# Patient Record
Sex: MALE | Race: WHITE | Employment: OTHER | URBAN - NONMETROPOLITAN AREA
[De-identification: names, ages, dates, MRNs, and addresses within clinical notes are randomized per-mention and may not be internally consistent; named-entity substitution may affect disease eponyms.]

---

## 2017-02-13 LAB
ANION GAP SERPL CALCULATED.3IONS-SCNC: 12 MMOL/L (ref 4–12)
BUN BLDV-MCNC: 20 MG/DL (ref 7–20)
CALCIUM SERPL-MCNC: 8.2 MG/DL (ref 8.8–10.5)
CHLORIDE BLD-SCNC: 100 MEQ/L (ref 101–111)
CO2: 30 MEQ/L (ref 21–32)
CREAT SERPL-MCNC: 0.82 MG/DL (ref 0.7–1.3)
CREATININE CLEARANCE: >60
GLUCOSE, FASTING: 106 MG/DL (ref 70–110)
POTASSIUM SERPL-SCNC: 3 MEQ/L (ref 3.6–5)
SODIUM BLD-SCNC: 142 MEQ/L (ref 135–145)

## 2017-04-24 ENCOUNTER — OFFICE VISIT (OUTPATIENT)
Dept: CARDIOLOGY | Age: 62
End: 2017-04-24

## 2017-04-24 VITALS
WEIGHT: 218 LBS | HEART RATE: 92 BPM | BODY MASS INDEX: 33.04 KG/M2 | SYSTOLIC BLOOD PRESSURE: 166 MMHG | DIASTOLIC BLOOD PRESSURE: 82 MMHG | HEIGHT: 68 IN

## 2017-04-24 DIAGNOSIS — I25.10 CORONARY ARTERY DISEASE INVOLVING NATIVE CORONARY ARTERY OF NATIVE HEART WITHOUT ANGINA PECTORIS: Primary | ICD-10-CM

## 2017-04-24 DIAGNOSIS — K20.90 ESOPHAGITIS: ICD-10-CM

## 2017-04-24 PROCEDURE — G8598 ASA/ANTIPLAT THER USED: HCPCS | Performed by: INTERNAL MEDICINE

## 2017-04-24 PROCEDURE — G8427 DOCREV CUR MEDS BY ELIG CLIN: HCPCS | Performed by: INTERNAL MEDICINE

## 2017-04-24 PROCEDURE — 1036F TOBACCO NON-USER: CPT | Performed by: INTERNAL MEDICINE

## 2017-04-24 PROCEDURE — G8417 CALC BMI ABV UP PARAM F/U: HCPCS | Performed by: INTERNAL MEDICINE

## 2017-04-24 PROCEDURE — 99213 OFFICE O/P EST LOW 20 MIN: CPT | Performed by: INTERNAL MEDICINE

## 2017-04-24 PROCEDURE — 3017F COLORECTAL CA SCREEN DOC REV: CPT | Performed by: INTERNAL MEDICINE

## 2017-04-24 RX ORDER — LOSARTAN POTASSIUM 25 MG/1
25 TABLET ORAL DAILY
Qty: 30 TABLET | Refills: 11 | Status: SHIPPED | OUTPATIENT
Start: 2017-04-24 | End: 2019-03-25 | Stop reason: ALTCHOICE

## 2017-12-27 ENCOUNTER — HOSPITAL ENCOUNTER (OUTPATIENT)
Dept: PHYSICAL THERAPY | Age: 62
Setting detail: THERAPIES SERIES
Discharge: HOME | End: 2017-12-27
Payer: MEDICARE

## 2017-12-27 PROCEDURE — 97161 PT EVAL LOW COMPLEX 20 MIN: CPT

## 2017-12-27 PROCEDURE — G8978 MOBILITY CURRENT STATUS: HCPCS

## 2017-12-27 PROCEDURE — G8979 MOBILITY GOAL STATUS: HCPCS

## 2017-12-27 ASSESSMENT — PAIN DESCRIPTION - PAIN TYPE: TYPE: CHRONIC PAIN

## 2017-12-27 ASSESSMENT — PAIN SCALES - GENERAL: PAINLEVEL_OUTOF10: 5

## 2017-12-27 ASSESSMENT — PAIN DESCRIPTION - LOCATION: LOCATION: BACK

## 2017-12-27 ASSESSMENT — PAIN DESCRIPTION - FREQUENCY: FREQUENCY: INTERMITTENT

## 2017-12-27 NOTE — PROGRESS NOTES
I certify that I have examined the patient below and determined that Physical Medicine and Rehabilitation service is necessary; that the secondary diagnosis for the provision of rehabilitation services is consistent with identified needs; that service will be furnished on an outpatient basis while the patient is in my care; that I approve the above plan of care for up to 90 days or as specifically noted above and will review it within that time frame or more often if the patients condition requires. Attestation, signature or co-signature of physician indicates approval of certification requirements.    ________________________ ____________ __________  Physician Signature   Date   Time  IS IT OK TO DO LAND EXERCISES, IF NOT TOLERATED, THEN CHANGE TO POOL? ________      [facility]  OUTPATIENT PHYSICAL THERAPY  EVALUATION  [facility]     Time In: 6299  Time Out: 1510  Minutes: 35  Timed Code Treatment Minutes: 0 Minutes     Date: 2017  Patient Name: Joy Bustamante,  Gender:  male        CSN: [id number]   : 1955  (57 y.o.)  Referral Date : 17     Referring Practitioner: Dr Dylan Santos      Diagnosis: deconditioning, weakness, back pain,   Treatment Diagnosis: deconditioning, low back pain, weakness   Additional Pertinent Hx: right knee sprain due to fall several years ago  SS since Oct 2011, history of RA per patient        General:  PT Visit Information  Onset Date: 16  PT Insurance Information: Medicare,  copay of 20%    Total # of Visits to Date: 1  Plan of Care/Certification Expiration Date: 18          See Medical History Questionnaire for information related to allergies and medications.     Subjective:  Comments: incidious onset of low back pain 2016  Subsided on its own  Pain returned  in Oct 2017  No previous treatment    Other (Comment): pool therapy for aerobic exercise  3 time per week for 12 weeks,             Pain:  Patient Currently in Pain: Short term goal 3: decrease low back pain ot a 5/10 so he is able to tolerate a 6 min of walking    Long term goals  Time Frame for Long term goals : 12 weeks  Long term goal 1: increase core strength to 4/5, B hip strength to 4+/5   Long term goal 2: patient will report he is able to walk longer and at  more normal speed without increased low back pain   Long term goal 3: increase hip flex to 120, hmastring mobility to 40 to increase ease of dressing,   Long term goal 4: I home program     PT G-Codes  Functional Assessment Tool Used: kaylene saran low back pain and disabiltiy questionaire   Score: 5  Functional Limitation: Mobility: Walking and moving around  Mobility: Walking and Moving Around Current Status (): At least 20 percent but less than 40 percent impaired, limited or restricted  Mobility: Walking and Moving Around Goal Status ():  At least 1 percent but less than 20 percent impaired, limited or restricted    Elridge Yemi, PT

## 2017-12-28 ENCOUNTER — HOSPITAL ENCOUNTER (OUTPATIENT)
Dept: PHYSICAL THERAPY | Age: 62
Setting detail: THERAPIES SERIES
Discharge: HOME | End: 2017-12-28
Payer: MEDICARE

## 2018-01-15 ENCOUNTER — HOSPITAL ENCOUNTER (OUTPATIENT)
Dept: PHYSICAL THERAPY | Age: 63
Setting detail: THERAPIES SERIES
Discharge: HOME | End: 2018-01-15
Payer: MEDICARE

## 2018-01-15 PROCEDURE — 97110 THERAPEUTIC EXERCISES: CPT

## 2018-01-15 NOTE — PROGRESS NOTES
endurance  Assessment: Pt toelrated session well. Pt educated in abdominal bracing to assist with moblity and decrease lumbar pressure. Pt performed standing ex. but does fatigue quickly and has increase in SOB. Pt would benefit from continued skilled PT to increase endurance, strength, and ROM to return to functional moblity with no pain.        Patient Education:  Patient Education: abdominal bracing, HEP with visuals                      Plan:  Times per week: 2  Plan weeks: 12  Specific instructions for Next Treatment: would like to intitiate land therapy in order to better monitor lumbar spine during actiity,   Sent order to  to intitiate land versus pool program   Patient is not scheduled at this time -   Current Treatment Recommendations: Strengthening, ROM, Pain Management, Balance Training, Positioning  Plan Comment: continue per PT POC    Goals:  Patient goals : less pain so he can sleep at night,     Short term goals  Time Frame for Short term goals: 5 weeks  Short term goal 1: increase abd strength to 3/5 to allow for increased support of his lumbar spine  Short term goal 2: patient will perfrom 10 reps of PRE on land or in the poo while maintaining a neutral lumbar spine    Short term goal 3: decrease low back pain ot a 5/10 so he is able to tolerate a 6 min of walking    Long term goals  Time Frame for Long term goals : 12 weeks  Long term goal 1: increase core strength to 4/5, B hip strength to 4+/5   Long term goal 2: patient will report he is able to walk longer and at  more normal speed without increased low back pain   Long term goal 3: increase hip flex to 120, hmastring mobility to 40 to increase ease of dressing,   Long term goal 4: I home program          Konrad Watts

## 2018-01-17 ENCOUNTER — HOSPITAL ENCOUNTER (OUTPATIENT)
Dept: PHYSICAL THERAPY | Age: 63
Setting detail: THERAPIES SERIES
Discharge: HOME | End: 2018-01-17
Payer: MEDICARE

## 2018-01-17 PROCEDURE — 97110 THERAPEUTIC EXERCISES: CPT

## 2018-01-17 ASSESSMENT — PAIN DESCRIPTION - LOCATION: LOCATION: KNEE

## 2018-01-17 ASSESSMENT — PAIN DESCRIPTION - ORIENTATION: ORIENTATION: RIGHT

## 2018-01-17 ASSESSMENT — PAIN SCALES - GENERAL: PAINLEVEL_OUTOF10: 5

## 2018-01-17 ASSESSMENT — PAIN DESCRIPTION - PAIN TYPE: TYPE: CHRONIC PAIN

## 2018-01-17 NOTE — PROGRESS NOTES
X3  Exercise 11: all ex. to increase strength for functional mobility          Activity Tolerance:  Activity Tolerance: Patient Tolerated treatment well    Assessment: Body structures, Functions, Activity limitations: Decreased functional mobility , Decreased strength, Decreased ROM, Decreased ADL status, Decreased endurance  Assessment: Pt tolerated session well with no increase in pain. Pt demonstrates decreased ROM/strength of R knee during ambulation. Pt working on increasing core strength to asssit with stabilization of lumbar spine.         Patient Education:  Patient Education: abdominal bracing, HEP with visuals                      Plan:  Times per week: 2  Plan weeks: 12  Specific instructions for Next Treatment: would like to intitiate land therapy in order to better monitor lumbar spine during actiity,   Sent order to  to intitiate land versus pool program   Patient is not scheduled at this time -   Current Treatment Recommendations: Strengthening, ROM, Pain Management, Balance Training, Positioning  Plan Comment: continue per PT POC    Goals:  Patient goals : less pain so he can sleep at night,     Short term goals  Time Frame for Short term goals: 5 weeks  Short term goal 1: increase abd strength to 3/5 to allow for increased support of his lumbar spine  Short term goal 2: patient will perfrom 10 reps of PRE on land or in the poo while maintaining a neutral lumbar spine    Short term goal 3: decrease low back pain ot a 5/10 so he is able to tolerate a 6 min of walking    Long term goals  Time Frame for Long term goals : 12 weeks  Long term goal 1: increase core strength to 4/5, B hip strength to 4+/5   Long term goal 2: patient will report he is able to walk longer and at  more normal speed without increased low back pain   Long term goal 3: increase hip flex to 120, hmastring mobility to 40 to increase ease of dressing,   Long term goal 4: I home program          Greta Burnett

## 2018-01-22 ENCOUNTER — HOSPITAL ENCOUNTER (OUTPATIENT)
Dept: PHYSICAL THERAPY | Age: 63
Setting detail: THERAPIES SERIES
Discharge: HOME | End: 2018-01-22
Payer: MEDICARE

## 2018-01-22 PROCEDURE — 97110 THERAPEUTIC EXERCISES: CPT

## 2018-01-24 ENCOUNTER — HOSPITAL ENCOUNTER (OUTPATIENT)
Dept: PHYSICAL THERAPY | Age: 63
Setting detail: THERAPIES SERIES
Discharge: HOME | End: 2018-01-24
Payer: MEDICARE

## 2018-01-24 PROCEDURE — 97110 THERAPEUTIC EXERCISES: CPT

## 2018-01-24 ASSESSMENT — PAIN DESCRIPTION - ORIENTATION: ORIENTATION: POSTERIOR;LOWER;RIGHT

## 2018-01-24 ASSESSMENT — PAIN SCALES - GENERAL: PAINLEVEL_OUTOF10: 5

## 2018-01-24 ASSESSMENT — PAIN DESCRIPTION - LOCATION: LOCATION: LEG

## 2018-01-24 ASSESSMENT — PAIN DESCRIPTION - PAIN TYPE: TYPE: CHRONIC PAIN

## 2018-01-24 NOTE — PROGRESS NOTES
[facility]  OUTPATIENT PHYSICAL THERAPY  DAILY NOTE  [facility]     Time In: 1430  Time Out: 1500  Minutes: 30  Timed Code Treatment Minutes: 30 Minutes     Date: 2018  Patient Name: Jm Fink,  Gender:  male        CSN: [id number]   : 1955  (57 y.o.)  Referral Date : 17    Referring Practitioner: Dr Segura Headings      Diagnosis: deconditioning, weakness, back pain,   Treatment Diagnosis: deconditioning, low back pain, weakness    Additional Pertinent Hx: right knee sprain due to fall several years ago  SS since Oct 2011, history of RA per patient                   General:  PT Visit Information  Onset Date: 16  PT Insurance Information: Medicare,  copay of 20%    Total # of Visits to Date: 5  Plan of Care/Certification Expiration Date: 18               Subjective: Other (Comment): pool therapy for aerobic exercise  3 time per week for 12 weeks,      Subjective: Pt had no new concerns. Pain:     Pain Level: 5  Pain Type: Chronic pain  Pain Location: Leg  Pain Orientation: Posterior, Lower, Right      Objective  Follows Commands: Within Functional Limits                                                                                                                       Exercises  Exercise 1: Nustep seat 8 UE 9 lvl5 for 5 min. Exercise 2: stainding in // bars: heel toe raises, marching, mini squats, 3 way hip X15 reps  Exercise 3: supine quad sets, heel slides, glut sets, SLR, hip abd,/add. X10 reps  Exercise 4: abdominal bracing with 5 sec. holds X10, pelvic tilts, dynamic lumbar stabilization ex. with abdominal bracing UE, LE, reciprocal  Exercise 5: supine: diaganols with B hand clasps (chopping pattern) hitting targets to strengthening abdominals/obliques X10 reps  Exercise 6: SKTC, sitting piriformis stretch held 15 sec.  X3  Exercise 11: all ex. to increase strength for functional mobility          Activity Tolerance:  Activity Tolerance: Patient Tolerated treatment well    Assessment: Body structures, Functions, Activity limitations: Decreased functional mobility , Decreased strength, Decreased ROM, Decreased ADL status, Decreased endurance  Assessment: Pt continues to work on core strength to assist with stabilization of lumbar spine. Pt does have weakness in R knee with standing ex. and demonstrates decreased R knee flexion with gait pattern. Pt requires positive motivation to complete tasks. Cont with skilled PT to increase strength for functional moblity. Prognosis: Good       Patient Education:  Patient Education: abdominal bracing with all activity.                        Plan:  Times per week: 2  Plan weeks: 12  Specific instructions for Next Treatment: would like to intitiate land therapy in order to better monitor lumbar spine during actiity,   Sent order to  to intitiate land versus pool program   Patient is not scheduled at this time -   Current Treatment Recommendations: Strengthening, ROM, Pain Management, Balance Training, Positioning  Plan Comment: continue per PT POC    Goals:  Patient goals : less pain so he can sleep at night,     Short term goals  Time Frame for Short term goals: 5 weeks  Short term goal 1: increase abd strength to 3/5 to allow for increased support of his lumbar spine  Short term goal 2: patient will perfrom 10 reps of PRE on land or in the poo while maintaining a neutral lumbar spine    Short term goal 3: decrease low back pain ot a 5/10 so he is able to tolerate a 6 min of walking    Long term goals  Time Frame for Long term goals : 12 weeks  Long term goal 1: increase core strength to 4/5, B hip strength to 4+/5   Long term goal 2: patient will report he is able to walk longer and at  more normal speed without increased low back pain   Long term goal 3: increase hip flex to 120, hmastring mobility to 40 to increase ease of dressing,   Long term goal 4: I home program          Hema

## 2018-01-25 ENCOUNTER — OFFICE VISIT (OUTPATIENT)
Dept: CARDIOLOGY CLINIC | Age: 63
End: 2018-01-25
Payer: MEDICARE

## 2018-01-25 VITALS
DIASTOLIC BLOOD PRESSURE: 68 MMHG | HEART RATE: 88 BPM | WEIGHT: 220 LBS | HEIGHT: 68 IN | SYSTOLIC BLOOD PRESSURE: 142 MMHG | BODY MASS INDEX: 33.34 KG/M2

## 2018-01-25 DIAGNOSIS — Z87.891 EX-SMOKER: ICD-10-CM

## 2018-01-25 DIAGNOSIS — E78.5 DYSLIPIDEMIA, GOAL LDL BELOW 100: ICD-10-CM

## 2018-01-25 DIAGNOSIS — I10 ESSENTIAL HYPERTENSION: ICD-10-CM

## 2018-01-25 DIAGNOSIS — I25.10 CORONARY ARTERY DISEASE INVOLVING NATIVE CORONARY ARTERY OF NATIVE HEART WITHOUT ANGINA PECTORIS: Primary | ICD-10-CM

## 2018-01-25 DIAGNOSIS — I73.9 CLAUDICATION (HCC): ICD-10-CM

## 2018-01-25 PROCEDURE — G8484 FLU IMMUNIZE NO ADMIN: HCPCS | Performed by: INTERNAL MEDICINE

## 2018-01-25 PROCEDURE — 93000 ELECTROCARDIOGRAM COMPLETE: CPT | Performed by: INTERNAL MEDICINE

## 2018-01-25 PROCEDURE — G8598 ASA/ANTIPLAT THER USED: HCPCS | Performed by: INTERNAL MEDICINE

## 2018-01-25 PROCEDURE — 3017F COLORECTAL CA SCREEN DOC REV: CPT | Performed by: INTERNAL MEDICINE

## 2018-01-25 PROCEDURE — G8417 CALC BMI ABV UP PARAM F/U: HCPCS | Performed by: INTERNAL MEDICINE

## 2018-01-25 PROCEDURE — 99213 OFFICE O/P EST LOW 20 MIN: CPT | Performed by: INTERNAL MEDICINE

## 2018-01-25 PROCEDURE — 1036F TOBACCO NON-USER: CPT | Performed by: INTERNAL MEDICINE

## 2018-01-25 PROCEDURE — G8427 DOCREV CUR MEDS BY ELIG CLIN: HCPCS | Performed by: INTERNAL MEDICINE

## 2018-01-25 RX ORDER — TRAMADOL HYDROCHLORIDE 50 MG/1
50 TABLET ORAL EVERY 6 HOURS PRN
COMMUNITY
End: 2020-09-24

## 2018-01-29 ENCOUNTER — HOSPITAL ENCOUNTER (OUTPATIENT)
Dept: PHYSICAL THERAPY | Age: 63
Setting detail: THERAPIES SERIES
Discharge: HOME | End: 2018-01-29
Payer: MEDICARE

## 2018-01-29 PROCEDURE — 97110 THERAPEUTIC EXERCISES: CPT

## 2018-01-29 ASSESSMENT — PAIN DESCRIPTION - LOCATION: LOCATION: KNEE

## 2018-01-29 ASSESSMENT — PAIN DESCRIPTION - PAIN TYPE: TYPE: CHRONIC PAIN

## 2018-01-29 ASSESSMENT — PAIN DESCRIPTION - ORIENTATION: ORIENTATION: RIGHT

## 2018-01-29 ASSESSMENT — PAIN SCALES - GENERAL: PAINLEVEL_OUTOF10: 5

## 2018-01-29 NOTE — PROGRESS NOTES
abdominals/obliques X10 reps  Exercise 6: SKTC, sitting piriformis stretch held 15 sec. X3  Exercise 11: all ex. to increase strength for functional mobility          Activity Tolerance:  Activity Tolerance: Patient Tolerated treatment well    Assessment: Body structures, Functions, Activity limitations: Decreased functional mobility , Decreased strength, Decreased ROM, Decreased ADL status, Decreased endurance  Assessment: Pt continues to work on balance and core strengthening ex. Pt has some weakness and pain in R knee affecting his gait pattern. Prognosis: Good       Patient Education:  Patient Education: abdominal bracing with all activity.                        Plan:  Times per week: 2  Plan weeks: 12  Specific instructions for Next Treatment: would like to intitiate land therapy in order to better monitor lumbar spine during actiity,   Sent order to Dr to intitiate land versus pool program   Patient is not scheduled at this time -   Current Treatment Recommendations: Strengthening, ROM, Pain Management, Balance Training, Positioning  Plan Comment: continue per PT POC    Goals:  Patient goals : less pain so he can sleep at night,     Short term goals  Time Frame for Short term goals: 5 weeks  Short term goal 1: increase abd strength to 3/5 to allow for increased support of his lumbar spine  Short term goal 2: patient will perfrom 10 reps of PRE on land or in the poo while maintaining a neutral lumbar spine    Short term goal 3: decrease low back pain ot a 5/10 so he is able to tolerate a 6 min of walking    Long term goals  Time Frame for Long term goals : 12 weeks  Long term goal 1: increase core strength to 4/5, B hip strength to 4+/5   Long term goal 2: patient will report he is able to walk longer and at  more normal speed without increased low back pain   Long term goal 3: increase hip flex to 120, hmastring mobility to 40 to increase ease of dressing,   Long term goal 4: I home program          Debbie Hameed

## 2018-01-30 ENCOUNTER — HOSPITAL ENCOUNTER (OUTPATIENT)
Dept: INTERVENTIONAL RADIOLOGY/VASCULAR | Age: 63
Discharge: HOME | End: 2018-01-30
Payer: MEDICARE

## 2018-01-30 DIAGNOSIS — I73.9 CLAUDICATION (HCC): ICD-10-CM

## 2018-01-30 PROCEDURE — 93923 UPR/LXTR ART STDY 3+ LVLS: CPT

## 2018-01-30 NOTE — PROGRESS NOTES
congestive heart failure and these include weighing one self and if gains 3 pounds to take additional water pill, fluid restrict to 2 liters a day, 2 gram sodium intake and if increased thirst to seek medical attention. The patient is educated on symptoms of heart disease that include chest pain and passing out, dizziness, etc and to report them if there is any change or go to emergency room. Patient is advised to exercise 30 mins a day three times a week and about weight loss, balance diet and more fruits and vegetables. A low fat, low cholesterol is discussed with the patient. Return in about 4 weeks (around 2/22/2018).

## 2018-02-01 ENCOUNTER — HOSPITAL ENCOUNTER (OUTPATIENT)
Dept: PHYSICAL THERAPY | Age: 63
Setting detail: THERAPIES SERIES
Discharge: HOME | End: 2018-02-01
Payer: MEDICARE

## 2018-02-01 PROCEDURE — G8978 MOBILITY CURRENT STATUS: HCPCS

## 2018-02-01 PROCEDURE — G8979 MOBILITY GOAL STATUS: HCPCS

## 2018-02-01 PROCEDURE — 97110 THERAPEUTIC EXERCISES: CPT

## 2018-02-01 NOTE — PROGRESS NOTES
maintian a neutral spine with while  [id number] reps of PREs in standing, and supine,    Short term goal 3: decrease low back pain ot a 5/10 so he is able to tolerate a 6 min of walking-- met for low back pain-- unable to attempt 6 min of walking due to uncontrolled coughing and clod  will attempt when breathing imporves  not met  contiinue goal     Long term goals  Time Frame for Long term goals : 12 weeks-- not met  only seen for 8 visits,   Long term goal 1: increase core strength to 4/5, B hip strength to 4+/5   Long term goal 2: patient will report he is able to walk longer and at  more normal speed without increased low back pain   Long term goal 3: increase hip flex to 120, hamstring mobility to 40 to increase ease of dressing,   Long term goal 4: I home program     PT G-Codes  Functional Assessment Tool Used: kaylene Mark low back pain and disabilty questionaire  Score: 7  ( increased due to right knee pain, not allows pain due to low back)   Functional Limitation: Mobility: Walking and moving around  Mobility: Walking and Moving Around Current Status (): At least 20 percent but less than 40 percent impaired, limited or restricted  Mobility: Walking and Moving Around Goal Status ():  At least 1 percent but less than 20 percent impaired, limited or restricted    Kandace Fails, PT

## 2018-02-07 ENCOUNTER — HOSPITAL ENCOUNTER (OUTPATIENT)
Dept: PHYSICAL THERAPY | Age: 63
Setting detail: THERAPIES SERIES
Discharge: HOME | End: 2018-02-07
Payer: MEDICARE

## 2018-02-07 PROCEDURE — 97110 THERAPEUTIC EXERCISES: CPT

## 2018-02-07 ASSESSMENT — PAIN SCALES - GENERAL: PAINLEVEL_OUTOF10: 5

## 2018-02-07 ASSESSMENT — PAIN DESCRIPTION - ORIENTATION: ORIENTATION: RIGHT

## 2018-02-07 ASSESSMENT — PAIN DESCRIPTION - PAIN TYPE: TYPE: CHRONIC PAIN

## 2018-02-07 ASSESSMENT — PAIN DESCRIPTION - LOCATION: LOCATION: KNEE

## 2018-02-08 ENCOUNTER — HOSPITAL ENCOUNTER (OUTPATIENT)
Dept: PHYSICAL THERAPY | Age: 63
Setting detail: THERAPIES SERIES
Discharge: HOME | End: 2018-02-08
Payer: MEDICARE

## 2018-02-08 PROCEDURE — 97110 THERAPEUTIC EXERCISES: CPT

## 2018-02-08 ASSESSMENT — PAIN DESCRIPTION - PAIN TYPE: TYPE: CHRONIC PAIN

## 2018-02-08 NOTE — PROGRESS NOTES
[facility]  OUTPATIENT PHYSICAL THERAPY  DAILY NOTE  Jaja Mansfield     Time In: 9104  Time Out: 4140  Minutes: 28  Timed Code Treatment Minutes: 28 Minutes     Date: 2018  Patient Name: Wardell Schirmer,  Gender:  male        CSN: [id number]   : 1955  (57 y.o.)  Referral Date : 17    Referring Practitioner: Dr Jauregui Marking      Diagnosis: deconditioning, weakness, back pain,   Treatment Diagnosis: deconditioning, low back pain, weakness    Additional Pertinent Hx: right knee sprain due to fall several years ago  SS since Oct 2011, history of RA per patient                   General:  PT Visit Information  Onset Date: 16  PT Insurance Information: Medicare,  copay of 20%    Total # of Visits to Date: 9  Plan of Care/Certification Expiration Date: 18  Progress Note Counter: PN on 2- visit, due on 18 visit,               Subjective:  Comments: incidious onset of low back pain 2016  Subsided on its own  Pain returned  in Oct 2017  No previous treatment    Other (Comment): pool therapy for aerobic exercise  3 time per week for 12 weeks, OK fo r land treatment      Subjective: Pt stated having sore stiff R knee. 8/10   denies ankle and calf pain,      Pain:  Patient Currently in Pain: Yes  Pain Type: Chronic pain      Objective  Follows Commands: Within Functional Limits                                                  Exercises  Exercise 1: Nustep seat 8 UE 9 lvl5 for 5 min. Exercise 2: stainding in // bars: heel toe raises, marching, mini squats on blue foam,  3 way hip X10 reps with yellow band    Exercise 3: supine quad sets, heel slides, glut sets, SLR, hip abd,/add.  X15 reps  Exercise 4: abdominal bracing with 5 sec. holds X10, pelvic tilts, dynamic lumbar stabilization ex. with abdominal bracing UE, LE, reciprocal  Exercise 5: supine: diagonals with B hand clasps (chopping pattern) hitting targets to strengthening abdominals/obliques X10 reps  Exercise

## 2018-02-12 ENCOUNTER — HOSPITAL ENCOUNTER (OUTPATIENT)
Dept: PHYSICAL THERAPY | Age: 63
Setting detail: THERAPIES SERIES
Discharge: HOME | End: 2018-02-12
Payer: MEDICARE

## 2018-02-12 ENCOUNTER — TELEPHONE (OUTPATIENT)
Dept: CARDIOLOGY CLINIC | Age: 63
End: 2018-02-12

## 2018-02-12 PROCEDURE — 97110 THERAPEUTIC EXERCISES: CPT

## 2018-02-12 ASSESSMENT — PAIN DESCRIPTION - LOCATION: LOCATION: KNEE

## 2018-02-12 ASSESSMENT — PAIN DESCRIPTION - ORIENTATION: ORIENTATION: RIGHT

## 2018-02-12 ASSESSMENT — PAIN SCALES - GENERAL: PAINLEVEL_OUTOF10: 10

## 2018-02-12 ASSESSMENT — PAIN DESCRIPTION - PAIN TYPE: TYPE: CHRONIC PAIN

## 2018-02-12 NOTE — PROGRESS NOTES
due to uncontrolled coughing and clod  will attempt when breathing imporves  not met  contiinue goal     Long term goals  Time Frame for Long term goals : 12 weeks-- not met  only seen for 8 visits,   Long term goal 1: increase core strength to 4/5, B hip strength to 4+/5   Long term goal 2: patient will report he is able to walk longer and at  more normal speed without increased low back pain   Long term goal 3: increase hip flex to 120, hamstring mobility to 40 to increase ease of dressing,   Long term goal 4: I home program          Fabiola Sloan

## 2018-02-14 ENCOUNTER — HOSPITAL ENCOUNTER (OUTPATIENT)
Dept: PHYSICAL THERAPY | Age: 63
Setting detail: THERAPIES SERIES
Discharge: HOME | End: 2018-02-14
Payer: MEDICARE

## 2018-02-14 PROCEDURE — 97110 THERAPEUTIC EXERCISES: CPT

## 2018-02-14 ASSESSMENT — PAIN DESCRIPTION - ORIENTATION: ORIENTATION: RIGHT

## 2018-02-14 ASSESSMENT — PAIN DESCRIPTION - LOCATION: LOCATION: KNEE

## 2018-02-14 ASSESSMENT — PAIN DESCRIPTION - PAIN TYPE: TYPE: CHRONIC PAIN

## 2018-02-14 ASSESSMENT — PAIN SCALES - GENERAL: PAINLEVEL_OUTOF10: 5

## 2018-02-19 ENCOUNTER — HOSPITAL ENCOUNTER (OUTPATIENT)
Dept: PHYSICAL THERAPY | Age: 63
Setting detail: THERAPIES SERIES
Discharge: HOME | End: 2018-02-19
Payer: MEDICARE

## 2018-02-19 PROCEDURE — 97110 THERAPEUTIC EXERCISES: CPT

## 2018-02-19 ASSESSMENT — PAIN SCALES - GENERAL: PAINLEVEL_OUTOF10: 5

## 2018-02-19 ASSESSMENT — PAIN DESCRIPTION - ORIENTATION: ORIENTATION: RIGHT

## 2018-02-19 ASSESSMENT — PAIN DESCRIPTION - LOCATION: LOCATION: KNEE

## 2018-02-19 ASSESSMENT — PAIN DESCRIPTION - PAIN TYPE: TYPE: CHRONIC PAIN

## 2018-02-21 ENCOUNTER — HOSPITAL ENCOUNTER (OUTPATIENT)
Dept: PHYSICAL THERAPY | Age: 63
Setting detail: THERAPIES SERIES
Discharge: HOME | End: 2018-02-21
Payer: MEDICARE

## 2018-02-21 PROCEDURE — 97110 THERAPEUTIC EXERCISES: CPT

## 2018-02-21 ASSESSMENT — PAIN DESCRIPTION - LOCATION: LOCATION: KNEE

## 2018-02-21 ASSESSMENT — PAIN SCALES - GENERAL: PAINLEVEL_OUTOF10: 5

## 2018-02-21 ASSESSMENT — PAIN DESCRIPTION - ORIENTATION: ORIENTATION: RIGHT

## 2018-02-21 ASSESSMENT — PAIN DESCRIPTION - PAIN TYPE: TYPE: CHRONIC PAIN

## 2018-02-21 NOTE — PROGRESS NOTES
pelvic circles [id number], CGA for safety. Exercise 6: SKTC, sitting piriformis stretch held 15 sec. X3  Exercise 7: rockerboard fwd/lat X15 with 10sec. balance (decreased balance laterally)  Exercise 8: NK table 5# flexion/ 5#extension cues to slow motion X10 reps with 2 sets  Exercise 11: all ex. to increase strength for functional mobility          Activity Tolerance:  Activity Tolerance: Patient limited by endurance    Assessment: Body structures, Functions, Activity limitations: Decreased functional mobility , Decreased strength, Decreased ROM, Decreased ADL status, Decreased endurance  Assessment: Pt demonstrated decreased endurance throughout session. Pt does have instability with R knee limiting functional mobility. Patient Education:  Patient Education: cont with HEP, there. ex. Plan:  Times per week: 2  Plan weeks: 4-5  Specific instructions for Next Treatment: continue with exercise , progress to rocerboad for ankle stability , and increase reps and resisitance for exerise.   Current Treatment Recommendations: Strengthening, ROM, Pain Management, Balance Training, Positioning  Plan Comment: continue per PT POC    Goals:  Patient goals : less pain so he can sleep at night    Short term goals  Time Frame for Short term goals: 5 weeks  Short term goal 2: patient will perform 10 reps of PRE on land or in the pool while maintaining a neutral lumbar spine  -- met  new goal-- maintian a neutral spine with while  [id number] reps of PREs in standing, and supine,    Short term goal 3: decrease low back pain ot a 5/10 so he is able to tolerate a 6 min of walking-- met for low back pain-- unable to attempt 6 min of walking due to uncontrolled coughing and clod  will attempt when breathing imporves  not met  contiinue goal     Long term goals  Time Frame for Long term goals : 12 weeks-- not met  only seen for 8 visits,   Long term goal 1: increase core strength to 4/5, B hip strength to 4+/5   Long term goal 2: patient will report he is able to walk longer and at  more normal speed without increased low back pain   Long term goal 3: increase hip flex to 120, hamstring mobility to 40 to increase ease of dressing,   Long term goal 4: I home program          Fabiola Sloan

## 2018-02-26 ENCOUNTER — HOSPITAL ENCOUNTER (OUTPATIENT)
Dept: PHYSICAL THERAPY | Age: 63
Setting detail: THERAPIES SERIES
Discharge: HOME | End: 2018-02-26
Payer: MEDICARE

## 2018-02-26 PROCEDURE — 97110 THERAPEUTIC EXERCISES: CPT

## 2018-02-26 ASSESSMENT — PAIN DESCRIPTION - PAIN TYPE: TYPE: CHRONIC PAIN

## 2018-02-26 ASSESSMENT — PAIN DESCRIPTION - ORIENTATION: ORIENTATION: RIGHT

## 2018-02-26 ASSESSMENT — PAIN DESCRIPTION - LOCATION: LOCATION: KNEE

## 2018-02-26 ASSESSMENT — PAIN SCALES - GENERAL: PAINLEVEL_OUTOF10: 5

## 2018-02-26 NOTE — PROGRESS NOTES
breathing imporves  not met  contiinue goal     Long term goals  Time Frame for Long term goals : 12 weeks-- not met  only seen for 8 visits,   Long term goal 1: increase core strength to 4/5, B hip strength to 4+/5   Long term goal 2: patient will report he is able to walk longer and at  more normal speed without increased low back pain   Long term goal 3: increase hip flex to 120, hamstring mobility to 40 to increase ease of dressing,   Long term goal 4: I home program          Mikaela Wahl

## 2018-03-01 ENCOUNTER — HOSPITAL ENCOUNTER (OUTPATIENT)
Dept: PHYSICAL THERAPY | Age: 63
Setting detail: THERAPIES SERIES
Discharge: HOME | End: 2018-03-01
Payer: MEDICARE

## 2018-03-01 PROCEDURE — G8979 MOBILITY GOAL STATUS: HCPCS

## 2018-03-01 PROCEDURE — 97110 THERAPEUTIC EXERCISES: CPT

## 2018-03-01 PROCEDURE — G8980 MOBILITY D/C STATUS: HCPCS

## 2018-03-01 NOTE — PROGRESS NOTES
[facility]  OUTPATIENT PHYSICAL THERAPY  DISCHARGE NOTE  Tiny [facility]     Time In: 0191  Time Out: 1600  Minutes: 45  Timed Code Treatment Minutes: 45 Minutes     Date: 3/1/2018  Patient Name: Omayra Wilson,  Gender:  male        CSN: [id number]   : 1955  (57 y.o.)  Referral Date : 17    Referring Practitioner: Dr Les John      Diagnosis: deconditioning, weakness, back pain,   Treatment Diagnosis: deconditioning, low back pain, weakness    Additional Pertinent Hx: right knee sprain due to fall several years ago  SS since Oct 2011, history of RA per patient                   General:  PT Visit Information  Onset Date: 16  PT Insurance Information: Medicare,  co-pay of 20%    Total # of Visits to Date: 15  Plan of Care/Certification Expiration Date: 18  Progress Note Counter: PN on 2-1  8th visit, ,PN 3-1 on 15th visit,                Subjective:  Comments: incidious onset of low back pain 2016  Subsided on its own  Pain returned  in Oct 2017  No previous treatment    Other (Comment): pool therapy for aerobic exercise  3 time per week for 12 weeks, OK for land treatment      Subjective: No back pain. RIght knee pain is his cheif complaint,      Pain:  Patient Currently in Pain: Yes         Objective  Follows Commands: Within Functional Limits      Right knee crepitus with AROM    Strength:  4/5, right ankle DF 3+/5, PF 4+/5 IN and eversion 4/5      Gait --lacks  knee flexion with push off on the right,  Good trunk stability during exercise as stated below. Exercises  Exercise 1: Nustep seat 8 UE 9 lvl5 for 5 min. Exercise 2: stainding in // bars: on blue foam, heel toe raises, marching, mini squats. ,  3 way hip X15 reps with red band    Exercise 3: supine quad sets, glut sets, SLR, hip abd,/add.  X10 reps  Exercise 4: abdominal bracing with 5 sec. holds X10, dynamic lumbar stabilization ex. with abdominal bracing UE, LE, reciprocal on blue therapy ball,

## 2018-03-20 ENCOUNTER — OFFICE VISIT (OUTPATIENT)
Dept: CARDIOLOGY CLINIC | Age: 63
End: 2018-03-20
Payer: MEDICARE

## 2018-03-20 VITALS
BODY MASS INDEX: 33.74 KG/M2 | DIASTOLIC BLOOD PRESSURE: 64 MMHG | HEIGHT: 68 IN | HEART RATE: 88 BPM | WEIGHT: 222.6 LBS | SYSTOLIC BLOOD PRESSURE: 134 MMHG

## 2018-03-20 DIAGNOSIS — I10 ESSENTIAL HYPERTENSION: ICD-10-CM

## 2018-03-20 DIAGNOSIS — E78.5 DYSLIPIDEMIA, GOAL LDL BELOW 100: ICD-10-CM

## 2018-03-20 DIAGNOSIS — I25.10 CORONARY ARTERY DISEASE INVOLVING NATIVE CORONARY ARTERY OF NATIVE HEART WITHOUT ANGINA PECTORIS: Primary | ICD-10-CM

## 2018-03-20 DIAGNOSIS — Z87.891 EX-SMOKER: ICD-10-CM

## 2018-03-20 PROCEDURE — G8484 FLU IMMUNIZE NO ADMIN: HCPCS | Performed by: INTERNAL MEDICINE

## 2018-03-20 PROCEDURE — G8417 CALC BMI ABV UP PARAM F/U: HCPCS | Performed by: INTERNAL MEDICINE

## 2018-03-20 PROCEDURE — 99213 OFFICE O/P EST LOW 20 MIN: CPT | Performed by: INTERNAL MEDICINE

## 2018-03-20 PROCEDURE — G8427 DOCREV CUR MEDS BY ELIG CLIN: HCPCS | Performed by: INTERNAL MEDICINE

## 2018-03-20 PROCEDURE — 1036F TOBACCO NON-USER: CPT | Performed by: INTERNAL MEDICINE

## 2018-03-20 PROCEDURE — G8598 ASA/ANTIPLAT THER USED: HCPCS | Performed by: INTERNAL MEDICINE

## 2018-03-20 PROCEDURE — 3017F COLORECTAL CA SCREEN DOC REV: CPT | Performed by: INTERNAL MEDICINE

## 2018-03-25 NOTE — PROGRESS NOTES
This patient is followed regularly by Dr. Anda Schilder, JOSE DANIEL. Chief Complaint   Patient presents with    1 Month Follow-Up     VIKI's    Coronary Artery Disease         Current Outpatient Prescriptions   Medication Sig Dispense Refill    Potassium Chloride Apple ER (KLOR-CON M20 PO) Take by mouth      traMADol (ULTRAM) 50 MG tablet Take 50 mg by mouth every 6 hours as needed for Pain.  Glucos-Chond-Sterol-Fish Oil (GLUCOSAMINE CHONDROITIN PLUS PO) Take by mouth      losartan (COZAAR) 25 MG tablet Take 1 tablet by mouth daily 30 tablet 11    amLODIPine (NORVASC) 10 MG tablet Take 10 mg by mouth daily      aspirin 81 MG EC tablet Take 81 mg by mouth daily      Insulin Detemir (LEVEMIR SC) Inject into the skin nightly       acetaminophen 650 MG TABS Take 650 mg by mouth every 4 hours as needed 120 tablet 3    pantoprazole (PROTONIX) 40 MG tablet Take 1 tablet by mouth 2 times daily (Patient taking differently: Take 40 mg by mouth daily ) 60 tablet 2    metFORMIN (GLUCOPHAGE) 500 MG tablet Take 1,000 mg by mouth 2 times daily (with meals)      terazosin (HYTRIN) 1 MG capsule Take 1 mg by mouth nightly.  gabapentin (NEURONTIN) 300 MG capsule Take 300 mg by mouth nightly.  glimepiride (AMARYL) 2 MG tablet Take 2 mg by mouth every morning (before breakfast).  simvastatin (ZOCOR) 40 MG tablet Take 40 mg by mouth nightly.  ferrous sulfate 325 (65 FE) MG tablet Take 325 mg by mouth 2 times daily       metoprolol (LOPRESSOR) 25 MG tablet Take 25 mg by mouth 2 times daily Takes 50mg BID       No current facility-administered medications for this visit. Review of Systems - General ROS: negative  Psychological ROS: negative  Hematological and Lymphatic ROS: No history of blood clots or bleeding disorder.    Respiratory ROS: no cough, shortness of breath, or wheezing  Cardiovascular ROS: no chest pain or dyspnea on exertion  Gastrointestinal ROS: negative  Genito-Urinary ROS: no dysuria, trouble voiding, or hematuria  Musculoskeletal ROS: negative  Neurological ROS: no TIA or stroke symptoms  Dermatological ROS: negative      /64 Comment: LEFT  Pulse 88   Ht 5' 8\" (1.727 m)   Wt 222 lb 9.6 oz (101 kg)   BMI 33.85 kg/m²       Physical Examination: General appearance - alert, well appearing, and in no distress  Mental status - alert, oriented to person, place, and time  Neck - supple, no significant adenopathy, no JVD, or carotid bruits  Chest - clear to auscultation, no wheezes, rales or rhonchi, symmetric air entry  Heart - normal rate, regular rhythm, normal S1, S2, no murmurs, rubs, clicks or gallops  Abdomen - soft, nontender, nondistended, no masses or organomegaly  Neurological - alert, oriented, normal speech, no focal findings or movement disorder noted  Musculoskeletal - no joint tenderness, deformity or swelling  Extremities - peripheral pulses 1+, no pedal edema, no clubbing or cyanosis  Skin - normal coloration and turgor, no rashes, no suspicious skin lesions noted      EKG   Normal sinus rhythm    Echo 2016  Left ventricle size is normal.   Ejection fraction is visually estimated at 55%.   Mildly dilated right ventricle.   Mild tricuspid regurgitation.   Tricuspid valve is structurally normal.    Stress test 2016  Inferior ischemia    Cath 2016  50-60% disease in the right coronary artery      Lab Results   Component Value Date    ALKPHOS 80 03/10/2016    ALT 19 02/13/2017    AST 24 02/13/2017    PROT 7.5 03/10/2016    BILITOT 0.2 03/10/2016    LABALBU 3.9 03/10/2016       Lab Results   Component Value Date    TRIG 125 02/13/2017    HDL 38 02/13/2017    LDLCALC 63 07/11/2016    LDLDIRECT 57 02/13/2017       VIKI  1. Study limited by vascular calcifications. 2. Judging from waveform analysis, there is no evidence to suggest significant arterial stenosis in either leg. Assessment/Plan:    Coronary artery disease  Seems to be stable.    Denies angina or change in breathing pattern. Continue ASA/Norvasc/BB/Statin. Hypertension, on medical treatment. Seems to be under good control. Patient is compliant with medical treatment. Diabetes mellitus: Followed by family physician. Patient needs very tight control to minimize cardiac risk. Dyslipidemia: On statin, followed periodically. Patient need periodic lipid and liver profile. Patient is a advised to have their lipids and liver panel checked through family doctor. The therapeutic values that need to be met are also presented to the patient and need to achieve them is emphasized and patient agrees and accepts. Normal VIKI    Patient is educated about symptoms of congestive heart failure and these include weighing one self and if gains 3 pounds to take additional water pill, fluid restrict to 2 liters a day, 2 gram sodium intake and if increased thirst to seek medical attention. The patient is educated on symptoms of heart disease that include chest pain and passing out, dizziness, etc and to report them if there is any change or go to emergency room. Patient is advised to exercise 30 mins a day three times a week and about weight loss, balance diet and more fruits and vegetables. A low fat, low cholesterol is discussed with the patient. Return in about 1 year (around 3/20/2019).

## 2019-01-30 ENCOUNTER — APPOINTMENT (OUTPATIENT)
Dept: GENERAL RADIOLOGY | Age: 64
DRG: 603 | End: 2019-01-30
Payer: MEDICARE

## 2019-01-30 ENCOUNTER — HOSPITAL ENCOUNTER (INPATIENT)
Age: 64
LOS: 4 days | Discharge: HOME HEALTH | DRG: 603 | End: 2019-02-03
Attending: FAMILY MEDICINE | Admitting: INTERNAL MEDICINE
Payer: MEDICARE

## 2019-01-30 ENCOUNTER — APPOINTMENT (OUTPATIENT)
Dept: INTERVENTIONAL RADIOLOGY/VASCULAR | Age: 64
DRG: 603 | End: 2019-01-30
Payer: MEDICARE

## 2019-01-30 DIAGNOSIS — L03.114 CELLULITIS OF LEFT UPPER EXTREMITY: Primary | ICD-10-CM

## 2019-01-30 DIAGNOSIS — M70.22 OLECRANON BURSITIS OF LEFT ELBOW: ICD-10-CM

## 2019-01-30 LAB
ANION GAP SERPL CALCULATED.3IONS-SCNC: 14 MEQ/L (ref 8–16)
BASOPHILS # BLD: 0.3 %
BASOPHILS ABSOLUTE: 0 THOU/MM3 (ref 0–0.1)
BUN BLDV-MCNC: 30 MG/DL (ref 7–22)
C-REACTIVE PROTEIN: 19.97 MG/DL (ref 0–1)
CALCIUM SERPL-MCNC: 8.4 MG/DL (ref 8.5–10.5)
CHLORIDE BLD-SCNC: 102 MEQ/L (ref 98–111)
CO2: 25 MEQ/L (ref 23–33)
CREAT SERPL-MCNC: 1 MG/DL (ref 0.4–1.2)
EOSINOPHIL # BLD: 0.9 %
EOSINOPHILS ABSOLUTE: 0.1 THOU/MM3 (ref 0–0.4)
ERYTHROCYTE [DISTWIDTH] IN BLOOD BY AUTOMATED COUNT: 17.2 % (ref 11.5–14.5)
ERYTHROCYTE [DISTWIDTH] IN BLOOD BY AUTOMATED COUNT: 48.4 FL (ref 35–45)
GFR SERPL CREATININE-BSD FRML MDRD: 75 ML/MIN/1.73M2
GLUCOSE BLD-MCNC: 84 MG/DL (ref 70–108)
HCT VFR BLD CALC: 34.5 % (ref 42–52)
HEMOGLOBIN: 10.2 GM/DL (ref 14–18)
IMMATURE GRANS (ABS): 0.04 THOU/MM3 (ref 0–0.07)
IMMATURE GRANULOCYTES: 0.3 %
LYMPHOCYTES # BLD: 9 %
LYMPHOCYTES ABSOLUTE: 1.1 THOU/MM3 (ref 1–4.8)
MCH RBC QN AUTO: 22.9 PG (ref 26–33)
MCHC RBC AUTO-ENTMCNC: 29.6 GM/DL (ref 32.2–35.5)
MCV RBC AUTO: 77.5 FL (ref 80–94)
MONOCYTES # BLD: 11.2 %
MONOCYTES ABSOLUTE: 1.4 THOU/MM3 (ref 0.4–1.3)
NUCLEATED RED BLOOD CELLS: 0 /100 WBC
OSMOLALITY CALCULATION: 286.6 MOSMOL/KG (ref 275–300)
PLATELET # BLD: 370 THOU/MM3 (ref 130–400)
PMV BLD AUTO: 10.4 FL (ref 9.4–12.4)
POTASSIUM SERPL-SCNC: 3.7 MEQ/L (ref 3.5–5.2)
RBC # BLD: 4.45 MILL/MM3 (ref 4.7–6.1)
SEDIMENTATION RATE, ERYTHROCYTE: 66 MM/HR (ref 0–10)
SEG NEUTROPHILS: 78.3 %
SEGMENTED NEUTROPHILS ABSOLUTE COUNT: 9.8 THOU/MM3 (ref 1.8–7.7)
SODIUM BLD-SCNC: 141 MEQ/L (ref 135–145)
WBC # BLD: 12.5 THOU/MM3 (ref 4.8–10.8)

## 2019-01-30 PROCEDURE — 85651 RBC SED RATE NONAUTOMATED: CPT

## 2019-01-30 PROCEDURE — 6360000002 HC RX W HCPCS: Performed by: FAMILY MEDICINE

## 2019-01-30 PROCEDURE — 93971 EXTREMITY STUDY: CPT

## 2019-01-30 PROCEDURE — 2580000003 HC RX 258: Performed by: PHYSICIAN ASSISTANT

## 2019-01-30 PROCEDURE — 85025 COMPLETE CBC W/AUTO DIFF WBC: CPT

## 2019-01-30 PROCEDURE — 99223 1ST HOSP IP/OBS HIGH 75: CPT | Performed by: PHYSICIAN ASSISTANT

## 2019-01-30 PROCEDURE — 2580000003 HC RX 258: Performed by: FAMILY MEDICINE

## 2019-01-30 PROCEDURE — 73120 X-RAY EXAM OF HAND: CPT

## 2019-01-30 PROCEDURE — 6370000000 HC RX 637 (ALT 250 FOR IP): Performed by: PHYSICIAN ASSISTANT

## 2019-01-30 PROCEDURE — 87040 BLOOD CULTURE FOR BACTERIA: CPT

## 2019-01-30 PROCEDURE — 73090 X-RAY EXAM OF FOREARM: CPT

## 2019-01-30 PROCEDURE — 80048 BASIC METABOLIC PNL TOTAL CA: CPT

## 2019-01-30 PROCEDURE — 96374 THER/PROPH/DIAG INJ IV PUSH: CPT

## 2019-01-30 PROCEDURE — 86140 C-REACTIVE PROTEIN: CPT

## 2019-01-30 PROCEDURE — 99285 EMERGENCY DEPT VISIT HI MDM: CPT

## 2019-01-30 PROCEDURE — 36415 COLL VENOUS BLD VENIPUNCTURE: CPT

## 2019-01-30 PROCEDURE — 1200000003 HC TELEMETRY R&B

## 2019-01-30 PROCEDURE — 1200000000 HC SEMI PRIVATE

## 2019-01-30 RX ORDER — ACETAMINOPHEN 325 MG/1
650 TABLET ORAL EVERY 4 HOURS PRN
Status: DISCONTINUED | OUTPATIENT
Start: 2019-01-30 | End: 2019-02-03 | Stop reason: HOSPADM

## 2019-01-30 RX ORDER — POTASSIUM CHLORIDE 7.45 MG/ML
10 INJECTION INTRAVENOUS PRN
Status: DISCONTINUED | OUTPATIENT
Start: 2019-01-30 | End: 2019-02-03 | Stop reason: HOSPADM

## 2019-01-30 RX ORDER — SIMVASTATIN 40 MG
40 TABLET ORAL NIGHTLY
Status: DISCONTINUED | OUTPATIENT
Start: 2019-01-30 | End: 2019-02-03 | Stop reason: HOSPADM

## 2019-01-30 RX ORDER — SODIUM CHLORIDE 0.9 % (FLUSH) 0.9 %
10 SYRINGE (ML) INJECTION PRN
Status: DISCONTINUED | OUTPATIENT
Start: 2019-01-30 | End: 2019-02-03 | Stop reason: HOSPADM

## 2019-01-30 RX ORDER — FERROUS SULFATE 325(65) MG
325 TABLET ORAL 2 TIMES DAILY
Status: DISCONTINUED | OUTPATIENT
Start: 2019-01-30 | End: 2019-01-31

## 2019-01-30 RX ORDER — ONDANSETRON 2 MG/ML
4 INJECTION INTRAMUSCULAR; INTRAVENOUS EVERY 6 HOURS PRN
Status: DISCONTINUED | OUTPATIENT
Start: 2019-01-30 | End: 2019-02-03 | Stop reason: HOSPADM

## 2019-01-30 RX ORDER — TRAMADOL HYDROCHLORIDE 50 MG/1
50 TABLET ORAL EVERY 6 HOURS PRN
Status: DISCONTINUED | OUTPATIENT
Start: 2019-01-30 | End: 2019-02-03 | Stop reason: HOSPADM

## 2019-01-30 RX ORDER — KETOROLAC TROMETHAMINE 30 MG/ML
15 INJECTION, SOLUTION INTRAMUSCULAR; INTRAVENOUS ONCE
Status: COMPLETED | OUTPATIENT
Start: 2019-01-30 | End: 2019-01-30

## 2019-01-30 RX ORDER — ASPIRIN 81 MG/1
81 TABLET ORAL DAILY
Status: DISCONTINUED | OUTPATIENT
Start: 2019-01-31 | End: 2019-02-03 | Stop reason: HOSPADM

## 2019-01-30 RX ORDER — POTASSIUM CHLORIDE 20MEQ/15ML
40 LIQUID (ML) ORAL PRN
Status: DISCONTINUED | OUTPATIENT
Start: 2019-01-30 | End: 2019-02-03 | Stop reason: HOSPADM

## 2019-01-30 RX ORDER — POTASSIUM CHLORIDE 20 MEQ/1
40 TABLET, EXTENDED RELEASE ORAL PRN
Status: DISCONTINUED | OUTPATIENT
Start: 2019-01-30 | End: 2019-02-03 | Stop reason: HOSPADM

## 2019-01-30 RX ORDER — PANTOPRAZOLE SODIUM 40 MG/1
40 TABLET, DELAYED RELEASE ORAL
Status: DISCONTINUED | OUTPATIENT
Start: 2019-01-31 | End: 2019-02-03 | Stop reason: HOSPADM

## 2019-01-30 RX ORDER — POTASSIUM CHLORIDE 20 MEQ/1
20 TABLET, EXTENDED RELEASE ORAL DAILY
Status: DISCONTINUED | OUTPATIENT
Start: 2019-01-31 | End: 2019-02-03 | Stop reason: HOSPADM

## 2019-01-30 RX ORDER — LOSARTAN POTASSIUM 100 MG/1
100 TABLET ORAL DAILY
Status: DISCONTINUED | OUTPATIENT
Start: 2019-01-31 | End: 2019-02-03 | Stop reason: HOSPADM

## 2019-01-30 RX ORDER — GLIMEPIRIDE 2 MG/1
2 TABLET ORAL
Status: DISCONTINUED | OUTPATIENT
Start: 2019-01-31 | End: 2019-02-03 | Stop reason: HOSPADM

## 2019-01-30 RX ORDER — SODIUM CHLORIDE 0.9 % (FLUSH) 0.9 %
10 SYRINGE (ML) INJECTION EVERY 12 HOURS SCHEDULED
Status: DISCONTINUED | OUTPATIENT
Start: 2019-01-30 | End: 2019-02-03 | Stop reason: HOSPADM

## 2019-01-30 RX ORDER — INSULIN GLARGINE 100 [IU]/ML
16 INJECTION, SOLUTION SUBCUTANEOUS NIGHTLY
Status: DISCONTINUED | OUTPATIENT
Start: 2019-01-30 | End: 2019-02-03 | Stop reason: HOSPADM

## 2019-01-30 RX ORDER — GABAPENTIN 300 MG/1
300 CAPSULE ORAL 2 TIMES DAILY
Status: DISCONTINUED | OUTPATIENT
Start: 2019-01-30 | End: 2019-02-03 | Stop reason: HOSPADM

## 2019-01-30 RX ORDER — AMLODIPINE BESYLATE 10 MG/1
10 TABLET ORAL DAILY
Status: DISCONTINUED | OUTPATIENT
Start: 2019-01-31 | End: 2019-02-03 | Stop reason: HOSPADM

## 2019-01-30 RX ORDER — DOXAZOSIN 2 MG/1
2 TABLET ORAL NIGHTLY
Status: DISCONTINUED | OUTPATIENT
Start: 2019-01-30 | End: 2019-02-03 | Stop reason: HOSPADM

## 2019-01-30 RX ADMIN — VANCOMYCIN HYDROCHLORIDE 1500 MG: 1 INJECTION, POWDER, LYOPHILIZED, FOR SOLUTION INTRAVENOUS at 23:23

## 2019-01-30 RX ADMIN — GABAPENTIN 300 MG: 300 CAPSULE ORAL at 23:23

## 2019-01-30 RX ADMIN — METOPROLOL TARTRATE 25 MG: 25 TABLET, FILM COATED ORAL at 23:23

## 2019-01-30 RX ADMIN — DOXAZOSIN 2 MG: 2 TABLET ORAL at 23:23

## 2019-01-30 RX ADMIN — Medication 10 ML: at 23:26

## 2019-01-30 RX ADMIN — KETOROLAC TROMETHAMINE 15 MG: 30 INJECTION, SOLUTION INTRAMUSCULAR at 20:18

## 2019-01-30 RX ADMIN — FERROUS SULFATE TAB 325 MG (65 MG ELEMENTAL FE) 325 MG: 325 (65 FE) TAB at 23:23

## 2019-01-30 ASSESSMENT — ENCOUNTER SYMPTOMS
WHEEZING: 0
NAUSEA: 0
ABDOMINAL PAIN: 0
CONSTIPATION: 0
DIARRHEA: 0
EYE PAIN: 0
CHEST TIGHTNESS: 0
BACK PAIN: 0
VOMITING: 0
PHOTOPHOBIA: 0
SORE THROAT: 0
SHORTNESS OF BREATH: 0
COUGH: 0
BLOOD IN STOOL: 0
RHINORRHEA: 0

## 2019-01-30 ASSESSMENT — PAIN SCALES - GENERAL
PAINLEVEL_OUTOF10: 0
PAINLEVEL_OUTOF10: 5
PAINLEVEL_OUTOF10: 5

## 2019-01-30 ASSESSMENT — PAIN DESCRIPTION - LOCATION
LOCATION: HAND;ARM
LOCATION: ARM;HAND

## 2019-01-30 ASSESSMENT — PAIN DESCRIPTION - ORIENTATION
ORIENTATION: LEFT
ORIENTATION: LEFT

## 2019-01-30 ASSESSMENT — PAIN DESCRIPTION - DESCRIPTORS
DESCRIPTORS: ACHING
DESCRIPTORS: TIGHTNESS

## 2019-01-30 ASSESSMENT — PAIN DESCRIPTION - PAIN TYPE
TYPE: ACUTE PAIN
TYPE: ACUTE PAIN

## 2019-01-31 LAB
ANION GAP SERPL CALCULATED.3IONS-SCNC: 12 MEQ/L (ref 8–16)
AVERAGE GLUCOSE: 129 MG/DL (ref 70–126)
BASOPHILS # BLD: 0.4 %
BASOPHILS ABSOLUTE: 0 THOU/MM3 (ref 0–0.1)
BUN BLDV-MCNC: 32 MG/DL (ref 7–22)
CALCIUM SERPL-MCNC: 8.1 MG/DL (ref 8.5–10.5)
CHARACTER,SYN.FL: ABNORMAL
CHLORIDE BLD-SCNC: 103 MEQ/L (ref 98–111)
CO2: 28 MEQ/L (ref 23–33)
COLOR FLUID: YELLOW
CREAT SERPL-MCNC: 1.1 MG/DL (ref 0.4–1.2)
CRYSTALS, FLUID: ABNORMAL
EOSINOPHIL # BLD: 2.3 %
EOSINOPHILS ABSOLUTE: 0.2 THOU/MM3 (ref 0–0.4)
ERYTHROCYTE [DISTWIDTH] IN BLOOD BY AUTOMATED COUNT: 17.2 % (ref 11.5–14.5)
ERYTHROCYTE [DISTWIDTH] IN BLOOD BY AUTOMATED COUNT: 49.1 FL (ref 35–45)
GFR SERPL CREATININE-BSD FRML MDRD: 67 ML/MIN/1.73M2
GLUCOSE BLD-MCNC: 114 MG/DL (ref 70–108)
GLUCOSE BLD-MCNC: 185 MG/DL (ref 70–108)
GLUCOSE BLD-MCNC: 269 MG/DL (ref 70–108)
GLUCOSE BLD-MCNC: 87 MG/DL (ref 70–108)
GLUCOSE BLD-MCNC: 97 MG/DL (ref 70–108)
HBA1C MFR BLD: 6.3 % (ref 4.4–6.4)
HCT VFR BLD CALC: 29.9 % (ref 42–52)
HEMOGLOBIN: 8.9 GM/DL (ref 14–18)
IMMATURE GRANS (ABS): 0.02 THOU/MM3 (ref 0–0.07)
IMMATURE GRANULOCYTES: 0.2 %
INTERPRETATION: ABNORMAL
LYMPHOCYTES # BLD: 13.3 %
LYMPHOCYTES ABSOLUTE: 1.3 THOU/MM3 (ref 1–4.8)
MCH RBC QN AUTO: 23.2 PG (ref 26–33)
MCHC RBC AUTO-ENTMCNC: 29.8 GM/DL (ref 32.2–35.5)
MCV RBC AUTO: 78.1 FL (ref 80–94)
MONOCYTES # BLD: 12.3 %
MONOCYTES ABSOLUTE: 1.2 THOU/MM3 (ref 0.4–1.3)
MONONUCLEAR CELLS SYNOVIAL FLUID: 5 % (ref 0–74)
NUCLEATED RED BLOOD CELLS: 0 /100 WBC
PATHOLOGIST REVIEW: ABNORMAL
PLATELET # BLD: 315 THOU/MM3 (ref 130–400)
PMV BLD AUTO: 10.1 FL (ref 9.4–12.4)
POLYMORPHONUCLEAR CELLS SYNOVIAL FLUID: 95 % (ref 0–24)
POTASSIUM REFLEX MAGNESIUM: 3.6 MEQ/L (ref 3.5–5.2)
RBC # BLD: 3.83 MILL/MM3 (ref 4.7–6.1)
RHEUMATOID FACTOR: 15 IU/ML (ref 0–13)
SEG NEUTROPHILS: 71.5 %
SEGMENTED NEUTROPHILS ABSOLUTE COUNT: 6.7 THOU/MM3 (ref 1.8–7.7)
SODIUM BLD-SCNC: 143 MEQ/L (ref 135–145)
TOTAL NUCLEATED CELLS SYNOVIAL: ABNORMAL /CUMM (ref 0–150)
TOTAL VOLUME RECEIVED SYNOVIAL: 11 ML
URIC ACID: 4.7 MG/DL (ref 3.7–7)
WBC # BLD: 9.4 THOU/MM3 (ref 4.8–10.8)

## 2019-01-31 PROCEDURE — 6370000000 HC RX 637 (ALT 250 FOR IP): Performed by: INTERNAL MEDICINE

## 2019-01-31 PROCEDURE — 6360000002 HC RX W HCPCS: Performed by: PHYSICIAN ASSISTANT

## 2019-01-31 PROCEDURE — 85025 COMPLETE CBC W/AUTO DIFF WBC: CPT

## 2019-01-31 PROCEDURE — 2580000003 HC RX 258: Performed by: PHYSICIAN ASSISTANT

## 2019-01-31 PROCEDURE — 2580000003 HC RX 258: Performed by: INTERNAL MEDICINE

## 2019-01-31 PROCEDURE — 87186 SC STD MICRODIL/AGAR DIL: CPT

## 2019-01-31 PROCEDURE — 89060 EXAM SYNOVIAL FLUID CRYSTALS: CPT

## 2019-01-31 PROCEDURE — 1200000000 HC SEMI PRIVATE

## 2019-01-31 PROCEDURE — 87077 CULTURE AEROBIC IDENTIFY: CPT

## 2019-01-31 PROCEDURE — 87075 CULTR BACTERIA EXCEPT BLOOD: CPT

## 2019-01-31 PROCEDURE — 36415 COLL VENOUS BLD VENIPUNCTURE: CPT

## 2019-01-31 PROCEDURE — 82272 OCCULT BLD FECES 1-3 TESTS: CPT

## 2019-01-31 PROCEDURE — 6360000002 HC RX W HCPCS: Performed by: INTERNAL MEDICINE

## 2019-01-31 PROCEDURE — 87147 CULTURE TYPE IMMUNOLOGIC: CPT

## 2019-01-31 PROCEDURE — 0R9M3ZX DRAINAGE OF LEFT ELBOW JOINT, PERCUTANEOUS APPROACH, DIAGNOSTIC: ICD-10-PCS | Performed by: ORTHOPAEDIC SURGERY

## 2019-01-31 PROCEDURE — 84550 ASSAY OF BLOOD/URIC ACID: CPT

## 2019-01-31 PROCEDURE — 87070 CULTURE OTHR SPECIMN AEROBIC: CPT

## 2019-01-31 PROCEDURE — 80048 BASIC METABOLIC PNL TOTAL CA: CPT

## 2019-01-31 PROCEDURE — 6370000000 HC RX 637 (ALT 250 FOR IP): Performed by: PHYSICIAN ASSISTANT

## 2019-01-31 PROCEDURE — 99232 SBSQ HOSP IP/OBS MODERATE 35: CPT | Performed by: INTERNAL MEDICINE

## 2019-01-31 PROCEDURE — 87205 SMEAR GRAM STAIN: CPT

## 2019-01-31 PROCEDURE — 83036 HEMOGLOBIN GLYCOSYLATED A1C: CPT

## 2019-01-31 PROCEDURE — 82948 REAGENT STRIP/BLOOD GLUCOSE: CPT

## 2019-01-31 PROCEDURE — 86430 RHEUMATOID FACTOR TEST QUAL: CPT

## 2019-01-31 PROCEDURE — 1200000003 HC TELEMETRY R&B

## 2019-01-31 PROCEDURE — 89050 BODY FLUID CELL COUNT: CPT

## 2019-01-31 RX ORDER — ASCORBIC ACID 500 MG
500 TABLET ORAL
Status: DISCONTINUED | OUTPATIENT
Start: 2019-02-02 | End: 2019-02-03 | Stop reason: HOSPADM

## 2019-01-31 RX ORDER — ASCORBIC ACID 500 MG
500 TABLET ORAL ONCE
Status: COMPLETED | OUTPATIENT
Start: 2019-01-31 | End: 2019-01-31

## 2019-01-31 RX ORDER — FERROUS SULFATE 325(65) MG
325 TABLET ORAL
Status: DISCONTINUED | OUTPATIENT
Start: 2019-02-02 | End: 2019-02-03 | Stop reason: HOSPADM

## 2019-01-31 RX ORDER — NICOTINE POLACRILEX 4 MG
15 LOZENGE BUCCAL PRN
Status: DISCONTINUED | OUTPATIENT
Start: 2019-01-31 | End: 2019-02-03 | Stop reason: HOSPADM

## 2019-01-31 RX ORDER — DEXTROSE MONOHYDRATE 50 MG/ML
100 INJECTION, SOLUTION INTRAVENOUS PRN
Status: DISCONTINUED | OUTPATIENT
Start: 2019-01-31 | End: 2019-02-03 | Stop reason: HOSPADM

## 2019-01-31 RX ORDER — DEXTROSE MONOHYDRATE 25 G/50ML
12.5 INJECTION, SOLUTION INTRAVENOUS PRN
Status: DISCONTINUED | OUTPATIENT
Start: 2019-01-31 | End: 2019-02-03 | Stop reason: HOSPADM

## 2019-01-31 RX ADMIN — VANCOMYCIN HYDROCHLORIDE 1500 MG: 5 INJECTION, POWDER, LYOPHILIZED, FOR SOLUTION INTRAVENOUS at 18:31

## 2019-01-31 RX ADMIN — ASPIRIN 81 MG: 81 TABLET, COATED ORAL at 09:41

## 2019-01-31 RX ADMIN — TRAMADOL HYDROCHLORIDE 50 MG: 50 TABLET, FILM COATED ORAL at 16:04

## 2019-01-31 RX ADMIN — METOPROLOL TARTRATE 25 MG: 25 TABLET, FILM COATED ORAL at 09:40

## 2019-01-31 RX ADMIN — METOPROLOL TARTRATE 25 MG: 25 TABLET, FILM COATED ORAL at 22:07

## 2019-01-31 RX ADMIN — ACETAMINOPHEN 650 MG: 325 TABLET ORAL at 18:36

## 2019-01-31 RX ADMIN — Medication 10 ML: at 09:43

## 2019-01-31 RX ADMIN — METFORMIN HYDROCHLORIDE 1000 MG: 500 TABLET ORAL at 09:40

## 2019-01-31 RX ADMIN — INSULIN LISPRO 2 UNITS: 100 INJECTION, SOLUTION INTRAVENOUS; SUBCUTANEOUS at 22:08

## 2019-01-31 RX ADMIN — GLIMEPIRIDE 2 MG: 4 TABLET ORAL at 06:12

## 2019-01-31 RX ADMIN — ENOXAPARIN SODIUM 40 MG: 40 INJECTION SUBCUTANEOUS at 09:42

## 2019-01-31 RX ADMIN — Medication 500 MG: at 16:00

## 2019-01-31 RX ADMIN — PANTOPRAZOLE SODIUM 40 MG: 40 TABLET, DELAYED RELEASE ORAL at 06:12

## 2019-01-31 RX ADMIN — Medication 1 UNITS: at 13:09

## 2019-01-31 RX ADMIN — AMLODIPINE BESYLATE 10 MG: 10 TABLET ORAL at 09:41

## 2019-01-31 RX ADMIN — LOSARTAN POTASSIUM 100 MG: 100 TABLET, FILM COATED ORAL at 09:41

## 2019-01-31 RX ADMIN — GABAPENTIN 300 MG: 300 CAPSULE ORAL at 22:07

## 2019-01-31 RX ADMIN — TRAMADOL HYDROCHLORIDE 50 MG: 50 TABLET, FILM COATED ORAL at 22:19

## 2019-01-31 RX ADMIN — SIMVASTATIN 40 MG: 40 TABLET, FILM COATED ORAL at 22:07

## 2019-01-31 RX ADMIN — DOXAZOSIN 2 MG: 2 TABLET ORAL at 22:07

## 2019-01-31 RX ADMIN — METFORMIN HYDROCHLORIDE 1000 MG: 500 TABLET ORAL at 18:28

## 2019-01-31 RX ADMIN — INSULIN GLARGINE 16 UNITS: 100 INJECTION, SOLUTION SUBCUTANEOUS at 22:07

## 2019-01-31 RX ADMIN — GABAPENTIN 300 MG: 300 CAPSULE ORAL at 09:41

## 2019-01-31 RX ADMIN — FERROUS SULFATE TAB 325 MG (65 MG ELEMENTAL FE) 325 MG: 325 (65 FE) TAB at 09:41

## 2019-01-31 RX ADMIN — TRAMADOL HYDROCHLORIDE 50 MG: 50 TABLET, FILM COATED ORAL at 09:40

## 2019-01-31 RX ADMIN — POTASSIUM CHLORIDE 20 MEQ: 20 TABLET, EXTENDED RELEASE ORAL at 09:40

## 2019-01-31 ASSESSMENT — PAIN DESCRIPTION - ORIENTATION
ORIENTATION: LEFT
ORIENTATION: LEFT
ORIENTATION: RIGHT

## 2019-01-31 ASSESSMENT — PAIN DESCRIPTION - DESCRIPTORS
DESCRIPTORS: ACHING
DESCRIPTORS: ACHING;DISCOMFORT

## 2019-01-31 ASSESSMENT — PAIN SCALES - GENERAL
PAINLEVEL_OUTOF10: 6
PAINLEVEL_OUTOF10: 0
PAINLEVEL_OUTOF10: 10
PAINLEVEL_OUTOF10: 5
PAINLEVEL_OUTOF10: 10
PAINLEVEL_OUTOF10: 10
PAINLEVEL_OUTOF10: 5

## 2019-01-31 ASSESSMENT — PAIN DESCRIPTION - PAIN TYPE
TYPE: ACUTE PAIN

## 2019-01-31 ASSESSMENT — PAIN DESCRIPTION - LOCATION
LOCATION: KNEE
LOCATION: HAND
LOCATION: ARM;HAND

## 2019-01-31 ASSESSMENT — PAIN DESCRIPTION - FREQUENCY
FREQUENCY: CONTINUOUS
FREQUENCY: INTERMITTENT

## 2019-01-31 ASSESSMENT — PAIN DESCRIPTION - PROGRESSION
CLINICAL_PROGRESSION: NOT CHANGED
CLINICAL_PROGRESSION: NOT CHANGED

## 2019-01-31 ASSESSMENT — PAIN DESCRIPTION - ONSET
ONSET: ON-GOING
ONSET: ON-GOING

## 2019-02-01 LAB
CREAT SERPL-MCNC: 0.8 MG/DL (ref 0.4–1.2)
GFR SERPL CREATININE-BSD FRML MDRD: > 90 ML/MIN/1.73M2
GLUCOSE BLD-MCNC: 121 MG/DL (ref 70–108)
GLUCOSE BLD-MCNC: 128 MG/DL (ref 70–108)
GLUCOSE BLD-MCNC: 131 MG/DL (ref 70–108)
GLUCOSE BLD-MCNC: 162 MG/DL (ref 70–108)
HEMOCCULT STL QL: POSITIVE

## 2019-02-01 PROCEDURE — 36415 COLL VENOUS BLD VENIPUNCTURE: CPT

## 2019-02-01 PROCEDURE — 6370000000 HC RX 637 (ALT 250 FOR IP): Performed by: PHYSICIAN ASSISTANT

## 2019-02-01 PROCEDURE — 82565 ASSAY OF CREATININE: CPT

## 2019-02-01 PROCEDURE — 6360000002 HC RX W HCPCS: Performed by: INTERNAL MEDICINE

## 2019-02-01 PROCEDURE — 82948 REAGENT STRIP/BLOOD GLUCOSE: CPT

## 2019-02-01 PROCEDURE — 99232 SBSQ HOSP IP/OBS MODERATE 35: CPT | Performed by: INTERNAL MEDICINE

## 2019-02-01 PROCEDURE — 2580000003 HC RX 258: Performed by: PHYSICIAN ASSISTANT

## 2019-02-01 PROCEDURE — 2580000003 HC RX 258: Performed by: INTERNAL MEDICINE

## 2019-02-01 PROCEDURE — 6360000002 HC RX W HCPCS: Performed by: PHYSICIAN ASSISTANT

## 2019-02-01 PROCEDURE — 6370000000 HC RX 637 (ALT 250 FOR IP): Performed by: INTERNAL MEDICINE

## 2019-02-01 PROCEDURE — 1200000003 HC TELEMETRY R&B

## 2019-02-01 PROCEDURE — 1200000000 HC SEMI PRIVATE

## 2019-02-01 RX ADMIN — LOSARTAN POTASSIUM 100 MG: 100 TABLET, FILM COATED ORAL at 09:08

## 2019-02-01 RX ADMIN — PANTOPRAZOLE SODIUM 40 MG: 40 TABLET, DELAYED RELEASE ORAL at 06:28

## 2019-02-01 RX ADMIN — TRAMADOL HYDROCHLORIDE 50 MG: 50 TABLET, FILM COATED ORAL at 04:42

## 2019-02-01 RX ADMIN — ENOXAPARIN SODIUM 40 MG: 40 INJECTION SUBCUTANEOUS at 09:08

## 2019-02-01 RX ADMIN — Medication 10 ML: at 20:39

## 2019-02-01 RX ADMIN — SIMVASTATIN 40 MG: 40 TABLET, FILM COATED ORAL at 20:39

## 2019-02-01 RX ADMIN — AMLODIPINE BESYLATE 10 MG: 10 TABLET ORAL at 09:08

## 2019-02-01 RX ADMIN — POTASSIUM CHLORIDE 20 MEQ: 20 TABLET, EXTENDED RELEASE ORAL at 09:08

## 2019-02-01 RX ADMIN — GABAPENTIN 300 MG: 300 CAPSULE ORAL at 20:39

## 2019-02-01 RX ADMIN — GLIMEPIRIDE 2 MG: 4 TABLET ORAL at 06:28

## 2019-02-01 RX ADMIN — METOPROLOL TARTRATE 25 MG: 25 TABLET, FILM COATED ORAL at 20:39

## 2019-02-01 RX ADMIN — TRAMADOL HYDROCHLORIDE 50 MG: 50 TABLET, FILM COATED ORAL at 16:26

## 2019-02-01 RX ADMIN — Medication 1 UNITS: at 09:03

## 2019-02-01 RX ADMIN — METOPROLOL TARTRATE 25 MG: 25 TABLET, FILM COATED ORAL at 09:08

## 2019-02-01 RX ADMIN — METFORMIN HYDROCHLORIDE 1000 MG: 500 TABLET ORAL at 09:08

## 2019-02-01 RX ADMIN — Medication 10 ML: at 09:09

## 2019-02-01 RX ADMIN — ASPIRIN 81 MG: 81 TABLET, COATED ORAL at 09:08

## 2019-02-01 RX ADMIN — METFORMIN HYDROCHLORIDE 1000 MG: 500 TABLET ORAL at 16:26

## 2019-02-01 RX ADMIN — DOXAZOSIN 2 MG: 2 TABLET ORAL at 20:39

## 2019-02-01 RX ADMIN — ACETAMINOPHEN 650 MG: 325 TABLET ORAL at 06:28

## 2019-02-01 RX ADMIN — VANCOMYCIN HYDROCHLORIDE 1500 MG: 5 INJECTION, POWDER, LYOPHILIZED, FOR SOLUTION INTRAVENOUS at 11:34

## 2019-02-01 RX ADMIN — GABAPENTIN 300 MG: 300 CAPSULE ORAL at 09:08

## 2019-02-01 RX ADMIN — INSULIN GLARGINE 16 UNITS: 100 INJECTION, SOLUTION SUBCUTANEOUS at 20:46

## 2019-02-01 ASSESSMENT — PAIN SCALES - GENERAL
PAINLEVEL_OUTOF10: 0
PAINLEVEL_OUTOF10: 5
PAINLEVEL_OUTOF10: 1
PAINLEVEL_OUTOF10: 5
PAINLEVEL_OUTOF10: 5

## 2019-02-01 ASSESSMENT — PAIN DESCRIPTION - LOCATION
LOCATION: HAND
LOCATION: HAND

## 2019-02-01 ASSESSMENT — PAIN DESCRIPTION - FREQUENCY
FREQUENCY: INTERMITTENT
FREQUENCY: INTERMITTENT

## 2019-02-01 ASSESSMENT — PAIN DESCRIPTION - PROGRESSION
CLINICAL_PROGRESSION: NOT CHANGED
CLINICAL_PROGRESSION: NOT CHANGED

## 2019-02-01 ASSESSMENT — PAIN DESCRIPTION - PAIN TYPE
TYPE: ACUTE PAIN
TYPE: ACUTE PAIN

## 2019-02-01 ASSESSMENT — PAIN DESCRIPTION - ORIENTATION
ORIENTATION: LEFT
ORIENTATION: LEFT

## 2019-02-01 ASSESSMENT — PAIN DESCRIPTION - DESCRIPTORS
DESCRIPTORS: ACHING
DESCRIPTORS: ACHING

## 2019-02-01 ASSESSMENT — PAIN DESCRIPTION - ONSET
ONSET: ON-GOING
ONSET: ON-GOING

## 2019-02-02 LAB
BACTERIA: ABNORMAL /HPF
BILIRUBIN URINE: NEGATIVE
BLOOD, URINE: NEGATIVE
CASTS 2: ABNORMAL /LPF
CASTS UA: ABNORMAL /LPF
CHARACTER, URINE: ABNORMAL
CHARACTER,SYN.FL: ABNORMAL
COLOR FLUID: YELLOW
COLOR: YELLOW
CRYSTALS, FLUID: ABNORMAL
CRYSTALS, UA: ABNORMAL
EPITHELIAL CELLS, UA: ABNORMAL /HPF
GLUCOSE BLD-MCNC: 189 MG/DL (ref 70–108)
GLUCOSE BLD-MCNC: 255 MG/DL (ref 70–108)
GLUCOSE BLD-MCNC: 70 MG/DL (ref 70–108)
GLUCOSE BLD-MCNC: 93 MG/DL (ref 70–108)
GLUCOSE URINE: NEGATIVE MG/DL
INTERPRETATION: ABNORMAL
KETONES, URINE: NEGATIVE
LEUKOCYTE ESTERASE, URINE: NEGATIVE
MISCELLANEOUS 2: ABNORMAL
MONONUCLEAR CELLS SYNOVIAL FLUID: 12.1 % (ref 0–74)
NITRITE, URINE: NEGATIVE
PATHOLOGIST REVIEW: ABNORMAL
PH UA: 6
POLYMORPHONUCLEAR CELLS SYNOVIAL FLUID: 87.9 % (ref 0–24)
PROTEIN UA: NEGATIVE
RBC URINE: ABNORMAL /HPF
RENAL EPITHELIAL, UA: ABNORMAL
SPECIFIC GRAVITY, URINE: 1.01 (ref 1–1.03)
TOTAL NUCLEATED CELLS SYNOVIAL: 9380 /CUMM (ref 0–150)
TOTAL VOLUME RECEIVED SYNOVIAL: 33 ML
UROBILINOGEN, URINE: 0.2 EU/DL
WBC UA: ABNORMAL /HPF
YEAST: ABNORMAL

## 2019-02-02 PROCEDURE — 51798 US URINE CAPACITY MEASURE: CPT

## 2019-02-02 PROCEDURE — 6360000002 HC RX W HCPCS: Performed by: PHYSICIAN ASSISTANT

## 2019-02-02 PROCEDURE — 89060 EXAM SYNOVIAL FLUID CRYSTALS: CPT

## 2019-02-02 PROCEDURE — 87075 CULTR BACTERIA EXCEPT BLOOD: CPT

## 2019-02-02 PROCEDURE — 2580000003 HC RX 258: Performed by: PHYSICIAN ASSISTANT

## 2019-02-02 PROCEDURE — 6370000000 HC RX 637 (ALT 250 FOR IP): Performed by: INTERNAL MEDICINE

## 2019-02-02 PROCEDURE — 1200000000 HC SEMI PRIVATE

## 2019-02-02 PROCEDURE — 2580000003 HC RX 258: Performed by: INTERNAL MEDICINE

## 2019-02-02 PROCEDURE — 89050 BODY FLUID CELL COUNT: CPT

## 2019-02-02 PROCEDURE — 6370000000 HC RX 637 (ALT 250 FOR IP): Performed by: PHYSICIAN ASSISTANT

## 2019-02-02 PROCEDURE — 87070 CULTURE OTHR SPECIMN AEROBIC: CPT

## 2019-02-02 PROCEDURE — 82948 REAGENT STRIP/BLOOD GLUCOSE: CPT

## 2019-02-02 PROCEDURE — 81001 URINALYSIS AUTO W/SCOPE: CPT

## 2019-02-02 PROCEDURE — 2500000003 HC RX 250 WO HCPCS: Performed by: PHYSICIAN ASSISTANT

## 2019-02-02 PROCEDURE — 87205 SMEAR GRAM STAIN: CPT

## 2019-02-02 PROCEDURE — 99232 SBSQ HOSP IP/OBS MODERATE 35: CPT | Performed by: INTERNAL MEDICINE

## 2019-02-02 PROCEDURE — 6360000002 HC RX W HCPCS: Performed by: INTERNAL MEDICINE

## 2019-02-02 PROCEDURE — 0S9C3ZX DRAINAGE OF RIGHT KNEE JOINT, PERCUTANEOUS APPROACH, DIAGNOSTIC: ICD-10-PCS | Performed by: ORTHOPAEDIC SURGERY

## 2019-02-02 RX ORDER — COLCHICINE 0.6 MG/1
1.2 TABLET ORAL ONCE
Status: COMPLETED | OUTPATIENT
Start: 2019-02-02 | End: 2019-02-02

## 2019-02-02 RX ORDER — COLCHICINE 0.6 MG/1
0.6 TABLET ORAL ONCE
Status: COMPLETED | OUTPATIENT
Start: 2019-02-02 | End: 2019-02-02

## 2019-02-02 RX ORDER — BUPIVACAINE HYDROCHLORIDE 5 MG/ML
9 INJECTION, SOLUTION EPIDURAL; INTRACAUDAL ONCE
Status: COMPLETED | OUTPATIENT
Start: 2019-02-02 | End: 2019-02-02

## 2019-02-02 RX ORDER — CELECOXIB 100 MG/1
100 CAPSULE ORAL 2 TIMES DAILY
Status: DISCONTINUED | OUTPATIENT
Start: 2019-02-02 | End: 2019-02-03 | Stop reason: HOSPADM

## 2019-02-02 RX ORDER — NAPROXEN 250 MG/1
250 TABLET ORAL 2 TIMES DAILY WITH MEALS
Status: DISCONTINUED | OUTPATIENT
Start: 2019-02-02 | End: 2019-02-02

## 2019-02-02 RX ORDER — OXYBUTYNIN CHLORIDE 5 MG/1
5 TABLET ORAL EVERY 6 HOURS PRN
Status: DISCONTINUED | OUTPATIENT
Start: 2019-02-02 | End: 2019-02-03 | Stop reason: HOSPADM

## 2019-02-02 RX ORDER — TRIAMCINOLONE ACETONIDE 40 MG/ML
40 INJECTION, SUSPENSION INTRA-ARTICULAR; INTRAMUSCULAR ONCE
Status: COMPLETED | OUTPATIENT
Start: 2019-02-02 | End: 2019-02-02

## 2019-02-02 RX ADMIN — ENOXAPARIN SODIUM 40 MG: 40 INJECTION SUBCUTANEOUS at 09:13

## 2019-02-02 RX ADMIN — INSULIN GLARGINE 16 UNITS: 100 INJECTION, SOLUTION SUBCUTANEOUS at 21:29

## 2019-02-02 RX ADMIN — CELECOXIB 100 MG: 100 CAPSULE ORAL at 09:12

## 2019-02-02 RX ADMIN — DOXAZOSIN 2 MG: 2 TABLET ORAL at 21:27

## 2019-02-02 RX ADMIN — INSULIN LISPRO 1 UNITS: 100 INJECTION, SOLUTION INTRAVENOUS; SUBCUTANEOUS at 21:29

## 2019-02-02 RX ADMIN — GLIMEPIRIDE 2 MG: 4 TABLET ORAL at 06:34

## 2019-02-02 RX ADMIN — METFORMIN HYDROCHLORIDE 1000 MG: 500 TABLET ORAL at 09:12

## 2019-02-02 RX ADMIN — GABAPENTIN 300 MG: 300 CAPSULE ORAL at 09:12

## 2019-02-02 RX ADMIN — COLCHICINE 1.2 MG: 0.6 TABLET, FILM COATED ORAL at 07:27

## 2019-02-02 RX ADMIN — GABAPENTIN 300 MG: 300 CAPSULE ORAL at 21:27

## 2019-02-02 RX ADMIN — SIMVASTATIN 40 MG: 40 TABLET, FILM COATED ORAL at 21:27

## 2019-02-02 RX ADMIN — Medication 3 UNITS: at 11:43

## 2019-02-02 RX ADMIN — ACETAMINOPHEN 650 MG: 325 TABLET ORAL at 09:12

## 2019-02-02 RX ADMIN — METOPROLOL TARTRATE 25 MG: 25 TABLET, FILM COATED ORAL at 09:12

## 2019-02-02 RX ADMIN — METFORMIN HYDROCHLORIDE 1000 MG: 500 TABLET ORAL at 16:54

## 2019-02-02 RX ADMIN — VANCOMYCIN HYDROCHLORIDE 1500 MG: 5 INJECTION, POWDER, LYOPHILIZED, FOR SOLUTION INTRAVENOUS at 07:24

## 2019-02-02 RX ADMIN — BUPIVACAINE HYDROCHLORIDE 45 MG: 5 INJECTION, SOLUTION EPIDURAL; INTRACAUDAL at 14:00

## 2019-02-02 RX ADMIN — METOPROLOL TARTRATE 25 MG: 25 TABLET, FILM COATED ORAL at 21:27

## 2019-02-02 RX ADMIN — POTASSIUM CHLORIDE 20 MEQ: 20 TABLET, EXTENDED RELEASE ORAL at 09:12

## 2019-02-02 RX ADMIN — ASPIRIN 81 MG: 81 TABLET, COATED ORAL at 09:13

## 2019-02-02 RX ADMIN — PANTOPRAZOLE SODIUM 40 MG: 40 TABLET, DELAYED RELEASE ORAL at 06:34

## 2019-02-02 RX ADMIN — Medication 10 ML: at 09:13

## 2019-02-02 RX ADMIN — OXYBUTYNIN CHLORIDE 5 MG: 5 TABLET ORAL at 11:47

## 2019-02-02 RX ADMIN — Medication 500 MG: at 09:12

## 2019-02-02 RX ADMIN — COLCHICINE 0.6 MG: 0.6 TABLET, FILM COATED ORAL at 09:14

## 2019-02-02 RX ADMIN — CELECOXIB 100 MG: 100 CAPSULE ORAL at 21:27

## 2019-02-02 RX ADMIN — LOSARTAN POTASSIUM 100 MG: 100 TABLET, FILM COATED ORAL at 09:12

## 2019-02-02 RX ADMIN — AMLODIPINE BESYLATE 10 MG: 10 TABLET ORAL at 09:12

## 2019-02-02 RX ADMIN — TRIAMCINOLONE ACETONIDE 40 MG: 40 INJECTION, SUSPENSION INTRA-ARTICULAR; INTRAMUSCULAR at 14:00

## 2019-02-02 RX ADMIN — OXYBUTYNIN CHLORIDE 5 MG: 5 TABLET ORAL at 21:27

## 2019-02-02 RX ADMIN — Medication 10 ML: at 21:27

## 2019-02-02 RX ADMIN — FERROUS SULFATE TAB 325 MG (65 MG ELEMENTAL FE) 325 MG: 325 (65 FE) TAB at 09:12

## 2019-02-02 ASSESSMENT — PAIN DESCRIPTION - DESCRIPTORS
DESCRIPTORS: ACHING
DESCRIPTORS: ACHING

## 2019-02-02 ASSESSMENT — PAIN DESCRIPTION - ORIENTATION
ORIENTATION: LEFT
ORIENTATION: RIGHT

## 2019-02-02 ASSESSMENT — PAIN DESCRIPTION - PROGRESSION
CLINICAL_PROGRESSION: NOT CHANGED

## 2019-02-02 ASSESSMENT — PAIN DESCRIPTION - ONSET
ONSET: ON-GOING
ONSET: ON-GOING

## 2019-02-02 ASSESSMENT — PAIN SCALES - GENERAL
PAINLEVEL_OUTOF10: 1
PAINLEVEL_OUTOF10: 4
PAINLEVEL_OUTOF10: 1
PAINLEVEL_OUTOF10: 0
PAINLEVEL_OUTOF10: 5
PAINLEVEL_OUTOF10: 0
PAINLEVEL_OUTOF10: 4

## 2019-02-02 ASSESSMENT — PAIN DESCRIPTION - FREQUENCY
FREQUENCY: INTERMITTENT
FREQUENCY: INTERMITTENT

## 2019-02-02 ASSESSMENT — PAIN DESCRIPTION - LOCATION
LOCATION: KNEE
LOCATION: HAND

## 2019-02-02 ASSESSMENT — PAIN DESCRIPTION - PAIN TYPE
TYPE: ACUTE PAIN
TYPE: ACUTE PAIN

## 2019-02-03 VITALS
SYSTOLIC BLOOD PRESSURE: 166 MMHG | DIASTOLIC BLOOD PRESSURE: 84 MMHG | WEIGHT: 222 LBS | OXYGEN SATURATION: 96 % | BODY MASS INDEX: 33.65 KG/M2 | RESPIRATION RATE: 18 BRPM | HEART RATE: 84 BPM | HEIGHT: 68 IN | TEMPERATURE: 98 F

## 2019-02-03 LAB
GLUCOSE BLD-MCNC: 117 MG/DL (ref 70–108)
GLUCOSE BLD-MCNC: 160 MG/DL (ref 70–108)
VANCOMYCIN TROUGH: 14 UG/ML (ref 5–15)

## 2019-02-03 PROCEDURE — 6370000000 HC RX 637 (ALT 250 FOR IP): Performed by: INTERNAL MEDICINE

## 2019-02-03 PROCEDURE — 6360000002 HC RX W HCPCS: Performed by: PHYSICIAN ASSISTANT

## 2019-02-03 PROCEDURE — 36415 COLL VENOUS BLD VENIPUNCTURE: CPT

## 2019-02-03 PROCEDURE — 82948 REAGENT STRIP/BLOOD GLUCOSE: CPT

## 2019-02-03 PROCEDURE — 6360000002 HC RX W HCPCS: Performed by: INTERNAL MEDICINE

## 2019-02-03 PROCEDURE — 6370000000 HC RX 637 (ALT 250 FOR IP): Performed by: PHYSICIAN ASSISTANT

## 2019-02-03 PROCEDURE — 99238 HOSP IP/OBS DSCHRG MGMT 30/<: CPT | Performed by: INTERNAL MEDICINE

## 2019-02-03 PROCEDURE — 2580000003 HC RX 258: Performed by: INTERNAL MEDICINE

## 2019-02-03 PROCEDURE — 2580000003 HC RX 258: Performed by: PHYSICIAN ASSISTANT

## 2019-02-03 PROCEDURE — 80202 ASSAY OF VANCOMYCIN: CPT

## 2019-02-03 RX ORDER — CELECOXIB 100 MG/1
100 CAPSULE ORAL 2 TIMES DAILY
Qty: 60 CAPSULE | Refills: 3 | Status: ON HOLD | OUTPATIENT
Start: 2019-02-03 | End: 2019-08-02

## 2019-02-03 RX ORDER — FERROUS SULFATE 325(65) MG
TABLET ORAL
Qty: 30 TABLET | Refills: 3 | Status: SHIPPED | OUTPATIENT
Start: 2019-02-03

## 2019-02-03 RX ORDER — ASCORBIC ACID 500 MG
500 TABLET ORAL
Qty: 30 TABLET | Refills: 3 | COMMUNITY
Start: 2019-02-04

## 2019-02-03 RX ORDER — DOXYCYCLINE HYCLATE 100 MG
100 TABLET ORAL 2 TIMES DAILY
Qty: 20 TABLET | Refills: 0 | Status: SHIPPED | OUTPATIENT
Start: 2019-02-03 | End: 2019-02-13

## 2019-02-03 RX ADMIN — PANTOPRAZOLE SODIUM 40 MG: 40 TABLET, DELAYED RELEASE ORAL at 05:52

## 2019-02-03 RX ADMIN — GABAPENTIN 300 MG: 300 CAPSULE ORAL at 07:32

## 2019-02-03 RX ADMIN — GLIMEPIRIDE 2 MG: 4 TABLET ORAL at 05:52

## 2019-02-03 RX ADMIN — METOPROLOL TARTRATE 25 MG: 25 TABLET, FILM COATED ORAL at 07:32

## 2019-02-03 RX ADMIN — AMLODIPINE BESYLATE 10 MG: 10 TABLET ORAL at 07:32

## 2019-02-03 RX ADMIN — Medication 1 UNITS: at 07:37

## 2019-02-03 RX ADMIN — POTASSIUM CHLORIDE 20 MEQ: 20 TABLET, EXTENDED RELEASE ORAL at 07:31

## 2019-02-03 RX ADMIN — CELECOXIB 100 MG: 100 CAPSULE ORAL at 07:32

## 2019-02-03 RX ADMIN — ENOXAPARIN SODIUM 40 MG: 40 INJECTION SUBCUTANEOUS at 07:31

## 2019-02-03 RX ADMIN — Medication 10 ML: at 07:33

## 2019-02-03 RX ADMIN — ACETAMINOPHEN 650 MG: 325 TABLET ORAL at 07:32

## 2019-02-03 RX ADMIN — OXYBUTYNIN CHLORIDE 5 MG: 5 TABLET ORAL at 07:31

## 2019-02-03 RX ADMIN — VANCOMYCIN HYDROCHLORIDE 1500 MG: 5 INJECTION, POWDER, LYOPHILIZED, FOR SOLUTION INTRAVENOUS at 03:02

## 2019-02-03 RX ADMIN — ASPIRIN 81 MG: 81 TABLET, COATED ORAL at 07:32

## 2019-02-03 RX ADMIN — LOSARTAN POTASSIUM 100 MG: 100 TABLET, FILM COATED ORAL at 07:32

## 2019-02-03 RX ADMIN — METFORMIN HYDROCHLORIDE 1000 MG: 500 TABLET ORAL at 07:31

## 2019-02-03 ASSESSMENT — PAIN SCALES - GENERAL
PAINLEVEL_OUTOF10: 0
PAINLEVEL_OUTOF10: 1
PAINLEVEL_OUTOF10: 0

## 2019-02-03 ASSESSMENT — PAIN DESCRIPTION - DESCRIPTORS: DESCRIPTORS: ACHING

## 2019-02-03 ASSESSMENT — PAIN DESCRIPTION - LOCATION: LOCATION: KNEE

## 2019-02-03 ASSESSMENT — PAIN DESCRIPTION - FREQUENCY: FREQUENCY: INTERMITTENT

## 2019-02-03 ASSESSMENT — PAIN DESCRIPTION - ORIENTATION: ORIENTATION: RIGHT

## 2019-02-03 ASSESSMENT — PAIN DESCRIPTION - PAIN TYPE: TYPE: ACUTE PAIN

## 2019-02-03 ASSESSMENT — PAIN DESCRIPTION - PROGRESSION: CLINICAL_PROGRESSION: NOT CHANGED

## 2019-02-03 ASSESSMENT — PAIN DESCRIPTION - ONSET: ONSET: ON-GOING

## 2019-02-05 LAB
ANAEROBIC CULTURE: ABNORMAL
BLOOD CULTURE, ROUTINE: NORMAL
BLOOD CULTURE, ROUTINE: NORMAL
BODY FLUID CULTURE, STERILE: ABNORMAL
GRAM STAIN RESULT: ABNORMAL
ORGANISM: ABNORMAL

## 2019-02-07 LAB
AEROBIC CULTURE: NORMAL
ANAEROBIC CULTURE: NORMAL
GRAM STAIN RESULT: NORMAL

## 2019-03-25 ENCOUNTER — OFFICE VISIT (OUTPATIENT)
Dept: CARDIOLOGY CLINIC | Age: 64
End: 2019-03-25
Payer: MEDICARE

## 2019-03-25 VITALS
HEART RATE: 87 BPM | DIASTOLIC BLOOD PRESSURE: 76 MMHG | SYSTOLIC BLOOD PRESSURE: 137 MMHG | BODY MASS INDEX: 33.34 KG/M2 | HEIGHT: 68 IN | WEIGHT: 220 LBS

## 2019-03-25 DIAGNOSIS — Z79.4 DIABETES MELLITUS DUE TO UNDERLYING CONDITION WITH HYPEROSMOLARITY WITHOUT COMA, WITH LONG-TERM CURRENT USE OF INSULIN (HCC): ICD-10-CM

## 2019-03-25 DIAGNOSIS — Z95.820 S/P ANGIOPLASTY WITH STENT: Primary | ICD-10-CM

## 2019-03-25 DIAGNOSIS — E08.00 DIABETES MELLITUS DUE TO UNDERLYING CONDITION WITH HYPEROSMOLARITY WITHOUT COMA, WITH LONG-TERM CURRENT USE OF INSULIN (HCC): ICD-10-CM

## 2019-03-25 DIAGNOSIS — I10 ESSENTIAL HYPERTENSION: ICD-10-CM

## 2019-03-25 DIAGNOSIS — I25.10 CORONARY ARTERY DISEASE INVOLVING NATIVE CORONARY ARTERY OF NATIVE HEART WITHOUT ANGINA PECTORIS: ICD-10-CM

## 2019-03-25 PROCEDURE — 93000 ELECTROCARDIOGRAM COMPLETE: CPT | Performed by: PHYSICIAN ASSISTANT

## 2019-03-25 PROCEDURE — 99213 OFFICE O/P EST LOW 20 MIN: CPT | Performed by: PHYSICIAN ASSISTANT

## 2019-03-25 RX ORDER — DOXYCYCLINE HYCLATE 100 MG
100 TABLET ORAL 2 TIMES DAILY
Status: ON HOLD | COMMUNITY
End: 2019-08-02

## 2019-03-25 RX ORDER — TELMISARTAN 40 MG/1
40 TABLET ORAL DAILY
COMMUNITY
End: 2020-05-14 | Stop reason: SDUPTHER

## 2019-03-25 RX ORDER — ALLOPURINOL 100 MG/1
300 TABLET ORAL DAILY
COMMUNITY
End: 2021-03-02

## 2019-06-18 RX ORDER — BUPIVACAINE HYDROCHLORIDE 2.5 MG/ML
5 INJECTION, SOLUTION EPIDURAL; INFILTRATION; INTRACAUDAL ONCE
Status: CANCELLED | OUTPATIENT
Start: 2019-06-18 | End: 2019-06-18

## 2019-06-18 RX ORDER — METHYLPREDNISOLONE ACETATE 80 MG/ML
80 INJECTION, SUSPENSION INTRA-ARTICULAR; INTRALESIONAL; INTRAMUSCULAR; SOFT TISSUE ONCE
Status: CANCELLED | OUTPATIENT
Start: 2019-06-18 | End: 2019-06-18

## 2019-06-19 ENCOUNTER — HOSPITAL ENCOUNTER (OUTPATIENT)
Dept: INTERVENTIONAL RADIOLOGY/VASCULAR | Age: 64
Discharge: HOME | End: 2019-06-19
Payer: MEDICARE

## 2019-06-19 DIAGNOSIS — M25.551 HIP PAIN, RIGHT: ICD-10-CM

## 2019-06-19 PROCEDURE — 6360000002 HC RX W HCPCS

## 2019-06-19 PROCEDURE — 77002 NEEDLE LOCALIZATION BY XRAY: CPT

## 2019-06-19 PROCEDURE — 2709999900 HC NON-CHARGEABLE SUPPLY

## 2019-06-19 PROCEDURE — 6360000004 HC RX CONTRAST MEDICATION: Performed by: RADIOLOGY

## 2019-06-19 PROCEDURE — 20610 DRAIN/INJ JOINT/BURSA W/O US: CPT

## 2019-06-19 PROCEDURE — 2500000003 HC RX 250 WO HCPCS

## 2019-06-19 PROCEDURE — 2500000003 HC RX 250 WO HCPCS: Performed by: RADIOLOGY

## 2019-06-19 RX ORDER — TRIAMCINOLONE ACETONIDE 40 MG/ML
40 INJECTION, SUSPENSION INTRA-ARTICULAR; INTRAMUSCULAR ONCE
Status: COMPLETED | OUTPATIENT
Start: 2019-06-19 | End: 2019-06-19

## 2019-06-19 RX ORDER — BUPIVACAINE HYDROCHLORIDE 2.5 MG/ML
2 INJECTION, SOLUTION INFILTRATION; PERINEURAL ONCE
Status: COMPLETED | OUTPATIENT
Start: 2019-06-19 | End: 2019-06-19

## 2019-06-19 RX ADMIN — BUPIVACAINE HYDROCHLORIDE 2 ML: 2.5 INJECTION, SOLUTION EPIDURAL; INFILTRATION; INTRACAUDAL; PERINEURAL at 14:06

## 2019-06-19 RX ADMIN — TRIAMCINOLONE ACETONIDE 40 MG: 40 INJECTION, SUSPENSION INTRA-ARTICULAR; INTRAMUSCULAR at 14:06

## 2019-06-19 RX ADMIN — IOTHALAMATE MEGLUMINE 0.5 ML: 600 INJECTION INTRAVASCULAR at 14:06

## 2019-06-19 ASSESSMENT — PAIN DESCRIPTION - PAIN TYPE: TYPE: CHRONIC PAIN

## 2019-06-19 ASSESSMENT — PAIN SCALES - GENERAL
PAINLEVEL_OUTOF10: 5
PAINLEVEL_OUTOF10: 5

## 2019-06-19 ASSESSMENT — PAIN DESCRIPTION - LOCATION: LOCATION: HIP

## 2019-06-19 NOTE — PROGRESS NOTES
Patient received in IR for right hip injection  1355 This procedure has been fully reviewed with the patient and written informed consent has been obtained. [id number] Procedure started with    1406 Procedure completed; patient tolerated well. Bandaid dressing applied. No bleeding noted. Pt. Has no complaints of numbness or tingling. 1406  Patient off table; comfort ensured. 1407 Patient taken to OP and ambulating.

## 2019-06-19 NOTE — BRIEF OP NOTE
Department of Radiology  Post Procedure Progress Note      Pre-Procedure Diagnosis:  Right hip pain    Procedure Performed:  Image guided right hip steroid injection/block    Anesthesia: local    Findings: successful    Immediate Complications:  None    Estimated Blood Loss: minimal    SEE DICTATED PROCEDURE NOTE FOR COMPLETE DETAILS.     Electronically signed by Sophia Sanchez MD on 6/19/2019 at 2:07 PM

## 2019-06-24 ENCOUNTER — HOSPITAL ENCOUNTER (OUTPATIENT)
Age: 64
Discharge: HOME | End: 2019-06-24
Payer: MEDICARE

## 2019-06-24 LAB
HBV SURFACE AB TITR SER: NEGATIVE {TITER}
HEPATITIS B SURFACE ANTIGEN: NEGATIVE
HEPATITIS C ANTIBODY: ABNORMAL

## 2019-06-24 PROCEDURE — 36415 COLL VENOUS BLD VENIPUNCTURE: CPT

## 2019-06-24 PROCEDURE — 83516 IMMUNOASSAY NONANTIBODY: CPT

## 2019-06-24 PROCEDURE — 82784 ASSAY IGA/IGD/IGG/IGM EACH: CPT

## 2019-06-24 PROCEDURE — 87340 HEPATITIS B SURFACE AG IA: CPT

## 2019-06-24 PROCEDURE — 87522 HEPATITIS C REVRS TRNSCRPJ: CPT

## 2019-06-24 PROCEDURE — 87385 HISTOPLASMA CAPSUL AG IA: CPT

## 2019-06-24 PROCEDURE — 86803 HEPATITIS C AB TEST: CPT

## 2019-06-24 PROCEDURE — 86704 HEP B CORE ANTIBODY TOTAL: CPT

## 2019-06-24 PROCEDURE — 86480 TB TEST CELL IMMUN MEASURE: CPT

## 2019-06-24 PROCEDURE — 86708 HEPATITIS A ANTIBODY: CPT

## 2019-06-24 PROCEDURE — 86706 HEP B SURFACE ANTIBODY: CPT

## 2019-06-25 ENCOUNTER — HOSPITAL ENCOUNTER (OUTPATIENT)
Age: 64
Discharge: HOME | End: 2019-06-25
Payer: MEDICARE

## 2019-06-25 LAB — IGA: 152 MG/DL (ref 70–400)

## 2019-06-25 PROCEDURE — 82657 ENZYME CELL ACTIVITY: CPT

## 2019-06-26 LAB
HAV AB SERPL IA-ACNC: NEGATIVE
HEPATITIS B CORE TOTAL ANTIBODY: NEGATIVE

## 2019-06-27 LAB
HCV QNT BY NAAT INTERPRETATION: NOT DETECTED
HCV QNT BY NAAT IU/ML: NOT DETECTED IU/ML
HCV QNT BY NAAT LOG IU/ML: NOT DETECTED LOG IU/ML
HISTOPLASMA ANTIGEN URINE: NOT DETECTED
HISTOPLASMA ANTIGEN URINE: NOT DETECTED NG/ML
QUANTI TB GOLD PLUS: NEGATIVE
QUANTI TB1 MINUS NIL: 0.02 IU/ML (ref 0–0.34)
QUANTI TB2 MINUS NIL: 0.01 IU/ML (ref 0–0.34)
QUANTIFERON MITOGEN MINUS NIL: > 10 IU/ML
QUANTIFERON NIL: 0.02 IU/ML
TISSUE TRANSGLUTAMINASE ANTIBODY: 2 U/ML (ref 0–5)
TISSUE TRANSGLUTAMINASE IGA: 1 U/ML (ref 0–3)

## 2019-07-01 LAB — MISC. #1 REFERENCE GROUP TEST: NORMAL

## 2019-08-02 ENCOUNTER — APPOINTMENT (OUTPATIENT)
Dept: GENERAL RADIOLOGY | Age: 64
DRG: 292 | End: 2019-08-02
Payer: MEDICARE

## 2019-08-02 ENCOUNTER — APPOINTMENT (OUTPATIENT)
Dept: INTERVENTIONAL RADIOLOGY/VASCULAR | Age: 64
DRG: 292 | End: 2019-08-02
Payer: MEDICARE

## 2019-08-02 ENCOUNTER — HOSPITAL ENCOUNTER (INPATIENT)
Age: 64
LOS: 5 days | Discharge: HOME HEALTH | DRG: 292 | End: 2019-08-07
Attending: EMERGENCY MEDICINE | Admitting: INTERNAL MEDICINE
Payer: MEDICARE

## 2019-08-02 DIAGNOSIS — I50.9 CONGESTIVE HEART FAILURE, UNSPECIFIED HF CHRONICITY, UNSPECIFIED HEART FAILURE TYPE (HCC): Primary | ICD-10-CM

## 2019-08-02 PROBLEM — I50.41 ACUTE COMBINED SYSTOLIC AND DIASTOLIC CHF, NYHA CLASS 4 (HCC): Status: ACTIVE | Noted: 2019-08-02

## 2019-08-02 LAB
ALBUMIN SERPL-MCNC: 3.6 G/DL (ref 3.5–5.1)
ALP BLD-CCNC: 99 U/L (ref 38–126)
ALT SERPL-CCNC: 13 U/L (ref 11–66)
ANION GAP SERPL CALCULATED.3IONS-SCNC: 11 MEQ/L (ref 8–16)
ANION GAP SERPL CALCULATED.3IONS-SCNC: 13 MEQ/L (ref 8–16)
AST SERPL-CCNC: 15 U/L (ref 5–40)
BASOPHILS # BLD: 0.3 %
BASOPHILS ABSOLUTE: 0 THOU/MM3 (ref 0–0.1)
BILIRUB SERPL-MCNC: 0.2 MG/DL (ref 0.3–1.2)
BILIRUBIN DIRECT: < 0.2 MG/DL (ref 0–0.3)
BUN BLDV-MCNC: 25 MG/DL (ref 7–22)
CALCIUM SERPL-MCNC: 8.6 MG/DL (ref 8.5–10.5)
CHLORIDE BLD-SCNC: 91 MEQ/L (ref 98–111)
CHLORIDE BLD-SCNC: 96 MEQ/L (ref 98–111)
CO2: 32 MEQ/L (ref 23–33)
CO2: 32 MEQ/L (ref 23–33)
CREAT SERPL-MCNC: 1.2 MG/DL (ref 0.4–1.2)
EOSINOPHIL # BLD: 0.3 %
EOSINOPHILS ABSOLUTE: 0 THOU/MM3 (ref 0–0.4)
ERYTHROCYTE [DISTWIDTH] IN BLOOD BY AUTOMATED COUNT: 17 % (ref 11.5–14.5)
ERYTHROCYTE [DISTWIDTH] IN BLOOD BY AUTOMATED COUNT: 48.6 FL (ref 35–45)
GFR SERPL CREATININE-BSD FRML MDRD: 61 ML/MIN/1.73M2
GLUCOSE BLD-MCNC: 139 MG/DL (ref 70–108)
GLUCOSE BLD-MCNC: 145 MG/DL (ref 70–108)
GLUCOSE BLD-MCNC: 340 MG/DL (ref 70–108)
GLUCOSE BLD-MCNC: 354 MG/DL (ref 70–108)
HCT VFR BLD CALC: 34.4 % (ref 42–52)
HEMOGLOBIN: 10.1 GM/DL (ref 14–18)
IMMATURE GRANS (ABS): 0.03 THOU/MM3 (ref 0–0.07)
IMMATURE GRANULOCYTES: 0 %
LYMPHOCYTES # BLD: 5.8 %
LYMPHOCYTES ABSOLUTE: 0.4 THOU/MM3 (ref 1–4.8)
MAGNESIUM: 1.9 MG/DL (ref 1.6–2.4)
MCH RBC QN AUTO: 23.3 PG (ref 26–33)
MCHC RBC AUTO-ENTMCNC: 29.4 GM/DL (ref 32.2–35.5)
MCV RBC AUTO: 79.3 FL (ref 80–94)
MONOCYTES # BLD: 13.3 %
MONOCYTES ABSOLUTE: 1 THOU/MM3 (ref 0.4–1.3)
NUCLEATED RED BLOOD CELLS: 0 /100 WBC
OSMOLALITY CALCULATION: 286.1 MOSMOL/KG (ref 275–300)
PLATELET # BLD: 213 THOU/MM3 (ref 130–400)
PMV BLD AUTO: 10.7 FL (ref 9.4–12.4)
POTASSIUM REFLEX MAGNESIUM: 3.1 MEQ/L (ref 3.5–5.2)
POTASSIUM SERPL-SCNC: 2.7 MEQ/L (ref 3.5–5.2)
PRO-BNP: 909.2 PG/ML (ref 0–900)
RBC # BLD: 4.34 MILL/MM3 (ref 4.7–6.1)
SEG NEUTROPHILS: 79.9 %
SEGMENTED NEUTROPHILS ABSOLUTE COUNT: 5.8 THOU/MM3 (ref 1.8–7.7)
SODIUM BLD-SCNC: 134 MEQ/L (ref 135–145)
SODIUM BLD-SCNC: 141 MEQ/L (ref 135–145)
TOTAL PROTEIN: 7 G/DL (ref 6.1–8)
TROPONIN T: 0.02 NG/ML
TROPONIN T: 0.03 NG/ML
TROPONIN T: 0.03 NG/ML
WBC # BLD: 7.3 THOU/MM3 (ref 4.8–10.8)

## 2019-08-02 PROCEDURE — 80076 HEPATIC FUNCTION PANEL: CPT

## 2019-08-02 PROCEDURE — 85025 COMPLETE CBC W/AUTO DIFF WBC: CPT

## 2019-08-02 PROCEDURE — 1200000003 HC TELEMETRY R&B

## 2019-08-02 PROCEDURE — 83880 ASSAY OF NATRIURETIC PEPTIDE: CPT

## 2019-08-02 PROCEDURE — 6360000002 HC RX W HCPCS: Performed by: INTERNAL MEDICINE

## 2019-08-02 PROCEDURE — 80048 BASIC METABOLIC PNL TOTAL CA: CPT

## 2019-08-02 PROCEDURE — 94760 N-INVAS EAR/PLS OXIMETRY 1: CPT

## 2019-08-02 PROCEDURE — 71045 X-RAY EXAM CHEST 1 VIEW: CPT

## 2019-08-02 PROCEDURE — 84484 ASSAY OF TROPONIN QUANT: CPT

## 2019-08-02 PROCEDURE — 99285 EMERGENCY DEPT VISIT HI MDM: CPT

## 2019-08-02 PROCEDURE — 82948 REAGENT STRIP/BLOOD GLUCOSE: CPT

## 2019-08-02 PROCEDURE — 83735 ASSAY OF MAGNESIUM: CPT

## 2019-08-02 PROCEDURE — 99223 1ST HOSP IP/OBS HIGH 75: CPT | Performed by: INTERNAL MEDICINE

## 2019-08-02 PROCEDURE — 80051 ELECTROLYTE PANEL: CPT

## 2019-08-02 PROCEDURE — 6370000000 HC RX 637 (ALT 250 FOR IP): Performed by: INTERNAL MEDICINE

## 2019-08-02 PROCEDURE — 36415 COLL VENOUS BLD VENIPUNCTURE: CPT

## 2019-08-02 PROCEDURE — 2580000003 HC RX 258: Performed by: INTERNAL MEDICINE

## 2019-08-02 PROCEDURE — 93971 EXTREMITY STUDY: CPT

## 2019-08-02 PROCEDURE — 93005 ELECTROCARDIOGRAM TRACING: CPT | Performed by: EMERGENCY MEDICINE

## 2019-08-02 RX ORDER — SODIUM CHLORIDE 0.9 % (FLUSH) 0.9 %
10 SYRINGE (ML) INJECTION EVERY 12 HOURS SCHEDULED
Status: DISCONTINUED | OUTPATIENT
Start: 2019-08-02 | End: 2019-08-07 | Stop reason: HOSPADM

## 2019-08-02 RX ORDER — ALLOPURINOL 300 MG/1
300 TABLET ORAL DAILY
Status: DISCONTINUED | OUTPATIENT
Start: 2019-08-03 | End: 2019-08-07 | Stop reason: HOSPADM

## 2019-08-02 RX ORDER — DEXTROSE MONOHYDRATE 50 MG/ML
100 INJECTION, SOLUTION INTRAVENOUS PRN
Status: DISCONTINUED | OUTPATIENT
Start: 2019-08-02 | End: 2019-08-07 | Stop reason: HOSPADM

## 2019-08-02 RX ORDER — AMLODIPINE BESYLATE 10 MG/1
10 TABLET ORAL DAILY
Status: DISCONTINUED | OUTPATIENT
Start: 2019-08-02 | End: 2019-08-03

## 2019-08-02 RX ORDER — SODIUM CHLORIDE 0.9 % (FLUSH) 0.9 %
10 SYRINGE (ML) INJECTION PRN
Status: DISCONTINUED | OUTPATIENT
Start: 2019-08-02 | End: 2019-08-07 | Stop reason: HOSPADM

## 2019-08-02 RX ORDER — ONDANSETRON 2 MG/ML
4 INJECTION INTRAMUSCULAR; INTRAVENOUS EVERY 6 HOURS PRN
Status: DISCONTINUED | OUTPATIENT
Start: 2019-08-02 | End: 2019-08-07 | Stop reason: HOSPADM

## 2019-08-02 RX ORDER — NICOTINE POLACRILEX 4 MG
15 LOZENGE BUCCAL PRN
Status: DISCONTINUED | OUTPATIENT
Start: 2019-08-02 | End: 2019-08-07 | Stop reason: HOSPADM

## 2019-08-02 RX ORDER — DEXTROSE MONOHYDRATE 25 G/50ML
12.5 INJECTION, SOLUTION INTRAVENOUS PRN
Status: DISCONTINUED | OUTPATIENT
Start: 2019-08-02 | End: 2019-08-07 | Stop reason: HOSPADM

## 2019-08-02 RX ORDER — GLIMEPIRIDE 2 MG/1
2 TABLET ORAL
Status: DISCONTINUED | OUTPATIENT
Start: 2019-08-03 | End: 2019-08-07 | Stop reason: HOSPADM

## 2019-08-02 RX ORDER — GABAPENTIN 300 MG/1
300 CAPSULE ORAL 2 TIMES DAILY
Status: DISCONTINUED | OUTPATIENT
Start: 2019-08-02 | End: 2019-08-07 | Stop reason: HOSPADM

## 2019-08-02 RX ORDER — SULFASALAZINE 500 MG/1
500 TABLET ORAL 3 TIMES DAILY
Status: ON HOLD | COMMUNITY
End: 2022-10-11

## 2019-08-02 RX ORDER — POTASSIUM CHLORIDE 7.45 MG/ML
10 INJECTION INTRAVENOUS PRN
Status: DISCONTINUED | OUTPATIENT
Start: 2019-08-02 | End: 2019-08-07

## 2019-08-02 RX ORDER — TRAMADOL HYDROCHLORIDE 50 MG/1
50 TABLET ORAL EVERY 6 HOURS PRN
Status: DISCONTINUED | OUTPATIENT
Start: 2019-08-02 | End: 2019-08-07 | Stop reason: HOSPADM

## 2019-08-02 RX ORDER — POTASSIUM CHLORIDE 7.45 MG/ML
10 INJECTION INTRAVENOUS
Status: COMPLETED | OUTPATIENT
Start: 2019-08-02 | End: 2019-08-02

## 2019-08-02 RX ORDER — ACETAMINOPHEN 325 MG/1
650 TABLET ORAL EVERY 4 HOURS PRN
Status: DISCONTINUED | OUTPATIENT
Start: 2019-08-02 | End: 2019-08-07 | Stop reason: HOSPADM

## 2019-08-02 RX ORDER — POTASSIUM CHLORIDE 1.5 G/1.77G
40 POWDER, FOR SOLUTION ORAL DAILY
COMMUNITY
End: 2019-08-21

## 2019-08-02 RX ORDER — PANTOPRAZOLE SODIUM 40 MG/1
40 TABLET, DELAYED RELEASE ORAL 2 TIMES DAILY
Status: DISCONTINUED | OUTPATIENT
Start: 2019-08-02 | End: 2019-08-02

## 2019-08-02 RX ORDER — METOPROLOL TARTRATE 5 MG/5ML
5 INJECTION INTRAVENOUS EVERY 6 HOURS PRN
Status: DISCONTINUED | OUTPATIENT
Start: 2019-08-02 | End: 2019-08-07 | Stop reason: HOSPADM

## 2019-08-02 RX ORDER — SIMVASTATIN 40 MG
40 TABLET ORAL NIGHTLY
Status: DISCONTINUED | OUTPATIENT
Start: 2019-08-02 | End: 2019-08-03

## 2019-08-02 RX ORDER — INSULIN GLARGINE 100 [IU]/ML
16 INJECTION, SOLUTION SUBCUTANEOUS NIGHTLY
Status: DISCONTINUED | OUTPATIENT
Start: 2019-08-02 | End: 2019-08-07 | Stop reason: HOSPADM

## 2019-08-02 RX ORDER — METOPROLOL TARTRATE 5 MG/5ML
5 INJECTION INTRAVENOUS EVERY 6 HOURS
Status: DISCONTINUED | OUTPATIENT
Start: 2019-08-02 | End: 2019-08-02

## 2019-08-02 RX ORDER — FUROSEMIDE 10 MG/ML
40 INJECTION INTRAMUSCULAR; INTRAVENOUS 2 TIMES DAILY
Status: DISCONTINUED | OUTPATIENT
Start: 2019-08-02 | End: 2019-08-06

## 2019-08-02 RX ORDER — DOXAZOSIN MESYLATE 1 MG/1
1 TABLET ORAL DAILY
Status: DISCONTINUED | OUTPATIENT
Start: 2019-08-02 | End: 2019-08-07 | Stop reason: HOSPADM

## 2019-08-02 RX ORDER — ALLOPURINOL 100 MG/1
100 TABLET ORAL DAILY
Status: DISCONTINUED | OUTPATIENT
Start: 2019-08-02 | End: 2019-08-02

## 2019-08-02 RX ORDER — ASPIRIN 81 MG/1
81 TABLET ORAL DAILY
Status: DISCONTINUED | OUTPATIENT
Start: 2019-08-02 | End: 2019-08-07 | Stop reason: HOSPADM

## 2019-08-02 RX ORDER — ACETAMINOPHEN 325 MG/1
650 TABLET ORAL EVERY 4 HOURS PRN
Status: DISCONTINUED | OUTPATIENT
Start: 2019-08-02 | End: 2019-08-02

## 2019-08-02 RX ORDER — LOSARTAN POTASSIUM 50 MG/1
50 TABLET ORAL DAILY
Status: DISCONTINUED | OUTPATIENT
Start: 2019-08-03 | End: 2019-08-03

## 2019-08-02 RX ORDER — CELECOXIB 100 MG/1
100 CAPSULE ORAL 2 TIMES DAILY
Status: DISCONTINUED | OUTPATIENT
Start: 2019-08-02 | End: 2019-08-02

## 2019-08-02 RX ADMIN — Medication 10 ML: at 20:25

## 2019-08-02 RX ADMIN — INSULIN GLARGINE 16 UNITS: 100 INJECTION, SOLUTION SUBCUTANEOUS at 20:35

## 2019-08-02 RX ADMIN — TRAMADOL HYDROCHLORIDE 50 MG: 50 TABLET, FILM COATED ORAL at 20:25

## 2019-08-02 RX ADMIN — POTASSIUM CHLORIDE 10 MEQ: 7.46 INJECTION, SOLUTION INTRAVENOUS at 23:51

## 2019-08-02 RX ADMIN — FUROSEMIDE 40 MG: 10 INJECTION, SOLUTION INTRAMUSCULAR; INTRAVENOUS at 20:25

## 2019-08-02 RX ADMIN — ACETAMINOPHEN 650 MG: 325 TABLET ORAL at 20:28

## 2019-08-02 RX ADMIN — POTASSIUM CHLORIDE 10 MEQ: 7.46 INJECTION, SOLUTION INTRAVENOUS at 22:08

## 2019-08-02 RX ADMIN — POTASSIUM CHLORIDE 10 MEQ: 7.46 INJECTION, SOLUTION INTRAVENOUS at 19:25

## 2019-08-02 RX ADMIN — METOPROLOL TARTRATE 25 MG: 25 TABLET ORAL at 20:22

## 2019-08-02 RX ADMIN — GABAPENTIN 300 MG: 300 CAPSULE ORAL at 20:22

## 2019-08-02 RX ADMIN — SIMVASTATIN 40 MG: 40 TABLET, FILM COATED ORAL at 20:22

## 2019-08-02 RX ADMIN — ENOXAPARIN SODIUM 100 MG: 100 INJECTION SUBCUTANEOUS at 20:22

## 2019-08-02 RX ADMIN — POTASSIUM CHLORIDE 10 MEQ: 7.46 INJECTION, SOLUTION INTRAVENOUS at 18:14

## 2019-08-02 RX ADMIN — INSULIN LISPRO 1 UNITS: 100 INJECTION, SOLUTION INTRAVENOUS; SUBCUTANEOUS at 20:35

## 2019-08-02 ASSESSMENT — PAIN SCALES - GENERAL
PAINLEVEL_OUTOF10: 5
PAINLEVEL_OUTOF10: 3
PAINLEVEL_OUTOF10: 0

## 2019-08-02 ASSESSMENT — PAIN DESCRIPTION - ORIENTATION
ORIENTATION: RIGHT
ORIENTATION: RIGHT

## 2019-08-02 ASSESSMENT — PAIN DESCRIPTION - PROGRESSION
CLINICAL_PROGRESSION: NOT CHANGED

## 2019-08-02 ASSESSMENT — PAIN - FUNCTIONAL ASSESSMENT: PAIN_FUNCTIONAL_ASSESSMENT: PREVENTS OR INTERFERES SOME ACTIVE ACTIVITIES AND ADLS

## 2019-08-02 ASSESSMENT — PAIN DESCRIPTION - LOCATION
LOCATION: KNEE
LOCATION: KNEE

## 2019-08-02 ASSESSMENT — PAIN DESCRIPTION - DESCRIPTORS: DESCRIPTORS: SHARP

## 2019-08-02 ASSESSMENT — PAIN DESCRIPTION - PAIN TYPE
TYPE: ACUTE PAIN
TYPE: ACUTE PAIN

## 2019-08-02 ASSESSMENT — PAIN DESCRIPTION - ONSET: ONSET: ON-GOING

## 2019-08-02 ASSESSMENT — PAIN DESCRIPTION - DIRECTION: RADIATING_TOWARDS: NO

## 2019-08-02 ASSESSMENT — PAIN DESCRIPTION - FREQUENCY: FREQUENCY: CONTINUOUS

## 2019-08-02 NOTE — PROGRESS NOTES
Pt admitted to  [address] [place] [address] from ED. Complaints: Swelling/leg pain. IV none infusing into the antecubital left, condition patent and no redness. IV site free of s/s of infection or infiltration. Vital signs obtained. Assessment and data collection initiated. Two nurse skin assessment performed by Wilfredo Desai and Abel Cartagena RN. Oriented to room. Policies and procedures for [facility] explained. All questions answered with no further questions at this time. Fall prevention and safety brochure discussed with patient.

## 2019-08-02 NOTE — H&P
medications    Medication Sig Start Date End Date Taking? Authorizing Provider   doxycycline hyclate (VIBRA-TABS) 100 MG tablet Take 100 mg by mouth 2 times daily   Yes Historical Provider, MD   telmisartan (MICARDIS) 40 MG tablet Take 40 mg by mouth daily   Yes Historical Provider, MD   allopurinol (ZYLOPRIM) 100 MG tablet Take 100 mg by mouth daily   Yes Historical Provider, MD   celecoxib (CELEBREX) 100 MG capsule Take 1 capsule by mouth 2 times daily 2/3/19  Yes Kaitlin Islas MD   ferrous sulfate 325 (65 Fe) MG tablet One tab every 48 hr.  Take with vitamn C 500 mg 2/3/19  Yes Kaitlin Islas MD   vitamin C (VITAMIN C) 500 MG tablet Take 1 tablet by mouth every 48 hours 2/4/19  Yes Kaitlin Islas MD   traMADol (ULTRAM) 50 MG tablet Take 50 mg by mouth every 6 hours as needed for Pain. Yes Historical Provider, MD   Glucos-Chond-Sterol-Fish Oil (GLUCOSAMINE CHONDROITIN PLUS PO) Take by mouth   Yes Historical Provider, MD   amLODIPine (NORVASC) 10 MG tablet Take 10 mg by mouth daily   Yes Historical Provider, MD   aspirin 81 MG EC tablet Take 81 mg by mouth daily   Yes Historical Provider, MD   Insulin Detemir (LEVEMIR SC) Inject 16 Units into the skin nightly    Yes Historical Provider, MD   acetaminophen 650 MG TABS Take 650 mg by mouth every 4 hours as needed 3/14/16  Yes Kaitlin Islas MD   pantoprazole (PROTONIX) 40 MG tablet Take 1 tablet by mouth 2 times daily  Patient taking differently: Take 40 mg by mouth daily  3/14/16  Yes Kaitlin Islas MD   metFORMIN (GLUCOPHAGE) 500 MG tablet Take 1,000 mg by mouth 2 times daily (with meals)   Yes Historical Provider, MD   terazosin (HYTRIN) 1 MG capsule Take 2 mg by mouth nightly    Yes Historical Provider, MD   gabapentin (NEURONTIN) 300 MG capsule Take 300 mg by mouth 2 times daily. .   Yes Historical Provider, MD   glimepiride (AMARYL) 2 MG tablet Take 2 mg by mouth every morning (before breakfast).    Yes Historical Provider, MD   simvastatin (ZOCOR) 40 MG tablet Take 40 mg by mouth nightly. Yes Historical Provider, MD   metoprolol (LOPRESSOR) 25 MG tablet Take 25 mg by mouth 2 times daily Takes 50mg BID   Yes Historical Provider, MD       Allergies:  Lorazepam    Social History:      The patient currently lives at home with spouse    TOBACCO:   reports that he quit smoking about 3 years ago. He has a 37.00 pack-year smoking history. He has never used smokeless tobacco.  ETOH:   reports that he does not drink alcohol. Family History:      Reviewed in detail and negative for DM, CAD, Cancer, CVA. Positive as follows:        Problem Relation Age of Onset    Hypertension Father     High Cholesterol Father     Diabetes Father        Diet:  No diet orders on file    REVIEW OF SYSTEMS:   Pertinent positives as noted in the HPI. All other systems reviewed and negative. PHYSICAL EXAM:    BP (!) 158/86   Pulse 112   Temp 98.5 °F (36.9 °C) (Oral)   Resp 20   Ht 5' 8\" (1.727 m)   Wt 220 lb (99.8 kg)   SpO2 91%   BMI 33.45 kg/m²     General appearance:  No apparent distress, appears stated age and cooperative. HEENT:  Normal cephalic, atraumatic without obvious deformity. Pupils equal, round, and reactive to light. Extra ocular muscles intact. Conjunctivae/corneas clear. Neck: Supple, with full range of motion. No jugular venous distention. Trachea midline. Respiratory:  Normal respiratory effort. Clear to auscultation, bilaterally without Rales/Wheezes/Rhonchi. Cardiovascular:  Regular rate and rhythm with normal S1/S2 without murmurs, rubs or gallops. Abdomen: Soft, non-tender, non-distended with normal bowel sounds. Musculoskeletal: Bilateral 2+ pitting edema noted right greater than the left  Skin: Skin color, texture, turgor normal.  No rashes or lesions. Neurologic:  Neurovascularly intact without any focal sensory/motor deficits.  Cranial nerves: II-XII intact, grossly non-focal.  Psychiatric:  Alert and oriented, thought content appropriate, cardiology  3. Type 2 diabetes on insulin monitor blood sugars closely and Humalog to scale per protocol  4. Pedal edema greater on the right side than left with mild tenderness in the medial aspect of thigh recommended to get a Doppler to the ER physician rule out DVT        Thank you FELI Salinas - JOSE DANIEL for the opportunity to be involved in this patient's care.     Electronically signed by Rosena Meigs, MD on 8/2/2019 at 4:56 PM

## 2019-08-02 NOTE — ED PROVIDER NOTES
Take 100 mg by mouth daily    AMLODIPINE (NORVASC) 10 MG TABLET    Take 10 mg by mouth daily    ASPIRIN 81 MG EC TABLET    Take 81 mg by mouth daily    CELECOXIB (CELEBREX) 100 MG CAPSULE    Take 1 capsule by mouth 2 times daily    DOXYCYCLINE HYCLATE (VIBRA-TABS) 100 MG TABLET    Take 100 mg by mouth 2 times daily    FERROUS SULFATE 325 (65 FE) MG TABLET    One tab every 48 hr.  Take with vitamn C 500 mg    GABAPENTIN (NEURONTIN) 300 MG CAPSULE    Take 300 mg by mouth 2 times daily. Naoma Broccoli GLIMEPIRIDE (AMARYL) 2 MG TABLET    Take 2 mg by mouth every morning (before breakfast). GLUCOS-CHOND-STEROL-FISH OIL (GLUCOSAMINE CHONDROITIN PLUS PO)    Take by mouth    INSULIN DETEMIR (LEVEMIR SC)    Inject 16 Units into the skin nightly     METFORMIN (GLUCOPHAGE) 500 MG TABLET    Take 1,000 mg by mouth 2 times daily (with meals)    METOPROLOL (LOPRESSOR) 25 MG TABLET    Take 25 mg by mouth 2 times daily Takes 50mg BID    PANTOPRAZOLE (PROTONIX) 40 MG TABLET    Take 1 tablet by mouth 2 times daily    SIMVASTATIN (ZOCOR) 40 MG TABLET    Take 40 mg by mouth nightly. TELMISARTAN (MICARDIS) 40 MG TABLET    Take 40 mg by mouth daily    TERAZOSIN (HYTRIN) 1 MG CAPSULE    Take 2 mg by mouth nightly     TRAMADOL (ULTRAM) 50 MG TABLET    Take 50 mg by mouth every 6 hours as needed for Pain. VITAMIN C (VITAMIN C) 500 MG TABLET    Take 1 tablet by mouth every 48 hours       ALLERGIES     is allergic to lorazepam.    FAMILY HISTORY     He indicated that his mother is . He indicated that his father is . He indicated that both of his sisters are alive. He indicated that his brother is alive. family history includes Diabetes in his father; High Cholesterol in his father; Hypertension in his father. SOCIAL HISTORY      reports that he quit smoking about 3 years ago. He has a 37.00 pack-year smoking history. He has never used smokeless tobacco. He reports that he does not drink alcohol or use drugs.     PHYSICAL

## 2019-08-02 NOTE — ED NOTES
Pt states he has been out of his metformin for a week. Pt states his blood sugar normally runs around 200.      Asiya Ochoa RN  08/02/19 0683

## 2019-08-03 LAB
ANION GAP SERPL CALCULATED.3IONS-SCNC: 11 MEQ/L (ref 8–16)
BASOPHILS # BLD: 0.3 %
BASOPHILS ABSOLUTE: 0 THOU/MM3 (ref 0–0.1)
BUN BLDV-MCNC: 19 MG/DL (ref 7–22)
CALCIUM SERPL-MCNC: 8.4 MG/DL (ref 8.5–10.5)
CHLORIDE BLD-SCNC: 94 MEQ/L (ref 98–111)
CHOLESTEROL, TOTAL: 103 MG/DL (ref 100–199)
CO2: 35 MEQ/L (ref 23–33)
CREAT SERPL-MCNC: 1.1 MG/DL (ref 0.4–1.2)
EKG ATRIAL RATE: 106 BPM
EKG ATRIAL RATE: 111 BPM
EKG P AXIS: 52 DEGREES
EKG P AXIS: 63 DEGREES
EKG P-R INTERVAL: 138 MS
EKG P-R INTERVAL: 166 MS
EKG Q-T INTERVAL: 330 MS
EKG Q-T INTERVAL: 340 MS
EKG QRS DURATION: 64 MS
EKG QRS DURATION: 80 MS
EKG QTC CALCULATION (BAZETT): 438 MS
EKG QTC CALCULATION (BAZETT): 462 MS
EKG R AXIS: 4 DEGREES
EKG R AXIS: 5 DEGREES
EKG T AXIS: 31 DEGREES
EKG T AXIS: 33 DEGREES
EKG VENTRICULAR RATE: 106 BPM
EKG VENTRICULAR RATE: 111 BPM
EOSINOPHIL # BLD: 0.7 %
EOSINOPHILS ABSOLUTE: 0.1 THOU/MM3 (ref 0–0.4)
ERYTHROCYTE [DISTWIDTH] IN BLOOD BY AUTOMATED COUNT: 16.9 % (ref 11.5–14.5)
ERYTHROCYTE [DISTWIDTH] IN BLOOD BY AUTOMATED COUNT: 49.1 FL (ref 35–45)
GFR SERPL CREATININE-BSD FRML MDRD: 67 ML/MIN/1.73M2
GLUCOSE BLD-MCNC: 129 MG/DL (ref 70–108)
GLUCOSE BLD-MCNC: 131 MG/DL (ref 70–108)
GLUCOSE BLD-MCNC: 245 MG/DL (ref 70–108)
GLUCOSE BLD-MCNC: 254 MG/DL (ref 70–108)
GLUCOSE BLD-MCNC: 278 MG/DL (ref 70–108)
HCT VFR BLD CALC: 32.8 % (ref 42–52)
HDLC SERPL-MCNC: 41 MG/DL
HEMOGLOBIN: 9.4 GM/DL (ref 14–18)
HYPOCHROMIA: PRESENT
IMMATURE GRANS (ABS): 0.05 THOU/MM3 (ref 0–0.07)
IMMATURE GRANULOCYTES: 1 %
LDL CHOLESTEROL CALCULATED: 42 MG/DL
LV EF: 43 %
LVEF MODALITY: NORMAL
LYMPHOCYTES # BLD: 10.9 %
LYMPHOCYTES ABSOLUTE: 0.8 THOU/MM3 (ref 1–4.8)
MAGNESIUM: 1.8 MG/DL (ref 1.6–2.4)
MCH RBC QN AUTO: 23 PG (ref 26–33)
MCHC RBC AUTO-ENTMCNC: 28.7 GM/DL (ref 32.2–35.5)
MCV RBC AUTO: 80.4 FL (ref 80–94)
MONOCYTES # BLD: 18.7 %
MONOCYTES ABSOLUTE: 1.4 THOU/MM3 (ref 0.4–1.3)
NUCLEATED RED BLOOD CELLS: 0 /100 WBC
PLATELET # BLD: 231 THOU/MM3 (ref 130–400)
PMV BLD AUTO: 11 FL (ref 9.4–12.4)
POTASSIUM SERPL-SCNC: 2.7 MEQ/L (ref 3.5–5.2)
POTASSIUM SERPL-SCNC: 3.5 MEQ/L (ref 3.5–5.2)
RBC # BLD: 4.08 MILL/MM3 (ref 4.7–6.1)
SEG NEUTROPHILS: 68.7 %
SEGMENTED NEUTROPHILS ABSOLUTE COUNT: 5.1 THOU/MM3 (ref 1.8–7.7)
SODIUM BLD-SCNC: 140 MEQ/L (ref 135–145)
TRIGL SERPL-MCNC: 100 MG/DL (ref 0–199)
WBC # BLD: 7.4 THOU/MM3 (ref 4.8–10.8)

## 2019-08-03 PROCEDURE — 84132 ASSAY OF SERUM POTASSIUM: CPT

## 2019-08-03 PROCEDURE — 6360000002 HC RX W HCPCS: Performed by: INTERNAL MEDICINE

## 2019-08-03 PROCEDURE — 6370000000 HC RX 637 (ALT 250 FOR IP): Performed by: PHYSICIAN ASSISTANT

## 2019-08-03 PROCEDURE — G0009 ADMIN PNEUMOCOCCAL VACCINE: HCPCS | Performed by: INTERNAL MEDICINE

## 2019-08-03 PROCEDURE — 6370000000 HC RX 637 (ALT 250 FOR IP): Performed by: INTERNAL MEDICINE

## 2019-08-03 PROCEDURE — 80048 BASIC METABOLIC PNL TOTAL CA: CPT

## 2019-08-03 PROCEDURE — 82948 REAGENT STRIP/BLOOD GLUCOSE: CPT

## 2019-08-03 PROCEDURE — 93010 ELECTROCARDIOGRAM REPORT: CPT | Performed by: INTERNAL MEDICINE

## 2019-08-03 PROCEDURE — 36415 COLL VENOUS BLD VENIPUNCTURE: CPT

## 2019-08-03 PROCEDURE — 93005 ELECTROCARDIOGRAM TRACING: CPT | Performed by: INTERNAL MEDICINE

## 2019-08-03 PROCEDURE — 83735 ASSAY OF MAGNESIUM: CPT

## 2019-08-03 PROCEDURE — 6360000002 HC RX W HCPCS: Performed by: PHYSICIAN ASSISTANT

## 2019-08-03 PROCEDURE — 2580000003 HC RX 258: Performed by: INTERNAL MEDICINE

## 2019-08-03 PROCEDURE — 85025 COMPLETE CBC W/AUTO DIFF WBC: CPT

## 2019-08-03 PROCEDURE — 99232 SBSQ HOSP IP/OBS MODERATE 35: CPT | Performed by: INTERNAL MEDICINE

## 2019-08-03 PROCEDURE — 90670 PCV13 VACCINE IM: CPT | Performed by: INTERNAL MEDICINE

## 2019-08-03 PROCEDURE — 94760 N-INVAS EAR/PLS OXIMETRY 1: CPT

## 2019-08-03 PROCEDURE — 1200000003 HC TELEMETRY R&B

## 2019-08-03 PROCEDURE — 93306 TTE W/DOPPLER COMPLETE: CPT

## 2019-08-03 PROCEDURE — 99223 1ST HOSP IP/OBS HIGH 75: CPT | Performed by: INTERNAL MEDICINE

## 2019-08-03 PROCEDURE — 80061 LIPID PANEL: CPT

## 2019-08-03 RX ORDER — POTASSIUM CHLORIDE 20 MEQ/1
40 TABLET, EXTENDED RELEASE ORAL EVERY 4 HOURS
Status: DISCONTINUED | OUTPATIENT
Start: 2019-08-03 | End: 2019-08-03 | Stop reason: ALTCHOICE

## 2019-08-03 RX ORDER — SIMVASTATIN 20 MG
20 TABLET ORAL NIGHTLY
Status: DISCONTINUED | OUTPATIENT
Start: 2019-08-03 | End: 2019-08-07 | Stop reason: HOSPADM

## 2019-08-03 RX ORDER — AMLODIPINE BESYLATE 2.5 MG/1
2.5 TABLET ORAL DAILY
Status: DISCONTINUED | OUTPATIENT
Start: 2019-08-04 | End: 2019-08-03

## 2019-08-03 RX ORDER — LOSARTAN POTASSIUM 100 MG/1
100 TABLET ORAL DAILY
Status: DISCONTINUED | OUTPATIENT
Start: 2019-08-04 | End: 2019-08-07 | Stop reason: HOSPADM

## 2019-08-03 RX ORDER — POTASSIUM CHLORIDE 20 MEQ/1
40 TABLET, EXTENDED RELEASE ORAL EVERY 4 HOURS
Status: COMPLETED | OUTPATIENT
Start: 2019-08-03 | End: 2019-08-03

## 2019-08-03 RX ORDER — METOPROLOL TARTRATE 50 MG/1
50 TABLET, FILM COATED ORAL 2 TIMES DAILY
Status: DISCONTINUED | OUTPATIENT
Start: 2019-08-03 | End: 2019-08-03 | Stop reason: SDUPTHER

## 2019-08-03 RX ORDER — SPIRONOLACTONE 25 MG/1
25 TABLET ORAL 2 TIMES DAILY
Status: DISCONTINUED | OUTPATIENT
Start: 2019-08-03 | End: 2019-08-07 | Stop reason: HOSPADM

## 2019-08-03 RX ORDER — AMLODIPINE BESYLATE 5 MG/1
5 TABLET ORAL DAILY
Status: DISCONTINUED | OUTPATIENT
Start: 2019-08-04 | End: 2019-08-06

## 2019-08-03 RX ORDER — POTASSIUM CHLORIDE 7.45 MG/ML
10 INJECTION INTRAVENOUS
Status: COMPLETED | OUTPATIENT
Start: 2019-08-03 | End: 2019-08-03

## 2019-08-03 RX ORDER — METOPROLOL TARTRATE 50 MG/1
50 TABLET, FILM COATED ORAL 2 TIMES DAILY
Status: DISCONTINUED | OUTPATIENT
Start: 2019-08-03 | End: 2019-08-06

## 2019-08-03 RX ORDER — METOPROLOL TARTRATE 50 MG/1
50 TABLET, FILM COATED ORAL 2 TIMES DAILY
Status: DISCONTINUED | OUTPATIENT
Start: 2019-08-03 | End: 2019-08-03

## 2019-08-03 RX ORDER — MAGNESIUM SULFATE HEPTAHYDRATE 500 MG/ML
1 INJECTION, SOLUTION INTRAMUSCULAR; INTRAVENOUS ONCE
Status: DISCONTINUED | OUTPATIENT
Start: 2019-08-03 | End: 2019-08-03 | Stop reason: ALTCHOICE

## 2019-08-03 RX ORDER — HYDRALAZINE HYDROCHLORIDE 20 MG/ML
10 INJECTION INTRAMUSCULAR; INTRAVENOUS EVERY 6 HOURS PRN
Status: DISCONTINUED | OUTPATIENT
Start: 2019-08-03 | End: 2019-08-07 | Stop reason: HOSPADM

## 2019-08-03 RX ORDER — PREDNISONE 20 MG/1
40 TABLET ORAL DAILY
Status: COMPLETED | OUTPATIENT
Start: 2019-08-03 | End: 2019-08-07

## 2019-08-03 RX ADMIN — ENOXAPARIN SODIUM 100 MG: 100 INJECTION SUBCUTANEOUS at 08:14

## 2019-08-03 RX ADMIN — INSULIN LISPRO 6 UNITS: 100 INJECTION, SOLUTION INTRAVENOUS; SUBCUTANEOUS at 17:18

## 2019-08-03 RX ADMIN — INSULIN LISPRO 4 UNITS: 100 INJECTION, SOLUTION INTRAVENOUS; SUBCUTANEOUS at 12:05

## 2019-08-03 RX ADMIN — POTASSIUM CHLORIDE 10 MEQ: 7.46 INJECTION, SOLUTION INTRAVENOUS at 08:00

## 2019-08-03 RX ADMIN — FUROSEMIDE 40 MG: 10 INJECTION, SOLUTION INTRAMUSCULAR; INTRAVENOUS at 20:08

## 2019-08-03 RX ADMIN — AMLODIPINE BESYLATE 10 MG: 10 TABLET ORAL at 08:14

## 2019-08-03 RX ADMIN — SPIRONOLACTONE 25 MG: 25 TABLET ORAL at 17:14

## 2019-08-03 RX ADMIN — METOPROLOL TARTRATE 25 MG: 25 TABLET ORAL at 08:14

## 2019-08-03 RX ADMIN — METOPROLOL TARTRATE 50 MG: 50 TABLET, FILM COATED ORAL at 13:17

## 2019-08-03 RX ADMIN — SIMVASTATIN 20 MG: 40 TABLET, FILM COATED ORAL at 20:08

## 2019-08-03 RX ADMIN — GLIMEPIRIDE 2 MG: 2 TABLET ORAL at 05:20

## 2019-08-03 RX ADMIN — POTASSIUM CHLORIDE 40 MEQ: 20 TABLET, EXTENDED RELEASE ORAL at 13:18

## 2019-08-03 RX ADMIN — ALLOPURINOL 300 MG: 300 TABLET ORAL at 08:14

## 2019-08-03 RX ADMIN — PNEUMOCOCCAL 13-VALENT CONJUGATE VACCINE 0.5 ML: 2.2; 2.2; 2.2; 2.2; 2.2; 4.4; 2.2; 2.2; 2.2; 2.2; 2.2; 2.2; 2.2 INJECTION, SUSPENSION INTRAMUSCULAR at 08:16

## 2019-08-03 RX ADMIN — INSULIN GLARGINE 16 UNITS: 100 INJECTION, SOLUTION SUBCUTANEOUS at 20:10

## 2019-08-03 RX ADMIN — ASPIRIN 81 MG: 81 TABLET ORAL at 08:14

## 2019-08-03 RX ADMIN — GABAPENTIN 300 MG: 300 CAPSULE ORAL at 20:08

## 2019-08-03 RX ADMIN — MAGNESIUM SULFATE HEPTAHYDRATE 1 G: 500 INJECTION, SOLUTION INTRAMUSCULAR; INTRAVENOUS at 14:21

## 2019-08-03 RX ADMIN — METOPROLOL TARTRATE 50 MG: 50 TABLET, FILM COATED ORAL at 20:08

## 2019-08-03 RX ADMIN — POTASSIUM CHLORIDE 40 MEQ: 20 TABLET, EXTENDED RELEASE ORAL at 17:15

## 2019-08-03 RX ADMIN — GABAPENTIN 300 MG: 300 CAPSULE ORAL at 08:14

## 2019-08-03 RX ADMIN — LOSARTAN POTASSIUM 50 MG: 50 TABLET, FILM COATED ORAL at 08:14

## 2019-08-03 RX ADMIN — Medication 10 ML: at 08:15

## 2019-08-03 RX ADMIN — POTASSIUM CHLORIDE 10 MEQ: 7.46 INJECTION, SOLUTION INTRAVENOUS at 13:16

## 2019-08-03 RX ADMIN — FUROSEMIDE 40 MG: 10 INJECTION, SOLUTION INTRAMUSCULAR; INTRAVENOUS at 08:14

## 2019-08-03 RX ADMIN — INSULIN LISPRO 3 UNITS: 100 INJECTION, SOLUTION INTRAVENOUS; SUBCUTANEOUS at 20:11

## 2019-08-03 RX ADMIN — HYDRALAZINE HYDROCHLORIDE 10 MG: 20 INJECTION INTRAMUSCULAR; INTRAVENOUS at 23:58

## 2019-08-03 RX ADMIN — PREDNISONE 40 MG: 20 TABLET ORAL at 17:14

## 2019-08-03 RX ADMIN — POTASSIUM CHLORIDE 10 MEQ: 7.46 INJECTION, SOLUTION INTRAVENOUS at 11:11

## 2019-08-03 RX ADMIN — POTASSIUM CHLORIDE 10 MEQ: 7.46 INJECTION, SOLUTION INTRAVENOUS at 15:51

## 2019-08-03 RX ADMIN — SPIRONOLACTONE 25 MG: 25 TABLET ORAL at 13:17

## 2019-08-03 RX ADMIN — POTASSIUM CHLORIDE 10 MEQ: 7.46 INJECTION, SOLUTION INTRAVENOUS at 09:11

## 2019-08-03 RX ADMIN — METOPROLOL TARTRATE 25 MG: 25 TABLET ORAL at 17:14

## 2019-08-03 RX ADMIN — POTASSIUM CHLORIDE 10 MEQ: 7.46 INJECTION, SOLUTION INTRAVENOUS at 17:13

## 2019-08-03 RX ADMIN — DOXAZOSIN 1 MG: 1 TABLET ORAL at 08:14

## 2019-08-03 RX ADMIN — Medication 10 ML: at 20:08

## 2019-08-03 RX ADMIN — ENOXAPARIN SODIUM 100 MG: 100 INJECTION SUBCUTANEOUS at 20:08

## 2019-08-03 ASSESSMENT — PAIN SCALES - GENERAL: PAINLEVEL_OUTOF10: 0

## 2019-08-03 NOTE — PROGRESS NOTES
Message left with cardiopulmonary scheduling to ensure patient is on list for lexiscan stress test on Monday.

## 2019-08-03 NOTE — CONSULTS
[address] [place] [address]                                  CONSULTATION    PATIENT NAME: Mark Flannery                  :        1955  MED REC NO:   [id number]                           ROOM:       0003  ACCOUNT NO:   [de-identified]                           ADMIT DATE: 2019  PROVIDER:     Diane Alvarez. Jamie Finn M.D.    Tricia Arciniegaer:  2019    PRIMARY CARDIOLOGIST:  Used to be Tommie De Anda MD, now he is not  following with any cardiologist.    REASON FOR CONSULTATION:  CHF exacerbation and elevated troponin. HISTORY OF PRESENT ILLNESS:  This is a 69-year-old gentleman, known to  have moderate nonobstructive coronary artery disease per the cath done a  few years ago and was in usual state of health until three to four days  ago. Three to four days ago, he noticed bilateral leg swelling. The  leg swelling was getting worse, and in view of that, he came to the  hospital.  However, the patient has been having shortness of breath for  a long time and getting worse in the last few weeks. He has no  orthopnea, no paroxysmal nocturnal dyspnea, and no chest pain. He  basically has difficulty of getting up from his recliner and walk, and  so most of the time he does not walk because he feels short of breath  and winded when he tries to walk. Denied having any chest pain. No  nausea or vomiting. No fever or chills. He has right knee swelling  though he has been having right knee problem for which he is under  followup by the Ortho team at [facility] and he has a history of arthrocentesis  a while ago. So, currently he has been in the hospital, has been seen and diagnosed  with acute CHF exacerbation and elevated troponin and cardiology  evaluation was sought. He denied having any chest pain. REVIEW OF SYSTEMS:  Negative except the above-mentioned ones.     PAST MEDICAL HISTORY:  Includes moderate nonobstructive coronary

## 2019-08-04 LAB
ANION GAP SERPL CALCULATED.3IONS-SCNC: 13 MEQ/L (ref 8–16)
BILIRUBIN URINE: NEGATIVE
BLOOD, URINE: NEGATIVE
BUN BLDV-MCNC: 22 MG/DL (ref 7–22)
CALCIUM SERPL-MCNC: 8.9 MG/DL (ref 8.5–10.5)
CHARACTER, URINE: CLEAR
CHLORIDE BLD-SCNC: 94 MEQ/L (ref 98–111)
CO2: 34 MEQ/L (ref 23–33)
COLOR: YELLOW
CREAT SERPL-MCNC: 1 MG/DL (ref 0.4–1.2)
EKG ATRIAL RATE: 96 BPM
EKG P AXIS: 59 DEGREES
EKG P-R INTERVAL: 148 MS
EKG Q-T INTERVAL: 394 MS
EKG QRS DURATION: 76 MS
EKG QTC CALCULATION (BAZETT): 497 MS
EKG R AXIS: 6 DEGREES
EKG T AXIS: 36 DEGREES
EKG VENTRICULAR RATE: 96 BPM
GFR SERPL CREATININE-BSD FRML MDRD: 75 ML/MIN/1.73M2
GLUCOSE BLD-MCNC: 158 MG/DL (ref 70–108)
GLUCOSE BLD-MCNC: 161 MG/DL (ref 70–108)
GLUCOSE BLD-MCNC: 259 MG/DL (ref 70–108)
GLUCOSE BLD-MCNC: 323 MG/DL (ref 70–108)
GLUCOSE BLD-MCNC: 359 MG/DL (ref 70–108)
GLUCOSE, URINE: NEGATIVE MG/DL
KETONES, URINE: NEGATIVE
LEUKOCYTE EST, POC: NEGATIVE
MAGNESIUM: 2 MG/DL (ref 1.6–2.4)
NITRITE, URINE: NEGATIVE
PH UA: 5.5 (ref 5–9)
POTASSIUM SERPL-SCNC: 3.3 MEQ/L (ref 3.5–5.2)
PRO-BNP: 2495 PG/ML (ref 0–900)
PROTEIN UA: NEGATIVE MG/DL
SODIUM BLD-SCNC: 141 MEQ/L (ref 135–145)
SPECIFIC GRAVITY UA: 1.01 (ref 1–1.03)
TROPONIN T: 0.02 NG/ML
UROBILINOGEN, URINE: 0.2 EU/DL (ref 0–1)

## 2019-08-04 PROCEDURE — 6370000000 HC RX 637 (ALT 250 FOR IP): Performed by: PHYSICIAN ASSISTANT

## 2019-08-04 PROCEDURE — 36415 COLL VENOUS BLD VENIPUNCTURE: CPT

## 2019-08-04 PROCEDURE — 6370000000 HC RX 637 (ALT 250 FOR IP): Performed by: INTERNAL MEDICINE

## 2019-08-04 PROCEDURE — 83735 ASSAY OF MAGNESIUM: CPT

## 2019-08-04 PROCEDURE — 93010 ELECTROCARDIOGRAM REPORT: CPT | Performed by: INTERNAL MEDICINE

## 2019-08-04 PROCEDURE — 6360000002 HC RX W HCPCS: Performed by: INTERNAL MEDICINE

## 2019-08-04 PROCEDURE — 83880 ASSAY OF NATRIURETIC PEPTIDE: CPT

## 2019-08-04 PROCEDURE — 93005 ELECTROCARDIOGRAM TRACING: CPT | Performed by: INTERNAL MEDICINE

## 2019-08-04 PROCEDURE — 2580000003 HC RX 258: Performed by: INTERNAL MEDICINE

## 2019-08-04 PROCEDURE — 81003 URINALYSIS AUTO W/O SCOPE: CPT

## 2019-08-04 PROCEDURE — 82948 REAGENT STRIP/BLOOD GLUCOSE: CPT

## 2019-08-04 PROCEDURE — 1200000003 HC TELEMETRY R&B

## 2019-08-04 PROCEDURE — 84484 ASSAY OF TROPONIN QUANT: CPT

## 2019-08-04 PROCEDURE — 80048 BASIC METABOLIC PNL TOTAL CA: CPT

## 2019-08-04 PROCEDURE — 99232 SBSQ HOSP IP/OBS MODERATE 35: CPT | Performed by: INTERNAL MEDICINE

## 2019-08-04 PROCEDURE — 87086 URINE CULTURE/COLONY COUNT: CPT

## 2019-08-04 RX ORDER — POTASSIUM CHLORIDE 20 MEQ/1
40 TABLET, EXTENDED RELEASE ORAL ONCE
Status: COMPLETED | OUTPATIENT
Start: 2019-08-04 | End: 2019-08-04

## 2019-08-04 RX ADMIN — ENOXAPARIN SODIUM 100 MG: 100 INJECTION SUBCUTANEOUS at 19:52

## 2019-08-04 RX ADMIN — SPIRONOLACTONE 25 MG: 25 TABLET ORAL at 18:55

## 2019-08-04 RX ADMIN — PREDNISONE 40 MG: 20 TABLET ORAL at 10:42

## 2019-08-04 RX ADMIN — INSULIN GLARGINE 16 UNITS: 100 INJECTION, SOLUTION SUBCUTANEOUS at 19:52

## 2019-08-04 RX ADMIN — FUROSEMIDE 40 MG: 10 INJECTION, SOLUTION INTRAMUSCULAR; INTRAVENOUS at 10:39

## 2019-08-04 RX ADMIN — INSULIN LISPRO 8 UNITS: 100 INJECTION, SOLUTION INTRAVENOUS; SUBCUTANEOUS at 16:36

## 2019-08-04 RX ADMIN — POTASSIUM CHLORIDE 40 MEQ: 20 TABLET, EXTENDED RELEASE ORAL at 10:39

## 2019-08-04 RX ADMIN — LOSARTAN POTASSIUM 100 MG: 100 TABLET, FILM COATED ORAL at 10:42

## 2019-08-04 RX ADMIN — ENOXAPARIN SODIUM 100 MG: 100 INJECTION SUBCUTANEOUS at 10:49

## 2019-08-04 RX ADMIN — INSULIN LISPRO 2 UNITS: 100 INJECTION, SOLUTION INTRAVENOUS; SUBCUTANEOUS at 11:00

## 2019-08-04 RX ADMIN — GLIMEPIRIDE 2 MG: 2 TABLET ORAL at 05:30

## 2019-08-04 RX ADMIN — SPIRONOLACTONE 25 MG: 25 TABLET ORAL at 10:41

## 2019-08-04 RX ADMIN — Medication 10 ML: at 20:02

## 2019-08-04 RX ADMIN — ALLOPURINOL 300 MG: 300 TABLET ORAL at 10:42

## 2019-08-04 RX ADMIN — AMLODIPINE BESYLATE 5 MG: 5 TABLET ORAL at 10:42

## 2019-08-04 RX ADMIN — Medication 10 ML: at 10:40

## 2019-08-04 RX ADMIN — GABAPENTIN 300 MG: 300 CAPSULE ORAL at 19:55

## 2019-08-04 RX ADMIN — DOXAZOSIN 1 MG: 1 TABLET ORAL at 10:42

## 2019-08-04 RX ADMIN — ACETAMINOPHEN 650 MG: 325 TABLET ORAL at 10:39

## 2019-08-04 RX ADMIN — FUROSEMIDE 40 MG: 10 INJECTION, SOLUTION INTRAMUSCULAR; INTRAVENOUS at 19:52

## 2019-08-04 RX ADMIN — SIMVASTATIN 20 MG: 40 TABLET, FILM COATED ORAL at 19:55

## 2019-08-04 RX ADMIN — ASPIRIN 81 MG: 81 TABLET ORAL at 10:39

## 2019-08-04 RX ADMIN — INSULIN LISPRO 6 UNITS: 100 INJECTION, SOLUTION INTRAVENOUS; SUBCUTANEOUS at 12:44

## 2019-08-04 RX ADMIN — METOPROLOL TARTRATE 50 MG: 50 TABLET, FILM COATED ORAL at 19:55

## 2019-08-04 RX ADMIN — INSULIN LISPRO 5 UNITS: 100 INJECTION, SOLUTION INTRAVENOUS; SUBCUTANEOUS at 19:52

## 2019-08-04 RX ADMIN — METOPROLOL TARTRATE 50 MG: 50 TABLET, FILM COATED ORAL at 10:41

## 2019-08-04 RX ADMIN — GABAPENTIN 300 MG: 300 CAPSULE ORAL at 10:42

## 2019-08-04 ASSESSMENT — PAIN SCALES - GENERAL
PAINLEVEL_OUTOF10: 5
PAINLEVEL_OUTOF10: 0

## 2019-08-04 NOTE — PROGRESS NOTES
may contain minor errors which are inherent in voice recognition technology. **      Final report electronically signed by Dr. Huy Feldman on 8/2/2019 3:21 PM               DVT prophylaxis: [x] Lovenox                                 [] SCDs                                 [] SQ Heparin                                 [] Encourage ambulation           [] Already on Anticoagulation    Code Status: Full         Disposition:    [] Home       [] TCU       [x] Rehab       [] Psych       [] SNF       [] [place]       [] Other-          Thank you FELI Gutierres - JOSE DANIEL for the opportunity to be involved in this patient's care.     Electronically signed by Jesus Thorpe MD on 8/4/2019 at 3:28 PM

## 2019-08-05 ENCOUNTER — APPOINTMENT (OUTPATIENT)
Dept: NON INVASIVE DIAGNOSTICS | Age: 64
DRG: 292 | End: 2019-08-05
Payer: MEDICARE

## 2019-08-05 LAB
ANION GAP SERPL CALCULATED.3IONS-SCNC: 13 MEQ/L (ref 8–16)
BUN BLDV-MCNC: 36 MG/DL (ref 7–22)
CALCIUM SERPL-MCNC: 8.9 MG/DL (ref 8.5–10.5)
CHLORIDE BLD-SCNC: 96 MEQ/L (ref 98–111)
CO2: 34 MEQ/L (ref 23–33)
CREAT SERPL-MCNC: 1.2 MG/DL (ref 0.4–1.2)
GFR SERPL CREATININE-BSD FRML MDRD: 61 ML/MIN/1.73M2
GLUCOSE BLD-MCNC: 139 MG/DL (ref 70–108)
GLUCOSE BLD-MCNC: 155 MG/DL (ref 70–108)
GLUCOSE BLD-MCNC: 212 MG/DL (ref 70–108)
GLUCOSE BLD-MCNC: 358 MG/DL (ref 70–108)
GLUCOSE BLD-MCNC: 369 MG/DL (ref 70–108)
MAGNESIUM: 1.9 MG/DL (ref 1.6–2.4)
ORGANISM: ABNORMAL
POTASSIUM SERPL-SCNC: 3.4 MEQ/L (ref 3.5–5.2)
SODIUM BLD-SCNC: 143 MEQ/L (ref 135–145)
URINE CULTURE, ROUTINE: ABNORMAL

## 2019-08-05 PROCEDURE — 78452 HT MUSCLE IMAGE SPECT MULT: CPT

## 2019-08-05 PROCEDURE — 6370000000 HC RX 637 (ALT 250 FOR IP): Performed by: INTERNAL MEDICINE

## 2019-08-05 PROCEDURE — 80048 BASIC METABOLIC PNL TOTAL CA: CPT

## 2019-08-05 PROCEDURE — 6360000002 HC RX W HCPCS

## 2019-08-05 PROCEDURE — 83735 ASSAY OF MAGNESIUM: CPT

## 2019-08-05 PROCEDURE — 6360000002 HC RX W HCPCS: Performed by: PHYSICIAN ASSISTANT

## 2019-08-05 PROCEDURE — 97110 THERAPEUTIC EXERCISES: CPT

## 2019-08-05 PROCEDURE — 82948 REAGENT STRIP/BLOOD GLUCOSE: CPT

## 2019-08-05 PROCEDURE — A9500 TC99M SESTAMIBI: HCPCS | Performed by: INTERNAL MEDICINE

## 2019-08-05 PROCEDURE — 1200000003 HC TELEMETRY R&B

## 2019-08-05 PROCEDURE — 2709999900 HC NON-CHARGEABLE SUPPLY

## 2019-08-05 PROCEDURE — 93017 CV STRESS TEST TRACING ONLY: CPT | Performed by: INTERNAL MEDICINE

## 2019-08-05 PROCEDURE — 2580000003 HC RX 258: Performed by: INTERNAL MEDICINE

## 2019-08-05 PROCEDURE — 6360000002 HC RX W HCPCS: Performed by: INTERNAL MEDICINE

## 2019-08-05 PROCEDURE — 97166 OT EVAL MOD COMPLEX 45 MIN: CPT

## 2019-08-05 PROCEDURE — 99232 SBSQ HOSP IP/OBS MODERATE 35: CPT | Performed by: INTERNAL MEDICINE

## 2019-08-05 PROCEDURE — 3430000000 HC RX DIAGNOSTIC RADIOPHARMACEUTICAL: Performed by: INTERNAL MEDICINE

## 2019-08-05 PROCEDURE — 97162 PT EVAL MOD COMPLEX 30 MIN: CPT

## 2019-08-05 PROCEDURE — 36415 COLL VENOUS BLD VENIPUNCTURE: CPT

## 2019-08-05 RX ORDER — POTASSIUM CHLORIDE 20 MEQ/1
40 TABLET, EXTENDED RELEASE ORAL ONCE
Status: COMPLETED | OUTPATIENT
Start: 2019-08-05 | End: 2019-08-05

## 2019-08-05 RX ADMIN — SPIRONOLACTONE 25 MG: 25 TABLET ORAL at 17:28

## 2019-08-05 RX ADMIN — Medication 35 MILLICURIE: at 08:45

## 2019-08-05 RX ADMIN — FUROSEMIDE 40 MG: 10 INJECTION, SOLUTION INTRAMUSCULAR; INTRAVENOUS at 09:59

## 2019-08-05 RX ADMIN — DOXAZOSIN 1 MG: 1 TABLET ORAL at 09:59

## 2019-08-05 RX ADMIN — Medication 10 ML: at 09:59

## 2019-08-05 RX ADMIN — PREDNISONE 40 MG: 20 TABLET ORAL at 09:58

## 2019-08-05 RX ADMIN — LOSARTAN POTASSIUM 100 MG: 100 TABLET, FILM COATED ORAL at 09:58

## 2019-08-05 RX ADMIN — AMLODIPINE BESYLATE 5 MG: 5 TABLET ORAL at 09:58

## 2019-08-05 RX ADMIN — HYDRALAZINE HYDROCHLORIDE 10 MG: 20 INJECTION INTRAMUSCULAR; INTRAVENOUS at 04:30

## 2019-08-05 RX ADMIN — ENOXAPARIN SODIUM 100 MG: 100 INJECTION SUBCUTANEOUS at 09:58

## 2019-08-05 RX ADMIN — METOPROLOL TARTRATE 50 MG: 50 TABLET, FILM COATED ORAL at 09:58

## 2019-08-05 RX ADMIN — ENOXAPARIN SODIUM 100 MG: 100 INJECTION SUBCUTANEOUS at 20:06

## 2019-08-05 RX ADMIN — INSULIN GLARGINE 16 UNITS: 100 INJECTION, SOLUTION SUBCUTANEOUS at 20:06

## 2019-08-05 RX ADMIN — ALLOPURINOL 300 MG: 300 TABLET ORAL at 09:58

## 2019-08-05 RX ADMIN — METOPROLOL TARTRATE 50 MG: 50 TABLET, FILM COATED ORAL at 20:05

## 2019-08-05 RX ADMIN — FUROSEMIDE 40 MG: 10 INJECTION, SOLUTION INTRAMUSCULAR; INTRAVENOUS at 20:06

## 2019-08-05 RX ADMIN — GLIMEPIRIDE 2 MG: 2 TABLET ORAL at 05:12

## 2019-08-05 RX ADMIN — Medication 10 ML: at 20:06

## 2019-08-05 RX ADMIN — SIMVASTATIN 20 MG: 40 TABLET, FILM COATED ORAL at 20:05

## 2019-08-05 RX ADMIN — INSULIN LISPRO 5 UNITS: 100 INJECTION, SOLUTION INTRAVENOUS; SUBCUTANEOUS at 20:06

## 2019-08-05 RX ADMIN — SPIRONOLACTONE 25 MG: 25 TABLET ORAL at 09:58

## 2019-08-05 RX ADMIN — GABAPENTIN 300 MG: 300 CAPSULE ORAL at 09:58

## 2019-08-05 RX ADMIN — INSULIN LISPRO 10 UNITS: 100 INJECTION, SOLUTION INTRAVENOUS; SUBCUTANEOUS at 17:28

## 2019-08-05 RX ADMIN — GABAPENTIN 300 MG: 300 CAPSULE ORAL at 20:05

## 2019-08-05 RX ADMIN — Medication 10.7 MILLICURIE: at 07:50

## 2019-08-05 RX ADMIN — POTASSIUM CHLORIDE 40 MEQ: 20 TABLET, EXTENDED RELEASE ORAL at 09:58

## 2019-08-05 RX ADMIN — ASPIRIN 81 MG: 81 TABLET ORAL at 09:58

## 2019-08-05 ASSESSMENT — PAIN SCALES - GENERAL
PAINLEVEL_OUTOF10: 0
PAINLEVEL_OUTOF10: 0
PAINLEVEL_OUTOF10: 3
PAINLEVEL_OUTOF10: 0

## 2019-08-05 NOTE — PROGRESS NOTES
solving)    ADL;s:  LE Dressing: Moderate assistance       Functional Mobility:  Bed mobility  Supine to Sit: Stand by assistance(HOB elevated & using bedrail)    Functional Mobility  Functional - Mobility Device: Rolling Walker  Activity: Other(10ft in room)  Assist Level: Contact guard assistance  Functional Mobility Comments: ed for walker safety     Balance:  Balance  Sitting Balance: Stand by assistance  Standing Balance: Contact guard assistance    Transfers:  Sit to stand: Contact guard assistance  Stand to sit: Contact guard assistance   *ed on UE placement with t/fs    Upper Extremity Assessment:   LUE AROM : WFL  RUE AROM : WFL    LUE Strength  Gross LUE Strength: (4-/5)  RUE Strength  Gross RUE Strength: (4/5)    Sensation  Overall Sensation Status: WFL       Activity Tolerance: Patient limited by fatigue       Assessment:  Assessment: Pt demo decreased ADL & functional mobility status over PLOF. Continued OT recommended to educate Pt on safety & compensatory strategies to increased indep & safety upon returning home. Performance deficits / Impairments: Decreased functional mobility , Decreased ADL status, Decreased safe awareness, Decreased balance, Decreased endurance, Decreased cognition  Prognosis: Fair  REQUIRES OT FOLLOW UP: Yes  Decision Making: Medium Complexity  Safety Devices in place: Yes  Type of devices: All fall risk precautions in place, Call light within reach, Gait belt    Treatment Initiated: Treatment and education initiated within context of evaluation. Evaluation time included review of current medical information, gathering information related to past medical, social and functional history, completion of standardized testing, formal and informal observation of tasks, assessment of data and development of plan of care and goals.   Treatment time included skilled education and facilitation of tasks to increase safety and independence with ADL's for improved functional independence and quality of life. Discharge Recommendations:  (TCU)    Patient Education:  OT Education: OT Role, Plan of Care, Transfer Training  Barriers to Learning: decreased cognition    Equipment Recommendations: Other: RW, monitor for [facility] AE needs    Plan:  Times per week: 3-5x  Current Treatment Recommendations: Balance Training, Self-Care / ADL, Functional Mobility Training, Safety Education & Training    Goals:  Patient goals : go home  Short term goals  Time Frame for Short term goals: 2 weeks  Short term goal 1: Complete various t/fs including toilet with S & 0-2 vcs for safety  Short term goal 2: Tolerate standing 2 min with S for increased ease of sinkside grooming  Short term goal 3: Complete mobility to/from bathroom with RW, S, & 0-2 vcs for walker  safety  Short term goal 4: Complete LE dressing with min A & LH AE prn  Long term goals  Time Frame for Long term goals : No LTG set d/t short ELOS    Following session, patient left in safe position with all fall risk precautions in place.

## 2019-08-05 NOTE — PROGRESS NOTES
oriented  Denies any chest pain shortness of breath nausea vomiting    Scheduled Meds:   spironolactone  25 mg Oral BID    losartan  100 mg Oral Daily    amLODIPine  5 mg Oral Daily    simvastatin  20 mg Oral Nightly    metoprolol tartrate  50 mg Oral BID    predniSONE  40 mg Oral Daily    aspirin  81 mg Oral Daily    gabapentin  300 mg Oral BID    glimepiride  2 mg Oral QAM AC    doxazosin  1 mg Oral Daily    sodium chloride flush  10 mL Intravenous 2 times per day    enoxaparin  100 mg Subcutaneous Q12H    furosemide  40 mg Intravenous BID    insulin lispro  0-12 Units Subcutaneous TID WC    insulin lispro  0-6 Units Subcutaneous Nightly    insulin glargine  16 Units Subcutaneous Nightly    allopurinol  300 mg Oral Daily     Continuous Infusions:   dextrose       PRN Meds:.hydrALAZINE, traMADol, sodium chloride flush, magnesium hydroxide, ondansetron, potassium chloride, glucose, dextrose, glucagon (rDNA), dextrose, metoprolol, acetaminophen    Diet:  DIET LOW SODIUM 2 GM;    REVIEW OF SYSTEMS:   Pertinent positives as noted in the HPI. All other systems reviewed and negative. PHYSICAL EXAM:    BP (!) 174/91   Pulse 93   Temp 98 °F (36.7 °C) (Oral)   Resp 18   Ht 5' 8\" (1.727 m)   Wt 209 lb 12.8 oz (95.2 kg)   SpO2 93%   BMI 31.90 kg/m²     General appearance:  No apparent distress, appears stated age and cooperative. HEENT:  Normal cephalic, atraumatic without obvious deformity. Pupils equal, round, and reactive to light. Extra ocular muscles intact. Conjunctivae/corneas clear. Neck: Supple, with full range of motion. No jugular venous distention. Trachea midline. Respiratory:  Normal respiratory effort. Clear to auscultation, bilaterally without Rales/Wheezes/Rhonchi. Cardiovascular:  Regular rate and rhythm with normal S1/S2 without murmurs, rubs or gallops. Abdomen: Soft, non-tender, non-distended with normal bowel sounds.   Musculoskeletal: Bilateral 2+ pitting edema noted right greater than the left, right knee does have swelling no tenderness however no redness skin: Skin color, texture, turgor normal.  No rashes or lesions. Neurologic:  Neurovascularly intact without any focal sensory/motor deficits. Cranial nerves: II-XII intact, grossly non-focal.  Psychiatric:  Alert and oriented, thought content appropriate, normal insight  Capillary Refill: Brisk,< 3 seconds   Peripheral Pulses: +2 palpable, equal bilaterally        Labs:     Recent Labs     08/02/19  1357 08/03/19  0520   WBC 7.3 7.4   HGB 10.1* 9.4*   HCT 34.4* 32.8*    231     Recent Labs     08/03/19  0520 08/03/19  2041 08/04/19  0525 08/05/19  0521     --  141 143   K 2.7* 3.5 3.3* 3.4*   CL 94*  --  94* 96*   CO2 35*  --  34* 34*   BUN 19  --  22 36*   CREATININE 1.1  --  1.0 1.2   CALCIUM 8.4*  --  8.9 8.9     Recent Labs     08/02/19  1357   AST 15   ALT 13   BILIDIR <0.2   BILITOT 0.2*   ALKPHOS 99       Urinalysis:      Lab Results   Component Value Date    NITRU NEGATIVE 08/04/2019    WBCUA 0-2 02/02/2019    BACTERIA NONE 02/02/2019    RBCUA 0-2 02/02/2019    BLOODU NEGATIVE 08/04/2019    SPECGRAV 1.015 08/04/2019    GLUCOSEU NEGATIVE 02/02/2019       Radiology:     CXR: I have reviewed the CXR with the following interpretation:   EKG:  I have reviewed the EKG with the following interpretation: no acute ST changes noted, mild tachycardia     VL DUP LOWER EXTREMITY VENOUS RIGHT   Final Result   1. Negative exam for DVT of the right leg. 2. Popliteal soft tissue fluid collection correlate with persistent changes. **This report has been created using voice recognition software. It may contain minor errors which are inherent in voice recognition technology. **      Final report electronically signed by Dr. Bee Adame on 8/3/2019 12:55 AM      XR CHEST PORTABLE   Final Result   Prominent interstitium correlate for interstitial edema. Cardiomegaly.             **This report has been created

## 2019-08-06 LAB
ANION GAP SERPL CALCULATED.3IONS-SCNC: 16 MEQ/L (ref 8–16)
BUN BLDV-MCNC: 42 MG/DL (ref 7–22)
CALCIUM SERPL-MCNC: 8.9 MG/DL (ref 8.5–10.5)
CHLORIDE BLD-SCNC: 98 MEQ/L (ref 98–111)
CO2: 35 MEQ/L (ref 23–33)
CREAT SERPL-MCNC: 1.1 MG/DL (ref 0.4–1.2)
GFR SERPL CREATININE-BSD FRML MDRD: 67 ML/MIN/1.73M2
GLUCOSE BLD-MCNC: 103 MG/DL (ref 70–108)
GLUCOSE BLD-MCNC: 106 MG/DL (ref 70–108)
GLUCOSE BLD-MCNC: 148 MG/DL (ref 70–108)
GLUCOSE BLD-MCNC: 331 MG/DL (ref 70–108)
GLUCOSE BLD-MCNC: 382 MG/DL (ref 70–108)
MAGNESIUM: 1.9 MG/DL (ref 1.6–2.4)
POTASSIUM SERPL-SCNC: 3.4 MEQ/L (ref 3.5–5.2)
PRO-BNP: 576.7 PG/ML (ref 0–900)
SODIUM BLD-SCNC: 149 MEQ/L (ref 135–145)

## 2019-08-06 PROCEDURE — 6370000000 HC RX 637 (ALT 250 FOR IP): Performed by: PHYSICIAN ASSISTANT

## 2019-08-06 PROCEDURE — 6360000002 HC RX W HCPCS: Performed by: INTERNAL MEDICINE

## 2019-08-06 PROCEDURE — 82948 REAGENT STRIP/BLOOD GLUCOSE: CPT

## 2019-08-06 PROCEDURE — 6370000000 HC RX 637 (ALT 250 FOR IP): Performed by: INTERNAL MEDICINE

## 2019-08-06 PROCEDURE — 1200000003 HC TELEMETRY R&B

## 2019-08-06 PROCEDURE — 2580000003 HC RX 258: Performed by: INTERNAL MEDICINE

## 2019-08-06 PROCEDURE — 99232 SBSQ HOSP IP/OBS MODERATE 35: CPT | Performed by: INTERNAL MEDICINE

## 2019-08-06 PROCEDURE — 97110 THERAPEUTIC EXERCISES: CPT

## 2019-08-06 PROCEDURE — 99232 SBSQ HOSP IP/OBS MODERATE 35: CPT | Performed by: PHYSICIAN ASSISTANT

## 2019-08-06 PROCEDURE — 83880 ASSAY OF NATRIURETIC PEPTIDE: CPT

## 2019-08-06 PROCEDURE — 80048 BASIC METABOLIC PNL TOTAL CA: CPT

## 2019-08-06 PROCEDURE — 97116 GAIT TRAINING THERAPY: CPT

## 2019-08-06 PROCEDURE — 83735 ASSAY OF MAGNESIUM: CPT

## 2019-08-06 PROCEDURE — 36415 COLL VENOUS BLD VENIPUNCTURE: CPT

## 2019-08-06 RX ORDER — METOPROLOL TARTRATE 100 MG/1
100 TABLET ORAL 2 TIMES DAILY
Status: DISCONTINUED | OUTPATIENT
Start: 2019-08-06 | End: 2019-08-06

## 2019-08-06 RX ORDER — DEXTROSE MONOHYDRATE 50 MG/ML
INJECTION, SOLUTION INTRAVENOUS CONTINUOUS
Status: ACTIVE | OUTPATIENT
Start: 2019-08-06 | End: 2019-08-06

## 2019-08-06 RX ORDER — CARVEDILOL 25 MG/1
25 TABLET ORAL 2 TIMES DAILY
Status: DISCONTINUED | OUTPATIENT
Start: 2019-08-06 | End: 2019-08-07 | Stop reason: HOSPADM

## 2019-08-06 RX ORDER — FUROSEMIDE 40 MG/1
40 TABLET ORAL DAILY
Status: DISCONTINUED | OUTPATIENT
Start: 2019-08-07 | End: 2019-08-07 | Stop reason: HOSPADM

## 2019-08-06 RX ORDER — AMLODIPINE BESYLATE 10 MG/1
10 TABLET ORAL DAILY
Status: DISCONTINUED | OUTPATIENT
Start: 2019-08-07 | End: 2019-08-07 | Stop reason: HOSPADM

## 2019-08-06 RX ADMIN — SPIRONOLACTONE 25 MG: 25 TABLET ORAL at 08:45

## 2019-08-06 RX ADMIN — GABAPENTIN 300 MG: 300 CAPSULE ORAL at 08:45

## 2019-08-06 RX ADMIN — DEXTROSE MONOHYDRATE: 50 INJECTION, SOLUTION INTRAVENOUS at 13:44

## 2019-08-06 RX ADMIN — DOXAZOSIN 1 MG: 1 TABLET ORAL at 08:45

## 2019-08-06 RX ADMIN — LOSARTAN POTASSIUM 100 MG: 100 TABLET, FILM COATED ORAL at 08:45

## 2019-08-06 RX ADMIN — METOPROLOL TARTRATE 50 MG: 50 TABLET, FILM COATED ORAL at 08:45

## 2019-08-06 RX ADMIN — INSULIN LISPRO 5 UNITS: 100 INJECTION, SOLUTION INTRAVENOUS; SUBCUTANEOUS at 21:21

## 2019-08-06 RX ADMIN — ENOXAPARIN SODIUM 100 MG: 100 INJECTION SUBCUTANEOUS at 08:45

## 2019-08-06 RX ADMIN — AMLODIPINE BESYLATE 5 MG: 5 TABLET ORAL at 08:45

## 2019-08-06 RX ADMIN — FUROSEMIDE 40 MG: 10 INJECTION, SOLUTION INTRAMUSCULAR; INTRAVENOUS at 08:45

## 2019-08-06 RX ADMIN — SIMVASTATIN 20 MG: 40 TABLET, FILM COATED ORAL at 21:39

## 2019-08-06 RX ADMIN — GABAPENTIN 300 MG: 300 CAPSULE ORAL at 21:35

## 2019-08-06 RX ADMIN — INSULIN LISPRO 8 UNITS: 100 INJECTION, SOLUTION INTRAVENOUS; SUBCUTANEOUS at 16:27

## 2019-08-06 RX ADMIN — PREDNISONE 40 MG: 20 TABLET ORAL at 08:45

## 2019-08-06 RX ADMIN — ASPIRIN 81 MG: 81 TABLET ORAL at 08:45

## 2019-08-06 RX ADMIN — CARVEDILOL 25 MG: 25 TABLET, FILM COATED ORAL at 21:38

## 2019-08-06 RX ADMIN — GLIMEPIRIDE 2 MG: 2 TABLET ORAL at 05:22

## 2019-08-06 RX ADMIN — SPIRONOLACTONE 25 MG: 25 TABLET ORAL at 21:35

## 2019-08-06 RX ADMIN — Medication 10 ML: at 08:46

## 2019-08-06 RX ADMIN — ALLOPURINOL 300 MG: 300 TABLET ORAL at 08:46

## 2019-08-06 RX ADMIN — ENOXAPARIN SODIUM 100 MG: 100 INJECTION SUBCUTANEOUS at 21:35

## 2019-08-06 RX ADMIN — INSULIN GLARGINE 16 UNITS: 100 INJECTION, SOLUTION SUBCUTANEOUS at 21:32

## 2019-08-06 ASSESSMENT — PAIN SCALES - GENERAL
PAINLEVEL_OUTOF10: 0

## 2019-08-06 NOTE — PROGRESS NOTES
not suggestive for ischemia.   The nuclear images is not suggestive for myocardial ischemia.      Signatures      ----------------------------------------------------------------   Electronically signed by Modesto Bernal MD (Interpreting   Cardiologist) on 08/05/2019 at 16:34    Stress test 8/3/19  Summary   Technically difficult examination.   Left ventricle size is normal.   Normal left ventricular wall thickness.   There was moderate global hypokinesis of the left ventricle.   Systolic function was moderately reduced.   Ejection fraction is visually estimated in the range of 40% to 45%.   There is moderate circumferential pericardial effusion with no evidence of   hemodynamic compromise. Maximum size 1.8 cm anteriorly and 1.3 cm   posteriorly.      Signature      ----------------------------------------------------------------   Electronically signed by Modesto Bernal MD (Interpreting   [id number]) on 08/03/2019 at 04:18 PM    Lab Data:    Cardiac Enzymes:  No results for input(s): CKTOTAL, CKMB, CKMBINDEX, TROPONINI in the last 72 hours.     CBC:   Lab Results   Component Value Date    WBC 7.4 08/03/2019    RBC 4.08 08/03/2019    RBC 3.92 01/09/2012    HGB 9.4 08/03/2019    HCT 32.8 08/03/2019     08/03/2019       CMP:  Lab Results   Component Value Date     08/06/2019    K 3.4 08/06/2019    K 3.1 08/02/2019    CL 98 08/06/2019    CO2 35 08/06/2019    BUN 42 08/06/2019    CREATININE 1.1 08/06/2019    LABGLOM 67 08/06/2019    GLUCOSE 103 08/06/2019    CALCIUM 8.9 08/06/2019       Hepatic Function Panel:  Lab Results   Component Value Date    ALKPHOS 99 08/02/2019    ALT 13 08/02/2019    AST 15 08/02/2019    PROT 7.0 08/02/2019    BILITOT 0.2 08/02/2019    BILIDIR <0.2 08/02/2019    LABALBU 3.6 08/02/2019       Magnesium:    Lab Results   Component Value Date    MG 1.9 08/06/2019       PT/INR:  No results found for: PROTIME, INR    HgBA1c:    Lab Results   Component Value Date    LABA1C 6.3 01/31/2019

## 2019-08-07 VITALS
DIASTOLIC BLOOD PRESSURE: 79 MMHG | HEART RATE: 94 BPM | OXYGEN SATURATION: 92 % | WEIGHT: 213.9 LBS | RESPIRATION RATE: 16 BRPM | SYSTOLIC BLOOD PRESSURE: 136 MMHG | HEIGHT: 68 IN | BODY MASS INDEX: 32.42 KG/M2 | TEMPERATURE: 97.8 F

## 2019-08-07 LAB
ANION GAP SERPL CALCULATED.3IONS-SCNC: 12 MEQ/L (ref 8–16)
BUN BLDV-MCNC: 35 MG/DL (ref 7–22)
CALCIUM SERPL-MCNC: 8.6 MG/DL (ref 8.5–10.5)
CHLORIDE BLD-SCNC: 97 MEQ/L (ref 98–111)
CO2: 33 MEQ/L (ref 23–33)
CREAT SERPL-MCNC: 1 MG/DL (ref 0.4–1.2)
GFR SERPL CREATININE-BSD FRML MDRD: 75 ML/MIN/1.73M2
GLUCOSE BLD-MCNC: 101 MG/DL (ref 70–108)
GLUCOSE BLD-MCNC: 171 MG/DL (ref 70–108)
GLUCOSE BLD-MCNC: 93 MG/DL (ref 70–108)
MAGNESIUM: 1.9 MG/DL (ref 1.6–2.4)
POTASSIUM SERPL-SCNC: 3.1 MEQ/L (ref 3.5–5.2)
SODIUM BLD-SCNC: 142 MEQ/L (ref 135–145)

## 2019-08-07 PROCEDURE — 83735 ASSAY OF MAGNESIUM: CPT

## 2019-08-07 PROCEDURE — 99222 1ST HOSP IP/OBS MODERATE 55: CPT | Performed by: NURSE PRACTITIONER

## 2019-08-07 PROCEDURE — 6360000002 HC RX W HCPCS: Performed by: INTERNAL MEDICINE

## 2019-08-07 PROCEDURE — 6370000000 HC RX 637 (ALT 250 FOR IP): Performed by: PHYSICIAN ASSISTANT

## 2019-08-07 PROCEDURE — 6370000000 HC RX 637 (ALT 250 FOR IP): Performed by: INTERNAL MEDICINE

## 2019-08-07 PROCEDURE — 99239 HOSP IP/OBS DSCHRG MGMT >30: CPT | Performed by: INTERNAL MEDICINE

## 2019-08-07 PROCEDURE — 97116 GAIT TRAINING THERAPY: CPT

## 2019-08-07 PROCEDURE — 36415 COLL VENOUS BLD VENIPUNCTURE: CPT

## 2019-08-07 PROCEDURE — 80048 BASIC METABOLIC PNL TOTAL CA: CPT

## 2019-08-07 PROCEDURE — 97110 THERAPEUTIC EXERCISES: CPT

## 2019-08-07 PROCEDURE — 82948 REAGENT STRIP/BLOOD GLUCOSE: CPT

## 2019-08-07 PROCEDURE — 2580000003 HC RX 258: Performed by: INTERNAL MEDICINE

## 2019-08-07 RX ORDER — SIMVASTATIN 20 MG
20 TABLET ORAL NIGHTLY
Qty: 30 TABLET | Refills: 3 | Status: SHIPPED | OUTPATIENT
Start: 2019-08-07 | End: 2020-05-14 | Stop reason: SDUPTHER

## 2019-08-07 RX ORDER — FUROSEMIDE 40 MG/1
40 TABLET ORAL DAILY
Qty: 60 TABLET | Refills: 3 | Status: SHIPPED | OUTPATIENT
Start: 2019-08-08 | End: 2019-08-21

## 2019-08-07 RX ORDER — POTASSIUM CHLORIDE 20 MEQ/1
60 TABLET, EXTENDED RELEASE ORAL ONCE
Status: COMPLETED | OUTPATIENT
Start: 2019-08-07 | End: 2019-08-07

## 2019-08-07 RX ORDER — SPIRONOLACTONE 25 MG/1
25 TABLET ORAL 2 TIMES DAILY
Qty: 30 TABLET | Refills: 3 | Status: SHIPPED | OUTPATIENT
Start: 2019-08-07 | End: 2019-10-15 | Stop reason: SDUPTHER

## 2019-08-07 RX ORDER — CARVEDILOL 25 MG/1
25 TABLET ORAL 2 TIMES DAILY
Qty: 60 TABLET | Refills: 3 | Status: SHIPPED | OUTPATIENT
Start: 2019-08-07 | End: 2020-05-14 | Stop reason: SDUPTHER

## 2019-08-07 RX ORDER — POTASSIUM CHLORIDE 20 MEQ/1
40 TABLET, EXTENDED RELEASE ORAL ONCE
Status: DISCONTINUED | OUTPATIENT
Start: 2019-08-07 | End: 2019-08-07

## 2019-08-07 RX ADMIN — SPIRONOLACTONE 25 MG: 25 TABLET ORAL at 08:23

## 2019-08-07 RX ADMIN — PREDNISONE 40 MG: 20 TABLET ORAL at 08:24

## 2019-08-07 RX ADMIN — AMLODIPINE BESYLATE 10 MG: 10 TABLET ORAL at 08:24

## 2019-08-07 RX ADMIN — ALLOPURINOL 300 MG: 300 TABLET ORAL at 08:24

## 2019-08-07 RX ADMIN — DOXAZOSIN 1 MG: 1 TABLET ORAL at 08:24

## 2019-08-07 RX ADMIN — CARVEDILOL 25 MG: 25 TABLET, FILM COATED ORAL at 08:24

## 2019-08-07 RX ADMIN — FUROSEMIDE 40 MG: 40 TABLET ORAL at 08:24

## 2019-08-07 RX ADMIN — GLIMEPIRIDE 2 MG: 2 TABLET ORAL at 05:50

## 2019-08-07 RX ADMIN — ASPIRIN 81 MG: 81 TABLET ORAL at 08:26

## 2019-08-07 RX ADMIN — ENOXAPARIN SODIUM 100 MG: 100 INJECTION SUBCUTANEOUS at 08:26

## 2019-08-07 RX ADMIN — Medication 10 ML: at 08:26

## 2019-08-07 RX ADMIN — LOSARTAN POTASSIUM 100 MG: 100 TABLET, FILM COATED ORAL at 08:24

## 2019-08-07 RX ADMIN — POTASSIUM CHLORIDE 60 MEQ: 20 TABLET, EXTENDED RELEASE ORAL at 08:26

## 2019-08-07 RX ADMIN — GABAPENTIN 300 MG: 300 CAPSULE ORAL at 08:24

## 2019-08-07 ASSESSMENT — PAIN SCALES - GENERAL
PAINLEVEL_OUTOF10: 0
PAINLEVEL_OUTOF10: 0

## 2019-08-07 NOTE — PROGRESS NOTES
LABGLOM 75 08/07/2019    GLUCOSE 93 08/07/2019    CALCIUM 8.6 08/07/2019       Hepatic Function Panel:  Lab Results   Component Value Date    ALKPHOS 99 08/02/2019    ALT 13 08/02/2019    AST 15 08/02/2019    PROT 7.0 08/02/2019    BILITOT 0.2 08/02/2019    BILIDIR <0.2 08/02/2019    LABALBU 3.6 08/02/2019       Magnesium:    Lab Results   Component Value Date    MG 1.9 08/07/2019       PT/INR:  No results found for: PROTIME, INR    HgBA1c:    Lab Results   Component Value Date    LABA1C 6.3 01/31/2019       FLP:  Lab Results   Component Value Date    TRIG 100 08/03/2019    HDL 41 08/03/2019    LDLCALC 42 08/03/2019    LDLDIRECT 57 02/13/2017       TSH:  No results found for: TSH      Assessment:  · Acute on chronic CHF: improved  · EF 40-45 per echo 8/3/19, was 55 per echo 3/11/16  · moderate circumferential pericardial effusion with no evidence of   hemodynamic compromise.   · CAD: moderate nonobstructive CAD per cath 2016  · Elevated troponin 2/2 CHF  · Hypokalemia: primary replacing potassium  · HTN:   · HLD  · DM  · Obesity  · ? SRAVAN      Plan:  Daily weight, I&O  · 2 g sodium, 2 L fluid restriction  · Continue lasix, spironolactone, BB, ACEi,, CCB, statin, asa  · Monitor lytes, replace prn  · Will see prn  · Recommend OP sleep study  · F/U with CHF clinic as scheduled 8/21/19  · F/u with [facility] in 6-8 weeks         Electronically signed by FELI Curry CNP on 8/7/2019 at 8:54 AM

## 2019-08-07 NOTE — DISCHARGE SUMMARY
Progress     Patient:  Olivia Rodriguez  YOB: 1955    MRN: [id number]     Acct: [de-identified]    PCP: FELI Gutierres CNP    Date of Admission: 8/2/2019    Date of Service: Pt seen/examined on 08/02/19 and Admitted to Inpatient with expected LOS greater than two midnights due to medical therapy. Chief Complaint: Leg swelling right greater than left of 3 days duration associated with difficulty breathing and severe weakness    ASSESSMENT/ PLAN:    1. Acute exacerbation of systolic congestive heart failure-EF of 40 to 45%-on Lasix twice daily  Monitor daily weights, strict  Intake and Out put, monitor K, Ical , Mag, BMP daily, replace lytes accordingly-diuresing well will continue-replace potassium-diuresing well-we will continue-will need cardiology input regarding ongoing  Diuresis- seen by cardio- changed to oral diuretics at discharge  Hypernatremia likely secondary to diuretics-we will add D5W for few hours- resolved      Low K- sec to diuretics received 60meq of oral k and also ordered K daily for home- spironolactone added by cardio    2. Mild troponin leak possibly related to tachycardia as well as congestive heart failure on Lovenox 1 mg/kg twice daily plan for stress test-Per cardiology  3. Type 2 diabetes on insulin monitor blood sugars closely and Humalog to scale per protocol  4. Right knee swelling has history of gout and osteoarthritis does not look like a septic knee-improving well on prednisone will continue    Add PT OT  We will add home health as patient is now requesting this    Stable for discharge today    pcp in one week    Cardio appt as OP per scheduled    Discharge time:- 35mins    History Of Present Illness:    70 y.o. male who presented to [address] [place] [address] for evaluation of leg swelling. The patient has a history of CAD, hypertension, hyperlipidemia, and diabetes.  This swelling is present bilaterally, but is worse on the right, specifically to the results for input(s): WBC, HGB, HCT, PLT in the last 72 hours. Recent Labs     08/05/19  0521 08/06/19  0523 08/07/19  0543    149* 142   K 3.4* 3.4* 3.1*   CL 96* 98 97*   CO2 34* 35* 33   BUN 36* 42* 35*   CREATININE 1.2 1.1 1.0   CALCIUM 8.9 8.9 8.6     No results for input(s): AST, ALT, BILIDIR, BILITOT, ALKPHOS in the last 72 hours. Urinalysis:      Lab Results   Component Value Date    NITRU NEGATIVE 08/04/2019    WBCUA 0-2 02/02/2019    BACTERIA NONE 02/02/2019    RBCUA 0-2 02/02/2019    BLOODU NEGATIVE 08/04/2019    SPECGRAV 1.015 08/04/2019    GLUCOSEU NEGATIVE 02/02/2019       Radiology:     CXR: I have reviewed the CXR with the following interpretation:   EKG:  I have reviewed the EKG with the following interpretation: no acute ST changes noted, mild tachycardia     VL DUP LOWER EXTREMITY VENOUS RIGHT   Final Result   1. Negative exam for DVT of the right leg. 2. Popliteal soft tissue fluid collection correlate with persistent changes. **This report has been created using voice recognition software. It may contain minor errors which are inherent in voice recognition technology. **      Final report electronically signed by Dr. Chelsie Hodge on 8/3/2019 12:55 AM      XR CHEST PORTABLE   Final Result   Prominent interstitium correlate for interstitial edema. Cardiomegaly. **This report has been created using voice recognition software. It may contain minor errors which are inherent in voice recognition technology. **      Final report electronically signed by Dr. Huy Feldman on 8/2/2019 3:21 PM               DVT prophylaxis: [x] Lovenox                                 [] SCDs                                 [] SQ Heparin                                 [] Encourage ambulation           [] Already on Anticoagulation    Code Status: Full         Disposition:    [] Home       [] TCU       [x] Rehab       [] Psych       [] SNF       [] [place]       []

## 2019-08-07 NOTE — PLAN OF CARE
Problem: Falls - Risk of:  Goal: Will remain free from falls  Description  Will remain free from falls  8/6/2019 0444 by Estevan Rizo RN  Outcome: Ongoing  Note:   Pt free from falls this shift. Bed alarm on, 2/4 side rails up, call light in reach, fall band on, nonskid socks on, clear pathway, bed in locked and lowest position. Will continue to monitor. Problem: Falls - Risk of:  Goal: Absence of physical injury  Description  Absence of physical injury  8/6/2019 0444 by Estevan Rizo RN  Outcome: Ongoing  Note:   Pt free from physical injury this shift. Bed alarm on, 2/4 side rails up, call light in reach, fall band on, nonskid socks on, clear pathway, bed in locked and lowest position. Will continue to monitor. Problem: Risk for Impaired Skin Integrity  Goal: Tissue integrity - skin and mucous membranes  Description  Structural intactness and normal physiological function of skin and  mucous membranes. 8/6/2019 0444 by Estevan Rizo RN  Outcome: Ongoing  Note:   Skin warm, dry, intact. Mucous membranes pink, moist, intact. Pt is cleaned when incontinent and able to turn self. Will continue to monitor. Problem: Pain:  Goal: Pain level will decrease  Description  Pain level will decrease  8/6/2019 0444 by Estevan Rizo RN  Outcome: Ongoing  Note:   Pt denied pain this shift. Will continue to monitor and manage pain appropriately. Problem: Discharge Planning:  Goal: Participates in care planning  Description  Participates in care planning  8/6/2019 0444 by Estevan Rizo RN  Outcome: Ongoing  Note:   Pt participates in care planning to the best of ability. Will continue to include in care planning. Problem: Discharge Planning:  Goal: Discharged to appropriate level of care  Description  Discharged to appropriate level of care  8/6/2019 0444 by Estevan Rizo RN  Outcome: Ongoing  Note:   Pt plans to be discharged to home with wife. Will continue to monitor for further discharge needs.
0345 by King El RN  Outcome: Ongoing  Note:   Monitoring blood sugar ACHS and giving insulin per order. Care plan reviewed with patient. Patient verbalizes understanding of the plan of care and contributes to goal setting.
imbalanced fluid volume signs and symptoms  Outcome: Ongoing  Note:   Monitoring intake and output. Lasix given. Will continue to monitor. Problem: Serum Glucose Level - Abnormal:  Goal: Ability to maintain appropriate glucose levels will improve to within specified parameters  Description  Ability to maintain appropriate glucose levels will improve to within specified parameters  Outcome: Ongoing  Note:   Blood sugars checked ACHS. Insulin also ordered per sliding scale. Care plan reviewed with patient. Will review and discuss care plan with family when available.

## 2019-08-21 ENCOUNTER — OFFICE VISIT (OUTPATIENT)
Dept: CARDIOLOGY CLINIC | Age: 64
End: 2019-08-21
Payer: MEDICARE

## 2019-08-21 VITALS
DIASTOLIC BLOOD PRESSURE: 82 MMHG | BODY MASS INDEX: 31.98 KG/M2 | OXYGEN SATURATION: 92 % | HEIGHT: 68 IN | HEART RATE: 90 BPM | SYSTOLIC BLOOD PRESSURE: 138 MMHG | WEIGHT: 211 LBS

## 2019-08-21 DIAGNOSIS — I10 ESSENTIAL HYPERTENSION: ICD-10-CM

## 2019-08-21 DIAGNOSIS — I50.42 CHF (CONGESTIVE HEART FAILURE), NYHA CLASS II, CHRONIC, COMBINED (HCC): Primary | ICD-10-CM

## 2019-08-21 LAB
ANION GAP SERPL CALCULATED.3IONS-SCNC: 14 MEQ/L (ref 8–16)
BUN BLDV-MCNC: 28 MG/DL (ref 7–22)
CALCIUM SERPL-MCNC: 9.5 MG/DL (ref 8.5–10.5)
CHLORIDE BLD-SCNC: 101 MEQ/L (ref 98–111)
CO2: 27 MEQ/L (ref 23–33)
CREAT SERPL-MCNC: 1.2 MG/DL (ref 0.4–1.2)
GFR SERPL CREATININE-BSD FRML MDRD: 61 ML/MIN/1.73M2
GLUCOSE BLD-MCNC: 168 MG/DL (ref 70–108)
POTASSIUM SERPL-SCNC: 4.8 MEQ/L (ref 3.5–5.2)
SODIUM BLD-SCNC: 142 MEQ/L (ref 135–145)

## 2019-08-21 PROCEDURE — 36415 COLL VENOUS BLD VENIPUNCTURE: CPT | Performed by: NURSE PRACTITIONER

## 2019-08-21 PROCEDURE — 99214 OFFICE O/P EST MOD 30 MIN: CPT | Performed by: NURSE PRACTITIONER

## 2019-08-21 RX ORDER — POTASSIUM CHLORIDE 1.5 G/1.77G
20 POWDER, FOR SOLUTION ORAL DAILY
Qty: 30 EACH | Refills: 1
Start: 2019-08-21 | End: 2020-05-14 | Stop reason: SDUPTHER

## 2019-08-21 RX ORDER — FUROSEMIDE 40 MG/1
20 TABLET ORAL DAILY
Qty: 60 TABLET | Refills: 3
Start: 2019-08-21 | End: 2020-05-14

## 2019-08-21 ASSESSMENT — ENCOUNTER SYMPTOMS
COUGH: 0
WHEEZING: 0
COLOR CHANGE: 0
APNEA: 0
ABDOMINAL DISTENTION: 0
NAUSEA: 0
CHEST TIGHTNESS: 0
ABDOMINAL PAIN: 0
SHORTNESS OF BREATH: 1

## 2019-08-21 NOTE — PROGRESS NOTES
EF 60% C MILD DISEASE IN THE PROXIMAL  PORTION BEFORE THE BIFURCATION OF D1,MILD DISEASE IS NOTED IN THE RCA , DIFFUSE AT 10-20% RCA ALSO HAS 10-20% LESIONS    DILATATION, ESOPHAGUS      TRANSTHORACIC ECHOCARDIOGRAM  10/22/2010    EF 55-65% C MILD LFT ATRIAL DILATATION, GRADE 1 DIASOLIC DYSFUNCTION     Family History   Problem Relation Age of Onset    Hypertension Father     High Cholesterol Father     Diabetes Father      Social History     Tobacco Use    Smoking status: Former Smoker     Packs/day: 1.00     Years: 37.00     Pack years: 37.00     Last attempt to quit: 2/10/2016     Years since quitting: 3.5    Smokeless tobacco: Never Used   Substance Use Topics    Alcohol use: No     Current Outpatient Medications   Medication Sig Dispense Refill    furosemide (LASIX) 40 MG tablet Take 0.5 tablets by mouth daily 60 tablet 3    potassium chloride (KLOR-CON) 20 MEQ packet Take 20 mEq by mouth daily 30 each 1    simvastatin (ZOCOR) 20 MG tablet Take 1 tablet by mouth nightly 30 tablet 3    carvedilol (COREG) 25 MG tablet Take 1 tablet by mouth 2 times daily 60 tablet 3    spironolactone (ALDACTONE) 25 MG tablet Take 1 tablet by mouth 2 times daily 30 tablet 3    sulfaSALAzine (AZULFIDINE) 500 MG tablet Take 1,000 mg by mouth 3 times daily      telmisartan (MICARDIS) 40 MG tablet Take 40 mg by mouth daily      allopurinol (ZYLOPRIM) 100 MG tablet Take 300 mg by mouth daily       ferrous sulfate 325 (65 Fe) MG tablet One tab every 48 hr.  Take with vitamn C 500 mg 30 tablet 3    vitamin C (VITAMIN C) 500 MG tablet Take 1 tablet by mouth every 48 hours 30 tablet 3    traMADol (ULTRAM) 50 MG tablet Take 50 mg by mouth every 6 hours as needed for Pain.       Glucos-Chond-Sterol-Fish Oil (GLUCOSAMINE CHONDROITIN PLUS PO) Take by mouth      amLODIPine (NORVASC) 10 MG tablet Take 10 mg by mouth daily      aspirin 81 MG EC tablet Take 81 mg by mouth daily      Insulin Detemir (LEVEMIR SC) Inject 20 Units

## 2019-09-12 LAB
ANION GAP SERPL CALCULATED.3IONS-SCNC: 9 MMOL/L (ref 4–12)
BUN BLDV-MCNC: 14 MG/DL (ref 7–20)
CALCIUM SERPL-MCNC: 9.2 MG/DL (ref 8.8–10.5)
CHLORIDE BLD-SCNC: 99 MEQ/L (ref 101–111)
CO2: 30 MEQ/L (ref 21–32)
CREAT SERPL-MCNC: 1.09 MG/DL (ref 0.6–1.3)
CREATININE CLEARANCE: >60
GLUCOSE: 228 MG/DL (ref 70–110)
POTASSIUM SERPL-SCNC: 3.8 MEQ/L (ref 3.6–5)
SODIUM BLD-SCNC: 138 MEQ/L (ref 135–145)

## 2019-09-13 ENCOUNTER — TELEPHONE (OUTPATIENT)
Dept: CARDIOLOGY CLINIC | Age: 64
End: 2019-09-13

## 2019-09-23 ENCOUNTER — OFFICE VISIT (OUTPATIENT)
Dept: CARDIOLOGY CLINIC | Age: 64
End: 2019-09-23
Payer: MEDICARE

## 2019-09-23 VITALS
DIASTOLIC BLOOD PRESSURE: 88 MMHG | WEIGHT: 211.2 LBS | SYSTOLIC BLOOD PRESSURE: 116 MMHG | HEIGHT: 68 IN | BODY MASS INDEX: 32.01 KG/M2 | HEART RATE: 64 BPM

## 2019-09-23 DIAGNOSIS — I25.10 CORONARY ARTERY DISEASE INVOLVING NATIVE CORONARY ARTERY OF NATIVE HEART WITHOUT ANGINA PECTORIS: Primary | ICD-10-CM

## 2019-09-23 DIAGNOSIS — I50.41 ACUTE COMBINED SYSTOLIC AND DIASTOLIC CHF, NYHA CLASS 4 (HCC): ICD-10-CM

## 2019-09-23 DIAGNOSIS — Z95.820 S/P ANGIOPLASTY WITH STENT: ICD-10-CM

## 2019-09-23 DIAGNOSIS — I10 ESSENTIAL HYPERTENSION: ICD-10-CM

## 2019-09-23 PROCEDURE — 99213 OFFICE O/P EST LOW 20 MIN: CPT | Performed by: PHYSICIAN ASSISTANT

## 2019-10-15 RX ORDER — SPIRONOLACTONE 25 MG/1
25 TABLET ORAL 2 TIMES DAILY
Qty: 30 TABLET | Refills: 3 | Status: SHIPPED | OUTPATIENT
Start: 2019-10-15 | End: 2020-05-14 | Stop reason: SDUPTHER

## 2019-10-16 ENCOUNTER — OFFICE VISIT (OUTPATIENT)
Dept: CARDIOLOGY CLINIC | Age: 64
End: 2019-10-16
Payer: MEDICARE

## 2019-10-16 VITALS
DIASTOLIC BLOOD PRESSURE: 84 MMHG | BODY MASS INDEX: 32.98 KG/M2 | WEIGHT: 217.6 LBS | OXYGEN SATURATION: 93 % | HEART RATE: 100 BPM | HEIGHT: 68 IN | SYSTOLIC BLOOD PRESSURE: 174 MMHG

## 2019-10-16 DIAGNOSIS — I50.42 CHF (CONGESTIVE HEART FAILURE), NYHA CLASS II, CHRONIC, COMBINED (HCC): Primary | ICD-10-CM

## 2019-10-16 DIAGNOSIS — I10 ESSENTIAL HYPERTENSION: ICD-10-CM

## 2019-10-16 PROCEDURE — 99213 OFFICE O/P EST LOW 20 MIN: CPT | Performed by: NURSE PRACTITIONER

## 2019-10-16 ASSESSMENT — ENCOUNTER SYMPTOMS
SHORTNESS OF BREATH: 1
COLOR CHANGE: 0
CHEST TIGHTNESS: 0
COUGH: 0
ABDOMINAL DISTENTION: 0
ABDOMINAL PAIN: 0
APNEA: 0
WHEEZING: 0
NAUSEA: 0

## 2019-10-22 ENCOUNTER — OFFICE VISIT (OUTPATIENT)
Dept: CARDIOLOGY CLINIC | Age: 64
End: 2019-10-22
Payer: MEDICARE

## 2019-10-22 VITALS
OXYGEN SATURATION: 95 % | BODY MASS INDEX: 32.77 KG/M2 | WEIGHT: 216.2 LBS | HEART RATE: 89 BPM | DIASTOLIC BLOOD PRESSURE: 80 MMHG | HEIGHT: 68 IN | SYSTOLIC BLOOD PRESSURE: 178 MMHG

## 2019-10-22 DIAGNOSIS — I10 ESSENTIAL HYPERTENSION: Primary | ICD-10-CM

## 2019-10-22 PROCEDURE — 99211 OFF/OP EST MAY X REQ PHY/QHP: CPT | Performed by: NURSE PRACTITIONER

## 2019-10-22 RX ORDER — HYDRALAZINE HYDROCHLORIDE 10 MG/1
10 TABLET, FILM COATED ORAL 3 TIMES DAILY
Qty: 90 TABLET | Refills: 3 | Status: SHIPPED | OUTPATIENT
Start: 2019-10-22 | End: 2019-10-29 | Stop reason: DRUGHIGH

## 2019-10-29 ENCOUNTER — OFFICE VISIT (OUTPATIENT)
Dept: CARDIOLOGY CLINIC | Age: 64
End: 2019-10-29
Payer: MEDICARE

## 2019-10-29 VITALS
SYSTOLIC BLOOD PRESSURE: 174 MMHG | OXYGEN SATURATION: 95 % | HEART RATE: 91 BPM | BODY MASS INDEX: 32.49 KG/M2 | DIASTOLIC BLOOD PRESSURE: 82 MMHG | WEIGHT: 214.4 LBS | HEIGHT: 68 IN

## 2019-10-29 DIAGNOSIS — I10 ESSENTIAL HYPERTENSION: Primary | ICD-10-CM

## 2019-10-29 PROCEDURE — 99211 OFF/OP EST MAY X REQ PHY/QHP: CPT | Performed by: NURSE PRACTITIONER

## 2019-10-29 RX ORDER — HYDRALAZINE HYDROCHLORIDE 25 MG/1
25 TABLET, FILM COATED ORAL 3 TIMES DAILY
Qty: 90 TABLET | Refills: 3 | Status: SHIPPED | OUTPATIENT
Start: 2019-10-29 | End: 2019-12-11

## 2019-12-11 ENCOUNTER — OFFICE VISIT (OUTPATIENT)
Dept: CARDIOLOGY CLINIC | Age: 64
End: 2019-12-11
Payer: MEDICARE

## 2019-12-11 VITALS
WEIGHT: 211 LBS | BODY MASS INDEX: 32.08 KG/M2 | DIASTOLIC BLOOD PRESSURE: 80 MMHG | OXYGEN SATURATION: 94 % | HEART RATE: 104 BPM | SYSTOLIC BLOOD PRESSURE: 160 MMHG

## 2019-12-11 DIAGNOSIS — I10 ESSENTIAL HYPERTENSION: ICD-10-CM

## 2019-12-11 DIAGNOSIS — I50.42 CHF (CONGESTIVE HEART FAILURE), NYHA CLASS II, CHRONIC, COMBINED (HCC): Primary | ICD-10-CM

## 2019-12-11 DIAGNOSIS — Z95.820 S/P ANGIOPLASTY WITH STENT: ICD-10-CM

## 2019-12-11 PROCEDURE — 99213 OFFICE O/P EST LOW 20 MIN: CPT | Performed by: NURSE PRACTITIONER

## 2019-12-11 RX ORDER — HYDRALAZINE HYDROCHLORIDE 25 MG/1
50 TABLET, FILM COATED ORAL 3 TIMES DAILY
Qty: 90 TABLET | Refills: 3
Start: 2019-12-11 | End: 2020-05-14 | Stop reason: SDUPTHER

## 2019-12-11 RX ORDER — CLONIDINE HYDROCHLORIDE 0.1 MG/1
0.1 TABLET ORAL 2 TIMES DAILY
Qty: 60 TABLET | Refills: 3 | Status: SHIPPED | OUTPATIENT
Start: 2019-12-11 | End: 2020-05-14 | Stop reason: SDUPTHER

## 2019-12-11 ASSESSMENT — ENCOUNTER SYMPTOMS
NAUSEA: 0
CHEST TIGHTNESS: 0
ABDOMINAL PAIN: 0
SHORTNESS OF BREATH: 1
COLOR CHANGE: 0
COUGH: 0
WHEEZING: 0
ABDOMINAL DISTENTION: 0
APNEA: 0

## 2019-12-19 ENCOUNTER — OFFICE VISIT (OUTPATIENT)
Dept: CARDIOLOGY CLINIC | Age: 64
End: 2019-12-19
Payer: MEDICARE

## 2019-12-19 VITALS
HEIGHT: 68 IN | SYSTOLIC BLOOD PRESSURE: 124 MMHG | WEIGHT: 211 LBS | OXYGEN SATURATION: 96 % | HEART RATE: 84 BPM | DIASTOLIC BLOOD PRESSURE: 72 MMHG | BODY MASS INDEX: 31.98 KG/M2

## 2019-12-19 DIAGNOSIS — I50.42 CHF (CONGESTIVE HEART FAILURE), NYHA CLASS II, CHRONIC, COMBINED (HCC): Primary | ICD-10-CM

## 2019-12-19 PROCEDURE — 99211 OFF/OP EST MAY X REQ PHY/QHP: CPT | Performed by: NURSE PRACTITIONER

## 2020-05-14 ENCOUNTER — OFFICE VISIT (OUTPATIENT)
Dept: CARDIOLOGY CLINIC | Age: 65
End: 2020-05-14
Payer: MEDICARE

## 2020-05-14 VITALS
HEART RATE: 94 BPM | HEIGHT: 68 IN | BODY MASS INDEX: 33.65 KG/M2 | OXYGEN SATURATION: 94 % | SYSTOLIC BLOOD PRESSURE: 160 MMHG | WEIGHT: 222 LBS | DIASTOLIC BLOOD PRESSURE: 80 MMHG

## 2020-05-14 LAB
ANION GAP SERPL CALCULATED.3IONS-SCNC: 8 MEQ/L (ref 8–16)
BUN BLDV-MCNC: 20 MG/DL (ref 7–22)
CALCIUM SERPL-MCNC: 9.1 MG/DL (ref 8.5–10.5)
CHLORIDE BLD-SCNC: 97 MEQ/L (ref 98–111)
CO2: 32 MEQ/L (ref 23–33)
CREAT SERPL-MCNC: 0.9 MG/DL (ref 0.4–1.2)
GFR SERPL CREATININE-BSD FRML MDRD: 84 ML/MIN/1.73M2
GLUCOSE BLD-MCNC: 103 MG/DL (ref 70–108)
POTASSIUM SERPL-SCNC: 3.9 MEQ/L (ref 3.5–5.2)
SODIUM BLD-SCNC: 137 MEQ/L (ref 135–145)

## 2020-05-14 PROCEDURE — 36415 COLL VENOUS BLD VENIPUNCTURE: CPT | Performed by: NURSE PRACTITIONER

## 2020-05-14 PROCEDURE — 99213 OFFICE O/P EST LOW 20 MIN: CPT | Performed by: NURSE PRACTITIONER

## 2020-05-14 RX ORDER — CARVEDILOL 25 MG/1
25 TABLET ORAL 2 TIMES DAILY
Qty: 60 TABLET | Refills: 5 | Status: SHIPPED | OUTPATIENT
Start: 2020-05-14 | End: 2022-07-05 | Stop reason: SDUPTHER

## 2020-05-14 RX ORDER — POTASSIUM CHLORIDE 1.5 G/1.77G
20 POWDER, FOR SOLUTION ORAL DAILY
Qty: 30 EACH | Refills: 5 | Status: SHIPPED | OUTPATIENT
Start: 2020-05-14 | End: 2021-03-02

## 2020-05-14 RX ORDER — AMLODIPINE BESYLATE 10 MG/1
10 TABLET ORAL DAILY
Qty: 30 TABLET | Refills: 5 | Status: SHIPPED | OUTPATIENT
Start: 2020-05-14 | End: 2022-07-05 | Stop reason: SDUPTHER

## 2020-05-14 RX ORDER — CLONIDINE HYDROCHLORIDE 0.1 MG/1
0.1 TABLET ORAL 2 TIMES DAILY
Qty: 60 TABLET | Refills: 5 | Status: SHIPPED | OUTPATIENT
Start: 2020-05-14 | End: 2020-09-24

## 2020-05-14 RX ORDER — FUROSEMIDE 20 MG/1
20 TABLET ORAL DAILY
Qty: 30 TABLET | Refills: 5 | Status: SHIPPED | OUTPATIENT
Start: 2020-05-14 | End: 2021-02-04 | Stop reason: SDUPTHER

## 2020-05-14 RX ORDER — TELMISARTAN 40 MG/1
40 TABLET ORAL DAILY
Qty: 30 TABLET | Refills: 5 | Status: SHIPPED | OUTPATIENT
Start: 2020-05-14 | End: 2020-09-24

## 2020-05-14 RX ORDER — SIMVASTATIN 20 MG
20 TABLET ORAL NIGHTLY
Qty: 30 TABLET | Refills: 5 | Status: SHIPPED | OUTPATIENT
Start: 2020-05-14 | End: 2022-07-05 | Stop reason: SDUPTHER

## 2020-05-14 RX ORDER — SPIRONOLACTONE 25 MG/1
25 TABLET ORAL 2 TIMES DAILY
Qty: 60 TABLET | Refills: 5 | Status: SHIPPED | OUTPATIENT
Start: 2020-05-14 | End: 2022-07-05 | Stop reason: SDUPTHER

## 2020-05-14 RX ORDER — HYDRALAZINE HYDROCHLORIDE 25 MG/1
50 TABLET, FILM COATED ORAL 3 TIMES DAILY
Qty: 90 TABLET | Refills: 5 | Status: SHIPPED | OUTPATIENT
Start: 2020-05-14 | End: 2020-09-24

## 2020-05-14 ASSESSMENT — ENCOUNTER SYMPTOMS
ABDOMINAL PAIN: 0
APNEA: 0
COUGH: 0
CHEST TIGHTNESS: 0
SHORTNESS OF BREATH: 0
COLOR CHANGE: 0
ABDOMINAL DISTENTION: 0
NAUSEA: 0
WHEEZING: 0

## 2020-05-14 NOTE — PATIENT INSTRUCTIONS
You may receive a survey regarding the care you received during your visit. Your input is valuable to us. We encourage you to complete and return your survey. We hope you will choose us in the future for your healthcare needs. Continue:  · Take all medications as prescribed   · Daily weights and record - please try to weigh yourself upon awakening before eating or drinking   · Fluid restriction of 2 Liters per day (64 oz)  · Limit sodium in diet to around 0089-0024 mg/day  · Monitor BP  · Activity as tolerated     Call the [facility] for any of the following symptoms: [phone]   Weight gain of 3 pounds in 1 day or 5 pounds in 1 week   Increased shortness of breath   Shortness of breath while laying down   Cough   Chest pain   Swelling in feet, ankles or legs   Tenderness or bloating in the abdomen   Fatigue    Decreased appetite or feeling \"full\"    Nausea    Confusion     **PLEASE bring all medications in original bottles with you to your next appointment**    Educational websites:    http://[email or web address].Panopto/. org (American Heart Association)    PromotionalReview.nl. com ([employer])

## 2020-05-14 NOTE — PROGRESS NOTES
DILATATION, ESOPHAGUS      TRANSTHORACIC ECHOCARDIOGRAM  10/22/2010    EF 55-65% C MILD LFT ATRIAL DILATATION, GRADE 1 DIASOLIC DYSFUNCTION     Family History   Problem Relation Age of Onset    Hypertension Father     High Cholesterol Father     Diabetes Father      Social History     Tobacco Use    Smoking status: Former Smoker     Packs/day: 1.00     Years: 37.00     Pack years: 37.00     Last attempt to quit: 2/10/2016     Years since quittin.2    Smokeless tobacco: Never Used   Substance Use Topics    Alcohol use: No     Current Outpatient Medications   Medication Sig Dispense Refill    furosemide (LASIX) 20 MG tablet Take 1 tablet by mouth daily 30 tablet 5    carvedilol (COREG) 25 MG tablet Take 1 tablet by mouth 2 times daily 60 tablet 5    telmisartan (MICARDIS) 40 MG tablet Take 1 tablet by mouth daily 30 tablet 5    amLODIPine (NORVASC) 10 MG tablet Take 1 tablet by mouth daily 30 tablet 5    simvastatin (ZOCOR) 20 MG tablet Take 1 tablet by mouth nightly 30 tablet 5    spironolactone (ALDACTONE) 25 MG tablet Take 1 tablet by mouth 2 times daily 60 tablet 5    hydrALAZINE (APRESOLINE) 25 MG tablet Take 2 tablets by mouth 3 times daily 90 tablet 5    cloNIDine (CATAPRES) 0.1 MG tablet Take 1 tablet by mouth 2 times daily 60 tablet 5    potassium chloride (KLOR-CON) 20 MEQ packet Take 20 mEq by mouth daily 30 each 5    sulfaSALAzine (AZULFIDINE) 500 MG tablet Take 1,000 mg by mouth 3 times daily      allopurinol (ZYLOPRIM) 100 MG tablet Take 300 mg by mouth daily       ferrous sulfate 325 (65 Fe) MG tablet One tab every 48 hr.  Take with vitamn C 500 mg 30 tablet 3    vitamin C (VITAMIN C) 500 MG tablet Take 1 tablet by mouth every 48 hours 30 tablet 3    traMADol (ULTRAM) 50 MG tablet Take 50 mg by mouth every 6 hours as needed for Pain.       aspirin 81 MG EC tablet Take 81 mg by mouth daily      Insulin Detemir (LEVEMIR SC) Inject 20 Units into the skin nightly       metFORMIN (GLUCOPHAGE) 500 MG tablet Take 1,000 mg by mouth 2 times daily (with meals)      terazosin (HYTRIN) 1 MG capsule Take 2 mg by mouth nightly       gabapentin (NEURONTIN) 300 MG capsule Take 300 mg by mouth 2 times daily. .      glimepiride (AMARYL) 2 MG tablet Take 2 mg by mouth 2 times daily        No current facility-administered medications for this visit. Allergies   Allergen Reactions    Lorazepam Other (See Comments)     Light headed, dizzy, blurred vision       SUBJECTIVE:   Review of Systems   Constitutional: Negative for activity change, appetite change, fatigue and fever. HENT: Negative for congestion. Respiratory: Negative for apnea, cough, chest tightness, shortness of breath (with exertion ) and wheezing. Cardiovascular: Negative for chest pain, palpitations and leg swelling. Gastrointestinal: Negative for abdominal distention, abdominal pain and nausea. Genitourinary: Negative for difficulty urinating and dysuria. Musculoskeletal: Negative for arthralgias and gait problem. Skin: Negative for color change. Neurological: Negative for dizziness, numbness and headaches. Psychiatric/Behavioral: Negative for agitation, confusion and sleep disturbance. The patient is not nervous/anxious. OBJECTIVE:   Today's Vitals:  BP (!) 160/80   Pulse 94   Ht 5' 8\" (1.727 m)   Wt 222 lb (100.7 kg)   SpO2 94%   BMI 33.75 kg/m²     Physical Exam  Vitals signs reviewed. Constitutional:       Appearance: He is well-developed. HENT:      Head: Normocephalic and atraumatic. Eyes:      Pupils: Pupils are equal, round, and reactive to light. Neck:      Musculoskeletal: Normal range of motion. Vascular: No JVD. Cardiovascular:      Rate and Rhythm: Normal rate and regular rhythm. Heart sounds: Normal heart sounds. No murmur. Pulmonary:      Effort: Pulmonary effort is normal. No respiratory distress. Breath sounds: No rales.    Abdominal:      General: There is no feeling \"full\"   Nausea    Confusion      Return in about 2 months (around 7/14/2020). or sooner if needed     Patient given educational materials - see patient instructions. We discussed the importance of weighing oneself and recording daily. We also discussed the importance of a low sodium diet, higher sodium foods to avoid and appropriate low sodium food choices. Discussed use, benefit, and side effects of prescribed medications. All patient questions answered. Patient verbalizes understanding of plan of care using teach back method, and is agreeable to the treatment plan.        Electronicallysigned by FELI Worrell CNP on 5/14/2020 at 3:11 PM

## 2020-07-07 ENCOUNTER — OFFICE VISIT (OUTPATIENT)
Dept: CARDIOLOGY CLINIC | Age: 65
End: 2020-07-07
Payer: MEDICARE

## 2020-07-07 VITALS
BODY MASS INDEX: 33.59 KG/M2 | DIASTOLIC BLOOD PRESSURE: 80 MMHG | WEIGHT: 221.6 LBS | HEART RATE: 84 BPM | HEIGHT: 68 IN | SYSTOLIC BLOOD PRESSURE: 142 MMHG

## 2020-07-07 PROCEDURE — 99213 OFFICE O/P EST LOW 20 MIN: CPT | Performed by: NURSE PRACTITIONER

## 2020-07-07 RX ORDER — ACETAMINOPHEN 500 MG
500 TABLET ORAL EVERY 6 HOURS PRN
COMMUNITY

## 2020-07-07 NOTE — PROGRESS NOTES
tablet 5    spironolactone (ALDACTONE) 25 MG tablet Take 1 tablet by mouth 2 times daily 60 tablet 5    hydrALAZINE (APRESOLINE) 25 MG tablet Take 2 tablets by mouth 3 times daily (Patient taking differently: Take 10 mg by mouth 3 times daily ) 90 tablet 5    cloNIDine (CATAPRES) 0.1 MG tablet Take 1 tablet by mouth 2 times daily 60 tablet 5    potassium chloride (KLOR-CON) 20 MEQ packet Take 20 mEq by mouth daily 30 each 5    sulfaSALAzine (AZULFIDINE) 500 MG tablet Take 1,000 mg by mouth 3 times daily      allopurinol (ZYLOPRIM) 100 MG tablet Take 300 mg by mouth daily       ferrous sulfate 325 (65 Fe) MG tablet One tab every 48 hr.  Take with vitamn C 500 mg 30 tablet 3    vitamin C (VITAMIN C) 500 MG tablet Take 1 tablet by mouth every 48 hours 30 tablet 3    traMADol (ULTRAM) 50 MG tablet Take 50 mg by mouth every 6 hours as needed for Pain.  aspirin 81 MG EC tablet Take 81 mg by mouth daily      Insulin Detemir (LEVEMIR SC) Inject 20 Units into the skin nightly       metFORMIN (GLUCOPHAGE) 500 MG tablet Take 1,000 mg by mouth 2 times daily (with meals)      terazosin (HYTRIN) 1 MG capsule Take 2 mg by mouth nightly       gabapentin (NEURONTIN) 300 MG capsule Take 300 mg by mouth 2 times daily. .      glimepiride (AMARYL) 2 MG tablet Take 2 mg by mouth every morning (before breakfast)        No current facility-administered medications for this visit.         Social History     Socioeconomic History    Marital status:      Spouse name: Sergei Grimaldo Number of children: 3    Years of education: Not on file    Highest education level: Not on file   Occupational History    Not on file   Social Needs    Financial resource strain: Not on file    Food insecurity     Worry: Not on file     Inability: Not on file   [employer] needs     Medical: Not on file     Non-medical: Not on file   Tobacco Use    Smoking status: Former Smoker     Packs/day: 1.00     Years: 37.00     Pack years: moderate global hypokinesis of the left ventricle. Systolic function was moderately reduced. Ejection fraction is visually estimated in the range of 40% to 45%. There is moderate circumferential pericardial effusion with no evidence of   hemodynamic compromise. Maximum size 1.8 cm anteriorly and 1.3 cm   posteriorly. Signature      ----------------------------------------------------------------   Electronically signed by Dave Steele MD        Diagnosis Orders   1. CHF (congestive heart failure), NYHA class II, chronic, combined (HCC)  ECHO Complete 2D W Doppler W Color   2. Essential hypertension     3. Coronary artery disease involving native coronary artery of native heart without angina pectoris     4. S/P angioplasty with stent     5. Hyperlipidemia, unspecified hyperlipidemia type         Orders Placed This Encounter   Procedures    ECHO Complete 2D W Doppler W Color     Standing Status:   Future     Standing Expiration Date:   7/7/2021     Order Specific Question:   Reason for exam:     Answer:   F/U reduced EF   Cardiac wise stable, no chest pain or sob. No fluid overload. B/P slightly elevated today, states ok at home and usually 130's-1400's/70's  Instructed on importance of having scale and daily weights as well as fluid and sodium restriction to avoid CHF exacerbation. Continue f/u with and recommendations per CHF clinic. Repeat echo prior to appt with Lilibeth SKY, CHF clinic  Continue current treatment plan     Discussed use, benefit, and side effects of prescribed medications. All patient questions answered. Pt voiced understanding. Instructed to continue current medications, diet and exercise. Continue risk factor modification and medical management. Patient agreed with treatment plan. Follow up as directed.     Follow up with Dr Calos Sow as scheduled or sooner if needed

## 2020-09-03 ENCOUNTER — HOSPITAL ENCOUNTER (OUTPATIENT)
Dept: NON INVASIVE DIAGNOSTICS | Age: 65
Discharge: HOME | End: 2020-09-03
Payer: MEDICARE

## 2020-09-03 LAB
LV EF: 63 %
LVEF MODALITY: NORMAL

## 2020-09-03 PROCEDURE — 93306 TTE W/DOPPLER COMPLETE: CPT

## 2020-09-24 ENCOUNTER — OFFICE VISIT (OUTPATIENT)
Dept: CARDIOLOGY CLINIC | Age: 65
End: 2020-09-24
Payer: MEDICARE

## 2020-09-24 VITALS
SYSTOLIC BLOOD PRESSURE: 150 MMHG | DIASTOLIC BLOOD PRESSURE: 70 MMHG | WEIGHT: 225 LBS | HEART RATE: 65 BPM | OXYGEN SATURATION: 93 % | BODY MASS INDEX: 34.1 KG/M2 | HEIGHT: 68 IN

## 2020-09-24 PROCEDURE — 99214 OFFICE O/P EST MOD 30 MIN: CPT | Performed by: NURSE PRACTITIONER

## 2020-09-24 RX ORDER — HYDRALAZINE HYDROCHLORIDE 25 MG/1
25 TABLET, FILM COATED ORAL 3 TIMES DAILY
Qty: 90 TABLET | Refills: 3 | Status: SHIPPED | OUTPATIENT
Start: 2020-09-24 | End: 2020-09-29

## 2020-09-24 RX ORDER — TELMISARTAN 80 MG/1
80 TABLET ORAL DAILY
COMMUNITY
Start: 2020-09-10 | End: 2022-10-03

## 2020-09-24 ASSESSMENT — ENCOUNTER SYMPTOMS
WHEEZING: 0
ABDOMINAL PAIN: 0
NAUSEA: 0
COLOR CHANGE: 0
ABDOMINAL DISTENTION: 0
APNEA: 0
COUGH: 0
SHORTNESS OF BREATH: 0
CHEST TIGHTNESS: 0

## 2020-09-24 NOTE — PROGRESS NOTES
Rajeevjulia       Visit Date: 9/24/2020  Cardiologist:  Dr. Mansi Orr - will be new to him previous [facility]  Primary Care Physician: Dr. Arcenio Iniguez, APRN - CNP    Rogelio Schrader is a 77 y.o. male who presents today for:  Chief Complaint   Patient presents with    Congestive Heart Failure       HPI: Obtained from patient and chart    Rogelio Schrader is a 77 y.o. male who presents to the office for a follow up visit in the heart failure clinic. Hx nonobstructive CAD per [facility] 2016. Negative stress test 8/5/19, HTN, DM, previous smoker ? COPD, EF was 55% 2016, 8/3/19 revealed EF 40-45%, EF improved to 60-65% with grade 1 DD 9/3/20. He has been feeling ok. No swelling, bloating, palpitations, SOB, orthopnea or PND. His BP is elevated. He has not been taking the Clonidine or Hydralazine I prescribed in May 2020. He recently saw his PCP who increased the Telmisartan. He denies any headache, dizziness. He can perform ADLs without SOB or fatigue. He is making urine.        Accompanied by: welf  Last hospital admission related to Heart Failure:  Aug 2019  Chest Pain: no  Worsening SOB: no  Worsening Orthopnea/PND: no  Edema: no  Any extra diuretic use: no  Weight gain: no  Fatigue: no  Abdominal bloating: no  Appetite: good  SRAVAN: no  Cough: occasional   Compliant checking home weight: no  Compliant checking blood pressure: no      Past Medical History:   Diagnosis Date    Arthritis     CAD (coronary artery disease) 4/5/2012    Diabetes mellitus ([address]     Diverticulosis of colon     History of chest pain     History of tobacco abuse     Hyperlipidemia     Hypertension     Pneumonia      Past Surgical History:   Procedure Laterality Date    APPENDECTOMY      CARDIAC SURGERY      heart cath    COLECTOMY  2001    D/T DIVERTICULOSIS    COLONOSCOPY      DIAGNOSTIC CARDIAC CATH LAB PROCEDURE  09/17/2010    EF 60% C MILD DISEASE IN THE PROXIMAL  PORTION BEFORE THE BIFURCATION OF D1,MILD DISEASE IS NOTED IN THE RCA , DIFFUSE AT 10-20% RCA ALSO HAS 10-20% LESIONS    DILATATION, ESOPHAGUS      TRANSTHORACIC ECHOCARDIOGRAM  10/22/2010    EF 55-65% C MILD LFT ATRIAL DILATATION, GRADE 1 DIASOLIC DYSFUNCTION     Family History   Problem Relation Age of Onset    Hypertension Father     High Cholesterol Father     Diabetes Father      Social History     Tobacco Use    Smoking status: Former Smoker     Packs/day: 1.00     Years: 37.00     Pack years: 37.00     Last attempt to quit: 2/10/2016     Years since quittin.6    Smokeless tobacco: Never Used   Substance Use Topics    Alcohol use: No     Current Outpatient Medications   Medication Sig Dispense Refill    telmisartan (MICARDIS) 80 MG tablet Take 80 mg by mouth daily      hydrALAZINE (APRESOLINE) 25 MG tablet Take 1 tablet by mouth 3 times daily 90 tablet 3    acetaminophen (TYLENOL) 500 MG tablet Take 500 mg by mouth every 6 hours as needed for Pain      Misc Natural Products (GLUCOSAMINE CHOND COMPLEX/MSM PO) Take by mouth      furosemide (LASIX) 20 MG tablet Take 1 tablet by mouth daily 30 tablet 5    carvedilol (COREG) 25 MG tablet Take 1 tablet by mouth 2 times daily 60 tablet 5    amLODIPine (NORVASC) 10 MG tablet Take 1 tablet by mouth daily 30 tablet 5    simvastatin (ZOCOR) 20 MG tablet Take 1 tablet by mouth nightly 30 tablet 5    spironolactone (ALDACTONE) 25 MG tablet Take 1 tablet by mouth 2 times daily 60 tablet 5    potassium chloride (KLOR-CON) 20 MEQ packet Take 20 mEq by mouth daily 30 each 5    sulfaSALAzine (AZULFIDINE) 500 MG tablet Take 500 mg by mouth 2 times daily       allopurinol (ZYLOPRIM) 100 MG tablet Take 300 mg by mouth daily       ferrous sulfate 325 (65 Fe) MG tablet One tab every 48 hr.  Take with vitamn C 500 mg 30 tablet 3    vitamin C (VITAMIN C) 500 MG tablet Take 1 tablet by mouth every 48 hours 30 tablet 3    aspirin 81 MG EC tablet Take 81 mg by mouth daily      Insulin Detemir (LEVEMIR SC) Inject 20 Units into the skin nightly       metFORMIN (GLUCOPHAGE) 500 MG tablet Take 1,000 mg by mouth 2 times daily (with meals)      terazosin (HYTRIN) 1 MG capsule Take 5 mg by mouth nightly       gabapentin (NEURONTIN) 300 MG capsule Take 300 mg by mouth 2 times daily. .      glimepiride (AMARYL) 2 MG tablet Take 2 mg by mouth 2 times daily        No current facility-administered medications for this visit. Allergies   Allergen Reactions    Lorazepam Other (See Comments)     Light headed, dizzy, blurred vision       SUBJECTIVE:   Review of Systems   Constitutional: Negative for activity change, appetite change, fatigue and fever. HENT: Negative for congestion. Respiratory: Negative for apnea, cough, chest tightness, shortness of breath and wheezing. Cardiovascular: Negative for chest pain, palpitations and leg swelling. Gastrointestinal: Negative for abdominal distention, abdominal pain and nausea. Genitourinary: Negative for difficulty urinating and dysuria. Musculoskeletal: Negative for arthralgias and gait problem. Skin: Negative for color change. Neurological: Negative for dizziness, numbness and headaches. Psychiatric/Behavioral: Negative for agitation, confusion and sleep disturbance. The patient is not nervous/anxious. OBJECTIVE:   Today's Vitals:  BP (!) 150/70   Pulse 65   Ht 5' 8\" (1.727 m)   Wt 225 lb (102.1 kg)   SpO2 93%   BMI 34.21 kg/m²     Physical Exam  Vitals signs reviewed. Constitutional:       Appearance: He is well-developed. HENT:      Head: Normocephalic and atraumatic. Eyes:      Pupils: Pupils are equal, round, and reactive to light. Neck:      Musculoskeletal: Normal range of motion. Vascular: No JVD. Cardiovascular:      Rate and Rhythm: Normal rate and regular rhythm. Heart sounds: Normal heart sounds. No murmur. Pulmonary:      Effort: Pulmonary effort is normal. No respiratory distress. Breath sounds: No rales. Abdominal:      General: There is no distension. Palpations: Abdomen is soft. Tenderness: There is no abdominal tenderness. Musculoskeletal:         General: No tenderness. Right lower leg: No edema. Left lower leg: No edema. Skin:     General: Skin is warm and dry. Capillary Refill: Capillary refill takes less than 2 seconds. Neurological:      Mental Status: He is alert and oriented to person, place, and time. Psychiatric:         Mood and Affect: Mood normal.         Behavior: Behavior normal.         Wt Readings from Last 3 Encounters:   09/24/20 225 lb (102.1 kg)   07/07/20 221 lb 9.6 oz (100.5 kg)   05/14/20 222 lb (100.7 kg)     BP Readings from Last 3 Encounters:   09/24/20 (!) 150/70   07/07/20 (!) 142/80   05/14/20 (!) 160/80     Pulse Readings from Last 3 Encounters:   09/24/20 65   07/07/20 84   05/14/20 94     Body mass index is 34.21 kg/m². ECHO:   9/3/20  Summary   Normal left ventricle size and systolic function. Ejection fraction was   estimated at 60 to 65 %. There were no regional left ventricular wall   motion abnormalities and wall thickness was within normal limits. Doppler parameters were consistent with abnormal left ventricular   relaxation (grade 1 diastolic dysfunction). The left atrium is Mildly dilated. There is a small anterior and posterior pericardial effusion with no   evidence of hemodynamic compromise. Signature      ----------------------------------------------------------------   Electronically signed by Marilee Quiroz MD (Interpreting   physician) on 09/03/2020 at 06:48 PM   ----------------------------------------------------------------      Findings      Mitral Valve   The mitral valve structure was normal with normal leaflet separation. DOPPLER: The transmitral velocity was within the normal range with no   evidence for mitral stenosis. There was no evidence of mitral   regurgitation.       Aortic Valve   The aortic valve was trileaflet with normal thickness and cuspal   separation. DOPPLER: Transaortic velocity was within the normal range with   no evidence of aortic stenosis. There was no evidence of aortic   regurgitation. Tricuspid Valve   The tricuspid valve structure was normal with normal leaflet separation. DOPPLER: There was no evidence of tricuspid stenosis. Trivial tricuspid regurgitation visualized. Pulmonic Valve   The pulmonic valve leaflets exhibited normal thickness, no calcification,   and normal cuspal separation. DOPPLER: The transpulmonic velocity was   within the normal range with no evidence for regurgitation. Left Atrium   The left atrium is Mildly dilated. Left Ventricle   Normal left ventricle size and systolic function. Ejection fraction was   estimated at 60 to 65 %. There were no regional left ventricular wall   motion abnormalities and wall thickness was within normal limits. Doppler parameters were consistent with abnormal left ventricular   relaxation (grade 1 diastolic dysfunction). Right Atrium   Right atrial size was normal.      Right Ventricle   The right ventricular size was normal with normal systolic function and   wall thickness. Pericardial Effusion   There is a small anterior and posterior pericardial effusion with no   evidence of hemodynamic compromise. Pleural Effusion   No evidence of pleural effusion. Aorta / Great Vessels   -Aortic root dimension within normal limits.   -The Pulmonary artery is within normal limits. -IVC size is within normal limits with normal respiratory phasic changes.       Results reviewed:  BNP:   Lab Results   Component Value Date    PROBNP 576.7 08/06/2019     CBC:   Lab Results   Component Value Date    WBC 7.4 08/03/2019    RBC 4.08 08/03/2019    RBC 3.92 01/09/2012    HGB 9.4 08/03/2019    HCT 32.8 08/03/2019     08/03/2019     CMP:    Lab Results   Component Value Date     05/14/2020    K 3.9 05/14/2020    K 3.1 08/02/2019    CL 97 05/14/2020    CO2 32 05/14/2020    BUN 20 05/14/2020    CREATININE 0.9 05/14/2020    LABGLOM 84 05/14/2020    GLUCOSE 103 05/14/2020    GLUCOSE 228 09/12/2019    CALCIUM 9.1 05/14/2020     Hepatic Function Panel:    Lab Results   Component Value Date    ALKPHOS 99 08/02/2019    ALT 13 08/02/2019    AST 15 08/02/2019    PROT 7.0 08/02/2019    BILITOT 0.2 08/02/2019    BILIDIR <0.2 08/02/2019    LABALBU 3.6 08/02/2019     Magnesium:    Lab Results   Component Value Date    MG 1.9 08/07/2019     PT/INR:  No results found for: PROTIME, INR  Lipids:    Lab Results   Component Value Date    TRIG 100 08/03/2019    HDL 41 08/03/2019    LDLCALC 42 08/03/2019    LDLDIRECT 57 02/13/2017       ASSESSMENT AND PLAN:   The patient's condition/symptoms are Stable      Diagnosis Orders   1. CHF (congestive heart failure), NYHA class II, chronic, combined ([facility] [address]  Basic Metabolic Panel   2. Essential hypertension     3. S/P angioplasty with stent         Plan:  · GDMT   · ACE/ARB/ARNi: Telmisartan 40 mg daily  · Beta Blocker: Coreg 25 mg bid  · Aldosterone antagonist: Aldactone 25 mg bid  · Diuretic/Potassium: Lasix 20 mg daily, Potassium 20 mEq daily  · Hydralazine/Nitrate: Hydralazine 10 mg tid  · Other: Amlodipine 10 mg daily, Lipitor, Catapres 0.1 mg bid, ASA 81 mg  · No signs of fluid overload. EF has improved from 40-45% to 60-65%. BP is elevated today. Add back Hydralazine (not sure why he did not refill? ?). Will monitor and titrate if needed. Nurse visit next week for BP check. BMP soon. · HF Zones reviewed.   · Daily weights and record  · Fluid restriction of 2 Liters per day (64 oz)   · Limit sodium in diet to 7165-6226 mg/day  · Healthier food options were discussed   · Monitor BP daily 1 hour after meds are taken  · Increase activity as tolerated     Patient was instructed to call the [address] for changes in the following symptoms:    Weight gain of 3 pounds in 1 day or 5 pounds in 1 week   Increased shortness of breath   Shortness of breath while laying down   Cough   Chest pain   Swelling in feet, ankles or legs   Tenderness or bloating in the abdomen   Fatigue    Decreased appetite or feeling \"full\"   Nausea    Confusion      Return in about 6 months (around 3/24/2021). or sooner if needed     Patient given educational materials - see patient instructions. We discussed the importance of weighing oneself and recording daily. We also discussed the importance of a low sodium diet, higher sodium foods to avoid and appropriate low sodium food choices. Discussed use, benefit, and side effects of prescribed medications. All patient questions answered. Patient verbalizes understanding of plan of care using teach back method, and is agreeable to the treatment plan. Greater then 40 minutes of time was spent reviewing the chart and educating the patient about HF, medications, diet, exercise, and discussing the plan of care. I personally spent more then 50% of the appt time face to face with the patient counseling/coordinating patient's care.       Electronicallysigned by FELI Rodriguez CNP on 9/24/2020 at 3:24 PM

## 2020-09-29 ENCOUNTER — OFFICE VISIT (OUTPATIENT)
Dept: CARDIOLOGY CLINIC | Age: 65
End: 2020-09-29

## 2020-09-29 VITALS
HEART RATE: 60 BPM | OXYGEN SATURATION: 94 % | HEIGHT: 68 IN | WEIGHT: 223.8 LBS | SYSTOLIC BLOOD PRESSURE: 170 MMHG | BODY MASS INDEX: 33.92 KG/M2 | DIASTOLIC BLOOD PRESSURE: 78 MMHG

## 2020-09-29 PROCEDURE — 99999 PR OFFICE/OUTPT VISIT,PROCEDURE ONLY: CPT | Performed by: NURSE PRACTITIONER

## 2020-09-29 RX ORDER — HYDRALAZINE HYDROCHLORIDE 25 MG/1
50 TABLET, FILM COATED ORAL 3 TIMES DAILY
Qty: 90 TABLET | Refills: 3
Start: 2020-09-29 | End: 2020-10-06 | Stop reason: DRUGHIGH

## 2020-10-02 ENCOUNTER — TELEPHONE (OUTPATIENT)
Dept: CARDIOLOGY CLINIC | Age: 65
End: 2020-10-02

## 2020-10-02 NOTE — TELEPHONE ENCOUNTER
BMP reviewed and WNL  BUN a little elevated but GFR and Cr ok  No changes   BP check as scheduled next week.  Please remind him to be checking at home and bring his home BP cuff in as well  Thank you

## 2020-10-06 ENCOUNTER — TELEPHONE (OUTPATIENT)
Dept: CARDIOLOGY CLINIC | Age: 65
End: 2020-10-06

## 2020-10-06 ENCOUNTER — OFFICE VISIT (OUTPATIENT)
Dept: CARDIOLOGY CLINIC | Age: 65
End: 2020-10-06

## 2020-10-06 VITALS
HEART RATE: 94 BPM | SYSTOLIC BLOOD PRESSURE: 168 MMHG | DIASTOLIC BLOOD PRESSURE: 92 MMHG | HEIGHT: 68 IN | WEIGHT: 231.6 LBS | BODY MASS INDEX: 35.1 KG/M2 | OXYGEN SATURATION: 90 %

## 2020-10-06 PROCEDURE — 99999 PR OFFICE/OUTPT VISIT,PROCEDURE ONLY: CPT | Performed by: NURSE PRACTITIONER

## 2020-10-06 RX ORDER — HYDRALAZINE HYDROCHLORIDE 50 MG/1
50 TABLET, FILM COATED ORAL 3 TIMES DAILY
Qty: 270 TABLET | Refills: 3
Start: 2020-10-06 | End: 2021-02-04 | Stop reason: SDUPTHER

## 2020-10-06 NOTE — PROGRESS NOTES
Patient here for blood pressure check. Patient brought in a blood pressure log which is scanned in chart. Also checked blood pressure with patient's home machine.

## 2020-10-06 NOTE — TELEPHONE ENCOUNTER
Patient in for blood pressure check. Mundo Check in to see patient. Patient will call in to see how blood pressures are doing.

## 2020-10-07 NOTE — PROGRESS NOTES
Pt BP cuss readings 160/90  Manual /92  His BPs at home have been 140's in the evening prior to taking his last dose of Hydralazine  We will keep meds the same  He was asked to notify me if SBP>145  He verbalized understanding

## 2020-10-12 NOTE — TELEPHONE ENCOUNTER
Spoke with patient   He has not been checking his bp at home  He states he will check them this week and call office on Monday with readings

## 2020-10-19 NOTE — TELEPHONE ENCOUNTER
He wasn't checking them at home but is now checking them at home   He just didn't start the week you told him to

## 2020-10-19 NOTE — TELEPHONE ENCOUNTER
Patient bp log      Date BP   10/12 153/73   10/13 122/63   10/14 124/62   10/15 127/63   10/16 138/65

## 2020-11-03 PROBLEM — I10 HYPERTENSION: Status: RESOLVED | Noted: 2020-11-03 | Resolved: 2020-11-03

## 2021-01-12 ENCOUNTER — OFFICE VISIT (OUTPATIENT)
Dept: CARDIOLOGY CLINIC | Age: 66
End: 2021-01-12
Payer: MEDICARE

## 2021-01-12 VITALS
DIASTOLIC BLOOD PRESSURE: 71 MMHG | BODY MASS INDEX: 36.37 KG/M2 | SYSTOLIC BLOOD PRESSURE: 142 MMHG | WEIGHT: 240 LBS | HEART RATE: 67 BPM | HEIGHT: 68 IN

## 2021-01-12 DIAGNOSIS — I50.42 CHF (CONGESTIVE HEART FAILURE), NYHA CLASS II, CHRONIC, COMBINED (HCC): Primary | ICD-10-CM

## 2021-01-12 PROCEDURE — 99214 OFFICE O/P EST MOD 30 MIN: CPT | Performed by: INTERNAL MEDICINE

## 2021-01-12 PROCEDURE — 93000 ELECTROCARDIOGRAM COMPLETE: CPT | Performed by: INTERNAL MEDICINE

## 2021-01-12 NOTE — PROGRESS NOTES
[id number] [address] Chester[facility] .  [address] [address]  Dept: [phone]  Dept Fax: [phone]  Loc: [phone]    Visit Date: 1/12/2021    Mr. Bao Garcia is a 77 y.o. male  who presented for:    CAD  CHF    HPI:   HPI   Camella Cockayne is a pleasant 77year old male patient who  has a past medical history of Arthritis, CAD (coronary artery disease), Diabetes mellitus ([address] Diverticulosis of colon, History of chest pain, History of tobacco abuse, Hyperlipidemia, Hypertension, and Pneumonia. He has h/o nonobstructive CAD ([facility] 2016). Stress test in 08/2019 was negative for ischemia. Previous Echo 8/2019 revealed an EF of 40-45%, improved to 80-27% with diastolic dysfunciton 94/2880. The patient feels well. Patient denies chest pain, shortness of breath, dyspnea on exertion, orthopnea, paroxysmal nocturnal dyspnea, palpitations, dizziness, syncope, weight gain or leg swelling.        Current Outpatient Medications:     hydrALAZINE (APRESOLINE) 50 MG tablet, Take 1 tablet by mouth 3 times daily, Disp: 270 tablet, Rfl: 3    telmisartan (MICARDIS) 80 MG tablet, Take 80 mg by mouth daily, Disp: , Rfl:     acetaminophen (TYLENOL) 500 MG tablet, Take 500 mg by mouth every 6 hours as needed for Pain, Disp: , Rfl:     Misc Natural Products (GLUCOSAMINE CHOND COMPLEX/MSM PO), Take by mouth, Disp: , Rfl:     furosemide (LASIX) 20 MG tablet, Take 1 tablet by mouth daily, Disp: 30 tablet, Rfl: 5    carvedilol (COREG) 25 MG tablet, Take 1 tablet by mouth 2 times daily, Disp: 60 tablet, Rfl: 5    amLODIPine (NORVASC) 10 MG tablet, Take 1 tablet by mouth daily, Disp: 30 tablet, Rfl: 5    simvastatin (ZOCOR) 20 MG tablet, Take 1 tablet by mouth nightly, Disp: 30 tablet, Rfl: 5    spironolactone (ALDACTONE) 25 MG tablet, Take 1 tablet by mouth 2 times daily, Disp: 60 tablet, Rfl: 5    potassium chloride (KLOR-CON) 20 MEQ packet, Take 20 mEq by mouth daily, Disp: 30 each, Rfl: 5    sulfaSALAzine (AZULFIDINE) 500 MG tablet, Take 500 mg by mouth 2 times daily , Disp: , Rfl:     allopurinol (ZYLOPRIM) 100 MG tablet, Take 300 mg by mouth daily , Disp: , Rfl:     ferrous sulfate 325 (65 Fe) MG tablet, One tab every 48 hr.  Take with vitamn C 500 mg, Disp: 30 tablet, Rfl: 3    vitamin C (VITAMIN C) 500 MG tablet, Take 1 tablet by mouth every 48 hours, Disp: 30 tablet, Rfl: 3    aspirin 81 MG EC tablet, Take 81 mg by mouth daily, Disp: , Rfl:     Insulin Detemir (LEVEMIR SC), Inject 20 Units into the skin nightly , Disp: , Rfl:     metFORMIN (GLUCOPHAGE) 500 MG tablet, Take 1,000 mg by mouth 2 times daily (with meals), Disp: , Rfl:     terazosin (HYTRIN) 1 MG capsule, Take 5 mg by mouth nightly , Disp: , Rfl:     gabapentin (NEURONTIN) 300 MG capsule, Take 300 mg by mouth 2 times daily. ., Disp: , Rfl:     glimepiride (AMARYL) 2 MG tablet, Take 2 mg by mouth 2 times daily , Disp: , Rfl:     Past Medical History  Linda Jain  has a past medical history of Arthritis, CAD (coronary artery disease), Diabetes mellitus ([facility] [address] Diverticulosis of colon, History of chest pain, History of tobacco abuse, Hyperlipidemia, Hypertension, and Pneumonia. Social History  Linda Jain  reports that he quit smoking about 4 years ago. He has a 37.00 pack-year smoking history. He has never used smokeless tobacco. He reports that he does not drink alcohol or use drugs. Family History  Linda Jain family history includes Diabetes in his father; High Cholesterol in his father; Hypertension in his father.     Past Surgical History   Past Surgical History:   Procedure Laterality Date    APPENDECTOMY      CARDIAC SURGERY      heart cath    COLECTOMY  2001    D/T DIVERTICULOSIS    COLONOSCOPY      DIAGNOSTIC CARDIAC CATH LAB PROCEDURE  09/17/2010    EF 60% C MILD DISEASE IN THE PROXIMAL  PORTION BEFORE THE BIFURCATION OF D1,MILD DISEASE IS NOTED IN THE RCA , DIFFUSE AT 10-20% RCA ALSO HAS 10-20% LESIONS    DILATATION, ESOPHAGUS      TRANSTHORACIC ECHOCARDIOGRAM  10/22/2010    EF 55-65% C MILD LFT ATRIAL DILATATION, GRADE 1 DIASOLIC DYSFUNCTION       Review of Systems   Constitutional: Negative for chills and fever  HENT: Negative for congestion, sinus pressure, sneezing and sore throat. Eyes: Negative for pain, discharge, redness and itching. Respiratory: Negative for apnea, cough  Gastrointestinal: Negative for blood in stool, constipation, diarrhea   Endocrine: Negative for cold intolerance, heat intolerance, polydipsia. Genitourinary: Negative for dysuria, enuresis, flank pain and hematuria. Musculoskeletal: Negative for arthralgias, joint swelling and neck pain. Neurological: Negative for numbness and headaches. Psychiatric/Behavioral: Negative for agitation, confusion, decreased concentration and dysphoric mood. Objective: There were no vitals taken for this visit. Wt Readings from Last 3 Encounters:   10/06/20 231 lb 9.6 oz (105.1 kg)   09/29/20 223 lb 12.8 oz (101.5 kg)   09/24/20 225 lb (102.1 kg)     BP Readings from Last 3 Encounters:   10/06/20 (!) 168/92   09/29/20 (!) 170/78   09/24/20 (!) 150/70       Nursing note and vitals reviewed. Physical Exam   Constitutional: Oriented to person, place, and time. Appears well-developed and well-nourished. ENT: Moist mucous membranes. No bleeding. Tongue is midline. Head: Normocephalic and atraumatic. Eyes: EOM are normal. Pupils are equal, round, and reactive to light. Neck: Normal range of motion. Neck supple. No JVD present. Cardiovascular: Normal rate, regular rhythm, murmur, no rubs, and intact distal pulses. Pulmonary/Chest: Effort normal and breath sounds normal. No respiratory distress. No wheezes. No rales. Abdominal: Soft. Bowel sounds are normal. No distension. There is no tenderness. Musculoskeletal: Normal range of motion. no edema.    Neurological: Alert and oriented to person, place, and time. No cranial nerve deficit. Coordination normal.   Skin: Skin is warm and dry. Psychiatric: Normal mood and affect.        No results found for: CKTOTAL, CKMB, CKMBINDEX    Lab Results   Component Value Date    WBC 7.3 09/28/2020    RBC 4.05 09/28/2020    RBC 3.92 01/09/2012    HGB 11.3 09/28/2020    HCT 34.7 09/28/2020    MCV 85.6 09/28/2020    MCH 27.9 09/28/2020    MCHC 32.6 09/28/2020    RDW 16.1 09/28/2020     09/28/2020    MPV 11.0 08/03/2019       Lab Results   Component Value Date     09/28/2020    K 4.0 09/28/2020    K 3.1 08/02/2019     09/28/2020    CO2 32 09/28/2020    BUN 31 09/28/2020    LABALBU 3.9 09/28/2020    CREATININE 1.18 09/28/2020    CALCIUM 8.50 09/28/2020    LABGLOM 84 05/14/2020    GLUCOSE 65 09/28/2020       Lab Results   Component Value Date    ALKPHOS 54 09/28/2020    ALT 12 09/28/2020    AST 16 09/28/2020    PROT 6.1 09/28/2020    BILITOT 0.2 09/28/2020    BILIDIR <0.2 08/02/2019    LABALBU 3.9 09/28/2020       Lab Results   Component Value Date    MG 1.9 08/07/2019       No results found for: INR, PROTIME      Lab Results   Component Value Date    LABA1C 6.3 01/31/2019       Lab Results   Component Value Date    TRIG 100 08/03/2019    HDL 41 08/03/2019    LDLCALC 42 08/03/2019    LDLDIRECT 57 02/13/2017       No results found for: TSH      Testing Reviewed:      I have individually reviewed the cardiac test below:    ECHO:   Results for orders placed during the hospital encounter of 09/03/20   ECHO Complete 2D W Doppler W Color    Narrative Transthoracic Echocardiography Report (TTE)     Demographics      Patient Name   Yuliya Scanlon  Gender               Male                  E      MR #           [id number]        Race                                                       Ethnicity      Account #      [de-identified]        Room Number      Accession      [id number]        Date of Study        09/03/2020   Number      Date of Birth  1955 Referring Physician  Sandro Brasher, JOSE DANIEL                                                        Nicole Bird, JOSE DANIEL      Age            77 year(s)       Sonographer          Linnette Mercado, [facility]                                      Interpreting         Echo reader of the                                   Physician            week                                                        Cliff Rg MD     Procedure    Type of Study      TTE procedure:ECHOCARDIOGRAM COMPLETE 2D W DOPPLER W COLOR. Procedure Date  Date: 09/03/2020 Start: 03:56 PM    Study Location: Echo Lab  Technical Quality: Limited visualization due to poor acoustical window. Indications:Evaluate left ventricular function. Additional Medical History:Coronary artery disease, Former smoker,  Hypertension, hyperlipidemia, Diabetes, CHF    Patient Status: Routine    Height: 67.72 inches Weight: 221.01 pounds BSA: 2.13 m^2 BMI: 33.89 kg/m^2    BP: 142/82 mmHg    Allergies    - Other allergy:(lorazepam). Conclusions      Summary   Normal left ventricle size and systolic function. Ejection fraction was   estimated at 60 to 65 %. There were no regional left ventricular wall   motion abnormalities and wall thickness was within normal limits. Doppler parameters were consistent with abnormal left ventricular   relaxation (grade 1 diastolic dysfunction). The left atrium is Mildly dilated. There is a small anterior and posterior pericardial effusion with no   evidence of hemodynamic compromise. Signature      ----------------------------------------------------------------   Electronically signed by Cliff Rg MD (Interpreting   physician) on 09/03/2020 at 06:48 PM   ----------------------------------------------------------------      Findings      Mitral Valve   The mitral valve structure was normal with normal leaflet separation. DOPPLER: The transmitral velocity was within the normal range with no   evidence for mitral stenosis. %   ml                        cm^2   LV PW          LV Volume Systolic: 28.9   Diastolic: 1.3 ml   cm             LV EDV/LV EDV Index: 124   Septum         ml/58 m^2LV ESV/LV ESV    RV Diastolic Dimension: 3 cm   Diastolic: 1.3 Index: 96.0 ml/27 m^2   cm             EF Calculated: 53.2 %     LA/Aorta: 1.62                                               LA volume/Index: 80.9 ml /38m^2     Doppler Measurements & Calculations      MV Peak E-Wave: 66.4 cm/s   AV Peak Velocity: 151  LVOT Peak Velocity: 84   MV Peak A-Wave: 111 cm/s    cm/s                   cm/s   MV E/A Ratio: 0.6           AV Peak Gradient: 9.12 LVOT Peak Gradient: 3   MV Peak Gradient: 1.76 mmHg mmHg                   mmHg      MV Deceleration Time: 250                          TV Peak E-Wave: 57 cm/s   msec                                               TV Peak A-Wave: 80.1   MV P1/2t: 73 msec                                  cm/s   MVA by PHT:3.01 cm^2        IVRT: 81 msec                                                      TV Peak Gradient: 1.3   MV E' Septal Velocity: 7.1                         mmHg   cm/s                        AV DVI (Vmax):0.56   MV A' Septal Velocity: 10                          PV Peak Velocity: 102   cm/s                                               cm/s   MV E' Lateral Velocity: 5.7                        PV Peak Gradient: 4.16   cm/s                                               mmHg   MV A' Lateral Velocity:   13.6 cm/s   E/E' septal: 9.35   E/E' lateral: 11.65     [email or web address]? [id number]        Ekg:   EKG Interpretation:  normal EKG, normal sinus rhythm, unchanged from previous tracings. Stress Test:08/2019  No ischemia     Cath:2016  CORONARY ANGIOGRAPHY:       The RCA is a large vessel, dominant vessel. Gives rise to circumflex and the  LAD.   It does have 20 percent diffuse disease but in the mid test in 08/2019 was negative for ischemia. Previous Echo 8/2019 revealed an EF of 40-45%, improved to 08-59% with diastolic dysfunciton 95/8510. The patient feels well. Patient denies chest pain, shortness of breath, dyspnea on exertion, orthopnea, paroxysmal nocturnal dyspnea, palpitations, dizziness, syncope, weight gain or leg swelling. 1. CHF, improved EF (EF 40-45% 08/2019, up to 60-65% 09/2020)  2. Nonobstructive CAD  3. DM  4. H/o tobacco abuse  5. Dyslipidemia      /60   HR 69   EKG revealed NSR   EF has improved, normal on echo 09/2020   Stress test 08/2019 was negative for ischemia   Coreg   Norvasc   LASIX 20 MG PO DAILY    kcl   No signs of volume overload    Hydralazine    On zocor    Hyperlipidemia: on statins, followed periodically. Patient need periodic lipid and liver profile   ASA 81 mg po daily     Above findings and plan of care were discussed with patient in details, patient's questions were answered.      Disposition:  RTC in 6 months             Electronically signed by Anders Wilson MD, [facility]    1/12/2021 at 10:09 AM EST

## 2021-01-12 NOTE — PROGRESS NOTES
Pt here for 6 mo check up     EKG done today    Pt states med list is correct from last appt did not bring list or bottles

## 2021-01-21 ENCOUNTER — HOSPITAL ENCOUNTER (OUTPATIENT)
Age: 66
Discharge: HOME | End: 2021-01-21
Payer: MEDICARE

## 2021-01-21 LAB
C-REACTIVE PROTEIN: 0.38 MG/DL (ref 0–1)
VITAMIN D 25-HYDROXY: 17 NG/ML (ref 30–100)

## 2021-01-21 PROCEDURE — 86140 C-REACTIVE PROTEIN: CPT

## 2021-01-21 PROCEDURE — 86480 TB TEST CELL IMMUN MEASURE: CPT

## 2021-01-21 PROCEDURE — 36415 COLL VENOUS BLD VENIPUNCTURE: CPT

## 2021-01-21 PROCEDURE — 82306 VITAMIN D 25 HYDROXY: CPT

## 2021-01-21 PROCEDURE — 82784 ASSAY IGA/IGD/IGG/IGM EACH: CPT

## 2021-01-21 PROCEDURE — 87385 HISTOPLASMA CAPSUL AG IA: CPT

## 2021-01-21 PROCEDURE — 83516 IMMUNOASSAY NONANTIBODY: CPT

## 2021-01-24 LAB
HISTOPLASMA ANTIGEN URINE: NOT DETECTED
HISTOPLASMA ANTIGEN URINE: NOT DETECTED NG/ML
QUANTI TB GOLD PLUS: NEGATIVE
QUANTI TB1 MINUS NIL: 0 IU/ML (ref 0–0.34)
QUANTI TB2 MINUS NIL: 0 IU/ML (ref 0–0.34)
QUANTIFERON MITOGEN MINUS NIL: > 10 IU/ML
QUANTIFERON NIL: 0.02 IU/ML
TISSUE TRANSGLUTAMINASE ANTIBODY: 2 U/ML (ref 0–5)

## 2021-02-04 ENCOUNTER — TELEPHONE (OUTPATIENT)
Dept: CARDIOLOGY CLINIC | Age: 66
End: 2021-02-04

## 2021-02-04 RX ORDER — FUROSEMIDE 20 MG/1
20 TABLET ORAL DAILY
Qty: 90 TABLET | Refills: 3 | Status: SHIPPED | OUTPATIENT
Start: 2021-02-04 | End: 2021-09-27

## 2021-02-04 RX ORDER — HYDRALAZINE HYDROCHLORIDE 50 MG/1
50 TABLET, FILM COATED ORAL 3 TIMES DAILY
Qty: 270 TABLET | Refills: 3 | Status: SHIPPED | OUTPATIENT
Start: 2021-02-04 | End: 2021-07-20

## 2021-02-04 NOTE — TELEPHONE ENCOUNTER
///////      Patient called in requesting refills on hydralazine 25 mg three times daily, lasix 20 mg daily, and clonidine 0.1 twice daily. Discussed with patient hydralazine was supposed to be increased to 50 mg three times daily and no clonidine on med list.  Patient stated he has not had clonidine since Nov last on the bottle.   He has bp cuff at home but has not been checking bp  Instructed patient to check bp daily and keep bp log    Please advise regarding medication

## 2021-02-26 LAB — IGA: NORMAL

## 2021-03-02 ENCOUNTER — OFFICE VISIT (OUTPATIENT)
Dept: CARDIOLOGY CLINIC | Age: 66
End: 2021-03-02
Payer: MEDICARE

## 2021-03-02 ENCOUNTER — TELEPHONE (OUTPATIENT)
Dept: CARDIOLOGY CLINIC | Age: 66
End: 2021-03-02

## 2021-03-02 VITALS
HEART RATE: 72 BPM | WEIGHT: 237.8 LBS | BODY MASS INDEX: 36.04 KG/M2 | OXYGEN SATURATION: 95 % | HEIGHT: 68 IN | DIASTOLIC BLOOD PRESSURE: 70 MMHG | SYSTOLIC BLOOD PRESSURE: 136 MMHG

## 2021-03-02 DIAGNOSIS — Z95.820 S/P ANGIOPLASTY WITH STENT: ICD-10-CM

## 2021-03-02 DIAGNOSIS — I50.42 CHF (CONGESTIVE HEART FAILURE), NYHA CLASS II, CHRONIC, COMBINED (HCC): Primary | ICD-10-CM

## 2021-03-02 DIAGNOSIS — I25.10 CORONARY ARTERY DISEASE INVOLVING NATIVE CORONARY ARTERY OF NATIVE HEART WITHOUT ANGINA PECTORIS: ICD-10-CM

## 2021-03-02 DIAGNOSIS — I10 ESSENTIAL HYPERTENSION: ICD-10-CM

## 2021-03-02 LAB
ANION GAP SERPL CALCULATED.3IONS-SCNC: 12 MEQ/L (ref 8–16)
BUN BLDV-MCNC: 33 MG/DL (ref 7–22)
CALCIUM SERPL-MCNC: 9.2 MG/DL (ref 8.5–10.5)
CHLORIDE BLD-SCNC: 101 MEQ/L (ref 98–111)
CO2: 29 MEQ/L (ref 23–33)
CREAT SERPL-MCNC: 1.3 MG/DL (ref 0.4–1.2)
GFR SERPL CREATININE-BSD FRML MDRD: 55 ML/MIN/1.73M2
GLUCOSE BLD-MCNC: 191 MG/DL (ref 70–108)
MAGNESIUM: 1.8 MG/DL (ref 1.6–2.4)
POTASSIUM SERPL-SCNC: 5.4 MEQ/L (ref 3.5–5.2)
PRO-BNP: 83.8 PG/ML (ref 0–900)
SODIUM BLD-SCNC: 142 MEQ/L (ref 135–145)

## 2021-03-02 PROCEDURE — 99214 OFFICE O/P EST MOD 30 MIN: CPT | Performed by: NURSE PRACTITIONER

## 2021-03-02 PROCEDURE — 36415 COLL VENOUS BLD VENIPUNCTURE: CPT | Performed by: NURSE PRACTITIONER

## 2021-03-02 RX ORDER — TERAZOSIN 5 MG/1
5 CAPSULE ORAL DAILY
COMMUNITY
Start: 2020-12-09

## 2021-03-02 RX ORDER — POTASSIUM CHLORIDE 1.5 G/1.77G
10 POWDER, FOR SOLUTION ORAL DAILY
Qty: 30 EACH | Refills: 5
Start: 2021-03-02 | End: 2021-04-19

## 2021-03-02 RX ORDER — ALLOPURINOL 300 MG/1
300 TABLET ORAL
COMMUNITY
Start: 2020-12-12

## 2021-03-02 ASSESSMENT — ENCOUNTER SYMPTOMS
APNEA: 0
CHEST TIGHTNESS: 0
COUGH: 0
ABDOMINAL PAIN: 0
NAUSEA: 0
COLOR CHANGE: 0
WHEEZING: 0
SHORTNESS OF BREATH: 0
ABDOMINAL DISTENTION: 0

## 2021-03-02 NOTE — PROGRESS NOTES
Venipuncture obtained from [address] Patient tolerated procedure without complications or complaints.

## 2021-03-02 NOTE — TELEPHONE ENCOUNTER
Labs reviewed  Due to LE edema I would like for Sumeet to double the Lasix to 40 mg x 2 days  Potassium is elevated 5.4  Hold Potassium tomorrow then  Decrease Potassium to 10 mEq daily (from 20 mEq)  Repeat BMP in 1 week

## 2021-03-02 NOTE — PATIENT INSTRUCTIONS
You may receive a survey regarding the care you received during your visit. Your input is valuable to us. We encourage you to complete and return your survey. We hope you will choose us in the future for your healthcare needs. NEW ORDERS FROM TODAY:      Continue:  · Take all medications as prescribed   · Daily weights and record - please try to weigh yourself upon awakening before eating or drinking   · Fluid restriction of 2 Liters per day (64 oz)  · Limit sodium in diet to around 1431-3389 mg/day  · Monitor BP - take BP 1 hour after meds  · Increase activity as tolerated     Call the [facility] for any of the following symptoms: [phone]  ? Weight gain of 3 pounds in 1 day or 5 pounds in 1 week  ? Increased shortness of breath  ? Shortness of breath while laying down  ? Cough  ? Chest pain  ? Swelling in feet, ankles or legs  ? Tenderness or bloating in the abdomen  ? Fatigue   ? Decreased appetite or feeling \"full\"   ? Nausea   ? Confusion     **PLEASE bring all medications in original bottles with you to your next appointment**    Educational websites:    http://www.[employer]/. org (American Heart Association)    PromotionalActionality.nl. com ([employer])    Siftit.com ([employer])    Too much sodium causes your body to hold on to extra water. This can raise your blood pressure and force your heart and kidneys to work harder. In very serious cases, this could cause you to be put in the hospital. It might even be life-threatening. By limiting sodium, you will feel better and lower your risk of serious problems. The most common source of sodium is salt. People get most of the salt in their diet from canned, prepared, and packaged foods. Fast food and restaurant meals also are very high in sodium. Your doctor will probably limit your sodium to less than 2,000 milligrams (mg) a day. This limit counts all the sodium in prepared and packaged foods and any salt you add to your food. Follow-up care is a key part of your treatment and safety. Be sure to make and go to all appointments, and call your doctor if you are having problems. It's also a good idea to know your test results and keep a list of the medicines you take. How can you care for yourself at home? Read food labels  · Read labels on cans and food packages. The labels tell you how much sodium is in each serving. Make sure that you look at the serving size. If you eat more than the serving size, you have eaten more sodium. · Food labels also tell you the Percent Daily Value for sodium. Choose products with low Percent Daily Values for sodium. · Be aware that sodium can come in forms other than salt, including monosodium glutamate (MSG), sodium citrate, and sodium bicarbonate (baking soda). MSG is often added to Asian food. When you eat out, you can sometimes ask for food without MSG or added salt. Buy low-sodium foods  · Buy foods that are labeled \"unsalted\" (no salt added), \"sodium-free\" (less than 5 mg of sodium per serving), or \"low-sodium\" (less than 140 mg of sodium per serving). Foods labeled \"reduced-sodium\" and \"light sodium\" may still have too much sodium. Be sure to read the label to see how much sodium you are getting. · Buy fresh vegetables, or frozen vegetables without added sauces. Buy low-sodium versions of canned vegetables, soups, and other canned goods. Prepare low-sodium meals  · Cut back on the amount of salt you use in cooking. This will help you adjust to the taste. Do not add salt after cooking. One teaspoon of salt has about 2,300 mg of sodium. · Take the salt shaker off the table. · Flavor your food with garlic, lemon juice, onion, vinegar, herbs, and spices. Do not use soy sauce, lite soy sauce, steak sauce, onion salt, garlic salt, celery salt, mustard, or ketchup on your food. · Use low-sodium salad dressings, sauces, and ketchup. Or make your own salad dressings and sauces without adding salt. · Use less salt (or none) when recipes call for it. You can often use half the salt a recipe calls for without losing flavor. Other foods such as rice, pasta, and grains do not need added salt. · Rinse canned vegetables, and cook them in fresh water. This removes somebut not allof the salt. · Avoid water that is naturally high in sodium or that has been treated with water softeners, which add sodium. Call your local water company to find out the sodium content of your water supply. If you buy bottled water, read the label and choose a sodium-free brand. Avoid high-sodium foods  · Avoid eating:  ? Smoked, cured, salted, and canned meat, fish, and poultry. ? Ham, celestin, hot dogs, and luncheon meats. ? Regular, hard, and processed cheese and regular peanut butter. ? Crackers with salted tops, and other salted snack foods such as pretzels, chips, and salted popcorn. ? Frozen prepared meals, unless labeled low-sodium. ? Canned and dried soups, broths, and bouillon, unless labeled sodium-free or low-sodium. ? Canned vegetables, unless labeled sodium-free or low-sodium. ? [place] [employer] fries, pizza, tacos, and other fast foods. ? Pickles, olives, ketchup, and other condiments, especially soy sauce, unless labeled sodium-free or low-sodium.

## 2021-03-02 NOTE — PROGRESS NOTES
[facility]       Visit Date: 3/2/2021  Cardiologist:  Dr. Júnior Bhakta  Primary Care Physician: Dr. Maddie Street, APRN - CNP    Stafford Claude is a 77 y.o. male who presents today for:  Chief Complaint   Patient presents with    Congestive Heart Failure       HPI: Obtained from patient and chart    Stafford Claude is a 77 y.o. male who presents to the office for a follow up visit in the heart failure clinic. Hx nonobstructive CAD per [facility] 2016. Negative stress test 8/5/19, HTN, DM, previous smoker ? COPD, EF was 55% 2016, 8/3/19 revealed EF 40-45%, EF improved to 60-65% with grade 1 DD 9/3/20. He has had numerous nurse visits for Bp check since then and has seen Dr Júnior Bhakta. BP has improved. He has some LE edema. He states that his legs have been swelling \"off and on. \" His weight is up 12 pounds since his last OV in September. He denies any increase in SOB. He can perform ADLs without SOB or fatigue. He denies any CP.       Accompanied by: self  Last hospital admission related to Heart Failure:  Aug 2019  Chest Pain: no  Worsening SOB: no  Worsening Orthopnea/PND: no  Edema: yes  Any extra diuretic use: no  Weight gain: see hpi  Fatigue: no  Abdominal bloating: no  Appetite: good  SRAVAN: no  Cough: occasional   Compliant checking home weight: no  Compliant checking blood pressure: no      Past Medical History:   Diagnosis Date    Arthritis     CAD (coronary artery disease) 4/5/2012    Diabetes mellitus ([facility] [address]     Diverticulosis of colon     History of chest pain     History of tobacco abuse     Hyperlipidemia     Hypertension     Pneumonia      Past Surgical History:   Procedure Laterality Date    APPENDECTOMY      CARDIAC SURGERY      heart cath    COLECTOMY  2001    D/T DIVERTICULOSIS    COLONOSCOPY      DIAGNOSTIC CARDIAC CATH LAB PROCEDURE  09/17/2010    EF 60% C MILD DISEASE IN THE PROXIMAL  PORTION BEFORE THE BIFURCATION OF D1,MILD DISEASE IS NOTED IN THE RCA , DIFFUSE AT 10-20% RCA ALSO HAS 10-20% LESIONS    DILATATION, ESOPHAGUS      TRANSTHORACIC ECHOCARDIOGRAM  10/22/2010    EF 55-65% C MILD LFT ATRIAL DILATATION, GRADE 1 DIASOLIC DYSFUNCTION     Family History   Problem Relation Age of Onset    Hypertension Father     High Cholesterol Father     Diabetes Father      Social History     Tobacco Use    Smoking status: Former Smoker     Packs/day: 1.00     Years: 37.00     Pack years: 37.00     Quit date: 2/10/2016     Years since quittin.0    Smokeless tobacco: Never Used   Substance Use Topics    Alcohol use: No     Current Outpatient Medications   Medication Sig Dispense Refill    vitamin D 25 MCG (1000 UT) CAPS Take 3 capsules by mouth daily      allopurinol (ZYLOPRIM) 300 MG tablet Take 300 mg by mouth every morning (before breakfast)      terazosin (HYTRIN) 5 MG capsule Take 5 mg by mouth daily      hydrALAZINE (APRESOLINE) 50 MG tablet Take 1 tablet by mouth 3 times daily 270 tablet 3    furosemide (LASIX) 20 MG tablet Take 1 tablet by mouth daily 90 tablet 3    telmisartan (MICARDIS) 80 MG tablet Take 80 mg by mouth daily      acetaminophen (TYLENOL) 500 MG tablet Take 500 mg by mouth every 6 hours as needed for Pain      Misc Natural Products (GLUCOSAMINE CHOND COMPLEX/MSM PO) Take by mouth      carvedilol (COREG) 25 MG tablet Take 1 tablet by mouth 2 times daily 60 tablet 5    amLODIPine (NORVASC) 10 MG tablet Take 1 tablet by mouth daily 30 tablet 5    simvastatin (ZOCOR) 20 MG tablet Take 1 tablet by mouth nightly 30 tablet 5    spironolactone (ALDACTONE) 25 MG tablet Take 1 tablet by mouth 2 times daily 60 tablet 5    potassium chloride (KLOR-CON) 20 MEQ packet Take 20 mEq by mouth daily 30 each 5    sulfaSALAzine (AZULFIDINE) 500 MG tablet Take 500 mg by mouth 2 times daily       ferrous sulfate 325 (65 Fe) MG tablet One tab every 48 hr.  Take with vitamn C 500 mg 30 tablet 3    vitamin C (VITAMIN C) 500 MG tablet Take 1 tablet by mouth every 48 hours 30 tablet 3    aspirin 81 MG EC tablet Take 81 mg by mouth daily      Insulin Detemir (LEVEMIR SC) Inject 20 Units into the skin nightly       metFORMIN (GLUCOPHAGE) 500 MG tablet Take 1,000 mg by mouth 2 times daily (with meals)      gabapentin (NEURONTIN) 300 MG capsule Take 300 mg by mouth 2 times daily. .      glimepiride (AMARYL) 2 MG tablet Take 2 mg by mouth 2 times daily        No current facility-administered medications for this visit. Allergies   Allergen Reactions    Lorazepam Other (See Comments)     Light headed, dizzy, blurred vision       SUBJECTIVE:   Review of Systems   Constitutional: Negative for activity change, appetite change, fatigue and fever. HENT: Negative for congestion. Respiratory: Negative for apnea, cough, chest tightness, shortness of breath and wheezing. Cardiovascular: Positive for leg swelling. Negative for chest pain and palpitations. Gastrointestinal: Negative for abdominal distention, abdominal pain and nausea. Genitourinary: Negative for difficulty urinating and dysuria. Musculoskeletal: Negative for arthralgias and gait problem. Skin: Negative for color change. Neurological: Negative for dizziness, numbness and headaches. Psychiatric/Behavioral: Negative for agitation, confusion and sleep disturbance. The patient is not nervous/anxious. OBJECTIVE:   Today's Vitals:  /70   Pulse 72   Ht 5' 8\" (1.727 m)   Wt 237 lb 12.8 oz (107.9 kg)   SpO2 95%   BMI 36.16 kg/m²     Physical Exam  Vitals signs reviewed. Constitutional:       Appearance: He is well-developed. HENT:      Head: Normocephalic and atraumatic. Eyes:      Pupils: Pupils are equal, round, and reactive to light. Neck:      Musculoskeletal: Normal range of motion. Vascular: No JVD. Cardiovascular:      Rate and Rhythm: Normal rate and regular rhythm. Heart sounds: Normal heart sounds. No murmur.    Pulmonary:      Effort: Pulmonary effort is normal. No respiratory distress. Breath sounds: No rales. Abdominal:      General: There is no distension. Palpations: Abdomen is soft. Tenderness: There is no abdominal tenderness. Musculoskeletal:         General: No tenderness. Right lower leg: Edema (1+) present. Left lower leg: Edema (1+) present. Skin:     General: Skin is warm and dry. Capillary Refill: Capillary refill takes less than 2 seconds. Neurological:      Mental Status: He is alert and oriented to person, place, and time. Psychiatric:         Mood and Affect: Mood normal.         Behavior: Behavior normal.         Wt Readings from Last 3 Encounters:   03/02/21 237 lb 12.8 oz (107.9 kg)   01/12/21 240 lb (108.9 kg)   10/06/20 231 lb 9.6 oz (105.1 kg)     BP Readings from Last 3 Encounters:   03/02/21 136/70   01/12/21 (!) 142/71   10/06/20 (!) 168/92     Pulse Readings from Last 3 Encounters:   03/02/21 72   01/12/21 67   10/06/20 94     Body mass index is 36.16 kg/m². ECHO:   9/3/20   Summary   Normal left ventricle size and systolic function. Ejection fraction was   estimated at 60 to 65 %. There were no regional left ventricular wall   motion abnormalities and wall thickness was within normal limits. Doppler parameters were consistent with abnormal left ventricular   relaxation (grade 1 diastolic dysfunction). The left atrium is Mildly dilated. There is a small anterior and posterior pericardial effusion with no   evidence of hemodynamic compromise. Signature      ----------------------------------------------------------------   Electronically signed by Kaela Rodriguez MD (Interpreting   physician) on 09/03/2020 at 06:48 PM   ----------------------------------------------------------------      Findings      Mitral Valve   The mitral valve structure was normal with normal leaflet separation.    DOPPLER: The transmitral velocity was within the normal range with no   evidence for mitral stenosis. There was no evidence of mitral   regurgitation. Aortic Valve   The aortic valve was trileaflet with normal thickness and cuspal   separation. DOPPLER: Transaortic velocity was within the normal range with   no evidence of aortic stenosis. There was no evidence of aortic   regurgitation. Tricuspid Valve   The tricuspid valve structure was normal with normal leaflet separation. DOPPLER: There was no evidence of tricuspid stenosis. Trivial tricuspid regurgitation visualized. Pulmonic Valve   The pulmonic valve leaflets exhibited normal thickness, no calcification,   and normal cuspal separation. DOPPLER: The transpulmonic velocity was   within the normal range with no evidence for regurgitation. Left Atrium   The left atrium is Mildly dilated. Left Ventricle   Normal left ventricle size and systolic function. Ejection fraction was   estimated at 60 to 65 %. There were no regional left ventricular wall   motion abnormalities and wall thickness was within normal limits. Doppler parameters were consistent with abnormal left ventricular   relaxation (grade 1 diastolic dysfunction). Right Atrium   Right atrial size was normal.      Right Ventricle   The right ventricular size was normal with normal systolic function and   wall thickness. Pericardial Effusion   There is a small anterior and posterior pericardial effusion with no   evidence of hemodynamic compromise. Pleural Effusion   No evidence of pleural effusion. Aorta / Great Vessels   -Aortic root dimension within normal limits.   -The Pulmonary artery is within normal limits. -IVC size is within normal limits with normal respiratory phasic changes.     Results reviewed:  BNP:   Lab Results   Component Value Date    PROBNP 576.7 08/06/2019     CBC:   Lab Results   Component Value Date    WBC 7.3 09/28/2020    RBC 4.05 09/28/2020    RBC 3.92 01/09/2012    HGB 11.3 09/28/2020    HCT 34.7 09/28/2020     09/28/2020     CMP:    Lab Results   Component Value Date     09/28/2020    K 4.0 09/28/2020    K 3.1 08/02/2019     09/28/2020    CO2 32 09/28/2020    BUN 31 09/28/2020    CREATININE 1.18 09/28/2020    AGRATIO 1.8 09/28/2020    LABGLOM 84 05/14/2020    GLUCOSE 65 09/28/2020    CALCIUM 8.50 09/28/2020     Hepatic Function Panel:    Lab Results   Component Value Date    ALKPHOS 54 09/28/2020    ALT 12 09/28/2020    AST 16 09/28/2020    PROT 6.1 09/28/2020    BILITOT 0.2 09/28/2020    BILIDIR <0.2 08/02/2019    LABALBU 3.9 09/28/2020     Magnesium:    Lab Results   Component Value Date    MG 1.9 08/07/2019     PT/INR:  No results found for: PROTIME, INR  Lipids:    Lab Results   Component Value Date    TRIG 100 08/03/2019    HDL 41 08/03/2019    LDLCALC 42 08/03/2019    LDLDIRECT 57 02/13/2017       ASSESSMENT AND PLAN:   The patient's condition/symptoms are Stable      Diagnosis Orders   1. CHF (congestive heart failure), NYHA class II, chronic, combined ([facility] [address]  Brain Natriuretic Peptide    Magnesium    Basic Metabolic Panel   2. Essential hypertension     3. Coronary artery disease involving native coronary artery of native heart without angina pectoris     4. S/P angioplasty with stent     EF improved from 40-45% (2019) to 60-65% (2020)    Plan:  GDMT   ACE/ARB/ARNi: Telmisartan 80 mg daily  Beta Blocker: Coreg 25 mg bid  Aldosterone antagonist: Aldactone 25 mg bid  Diuretic/Potassium: Lasix 20 mg daily, Potassium 20 mEq daily  Hydralazine/Nitrate: Hydralazine 50 mg tid  Other: Amlodipine 10 mg daily, Zocor, ASA 81 mg  He has LE edema not usually present. He states this has been happening \"off and on. \" Labs today. Further recs pending labs. BP much more controlled. HF Zones reviewed.   Daily weights and record  Fluid restriction of 2 Liters per day (64 oz)   Limit sodium in diet to 3769-4011 mg/day  Healthier food options were discussed   Monitor BP daily 1 hour after meds are taken  Increase activity as tolerated     Patient was instructed to call the [facility] for changes in the following symptoms:   Weight gain of 3 pounds in 1 day or 5 pounds in 1 week  Increased shortness of breath  Shortness of breath while laying down  Cough  Chest pain  Swelling in feet, ankles or legs  Tenderness or bloating in the abdomen  Fatigue   Decreased appetite or feeling \"full\"  Nausea   Confusion      Return in about 3 months (around 6/2/2021). or sooner if needed     Patient given educational materials - see patient instructions. We discussed the importance of weighing oneself and recording daily. We also discussed the importance of a low sodium diet, higher sodium foods to avoid and appropriate low sodium food choices. Discussed use, benefit, and side effects of prescribed medications. All patient questions answered. Patient verbalizes understanding of plan of care using teach back method, and is agreeable to the treatment plan. Greater then 30 minutes of time was spent reviewing the chart and educating the patient about HF, medications, diet, exercise, and discussing the plan of care. I personally spent more then 50% of the appt time face to face with the patient counseling/coordinating patient's care.       Electronicallysigned by FELI Albert CNP on 3/2/2021 at 1:23 PM

## 2021-03-09 LAB
ANION GAP SERPL CALCULATED.3IONS-SCNC: 12 MMOL/L (ref 4–12)
BUN BLDV-MCNC: 37 MG/DL (ref 7–20)
CALCIUM SERPL-MCNC: 8.7 MG/DL (ref 8.8–10.5)
CHLORIDE BLD-SCNC: 104 MEQ/L (ref 101–111)
CO2: 25 MEQ/L (ref 21–32)
CREAT SERPL-MCNC: 1.39 MG/DL (ref 0.6–1.3)
CREATININE CLEARANCE: 51
GLUCOSE: 188 MG/DL (ref 70–110)
POTASSIUM SERPL-SCNC: 4.7 MEQ/L (ref 3.6–5)
SODIUM BLD-SCNC: 141 MEQ/L (ref 135–145)

## 2021-03-10 NOTE — TELEPHONE ENCOUNTER
BMP reviewed  Potassium improved   Cr still slightly elevated and GFR down  We will repeat a BMP at his appt next month

## 2021-04-19 ENCOUNTER — TELEPHONE (OUTPATIENT)
Dept: CARDIOLOGY CLINIC | Age: 66
End: 2021-04-19

## 2021-04-19 ENCOUNTER — OFFICE VISIT (OUTPATIENT)
Dept: CARDIOLOGY CLINIC | Age: 66
End: 2021-04-19
Payer: MEDICARE

## 2021-04-19 VITALS
WEIGHT: 238 LBS | DIASTOLIC BLOOD PRESSURE: 64 MMHG | OXYGEN SATURATION: 95 % | HEART RATE: 72 BPM | SYSTOLIC BLOOD PRESSURE: 138 MMHG | BODY MASS INDEX: 36.19 KG/M2

## 2021-04-19 DIAGNOSIS — I10 ESSENTIAL HYPERTENSION: ICD-10-CM

## 2021-04-19 DIAGNOSIS — Z95.820 S/P ANGIOPLASTY WITH STENT: ICD-10-CM

## 2021-04-19 DIAGNOSIS — I50.42 CHF (CONGESTIVE HEART FAILURE), NYHA CLASS II, CHRONIC, COMBINED (HCC): Primary | ICD-10-CM

## 2021-04-19 DIAGNOSIS — I25.10 CORONARY ARTERY DISEASE INVOLVING NATIVE CORONARY ARTERY OF NATIVE HEART WITHOUT ANGINA PECTORIS: ICD-10-CM

## 2021-04-19 DIAGNOSIS — M79.89 LEG SWELLING: ICD-10-CM

## 2021-04-19 LAB
ANION GAP SERPL CALCULATED.3IONS-SCNC: 9 MEQ/L (ref 8–16)
BUN BLDV-MCNC: 37 MG/DL (ref 7–22)
CALCIUM SERPL-MCNC: 9.5 MG/DL (ref 8.5–10.5)
CHLORIDE BLD-SCNC: 108 MEQ/L (ref 98–111)
CO2: 26 MEQ/L (ref 23–33)
CREAT SERPL-MCNC: 1.3 MG/DL (ref 0.4–1.2)
GFR SERPL CREATININE-BSD FRML MDRD: 55 ML/MIN/1.73M2
GLUCOSE BLD-MCNC: 109 MG/DL (ref 70–108)
POTASSIUM SERPL-SCNC: 5.1 MEQ/L (ref 3.5–5.2)
PRO-BNP: 76.1 PG/ML (ref 0–900)
SODIUM BLD-SCNC: 143 MEQ/L (ref 135–145)

## 2021-04-19 PROCEDURE — 99214 OFFICE O/P EST MOD 30 MIN: CPT | Performed by: NURSE PRACTITIONER

## 2021-04-19 PROCEDURE — 36415 COLL VENOUS BLD VENIPUNCTURE: CPT | Performed by: NURSE PRACTITIONER

## 2021-04-19 RX ORDER — POTASSIUM CHLORIDE 20 MEQ/1
20 TABLET, EXTENDED RELEASE ORAL DAILY
COMMUNITY

## 2021-04-19 ASSESSMENT — ENCOUNTER SYMPTOMS
ABDOMINAL PAIN: 0
NAUSEA: 0
WHEEZING: 0
CHEST TIGHTNESS: 0
SHORTNESS OF BREATH: 0
COUGH: 0
APNEA: 0
COLOR CHANGE: 0
ABDOMINAL DISTENTION: 0

## 2021-04-19 NOTE — PROGRESS NOTES
[place]       Visit Date: 4/19/2021  Cardiologist:  Dr. Renetta Patton  Primary Care Physician: Dr. Susu Bowling, APRN - CNP    Khanh Adhikari is a 77 y.o. male who presents today for:  Chief Complaint   Patient presents with    Congestive Heart Failure       HPI: Obtained from patient and chart    Khanh Adhikari is a 77 y.o. male who presents to the office for a follow up visit in the heart failure clinic. Hx nonobstructive CAD per [facility] 2016. Negative stress test 8/5/19, HTN, DM, previous smoker ? COPD, EF was 55% 2016, 8/3/19 revealed EF 40-45%, EF improved to 60-65% with grade 1 DD 9/3/20. He still has LE edema right > left. He also has some leg pain at night. Weight has been stable since last month. He can perform ADLs without SOB or fatigue. He denies any orthopnea.        Accompanied by: self  Last hospital admission related to Heart Failure:  Aug 2019  Chest Pain: no  Worsening SOB: no  Worsening Orthopnea/PND: no  Edema: no  Any extra diuretic use: no  Weight gain: no  Fatigue: no  Abdominal bloating: no  Appetite: good  SRAVAN: no  Cough: no  Compliant checking home weight: no  Compliant checking blood pressure: no      Past Medical History:   Diagnosis Date    Arthritis     CAD (coronary artery disease) 4/5/2012    Diabetes mellitus ([facility] [address]     Diverticulosis of colon     History of chest pain     History of tobacco abuse     Hyperlipidemia     Hypertension     Pneumonia      Past Surgical History:   Procedure Laterality Date    APPENDECTOMY      CARDIAC SURGERY      heart cath    COLECTOMY  2001    D/T DIVERTICULOSIS    COLONOSCOPY      DIAGNOSTIC CARDIAC CATH LAB PROCEDURE  09/17/2010    EF 60% C MILD DISEASE IN THE PROXIMAL  PORTION BEFORE THE BIFURCATION OF D1,MILD DISEASE IS NOTED IN THE RCA , DIFFUSE AT 10-20% RCA ALSO HAS 10-20% LESIONS    DILATATION, ESOPHAGUS      TRANSTHORACIC ECHOCARDIOGRAM  10/22/2010    EF 55-65% C MILD LFT ATRIAL DILATATION, GRADE 1 DIASOLIC DYSFUNCTION     Family History   Problem Relation Age of Onset    Hypertension Father     High Cholesterol Father     Diabetes Father      Social History     Tobacco Use    Smoking status: Former Smoker     Packs/day: 1.00     Years: 37.00     Pack years: 37.00     Quit date: 2/10/2016     Years since quittin.1    Smokeless tobacco: Never Used   Substance Use Topics    Alcohol use: No     Current Outpatient Medications   Medication Sig Dispense Refill    potassium chloride (KLOR-CON M) 20 MEQ extended release tablet Take 10 mEq by mouth daily      vitamin D 25 MCG (1000 UT) CAPS Take 3 capsules by mouth daily      allopurinol (ZYLOPRIM) 300 MG tablet Take 300 mg by mouth every morning (before breakfast)      terazosin (HYTRIN) 5 MG capsule Take 5 mg by mouth daily      hydrALAZINE (APRESOLINE) 50 MG tablet Take 1 tablet by mouth 3 times daily 270 tablet 3    furosemide (LASIX) 20 MG tablet Take 1 tablet by mouth daily 90 tablet 3    telmisartan (MICARDIS) 80 MG tablet Take 80 mg by mouth daily      acetaminophen (TYLENOL) 500 MG tablet Take 500 mg by mouth every 6 hours as needed for Pain      Misc Natural Products (GLUCOSAMINE CHOND COMPLEX/MSM PO) Take by mouth      carvedilol (COREG) 25 MG tablet Take 1 tablet by mouth 2 times daily 60 tablet 5    amLODIPine (NORVASC) 10 MG tablet Take 1 tablet by mouth daily 30 tablet 5    simvastatin (ZOCOR) 20 MG tablet Take 1 tablet by mouth nightly 30 tablet 5    spironolactone (ALDACTONE) 25 MG tablet Take 1 tablet by mouth 2 times daily 60 tablet 5    sulfaSALAzine (AZULFIDINE) 500 MG tablet Take 500 mg by mouth 2 times daily       ferrous sulfate 325 (65 Fe) MG tablet One tab every 48 hr.  Take with vitamn C 500 mg 30 tablet 3    vitamin C (VITAMIN C) 500 MG tablet Take 1 tablet by mouth every 48 hours 30 tablet 3    aspirin 81 MG EC tablet Take 81 mg by mouth daily      Insulin Detemir (LEVEMIR SC) Inject 20 Units into the skin nightly       metFORMIN (GLUCOPHAGE) 500 MG tablet Take 1,000 mg by mouth 2 times daily (with meals)      gabapentin (NEURONTIN) 300 MG capsule Take 300 mg by mouth 2 times daily. .      glimepiride (AMARYL) 2 MG tablet Take 2 mg by mouth 2 times daily        No current facility-administered medications for this visit. Allergies   Allergen Reactions    Lorazepam Other (See Comments)     Light headed, dizzy, blurred vision       SUBJECTIVE:   Review of Systems   Constitutional: Negative for activity change, appetite change, fatigue and fever. HENT: Negative for congestion. Respiratory: Negative for apnea, cough, chest tightness, shortness of breath and wheezing. Cardiovascular: Positive for leg swelling. Negative for chest pain and palpitations. Gastrointestinal: Negative for abdominal distention, abdominal pain and nausea. Genitourinary: Negative for difficulty urinating and dysuria. Musculoskeletal: Negative for arthralgias and gait problem. Skin: Negative for color change. Neurological: Negative for dizziness, numbness and headaches. Psychiatric/Behavioral: Negative for agitation, confusion and sleep disturbance. The patient is not nervous/anxious. OBJECTIVE:   Today's Vitals:  /64   Pulse 72   Wt 238 lb (108 kg)   SpO2 95%   BMI 36.19 kg/m²     Physical Exam  Vitals signs reviewed. Constitutional:       Appearance: He is well-developed. HENT:      Head: Normocephalic and atraumatic. Eyes:      Pupils: Pupils are equal, round, and reactive to light. Neck:      Musculoskeletal: Normal range of motion. Vascular: No JVD. Cardiovascular:      Rate and Rhythm: Normal rate and regular rhythm. Heart sounds: Normal heart sounds. No murmur. Pulmonary:      Effort: Pulmonary effort is normal. No respiratory distress. Breath sounds: No rales. Abdominal:      General: There is no distension. Palpations: Abdomen is soft. Tenderness:  There is no abdominal tenderness. Musculoskeletal:         General: No tenderness. Right lower leg: Edema (1++) present. Left lower leg: Edema (1+) present. Skin:     General: Skin is warm and dry. Capillary Refill: Capillary refill takes less than 2 seconds. Neurological:      Mental Status: He is alert and oriented to person, place, and time. Psychiatric:         Mood and Affect: Mood normal.         Behavior: Behavior normal.         Wt Readings from Last 3 Encounters:   04/19/21 238 lb (108 kg)   03/02/21 237 lb 12.8 oz (107.9 kg)   01/12/21 240 lb (108.9 kg)     BP Readings from Last 3 Encounters:   04/19/21 138/64   03/02/21 136/70   01/12/21 (!) 142/71     Pulse Readings from Last 3 Encounters:   04/19/21 72   03/02/21 72   01/12/21 67     Body mass index is 36.19 kg/m². ECHO:   9/3/20   Summary   Normal left ventricle size and systolic function. Ejection fraction was   estimated at 60 to 65 %. There were no regional left ventricular wall   motion abnormalities and wall thickness was within normal limits. Doppler parameters were consistent with abnormal left ventricular   relaxation (grade 1 diastolic dysfunction). The left atrium is Mildly dilated. There is a small anterior and posterior pericardial effusion with no   evidence of hemodynamic compromise. Signature      ----------------------------------------------------------------   Electronically signed by Maile Desai MD (Interpreting   physician) on 09/03/2020 at 06:48 PM   ----------------------------------------------------------------      Findings      Mitral Valve   The mitral valve structure was normal with normal leaflet separation. DOPPLER: The transmitral velocity was within the normal range with no   evidence for mitral stenosis. There was no evidence of mitral   regurgitation. Aortic Valve   The aortic valve was trileaflet with normal thickness and cuspal   separation.  DOPPLER: Transaortic velocity was within the normal range with   no evidence of aortic stenosis. There was no evidence of aortic   regurgitation. Tricuspid Valve   The tricuspid valve structure was normal with normal leaflet separation. DOPPLER: There was no evidence of tricuspid stenosis. Trivial tricuspid regurgitation visualized. Pulmonic Valve   The pulmonic valve leaflets exhibited normal thickness, no calcification,   and normal cuspal separation. DOPPLER: The transpulmonic velocity was   within the normal range with no evidence for regurgitation. Left Atrium   The left atrium is Mildly dilated. Left Ventricle   Normal left ventricle size and systolic function. Ejection fraction was   estimated at 60 to 65 %. There were no regional left ventricular wall   motion abnormalities and wall thickness was within normal limits. Doppler parameters were consistent with abnormal left ventricular   relaxation (grade 1 diastolic dysfunction). Right Atrium   Right atrial size was normal.      Right Ventricle   The right ventricular size was normal with normal systolic function and   wall thickness. Pericardial Effusion   There is a small anterior and posterior pericardial effusion with no   evidence of hemodynamic compromise. Pleural Effusion   No evidence of pleural effusion. Aorta / Great Vessels   -Aortic root dimension within normal limits.   -The Pulmonary artery is within normal limits.    -IVC size is within normal limits with normal respiratory phasic changes    Results reviewed:  BNP:   Lab Results   Component Value Date    PROBNP 76.1 04/19/2021     CBC:   Lab Results   Component Value Date    WBC 7.3 09/28/2020    RBC 4.05 09/28/2020    RBC 3.92 01/09/2012    HGB 11.3 09/28/2020    HCT 34.7 09/28/2020     09/28/2020     CMP:    Lab Results   Component Value Date     04/19/2021    K 5.1 04/19/2021    K 3.1 08/02/2019     04/19/2021    CO2 26 04/19/2021    BUN 37 04/19/2021 CREATININE 1.3 04/19/2021    AGRATIO 1.8 09/28/2020    LABGLOM 55 04/19/2021    GLUCOSE 109 04/19/2021    GLUCOSE 188 03/09/2021    CALCIUM 9.5 04/19/2021     Hepatic Function Panel:    Lab Results   Component Value Date    ALKPHOS 54 09/28/2020    ALT 12 09/28/2020    AST 16 09/28/2020    PROT 6.1 09/28/2020    BILITOT 0.2 09/28/2020    BILIDIR <0.2 08/02/2019    LABALBU 3.9 09/28/2020     Magnesium:    Lab Results   Component Value Date    MG 1.8 03/02/2021     PT/INR:  No results found for: PROTIME, INR  Lipids:    Lab Results   Component Value Date    TRIG 100 08/03/2019    HDL 41 08/03/2019    LDLCALC 42 08/03/2019    LDLDIRECT 57 02/13/2017       ASSESSMENT AND PLAN:   The patient's condition/symptoms are Stable      Diagnosis Orders   1. CHF (congestive heart failure), NYHA class II, chronic, combined (HCC)  Basic Metabolic Panel    Brain Natriuretic Peptide   2. Essential hypertension     3. Coronary artery disease involving native coronary artery of native heart without angina pectoris     4. S/P angioplasty with stent     5. Leg swelling  Compression Stocking       Plan:  GDMT   ACE/ARB/ARNi: Telmisartan 80 mg daily  Beta Blocker: Coreg 25 mg bid  Aldosterone antagonist: Aldactone 25 mg bid  Diuretic/Potassium: Lasix 20 mg daily, Potassium 10 mEq daily  Hydralazine/Nitrate: Hydralazine 50 mg tid  Other: Amlodipine 10 mg daily, Zocor, ASA 81 mg  He still has LE edema. No other s/s. BNP and BMP today. Compression socks. ABIs soon. Further recs on meds pending labs. BP is controlled. HF Zones reviewed.   Daily weights and record  Fluid restriction of 2 Liters per day (64 oz)   Limit sodium in diet to 5642-6330 mg/day  Healthier food options were discussed   Monitor BP daily 1 hour after meds are taken  Increase activity as tolerated     Patient was instructed to call the [facility] for changes in the following symptoms:   Weight gain of 3 pounds in 1 day or 5 pounds in 1 week  Increased shortness

## 2021-04-19 NOTE — PATIENT INSTRUCTIONS
You may receive a survey regarding the care you received during your visit. Your input is valuable to us. We encourage you to complete and return your survey. We hope you will choose us in the future for your healthcare needs. NEW ORDERS FROM TODAY:      Continue:  · Take all medications as prescribed   · Daily weights and record - please try to weigh yourself upon awakening before eating or drinking   · Fluid restriction of 2 Liters per day (64 oz)  · Limit sodium in diet to around 1074-0289 mg/day  · Monitor BP - take BP 1 hour after meds  · Increase activity as tolerated     Call the Heart Failure Clinic for any of the following symptoms: [phone]   Weight gain of 3 pounds in 1 day or 5 pounds in 1 week   Increased shortness of breath   Shortness of breath while laying down   Cough   Chest pain   Swelling in feet, ankles or legs   Tenderness or bloating in the abdomen   Fatigue    Decreased appetite or feeling \"full\"    Nausea    Confusion     **PLEASE bring all medications in original bottles with you to your next appointment**    Educational websites:    http://www.Conspire.Weizoom/. org (American Heart Association)    [employer].nl. com ([employer])    Powerlytics HF.com ([employer])    Too much sodium causes your body to hold on to extra water. This can raise your blood pressure and force your heart and kidneys to work harder. In very serious cases, this could cause you to be put in the hospital. It might even be life-threatening. By limiting sodium, you will feel better and lower your risk of serious problems. The most common source of sodium is salt. People get most of the salt in their diet from canned, prepared, and packaged foods. Fast food and restaurant meals also are very high in sodium. Your doctor will probably limit your sodium to less than 2,000 milligrams (mg) a day. This limit counts all the sodium in prepared and packaged foods and any salt you add to your food. Follow-up care is a key part of your treatment and safety. Be sure to make and go to all appointments, and call your doctor if you are having problems. It's also a good idea to know your test results and keep a list of the medicines you take. How can you care for yourself at home? Read food labels  · Read labels on cans and food packages. The labels tell you how much sodium is in each serving. Make sure that you look at the serving size. If you eat more than the serving size, you have eaten more sodium. · Food labels also tell you the Percent Daily Value for sodium. Choose products with low Percent Daily Values for sodium. · Be aware that sodium can come in forms other than salt, including monosodium glutamate (MSG), sodium citrate, and sodium bicarbonate (baking soda). MSG is often added to Asian food. When you eat out, you can sometimes ask for food without MSG or added salt. Buy low-sodium foods  · Buy foods that are labeled \"unsalted\" (no salt added), \"sodium-free\" (less than 5 mg of sodium per serving), or \"low-sodium\" (less than 140 mg of sodium per serving). Foods labeled \"reduced-sodium\" and \"light sodium\" may still have too much sodium. Be sure to read the label to see how much sodium you are getting. · Buy fresh vegetables, or frozen vegetables without added sauces. Buy low-sodium versions of canned vegetables, soups, and other canned goods. Prepare low-sodium meals  · Cut back on the amount of salt you use in cooking. This will help you adjust to the taste. Do not add salt after cooking. One teaspoon of salt has about 2,300 mg of sodium. · Take the salt shaker off the table. · Flavor your food with garlic, lemon juice, onion, vinegar, herbs, and spices. Do not use soy sauce, lite soy sauce, steak sauce, onion salt, garlic salt, celery salt, mustard, or ketchup on your food. · Use low-sodium salad dressings, sauces, and ketchup. Or make your own salad dressings and sauces without adding salt.   · Use less salt (or none) when recipes call for it. You can often use half the salt a recipe calls for without losing flavor. Other foods such as rice, pasta, and grains do not need added salt. · Rinse canned vegetables, and cook them in fresh water. This removes somebut not allof the salt. · Avoid water that is naturally high in sodium or that has been treated with water softeners, which add sodium. Call your local water company to find out the sodium content of your water supply. If you buy bottled water, read the label and choose a sodium-free brand. Avoid high-sodium foods  · Avoid eating:  ? Smoked, cured, salted, and canned meat, fish, and poultry. ? Ham, celestin, hot dogs, and luncheon meats. ? Regular, hard, and processed cheese and regular peanut butter. ? Crackers with salted tops, and other salted snack foods such as pretzels, chips, and salted popcorn. ? Frozen prepared meals, unless labeled low-sodium. ? Canned and dried soups, broths, and bouillon, unless labeled sodium-free or low-sodium. ? Canned vegetables, unless labeled sodium-free or low-sodium. ? [employer] fries, pizza, tacos, and other fast foods. ? Pickles, olives, ketchup, and other condiments, especially soy sauce, unless labeled sodium-free or low-sodium.

## 2021-04-19 NOTE — PROGRESS NOTES
Venipuncture obtained from [facility]. Patient tolerated procedure without complications or complaints.

## 2021-04-29 ENCOUNTER — TELEPHONE (OUTPATIENT)
Dept: CARDIOLOGY CLINIC | Age: 66
End: 2021-04-29

## 2021-07-20 ENCOUNTER — OFFICE VISIT (OUTPATIENT)
Dept: CARDIOLOGY CLINIC | Age: 66
End: 2021-07-20
Payer: MEDICARE

## 2021-07-20 VITALS
WEIGHT: 233 LBS | DIASTOLIC BLOOD PRESSURE: 74 MMHG | SYSTOLIC BLOOD PRESSURE: 148 MMHG | HEART RATE: 89 BPM | HEIGHT: 68 IN | BODY MASS INDEX: 35.31 KG/M2

## 2021-07-20 DIAGNOSIS — E78.5 DYSLIPIDEMIA: ICD-10-CM

## 2021-07-20 DIAGNOSIS — I10 ESSENTIAL HYPERTENSION: Primary | ICD-10-CM

## 2021-07-20 PROCEDURE — 99214 OFFICE O/P EST MOD 30 MIN: CPT | Performed by: INTERNAL MEDICINE

## 2021-07-20 RX ORDER — HYDRALAZINE HYDROCHLORIDE 100 MG/1
100 TABLET, FILM COATED ORAL 3 TIMES DAILY
Qty: 90 TABLET | Refills: 3 | Status: SHIPPED | OUTPATIENT
Start: 2021-07-20 | End: 2022-01-12

## 2021-09-27 ENCOUNTER — OFFICE VISIT (OUTPATIENT)
Dept: CARDIOLOGY CLINIC | Age: 66
End: 2021-09-27
Payer: MEDICARE

## 2021-09-27 VITALS
HEIGHT: 68 IN | BODY MASS INDEX: 35.25 KG/M2 | WEIGHT: 232.6 LBS | DIASTOLIC BLOOD PRESSURE: 72 MMHG | OXYGEN SATURATION: 90 % | HEART RATE: 110 BPM | SYSTOLIC BLOOD PRESSURE: 158 MMHG

## 2021-09-27 DIAGNOSIS — I50.32 CHRONIC DIASTOLIC CONGESTIVE HEART FAILURE, NYHA CLASS 1 (HCC): Primary | ICD-10-CM

## 2021-09-27 DIAGNOSIS — I25.10 CORONARY ARTERY DISEASE INVOLVING NATIVE CORONARY ARTERY OF NATIVE HEART WITHOUT ANGINA PECTORIS: ICD-10-CM

## 2021-09-27 DIAGNOSIS — I10 ESSENTIAL HYPERTENSION: ICD-10-CM

## 2021-09-27 PROCEDURE — 99214 OFFICE O/P EST MOD 30 MIN: CPT | Performed by: NURSE PRACTITIONER

## 2021-09-27 RX ORDER — FUROSEMIDE 20 MG/1
20 TABLET ORAL 2 TIMES DAILY
Qty: 90 TABLET | Refills: 3
Start: 2021-09-27 | End: 2022-02-24 | Stop reason: SDUPTHER

## 2021-09-27 ASSESSMENT — ENCOUNTER SYMPTOMS
ABDOMINAL DISTENTION: 0
NAUSEA: 0
VOMITING: 0
SHORTNESS OF BREATH: 0
COUGH: 0

## 2021-09-27 NOTE — PROGRESS NOTES
Heart Failure Clinic       Visit Date: 9/27/2021  Cardiologist:  Dr. Ramiro Rodriguez  Primary Care Physician: Dr. Shai Love, APRN - CNP    Tami Larsen is a 74 y.o. male who presents today for:  Chief Complaint   Patient presents with    Follow-up    Congestive Heart Failure       HPI:   Tami Larsen is a 74 y.o. male who presents to the office for a follow up patient visit in the heart failure clinic. Last seen by Dr. Ramiro Rodriguez on 7/20, increased Hydralazine to 100 mg PO TID, instructed on home BP log  Accompanied by self    TYPE HF: HFpEF (60-65%) (40-45% 2019) DD grade 1  Cause: nonischemic  Device: none  HX: CAD (nonobstructive), DM, HLD, HTN    Dry Wt:  230    Hospitalization:  > 6 months    Concerns today: no new or worsening of symptoms  Activity: ADLs performed, denies FOSTER  Diet: does not follow low sodium diet, does not pay attention to fluid but seems to think he stays under 64oz    Patient has:  Chest Pain: no  SOB: no  Orthopnea/PND: no  SRAVAN: no  Edema: no  Fatigue: no  Abdominal bloating: no  Cough: no  Appetite: good  Home weight: does not have a scale  Home blood pressure: has a cuff but is still not checking.      Past Medical History:   Diagnosis Date    Arthritis     CAD (coronary artery disease) 4/5/2012    Diabetes mellitus ([facility] [address]     Diverticulosis of colon     History of chest pain     History of tobacco abuse     Hyperlipidemia     Hypertension     Pneumonia      Past Surgical History:   Procedure Laterality Date    APPENDECTOMY      CARDIAC SURGERY      heart cath    COLECTOMY  2001    D/T DIVERTICULOSIS    COLONOSCOPY      DIAGNOSTIC CARDIAC CATH LAB PROCEDURE  09/17/2010    EF 60% C MILD DISEASE IN THE PROXIMAL  PORTION BEFORE THE BIFURCATION OF D1,MILD DISEASE IS NOTED IN THE RCA , DIFFUSE AT 10-20% RCA ALSO HAS 10-20% LESIONS    DILATATION, ESOPHAGUS      TRANSTHORACIC ECHOCARDIOGRAM  10/22/2010    EF 55-65% C MILD LFT ATRIAL DILATATION, GRADE 1 DIASOLIC DYSFUNCTION     Family History   Problem Relation Age of Onset    Hypertension Father     High Cholesterol Father     Diabetes Father      Social History     Tobacco Use    Smoking status: Former Smoker     Packs/day: 1.00     Years: 37.00     Pack years: 37.00     Quit date: 2/10/2016     Years since quittin.6    Smokeless tobacco: Never Used   Substance Use Topics    Alcohol use: No     Current Outpatient Medications   Medication Sig Dispense Refill    hydrALAZINE (APRESOLINE) 100 MG tablet Take 1 tablet by mouth 3 times daily 90 tablet 3    potassium chloride (KLOR-CON M) 20 MEQ extended release tablet Take 10 mEq by mouth daily      vitamin D 25 MCG (1000 UT) CAPS Take 3 capsules by mouth daily      allopurinol (ZYLOPRIM) 300 MG tablet Take 300 mg by mouth every morning (before breakfast)      terazosin (HYTRIN) 5 MG capsule Take 5 mg by mouth daily      furosemide (LASIX) 20 MG tablet Take 1 tablet by mouth daily 90 tablet 3    telmisartan (MICARDIS) 80 MG tablet Take 80 mg by mouth daily      acetaminophen (TYLENOL) 500 MG tablet Take 500 mg by mouth every 6 hours as needed for Pain      Misc Natural Products (GLUCOSAMINE CHOND COMPLEX/MSM PO) Take by mouth 2 times daily       carvedilol (COREG) 25 MG tablet Take 1 tablet by mouth 2 times daily 60 tablet 5    amLODIPine (NORVASC) 10 MG tablet Take 1 tablet by mouth daily 30 tablet 5    simvastatin (ZOCOR) 20 MG tablet Take 1 tablet by mouth nightly 30 tablet 5    spironolactone (ALDACTONE) 25 MG tablet Take 1 tablet by mouth 2 times daily 60 tablet 5    sulfaSALAzine (AZULFIDINE) 500 MG tablet Take 500 mg by mouth 2 times daily       ferrous sulfate 325 (65 Fe) MG tablet One tab every 48 hr.  Take with vitamn C 500 mg (Patient taking differently: One tab every other day .   Take with vitamn C 500 mg) 30 tablet 3    vitamin C (VITAMIN C) 500 MG tablet Take 1 tablet by mouth every 48 hours (Patient taking differently: Take 500 mg by mouth every other day ) 30 tablet 3    aspirin 81 MG EC tablet Take 81 mg by mouth daily      Insulin Detemir (LEVEMIR SC) Inject 20 Units into the skin nightly       metFORMIN (GLUCOPHAGE) 500 MG tablet Take 1,000 mg by mouth 2 times daily (with meals)      gabapentin (NEURONTIN) 300 MG capsule Take 300 mg by mouth 2 times daily. .      glimepiride (AMARYL) 2 MG tablet Take 2 mg by mouth 2 times daily        No current facility-administered medications for this visit. Allergies   Allergen Reactions    Lorazepam Other (See Comments)     Light headed, dizzy, blurred vision       SUBJECTIVE:   Review of Systems   Constitutional: Negative for activity change, appetite change, diaphoresis and fatigue. Respiratory: Negative for cough and shortness of breath. Cardiovascular: Negative for chest pain, palpitations and leg swelling. Gastrointestinal: Negative for abdominal distention, nausea and vomiting. Neurological: Negative for weakness, light-headedness and headaches. Hematological: Negative for adenopathy. Psychiatric/Behavioral: Negative for sleep disturbance. OBJECTIVE:   Today's Vitals:  BP (!) 170/64   Pulse 110   Ht 5' 8\" (1.727 m)   Wt 232 lb 9.6 oz (105.5 kg)   SpO2 90%   BMI 35.37 kg/m²     Physical Exam  Vitals reviewed. Constitutional:       General: He is not in acute distress. Appearance: Normal appearance. He is well-developed. He is not diaphoretic. HENT:      Head: Normocephalic and atraumatic. Eyes:      Conjunctiva/sclera: Conjunctivae normal.   Cardiovascular:      Rate and Rhythm: Normal rate and regular rhythm. Heart sounds: Murmur heard. Pulmonary:      Effort: Pulmonary effort is normal. No respiratory distress. Breath sounds: Normal breath sounds. No wheezing or rales. Abdominal:      General: Bowel sounds are normal. There is no distension. Palpations: Abdomen is soft. Tenderness: There is no abdominal tenderness. Musculoskeletal:         General: Normal range of motion. Cervical back: Normal range of motion and neck supple. Right lower leg: Edema (+1) present. Left lower leg: Edema (+1) present. Skin:     General: Skin is warm and dry. Capillary Refill: Capillary refill takes less than 2 seconds. Neurological:      Mental Status: He is alert and oriented to person, place, and time. Coordination: Coordination normal.   Psychiatric:         Behavior: Behavior normal.         Wt Readings from Last 3 Encounters:   09/27/21 232 lb 9.6 oz (105.5 kg)   07/20/21 233 lb (105.7 kg)   04/19/21 238 lb (108 kg)     BP Readings from Last 3 Encounters:   09/27/21 (!) 170/64   07/20/21 (!) 148/74   04/19/21 138/64     Pulse Readings from Last 3 Encounters:   09/27/21 110   07/20/21 89   04/19/21 72     Body mass index is 35.37 kg/m². ECHO:   Conclusions     Summary  Normal left ventricle size and systolic function. Ejection fraction was  estimated at 60 to 65 %. There were no regional left ventricular wall  motion abnormalities and wall thickness was within normal limits. Doppler parameters were consistent with abnormal left ventricular  relaxation (grade 1 diastolic dysfunction). The left atrium is Mildly dilated. There is a small anterior and posterior pericardial effusion with no  evidence of hemodynamic compromise.     Signature     ----------------------------------------------------------------  Electronically signed by Anastasia Peña MD (Interpreting  physician) on 09/03/2020 at 06:48 PM    CATH/STRESS:   2016: Nonobstructive.        Results reviewed:  BNP: No results found for: BNP  CBC:   Lab Results   Component Value Date    WBC 7.3 09/28/2020    RBC 4.05 09/28/2020    RBC 3.92 01/09/2012    HGB 11.3 09/28/2020    HCT 34.7 09/28/2020     09/28/2020     CMP:    Lab Results   Component Value Date     07/21/2021    K 4.0 07/21/2021    K 3.1 08/02/2019     07/21/2021    CO2 31 07/21/2021    BUN 22 07/21/2021    CREATININE 1.09 07/21/2021    AGRATIO 1.5 07/21/2021    LABGLOM 55 04/19/2021    GLUCOSE 118 07/21/2021    CALCIUM 8.60 07/21/2021     Hepatic Function Panel:    Lab Results   Component Value Date    ALKPHOS 74 07/21/2021    ALT 11 07/21/2021    AST 15 07/21/2021    PROT 6.6 07/21/2021    BILITOT 0.2 07/21/2021    BILIDIR <0.2 08/02/2019    LABALBU 4.0 07/21/2021     Magnesium:    Lab Results   Component Value Date    MG 1.8 03/02/2021     PT/INR:  No results found for: PROTIME, INR  Lipids:    Lab Results   Component Value Date    TRIG 100 08/03/2019    HDL 41 08/03/2019    LDLCALC 42 08/03/2019    LDLDIRECT 57 02/13/2017       ASSESSMENT AND PLAN:   The patient's condition/symptoms are Stable: No clinical evidence of fluid overload today. Continue current medical regimen without changes at present time. Diagnosis Orders   1. Chronic diastolic congestive heart failure, NYHA class 1 ([facility] [address]     2. Essential hypertension     3. Coronary artery disease involving native coronary artery of native heart without angina pectoris       Continue:  GDMT:   ACE/ARB/ARNI - Telmisartan 80 mg daily   BB - Coreg 25 BID   Diuretic - Lasix 20 Daily, increasing to BID  AA - Aldactone 25 BID  SGLT2 -  none  Vasodilator - Hydralazine 100 TID  Other - Amlodipine 10 daily, ASA, KCl 10 dialy    Tolerating above noted HF meds, no ill side effects noted. Will continue to monitor kidney function and electrolytes. Will optimize as tolerated. Pt is compliant w/ medications. Total visit time of 30 minutes has been spent with patient on education of symptoms, management, medication, and plan of care; as well as review of chart: labs, ECHO, radiology reports, etc.   I personally spent more then 50% of the appt time face to face with the patient.   Daily weights  Fluid restriction of 2 Liters per day  Limit sodium in diet to around 3836-6767 mg/day  Monitor BP  Activity as tolerated     Stable, appears Euvolemic  Lab reviewed - stable  Repeat blood work in 2 week  Start taking BP at home 1 hr after taking medication. Changing lasix to 20mg BID  Continue diet/fluid adherence  Start daily weights  F/U w/ Cardiology  F/U in clinic in 1 year     Patient was instructed to call the [facility] for any changes in symptoms as noted in AVS.      No follow-ups on file. or sooner if needed     Patient given educational materials - see patient instructions. We discussed the importance of weighing oneself and recording daily. We also discussed the importance of a low sodium diet, higher sodium foods to avoid and better low sodium food options. Patient verbalizes understanding of plan of care using teach back method, and is agreeable to the treatment plan.        Electronically signed by FELI Baltazar CNP on 9/27/2021 at 2:53 PM

## 2021-10-03 ENCOUNTER — APPOINTMENT (OUTPATIENT)
Dept: GENERAL RADIOLOGY | Age: 66
DRG: 871 | End: 2021-10-03
Payer: MEDICARE

## 2021-10-03 ENCOUNTER — HOSPITAL ENCOUNTER (INPATIENT)
Age: 66
LOS: 15 days | Discharge: HOME HEALTH | DRG: 871 | End: 2021-10-18
Attending: EMERGENCY MEDICINE | Admitting: PEDIATRICS
Payer: MEDICARE

## 2021-10-03 DIAGNOSIS — J12.82 PNEUMONIA DUE TO COVID-19 VIRUS: ICD-10-CM

## 2021-10-03 DIAGNOSIS — R53.81 PHYSICAL DECONDITIONING: ICD-10-CM

## 2021-10-03 DIAGNOSIS — J96.01 ACUTE RESPIRATORY FAILURE WITH HYPOXEMIA (HCC): Primary | ICD-10-CM

## 2021-10-03 DIAGNOSIS — U07.1 COVID-19: ICD-10-CM

## 2021-10-03 DIAGNOSIS — U07.1 PNEUMONIA DUE TO COVID-19 VIRUS: ICD-10-CM

## 2021-10-03 LAB
ALBUMIN SERPL-MCNC: 3.8 G/DL (ref 3.5–5.1)
ALLEN TEST: ABNORMAL
ALLEN TEST: POSITIVE
ALP BLD-CCNC: 56 U/L (ref 38–126)
ALT SERPL-CCNC: 26 U/L (ref 11–66)
ANION GAP SERPL CALCULATED.3IONS-SCNC: 9 MEQ/L (ref 8–16)
AST SERPL-CCNC: 88 U/L (ref 5–40)
AVERAGE GLUCOSE: 111 MG/DL (ref 70–126)
BASE EXCESS (CALCULATED): 3.2 MMOL/L (ref -2.5–2.5)
BASE EXCESS (CALCULATED): 3.3 MMOL/L (ref -2.5–2.5)
BASE EXCESS (CALCULATED): 3.7 MMOL/L (ref -2.5–2.5)
BASE EXCESS (CALCULATED): 4 MMOL/L (ref -2.5–2.5)
BASE EXCESS (CALCULATED): 4.2 MMOL/L (ref -2.5–2.5)
BASOPHILS # BLD: 0 %
BASOPHILS ABSOLUTE: 0 THOU/MM3 (ref 0–0.1)
BILIRUB SERPL-MCNC: 0.3 MG/DL (ref 0.3–1.2)
BILIRUBIN DIRECT: < 0.2 MG/DL (ref 0–0.3)
BUN BLDV-MCNC: 34 MG/DL (ref 7–22)
C-REACTIVE PROTEIN: 18.98 MG/DL (ref 0–1)
CALCIUM SERPL-MCNC: 8.3 MG/DL (ref 8.5–10.5)
CHLORIDE BLD-SCNC: 98 MEQ/L (ref 98–111)
CO2: 30 MEQ/L (ref 23–33)
COLLECTED BY:: ABNORMAL
CREAT SERPL-MCNC: 1.4 MG/DL (ref 0.4–1.2)
DEVICE: ABNORMAL
EOSINOPHIL # BLD: 0 %
EOSINOPHILS ABSOLUTE: 0 THOU/MM3 (ref 0–0.4)
ERYTHROCYTE [DISTWIDTH] IN BLOOD BY AUTOMATED COUNT: 14.8 % (ref 11.5–14.5)
ERYTHROCYTE [DISTWIDTH] IN BLOOD BY AUTOMATED COUNT: 51.6 FL (ref 35–45)
FLU A ANTIGEN: NEGATIVE
FLU B ANTIGEN: NEGATIVE
GFR SERPL CREATININE-BSD FRML MDRD: 50 ML/MIN/1.73M2
GLUCOSE BLD-MCNC: 154 MG/DL (ref 70–108)
GLUCOSE BLD-MCNC: 184 MG/DL (ref 70–108)
GLUCOSE BLD-MCNC: 188 MG/DL (ref 70–108)
GLUCOSE BLD-MCNC: 193 MG/DL (ref 70–108)
HBA1C MFR BLD: 5.7 % (ref 4.4–6.4)
HCO3: 30 MMOL/L (ref 23–28)
HCO3: 31 MMOL/L (ref 23–28)
HCO3: 31 MMOL/L (ref 23–28)
HCT VFR BLD CALC: 34 % (ref 42–52)
HEMOGLOBIN: 10.4 GM/DL (ref 14–18)
IFIO2: 100
IFIO2: 100
IFIO2: 70
IFIO2: 80
IFIO2: 90
IMMATURE GRANS (ABS): 0.03 THOU/MM3 (ref 0–0.07)
IMMATURE GRANULOCYTES: 0.7 %
LIPASE: 46.7 U/L (ref 5.6–51.3)
LYMPHOCYTES # BLD: 11.6 %
LYMPHOCYTES ABSOLUTE: 0.5 THOU/MM3 (ref 1–4.8)
MAGNESIUM: 1.8 MG/DL (ref 1.6–2.4)
MCH RBC QN AUTO: 28.7 PG (ref 26–33)
MCHC RBC AUTO-ENTMCNC: 30.6 GM/DL (ref 32.2–35.5)
MCV RBC AUTO: 93.7 FL (ref 80–94)
MODE: ABNORMAL
MODE: ABNORMAL
MONOCYTES # BLD: 9.3 %
MONOCYTES ABSOLUTE: 0.4 THOU/MM3 (ref 0.4–1.3)
NUCLEATED RED BLOOD CELLS: 0 /100 WBC
O2 SATURATION: 93 %
O2 SATURATION: 98 %
O2 SATURATION: 99 %
OSMOLALITY CALCULATION: 286.4 MOSMOL/KG (ref 275–300)
PCO2: 50 MMHG (ref 35–45)
PCO2: 52 MMHG (ref 35–45)
PCO2: 52 MMHG (ref 35–45)
PCO2: 57 MMHG (ref 35–45)
PCO2: 60 MMHG (ref 35–45)
PH BLOOD GAS: 7.32 (ref 7.35–7.45)
PH BLOOD GAS: 7.34 (ref 7.35–7.45)
PH BLOOD GAS: 7.36 (ref 7.35–7.45)
PH BLOOD GAS: 7.37 (ref 7.35–7.45)
PH BLOOD GAS: 7.39 (ref 7.35–7.45)
PIP: 16 CMH2O
PIP: 22 CMH2O
PLATELET # BLD: 147 THOU/MM3 (ref 130–400)
PMV BLD AUTO: 10.8 FL (ref 9.4–12.4)
PO2: 111 MMHG (ref 71–104)
PO2: 130 MMHG (ref 71–104)
PO2: 156 MMHG (ref 71–104)
PO2: 156 MMHG (ref 71–104)
PO2: 73 MMHG (ref 71–104)
POTASSIUM SERPL-SCNC: 4.4 MEQ/L (ref 3.5–5.2)
PRO-BNP: 330.3 PG/ML (ref 0–900)
PROCALCITONIN: 0.33 NG/ML (ref 0.01–0.09)
RBC # BLD: 3.63 MILL/MM3 (ref 4.7–6.1)
SARS-COV-2, NAAT: DETECTED
SEG NEUTROPHILS: 78.4 %
SEGMENTED NEUTROPHILS ABSOLUTE COUNT: 3.4 THOU/MM3 (ref 1.8–7.7)
SET PEEP: 10 MMHG
SET PEEP: 10 MMHG
SET PEEP: 6 MMHG
SET PEEP: 6 MMHG
SET PRESS SUPP: 10 CMH2O
SET RESPIRATORY RATE: 20 BPM
SODIUM BLD-SCNC: 137 MEQ/L (ref 135–145)
SOURCE, BLOOD GAS: ABNORMAL
TOTAL PROTEIN: 6.8 G/DL (ref 6.1–8)
TROPONIN T: 0.04 NG/ML
WBC # BLD: 4.4 THOU/MM3 (ref 4.8–10.8)

## 2021-10-03 PROCEDURE — XW033E5 INTRODUCTION OF REMDESIVIR ANTI-INFECTIVE INTO PERIPHERAL VEIN, PERCUTANEOUS APPROACH, NEW TECHNOLOGY GROUP 5: ICD-10-PCS | Performed by: STUDENT IN AN ORGANIZED HEALTH CARE EDUCATION/TRAINING PROGRAM

## 2021-10-03 PROCEDURE — 87804 INFLUENZA ASSAY W/OPTIC: CPT

## 2021-10-03 PROCEDURE — 83036 HEMOGLOBIN GLYCOSYLATED A1C: CPT

## 2021-10-03 PROCEDURE — 2060000000 HC ICU INTERMEDIATE R&B

## 2021-10-03 PROCEDURE — 6370000000 HC RX 637 (ALT 250 FOR IP): Performed by: STUDENT IN AN ORGANIZED HEALTH CARE EDUCATION/TRAINING PROGRAM

## 2021-10-03 PROCEDURE — 87899 AGENT NOS ASSAY W/OPTIC: CPT

## 2021-10-03 PROCEDURE — 83735 ASSAY OF MAGNESIUM: CPT

## 2021-10-03 PROCEDURE — 71045 X-RAY EXAM CHEST 1 VIEW: CPT

## 2021-10-03 PROCEDURE — 83690 ASSAY OF LIPASE: CPT

## 2021-10-03 PROCEDURE — 86140 C-REACTIVE PROTEIN: CPT

## 2021-10-03 PROCEDURE — 82248 BILIRUBIN DIRECT: CPT

## 2021-10-03 PROCEDURE — 6360000002 HC RX W HCPCS: Performed by: STUDENT IN AN ORGANIZED HEALTH CARE EDUCATION/TRAINING PROGRAM

## 2021-10-03 PROCEDURE — 87635 SARS-COV-2 COVID-19 AMP PRB: CPT

## 2021-10-03 PROCEDURE — 36600 WITHDRAWAL OF ARTERIAL BLOOD: CPT

## 2021-10-03 PROCEDURE — 94640 AIRWAY INHALATION TREATMENT: CPT

## 2021-10-03 PROCEDURE — 2700000000 HC OXYGEN THERAPY PER DAY

## 2021-10-03 PROCEDURE — 96374 THER/PROPH/DIAG INJ IV PUSH: CPT

## 2021-10-03 PROCEDURE — XW033H5 INTRODUCTION OF TOCILIZUMAB INTO PERIPHERAL VEIN, PERCUTANEOUS APPROACH, NEW TECHNOLOGY GROUP 5: ICD-10-PCS | Performed by: STUDENT IN AN ORGANIZED HEALTH CARE EDUCATION/TRAINING PROGRAM

## 2021-10-03 PROCEDURE — 84145 PROCALCITONIN (PCT): CPT

## 2021-10-03 PROCEDURE — 6360000002 HC RX W HCPCS: Performed by: PHYSICIAN ASSISTANT

## 2021-10-03 PROCEDURE — 94761 N-INVAS EAR/PLS OXIMETRY MLT: CPT

## 2021-10-03 PROCEDURE — 2580000003 HC RX 258: Performed by: STUDENT IN AN ORGANIZED HEALTH CARE EDUCATION/TRAINING PROGRAM

## 2021-10-03 PROCEDURE — 84484 ASSAY OF TROPONIN QUANT: CPT

## 2021-10-03 PROCEDURE — 85025 COMPLETE CBC W/AUTO DIFF WBC: CPT

## 2021-10-03 PROCEDURE — 82803 BLOOD GASES ANY COMBINATION: CPT

## 2021-10-03 PROCEDURE — 99223 1ST HOSP IP/OBS HIGH 75: CPT | Performed by: PEDIATRICS

## 2021-10-03 PROCEDURE — 36415 COLL VENOUS BLD VENIPUNCTURE: CPT

## 2021-10-03 PROCEDURE — 2580000003 HC RX 258: Performed by: PHYSICIAN ASSISTANT

## 2021-10-03 PROCEDURE — 87449 NOS EACH ORGANISM AG IA: CPT

## 2021-10-03 PROCEDURE — 6370000000 HC RX 637 (ALT 250 FOR IP): Performed by: EMERGENCY MEDICINE

## 2021-10-03 PROCEDURE — 99284 EMERGENCY DEPT VISIT MOD MDM: CPT

## 2021-10-03 PROCEDURE — 6360000002 HC RX W HCPCS: Performed by: EMERGENCY MEDICINE

## 2021-10-03 PROCEDURE — 94660 CPAP INITIATION&MGMT: CPT

## 2021-10-03 PROCEDURE — 93005 ELECTROCARDIOGRAM TRACING: CPT | Performed by: EMERGENCY MEDICINE

## 2021-10-03 PROCEDURE — 82948 REAGENT STRIP/BLOOD GLUCOSE: CPT

## 2021-10-03 PROCEDURE — 2500000003 HC RX 250 WO HCPCS: Performed by: STUDENT IN AN ORGANIZED HEALTH CARE EDUCATION/TRAINING PROGRAM

## 2021-10-03 PROCEDURE — 83880 ASSAY OF NATRIURETIC PEPTIDE: CPT

## 2021-10-03 PROCEDURE — 80053 COMPREHEN METABOLIC PANEL: CPT

## 2021-10-03 RX ORDER — IPRATROPIUM BROMIDE AND ALBUTEROL SULFATE 2.5; .5 MG/3ML; MG/3ML
1 SOLUTION RESPIRATORY (INHALATION) ONCE
Status: COMPLETED | OUTPATIENT
Start: 2021-10-03 | End: 2021-10-03

## 2021-10-03 RX ORDER — ONDANSETRON 2 MG/ML
4 INJECTION INTRAMUSCULAR; INTRAVENOUS EVERY 6 HOURS PRN
Status: DISCONTINUED | OUTPATIENT
Start: 2021-10-03 | End: 2021-10-18 | Stop reason: HOSPADM

## 2021-10-03 RX ORDER — ACETAMINOPHEN 500 MG
1000 TABLET ORAL ONCE
Status: COMPLETED | OUTPATIENT
Start: 2021-10-03 | End: 2021-10-03

## 2021-10-03 RX ORDER — SODIUM CHLORIDE 0.9 % (FLUSH) 0.9 %
5-40 SYRINGE (ML) INJECTION EVERY 12 HOURS SCHEDULED
Status: DISCONTINUED | OUTPATIENT
Start: 2021-10-03 | End: 2021-10-18 | Stop reason: HOSPADM

## 2021-10-03 RX ORDER — ASCORBIC ACID 500 MG
500 TABLET ORAL DAILY
Status: DISCONTINUED | OUTPATIENT
Start: 2021-10-03 | End: 2021-10-18 | Stop reason: HOSPADM

## 2021-10-03 RX ORDER — ACETAMINOPHEN 650 MG/1
650 SUPPOSITORY RECTAL EVERY 6 HOURS PRN
Status: DISCONTINUED | OUTPATIENT
Start: 2021-10-03 | End: 2021-10-18 | Stop reason: HOSPADM

## 2021-10-03 RX ORDER — ONDANSETRON 4 MG/1
4 TABLET, ORALLY DISINTEGRATING ORAL EVERY 8 HOURS PRN
Status: DISCONTINUED | OUTPATIENT
Start: 2021-10-03 | End: 2021-10-18 | Stop reason: HOSPADM

## 2021-10-03 RX ORDER — ATORVASTATIN CALCIUM 10 MG/1
10 TABLET, FILM COATED ORAL DAILY
Status: DISCONTINUED | OUTPATIENT
Start: 2021-10-04 | End: 2021-10-18 | Stop reason: HOSPADM

## 2021-10-03 RX ORDER — ACETAMINOPHEN 325 MG/1
650 TABLET ORAL EVERY 6 HOURS PRN
Status: DISCONTINUED | OUTPATIENT
Start: 2021-10-03 | End: 2021-10-18 | Stop reason: HOSPADM

## 2021-10-03 RX ORDER — FUROSEMIDE 10 MG/ML
40 INJECTION INTRAMUSCULAR; INTRAVENOUS 2 TIMES DAILY
Status: DISCONTINUED | OUTPATIENT
Start: 2021-10-03 | End: 2021-10-08

## 2021-10-03 RX ORDER — ASPIRIN 81 MG/1
81 TABLET ORAL DAILY
Status: DISCONTINUED | OUTPATIENT
Start: 2021-10-04 | End: 2021-10-18 | Stop reason: HOSPADM

## 2021-10-03 RX ORDER — DEXAMETHASONE SODIUM PHOSPHATE 4 MG/ML
10 INJECTION, SOLUTION INTRA-ARTICULAR; INTRALESIONAL; INTRAMUSCULAR; INTRAVENOUS; SOFT TISSUE ONCE
Status: COMPLETED | OUTPATIENT
Start: 2021-10-03 | End: 2021-10-03

## 2021-10-03 RX ORDER — ZINC SULFATE 50(220)MG
50 CAPSULE ORAL DAILY
Status: DISCONTINUED | OUTPATIENT
Start: 2021-10-03 | End: 2021-10-18 | Stop reason: HOSPADM

## 2021-10-03 RX ORDER — POTASSIUM CHLORIDE 20 MEQ/1
20 TABLET, EXTENDED RELEASE ORAL DAILY
Status: DISCONTINUED | OUTPATIENT
Start: 2021-10-03 | End: 2021-10-18 | Stop reason: HOSPADM

## 2021-10-03 RX ORDER — SPIRONOLACTONE 25 MG/1
25 TABLET ORAL 2 TIMES DAILY
Status: DISCONTINUED | OUTPATIENT
Start: 2021-10-03 | End: 2021-10-10

## 2021-10-03 RX ORDER — IPRATROPIUM BROMIDE AND ALBUTEROL SULFATE 2.5; .5 MG/3ML; MG/3ML
1 SOLUTION RESPIRATORY (INHALATION)
Status: DISCONTINUED | OUTPATIENT
Start: 2021-10-03 | End: 2021-10-04

## 2021-10-03 RX ORDER — POLYETHYLENE GLYCOL 3350 17 G/17G
17 POWDER, FOR SOLUTION ORAL DAILY PRN
Status: DISCONTINUED | OUTPATIENT
Start: 2021-10-03 | End: 2021-10-18 | Stop reason: HOSPADM

## 2021-10-03 RX ORDER — AMLODIPINE BESYLATE 10 MG/1
10 TABLET ORAL DAILY
Status: DISCONTINUED | OUTPATIENT
Start: 2021-10-04 | End: 2021-10-18 | Stop reason: HOSPADM

## 2021-10-03 RX ORDER — 0.9 % SODIUM CHLORIDE 0.9 %
30 INTRAVENOUS SOLUTION INTRAVENOUS PRN
Status: DISCONTINUED | OUTPATIENT
Start: 2021-10-03 | End: 2021-10-18 | Stop reason: HOSPADM

## 2021-10-03 RX ORDER — GABAPENTIN 300 MG/1
300 CAPSULE ORAL 2 TIMES DAILY
Status: DISCONTINUED | OUTPATIENT
Start: 2021-10-03 | End: 2021-10-18 | Stop reason: HOSPADM

## 2021-10-03 RX ORDER — HYDRALAZINE HYDROCHLORIDE 50 MG/1
100 TABLET, FILM COATED ORAL 3 TIMES DAILY
Status: DISCONTINUED | OUTPATIENT
Start: 2021-10-03 | End: 2021-10-18 | Stop reason: HOSPADM

## 2021-10-03 RX ORDER — SODIUM CHLORIDE 0.9 % (FLUSH) 0.9 %
5-40 SYRINGE (ML) INJECTION PRN
Status: DISCONTINUED | OUTPATIENT
Start: 2021-10-03 | End: 2021-10-18 | Stop reason: HOSPADM

## 2021-10-03 RX ORDER — DEXAMETHASONE 4 MG/1
6 TABLET ORAL DAILY
Status: DISCONTINUED | OUTPATIENT
Start: 2021-10-03 | End: 2021-10-03

## 2021-10-03 RX ORDER — SULFASALAZINE 500 MG/1
500 TABLET ORAL 2 TIMES DAILY
Status: DISCONTINUED | OUTPATIENT
Start: 2021-10-03 | End: 2021-10-18 | Stop reason: HOSPADM

## 2021-10-03 RX ORDER — VITAMIN B COMPLEX
6000 TABLET ORAL DAILY
Status: DISPENSED | OUTPATIENT
Start: 2021-10-03 | End: 2021-10-10

## 2021-10-03 RX ORDER — VITAMIN B COMPLEX
2000 TABLET ORAL DAILY
Status: DISCONTINUED | OUTPATIENT
Start: 2021-10-10 | End: 2021-10-14

## 2021-10-03 RX ORDER — GLIPIZIDE 5 MG/1
5 TABLET ORAL
Status: DISCONTINUED | OUTPATIENT
Start: 2021-10-03 | End: 2021-10-17

## 2021-10-03 RX ORDER — CARVEDILOL 25 MG/1
25 TABLET ORAL 2 TIMES DAILY
Status: DISCONTINUED | OUTPATIENT
Start: 2021-10-03 | End: 2021-10-18 | Stop reason: HOSPADM

## 2021-10-03 RX ORDER — DEXAMETHASONE SODIUM PHOSPHATE 4 MG/ML
10 INJECTION, SOLUTION INTRA-ARTICULAR; INTRALESIONAL; INTRAMUSCULAR; INTRAVENOUS; SOFT TISSUE EVERY 24 HOURS
Status: DISCONTINUED | OUTPATIENT
Start: 2021-10-08 | End: 2021-10-10

## 2021-10-03 RX ORDER — DOXAZOSIN MESYLATE 4 MG/1
4 TABLET ORAL DAILY
Status: DISCONTINUED | OUTPATIENT
Start: 2021-10-04 | End: 2021-10-18 | Stop reason: HOSPADM

## 2021-10-03 RX ORDER — LOSARTAN POTASSIUM 100 MG/1
100 TABLET ORAL DAILY
Status: DISCONTINUED | OUTPATIENT
Start: 2021-10-04 | End: 2021-10-18 | Stop reason: HOSPADM

## 2021-10-03 RX ORDER — ALLOPURINOL 300 MG/1
300 TABLET ORAL
Status: DISCONTINUED | OUTPATIENT
Start: 2021-10-04 | End: 2021-10-18 | Stop reason: HOSPADM

## 2021-10-03 RX ORDER — INSULIN GLARGINE 100 [IU]/ML
10 INJECTION, SOLUTION SUBCUTANEOUS NIGHTLY
Status: DISCONTINUED | OUTPATIENT
Start: 2021-10-03 | End: 2021-10-06

## 2021-10-03 RX ORDER — NICOTINE POLACRILEX 4 MG
15 LOZENGE BUCCAL PRN
Status: DISCONTINUED | OUTPATIENT
Start: 2021-10-03 | End: 2021-10-18 | Stop reason: HOSPADM

## 2021-10-03 RX ORDER — DEXTROSE MONOHYDRATE 25 G/50ML
12.5 INJECTION, SOLUTION INTRAVENOUS PRN
Status: DISCONTINUED | OUTPATIENT
Start: 2021-10-03 | End: 2021-10-18 | Stop reason: HOSPADM

## 2021-10-03 RX ORDER — SODIUM CHLORIDE 9 MG/ML
25 INJECTION, SOLUTION INTRAVENOUS PRN
Status: DISCONTINUED | OUTPATIENT
Start: 2021-10-03 | End: 2021-10-18 | Stop reason: HOSPADM

## 2021-10-03 RX ORDER — DEXTROSE MONOHYDRATE 50 MG/ML
100 INJECTION, SOLUTION INTRAVENOUS PRN
Status: DISCONTINUED | OUTPATIENT
Start: 2021-10-03 | End: 2021-10-18 | Stop reason: HOSPADM

## 2021-10-03 RX ORDER — SODIUM CHLORIDE, SODIUM LACTATE, POTASSIUM CHLORIDE, CALCIUM CHLORIDE 600; 310; 30; 20 MG/100ML; MG/100ML; MG/100ML; MG/100ML
INJECTION, SOLUTION INTRAVENOUS CONTINUOUS
Status: DISCONTINUED | OUTPATIENT
Start: 2021-10-03 | End: 2021-10-04

## 2021-10-03 RX ADMIN — DEXAMETHASONE SODIUM PHOSPHATE 10 MG: 4 INJECTION, SOLUTION INTRA-ARTICULAR; INTRALESIONAL; INTRAMUSCULAR; INTRAVENOUS; SOFT TISSUE at 10:38

## 2021-10-03 RX ADMIN — POTASSIUM CHLORIDE 20 MEQ: 1500 TABLET, EXTENDED RELEASE ORAL at 16:59

## 2021-10-03 RX ADMIN — GABAPENTIN 300 MG: 300 CAPSULE ORAL at 22:30

## 2021-10-03 RX ADMIN — SODIUM CHLORIDE, PRESERVATIVE FREE 10 ML: 5 INJECTION INTRAVENOUS at 21:44

## 2021-10-03 RX ADMIN — SPIRONOLACTONE 25 MG: 25 TABLET ORAL at 18:44

## 2021-10-03 RX ADMIN — REMDESIVIR 200 MG: 100 INJECTION, POWDER, LYOPHILIZED, FOR SOLUTION INTRAVENOUS at 08:55

## 2021-10-03 RX ADMIN — IPRATROPIUM BROMIDE AND ALBUTEROL SULFATE 1 AMPULE: .5; 3 SOLUTION RESPIRATORY (INHALATION) at 04:47

## 2021-10-03 RX ADMIN — SULFASALAZINE 500 MG: 500 TABLET ORAL at 22:30

## 2021-10-03 RX ADMIN — HYDRALAZINE HYDROCHLORIDE 100 MG: 50 TABLET ORAL at 16:59

## 2021-10-03 RX ADMIN — ACETAMINOPHEN 1000 MG: 500 TABLET ORAL at 05:01

## 2021-10-03 RX ADMIN — TOCILIZUMAB 810 MG: 180 INJECTION, SOLUTION SUBCUTANEOUS at 17:07

## 2021-10-03 RX ADMIN — DEXAMETHASONE SODIUM PHOSPHATE 10 MG: 4 INJECTION, SOLUTION INTRA-ARTICULAR; INTRALESIONAL; INTRAMUSCULAR; INTRAVENOUS; SOFT TISSUE at 06:01

## 2021-10-03 RX ADMIN — SODIUM CHLORIDE, POTASSIUM CHLORIDE, SODIUM LACTATE AND CALCIUM CHLORIDE: 600; 310; 30; 20 INJECTION, SOLUTION INTRAVENOUS at 21:45

## 2021-10-03 RX ADMIN — IPRATROPIUM BROMIDE AND ALBUTEROL SULFATE 1 AMPULE: .5; 3 SOLUTION RESPIRATORY (INHALATION) at 17:28

## 2021-10-03 RX ADMIN — SODIUM CHLORIDE, POTASSIUM CHLORIDE, SODIUM LACTATE AND CALCIUM CHLORIDE: 600; 310; 30; 20 INJECTION, SOLUTION INTRAVENOUS at 09:29

## 2021-10-03 RX ADMIN — INSULIN GLARGINE 10 UNITS: 100 INJECTION, SOLUTION SUBCUTANEOUS at 21:40

## 2021-10-03 RX ADMIN — ENOXAPARIN SODIUM 30 MG: 30 INJECTION SUBCUTANEOUS at 10:39

## 2021-10-03 RX ADMIN — INSULIN LISPRO 1 UNITS: 100 INJECTION, SOLUTION INTRAVENOUS; SUBCUTANEOUS at 21:40

## 2021-10-03 RX ADMIN — FUROSEMIDE 40 MG: 10 INJECTION, SOLUTION INTRAMUSCULAR; INTRAVENOUS at 18:43

## 2021-10-03 RX ADMIN — IPRATROPIUM BROMIDE AND ALBUTEROL SULFATE 1 AMPULE: .5; 3 SOLUTION RESPIRATORY (INHALATION) at 13:29

## 2021-10-03 RX ADMIN — IPRATROPIUM BROMIDE AND ALBUTEROL SULFATE 1 AMPULE: .5; 3 SOLUTION RESPIRATORY (INHALATION) at 04:46

## 2021-10-03 RX ADMIN — CARVEDILOL 25 MG: 25 TABLET, FILM COATED ORAL at 22:30

## 2021-10-03 RX ADMIN — CEFTRIAXONE SODIUM 1000 MG: 1 INJECTION, POWDER, FOR SOLUTION INTRAMUSCULAR; INTRAVENOUS at 08:37

## 2021-10-03 RX ADMIN — GLIPIZIDE 5 MG: 5 TABLET ORAL at 16:59

## 2021-10-03 RX ADMIN — IPRATROPIUM BROMIDE AND ALBUTEROL SULFATE 1 AMPULE: .5; 3 SOLUTION RESPIRATORY (INHALATION) at 08:24

## 2021-10-03 RX ADMIN — ENOXAPARIN SODIUM 30 MG: 30 INJECTION SUBCUTANEOUS at 21:40

## 2021-10-03 ASSESSMENT — ENCOUNTER SYMPTOMS
TROUBLE SWALLOWING: 0
EYE REDNESS: 0
EYE PAIN: 0
PHOTOPHOBIA: 0
EYE ITCHING: 0
VOMITING: 0
SINUS PRESSURE: 0
SORE THROAT: 0
VOICE CHANGE: 0
CONSTIPATION: 0
COUGH: 1
SHORTNESS OF BREATH: 1
ABDOMINAL PAIN: 0
EYE DISCHARGE: 0
BLOOD IN STOOL: 0
WHEEZING: 1
NAUSEA: 0
RHINORRHEA: 0
DIARRHEA: 0
CHOKING: 0
ABDOMINAL DISTENTION: 0
BACK PAIN: 0
CHEST TIGHTNESS: 0

## 2021-10-03 ASSESSMENT — PAIN SCALES - GENERAL
PAINLEVEL_OUTOF10: 0
PAINLEVEL_OUTOF10: 0

## 2021-10-03 NOTE — ACP (ADVANCE CARE PLANNING)
Advance Care Planning     Advance Care Planning Activator (Inpatient)  Conversation Note      Date of ACP Conversation: 10/3/2021     Conversation Conducted with: Patient with Narendra 51: Named in Advance Directive or Healthcare Power of  (name) Antonio Dunlap (Wife)    ACP Activator: Ranjit Hern[facility]dez [facility] Decision Maker:     Current Designated Health Care Decision Maker:     Primary Decision Maker: Kingsley Jimenez, [address] [phone]    Today we documented Decision Maker(s) consistent with ACP documents on file. Care Preferences    Ventilation: \"If you were in your present state of health and suddenly became very ill and were unable to breathe on your own, what would your preference be about the use of a ventilator (breathing machine) if it were available to you? \"      Would the patient desire the use of ventilator (breathing machine)?: Unknown    \"If your health worsens and it becomes clear that your chance of recovery is unlikely, what would your preference be about the use of a ventilator (breathing machine) if it were available to you? \"     Would the patient desire the use of ventilator (breathing machine)?: No      Resuscitation  \"CPR works best to restart the heart when there is a sudden event, like a heart attack, in someone who is otherwise healthy. Unfortunately, CPR does not typically restart the heart for people who have serious health conditions or who are very sick. \"    \"In the event your heart stopped as a result of an underlying serious health condition, would you want attempts to be made to restart your heart (answer \"yes\" for attempt to resuscitate) or would you prefer a natural death (answer \"no\" for do not attempt to resuscitate)? \" Unknown       [x] Yes   [] No   Educated Patient / Dufm Staggers regarding differences between Advance Directives and portable DNR orders.     Length of ACP Conversation in minutes: 45  Conversation Outcomes:  [x] ACP discussion completed  [x] Existing advance directive reviewed with patient; no changes to patient's previously recorded wishes  [] New Advance Directive completed  [] Portable Do Not Rescitate prepared for Provider review and signature  [] POLST/POST/MOLST/MOST prepared for Provider review and signature      Follow-up plan:    [] Schedule follow-up conversation to continue planning  [x] Referred individual to Provider for additional questions/concerns   [] Advised patient/agent/surrogate to review completed ACP document and update if needed with changes in condition, patient preferences or care setting    [x] This note routed to one or more involved healthcare providers     - Shannon Padilla was using a CPAP and unresponsive to our conversation. Called his POA (wife Dorcas Evangelista) to discuss his ACP conversation.   - Dorcas Evangelista was understanding of the situation with Shannon Padilla and explained that he has never worn a CPap in his life. So the thought of intubation was fearful to her. We discussed in detail the purpose for intubation if it becomes necessary.   - She also explained that she was diagnosed (+) a week ago and has been coughing so much more than him. We discussed his special instructions component to the POA and how she needs to address them as though she is speaking for him and what he would want or not. - I assured her, many people over the last 18 months have been faced with this situation, and are unknowing but we make our best judgement based on the things that are important to him. Sherron Chen is a Full Code at present and will remain such at this admission unless his health becomes compromised and he is unable to live without mechanical devices. His POA would have authorization to speak on his behalf to withdraw. SC was available to the family and offering support as I left the area.

## 2021-10-03 NOTE — PROGRESS NOTES
Pharmacy Consult      Chandler Black is a 74 y.o. male for whom pharmacy has been consulted to dose baricitinib. Patient Active Problem List   Diagnosis    Hyperlipidemia    Diabetes mellitus ([facility] [address]    History of tobacco abuse    History of chest pain    Diverticulosis of colon    Arthritis    CAD (coronary artery disease)    SIRS (systemic inflammatory response syndrome) ([facility] [address]    Duodenal ulcer    Esophagitis    Essential hypertension    Coronary artery disease involving native coronary artery of native heart without angina pectoris    Cellulitis of left upper extremity    Acute combined systolic and diastolic CHF, NYHA class 4 (HCC)    Congestive heart failure ([facility] [address]    COVID-19       Allergies:  Lorazepam     Recent Labs     10/03/21  0450   CREATININE 1.4*       Ht/Wt:   Ht Readings from Last 1 Encounters:   09/27/21 5' 8\" (1.727 m)        Wt Readings from Last 1 Encounters:   10/03/21 230 lb (104.3 kg)         CrCl cannot be calculated (Unknown ideal weight. ). Assessment/Plan:    Patient was dosed baricitinib 4 mg PO daily x14 days or until discharge    Thank you for the consult. Will continue to follow.

## 2021-10-03 NOTE — PROGRESS NOTES
Remdesivir Initiation Note    This patient does meet criteria for initiation of remdesivir based on the following:  Proven COVID-19  Mild to moderate disease (SpO2 ? 94% on RA or requiring supplemental O2) - on biPAP, sats on room air were in the 70's  Acceptable hepatic function (ALT within 5 times ULN) - AST ~ 2 x uln, others wnl    Exclusion Criteria:  Severe disease requiring invasive or non-invasive mechanical ventilation (includes HFNC & BiPAP) - patient with COPD prior to admission  Could consider use in patients requiring high flow if early on in the disease course (based on symptom duration)  Use of more than 1 vasopressor prior to remdesivir initiation  Already improving on supportive treatment and/or impending discharge  Patients in whom the clinical team think death is in the immediate short-term where remdesivir is unlikely to change the clinical outcome     Liver function tests will be monitored daily while on remdesivir.     Huong Reed RPh, SAUMYA, JONATHANP  10/3/2021     6:34 AM

## 2021-10-03 NOTE — ED NOTES
Patient continues to be diaphoretic and difficult to arouse. Elinor Fothergill, PA at bedside to evaluate patient.       Blu Greenwood RN  10/03/21 [address] [facility], [place]  10/03/21 3410

## 2021-10-03 NOTE — ED NOTES
Pt resting in cot with BiPAP machine on, respirations are even and slightly labored. Respiratory is at bedside to draw ABG. RN asked pt if he needs anything at this time. Pt denies any need or has any concern right now. Call light is within reach. Will continue to monitor.        Ariella Piña RN  10/03/21 6619

## 2021-10-03 NOTE — ED NOTES
Orders for Bi-PAP settings of 22/6, back up rate of 20 received from [facility]. RT Francia notified.       Marlon Sinha RN  10/03/21 0926

## 2021-10-03 NOTE — ED NOTES
Pt awake, resting on cot. BiPAP continued and pt tolerating well. VSS. Pt offered blanket but declined. Pt denies other needs at this time.      Germaine Lester RN  10/03/21 3450

## 2021-10-03 NOTE — PROGRESS NOTES
Patient does meet criteria for tocilizumab    Tocilizumab (Actemra) Criteria for Use    Age > 25years old  Clinical Status - within 7 days of symptom onset OR within 2 days of                                   hospital admission                                  AND                                  within 24 hours of vital (respiratory/cardiovascular) organ                                   support initiation due to COVID-19  Concomitant Therapy - Receiving systemic corticosteroids  Oxygen Saturation - < 92% OR requiring oxygen  Laboratory - Positive COVID-19 PCR within 72 hours of admission                          - CRP > 7.5   Ref.  Range 10/3/2021 04:50   CRP Latest Ref Range: 0.00 - 1.00 mg/dl 18.98 (H)       Contraindications  Invasive active mycobacterial or fungal infection  Platelets < 652,093 or active bleeding  Not receiving systemic steroids  Significant immunosuppression    Dose when compounding from SQ vial:   810 mg (5 syringes): Patient weight >90 kg x 1 dose   648 mg (4 syringes): Patient weight >65 kg to ? 90 kg x 1 dose   486 mg (3 syringes): Patient weight >40 kg to ? 65 kg x 1 dose   324mg (2 syringes): Patient weight ? 40 kg x 1 dose    Mitchell Franco, PharmD, BCPS, [facility] 10/3/2021 2:27 PM

## 2021-10-03 NOTE — ED NOTES
THIS RN COMPLETED INITIAL ASSESSMENT AND FOUND PATIENT INCONTINENT OF URINE. COMPLETE LINEN CHANGE REQUIRED DUE TO SATURATION OF PAD AND SHEET. LINEN CHANGE COMPLETED, MARY ANN CARE/ INCONTINENCE CARE PROVIDED, NEW PAD PLACED UNDER PATIENT.      Stephanie Nieto RN  10/03/21 1952

## 2021-10-03 NOTE — H&P
Hospitalist - History & Physical      Patient: Handy Yeh    Unit/Bed:04/004A  YOB: 1955  MRN: [id number]   Acct: [de-identified]   PCP: FELI Atkinson CNP    Date of Service: Pt seen/examined on 10/03/21  and Admitted to Inpatient with expected LOS greater than two midnights due to medical therapy. Chief Complaint:  COVID-19    Assessment and Plan:    Acute Hypoxemic Respiratory Failure: Due to COVID-19 PNA. No home O2 supplemental use. Was placed on 20/10 BiPAP with FiO2 in the ED with significant improvement in SpO2 > 95%. Will continue with BiPAP at lower settings to prevent barotrauma associated lung injury. Wean FiO2/O2 for goal SpO2 > 90%. Aspiration precautions. DuoNebs as per respiratory therapy. COVID-19 Pneumonia with Sepsis POA: Tested positive for COVID-19 on 10/3/2021. Not vaccinated. CXR shows typical groundglass opacities consistent with COVID-19 infection. Tachycardic, febrile and leukopenic on admission. Given 10 mg of Decadron in the ED. Will initiate Remdesivir, pharmacy to dose   Baricitinib, pending CRP level. Pharmacy to dose  Obtain daily CRP, Ferritin, LD, D-Dimer   Decadron 6 mg for 10 total doses  Start IV Rocephin for superimposed bacterial coverage. Obtain procalcitonin, de-escalate/discontinue depending on clinical course and results  Daily vitamin D/zinc/vitamin C  IDDM2: Last A1c 6.3%. Continue with Lantus 10 units at night. Medium SSI. Hypoglycemia parameters in place. Adjust insulin as indicated. Continue with Neurontin twice daily. HFpEF: Last echo obtained 9/2020 with LVEF of 60 to 65% and G1DD. Patient does not appear to be fluid overloaded at this time. Appears compensated. Continue with antihypertensives/GDMT as noted below. Continue with IV Lasix 40 mg twice daily. Daily weights, I's and O's and electrolytes. Essential Hypertension: Continue with Norvasc, Coreg, Cardura, Apresoline, Cozaar, Aldactone.  Apply holding parameters. Hyperlipidemia: Continue with Lipitor nightly. History Of Present Illness:      Luiz Barton is a 74 y.o. male with PMH of essential HTN, CAD, IDDM 2, HLD, HFpEF with last LVEF 60 to 65% and G1DD in 9/2020. Patient reports feeling worsening shortness of breath over the last 2 days. His wife tested positive for COVID-19 a few days ago. Decided to get evaluated in the ED today and called EMS. While in the ED he was found to be hypoxemic on room air at 78%. On chart review it appears that he is placed on CPAP on route to the ED by EMS. Was placed on BiPAP 20/10 in the ED with subsequent improvement in oxygenation to well over 90% as well as an improvement in his respiratory distress. On assessment patient was tolerating BiPAP, having some difficulty with words as he was getting quite dyspneic with conversation. He states he is not vaccinated against COVID-19. He denies any new onset headaches, syncope, changes in vision, chest pain, palpitations, abdominal pain, N/V/C/D, extremity weakness or pain. He does report a cough that is nonproductive. Otherwise has no other complaints. Summarized ED course: Initial vital signs included temperature 102.3 °F, respirations 21 breaths/min, pulse 124 bpm, /74 mmHg, SPO2 69% on room air with subsequent increase to 97% with PAP therapy.     Pertinent Admission Labs:  CBC: WBC 4400, H&H 10.4/34.0, platelet count [phone]  BMP/CMP: Sodium 137, potassium 4.4, chloride 98, CO2 30, BUN 34, creatinine 1.4, anion gap 9.0, GFR 50, magnesium 1.8, calcium 8.3  Albumin 3.8, alk phos 56, ALT 26, AST 88, bilirubin 0.3, lipase 46.7  Flu A and B antigen negative  ABG: pH 7.37, PCO2 52, PO2 111, HCO3 30, FiO2 100%  SARS-CoV-2 detected    Pertinent Admission Imaging:  CXR: Bilateral pneumonia concerning for COVID-19 infection    Past Medical History:  has a past medical history of Arthritis, CAD (coronary artery disease), Diabetes mellitus ([facility] [address] Diverticulosis of colon, History of chest pain, History of tobacco abuse, Hyperlipidemia, Hypertension, and Pneumonia. Past Surgical History:  has a past surgical history that includes colectomy (2001); transthoracic echocardiogram (10/22/2010); Diagnostic Cardiac Cath Lab Procedure (09/17/2010); Dilatation, esophagus; Appendectomy; Colonoscopy; and Cardiac surgery. Home Medications:   No current facility-administered medications on file prior to encounter. Current Outpatient Medications on File Prior to Encounter   Medication Sig Dispense Refill    furosemide (LASIX) 20 MG tablet Take 1 tablet by mouth 2 times daily 90 tablet 3    hydrALAZINE (APRESOLINE) 100 MG tablet Take 1 tablet by mouth 3 times daily 90 tablet 3    potassium chloride (KLOR-CON M) 20 MEQ extended release tablet Take 10 mEq by mouth daily      vitamin D 25 MCG (1000 UT) CAPS Take 3 capsules by mouth daily      allopurinol (ZYLOPRIM) 300 MG tablet Take 300 mg by mouth every morning (before breakfast)      terazosin (HYTRIN) 5 MG capsule Take 5 mg by mouth daily      telmisartan (MICARDIS) 80 MG tablet Take 80 mg by mouth daily      acetaminophen (TYLENOL) 500 MG tablet Take 500 mg by mouth every 6 hours as needed for Pain      Misc Natural Products (GLUCOSAMINE CHOND COMPLEX/MSM PO) Take by mouth 2 times daily       carvedilol (COREG) 25 MG tablet Take 1 tablet by mouth 2 times daily 60 tablet 5    amLODIPine (NORVASC) 10 MG tablet Take 1 tablet by mouth daily 30 tablet 5    simvastatin (ZOCOR) 20 MG tablet Take 1 tablet by mouth nightly 30 tablet 5    spironolactone (ALDACTONE) 25 MG tablet Take 1 tablet by mouth 2 times daily 60 tablet 5    sulfaSALAzine (AZULFIDINE) 500 MG tablet Take 500 mg by mouth 2 times daily       ferrous sulfate 325 (65 Fe) MG tablet One tab every 48 hr.  Take with vitamn C 500 mg (Patient taking differently: One tab every other day .   Take with vitamn C 500 mg) 30 tablet 3    vitamin C (VITAMIN C) 500 MG tablet Take 1 tablet by mouth every 48 hours (Patient taking differently: Take 500 mg by mouth every other day ) 30 tablet 3    aspirin 81 MG EC tablet Take 81 mg by mouth daily      Insulin Detemir (LEVEMIR SC) Inject 20 Units into the skin nightly       metFORMIN (GLUCOPHAGE) 500 MG tablet Take 1,000 mg by mouth 2 times daily (with meals)      gabapentin (NEURONTIN) 300 MG capsule Take 300 mg by mouth 2 times daily. Desmond Gutter glimepiride (AMARYL) 2 MG tablet Take 2 mg by mouth 2 times daily          Allergies:    Lorazepam    Social History:  reports that he quit smoking about 5 years ago. He has a 37.00 pack-year smoking history. He has never used smokeless tobacco. He reports that he does not drink alcohol and does not use drugs. Family History: family history includes Diabetes in his father; High Cholesterol in his father; Hypertension in his father. Diet:  No diet orders on file    Review of systems:   Pertinent positives as noted in the HPI. All other systems reviewed and negative. PHYSICAL EXAM:   axillary temperature is 102.3 °F (39.1 °C). His blood pressure is 136/67 and his pulse is 103. His respiration is 21 and oxygen saturation is 98%. There is no height or weight on file to calculate BMI. Constitutional: On BiPAP. Patient is oriented to person, place, and time. HENT:   Head: Normocephalic and atraumatic. Mouth/Throat: Oropharynx is clear and moist.    Eyes: Conjunctivae are normal. Pupils are equal, round, and reactive to light. No scleral icterus. Neck: Neck supple. No JVD present. No tracheal deviation present. Cardiovascular: Normal rate, regular rhythm, normal heart sounds. No murmur heard. Pulmonary/Chest: On BiPAP. Conversational dyspnea noted. Abdominal: Soft. Patient exhibits no distension. No tenderness. Bowel sounds present. Musculoskeletal: Normal range of motion. Extremities: Patient exhibits no edema and no tenderness. Lymphadenopathy: No cervical adenopathy.    Neurological: Patient is alert and oriented to person, place, and time. Skin: Skin is warm and dry. Patient is not diaphoretic. No rashes or lesions. Psychiatric: Patient has a normal mood and affect. Patient behavior is normal.     Electronically signed by Arabella Shelby. Moy Tavarez M.D. on 10/3/2021 at 6:06 AM    Agree with history and physical exam findings as well as assessment and plan detailed by the internal medicine resident.

## 2021-10-03 NOTE — ED NOTES
Pt resting in cot watching TV. BiPAP machine is in place. Pt respirations are even and slightly labored. Pt denies any pain. Pt voices no concern or need a this time. Call light is within reach. Will continue to monitor.       Belvie Sever, RN  10/03/21 8128

## 2021-10-03 NOTE — ED TRIAGE NOTES
Pt brought in by EMS for SOB. Pt's wife tested positive for COVID a few days ago. Baseline room air at home. EMS states pt was 78% on RA and 94% on CPAP. EMS removed CPAP prior to arrival. Pt O2 67% on RA. RT at bedside and placed pt on BiPAP.

## 2021-10-03 NOTE — ED NOTES
ED nurse-to-nurse bedside report    Chief Complaint   Patient presents with    Concern For COVID-19    Shortness of Breath      LOC: alert and orientated to name, place, date  Vital signs   Vitals:    10/03/21 0501 10/03/21 0556 10/03/21 0659 10/03/21 0701   BP: 138/74 136/67  (!) 147/70   Pulse:  103  102   Resp:  21  13   Temp: 102.3 °F (39.1 °C)      TempSrc: Axillary      SpO2:  98%  95%   Weight:   230 lb (104.3 kg)       Pain:    Pain Interventions: n/a  Pain Goal: n/a  Oxygen: Yes    Current needs required BiPap   Telemetry: Yes  LDAs:   Peripheral IV 10/03/21 Left Antecubital (Active)   Site Assessment Clean;Dry; Intact 10/03/21 0557   Line Status Normal saline locked 10/03/21 0557   Dressing Status Clean;Dry; Intact 10/03/21 0557   Dressing Intervention New 10/03/21 0446     Continuous Infusions:    sodium chloride      lactated ringers      dextrose       Mobility: Requires assistance * 2  Nielson Fall Risk Score: No flowsheet data found.   Fall Interventions: side rails, call light in reach, in view of nurses station  Report given to: Tati North RN  10/03/21 7452

## 2021-10-03 NOTE — ED NOTES
ED nurse-to-nurse bedside report    Chief Complaint   Patient presents with    Concern For COVID-19    Shortness of Breath      LOC: alert and orientated to name and place  Vital signs   Vitals:    10/03/21 1357 10/03/21 1510 10/03/21 1708 10/03/21 1730   BP: (!) 146/81 (!) 176/88 (!) 156/68    Pulse: 78 74 82    Resp: 14 18 20 20   Temp:  98.7 °F (37.1 °C)     TempSrc:  Rectal     SpO2: 96% 97% 97%    Weight:       Height:          Pain:    Pain Interventions: none at this time  Pain Goal:   Oxygen: Yes, bipap    Current needs required none at this time. Telemetry: Yes  LDAs:   Peripheral IV 10/03/21 Left Antecubital (Active)   Site Assessment Clean;Dry; Intact 10/03/21 1043   Line Status Normal saline locked; Infusing 10/03/21 1043   Dressing Status Clean;Dry; Intact 10/03/21 1043   Dressing Intervention New 10/03/21 0446       Peripheral IV 10/03/21 Left Forearm (Active)   Site Assessment Clean;Dry; Intact 10/03/21 1358   Line Status Infusing 10/03/21 1358   Dressing Status Clean;Dry; Intact 10/03/21 1358   Dressing Intervention New 10/03/21 0854     Continuous Infusions:    sodium chloride      lactated ringers 125 mL/hr at 10/03/21 0929    dextrose       Mobility: Requires assistance * 2  Nielson Fall Risk Score: No flowsheet data found.   Fall Interventions: call light within reach, bed rails up  Report given to: Natacha Turcios RN  10/03/21 9450

## 2021-10-03 NOTE — ED NOTES
Patient resting in cot at this time and is difficult to arouse and diaphoretic. PO medications held due to patient being unable to stay awake. Glucose is 184. O2 saturation 88%, RT Leona at bedside and increased rate from 4 to 10 which improved saturations to 94%. Alford Osler, [place] notified via perfect serve. Order given to ABG's. RT Francia notified.       Jonas Pate RN  10/03/21 6611

## 2021-10-03 NOTE — ED PROVIDER NOTES
[facility]  eMERGENCY dEPARTMENT eNCOUnter          [address] [facility]       Chief Complaint   Patient presents with    Concern For COVID-19    Shortness of Breath       Nurses Notes reviewed and I agree except as noted in the HPI. HISTORY OF PRESENT ILLNESS    Rupinder Smith is a 74 y.o. male who presents via squad for shortness of breath. Apparently that had him on CPAP the in the squad. Patient has a history of COPD. He was former smoker. He also has a history of heart failure. Patient states he is not getting any water. He has been short of breath for at least 2 days. Tonight it worsened so he decided to call the squad to have him brought in. Of note his wife was just tested +2 days ago for COVID-19. Patient is febrile here. He is 78% on room air. Patient has mild conversational dyspnea. Patient's work of breathing did not appear terrible however his respiratory rate was in the 30s. Currently the patient is resting comfortably on the cot no apparent distress no other physical complaints at this time. REVIEW OF SYSTEMS     Review of Systems   Constitutional: Positive for fatigue and fever. Negative for activity change, appetite change, diaphoresis and unexpected weight change. HENT: Negative for congestion, ear discharge, ear pain, hearing loss, rhinorrhea, sinus pressure, sore throat, trouble swallowing and voice change. Eyes: Negative for photophobia, pain, discharge, redness and itching. Respiratory: Positive for cough, shortness of breath and wheezing. Negative for choking and chest tightness. Cardiovascular: Negative for chest pain, palpitations and leg swelling. Gastrointestinal: Negative for abdominal distention, abdominal pain, blood in stool, constipation, diarrhea, nausea and vomiting. Endocrine: Negative for polydipsia, polyphagia and polyuria.    Genitourinary: Negative for decreased urine volume, difficulty urinating, dysuria, enuresis, frequency, hematuria and urgency. Musculoskeletal: Negative for arthralgias, back pain, gait problem, myalgias, neck pain and neck stiffness. Skin: Negative for pallor and rash. Allergic/Immunologic: Negative for immunocompromised state. Neurological: Negative for dizziness, tremors, seizures, syncope, facial asymmetry, weakness, light-headedness, numbness and headaches. Hematological: Negative for adenopathy. Does not bruise/bleed easily. Psychiatric/Behavioral: Negative for agitation, hallucinations and suicidal ideas. The patient is not nervous/anxious. PAST MEDICAL HISTORY    has a past medical history of Arthritis, CAD (coronary artery disease), Diabetes mellitus ([facility] [address] Diverticulosis of colon, History of chest pain, History of tobacco abuse, Hyperlipidemia, Hypertension, and Pneumonia. SURGICAL HISTORY      has a past surgical history that includes colectomy (2001); transthoracic echocardiogram (10/22/2010); Diagnostic Cardiac Cath Lab Procedure (09/17/2010); Dilatation, esophagus; Appendectomy; Colonoscopy; and Cardiac surgery. CURRENT MEDICATIONS       Previous Medications    ACETAMINOPHEN (TYLENOL) 500 MG TABLET    Take 500 mg by mouth every 6 hours as needed for Pain    ALLOPURINOL (ZYLOPRIM) 300 MG TABLET    Take 300 mg by mouth every morning (before breakfast)    AMLODIPINE (NORVASC) 10 MG TABLET    Take 1 tablet by mouth daily    ASPIRIN 81 MG EC TABLET    Take 81 mg by mouth daily    CARVEDILOL (COREG) 25 MG TABLET    Take 1 tablet by mouth 2 times daily    FERROUS SULFATE 325 (65 FE) MG TABLET    One tab every 48 hr.  Take with vitamn C 500 mg    FUROSEMIDE (LASIX) 20 MG TABLET    Take 1 tablet by mouth 2 times daily    GABAPENTIN (NEURONTIN) 300 MG CAPSULE    Take 300 mg by mouth 2 times daily. Mack Schaefer     GLIMEPIRIDE (AMARYL) 2 MG TABLET    Take 2 mg by mouth 2 times daily     HYDRALAZINE (APRESOLINE) 100 MG TABLET    Take 1 tablet by mouth 3 times daily    INSULIN DETEMIR (LEVEMIR SC) Inject 20 Units into the skin nightly     METFORMIN (GLUCOPHAGE) 500 MG TABLET    Take 1,000 mg by mouth 2 times daily (with meals)    MISC NATURAL PRODUCTS (GLUCOSAMINE CHOND COMPLEX/MSM PO)    Take by mouth 2 times daily     POTASSIUM CHLORIDE (KLOR-CON M) 20 MEQ EXTENDED RELEASE TABLET    Take 10 mEq by mouth daily    SIMVASTATIN (ZOCOR) 20 MG TABLET    Take 1 tablet by mouth nightly    SPIRONOLACTONE (ALDACTONE) 25 MG TABLET    Take 1 tablet by mouth 2 times daily    SULFASALAZINE (AZULFIDINE) 500 MG TABLET    Take 500 mg by mouth 2 times daily     TELMISARTAN (MICARDIS) 80 MG TABLET    Take 80 mg by mouth daily    TERAZOSIN (HYTRIN) 5 MG CAPSULE    Take 5 mg by mouth daily    VITAMIN C (VITAMIN C) 500 MG TABLET    Take 1 tablet by mouth every 48 hours    VITAMIN D 25 MCG (1000 UT) CAPS    Take 3 capsules by mouth daily       ALLERGIES     is allergic to lorazepam.    FAMILY HISTORY     He indicated that his mother is . He indicated that his father is . He indicated that both of his sisters are alive. He indicated that his brother is alive. family history includes Diabetes in his father; High Cholesterol in his father; Hypertension in his father. SOCIAL HISTORY      reports that he quit smoking about 5 years ago. He has a 37.00 pack-year smoking history. He has never used smokeless tobacco. He reports that he does not drink alcohol and does not use drugs. PHYSICAL EXAM     INITIAL VITALS:  axillary temperature is 102.3 °F (39.1 °C). His blood pressure is 138/74 and his pulse is 124. His respiration is 19 and oxygen saturation is 97%. Physical Exam  Vitals and nursing note reviewed. Constitutional:       General: He is not in acute distress. Appearance: He is well-developed. He is not diaphoretic. HENT:      Head: Normocephalic and atraumatic.       Right Ear: External ear normal.      Left Ear: External ear normal.      Nose: Nose normal.   Eyes:      General: Lids are normal. No scleral icterus. Right eye: No discharge. Left eye: No discharge. Conjunctiva/sclera: Conjunctivae normal.      Right eye: No exudate. Left eye: No exudate. Pupils: Pupils are equal, round, and reactive to light. Neck:      Thyroid: No thyromegaly. Vascular: No JVD. Trachea: No tracheal deviation. Cardiovascular:      Rate and Rhythm: Regular rhythm. Tachycardia present. Pulses: Normal pulses. Carotid pulses are 2+ on the right side and 2+ on the left side. Radial pulses are 2+ on the right side and 2+ on the left side. Femoral pulses are 2+ on the right side and 2+ on the left side. Popliteal pulses are 2+ on the right side and 2+ on the left side. Dorsalis pedis pulses are 2+ on the right side and 2+ on the left side. Posterior tibial pulses are 2+ on the right side and 2+ on the left side. Heart sounds: Normal heart sounds, S1 normal and S2 normal. No murmur heard. No friction rub. No gallop. Pulmonary:      Effort: Pulmonary effort is normal. No respiratory distress. Breath sounds: No stridor. Examination of the right-upper field reveals wheezing. Examination of the left-upper field reveals wheezing. Examination of the right-middle field reveals wheezing. Examination of the left-middle field reveals wheezing. Examination of the right-lower field reveals decreased breath sounds. Examination of the left-lower field reveals decreased breath sounds. Decreased breath sounds and wheezing present. No rhonchi or rales. Chest:      Chest wall: No tenderness. Abdominal:      General: Bowel sounds are normal. There is no distension. Palpations: Abdomen is soft. There is no mass. Tenderness: There is no abdominal tenderness. There is no guarding or rebound. Musculoskeletal:         General: No tenderness. Normal range of motion. Cervical back: Normal range of motion and neck supple.  Normal range of motion. Right lower leg: No edema. Left lower leg: No edema. Lymphadenopathy:      Cervical: No cervical adenopathy. Skin:     General: Skin is warm and dry. Capillary Refill: Capillary refill takes less than 2 seconds. Findings: No bruising, ecchymosis, lesion or rash. Neurological:      General: No focal deficit present. Mental Status: He is alert and oriented to person, place, and time. Mental status is at baseline. Cranial Nerves: No cranial nerve deficit. Sensory: No sensory deficit. Motor: No weakness. Coordination: Coordination normal.      Gait: Gait normal.      Deep Tendon Reflexes: Reflexes are normal and symmetric. Reflexes normal.   Psychiatric:         Speech: Speech normal.         Behavior: Behavior normal.         Thought Content: Thought content normal.         Judgment: Judgment normal.           DIFFERENTIAL DIAGNOSIS:   COPD CHF pneumonia bronchitis COVID-19 influenza    DIAGNOSTIC RESULTS     EKG: All EKG's are interpreted by the Emergency Department Physician who either signs or Co-signs this chart in the absence of a cardiologist.  EKG showed sinus tachycardia left axis deviation ventricular rate of 122 MI interval of 136 QRS duration of 62 QT interval 296 QTC of 421    RADIOLOGY: non-plain film images(s) such as CT, Ultrasound and MRI are read by the radiologist.  XR CHEST PORTABLE   Final Result   Impression:   Bilateral pneumonia concerning for SARS-coronavirus-2 infection.       This document has been electronically signed by: Michael Landaverde MD on    10/03/2021 05:21 AM            LABS:   Labs Reviewed   COVID-19, RAPID - Abnormal; Notable for the following components:       Result Value    SARS-CoV-2, NAAT DETECTED (*)     All other components within normal limits   CBC WITH AUTO DIFFERENTIAL - Abnormal; Notable for the following components:    WBC 4.4 (*)     RBC 3.63 (*)     Hemoglobin 10.4 (*)     Hematocrit 34.0 (*)     MCHC 30.6 (*)     RDW-CV 14.8 (*)     RDW-SD 51.6 (*)     Lymphocytes Absolute 0.5 (*)     All other components within normal limits   BASIC METABOLIC PANEL - Abnormal; Notable for the following components:    Glucose 188 (*)     BUN 34 (*)     CREATININE 1.4 (*)     Calcium 8.3 (*)     All other components within normal limits   HEPATIC FUNCTION PANEL - Abnormal; Notable for the following components:    AST 88 (*)     All other components within normal limits   TROPONIN - Abnormal; Notable for the following components:    Troponin T 0.044 (*)     All other components within normal limits   BLOOD GAS, ARTERIAL - Abnormal; Notable for the following components:    PCO2 52 (*)     PO2 111 (*)     HCO3 30 (*)     Base Excess (Calculated) 4.0 (*)     All other components within normal limits   GLOMERULAR FILTRATION RATE, ESTIMATED - Abnormal; Notable for the following components:    Est, Glom Filt Rate 50 (*)     All other components within normal limits   RAPID INFLUENZA A/B ANTIGENS   BRAIN NATRIURETIC PEPTIDE   LIPASE   MAGNESIUM   ANION GAP   OSMOLALITY   BLOOD GAS, ARTERIAL       EMERGENCY DEPARTMENT COURSE:   Vitals:    Vitals:    10/03/21 0425 10/03/21 0430 10/03/21 0501   BP:   138/74   Pulse:  124    Resp:  19    Temp:   102.3 °F (39.1 °C)   TempSrc:   Axillary   SpO2: (!) 69% 97%      Patient was assessed at bedside appropriate labs and imaging were ordered. Patient was placed on BiPAP 20/10. Oxygenation came up quickly. ABG looks like he is compensated COPD. Here today I reviewed the patient's labs and imaging. He does not have a white blood cell count. Patient's troponin is in his normal ballpark. Patient's chest x-ray showed bilateral patchy infiltrates. He is positive for COVID-19. At this point I called the hospitalist group and discussed case with them. They graciously accepted the admission. Patient has been given 2 DuoNeb treatments here as well as steroids.   Patient is admitted in guarded condition pending further evaluation and treatment. CRITICAL CARE:   None    CONSULTS:  None    PROCEDURES:  None    FINAL IMPRESSION      1. Pneumonia due to COVID-19 virus    2. Acute respiratory failure with hypoxemia (HCC)          DISPOSITION/PLAN   Admission    PATIENT REFERRED TO:  No follow-up provider specified.     DISCHARGE MEDICATIONS:  New Prescriptions    No medications on file       (Please note that portions of this note were completed with a voice recognition program.  Efforts were made to edit the dictations but occasionally words are mis-transcribed.)    Mat Escobedo, [address],   10/03/21 1280

## 2021-10-03 NOTE — ED NOTES
Patient resting quietly in cot showing no signs of distress at this time. Updated on POC. Call light within reach. Will continue to monitor.       June King RN  10/03/21 9923

## 2021-10-03 NOTE — ED NOTES
Patient is resting in cot showing no signs of distress at this time. Updated on POC. Call light within reach. Will continue to monitor.       Star Laurent RN  10/03/21 6514

## 2021-10-03 NOTE — ED NOTES
ED to inpatient nurses report    Chief Complaint   Patient presents with    Concern For COVID-19    Shortness of Breath      Present to ED from home  LOC: alert and orientated to name and place  Vital signs   Vitals:    10/03/21 1357 10/03/21 1510 10/03/21 1708 10/03/21 1730   BP: (!) 146/81 (!) 176/88 (!) 156/68    Pulse: 78 74 82    Resp: 14 18 20 20   Temp:  98.7 °F (37.1 °C)     TempSrc:  Rectal     SpO2: 96% 97% 97%    Weight:       Height:          Oxygen Baseline on bipap, 93-98%    Current needs required none at this time   Bipap/Cpap Yes  LDAs:   Peripheral IV 10/03/21 Left Antecubital (Active)   Site Assessment Clean;Dry; Intact 10/03/21 1043   Line Status Normal saline locked; Infusing 10/03/21 1043   Dressing Status Clean;Dry; Intact 10/03/21 1043   Dressing Intervention New 10/03/21 0446       Peripheral IV 10/03/21 Left Forearm (Active)   Site Assessment Clean;Dry; Intact 10/03/21 1358   Line Status Infusing 10/03/21 1358   Dressing Status Clean;Dry; Intact 10/03/21 1358   Dressing Intervention New 10/03/21 0854     Mobility: Requires assistance * 2  Pending ED orders: none  Present condition: stable      Electronically signed by Negar Up RN on 10/3/2021 at 6:24 PM       Negar Up RN  10/03/21 4197

## 2021-10-03 NOTE — ED NOTES
Patient resting quietly in cot with eyes open at this time. Answering questions appropriately. Updated on POC. Medicated per MAR. Will continue to monitor.       Jadon Mittal RN  10/03/21 5356

## 2021-10-03 NOTE — PROGRESS NOTES
Patient was admitted early this a.m. (10/3) due to acute respiratory failure secondary to COVID-19. Upon evaluation, patient was lethargic and not following commands. Patient rarely responds to painful stimuli. ABG shows pH 7.34. pCO2 57. PO2 73. HCO3 31. Case discussed with ICU attending. Changed BiPAP settings and plan to do ABG in 3 hours to see next course of action. This provider spoke with wife, Ron aRsheed and she is agreeable to intubation if that becomes necessary. She has COVID as well. Plan also discussed with RN.      Electronically signed by DERICK Small on 10/3/2021 at 9:43 AM

## 2021-10-03 NOTE — FLOWSHEET NOTE
Consult from ED charge for Autumn lawrence support. Rodriguez Bradley is in the ED on BiPAP and is COVID +. Per chart, his wife is also OCVID+. Patient had not been very responsive for nursing staff. He opened his eyes when  entered room and was non-verbally engaged in the encounter. He accepted the offer of prayer.  explained how he will be be able to receive visitors through [address] [place] [address] and he was very receptive. Will contact patient's wife to explain the zoom process.

## 2021-10-03 NOTE — ED NOTES
Bed: 039A  Expected date:   Expected time:   Means of arrival:   Comments:  IP ed room 1263 [address], RN  10/03/21 6424

## 2021-10-03 NOTE — ED NOTES
ED to inpatient nurses report    Chief Complaint   Patient presents with    Concern For COVID-19    Shortness of Breath      Present to ED from nursing home  LOC: alert and orientated to name, place, date  Vital signs   Vitals:    10/03/21 0425 10/03/21 0430 10/03/21 0501 10/03/21 0556   BP:   138/74 136/67   Pulse:  124  103   Resp:  19  21   Temp:   102.3 °F (39.1 °C)    TempSrc:   Axillary    SpO2: (!) 69% 97%  98%      Oxygen Baseline RA    Current needs required BiPap   LDAs:   Peripheral IV 10/03/21 Left Antecubital (Active)   Site Assessment Clean;Dry; Intact 10/03/21 0557   Line Status Normal saline locked 10/03/21 0557   Dressing Status Clean;Dry; Intact 10/03/21 0557   Dressing Intervention New 10/03/21 0446     Mobility: Requires assistance * 2  Pending ED orders: none  Present condition: stable, awake, resting on cot    Electronically signed by João Gates RN on 10/3/2021 at 6:09 AM       João Gates RN  10/03/21 5540

## 2021-10-04 LAB
ALBUMIN SERPL-MCNC: 3.2 G/DL (ref 3.5–5.1)
ALP BLD-CCNC: 50 U/L (ref 38–126)
ALT SERPL-CCNC: 26 U/L (ref 11–66)
ANION GAP SERPL CALCULATED.3IONS-SCNC: 9 MEQ/L (ref 8–16)
AST SERPL-CCNC: 74 U/L (ref 5–40)
BASOPHILS # BLD: 0 %
BASOPHILS ABSOLUTE: 0 THOU/MM3 (ref 0–0.1)
BILIRUB SERPL-MCNC: 0.2 MG/DL (ref 0.3–1.2)
BUN BLDV-MCNC: 38 MG/DL (ref 7–22)
C-REACTIVE PROTEIN: 15.44 MG/DL (ref 0–1)
CALCIUM SERPL-MCNC: 8 MG/DL (ref 8.5–10.5)
CHLORIDE BLD-SCNC: 102 MEQ/L (ref 98–111)
CO2: 31 MEQ/L (ref 23–33)
CREAT SERPL-MCNC: 1.5 MG/DL (ref 0.4–1.2)
D-DIMER QUANTITATIVE: 559 NG/ML FEU (ref 0–500)
EKG ATRIAL RATE: 122 BPM
EKG P AXIS: 43 DEGREES
EKG P-R INTERVAL: 136 MS
EKG Q-T INTERVAL: 296 MS
EKG QRS DURATION: 62 MS
EKG QTC CALCULATION (BAZETT): 421 MS
EKG R AXIS: -3 DEGREES
EKG T AXIS: 29 DEGREES
EKG VENTRICULAR RATE: 122 BPM
EOSINOPHIL # BLD: 0 %
EOSINOPHILS ABSOLUTE: 0 THOU/MM3 (ref 0–0.4)
ERYTHROCYTE [DISTWIDTH] IN BLOOD BY AUTOMATED COUNT: 14.8 % (ref 11.5–14.5)
ERYTHROCYTE [DISTWIDTH] IN BLOOD BY AUTOMATED COUNT: 49.7 FL (ref 35–45)
FERRITIN: 219 NG/ML (ref 22–322)
FIBRINOGEN: 542 MG/100ML (ref 155–475)
GFR SERPL CREATININE-BSD FRML MDRD: 47 ML/MIN/1.73M2
GLUCOSE BLD-MCNC: 123 MG/DL (ref 70–108)
GLUCOSE BLD-MCNC: 123 MG/DL (ref 70–108)
GLUCOSE BLD-MCNC: 224 MG/DL (ref 70–108)
GLUCOSE BLD-MCNC: 340 MG/DL (ref 70–108)
GLUCOSE BLD-MCNC: 350 MG/DL (ref 70–108)
HCT VFR BLD CALC: 33.2 % (ref 42–52)
HEMOGLOBIN: 10.3 GM/DL (ref 14–18)
IMMATURE GRANS (ABS): 0.02 THOU/MM3 (ref 0–0.07)
IMMATURE GRANULOCYTES: 0.9 %
LACTIC ACID: 1.2 MMOL/L (ref 0.5–2)
LD: 471 U/L (ref 100–190)
LYMPHOCYTES # BLD: 22 %
LYMPHOCYTES ABSOLUTE: 0.5 THOU/MM3 (ref 1–4.8)
MCH RBC QN AUTO: 28.5 PG (ref 26–33)
MCHC RBC AUTO-ENTMCNC: 31 GM/DL (ref 32.2–35.5)
MCV RBC AUTO: 91.7 FL (ref 80–94)
MONOCYTES # BLD: 16.8 %
MONOCYTES ABSOLUTE: 0.4 THOU/MM3 (ref 0.4–1.3)
NUCLEATED RED BLOOD CELLS: 0 /100 WBC
OSMOLALITY CALCULATION: 293.5 MOSMOL/KG (ref 275–300)
PLATELET # BLD: 153 THOU/MM3 (ref 130–400)
PMV BLD AUTO: 10.9 FL (ref 9.4–12.4)
POTASSIUM REFLEX MAGNESIUM: 4.1 MEQ/L (ref 3.5–5.2)
RBC # BLD: 3.62 MILL/MM3 (ref 4.7–6.1)
SEG NEUTROPHILS: 60.3 %
SEGMENTED NEUTROPHILS ABSOLUTE COUNT: 1.3 THOU/MM3 (ref 1.8–7.7)
SODIUM BLD-SCNC: 142 MEQ/L (ref 135–145)
TOTAL PROTEIN: 5.7 G/DL (ref 6.1–8)
WBC # BLD: 2.1 THOU/MM3 (ref 4.8–10.8)

## 2021-10-04 PROCEDURE — 6360000002 HC RX W HCPCS: Performed by: STUDENT IN AN ORGANIZED HEALTH CARE EDUCATION/TRAINING PROGRAM

## 2021-10-04 PROCEDURE — 85025 COMPLETE CBC W/AUTO DIFF WBC: CPT

## 2021-10-04 PROCEDURE — 2060000000 HC ICU INTERMEDIATE R&B

## 2021-10-04 PROCEDURE — 82728 ASSAY OF FERRITIN: CPT

## 2021-10-04 PROCEDURE — 85385 FIBRINOGEN ANTIGEN: CPT

## 2021-10-04 PROCEDURE — 99233 SBSQ HOSP IP/OBS HIGH 50: CPT | Performed by: INTERNAL MEDICINE

## 2021-10-04 PROCEDURE — 85379 FIBRIN DEGRADATION QUANT: CPT

## 2021-10-04 PROCEDURE — 94660 CPAP INITIATION&MGMT: CPT

## 2021-10-04 PROCEDURE — 83615 LACTATE (LD) (LDH) ENZYME: CPT

## 2021-10-04 PROCEDURE — 6370000000 HC RX 637 (ALT 250 FOR IP): Performed by: STUDENT IN AN ORGANIZED HEALTH CARE EDUCATION/TRAINING PROGRAM

## 2021-10-04 PROCEDURE — 6360000002 HC RX W HCPCS: Performed by: PHYSICIAN ASSISTANT

## 2021-10-04 PROCEDURE — 94640 AIRWAY INHALATION TREATMENT: CPT

## 2021-10-04 PROCEDURE — 86140 C-REACTIVE PROTEIN: CPT

## 2021-10-04 PROCEDURE — 80053 COMPREHEN METABOLIC PANEL: CPT

## 2021-10-04 PROCEDURE — 6370000000 HC RX 637 (ALT 250 FOR IP): Performed by: INTERNAL MEDICINE

## 2021-10-04 PROCEDURE — 93010 ELECTROCARDIOGRAM REPORT: CPT | Performed by: INTERNAL MEDICINE

## 2021-10-04 PROCEDURE — 97530 THERAPEUTIC ACTIVITIES: CPT

## 2021-10-04 PROCEDURE — 2580000003 HC RX 258: Performed by: PHYSICIAN ASSISTANT

## 2021-10-04 PROCEDURE — 36415 COLL VENOUS BLD VENIPUNCTURE: CPT

## 2021-10-04 PROCEDURE — 82948 REAGENT STRIP/BLOOD GLUCOSE: CPT

## 2021-10-04 PROCEDURE — 94761 N-INVAS EAR/PLS OXIMETRY MLT: CPT

## 2021-10-04 PROCEDURE — 97163 PT EVAL HIGH COMPLEX 45 MIN: CPT

## 2021-10-04 PROCEDURE — 83605 ASSAY OF LACTIC ACID: CPT

## 2021-10-04 PROCEDURE — 2580000003 HC RX 258: Performed by: STUDENT IN AN ORGANIZED HEALTH CARE EDUCATION/TRAINING PROGRAM

## 2021-10-04 PROCEDURE — 2500000003 HC RX 250 WO HCPCS: Performed by: STUDENT IN AN ORGANIZED HEALTH CARE EDUCATION/TRAINING PROGRAM

## 2021-10-04 RX ORDER — ALBUTEROL SULFATE 90 UG/1
2 AEROSOL, METERED RESPIRATORY (INHALATION)
Status: DISCONTINUED | OUTPATIENT
Start: 2021-10-04 | End: 2021-10-10

## 2021-10-04 RX ADMIN — Medication 50 MG: at 08:47

## 2021-10-04 RX ADMIN — ATORVASTATIN CALCIUM 10 MG: 10 TABLET, FILM COATED ORAL at 08:47

## 2021-10-04 RX ADMIN — SODIUM CHLORIDE, PRESERVATIVE FREE 10 ML: 5 INJECTION INTRAVENOUS at 17:47

## 2021-10-04 RX ADMIN — FUROSEMIDE 40 MG: 10 INJECTION, SOLUTION INTRAMUSCULAR; INTRAVENOUS at 08:48

## 2021-10-04 RX ADMIN — GLIPIZIDE 5 MG: 5 TABLET ORAL at 17:47

## 2021-10-04 RX ADMIN — AMLODIPINE BESYLATE 10 MG: 10 TABLET ORAL at 08:48

## 2021-10-04 RX ADMIN — CARVEDILOL 25 MG: 25 TABLET, FILM COATED ORAL at 08:48

## 2021-10-04 RX ADMIN — IPRATROPIUM BROMIDE 2 PUFF: 17 AEROSOL, METERED RESPIRATORY (INHALATION) at 18:11

## 2021-10-04 RX ADMIN — INSULIN LISPRO 4 UNITS: 100 INJECTION, SOLUTION INTRAVENOUS; SUBCUTANEOUS at 12:57

## 2021-10-04 RX ADMIN — SODIUM CHLORIDE, PRESERVATIVE FREE 10 ML: 5 INJECTION INTRAVENOUS at 09:36

## 2021-10-04 RX ADMIN — SULFASALAZINE 500 MG: 500 TABLET ORAL at 20:36

## 2021-10-04 RX ADMIN — SPIRONOLACTONE 25 MG: 25 TABLET ORAL at 08:47

## 2021-10-04 RX ADMIN — ALBUTEROL SULFATE 2 PUFF: 90 AEROSOL, METERED RESPIRATORY (INHALATION) at 18:11

## 2021-10-04 RX ADMIN — GLIPIZIDE 5 MG: 5 TABLET ORAL at 08:58

## 2021-10-04 RX ADMIN — ASPIRIN 81 MG: 81 TABLET, FILM COATED ORAL at 08:46

## 2021-10-04 RX ADMIN — ENOXAPARIN SODIUM 30 MG: 30 INJECTION SUBCUTANEOUS at 20:38

## 2021-10-04 RX ADMIN — HYDRALAZINE HYDROCHLORIDE 100 MG: 50 TABLET ORAL at 20:36

## 2021-10-04 RX ADMIN — OXYCODONE HYDROCHLORIDE AND ACETAMINOPHEN 500 MG: 500 TABLET ORAL at 08:46

## 2021-10-04 RX ADMIN — ALLOPURINOL 300 MG: 300 TABLET ORAL at 08:48

## 2021-10-04 RX ADMIN — INSULIN GLARGINE 10 UNITS: 100 INJECTION, SOLUTION SUBCUTANEOUS at 20:37

## 2021-10-04 RX ADMIN — CARVEDILOL 25 MG: 25 TABLET, FILM COATED ORAL at 20:36

## 2021-10-04 RX ADMIN — INSULIN LISPRO 8 UNITS: 100 INJECTION, SOLUTION INTRAVENOUS; SUBCUTANEOUS at 18:04

## 2021-10-04 RX ADMIN — GABAPENTIN 300 MG: 300 CAPSULE ORAL at 20:36

## 2021-10-04 RX ADMIN — POTASSIUM CHLORIDE 20 MEQ: 1500 TABLET, EXTENDED RELEASE ORAL at 08:46

## 2021-10-04 RX ADMIN — CEFTRIAXONE SODIUM 1000 MG: 1 INJECTION, POWDER, FOR SOLUTION INTRAMUSCULAR; INTRAVENOUS at 09:12

## 2021-10-04 RX ADMIN — DOXAZOSIN 4 MG: 4 TABLET ORAL at 08:47

## 2021-10-04 RX ADMIN — DEXAMETHASONE SODIUM PHOSPHATE 20 MG: 4 INJECTION, SOLUTION INTRA-ARTICULAR; INTRALESIONAL; INTRAMUSCULAR; INTRAVENOUS; SOFT TISSUE at 09:31

## 2021-10-04 RX ADMIN — Medication 6000 UNITS: at 08:46

## 2021-10-04 RX ADMIN — SPIRONOLACTONE 25 MG: 25 TABLET ORAL at 17:47

## 2021-10-04 RX ADMIN — LOSARTAN POTASSIUM 100 MG: 100 TABLET, FILM COATED ORAL at 08:47

## 2021-10-04 RX ADMIN — INSULIN LISPRO 5 UNITS: 100 INJECTION, SOLUTION INTRAVENOUS; SUBCUTANEOUS at 20:36

## 2021-10-04 RX ADMIN — IPRATROPIUM BROMIDE AND ALBUTEROL SULFATE 1 AMPULE: .5; 3 SOLUTION RESPIRATORY (INHALATION) at 14:28

## 2021-10-04 RX ADMIN — FUROSEMIDE 40 MG: 10 INJECTION, SOLUTION INTRAMUSCULAR; INTRAVENOUS at 17:47

## 2021-10-04 RX ADMIN — REMDESIVIR 100 MG: 100 INJECTION, POWDER, LYOPHILIZED, FOR SOLUTION INTRAVENOUS at 09:06

## 2021-10-04 RX ADMIN — ENOXAPARIN SODIUM 30 MG: 30 INJECTION SUBCUTANEOUS at 08:46

## 2021-10-04 RX ADMIN — GABAPENTIN 300 MG: 300 CAPSULE ORAL at 08:47

## 2021-10-04 RX ADMIN — SODIUM CHLORIDE, PRESERVATIVE FREE 10 ML: 5 INJECTION INTRAVENOUS at 20:36

## 2021-10-04 RX ADMIN — SULFASALAZINE 500 MG: 500 TABLET ORAL at 08:47

## 2021-10-04 RX ADMIN — HYDRALAZINE HYDROCHLORIDE 100 MG: 50 TABLET ORAL at 13:05

## 2021-10-04 RX ADMIN — HYDRALAZINE HYDROCHLORIDE 100 MG: 50 TABLET ORAL at 08:47

## 2021-10-04 RX ADMIN — SODIUM CHLORIDE, PRESERVATIVE FREE 10 ML: 5 INJECTION INTRAVENOUS at 08:48

## 2021-10-04 RX ADMIN — HYDRALAZINE HYDROCHLORIDE 100 MG: 50 TABLET ORAL at 00:00

## 2021-10-04 ASSESSMENT — PAIN SCALES - GENERAL
PAINLEVEL_OUTOF10: 0

## 2021-10-04 NOTE — PROGRESS NOTES
[facility]  INPATIENT PHYSICAL THERAPY  EVALUATION  [facility]    Time In: 9113  Time Out: 1004  Timed Code Treatment Minutes: 8 Minutes  Minutes: 18          Date: 10/4/2021  Patient Name: Todd Crockett,  Gender:  male        MRN: [id number]  : 1955  (74 y.o.)      Referring Practitioner: Dr. Patricia Golden  Diagnosis: acute respiratory failure with hypoxemia  Additional Pertinent Hx: admit with above diagnosis, COVID-19 pneumonia with sepsis, DM, CHF     Restrictions/Precautions:  Restrictions/Precautions: General Precautions, Fall Risk    Subjective:  Chart Reviewed: Yes  Patient assessed for rehabilitation services?: Yes  Subjective: cooperative    General:    Hearing: Within functional limits         Pain: no c/o pain    Vitals: Oxygen: on 15L O2 through nonrebreather, pt didn't have mask over nose initially with O2 sats at 88%, once mask on correctly O2 sats >90%, PT educated pt on proper wear of mask    Social/Functional History:    Lives With: Spouse  Type of Home: House  Home Layout: One level  Home Access: Stairs to enter with rails  Entrance Stairs - Number of Steps: 2  Home Equipment:  (no AD PTA,)                   Ambulation Assistance: Independent  Transfer Assistance: Independent               OBJECTIVE:  Range of Motion:  Bilateral Lower Extremity: WFL    Strength:  Bilateral Lower Extremity: WFL, generalized weakness    Balance:  Static Sitting Balance:  Stand By Assistance  Static Standing Balance: Minimal Assistance, to CGA, no UE support    Bed Mobility:  Rolling to Right: Minimal Assistance, X 1   Supine to Sit: Minimal Assistance, X 1  Sit to Supine: Contact Guard Assistance, X 1   Scooting: Contact Guard Assistance, X 1  HOB up 30 degrees  Transfers:  Sit to Stand: Minimal Assistance, X 1  Stand to Sit:Contact [employer], X 1    Ambulation:  CGAx1  Distance: 4 steps  Surface: Level Tile  Device:No Device  Gait Deviations:  Slow Tonya, Unsteady Gait and fatigue quickly        Functional Outcome Measures: Completed  AM-PAC Inpatient Mobility Raw Score : 17  AM-PAC Inpatient T-Scale Score : 42.13    ASSESSMENT:  Activity Tolerance:  Patient tolerance of  treatment: fair. Treatment Initiated: Treatment and education initiated within context of evaluation. Evaluation time included review of current medical information, gathering information related to past medical, social and functional history, completion of standardized testing, formal and informal observation of tasks, assessment of data and development of plan of care and goals. Treatment time included skilled education and facilitation of tasks to increase safety and independence with functional mobility for improved independence and quality of life. Assessment: Body structures, Functions, Activity limitations: Decreased functional mobility , Decreased endurance, Decreased strength, Decreased balance  Assessment: pt on 15L O2 through nonrebreather, +COVID-19, generalized weakness, deconditioning, dec balance, inc assist for safe mobility, recommend cont PT to inc pt I with functional mobility  Prognosis: Good    REQUIRES PT FOLLOW UP: Yes    Discharge Recommendations:  Discharge Recommendations: Continue to assess pending progress    Patient Education:  PT Education: Goals, PT Role, Plan of Care, Functional Mobility Training    Equipment Recommendations:   Other: cont to assess needs    Plan:  Times per week: 5X GM  Times per day: Daily  Specific instructions for Next Treatment: therex and mobility    Goals:  Patient goals : not stated  Short term goals  Time Frame for Short term goals: by discharge  Short term goal 1: bed mobility with S to get in/out of bed  Short term goal 2: transfer with S to get in/out of chairs  Short term goal 3: amb >100'x1 with/without AD and S, maintaining O2 sats >90% on </=4L O2, to walk safely in home  Short term goal 4: negotiate 2 steps with HR and SBA to enter home safely  Long term goals  Time Frame for Long term goals : no LTGs set secondary to short ELOS    Following session, patient left in safe position with all fall risk precautions in place.

## 2021-10-04 NOTE — PROGRESS NOTES
[facility]    Progress Note    10/4/2021    3:55 PM    Name:   Cathi Boast  MRN:     [id number]     Jaspreet Flowers:      [de-identified]   Room:   [address] [place] [address] Day:  1  Admit Date:  10/3/2021  4:25 AM    PCP:   FELI Mccallum CNP  Code Status:  Full Code    Subjective:     C/C:   Chief Complaint   Patient presents with    Concern For COVID-19    Shortness of Breath     Interval History Status: Unchanged. Patient was seen and evaluated at bedside this afternoon. Patient reports feeling about the same at this time. He still complains of shortness of breath. Brief History:     57-year-old male with history of hypertension, CAD, diabetes, hyperlipidemia, CHF who came in with complaints of worsening shortness of breath. Patient was diagnosed with COVID-19 infection. Review of Systems:     Constitutional:  negative for chills, fevers, sweats  Respiratory:  negative for cough, dyspnea on exertion, shortness of breath, wheezing  Cardiovascular:  negative for chest pain, chest pressure/discomfort, lower extremity edema, palpitations  Gastrointestinal:  negative for abdominal pain, constipation, diarrhea, nausea, vomiting  Neurological:  negative for dizziness, headache    Medications: Allergies:     Allergies   Allergen Reactions    Lorazepam Other (See Comments)     Light headed, dizzy, blurred vision       Current Meds:   Scheduled Meds:    allopurinol  300 mg Oral QAM AC    amLODIPine  10 mg Oral Daily    aspirin  81 mg Oral Daily    carvedilol  25 mg Oral BID    furosemide  40 mg IntraVENous BID    gabapentin  300 mg Oral BID    glipiZIDE  5 mg Oral BID AC    hydrALAZINE  100 mg Oral TID    potassium chloride  20 mEq Oral Daily    atorvastatin  10 mg Oral Daily    spironolactone  25 mg Oral BID    sulfaSALAzine  500 mg Oral BID    losartan  100 mg Oral Daily    doxazosin  4 mg Oral Daily    remdesivir IVPB  100 mg IntraVENous Q24H  enoxaparin  30 mg SubCUTAneous BID    Vitamin D  6,000 Units Oral Daily    Followed by   Bijan Gamez ON 10/10/2021] Vitamin D  2,000 Units Oral Daily    ascorbic acid  500 mg Oral Daily    zinc sulfate  50 mg Oral Daily    sodium chloride flush  5-40 mL IntraVENous 2 times per day    cefTRIAXone (ROCEPHIN) IV  1,000 mg IntraVENous Q24H    insulin lispro  0-12 Units SubCUTAneous TID WC    insulin lispro  0-6 Units SubCUTAneous Nightly    insulin glargine  10 Units SubCUTAneous Nightly    ipratropium-albuterol  1 ampule Inhalation Q4H WA    dexamethasone  20 mg IntraVENous Q24H    Followed by   Bijan Sitter ON 10/8/2021] dexamethasone  10 mg IntraVENous Q24H     Continuous Infusions:    sodium chloride      dextrose       PRN Meds: sodium chloride, acetaminophen **OR** acetaminophen, sodium chloride flush, sodium chloride, ondansetron **OR** ondansetron, polyethylene glycol, glucose, dextrose, glucagon (rDNA), dextrose    Data:     Past Medical History:   has a past medical history of Arthritis, CAD (coronary artery disease), CHF (congestive heart failure) ([facility] Diabetes mellitus ([facility] [address] Diverticulosis of colon, History of chest pain, History of tobacco abuse, Hyperlipidemia, Hypertension, and Pneumonia. Social History:   reports that he quit smoking about 5 years ago. He has a 37.00 pack-year smoking history. He has never used smokeless tobacco. He reports that he does not drink alcohol and does not use drugs.      Family History:   Family History   Problem Relation Age of Onset    Hypertension Father     High Cholesterol Father     Diabetes Father        Vitals:  /65   Pulse 74   Temp 98.3 °F (36.8 °C) (Axillary)   Resp 20   Ht 5' 8\" (1.727 m)   Wt 234 lb 11.2 oz (106.5 kg)   SpO2 92%   BMI 35.69 kg/m²   Temp (24hrs), Av.2 °F (36.8 °C), Min:97.8 °F (36.6 °C), Max:98.8 °F (37.1 °C)    Recent Labs     10/03/21  1701 10/03/21  2139 10/04/21  0840 10/04/21  1230   POCGLU 193* 154* 123* 224*       I/O (24Hr): Intake/Output Summary (Last 24 hours) at 10/4/2021 1555  Last data filed at 10/4/2021 1322  Gross per 24 hour   Intake 2885.79 ml   Output --   Net 2885.79 ml       Labs:  Hematology:  Recent Labs     10/03/21  0450 10/04/21  0443   WBC 4.4* 2.1*   RBC 3.63* 3.62*   HGB 10.4* 10.3*   HCT 34.0* 33.2*   MCV 93.7 91.7   MCH 28.7 28.5   MCHC 30.6* 31.0*    153   MPV 10.8 10.9   CRP 18.98* 15.44*   DDIMER  --  559.00*     Chemistry:  Recent Labs     10/03/21  0450 10/04/21  0443    142   K 4.4 4.1   CL 98 102   CO2 30 31   GLUCOSE 188* 123*   BUN 34* 38*   CREATININE 1.4* 1.5*   MG 1.8  --    ANIONGAP 9.0 9.0   LABGLOM 50* 47*   CALCIUM 8.3* 8.0*   PROBNP 330.3  --      Recent Labs     10/03/21  0450 10/03/21  0803 10/03/21  1701 10/03/21  2139 10/04/21  0443 10/04/21  0840 10/04/21  1230   PROT 6.8  --   --   --  5.7*  --   --    LABALBU 3.8  --   --   --  3.2*  --   --    LABA1C 5.7  --   --   --   --   --   --    AST 88*  --   --   --  74*  --   --    ALT 26  --   --   --  26  --   --    LDH  --   --   --   --  471*  --   --    ALKPHOS 56  --   --   --  50  --   --    BILITOT 0.3  --   --   --  0.2*  --   --    BILIDIR <0.2  --   --   --   --   --   --    LIPASE 46.7  --   --   --   --   --   --    POCGLU  --  184* 193* 154*  --  123* 224*     ABG:  Lab Results   Component Value Date    PH 7.36 10/03/2021    PCO2 52 10/03/2021    PO2 156 10/03/2021    HCO3 30 10/03/2021    O2SAT 99 10/03/2021     No results found for: SPECIAL  No results found for: CULTURE    Radiology:  XR CHEST PORTABLE    Result Date: 10/3/2021  Impression: Bilateral pneumonia concerning for SARS-coronavirus-2 infection.  This document has been electronically signed by: Rey Soriano MD on 10/03/2021 05:21 AM      Physical Examination:        General appearance:  alert, cooperative and no distress  Mental Status:  oriented to person, place and time and normal affect  Lungs: Diminished air sounds bilaterally. Heart:  regular rate and rhythm, no murmur  Abdomen:  soft, nontender, nondistended, normal bowel sounds, no masses, hepatomegaly, splenomegaly  Extremities:  no edema, redness, tenderness in the calves  Skin:  no gross lesions, rashes, induration    Assessment:        Hospital Problems         Last Modified POA    COVID-19 10/3/2021 Yes          Plan:        Acute hypoxic respiratory failure  -Secondary to COVID-19 pneumonia  -CRP elevated. -Continue Rocephin, Decadron, Lasix, remdesivir, spironolactone    Hypertension  -Continue amlodipine, Coreg, hydralazine, doxazosin, losartan    Hyperlipidemia-continue Lipitor    HALEIGH  -Creatinine baseline 1.09.     Diabetes  -Blood sugar elevated  224 this morning    Leukopenia  Anemia    Gout  -Continue allopurinol    Kathy Art MD  10/4/2021  3:55 PM

## 2021-10-04 NOTE — PLAN OF CARE
[facility]  OCCUPATIONAL THERAPY MISSED TREATMENT NOTE  [facility] CVICU 4B  4B-12/012-A      Date: 10/4/2021  Patient Name: Lisy Ruelas        CSN: [id number]   : 1955  (74 y.o.)  Gender: male                REASON FOR MISSED TREATMENT:  ATTN x 1- pt was on non re-breather earlier today. Pt is on CPAP and wearing the mask, per nursing. Will retry tomorrow.

## 2021-10-04 NOTE — CARE COORDINATION
10/4/21, 7:35 AM EDT  DISCHARGE PLANNING EVALUATION:    Evan Miranda       Admitted: 10/3/2021/ [facility] day: 1   Location: [address] [place] [address]- Reason for admit: Acute respiratory failure with hypoxemia ([facility] [address] [J96.01]  Pneumonia due to COVID-19 virus [U07.1, J12.82]  COVID-19 [U07.1]   PMH:  has a past medical history of Arthritis, CAD (coronary artery disease), Diabetes mellitus ([facility] [address] Diverticulosis of colon, History of chest pain, History of tobacco abuse, Hyperlipidemia, Hypertension, and Pneumonia. Procedure:   10/4 CXR    Impression:       Impression:   Bilateral pneumonia concerning for SARS-coronavirus-2 infection. Barriers to Discharge:  From ED,  Covid (+), creatinine 1.5, Tmax 102.3, BiPAP 80% FiO2, with saturations at 97%. PT/OT, diabetes management, telemetry, Vitamin C,D,zinc, IV Rocephin, Asa, IV Decadron, Lovenox, Lasix, Duonebs, IV Remdesivir. PCP: FELI Pñea CNP  Readmission Risk Score: 22%    Patient Goals/Plan/Treatment Preferences: Mulu Nettles currently lives at home with his wife. He has a CPAP. Plan is to return at discharge. Will follow for potential needs or services. Transportation/Food Security/Housekeeping Addressed:  No issues identified.

## 2021-10-04 NOTE — PROGRESS NOTES
I called patient wife Harjeet Art and gave her update on patient care. No questions or concerns at this time.

## 2021-10-05 LAB
ANION GAP SERPL CALCULATED.3IONS-SCNC: 8 MEQ/L (ref 8–16)
BASOPHILS # BLD: 0 %
BASOPHILS ABSOLUTE: 0 THOU/MM3 (ref 0–0.1)
BUN BLDV-MCNC: 46 MG/DL (ref 7–22)
C-REACTIVE PROTEIN: 8.91 MG/DL (ref 0–1)
CALCIUM SERPL-MCNC: 8.4 MG/DL (ref 8.5–10.5)
CHLORIDE BLD-SCNC: 100 MEQ/L (ref 98–111)
CO2: 35 MEQ/L (ref 23–33)
CREAT SERPL-MCNC: 1.6 MG/DL (ref 0.4–1.2)
D-DIMER QUANTITATIVE: 609 NG/ML FEU (ref 0–500)
EOSINOPHIL # BLD: 0 %
EOSINOPHILS ABSOLUTE: 0 THOU/MM3 (ref 0–0.4)
ERYTHROCYTE [DISTWIDTH] IN BLOOD BY AUTOMATED COUNT: 14.8 % (ref 11.5–14.5)
ERYTHROCYTE [DISTWIDTH] IN BLOOD BY AUTOMATED COUNT: 50.9 FL (ref 35–45)
GFR SERPL CREATININE-BSD FRML MDRD: 43 ML/MIN/1.73M2
GLUCOSE BLD-MCNC: 153 MG/DL (ref 70–108)
GLUCOSE BLD-MCNC: 199 MG/DL (ref 70–108)
GLUCOSE BLD-MCNC: 246 MG/DL (ref 70–108)
GLUCOSE BLD-MCNC: 257 MG/DL (ref 70–108)
GLUCOSE BLD-MCNC: 304 MG/DL (ref 70–108)
HCT VFR BLD CALC: 38.4 % (ref 42–52)
HEMOGLOBIN: 11.4 GM/DL (ref 14–18)
IMMATURE GRANS (ABS): 0.01 THOU/MM3 (ref 0–0.07)
IMMATURE GRANULOCYTES: 0.4 %
LEGIONELLA PNEUMOPHILIA AG, URINE: NEGATIVE
LYMPHOCYTES # BLD: 15.2 %
LYMPHOCYTES ABSOLUTE: 0.4 THOU/MM3 (ref 1–4.8)
MCH RBC QN AUTO: 27.7 PG (ref 26–33)
MCHC RBC AUTO-ENTMCNC: 29.7 GM/DL (ref 32.2–35.5)
MCV RBC AUTO: 93.2 FL (ref 80–94)
MONOCYTES # BLD: 15.2 %
MONOCYTES ABSOLUTE: 0.4 THOU/MM3 (ref 0.4–1.3)
NUCLEATED RED BLOOD CELLS: 0 /100 WBC
PLATELET # BLD: 168 THOU/MM3 (ref 130–400)
PMV BLD AUTO: 10.9 FL (ref 9.4–12.4)
POTASSIUM SERPL-SCNC: 3.8 MEQ/L (ref 3.5–5.2)
RBC # BLD: 4.12 MILL/MM3 (ref 4.7–6.1)
SEG NEUTROPHILS: 69.2 %
SEGMENTED NEUTROPHILS ABSOLUTE COUNT: 1.7 THOU/MM3 (ref 1.8–7.7)
SODIUM BLD-SCNC: 143 MEQ/L (ref 135–145)
STREP PNEUMO AG, UR: NEGATIVE
WBC # BLD: 2.4 THOU/MM3 (ref 4.8–10.8)

## 2021-10-05 PROCEDURE — 6370000000 HC RX 637 (ALT 250 FOR IP): Performed by: INTERNAL MEDICINE

## 2021-10-05 PROCEDURE — 94761 N-INVAS EAR/PLS OXIMETRY MLT: CPT

## 2021-10-05 PROCEDURE — 2700000000 HC OXYGEN THERAPY PER DAY

## 2021-10-05 PROCEDURE — 94640 AIRWAY INHALATION TREATMENT: CPT

## 2021-10-05 PROCEDURE — 85379 FIBRIN DEGRADATION QUANT: CPT

## 2021-10-05 PROCEDURE — 97110 THERAPEUTIC EXERCISES: CPT

## 2021-10-05 PROCEDURE — 80048 BASIC METABOLIC PNL TOTAL CA: CPT

## 2021-10-05 PROCEDURE — 36415 COLL VENOUS BLD VENIPUNCTURE: CPT

## 2021-10-05 PROCEDURE — 6360000002 HC RX W HCPCS: Performed by: STUDENT IN AN ORGANIZED HEALTH CARE EDUCATION/TRAINING PROGRAM

## 2021-10-05 PROCEDURE — 2500000003 HC RX 250 WO HCPCS: Performed by: STUDENT IN AN ORGANIZED HEALTH CARE EDUCATION/TRAINING PROGRAM

## 2021-10-05 PROCEDURE — 86140 C-REACTIVE PROTEIN: CPT

## 2021-10-05 PROCEDURE — 94660 CPAP INITIATION&MGMT: CPT

## 2021-10-05 PROCEDURE — 97116 GAIT TRAINING THERAPY: CPT

## 2021-10-05 PROCEDURE — 97530 THERAPEUTIC ACTIVITIES: CPT

## 2021-10-05 PROCEDURE — 2060000000 HC ICU INTERMEDIATE R&B

## 2021-10-05 PROCEDURE — 85025 COMPLETE CBC W/AUTO DIFF WBC: CPT

## 2021-10-05 PROCEDURE — 6360000002 HC RX W HCPCS: Performed by: PHYSICIAN ASSISTANT

## 2021-10-05 PROCEDURE — 6370000000 HC RX 637 (ALT 250 FOR IP): Performed by: STUDENT IN AN ORGANIZED HEALTH CARE EDUCATION/TRAINING PROGRAM

## 2021-10-05 PROCEDURE — 99233 SBSQ HOSP IP/OBS HIGH 50: CPT | Performed by: INTERNAL MEDICINE

## 2021-10-05 PROCEDURE — 82948 REAGENT STRIP/BLOOD GLUCOSE: CPT

## 2021-10-05 PROCEDURE — 2580000003 HC RX 258: Performed by: PHYSICIAN ASSISTANT

## 2021-10-05 PROCEDURE — XW0DXM6 INTRODUCTION OF BARICITINIB INTO MOUTH AND PHARYNX, EXTERNAL APPROACH, NEW TECHNOLOGY GROUP 6: ICD-10-PCS | Performed by: STUDENT IN AN ORGANIZED HEALTH CARE EDUCATION/TRAINING PROGRAM

## 2021-10-05 PROCEDURE — 2580000003 HC RX 258: Performed by: STUDENT IN AN ORGANIZED HEALTH CARE EDUCATION/TRAINING PROGRAM

## 2021-10-05 PROCEDURE — 97167 OT EVAL HIGH COMPLEX 60 MIN: CPT

## 2021-10-05 RX ADMIN — OXYCODONE HYDROCHLORIDE AND ACETAMINOPHEN 500 MG: 500 TABLET ORAL at 09:29

## 2021-10-05 RX ADMIN — FUROSEMIDE 40 MG: 10 INJECTION, SOLUTION INTRAMUSCULAR; INTRAVENOUS at 17:35

## 2021-10-05 RX ADMIN — LOSARTAN POTASSIUM 100 MG: 100 TABLET, FILM COATED ORAL at 09:29

## 2021-10-05 RX ADMIN — ALBUTEROL SULFATE 2 PUFF: 90 AEROSOL, METERED RESPIRATORY (INHALATION) at 16:35

## 2021-10-05 RX ADMIN — GLIPIZIDE 5 MG: 5 TABLET ORAL at 17:35

## 2021-10-05 RX ADMIN — ALLOPURINOL 300 MG: 300 TABLET ORAL at 05:31

## 2021-10-05 RX ADMIN — ASPIRIN 81 MG: 81 TABLET, FILM COATED ORAL at 11:41

## 2021-10-05 RX ADMIN — IPRATROPIUM BROMIDE 2 PUFF: 17 AEROSOL, METERED RESPIRATORY (INHALATION) at 20:09

## 2021-10-05 RX ADMIN — DEXAMETHASONE SODIUM PHOSPHATE 20 MG: 4 INJECTION, SOLUTION INTRA-ARTICULAR; INTRALESIONAL; INTRAMUSCULAR; INTRAVENOUS; SOFT TISSUE at 09:14

## 2021-10-05 RX ADMIN — BARICITINIB 2 MG: 2 TABLET, FILM COATED ORAL at 11:41

## 2021-10-05 RX ADMIN — CARVEDILOL 25 MG: 25 TABLET, FILM COATED ORAL at 09:29

## 2021-10-05 RX ADMIN — SPIRONOLACTONE 25 MG: 25 TABLET ORAL at 09:29

## 2021-10-05 RX ADMIN — GABAPENTIN 300 MG: 300 CAPSULE ORAL at 20:10

## 2021-10-05 RX ADMIN — IPRATROPIUM BROMIDE 2 PUFF: 17 AEROSOL, METERED RESPIRATORY (INHALATION) at 16:36

## 2021-10-05 RX ADMIN — INSULIN LISPRO 2 UNITS: 100 INJECTION, SOLUTION INTRAVENOUS; SUBCUTANEOUS at 09:25

## 2021-10-05 RX ADMIN — REMDESIVIR 100 MG: 100 INJECTION, POWDER, LYOPHILIZED, FOR SOLUTION INTRAVENOUS at 11:42

## 2021-10-05 RX ADMIN — INSULIN LISPRO 4 UNITS: 100 INJECTION, SOLUTION INTRAVENOUS; SUBCUTANEOUS at 20:11

## 2021-10-05 RX ADMIN — SODIUM CHLORIDE, PRESERVATIVE FREE 10 ML: 5 INJECTION INTRAVENOUS at 09:32

## 2021-10-05 RX ADMIN — GLIPIZIDE 5 MG: 5 TABLET ORAL at 09:29

## 2021-10-05 RX ADMIN — SULFASALAZINE 500 MG: 500 TABLET ORAL at 09:29

## 2021-10-05 RX ADMIN — SULFASALAZINE 500 MG: 500 TABLET ORAL at 20:10

## 2021-10-05 RX ADMIN — POTASSIUM CHLORIDE 20 MEQ: 1500 TABLET, EXTENDED RELEASE ORAL at 11:41

## 2021-10-05 RX ADMIN — ENOXAPARIN SODIUM 30 MG: 30 INJECTION SUBCUTANEOUS at 11:41

## 2021-10-05 RX ADMIN — IPRATROPIUM BROMIDE 2 PUFF: 17 AEROSOL, METERED RESPIRATORY (INHALATION) at 09:23

## 2021-10-05 RX ADMIN — GABAPENTIN 300 MG: 300 CAPSULE ORAL at 09:29

## 2021-10-05 RX ADMIN — ATORVASTATIN CALCIUM 10 MG: 10 TABLET, FILM COATED ORAL at 09:29

## 2021-10-05 RX ADMIN — ALBUTEROL SULFATE 2 PUFF: 90 AEROSOL, METERED RESPIRATORY (INHALATION) at 20:09

## 2021-10-05 RX ADMIN — ALBUTEROL SULFATE 2 PUFF: 90 AEROSOL, METERED RESPIRATORY (INHALATION) at 09:22

## 2021-10-05 RX ADMIN — INSULIN LISPRO 4 UNITS: 100 INJECTION, SOLUTION INTRAVENOUS; SUBCUTANEOUS at 18:06

## 2021-10-05 RX ADMIN — FUROSEMIDE 40 MG: 10 INJECTION, SOLUTION INTRAMUSCULAR; INTRAVENOUS at 11:41

## 2021-10-05 RX ADMIN — INSULIN GLARGINE 10 UNITS: 100 INJECTION, SOLUTION SUBCUTANEOUS at 20:10

## 2021-10-05 RX ADMIN — HYDRALAZINE HYDROCHLORIDE 100 MG: 50 TABLET ORAL at 09:29

## 2021-10-05 RX ADMIN — HYDRALAZINE HYDROCHLORIDE 100 MG: 50 TABLET ORAL at 15:36

## 2021-10-05 RX ADMIN — Medication 6000 UNITS: at 09:28

## 2021-10-05 RX ADMIN — ENOXAPARIN SODIUM 30 MG: 30 INJECTION SUBCUTANEOUS at 20:20

## 2021-10-05 RX ADMIN — SPIRONOLACTONE 25 MG: 25 TABLET ORAL at 17:35

## 2021-10-05 RX ADMIN — CARVEDILOL 25 MG: 25 TABLET, FILM COATED ORAL at 20:10

## 2021-10-05 RX ADMIN — DOXAZOSIN 4 MG: 4 TABLET ORAL at 09:29

## 2021-10-05 RX ADMIN — AMLODIPINE BESYLATE 10 MG: 10 TABLET ORAL at 09:29

## 2021-10-05 RX ADMIN — CEFTRIAXONE SODIUM 1000 MG: 1 INJECTION, POWDER, FOR SOLUTION INTRAMUSCULAR; INTRAVENOUS at 09:18

## 2021-10-05 RX ADMIN — INSULIN LISPRO 6 UNITS: 100 INJECTION, SOLUTION INTRAVENOUS; SUBCUTANEOUS at 12:51

## 2021-10-05 RX ADMIN — HYDRALAZINE HYDROCHLORIDE 100 MG: 50 TABLET ORAL at 20:09

## 2021-10-05 RX ADMIN — Medication 50 MG: at 09:29

## 2021-10-05 ASSESSMENT — PAIN SCALES - GENERAL
PAINLEVEL_OUTOF10: 0

## 2021-10-05 NOTE — PROGRESS NOTES
Patient is demanding that he be discharged. This RN has explained to patient several times today that it would not be safe for him to be discharged with the oxygen demand he his currently requiring.

## 2021-10-05 NOTE — PROGRESS NOTES
[facility] [address] [address]    Progress Note    10/5/2021    12:36 PM    Name:   Cristi Johnson  MRN:     [id number]     [place]:      [de-identified]   Room:   [address] [place] [address] Day:  2  Admit Date:  10/3/2021  4:25 AM    PCP:   FELI Ovalle CNP  Code Status:  Full Code    Subjective:     C/C:   Chief Complaint   Patient presents with    Concern For COVID-19    Shortness of Breath     Interval History Status: Unchanged. Patient was seen and evaluated at bedside this afternoon. Patient reports feeling about the same at this time. He still complains of shortness of breath. Brief History:     75-year-old male with history of hypertension, CAD, diabetes, hyperlipidemia, CHF who came in with complaints of worsening shortness of breath. Patient was diagnosed with COVID-19 infection. Review of Systems:     Constitutional:  negative for chills, fevers, sweats  Respiratory:  negative for cough, dyspnea on exertion, shortness of breath, wheezing  Cardiovascular:  negative for chest pain, chest pressure/discomfort, lower extremity edema, palpitations  Gastrointestinal:  negative for abdominal pain, constipation, diarrhea, nausea, vomiting  Neurological:  negative for dizziness, headache    Medications: Allergies:     Allergies   Allergen Reactions    Lorazepam Other (See Comments)     Light headed, dizzy, blurred vision       Current Meds:   Scheduled Meds:    baricitinib  2 mg Oral Daily    albuterol sulfate HFA  2 puff Inhalation Q4H WA    ipratropium  2 puff Inhalation Q4H WA    allopurinol  300 mg Oral QAM AC    amLODIPine  10 mg Oral Daily    aspirin  81 mg Oral Daily    carvedilol  25 mg Oral BID    furosemide  40 mg IntraVENous BID    gabapentin  300 mg Oral BID    glipiZIDE  5 mg Oral BID AC    hydrALAZINE  100 mg Oral TID    potassium chloride  20 mEq Oral Daily    atorvastatin  10 mg Oral Daily    spironolactone  25 mg Oral BID    sulfaSALAzine  500 mg Oral BID    losartan  100 mg Oral Daily    doxazosin  4 mg Oral Daily    remdesivir IVPB  100 mg IntraVENous Q24H    enoxaparin  30 mg SubCUTAneous BID    Vitamin D  6,000 Units Oral Daily    Followed by   Magnus Killian ON 10/10/2021] Vitamin D  2,000 Units Oral Daily    ascorbic acid  500 mg Oral Daily    zinc sulfate  50 mg Oral Daily    sodium chloride flush  5-40 mL IntraVENous 2 times per day    cefTRIAXone (ROCEPHIN) IV  1,000 mg IntraVENous Q24H    insulin lispro  0-12 Units SubCUTAneous TID     insulin lispro  0-6 Units SubCUTAneous Nightly    insulin glargine  10 Units SubCUTAneous Nightly    dexamethasone  20 mg IntraVENous Q24H    Followed by   Magnus Killian ON 10/8/2021] dexamethasone  10 mg IntraVENous Q24H     Continuous Infusions:    sodium chloride      dextrose       PRN Meds: sodium chloride, acetaminophen **OR** acetaminophen, sodium chloride flush, sodium chloride, ondansetron **OR** ondansetron, polyethylene glycol, glucose, dextrose, glucagon (rDNA), dextrose    Data:     Past Medical History:   has a past medical history of Arthritis, CAD (coronary artery disease), CHF (congestive heart failure) ([facility] [address] Diabetes mellitus ([facility] [address] Diverticulosis of colon, History of chest pain, History of tobacco abuse, Hyperlipidemia, Hypertension, and Pneumonia. Social History:   reports that he quit smoking about 5 years ago. He has a 37.00 pack-year smoking history. He has never used smokeless tobacco. He reports that he does not drink alcohol and does not use drugs.      Family History:   Family History   Problem Relation Age of Onset    Hypertension Father     High Cholesterol Father     Diabetes Father        Vitals:  /76   Pulse 80   Temp 97.9 °F (36.6 °C) (Axillary)   Resp 22   Ht 5' 8\" (1.727 m)   Wt 234 lb 8 oz (106.4 kg)   SpO2 93%   BMI 35.66 kg/m²   Temp (24hrs), Av.2 °F (36.8 °C), Min:97.8 °F (36.6 °C), Max:99 °F (37.2 °C)    Recent Labs 10/04/21  1722 10/04/21  2013 10/05/21  0824 10/05/21  1149   POCGLU 340* 350* 153* 257*       I/O (24Hr): Intake/Output Summary (Last 24 hours) at 10/5/2021 1236  Last data filed at 10/5/2021 1125  Gross per 24 hour   Intake 1000 ml   Output 850 ml   Net 150 ml       Labs:  Hematology:  Recent Labs     10/03/21  0450 10/04/21  0443 10/05/21  0346   WBC 4.4* 2.1* 2.4*   RBC 3.63* 3.62* 4.12*   HGB 10.4* 10.3* 11.4*   HCT 34.0* 33.2* 38.4*   MCV 93.7 91.7 93.2   MCH 28.7 28.5 27.7   MCHC 30.6* 31.0* 29.7*    153 168   MPV 10.8 10.9 10.9   CRP 18.98* 15.44* 8.91*   DDIMER  --  559.00* 609.00*     Chemistry:  Recent Labs     10/03/21  0450 10/04/21  0443 10/05/21  0346    142 143   K 4.4 4.1 3.8   CL 98 102 100   CO2 30 31 35*   GLUCOSE 188* 123* 199*   BUN 34* 38* 46*   CREATININE 1.4* 1.5* 1.6*   MG 1.8  --   --    ANIONGAP 9.0 9.0 8.0   LABGLOM 50* 47* 43*   CALCIUM 8.3* 8.0* 8.4*   PROBNP 330.3  --   --      Recent Labs     10/03/21  0450 10/03/21  0803 10/03/21  2139 10/04/21  0443 10/04/21  0840 10/04/21  1230 10/04/21  1722 10/04/21  2013 10/05/21  0824 10/05/21  1149   PROT 6.8  --   --  5.7*  --   --   --   --   --   --    LABALBU 3.8  --   --  3.2*  --   --   --   --   --   --    LABA1C 5.7  --   --   --   --   --   --   --   --   --    AST 88*  --   --  74*  --   --   --   --   --   --    ALT 26  --   --  26  --   --   --   --   --   --    LDH  --   --   --  471*  --   --   --   --   --   --    ALKPHOS 56  --   --  50  --   --   --   --   --   --    BILITOT 0.3  --   --  0.2*  --   --   --   --   --   --    BILIDIR <0.2  --   --   --   --   --   --   --   --   --    LIPASE 46.7  --   --   --   --   --   --   --   --   --    POCGLU  --    < >   < >  --  123* 224* 340* 350* 153* 257*    < > = values in this interval not displayed.      ABG:  Lab Results   Component Value Date    PH 7.36 10/03/2021    PCO2 52 10/03/2021    PO2 156 10/03/2021    HCO3 30 10/03/2021    O2SAT 99 10/03/2021     No results found for: SPECIAL  No results found for: CULTURE    Radiology:  XR CHEST PORTABLE    Result Date: 10/3/2021  Impression: Bilateral pneumonia concerning for SARS-coronavirus-2 infection. This document has been electronically signed by: Marcello Jung MD on 10/03/2021 05:21 AM      Physical Examination:        General appearance:  alert, cooperative and no distress  Mental Status:  oriented to person, place and time and normal affect  Lungs: Diminished air sounds bilaterally. Heart:  regular rate and rhythm, no murmur  Abdomen:  soft, nontender, nondistended, normal bowel sounds, no masses, hepatomegaly, splenomegaly  Extremities:  no edema, redness, tenderness in the calves  Skin:  no gross lesions, rashes, induration    Assessment:        Hospital Problems         Last Modified POA    COVID-19 10/3/2021 Yes          Plan:        Acute hypoxic respiratory failure  -Secondary to COVID-19 pneumonia  -FiO2 80% this morning down from 100%. -CRP elevated. -Continue Rocephin, Decadron, Lasix, remdesivir, spironolactone  -we will add baricitinib    Hyperglycemia  -Continue insulin    Hypertension  -Continue amlodipine, Coreg, hydralazine, doxazosin, losartan    Hyperlipidemia-continue Lipitor    HALE[place]  -Creatinine baseline 1.09.     Diabetes  -Continue insulin therapy     Leukopenia  Anemia  -monitor    Gout  -Continue allopurinol    Latasha Galvez MD  10/5/2021  12:36 PM

## 2021-10-05 NOTE — PROGRESS NOTES
[address]  INPATIENT OCCUPATIONAL THERAPY  [facility] CV[facility] 4B  EVALUATION    Time:   Time In: 0800  Time Out: 08  Timed Code Treatment Minutes: 30 Minutes  Minutes: 45          Date: 10/5/2021  Patient Name: Rupinder Smith,   Gender: male      MRN: [id number]  : 1955  (74 y.o.)  Referring Practitioner: Alexis Spence MD  Diagnosis: acute respiratory failure with hypoxemia  Additional Pertinent Hx: Per H&P:Patient reports feeling worsening shortness of breath over the last 2 days. His wife tested positive for COVID-19 a few days ago. Decided to get evaluated in the ED today and called EMS. While in the ED he was found to be hypoxemic on room air at 78%. Pt found to haveCOVID-19 Pneumonia with Sepsis    Restrictions/Precautions:  Restrictions/Precautions: General Precautions, Fall Risk, Isolation  Position Activity Restriction  Other position/activity restrictions: droplet +; bipap    Subjective  Chart Reviewed: Yes, Orders, Progress Notes, History and Physical  Patient assessed for rehabilitation services?: Yes  Family / Caregiver Present: No    Subjective: Pt supine in bed upon arrival, agreeable to OT session. Comments: Rn in at end of session to adjust bipap as was having leak around seal.    Pain:  Pain Assessment  Patient Currently in Pain: Denies    Vitals: Oxygen: 93% on arrival, on bipap with Fi02 of 80%. Sp02 increased to >93% while seated at EOB/with exercises.  Sp02 decreased to 88% with standing/side steps, although quickly recovered to >90% once returned to sitting/supine  Heart Rate: 70s-80s throughout    Social/Functional History:  Lives With: Spouse  Type of Home: House  Home Layout: One level  Home Access: Stairs to enter with rails  Entrance Stairs - Number of Steps: 2  Home Equipment:  (no AD PTA,)   Bathroom Shower/Tub: Walk-in shower  Bathroom Toilet: Standard  Bathroom Equipment: Shower chair       ADL Assistance: [address] [place] [address] Avenue: Independent (shares IADL tasks with wife)  Ambulation Assistance: Independent  Transfer Assistance: Independent          Additional Comments: No home O2    VISION:WFL    HEARING:  WFL    COGNITION: WFL    RANGE OF MOTION:  Bilateral Upper Extremity:  Impaired - decreased at shoulders; distally WFL    STRENGTH:  Bilateral Upper Extremity:  Impaired - grossly deconditioned    ADL:   No ADL's completed this session. Cherelle Le BALANCE:  Sitting Balance:  Supervision. Standing Balance: Contact Guard Assistance. Tolerated standing ~1 minute while linens/chux repositioned    BED MOBILITY:  Supine to Sit: Minimal Assistance to elevate trunk/shoulders  Sit to Supine: Moderate Assistance ; to bring BLEs up into bed  Scooting: Stand By Assistance ; advancing hips forward to EOB    TRANSFERS:  Sit to Stand:  Minimal Assistance, with increased time for completion, cues for hand placement. Stand to Sit: [address], cues for hand placement. FUNCTIONAL MOBILITY:  Assistive Device: hand held assist  Assist Level:  Contact Guard Assistance. Distance: steps laterally towards HOB  Slightly unsteady, quickly fatigues. Exercise:  Pt completed BUE AROM exercises while seated at EOB. Pt completed 1 set X 10 repetitions in all planes with prolonged rest breaks in between variations. Moderate cues for pursed lip breathing throughout. Sp02 remained >90% throughout. Exercises completed to increase overall strength/endurance needed for ADLs and transfers. Despite encouragement to sit up in chair this morning as Sp02 improved with upright sitting, pt declined at this time. Activity Tolerance:  Patient tolerance of  treatment: good. Assessment:  Assessment: Pt presents requiring increased assistance for ADLs, transfers, and functional mobility compared to PLOF. Pt will continue to benefit from OT services to improve independence with these tasks, in addition to overall strength/endurance to facilitate return to PLOF. and ease with self care tasks. Short term goal 3: Pt will tolerate dynamic standing X4-5 minutes with supervision and Sp02 >=90% in prep for sinkside grooming/toileting tasks. Following session, patient left in safe position with all fall risk precautions in place.

## 2021-10-05 NOTE — PROGRESS NOTES
[address] [address]  INPATIENT PHYSICAL THERAPY  DAILY NOTE  [facility] 4B - 4B-12/012-A    Time In: 727  Time Out: 1147  Timed Code Treatment Minutes: 23 Minutes  Minutes: 23          Date: 10/5/2021  Patient Name: Jeromy Venegas,  Gender:  male        MRN: [id number]  : 1955  (74 y.o.)     Referring Practitioner: Dr. Eliazar Harding  Diagnosis: acute respiratory failure with hypoxemia  Additional Pertinent Hx: admit with above diagnosis, COVID-19 pneumonia with sepsis, DM, CHF     Prior Level of Function:  Lives With: Spouse  Type of Home: House  Home Layout: One level  Home Access: Stairs to enter with rails  Entrance Stairs - Number of Steps: 2  Home Equipment:  (no AD PTA,)   Bathroom Shower/Tub: Walk-in shower  Bathroom Toilet: Standard  Bathroom Equipment: Shower chair    ADL Assistance: Independent  Homemaking Assistance: Independent (shares IADL tasks with wife)  Ambulation Assistance: Independent  Transfer Assistance: Independent  Additional Comments: No home O2    Restrictions/Precautions:  Restrictions/Precautions: General Precautions, Fall Risk, Isolation  Position Activity Restriction  Other position/activity restrictions: droplet +; bipap     SUBJECTIVE: cooperative, pt stating it felt good to move    PAIN: stated buttock min ache-felt good to move    Vitals: Oxygen: BiPAP with O2 sats primarily 89-91% throughout session    OBJECTIVE:  Bed Mobility:  Rolling to Left: Stand By Assistance   Supine to Sit: Stand By Assistance  Sit to Supine: Stand By Assistance   Scooting: Stand By Assistance  HOB up 30 degrees and use BR, inc time to complete  Transfers:  Sit to Stand: Contact Guard Assistance  Stand to [employer] Assistance    Ambulation:  Contact Guard Assistance  Distance: march in place x5-10 reps x5 trials with stding rest break between each trial  Surface: Level Tile  Device:1-2UE support on furniture  Gait Deviations:   Forward Flexed Posture and min unsteady initially but improved with inc trials    Balance:  Static Sitting Balance:  Supervision  Static Standing Balance: Contact Guard Assistance, to sBA with 1 UE support        Functional Outcome Measures: Completed  AM-PAC Inpatient Mobility Raw Score : 17  AM-PAC Inpatient T-Scale Score : 42.13    ASSESSMENT:  Assessment: Patient progressing toward established goals. Activity Tolerance:  Patient tolerance of  treatment: good. Equipment Recommendations: Other: cont to assess needs  Discharge Recommendations:  Continue to assess pending progress    Plan: Times per week: 5X C  Times per day: Daily  Specific instructions for Next Treatment: therex and mobility    Patient Education  Patient Education: rest breaks and monitoring O2 sats during mobility    Goals:  Patient goals : not stated  Short term goals  Time Frame for Short term goals: by discharge  Short term goal 1: bed mobility with S to get in/out of bed  Short term goal 2: transfer with S to get in/out of chairs  Short term goal 3: amb >100'x1 with/without AD and S, maintaining O2 sats >90% on </=4L O2, to walk safely in home  Short term goal 4: negotiate 2 steps with HR and SBA to enter home safely  Long term goals  Time Frame for Long term goals : no LTGs set secondary to short ELOS    Following session, patient left in safe position with all fall risk precautions in place.

## 2021-10-06 LAB
ALBUMIN SERPL-MCNC: 3.4 G/DL (ref 3.5–5.1)
ALP BLD-CCNC: 55 U/L (ref 38–126)
ALT SERPL-CCNC: 38 U/L (ref 11–66)
ANION GAP SERPL CALCULATED.3IONS-SCNC: 13 MEQ/L (ref 8–16)
AST SERPL-CCNC: 66 U/L (ref 5–40)
BASOPHILS # BLD: 0 %
BASOPHILS ABSOLUTE: 0 THOU/MM3 (ref 0–0.1)
BILIRUB SERPL-MCNC: 0.2 MG/DL (ref 0.3–1.2)
BILIRUBIN DIRECT: < 0.2 MG/DL (ref 0–0.3)
BUN BLDV-MCNC: 49 MG/DL (ref 7–22)
C-REACTIVE PROTEIN: 5.23 MG/DL (ref 0–1)
CALCIUM SERPL-MCNC: 8.3 MG/DL (ref 8.5–10.5)
CHLORIDE BLD-SCNC: 99 MEQ/L (ref 98–111)
CO2: 35 MEQ/L (ref 23–33)
CREAT SERPL-MCNC: 1.5 MG/DL (ref 0.4–1.2)
D-DIMER QUANTITATIVE: 604 NG/ML FEU (ref 0–500)
EOSINOPHIL # BLD: 0 %
EOSINOPHILS ABSOLUTE: 0 THOU/MM3 (ref 0–0.4)
ERYTHROCYTE [DISTWIDTH] IN BLOOD BY AUTOMATED COUNT: 14.8 % (ref 11.5–14.5)
ERYTHROCYTE [DISTWIDTH] IN BLOOD BY AUTOMATED COUNT: 50.2 FL (ref 35–45)
GFR SERPL CREATININE-BSD FRML MDRD: 47 ML/MIN/1.73M2
GLUCOSE BLD-MCNC: 107 MG/DL (ref 70–108)
GLUCOSE BLD-MCNC: 134 MG/DL (ref 70–108)
GLUCOSE BLD-MCNC: 193 MG/DL (ref 70–108)
GLUCOSE BLD-MCNC: 375 MG/DL (ref 70–108)
HCT VFR BLD CALC: 37.9 % (ref 42–52)
HEMOGLOBIN: 11.4 GM/DL (ref 14–18)
IMMATURE GRANS (ABS): 0.01 THOU/MM3 (ref 0–0.07)
IMMATURE GRANULOCYTES: 0.4 %
LYMPHOCYTES # BLD: 32.7 %
LYMPHOCYTES ABSOLUTE: 0.9 THOU/MM3 (ref 1–4.8)
MCH RBC QN AUTO: 27.7 PG (ref 26–33)
MCHC RBC AUTO-ENTMCNC: 30.1 GM/DL (ref 32.2–35.5)
MCV RBC AUTO: 92 FL (ref 80–94)
MONOCYTES # BLD: 16.3 %
MONOCYTES ABSOLUTE: 0.4 THOU/MM3 (ref 0.4–1.3)
NUCLEATED RED BLOOD CELLS: 0 /100 WBC
PLATELET # BLD: 165 THOU/MM3 (ref 130–400)
PLATELET ESTIMATE: ADEQUATE
PMV BLD AUTO: 11 FL (ref 9.4–12.4)
POTASSIUM SERPL-SCNC: 3.7 MEQ/L (ref 3.5–5.2)
RBC # BLD: 4.12 MILL/MM3 (ref 4.7–6.1)
SCAN OF BLOOD SMEAR: NORMAL
SEG NEUTROPHILS: 50.6 %
SEGMENTED NEUTROPHILS ABSOLUTE COUNT: 1.3 THOU/MM3 (ref 1.8–7.7)
SODIUM BLD-SCNC: 147 MEQ/L (ref 135–145)
TOTAL PROTEIN: 5.8 G/DL (ref 6.1–8)
WBC # BLD: 2.6 THOU/MM3 (ref 4.8–10.8)

## 2021-10-06 PROCEDURE — 6370000000 HC RX 637 (ALT 250 FOR IP): Performed by: STUDENT IN AN ORGANIZED HEALTH CARE EDUCATION/TRAINING PROGRAM

## 2021-10-06 PROCEDURE — 2060000000 HC ICU INTERMEDIATE R&B

## 2021-10-06 PROCEDURE — 94640 AIRWAY INHALATION TREATMENT: CPT

## 2021-10-06 PROCEDURE — 86140 C-REACTIVE PROTEIN: CPT

## 2021-10-06 PROCEDURE — 82948 REAGENT STRIP/BLOOD GLUCOSE: CPT

## 2021-10-06 PROCEDURE — 6370000000 HC RX 637 (ALT 250 FOR IP): Performed by: PEDIATRICS

## 2021-10-06 PROCEDURE — 82248 BILIRUBIN DIRECT: CPT

## 2021-10-06 PROCEDURE — 2580000003 HC RX 258: Performed by: PHYSICIAN ASSISTANT

## 2021-10-06 PROCEDURE — 80053 COMPREHEN METABOLIC PANEL: CPT

## 2021-10-06 PROCEDURE — 85025 COMPLETE CBC W/AUTO DIFF WBC: CPT

## 2021-10-06 PROCEDURE — 99233 SBSQ HOSP IP/OBS HIGH 50: CPT | Performed by: PEDIATRICS

## 2021-10-06 PROCEDURE — 97116 GAIT TRAINING THERAPY: CPT

## 2021-10-06 PROCEDURE — 97110 THERAPEUTIC EXERCISES: CPT

## 2021-10-06 PROCEDURE — 6370000000 HC RX 637 (ALT 250 FOR IP): Performed by: INTERNAL MEDICINE

## 2021-10-06 PROCEDURE — 97535 SELF CARE MNGMENT TRAINING: CPT

## 2021-10-06 PROCEDURE — 6360000002 HC RX W HCPCS: Performed by: PHYSICIAN ASSISTANT

## 2021-10-06 PROCEDURE — 94660 CPAP INITIATION&MGMT: CPT

## 2021-10-06 PROCEDURE — 6360000002 HC RX W HCPCS: Performed by: STUDENT IN AN ORGANIZED HEALTH CARE EDUCATION/TRAINING PROGRAM

## 2021-10-06 PROCEDURE — 36415 COLL VENOUS BLD VENIPUNCTURE: CPT

## 2021-10-06 PROCEDURE — 2580000003 HC RX 258: Performed by: STUDENT IN AN ORGANIZED HEALTH CARE EDUCATION/TRAINING PROGRAM

## 2021-10-06 PROCEDURE — 85379 FIBRIN DEGRADATION QUANT: CPT

## 2021-10-06 PROCEDURE — 2500000003 HC RX 250 WO HCPCS: Performed by: STUDENT IN AN ORGANIZED HEALTH CARE EDUCATION/TRAINING PROGRAM

## 2021-10-06 RX ORDER — INSULIN GLARGINE 100 [IU]/ML
20 INJECTION, SOLUTION SUBCUTANEOUS NIGHTLY
Status: DISCONTINUED | OUTPATIENT
Start: 2021-10-06 | End: 2021-10-08

## 2021-10-06 RX ADMIN — FUROSEMIDE 40 MG: 10 INJECTION, SOLUTION INTRAMUSCULAR; INTRAVENOUS at 08:51

## 2021-10-06 RX ADMIN — LOSARTAN POTASSIUM 100 MG: 100 TABLET, FILM COATED ORAL at 08:56

## 2021-10-06 RX ADMIN — INSULIN GLARGINE 20 UNITS: 100 INJECTION, SOLUTION SUBCUTANEOUS at 22:59

## 2021-10-06 RX ADMIN — SPIRONOLACTONE 25 MG: 25 TABLET ORAL at 08:56

## 2021-10-06 RX ADMIN — IPRATROPIUM BROMIDE 2 PUFF: 17 AEROSOL, METERED RESPIRATORY (INHALATION) at 12:48

## 2021-10-06 RX ADMIN — REMDESIVIR 100 MG: 100 INJECTION, POWDER, LYOPHILIZED, FOR SOLUTION INTRAVENOUS at 09:43

## 2021-10-06 RX ADMIN — SODIUM CHLORIDE, PRESERVATIVE FREE 10 ML: 5 INJECTION INTRAVENOUS at 23:44

## 2021-10-06 RX ADMIN — AMLODIPINE BESYLATE 10 MG: 10 TABLET ORAL at 08:56

## 2021-10-06 RX ADMIN — GLIPIZIDE 5 MG: 5 TABLET ORAL at 05:30

## 2021-10-06 RX ADMIN — ASPIRIN 81 MG: 81 TABLET, FILM COATED ORAL at 08:56

## 2021-10-06 RX ADMIN — ATORVASTATIN CALCIUM 10 MG: 10 TABLET, FILM COATED ORAL at 08:56

## 2021-10-06 RX ADMIN — GABAPENTIN 300 MG: 300 CAPSULE ORAL at 08:56

## 2021-10-06 RX ADMIN — ALLOPURINOL 300 MG: 300 TABLET ORAL at 05:30

## 2021-10-06 RX ADMIN — SULFASALAZINE 500 MG: 500 TABLET ORAL at 08:57

## 2021-10-06 RX ADMIN — CARVEDILOL 25 MG: 25 TABLET, FILM COATED ORAL at 08:56

## 2021-10-06 RX ADMIN — CEFTRIAXONE SODIUM 1000 MG: 1 INJECTION, POWDER, FOR SOLUTION INTRAMUSCULAR; INTRAVENOUS at 08:47

## 2021-10-06 RX ADMIN — CARVEDILOL 25 MG: 25 TABLET, FILM COATED ORAL at 23:02

## 2021-10-06 RX ADMIN — INSULIN LISPRO 5 UNITS: 100 INJECTION, SOLUTION INTRAVENOUS; SUBCUTANEOUS at 22:58

## 2021-10-06 RX ADMIN — ALBUTEROL SULFATE 2 PUFF: 90 AEROSOL, METERED RESPIRATORY (INHALATION) at 12:47

## 2021-10-06 RX ADMIN — OXYCODONE HYDROCHLORIDE AND ACETAMINOPHEN 500 MG: 500 TABLET ORAL at 08:56

## 2021-10-06 RX ADMIN — ENOXAPARIN SODIUM 30 MG: 30 INJECTION SUBCUTANEOUS at 23:01

## 2021-10-06 RX ADMIN — HYDRALAZINE HYDROCHLORIDE 100 MG: 50 TABLET ORAL at 08:56

## 2021-10-06 RX ADMIN — ENOXAPARIN SODIUM 30 MG: 30 INJECTION SUBCUTANEOUS at 08:50

## 2021-10-06 RX ADMIN — HYDRALAZINE HYDROCHLORIDE 100 MG: 50 TABLET ORAL at 14:51

## 2021-10-06 RX ADMIN — SULFASALAZINE 500 MG: 500 TABLET ORAL at 23:02

## 2021-10-06 RX ADMIN — SODIUM CHLORIDE, PRESERVATIVE FREE 10 ML: 5 INJECTION INTRAVENOUS at 08:57

## 2021-10-06 RX ADMIN — Medication 6000 UNITS: at 08:56

## 2021-10-06 RX ADMIN — Medication 50 MG: at 08:56

## 2021-10-06 RX ADMIN — HYDRALAZINE HYDROCHLORIDE 100 MG: 50 TABLET ORAL at 23:02

## 2021-10-06 RX ADMIN — BARICITINIB 2 MG: 2 TABLET, FILM COATED ORAL at 08:56

## 2021-10-06 RX ADMIN — DEXAMETHASONE SODIUM PHOSPHATE 20 MG: 4 INJECTION, SOLUTION INTRA-ARTICULAR; INTRALESIONAL; INTRAMUSCULAR; INTRAVENOUS; SOFT TISSUE at 09:40

## 2021-10-06 RX ADMIN — GABAPENTIN 300 MG: 300 CAPSULE ORAL at 23:02

## 2021-10-06 RX ADMIN — DOXAZOSIN 4 MG: 4 TABLET ORAL at 08:56

## 2021-10-06 RX ADMIN — POTASSIUM CHLORIDE 20 MEQ: 1500 TABLET, EXTENDED RELEASE ORAL at 08:57

## 2021-10-06 ASSESSMENT — PAIN SCALES - GENERAL
PAINLEVEL_OUTOF10: 0

## 2021-10-06 NOTE — PROGRESS NOTES
[address] [address]  [facility] CV[facility] 4B  Occupational Therapy  Daily Note  Time:   Time In: 1000  Time Out: 1040  Timed Code Treatment Minutes: 40 Minutes  Minutes: 40    Date: 10/6/2021  Patient Name: Tri Eldridge,   Gender: male      Room: -12/012-A  MRN: [id number]  : 1955  (74 y.o.)  Referring Practitioner: Tobi Xiong MD  Diagnosis: acute respiratory failure with hypoxemia  Additional Pertinent Hx: Per H&P:Patient reports feeling worsening shortness of breath over the last 2 days. His wife tested positive for COVID-19 a few days ago. Decided to get evaluated in the ED today and called EMS. While in the ED he was found to be hypoxemic on room air at 78%. Pt found to haveCOVID-19 Pneumonia with Sepsis    Restrictions/Precautions:  Restrictions/Precautions: General Precautions, Fall Risk, Isolation  Position Activity Restriction  Other position/activity restrictions: droplet +; bipap     SUBJECTIVE: Patient in bed upon arrival agreeable with min encouragement     PAIN: n o    Vitals: Vitals not assessed per clinical judgement, see nursing flowsheet    COGNITION: Decreased Insight and Decreased Safety Awareness    ADL:   Grooming: Supervision. seated EOB to wash face   Bathing: Minimal Assistance. with B feet, required encouragement to stand to complete simon and bottom care   Upper Extremity Dressing: Stand By Assistance. Lower Extremity Dressing: Maximum Assistance. reynold/doff socks. BALANCE:  Sitting Balance:  Stand By Assistance. Standing Balance: Contact Guard Assistance. BED MOBILITY:  Supine to Sit: Stand By Assistance    Sit to Supine: Stand By Assistance      TRANSFERS:  Sit to Stand:  [employer]. Stand to Sit: Contact Guard Assistance. FUNCTIONAL MOBILITY:  Assistive Device: Rolling Walker  Assist Level:  Contact Guard Assistance.    Distance: to/from bed during BADL tasks      ADDITIONAL ACTIVITIES:  Guided patient through BUE AROM this date x15 reps x1 set in all planes and joints in order to increase BUE strength and improve activity tolerance for ADLs and homemaking tasks. ASSESSMENT:     Activity Tolerance:  Patient tolerance of  treatment: fair. Discharge Recommendations: Patient would benefit from continued therapy after discharge, Continue to assess pending progress   Equipment Recommendations: Other: will continue to monitor pending progress  Plan: Times per week: 5x  Current Treatment Recommendations: Strengthening, Balance Training, Functional Mobility Training, Endurance Training, Safety Education & Training, Patient/Caregiver Education & Training, Equipment Evaluation, Education, & procurement, Self-Care / ADL    Patient Education  Patient Education: ADL's and Home Exercise Program    Goals  Short term goals  Time Frame for Short term goals: by discharge  Short term goal 1: Pt will increase activity tolerance for functional mobility to/from [facility] or chair, progressing to bathroom as able, with supervision and Sp02 >=90% in prep for ADL completion. Short term goal 2: Pt will complete BADL tasks with supervision, incorporating ECT prn, to increase independence and ease with self care tasks. Short term goal 3: Pt will tolerate dynamic standing X4-5 minutes with supervision and Sp02 >=90% in prep for sinkside grooming/toileting tasks. Following session, patient left in safe position with all fall risk precautions in place.

## 2021-10-06 NOTE — PROGRESS NOTES
Todd Crockett was evaluated today and a DME order was entered for a wheeled walker because he requires this to successfully complete daily living tasks of toileting, ambulating, grooming and hygiene. A wheeled walker is necessary due to the patient's unsteady gait, upper body weakness, and inability to  an ambulation device; and he can ambulate only by pushing a walker instead of a lesser assistive device such as a cane, crutch, or standard walker. The need for this equipment was discussed with the patient and he understands and is in agreement.

## 2021-10-06 NOTE — CARE COORDINATION
Discharge Planning Update:     Remains on 4B. Hospitalist following. Pt on high flow O2, 60L and 60% FiO2. Sats 89-90%. Pt also using BiPAP and NRB mask. CRP 5.23. Sodium 147, BUN 49, creatinine 1.5. Albuterol inhaler q4hr WA, Zyloprim, Vit C, Norvasc, baby ASA, Lipitor, Baricitinib 2mg po daily, Coreg, Rocephin iv daily, Decadron 20mg iv daily, Cardura, Lovenox, Lasix 40mg iv bid, Neurontin, Glucotrol, Hydralazine, Lantus, Humalog, Atrovent inhaler q4hr WA, Cozaar, Klor-Con M, Remdesivir iv daily (day #4), Aldactone, Azulfidine bid, Vit D, Zinc.      PT/OT working with pt. Await their recommendations. They do recommend a wheeled walker at discharge. Patient Goals/Plan/Treatment Preferences: Plans home with wife. Monitor for needs.

## 2021-10-06 NOTE — PROGRESS NOTES
hand placement    Ambulation:  Contact Guard Assistance, Minimal Assistance, X 1  Distance: 2 feet forward and 2 feet back X 3 trials  And 2 feet forward and 2 feet back X 2 more trials after seated rest break. Distance limited due to lines     Surface: Level Tile  Device:IV pole. anticipate need for RW . Not much room in his room to add a walker at this time. Gait Deviations:  Slow Tonya, Decreased Step Length Bilaterally, Decreased Gait Speed, Decreased Heel Strike Bilaterally, Mild Path Deviations and highly rec RW       Balance:  Static Sitting Balance:  Supervision  Static Standing Balance: Contact Guard Assistance  Dynamic Standing Balance: Minimal Assistance    Exercise:  Patient was guided in 1 set(s) 10 reps of exercise to both lower extremities. Ankle pumps, Standing marches and tried standing heel raises. Pt had great difficulty getting toes off of the floor. For the standing marches ,pt had difficulty clearing floor with his feet. .  Exercises were completed for increased independence with functional mobility. Functional Outcome Measures: Completed  AM-PAC Inpatient Mobility Raw Score : 17  AM-PAC Inpatient T-Scale Score : 42.13    ASSESSMENT:  Assessment: Patient progressing toward established goals. Pt continues to need skilled therapy to improve strength and safety with all mobility. Rec RW at discharge and continued therapy. Activity Tolerance:  Patient tolerance of  treatment: good. Sats stayed above 90 % throughout      Equipment Recommendations: Other: cont to assess needs  Discharge Recommendations:  Continue to assess pending progress    Plan: Times per week: 5X C  Times per day: Daily  Specific instructions for Next Treatment: therex and mobility    Patient Education  Patient Education: Home Exercise Program, Bed Mobility, Transfers, Gait    Goals:  Patient goals : not stated  Short term goals  Time Frame for Short term goals: by discharge  Short term goal 1: bed mobility with S to get in/out of bed  Short term goal 2: transfer with S to get in/out of chairs  Short term goal 3: amb >100'x1 with/without AD and S, maintaining O2 sats >90% on </=4L O2, to walk safely in home  Short term goal 4: negotiate 2 steps with HR and SBA to enter home safely  Long term goals  Time Frame for Long term goals : no LTGs set secondary to short ELOS    Following session, patient left in safe position with all fall risk precautions in place.

## 2021-10-06 NOTE — PROGRESS NOTES
Hospitalist Progress Note    Patient:  Nahomy Deng    YOB: 1955  Unit/Bed:4B-12/012-A  MRN: [id number]    Acct: [de-identified]   PCP: FELI Penn CNP    Date of Admission: 10/3/2021      Assessment/Plan:    Acute hypoxic respiratory failure  -Secondary to COVID-19 pneumonia  -Currently on high flow oxygen with 75% FiO2 and 60 L/min of flow to maintain O2 saturations between 88 to 91%. -CRP elevated. -Continue baricitinib, Rocephin, Decadron, vitamin D, vitamin C, zinc, and remdesivir.     Coronary artery disease:  Continue optimal medical management with Coreg, aspirin, statin, and losartan. Hypertension  -Continue amlodipine, Coreg, hydralazine, doxazosin, losartan     Hyperlipidemia:  -continue Lipitor     HALEIGH  -Creatinine baseline 1.09. Patient seems to be volume depleted and dehydrated with a significantly increased BUN to creatinine ratio. Hold Lasix and Aldactone as well as potassium for now. Consider holding losartan if not improved. Repeat renal function in a.m.     Diabetes  Steroid-induced hyperglycemia. Increase Lantus to 20 units subcu nightly. Continue Accu-Cheks before every meal and at bedtime and diabetic diet. Continue sliding scale insulin coverage. Continue home dose glyburide.      Leukopenia  Improving. Likely secondary to Covid. Monitor outpatient. Gout  -Continue allopurinol       Expected discharge date: More than a week. Disposition:   [] Home  [] TCU  [] Rehab  [] Psych  [x] SNF  [] [facility]  [] Other-    ===================================================================      Chief Complaint: Hypoxia and shortness of breath. Subjective (past 24 hours):   No acute events overnight. Continues to require high oxygen supply to maintain O2 sats above 90%. Minimal oral intake but otherwise no acute events.     Medications:  Reviewed    Infusion Medications    sodium chloride      dextrose       Scheduled Medications  insulin glargine  20 Units SubCUTAneous Nightly    baricitinib  2 mg Oral Daily    albuterol sulfate HFA  2 puff Inhalation Q4H WA    ipratropium  2 puff Inhalation Q4H WA    allopurinol  300 mg Oral QAM AC    amLODIPine  10 mg Oral Daily    aspirin  81 mg Oral Daily    carvedilol  25 mg Oral BID    [Held by provider] furosemide  40 mg IntraVENous BID    gabapentin  300 mg Oral BID    glipiZIDE  5 mg Oral BID AC    hydrALAZINE  100 mg Oral TID    [Held by provider] potassium chloride  20 mEq Oral Daily    atorvastatin  10 mg Oral Daily    [Held by provider] spironolactone  25 mg Oral BID    sulfaSALAzine  500 mg Oral BID    losartan  100 mg Oral Daily    doxazosin  4 mg Oral Daily    remdesivir IVPB  100 mg IntraVENous Q24H    enoxaparin  30 mg SubCUTAneous BID    Vitamin D  6,000 Units Oral Daily    Followed by   Jersey Do ON 10/10/2021] Vitamin D  2,000 Units Oral Daily    ascorbic acid  500 mg Oral Daily    zinc sulfate  50 mg Oral Daily    sodium chloride flush  5-40 mL IntraVENous 2 times per day    cefTRIAXone (ROCEPHIN) IV  1,000 mg IntraVENous Q24H    insulin lispro  0-12 Units SubCUTAneous TID     insulin lispro  0-6 Units SubCUTAneous Nightly    dexamethasone  20 mg IntraVENous Q24H    Followed by   Jersey Do ON 10/8/2021] dexamethasone  10 mg IntraVENous Q24H     PRN Meds: sodium chloride, acetaminophen **OR** acetaminophen, sodium chloride flush, sodium chloride, ondansetron **OR** ondansetron, polyethylene glycol, glucose, dextrose, glucagon (rDNA), dextrose      ROS: reviewed from prior note, full ROS unchanged unless otherwise stated in hospital course/subjective portion.          Intake/Output Summary (Last 24 hours) at 10/6/2021 1758  Last data filed at 10/6/2021 0335  Gross per 24 hour   Intake 1097 ml   Output 200 ml   Net 897 ml       Exam:  /61   Pulse 82   Temp 97.8 °F (36.6 °C) (Oral)   Resp 22   Ht 5' 8\" (1.727 m)   Wt 218 lb 9.6 oz (99.2 kg)   SpO2 90%   BMI 33.24 kg/m²     General appearance: No distress, well developed, appears stated age. Eyes:  PERRL. Conjunctivae/corneas clear. HENT: Head normal appearing. Nares normal. Oral mucosa moist.  Hearing intact. Neck: Supple, with full range of motion. Trachea midline. No gross JVD appreciated. Respiratory: Diffuse inspiratory crackles throughout both lung fields bilaterally, no wheezing or retractions, no accessory muscle use. Cardiovascular: Normal rate, regular rhythm with normal S1/S2 without murmurs. No lower extremity edema. Abdomen: Soft, non-tender, non-distended with normal bowel sounds. Musculoskeletal: No joint swelling or tenderness. Normal tone. No abnormal movements. Skin: Warm and dry. No rashes or lesions. Neurologic:  No focal sensory/motor deficits in the upper or lower extremities. Cranial nerves:  grossly non-focal 2-12. Psychiatric: Alert and oriented, normal insight and thought content. Capillary Refill: Brisk,< 3 seconds. Peripheral Pulses: +2 palpable, equal bilaterally. Labs:   Recent Labs     10/04/21  0443 10/05/21  0346 10/06/21  0423   WBC 2.1* 2.4* 2.6*   HGB 10.3* 11.4* 11.4*   HCT 33.2* 38.4* 37.9*    168 165     Recent Labs     10/04/21  0443 10/05/21  0346 10/06/21  0423    143 147*   K 4.1 3.8 3.7    100 99   CO2 31 35* 35*   BUN 38* 46* 49*   CREATININE 1.5* 1.6* 1.5*   CALCIUM 8.0* 8.4* 8.3*     Recent Labs     10/04/21  0443 10/06/21  0423   AST 74* 66*   ALT 26 38   BILIDIR  --  <0.2   BILITOT 0.2* 0.2*   ALKPHOS 50 55     No results for input(s): INR in the last 72 hours. No results for input(s): Curlie Lat in the last 72 hours. No results for input(s): PROCAL in the last 72 hours.    Lab Results   Component Value Date    NITRU NEGATIVE 08/04/2019    WBCUA 0-2 02/02/2019    BACTERIA NONE 02/02/2019    RBCUA 0-2 02/02/2019    BLOODU NEGATIVE 08/04/2019    SPECGRAV 1.015 08/04/2019    GLUCOSEU NEGATIVE 02/02/2019 Radiology (48 hours):  No results found. DVT prophylaxis:    [x] Lovenox  [] SCDs  [] SQ Heparin  [] Encourage ambulation   [] Already on Anticoagulation       Diet: ADULT DIET; Regular; 4 carb choices (60 gm/meal); Low Sodium (2 gm)  Code Status: Full Code  PT/OT: We will assess   Tele: Continue   IVF: Saline lock.     Electronically signed by Arian Valdez MD on 10/6/2021 at 5:58 PM

## 2021-10-07 LAB
ANION GAP SERPL CALCULATED.3IONS-SCNC: 7 MEQ/L (ref 8–16)
BUN BLDV-MCNC: 52 MG/DL (ref 7–22)
CALCIUM SERPL-MCNC: 8.5 MG/DL (ref 8.5–10.5)
CHLORIDE BLD-SCNC: 101 MEQ/L (ref 98–111)
CO2: 37 MEQ/L (ref 23–33)
CREAT SERPL-MCNC: 1.5 MG/DL (ref 0.4–1.2)
GFR SERPL CREATININE-BSD FRML MDRD: 47 ML/MIN/1.73M2
GLUCOSE BLD-MCNC: 107 MG/DL (ref 70–108)
GLUCOSE BLD-MCNC: 153 MG/DL (ref 70–108)
GLUCOSE BLD-MCNC: 194 MG/DL (ref 70–108)
GLUCOSE BLD-MCNC: 274 MG/DL (ref 70–108)
GLUCOSE BLD-MCNC: 303 MG/DL (ref 70–108)
POTASSIUM SERPL-SCNC: 3.5 MEQ/L (ref 3.5–5.2)
SODIUM BLD-SCNC: 145 MEQ/L (ref 135–145)

## 2021-10-07 PROCEDURE — 6360000002 HC RX W HCPCS: Performed by: PEDIATRICS

## 2021-10-07 PROCEDURE — 82948 REAGENT STRIP/BLOOD GLUCOSE: CPT

## 2021-10-07 PROCEDURE — 2100000000 HC CCU R&B

## 2021-10-07 PROCEDURE — 6370000000 HC RX 637 (ALT 250 FOR IP): Performed by: PEDIATRICS

## 2021-10-07 PROCEDURE — 2580000003 HC RX 258: Performed by: PHYSICIAN ASSISTANT

## 2021-10-07 PROCEDURE — 6370000000 HC RX 637 (ALT 250 FOR IP): Performed by: STUDENT IN AN ORGANIZED HEALTH CARE EDUCATION/TRAINING PROGRAM

## 2021-10-07 PROCEDURE — 6360000002 HC RX W HCPCS: Performed by: STUDENT IN AN ORGANIZED HEALTH CARE EDUCATION/TRAINING PROGRAM

## 2021-10-07 PROCEDURE — 2060000000 HC ICU INTERMEDIATE R&B

## 2021-10-07 PROCEDURE — 2580000003 HC RX 258: Performed by: STUDENT IN AN ORGANIZED HEALTH CARE EDUCATION/TRAINING PROGRAM

## 2021-10-07 PROCEDURE — 97535 SELF CARE MNGMENT TRAINING: CPT

## 2021-10-07 PROCEDURE — 2580000003 HC RX 258: Performed by: PEDIATRICS

## 2021-10-07 PROCEDURE — 2500000003 HC RX 250 WO HCPCS: Performed by: STUDENT IN AN ORGANIZED HEALTH CARE EDUCATION/TRAINING PROGRAM

## 2021-10-07 PROCEDURE — 6370000000 HC RX 637 (ALT 250 FOR IP): Performed by: INTERNAL MEDICINE

## 2021-10-07 PROCEDURE — 97530 THERAPEUTIC ACTIVITIES: CPT

## 2021-10-07 PROCEDURE — 36415 COLL VENOUS BLD VENIPUNCTURE: CPT

## 2021-10-07 PROCEDURE — 99233 SBSQ HOSP IP/OBS HIGH 50: CPT | Performed by: PEDIATRICS

## 2021-10-07 PROCEDURE — 2700000000 HC OXYGEN THERAPY PER DAY

## 2021-10-07 PROCEDURE — 97116 GAIT TRAINING THERAPY: CPT

## 2021-10-07 PROCEDURE — 80048 BASIC METABOLIC PNL TOTAL CA: CPT

## 2021-10-07 PROCEDURE — 6360000002 HC RX W HCPCS: Performed by: PHYSICIAN ASSISTANT

## 2021-10-07 RX ORDER — POTASSIUM CHLORIDE 20 MEQ/1
20 TABLET, EXTENDED RELEASE ORAL ONCE
Status: COMPLETED | OUTPATIENT
Start: 2021-10-07 | End: 2021-10-07

## 2021-10-07 RX ORDER — SODIUM CHLORIDE 9 MG/ML
INJECTION, SOLUTION INTRAVENOUS CONTINUOUS
Status: DISCONTINUED | OUTPATIENT
Start: 2021-10-07 | End: 2021-10-08

## 2021-10-07 RX ADMIN — ENOXAPARIN SODIUM 30 MG: 30 INJECTION SUBCUTANEOUS at 20:23

## 2021-10-07 RX ADMIN — ALBUTEROL SULFATE 2 PUFF: 90 AEROSOL, METERED RESPIRATORY (INHALATION) at 20:11

## 2021-10-07 RX ADMIN — BARICITINIB 2 MG: 2 TABLET, FILM COATED ORAL at 10:07

## 2021-10-07 RX ADMIN — SODIUM CHLORIDE, PRESERVATIVE FREE 10 ML: 5 INJECTION INTRAVENOUS at 10:25

## 2021-10-07 RX ADMIN — DEXAMETHASONE SODIUM PHOSPHATE 20 MG: 4 INJECTION, SOLUTION INTRA-ARTICULAR; INTRALESIONAL; INTRAMUSCULAR; INTRAVENOUS; SOFT TISSUE at 10:11

## 2021-10-07 RX ADMIN — POTASSIUM CHLORIDE 20 MEQ: 1500 TABLET, EXTENDED RELEASE ORAL at 16:49

## 2021-10-07 RX ADMIN — GLIPIZIDE 5 MG: 5 TABLET ORAL at 16:39

## 2021-10-07 RX ADMIN — OXYCODONE HYDROCHLORIDE AND ACETAMINOPHEN 500 MG: 500 TABLET ORAL at 10:07

## 2021-10-07 RX ADMIN — HYDRALAZINE HYDROCHLORIDE 100 MG: 50 TABLET ORAL at 16:38

## 2021-10-07 RX ADMIN — Medication 6000 UNITS: at 10:06

## 2021-10-07 RX ADMIN — ACETAMINOPHEN 650 MG: 325 TABLET ORAL at 10:00

## 2021-10-07 RX ADMIN — REMDESIVIR 100 MG: 100 INJECTION, POWDER, LYOPHILIZED, FOR SOLUTION INTRAVENOUS at 10:15

## 2021-10-07 RX ADMIN — AZITHROMYCIN DIHYDRATE 500 MG: 500 INJECTION, POWDER, LYOPHILIZED, FOR SOLUTION INTRAVENOUS at 16:40

## 2021-10-07 RX ADMIN — SODIUM CHLORIDE, PRESERVATIVE FREE 10 ML: 5 INJECTION INTRAVENOUS at 20:22

## 2021-10-07 RX ADMIN — DOXAZOSIN 4 MG: 4 TABLET ORAL at 10:07

## 2021-10-07 RX ADMIN — HYDRALAZINE HYDROCHLORIDE 100 MG: 50 TABLET ORAL at 20:22

## 2021-10-07 RX ADMIN — SULFASALAZINE 500 MG: 500 TABLET ORAL at 20:22

## 2021-10-07 RX ADMIN — SULFASALAZINE 500 MG: 500 TABLET ORAL at 10:06

## 2021-10-07 RX ADMIN — LOSARTAN POTASSIUM 100 MG: 100 TABLET, FILM COATED ORAL at 10:07

## 2021-10-07 RX ADMIN — IPRATROPIUM BROMIDE 2 PUFF: 17 AEROSOL, METERED RESPIRATORY (INHALATION) at 20:10

## 2021-10-07 RX ADMIN — GABAPENTIN 300 MG: 300 CAPSULE ORAL at 10:07

## 2021-10-07 RX ADMIN — INSULIN LISPRO 2 UNITS: 100 INJECTION, SOLUTION INTRAVENOUS; SUBCUTANEOUS at 13:08

## 2021-10-07 RX ADMIN — ENOXAPARIN SODIUM 30 MG: 30 INJECTION SUBCUTANEOUS at 10:08

## 2021-10-07 RX ADMIN — ASPIRIN 81 MG: 81 TABLET, FILM COATED ORAL at 10:07

## 2021-10-07 RX ADMIN — HYDRALAZINE HYDROCHLORIDE 100 MG: 50 TABLET ORAL at 10:07

## 2021-10-07 RX ADMIN — SODIUM CHLORIDE: 9 INJECTION, SOLUTION INTRAVENOUS at 14:00

## 2021-10-07 RX ADMIN — AMLODIPINE BESYLATE 10 MG: 10 TABLET ORAL at 10:07

## 2021-10-07 RX ADMIN — Medication 50 MG: at 10:06

## 2021-10-07 RX ADMIN — CARVEDILOL 25 MG: 25 TABLET, FILM COATED ORAL at 10:07

## 2021-10-07 RX ADMIN — INSULIN LISPRO 4 UNITS: 100 INJECTION, SOLUTION INTRAVENOUS; SUBCUTANEOUS at 21:37

## 2021-10-07 RX ADMIN — CEFTRIAXONE SODIUM 1000 MG: 1 INJECTION, POWDER, FOR SOLUTION INTRAMUSCULAR; INTRAVENOUS at 10:14

## 2021-10-07 RX ADMIN — ALLOPURINOL 300 MG: 300 TABLET ORAL at 06:33

## 2021-10-07 RX ADMIN — INSULIN LISPRO 4 UNITS: 100 INJECTION, SOLUTION INTRAVENOUS; SUBCUTANEOUS at 16:53

## 2021-10-07 RX ADMIN — GABAPENTIN 300 MG: 300 CAPSULE ORAL at 20:22

## 2021-10-07 RX ADMIN — GLIPIZIDE 5 MG: 5 TABLET ORAL at 06:33

## 2021-10-07 RX ADMIN — CARVEDILOL 25 MG: 25 TABLET, FILM COATED ORAL at 20:23

## 2021-10-07 RX ADMIN — ATORVASTATIN CALCIUM 10 MG: 10 TABLET, FILM COATED ORAL at 10:24

## 2021-10-07 RX ADMIN — INSULIN GLARGINE 20 UNITS: 100 INJECTION, SOLUTION SUBCUTANEOUS at 21:36

## 2021-10-07 ASSESSMENT — PAIN SCALES - GENERAL
PAINLEVEL_OUTOF10: 5
PAINLEVEL_OUTOF10: 0

## 2021-10-07 NOTE — PROGRESS NOTES
Hospitalist Progress Note    Patient:  Scarlett Iqbal    YOB: 1955  Unit/Bed:4B-13/013-A  MRN: [id number]    Acct: [de-identified]   PCP: FELI Sher CNP    Date of Admission: 10/3/2021      Assessment/Plan:    Acute hypoxic respiratory failure  -Secondary to COVID-19/atypical or bacterial pneumonia. -Currently on high flow oxygen with 75% FiO2 and 60 L/min of flow to maintain O2 saturations between 88 to 92%. Treat underlying causes. COVID-19 pneumonia:  -CRP elevated more than 18 on admission. O2 supplementation with weaning per RT and COVID-19 protocol.  -Continue baricitinib, Rocephin, Decadron, vitamin D, vitamin C, zinc, and remdesivir. Sepsis secondary to COVID-19 pneumonia and possible bacterial pneumonia:  Patient on admission had a temperature of 102.3, heart rate of 124, and tachypneic with an identifiable source of infection. Also acute kidney injury and now leukopenia. All criteria for sepsis met. Continue IV antibiotic coverage with ceftriaxone and azithromycin. Strep pneumo urine antigen negative. Follow-up on Legionella antigen. Hemodynamic support with IV fluids. Treat underlying causes (COVID-19 and bacterial pneumonia). Suspected bacterial/atypical pneumonia:  Follow-up on Legionella urine antigen. Continue Rocephin and will add azithromycin. Coronary artery disease:  Continue optimal medical management with Coreg, aspirin, statin, and losartan. Hypertension  -Continue amlodipine, Coreg, hydralazine, doxazosin, losartan     Hyperlipidemia:  -continue Lipitor     HALEIGH  -Creatinine baseline 1.09. Patient seems to be volume depleted and dehydrated with a significantly increased BUN to creatinine ratio. Serum creatinine still at 1.5 and not significantly improved. Start gentle IV fluid hydration with normal saline at 50 cc an hour. Hold Lasix and Aldactone as well as potassium for now. Consider holding losartan if not improved.   Repeat renal function in a.m.     Diabetes  Steroid-induced hyperglycemia. Blood sugars slightly improved after increasing Lantus to 20 units subcu nightly. Continue Accu-Cheks before every meal and at bedtime and diabetic diet. Continue sliding scale insulin coverage. Continue home dose glyburide.      Leukopenia  Improving. Likely secondary to Covid. Monitor outpatient. Gout  -Continue allopurinol    Fluids/electrolytes/nutrition:  Normal saline at 50 cc an hour. Borderline hypokalemia will be replenished with 20 mEq of potassium. Diabetic diet. Expected discharge date: More than a week. Disposition:   [] Home  [] TCU  [] Rehab  [] Psych  [x] SNF  [] [facility]  [] Other-    ===================================================================      Chief Complaint: Hypoxia and shortness of breath. Subjective (past 24 hours):   No acute events overnight. Continues to require high oxygen supply to maintain O2 sats above 90%. Minimal oral intake but otherwise no acute events.     Medications:  Reviewed    Infusion Medications    sodium chloride      sodium chloride      dextrose       Scheduled Medications    azithromycin  500 mg IntraVENous Q24H    potassium chloride  20 mEq Oral Once    insulin glargine  20 Units SubCUTAneous Nightly    baricitinib  2 mg Oral Daily    albuterol sulfate HFA  2 puff Inhalation Q4H WA    ipratropium  2 puff Inhalation Q4H WA    allopurinol  300 mg Oral QAM AC    amLODIPine  10 mg Oral Daily    aspirin  81 mg Oral Daily    carvedilol  25 mg Oral BID    [Held by provider] furosemide  40 mg IntraVENous BID    gabapentin  300 mg Oral BID    glipiZIDE  5 mg Oral BID AC    hydrALAZINE  100 mg Oral TID    [Held by provider] potassium chloride  20 mEq Oral Daily    atorvastatin  10 mg Oral Daily    [Held by provider] spironolactone  25 mg Oral BID    sulfaSALAzine  500 mg Oral BID    losartan  100 mg Oral Daily    doxazosin  4 mg Oral Daily    enoxaparin  30 mg SubCUTAneous BID    Vitamin D  6,000 Units Oral Daily    Followed by   Chris Jack ON 10/10/2021] Vitamin D  2,000 Units Oral Daily    ascorbic acid  500 mg Oral Daily    zinc sulfate  50 mg Oral Daily    sodium chloride flush  5-40 mL IntraVENous 2 times per day    cefTRIAXone (ROCEPHIN) IV  1,000 mg IntraVENous Q24H    insulin lispro  0-12 Units SubCUTAneous TID WC    insulin lispro  0-6 Units SubCUTAneous Nightly    [START ON 10/8/2021] dexamethasone  10 mg IntraVENous Q24H     PRN Meds: sodium chloride, acetaminophen **OR** acetaminophen, sodium chloride flush, sodium chloride, ondansetron **OR** ondansetron, polyethylene glycol, glucose, dextrose, glucagon (rDNA), dextrose      ROS: reviewed from prior note, full ROS unchanged unless otherwise stated in hospital course/subjective portion. Intake/Output Summary (Last 24 hours) at 10/7/2021 1336  Last data filed at 10/7/2021 0235  Gross per 24 hour   Intake 225 ml   Output 200 ml   Net 25 ml       Exam:  BP (!) 154/72   Pulse 75   Temp 100 °F (37.8 °C) (Axillary)   Resp 20   Ht 5' 8\" (1.727 m)   Wt 217 lb 4.8 oz (98.6 kg)   SpO2 92%   BMI 33.04 kg/m²     General appearance: No distress, well developed, appears stated age. Eyes:  PERRL. Conjunctivae/corneas clear. HENT: Head normal appearing. Nares normal. Oral mucosa moist.  Hearing intact. Neck: Supple, with full range of motion. Trachea midline. No gross JVD appreciated. Respiratory: Diffuse inspiratory crackles throughout both lung fields bilaterally, no wheezing or retractions, no accessory muscle use. Cardiovascular: Normal rate, regular rhythm with normal S1/S2 without murmurs. No lower extremity edema. Abdomen: Soft, non-tender, non-distended with normal bowel sounds. Musculoskeletal: No joint swelling or tenderness. Normal tone. No abnormal movements. Skin: Warm and dry. No rashes or lesions.   Neurologic:  No focal sensory/motor deficits in the upper or lower extremities. Cranial nerves:  grossly non-focal 2-12. Psychiatric: Alert and oriented, normal insight and thought content. Capillary Refill: Brisk,< 3 seconds. Peripheral Pulses: +2 palpable, equal bilaterally. Labs:   Recent Labs     10/05/21  0346 10/06/21  0423   WBC 2.4* 2.6*   HGB 11.4* 11.4*   HCT 38.4* 37.9*    165     Recent Labs     10/05/21  0346 10/06/21  0423 10/07/21  0359    147* 145   K 3.8 3.7 3.5    99 101   CO2 35* 35* 37*   BUN 46* 49* 52*   CREATININE 1.6* 1.5* 1.5*   CALCIUM 8.4* 8.3* 8.5     Recent Labs     10/06/21  0423   AST 66*   ALT 38   BILIDIR <0.2   BILITOT 0.2*   ALKPHOS 55     No results for input(s): INR in the last 72 hours. No results for input(s): Amanda Kras in the last 72 hours. No results for input(s): PROCAL in the last 72 hours. Lab Results   Component Value Date    NITRU NEGATIVE 08/04/2019    WBCUA 0-2 02/02/2019    BACTERIA NONE 02/02/2019    RBCUA 0-2 02/02/2019    BLOODU NEGATIVE 08/04/2019    SPECGRAV 1.015 08/04/2019    GLUCOSEU NEGATIVE 02/02/2019       Radiology (48 hours):  No results found. DVT prophylaxis:    [x] Lovenox  [] SCDs  [] SQ Heparin  [] Encourage ambulation   [] Already on Anticoagulation       Diet: ADULT DIET; Regular; 4 carb choices (60 gm/meal); Low Sodium (2 gm)  Code Status: Full Code  PT/OT: We will assess   Tele: Continue   IVF: Normal saline at 50 cc an hour.     Electronically signed by Rustam Chaparro MD on 10/7/2021 at 1:36 PM

## 2021-10-07 NOTE — PROGRESS NOTES
Physician Progress Note      Paradise Early  CSN #:                  [id number]  :                       1955  ADMIT DATE:       10/3/2021 4:25 AM  DISCH DATE:  RESPONDING  PROVIDER #:        Ani JO          QUERY TEXT:    Attending,    Patient admitted with COVID-19 pneumonia. H&P states, \"sepsis POA,\" however   this diagnosis has not been documented since. Pt noted to have T 102.3, P   124, SpO2 69% on RA, WBC 4.4..2.1, procalcitonin 0.33, CRP 18.98, lactic acid   wnl, acute respiratory failure, HALEIGH. If possible, please respond below and   document in the progress notes and discharge summary if the sepsis was: The medical record reflects the following:  Risk Factors: COVID-19 pneumonia, DM 2, CAD, HFpEF, HTN, BMI 35, advanced age  Clinical Indicators: T 102.3, P 124, SpO2 69% on RA, WBC 4.4..2.1,   procalcitonin 0.33, CRP 18.98, lactic acid wnl, acute respiratory failure, HALEIGH  Treatment: baricitinib, remdesivir, tocilizumab, IV Rocephin, Decadron, Vit C   & D, zinc, O2/BiPAP, labs, imaging    Thank you! Elana Rader, RN, BSN, RHIT, CCDS  RN Clinical   [phone]  Options provided:  -- Sepsis POA confirmed after study  -- Sepsis POA treated and resolved  -- Sepsis ruled out after study  -- Other - I will add my own diagnosis  -- Disagree - Not applicable / Not valid  -- Disagree - Clinically unable to determine / Unknown  -- Refer to Clinical Documentation Reviewer    PROVIDER RESPONSE TEXT:    Sepsis POA confirmed after study.     Query created by: [place] on 10/6/2021 2:41 PM      Electronically signed by:  Mirian Almaguer 10/7/2021 1:22 PM

## 2021-10-07 NOTE — PROGRESS NOTES
[facility]  INPATIENT PHYSICAL THERAPY  DAILY NOTE  [facility] CVICU 4B - 4B-12/012-A    Time In: 6618  Time Out: 2443  Timed Code Treatment Minutes: 64 Minutes  Minutes: 64          Date: 10/7/2021  Patient Name: Nahomy Deng,  Gender:  male        MRN: [id number]  : 1955  (74 y.o.)     Referring Practitioner: Dr. Alma Neely  Diagnosis: acute respiratory failure with hypoxemia  Additional Pertinent Hx: admit with above diagnosis, COVID-19 pneumonia with sepsis, DM, CHF     Prior Level of Function:  Lives With: Spouse  Type of Home: House  Home Layout: One level  Home Access: Stairs to enter with rails  Entrance Stairs - Number of Steps: 2  Home Equipment:  (no AD PTA,)   Bathroom Shower/Tub: Walk-in shower  Bathroom Toilet: Standard  Bathroom Equipment: Shower chair    ADL Assistance: Independent  Homemaking Assistance: Independent (shares IADL tasks with wife)  Ambulation Assistance: Independent  Transfer Assistance: Independent  Additional Comments: No home O2    Restrictions/Precautions:  Restrictions/Precautions: General Precautions, Fall Risk, Isolation  Position Activity Restriction  Other position/activity restrictions: droplet +;     SUBJECTIVE: pleasant and cooperative, pt motivated to sit up    PAIN: no c/o pain during session    Vitals: Oxygen:  RN switched pt from BiPAP to HFNC at 60L/min at 78% with O2 sats at 93-95% throughout session    OBJECTIVE:  Bed Mobility:  Rolling to Left: Stand By Assistance   Supine to Sit: Stand By Assistance  Scooting: Stand By Assistance  Use BR  Transfers:  Sit to Stand: Contact Guard Assistance, to SBA x3 trials  Stand to Sit:Contact Guard Assistance  Pt incontinent of bowel and bladder-pt stated he was aware-encouraged pt to alert staff instead of laying in wet bedding to protect skin-pt verbalized understanding  Ambulation:  Contact Guard Assistance  Distance: 2'x1  Surface: Level Tile  Device:No Device  Gait Deviations:  Min unsteady but no LOB    Balance:  Static Sitting Balance:  Modified Independent  Dynamic Sitting Balance: Supervision, reaching head to waist level, no UE support, S  Static Standing Balance: Contact Guard Assistance, no UE support, tolerated around 5 minx1 ,1minx2        Functional Outcome Measures: Completed  AM-PAC Inpatient Mobility Raw Score : 17  AM-PAC Inpatient T-Scale Score : 42.13    ASSESSMENT:  Assessment: Patient progressing toward established goals. Activity Tolerance:  Patient tolerance of  treatment: good. Equipment Recommendations: Other: cont to assess needs  Discharge Recommendations:  Continue to assess pending progress    Plan: Times per week: 5X C  Times per day: Daily  Specific instructions for Next Treatment: therex and mobility    Patient Education  Patient Education: Bed Mobility, Transfers, Gait    Goals:  Patient goals : not stated  Short term goals  Time Frame for Short term goals: by discharge  Short term goal 1: bed mobility with S to get in/out of bed  Short term goal 2: transfer with S to get in/out of chairs  Short term goal 3: amb >100'x1 with/without AD and S, maintaining O2 sats >90% on </=4L O2, to walk safely in home  Short term goal 4: negotiate 2 steps with HR and SBA to enter home safely  Long term goals  Time Frame for Long term goals : no LTGs set secondary to short ELOS    Following session, patient left in safe position with all fall risk precautions in place.

## 2021-10-07 NOTE — PROGRESS NOTES
[address] [address]  [facility] CVICU 4B  Occupational Therapy  Daily Note  Time:   Time In: 1200  Time Out: 1223  Timed Code Treatment Minutes: 23 Minutes  Minutes: 23          Date: 10/7/2021  Patient Name: Maria Esther Steel,   Gender: male      Room: -13/013-A  MRN: [id number]  : 1955  (74 y.o.)  Referring Practitioner: Shelly Simpson MD  Diagnosis: acute respiratory failure with hypoxemia  Additional Pertinent Hx: Per H&P:Patient reports feeling worsening shortness of breath over the last 2 days. His wife tested positive for COVID-19 a few days ago. Decided to get evaluated in the ED today and called EMS. While in the ED he was found to be hypoxemic on room air at 78%. Pt found to haveCOVID-19 Pneumonia with Sepsis    Restrictions/Precautions:  Restrictions/Precautions: General Precautions, Fall Risk, Isolation  Position Activity Restriction  Other position/activity restrictions: droplet +;     SUBJECTIVE: Nurse Melba Goldmann ok'd session, Up in chair upon arrival agreeable to OT session, confusion noted    PAIN: 0/10:     Vitals: Patient on 60 L O2at Fi02 75% via High Flow  upon arrival to room. Patient O2 sats at 92 %. Upon activity patient maintaining O2 at 92 %. Pt requiring minimal rest break(s) to recover. COGNITION: confusion noted    ADL:   Grooming: with set-up. combed hair sitting in bedside chair  Bathing: Minimal Assistance. completed sitting in bedside chair. ADDITIONAL ACTIVITIES:  Guided patient through BUE AROM this date x10 reps x1 set in chair in order to increase BUE strength and improve activity tolerance for ADLs and homemaking tasks. ASSESSMENT:     Activity Tolerance:  Patient tolerance of  treatment: good. Discharge Recommendations: Patient would benefit from continued therapy after discharge, Continue to assess pending progress   Equipment Recommendations:  Other: will continue to monitor pending progress  Plan: Times per week: 5x  Current Treatment Recommendations: Strengthening, Balance Training, Functional Mobility Training, Endurance Training, Safety Education & Training, Patient/Caregiver Education & Training, Equipment Evaluation, Education, & procurement, Self-Care / ADL    Patient Education  Patient Education: ADL's and Home Exercise Program    Goals  Short term goals  Time Frame for Short term goals: by discharge  Short term goal 1: Pt will increase activity tolerance for functional mobility to/from [facility] or chair, progressing to bathroom as able, with supervision and Sp02 >=90% in prep for ADL completion. Short term goal 2: Pt will complete BADL tasks with supervision, incorporating ECT prn, to increase independence and ease with self care tasks. Short term goal 3: Pt will tolerate dynamic standing X4-5 minutes with supervision and Sp02 >=90% in prep for sinkside grooming/toileting tasks. Following session, patient left in safe position with all fall risk precautions in place.

## 2021-10-08 LAB
ANION GAP SERPL CALCULATED.3IONS-SCNC: 8 MEQ/L (ref 8–16)
BUN BLDV-MCNC: 46 MG/DL (ref 7–22)
CALCIUM SERPL-MCNC: 8.3 MG/DL (ref 8.5–10.5)
CHLORIDE BLD-SCNC: 104 MEQ/L (ref 98–111)
CO2: 34 MEQ/L (ref 23–33)
CREAT SERPL-MCNC: 1.3 MG/DL (ref 0.4–1.2)
GFR SERPL CREATININE-BSD FRML MDRD: 55 ML/MIN/1.73M2
GLUCOSE BLD-MCNC: 129 MG/DL (ref 70–108)
GLUCOSE BLD-MCNC: 135 MG/DL (ref 70–108)
GLUCOSE BLD-MCNC: 291 MG/DL (ref 70–108)
GLUCOSE BLD-MCNC: 314 MG/DL (ref 70–108)
GLUCOSE BLD-MCNC: 341 MG/DL (ref 70–108)
GLUCOSE BLD-MCNC: 69 MG/DL (ref 70–108)
MAGNESIUM: 2.1 MG/DL (ref 1.6–2.4)
POTASSIUM SERPL-SCNC: 3.8 MEQ/L (ref 3.5–5.2)
SODIUM BLD-SCNC: 146 MEQ/L (ref 135–145)

## 2021-10-08 PROCEDURE — 6360000002 HC RX W HCPCS: Performed by: STUDENT IN AN ORGANIZED HEALTH CARE EDUCATION/TRAINING PROGRAM

## 2021-10-08 PROCEDURE — 94640 AIRWAY INHALATION TREATMENT: CPT

## 2021-10-08 PROCEDURE — 83735 ASSAY OF MAGNESIUM: CPT

## 2021-10-08 PROCEDURE — 2060000000 HC ICU INTERMEDIATE R&B

## 2021-10-08 PROCEDURE — 6360000002 HC RX W HCPCS: Performed by: PEDIATRICS

## 2021-10-08 PROCEDURE — 99233 SBSQ HOSP IP/OBS HIGH 50: CPT | Performed by: PEDIATRICS

## 2021-10-08 PROCEDURE — 82948 REAGENT STRIP/BLOOD GLUCOSE: CPT

## 2021-10-08 PROCEDURE — 6370000000 HC RX 637 (ALT 250 FOR IP): Performed by: STUDENT IN AN ORGANIZED HEALTH CARE EDUCATION/TRAINING PROGRAM

## 2021-10-08 PROCEDURE — 97530 THERAPEUTIC ACTIVITIES: CPT

## 2021-10-08 PROCEDURE — 97110 THERAPEUTIC EXERCISES: CPT

## 2021-10-08 PROCEDURE — 80048 BASIC METABOLIC PNL TOTAL CA: CPT

## 2021-10-08 PROCEDURE — 2700000000 HC OXYGEN THERAPY PER DAY

## 2021-10-08 PROCEDURE — 97116 GAIT TRAINING THERAPY: CPT

## 2021-10-08 PROCEDURE — 2580000003 HC RX 258: Performed by: STUDENT IN AN ORGANIZED HEALTH CARE EDUCATION/TRAINING PROGRAM

## 2021-10-08 PROCEDURE — 94761 N-INVAS EAR/PLS OXIMETRY MLT: CPT

## 2021-10-08 PROCEDURE — 2580000003 HC RX 258: Performed by: PEDIATRICS

## 2021-10-08 PROCEDURE — 6360000002 HC RX W HCPCS: Performed by: PHYSICIAN ASSISTANT

## 2021-10-08 PROCEDURE — 36415 COLL VENOUS BLD VENIPUNCTURE: CPT

## 2021-10-08 PROCEDURE — 6370000000 HC RX 637 (ALT 250 FOR IP): Performed by: INTERNAL MEDICINE

## 2021-10-08 RX ORDER — FUROSEMIDE 20 MG/1
20 TABLET ORAL 2 TIMES DAILY
Status: DISCONTINUED | OUTPATIENT
Start: 2021-10-09 | End: 2021-10-10

## 2021-10-08 RX ORDER — SODIUM CHLORIDE 9 MG/ML
INJECTION, SOLUTION INTRAVENOUS CONTINUOUS
Status: DISCONTINUED | OUTPATIENT
Start: 2021-10-08 | End: 2021-10-09

## 2021-10-08 RX ORDER — INSULIN GLARGINE 100 [IU]/ML
15 INJECTION, SOLUTION SUBCUTANEOUS DAILY
Status: DISCONTINUED | OUTPATIENT
Start: 2021-10-09 | End: 2021-10-09

## 2021-10-08 RX ADMIN — ASPIRIN 81 MG: 81 TABLET, FILM COATED ORAL at 10:44

## 2021-10-08 RX ADMIN — GABAPENTIN 300 MG: 300 CAPSULE ORAL at 10:44

## 2021-10-08 RX ADMIN — OXYCODONE HYDROCHLORIDE AND ACETAMINOPHEN 500 MG: 500 TABLET ORAL at 10:44

## 2021-10-08 RX ADMIN — INSULIN LISPRO 6 UNITS: 100 INJECTION, SOLUTION INTRAVENOUS; SUBCUTANEOUS at 18:46

## 2021-10-08 RX ADMIN — IPRATROPIUM BROMIDE 2 PUFF: 17 AEROSOL, METERED RESPIRATORY (INHALATION) at 14:15

## 2021-10-08 RX ADMIN — GABAPENTIN 300 MG: 300 CAPSULE ORAL at 21:18

## 2021-10-08 RX ADMIN — SULFASALAZINE 500 MG: 500 TABLET ORAL at 21:18

## 2021-10-08 RX ADMIN — GLIPIZIDE 5 MG: 5 TABLET ORAL at 06:04

## 2021-10-08 RX ADMIN — DOXAZOSIN 4 MG: 4 TABLET ORAL at 10:44

## 2021-10-08 RX ADMIN — IPRATROPIUM BROMIDE 2 PUFF: 17 AEROSOL, METERED RESPIRATORY (INHALATION) at 21:19

## 2021-10-08 RX ADMIN — HYDRALAZINE HYDROCHLORIDE 100 MG: 50 TABLET ORAL at 10:44

## 2021-10-08 RX ADMIN — DEXAMETHASONE SODIUM PHOSPHATE 10 MG: 4 INJECTION, SOLUTION INTRAMUSCULAR; INTRAVENOUS at 10:43

## 2021-10-08 RX ADMIN — ATORVASTATIN CALCIUM 10 MG: 10 TABLET, FILM COATED ORAL at 10:44

## 2021-10-08 RX ADMIN — INSULIN LISPRO 4 UNITS: 100 INJECTION, SOLUTION INTRAVENOUS; SUBCUTANEOUS at 22:22

## 2021-10-08 RX ADMIN — ALBUTEROL SULFATE 2 PUFF: 90 AEROSOL, METERED RESPIRATORY (INHALATION) at 14:15

## 2021-10-08 RX ADMIN — AZITHROMYCIN DIHYDRATE 500 MG: 500 INJECTION, POWDER, LYOPHILIZED, FOR SOLUTION INTRAVENOUS at 14:27

## 2021-10-08 RX ADMIN — Medication 6000 UNITS: at 10:43

## 2021-10-08 RX ADMIN — ENOXAPARIN SODIUM 30 MG: 30 INJECTION SUBCUTANEOUS at 21:18

## 2021-10-08 RX ADMIN — ALBUTEROL SULFATE 2 PUFF: 90 AEROSOL, METERED RESPIRATORY (INHALATION) at 18:06

## 2021-10-08 RX ADMIN — HYDRALAZINE HYDROCHLORIDE 100 MG: 50 TABLET ORAL at 14:28

## 2021-10-08 RX ADMIN — IPRATROPIUM BROMIDE 2 PUFF: 17 AEROSOL, METERED RESPIRATORY (INHALATION) at 18:06

## 2021-10-08 RX ADMIN — ALBUTEROL SULFATE 2 PUFF: 90 AEROSOL, METERED RESPIRATORY (INHALATION) at 09:00

## 2021-10-08 RX ADMIN — CARVEDILOL 25 MG: 25 TABLET, FILM COATED ORAL at 21:18

## 2021-10-08 RX ADMIN — ALBUTEROL SULFATE 2 PUFF: 90 AEROSOL, METERED RESPIRATORY (INHALATION) at 21:19

## 2021-10-08 RX ADMIN — ALLOPURINOL 300 MG: 300 TABLET ORAL at 06:04

## 2021-10-08 RX ADMIN — AMLODIPINE BESYLATE 10 MG: 10 TABLET ORAL at 10:44

## 2021-10-08 RX ADMIN — ENOXAPARIN SODIUM 30 MG: 30 INJECTION SUBCUTANEOUS at 10:45

## 2021-10-08 RX ADMIN — Medication 50 MG: at 10:43

## 2021-10-08 RX ADMIN — HYDRALAZINE HYDROCHLORIDE 100 MG: 50 TABLET ORAL at 21:18

## 2021-10-08 RX ADMIN — BARICITINIB 2 MG: 2 TABLET, FILM COATED ORAL at 10:44

## 2021-10-08 RX ADMIN — GLIPIZIDE 5 MG: 5 TABLET ORAL at 16:34

## 2021-10-08 RX ADMIN — CEFTRIAXONE SODIUM 1000 MG: 1 INJECTION, POWDER, FOR SOLUTION INTRAMUSCULAR; INTRAVENOUS at 10:48

## 2021-10-08 RX ADMIN — LOSARTAN POTASSIUM 100 MG: 100 TABLET, FILM COATED ORAL at 10:43

## 2021-10-08 RX ADMIN — SULFASALAZINE 500 MG: 500 TABLET ORAL at 10:43

## 2021-10-08 RX ADMIN — CARVEDILOL 25 MG: 25 TABLET, FILM COATED ORAL at 10:44

## 2021-10-08 RX ADMIN — IPRATROPIUM BROMIDE 2 PUFF: 17 AEROSOL, METERED RESPIRATORY (INHALATION) at 09:00

## 2021-10-08 NOTE — PROGRESS NOTES
[address] [address]  INPATIENT PHYSICAL THERAPY  DAILY NOTE  [facility] CV[facility] 4B - 4B-13/013-A      Time In: 1001  Time Out: 1026  Timed Code Treatment Minutes: 25 Minutes  Minutes: 25          Date: 10/8/2021  Patient Name: Todd Crockett,  Gender:  male        MRN: [id number]  : 1955  (74 y.o.)     Referring Practitioner: Dr. Patricia Golden  Diagnosis: acute respiratory failure with hypoxemia  Additional Pertinent Hx: admit with above diagnosis, COVID-19 pneumonia with sepsis, DM, CHF     Prior Level of Function:  Lives With: Spouse  Type of Home: House  Home Layout: One level  Home Access: Stairs to enter with rails  Entrance Stairs - Number of Steps: 2  Home Equipment:  (no AD PTA,)   Bathroom Shower/Tub: Walk-in shower  Bathroom Toilet: Standard  Bathroom Equipment: Shower chair    ADL Assistance: Independent  Homemaking Assistance: Independent (shares IADL tasks with wife)  Ambulation Assistance: Independent  Transfer Assistance: Independent  Additional Comments: No home O2    Restrictions/Precautions:  Restrictions/Precautions: General Precautions, Fall Risk, Isolation  Position Activity Restriction  Other position/activity restrictions: droplet +;       SUBJECTIVE: pt up in chair on arrival he is [facility] but seemed less confused this date as previous date- pt did however cont to ask if he was going home     PAIN: 0/10:       Vitals: Oxygen: high flow- 60L at 85%- Pts O2 sats   Heart Rate: 80's    OBJECTIVE:  Bed Mobility:  Not Tested    Transfers:  Sit to Stand: Stand By Assistance  Stand to Sit:Stand By Assistance    Ambulation:  Contact Guard Assistance  Distance: few feet forward and back with one UE at support of tray table   Surface: Level Tile  Device:No Device  Gait Deviations:  Pt limited due to high flow lines- pts O2 sats were good throughout session       Exercise:  Patient was guided in 1-2 set(s) 10 reps of exercise to both lower extremities.   Ankle pumps, Heelslides, Hip abduction/adduction, Straight leg raises, Seated marches, Seated hamstring curls, Long arc quads and upper trunk rotations and shoulder horz abd/add- pts O2 sats were great throughout exercises . Exercises were completed for increased independence with functional mobility. Functional Outcome Measures: Completed  AM-PAC Inpatient Mobility Raw Score : 17  AM-PAC Inpatient T-Scale Score : 42.13    ASSESSMENT:  Assessment: Patient progressing toward established goals. and Pt cont to be on high flow throughout session however his sats were WNL thorughout session. Pt would benefit from cont skilled therapy   Activity Tolerance:  Patient tolerance of  treatment: good. Equipment Recommendations: Other: cont to assess needs  Discharge Recommendations:    Continue to assess pending progress    Plan: Times per week: 5X C  Times per day: Daily  Specific instructions for Next Treatment: therex and mobility    Patient Education  Patient Education: Plan of Care    Goals:  Patient goals : not stated  Short term goals  Time Frame for Short term goals: by discharge  Short term goal 1: bed mobility with S to get in/out of bed  Short term goal 2: transfer with S to get in/out of chairs  Short term goal 3: amb >100'x1 with/without AD and S, maintaining O2 sats >90% on </=4L O2, to walk safely in home  Short term goal 4: negotiate 2 steps with HR and SBA to enter home safely  Long term goals  Time Frame for Long term goals : no LTGs set secondary to short ELOS    Following session, patient left in safe position with all fall risk precautions in place.

## 2021-10-08 NOTE — PROGRESS NOTES
[facility]  Occupational Therapy  Daily Note  Time:   Time In: 3666  Time Out: 7245  Timed Code Treatment Minutes: 23 Minutes  Minutes: 23          Date: 10/8/2021  Patient Name: Drucie Bosworth,   Gender: male      Room: [facility]13/013-A  MRN: [id number]  : 1955  (74 y.o.)  Referring Practitioner: Vianca Luna MD  Diagnosis: acute respiratory failure with hypoxemia  Additional Pertinent Hx: Per H&P:Patient reports feeling worsening shortness of breath over the last 2 days. His wife tested positive for COVID-19 a few days ago. Decided to get evaluated in the ED today and called EMS. While in the ED he was found to be hypoxemic on room air at 78%. Pt found to haveCOVID-19 Pneumonia with Sepsis    Restrictions/Precautions:  Restrictions/Precautions: General Precautions, Fall Risk, Isolation  Position Activity Restriction  Other position/activity restrictions: droplet +;     SUBJECTIVE: RN ok'd session, Up in chair upon arrival, agreeable to OT session, [facility]    PAIN: 0/10:     Vitals: Patient on 60 L O2 at Fi02 at 85% via High Flow  upon arrival to room. Patient O2 sats at 95 %. Upon activity patient maintaining O2 at 90 %. Pt requiring moderate rest break(s) to recover. COGNITION: Slow Processing, Decreased Insight, Impaired Memory, Decreased Problem Solving and Decreased Safety Awareness    ADL:   Grooming: with set-up. combed hair sitting in bedside chair  Lower Extremity Dressing: Supervision. donned/doffed B slipper socks sitting in bedside chair, increased time, MinV C for not holding breath durnig task      ADDITIONAL ACTIVITIES:  Guided patient through BUE AROM this date x15 reps x1 set in chair in order to increase BUE strength and improve activity tolerance for ADLs and homemaking tasks. ASSESSMENT:     Activity Tolerance:  Patient tolerance of  treatment: good.        Discharge Recommendations: Patient would benefit from continued therapy after discharge, Continue to assess pending progress   Equipment Recommendations: Other: will continue to monitor pending progress  Plan: Times per week: 5x  Current Treatment Recommendations: Strengthening, Balance Training, Functional Mobility Training, Endurance Training, Safety Education & Training, Patient/Caregiver Education & Training, Equipment Evaluation, Education, & procurement, Self-Care / ADL    Patient Education  Patient Education: ADL's and Home Exercise Program    Goals  Short term goals  Time Frame for Short term goals: by discharge  Short term goal 1: Pt will increase activity tolerance for functional mobility to/from [facility] or chair, progressing to bathroom as able, with supervision and Sp02 >=90% in prep for ADL completion. Short term goal 2: Pt will complete BADL tasks with supervision, incorporating ECT prn, to increase independence and ease with self care tasks. Short term goal 3: Pt will tolerate dynamic standing X4-5 minutes with supervision and Sp02 >=90% in prep for sinkside grooming/toileting tasks. Following session, patient left in safe position with all fall risk precautions in place.

## 2021-10-08 NOTE — PROGRESS NOTES
Hospitalist Progress Note    Patient:  Rupinder Smith    YOB: 1955  Unit/Bed:4B-13/013-A  MRN: [id number]    Acct: [de-identified]   PCP: FELI Tavarez CNP    Date of Admission: 10/3/2021      Assessment/Plan:    Acute hypoxic respiratory failure  -Secondary to COVID-19/atypical or bacterial pneumonia. -Currently on high flow oxygen with 85% FiO2 and 60 L/min of flow to maintain O2 saturations between 88 to 92%. Treat underlying causes. COVID-19 pneumonia:  -CRP elevated more than 18 on admission. -O2 supplementation with weaning per RT and COVID-19 protocol.  -Continue baricitinib, Rocephin, Decadron, vitamin D, vitamin C, zinc, and remdesivir. Sepsis secondary to COVID-19 pneumonia and possible bacterial pneumonia:  Patient on admission had a temperature of 102.3, heart rate of 124, and tachypneic with an identifiable source of infection. Also acute kidney injury and now leukopenia. All criteria for sepsis met. Procalcitonin elevated at 0.33 on admission. Continue IV antibiotic coverage with ceftriaxone (today is day 6 of 7) and azithromycin (today is day 2 of 5). Strep pneumo and Legionella urine antigen negative. Treat underlying causes (COVID-19 and bacterial pneumonia). Suspected bacterial/atypical pneumonia:  Both pneumococcus and Legionella urine antigen negative. Continue 1 more day of Rocephin and 3 more days of azithromycin. Coronary artery disease:  Continue optimal medical management with Coreg, aspirin, statin, and losartan. Hypertension  Mildly elevated readings today. Continue amlodipine, Coreg, hydralazine, doxazosin, losartan. KVO IV fluids.     Hyperlipidemia:  -continue Lipitor     HALEIGH  -Creatinine baseline 1.09. Now slightly improving down to 1.3 after gentle IV fluid hydration. Patient seems to be volume depleted and dehydrated with a significantly increased BUN to creatinine ratio. Again now slightly improving.   [place] IV fluids. Resume oral home dose Lasix and potassium tomorrow a.m. 10/9. Continue to hold Aldactone and likely discontinue upon discharge. Repeat renal function in a.m.     Diabetes  Steroid-induced hyperglycemia. Blood sugars overall improved with Lantus, however, early morning hypoglycemia. Will decrease Lantus dose down to 15 units and changed to every morning. Continue Accu-Cheks before every meal and at bedtime and diabetic diet. Continue sliding scale insulin coverage. Continue home dose glyburide.      Leukopenia  Improving. Likely secondary to Covid. Monitor outpatient. Gout  -Continue allopurinol    Fluids/electrolytes/nutrition:  Normal saline at 25 cc an hour. Borderline hypokalemia resolved. .  Diabetic diet. Expected discharge date: More than a week. Disposition:   [] Home  [] TCU  [] Rehab  [] Psych  [x] SNF  [] [place]  [] Other-    ===================================================================      Chief Complaint: Hypoxia and shortness of breath. Subjective (past 24 hours):   No acute events overnight. Continues to require high oxygen supply to maintain O2 sats above 90% although on slightly decreased settings compared to yesterday. Better oral intake today.     Medications:  Reviewed    Infusion Medications    sodium chloride      sodium chloride      dextrose       Scheduled Medications    [START ON 10/9/2021] furosemide  20 mg Oral BID    azithromycin  500 mg IntraVENous Q24H    insulin glargine  20 Units SubCUTAneous Nightly    baricitinib  2 mg Oral Daily    albuterol sulfate HFA  2 puff Inhalation Q4H WA    ipratropium  2 puff Inhalation Q4H WA    allopurinol  300 mg Oral QAM AC    amLODIPine  10 mg Oral Daily    aspirin  81 mg Oral Daily    carvedilol  25 mg Oral BID    gabapentin  300 mg Oral BID    glipiZIDE  5 mg Oral BID AC    hydrALAZINE  100 mg Oral TID    potassium chloride  20 mEq Oral Daily    atorvastatin  10 mg Oral Daily  [Held by provider] spironolactone  25 mg Oral BID    sulfaSALAzine  500 mg Oral BID    losartan  100 mg Oral Daily    doxazosin  4 mg Oral Daily    enoxaparin  30 mg SubCUTAneous BID    Vitamin D  6,000 Units Oral Daily    Followed by   Marquita Najjar ON 10/10/2021] Vitamin D  2,000 Units Oral Daily    ascorbic acid  500 mg Oral Daily    zinc sulfate  50 mg Oral Daily    sodium chloride flush  5-40 mL IntraVENous 2 times per day    cefTRIAXone (ROCEPHIN) IV  1,000 mg IntraVENous Q24H    insulin lispro  0-12 Units SubCUTAneous TID     insulin lispro  0-6 Units SubCUTAneous Nightly    dexamethasone  10 mg IntraVENous Q24H     PRN Meds: sodium chloride, acetaminophen **OR** acetaminophen, sodium chloride flush, sodium chloride, ondansetron **OR** ondansetron, polyethylene glycol, glucose, dextrose, glucagon (rDNA), dextrose      ROS: reviewed from prior note, full ROS unchanged unless otherwise stated in hospital course/subjective portion. Intake/Output Summary (Last 24 hours) at 10/8/2021 1450  Last data filed at 10/8/2021 0941  Gross per 24 hour   Intake 1550 ml   Output 540 ml   Net 1010 ml       Exam:  BP (!) 148/70   Pulse 67   Temp 97.4 °F (36.3 °C) (Oral)   Resp 22   Ht 5' 8\" (1.727 m)   Wt 228 lb 0.8 oz (103.4 kg)   SpO2 95%   BMI 34.67 kg/m²     General appearance: No distress, well developed, appears stated age. Eyes:  PERRL. Conjunctivae/corneas clear. HENT: Head normal appearing. Nares normal. Oral mucosa moist.  Hearing intact. Neck: Supple, with full range of motion. Trachea midline. No gross JVD appreciated. Respiratory: Diffuse inspiratory crackles throughout both lung fields bilaterally, no wheezing or retractions, no accessory muscle use. Cardiovascular: Normal rate, regular rhythm with normal S1/S2 without murmurs. No lower extremity edema. Abdomen: Soft, non-tender, non-distended with normal bowel sounds. Musculoskeletal: No joint swelling or tenderness. Normal tone. No abnormal movements. Skin: Warm and dry. No rashes or lesions. Neurologic:  No focal sensory/motor deficits in the upper or lower extremities. Cranial nerves:  grossly non-focal 2-12. Psychiatric: Alert and oriented, normal insight and thought content. Capillary Refill: Brisk,< 3 seconds. Peripheral Pulses: +2 palpable, equal bilaterally. Labs:   Recent Labs     10/06/21  0423   WBC 2.6*   HGB 11.4*   HCT 37.9*        Recent Labs     10/06/21  0423 10/07/21  0359 10/08/21  0414   * 145 146*   K 3.7 3.5 3.8   CL 99 101 104   CO2 35* 37* 34*   BUN 49* 52* 46*   CREATININE 1.5* 1.5* 1.3*   CALCIUM 8.3* 8.5 8.3*     Recent Labs     10/06/21  0423   AST 66*   ALT 38   BILIDIR <0.2   BILITOT 0.2*   ALKPHOS 55     No results for input(s): INR in the last 72 hours. No results for input(s): Luli Gazella in the last 72 hours. No results for input(s): PROCAL in the last 72 hours. Lab Results   Component Value Date    NITRU NEGATIVE 08/04/2019    WBCUA 0-2 02/02/2019    BACTERIA NONE 02/02/2019    RBCUA 0-2 02/02/2019    BLOODU NEGATIVE 08/04/2019    SPECGRAV 1.015 08/04/2019    GLUCOSEU NEGATIVE 02/02/2019       Radiology (48 hours):  No results found. DVT prophylaxis:    [x] Lovenox  [] SCDs  [] SQ Heparin  [] Encourage ambulation   [] Already on Anticoagulation       Diet: ADULT DIET; Regular; 4 carb choices (60 gm/meal); Low Sodium (2 gm)  Code Status: Full Code  PT/OT: We will assess   Tele: Continue   IVF: Normal saline at 25 cc an hour.     Electronically signed by Santi Pelletier MD on 10/8/2021 at 2:50 PM

## 2021-10-09 LAB
GLUCOSE BLD-MCNC: 176 MG/DL (ref 70–108)
GLUCOSE BLD-MCNC: 297 MG/DL (ref 70–108)
GLUCOSE BLD-MCNC: 301 MG/DL (ref 70–108)
GLUCOSE BLD-MCNC: 320 MG/DL (ref 70–108)
GLUCOSE BLD-MCNC: 95 MG/DL (ref 70–108)

## 2021-10-09 PROCEDURE — 6370000000 HC RX 637 (ALT 250 FOR IP): Performed by: STUDENT IN AN ORGANIZED HEALTH CARE EDUCATION/TRAINING PROGRAM

## 2021-10-09 PROCEDURE — 2580000003 HC RX 258: Performed by: PEDIATRICS

## 2021-10-09 PROCEDURE — 6360000002 HC RX W HCPCS: Performed by: STUDENT IN AN ORGANIZED HEALTH CARE EDUCATION/TRAINING PROGRAM

## 2021-10-09 PROCEDURE — 2060000000 HC ICU INTERMEDIATE R&B

## 2021-10-09 PROCEDURE — 6360000002 HC RX W HCPCS: Performed by: PHYSICIAN ASSISTANT

## 2021-10-09 PROCEDURE — 2580000003 HC RX 258: Performed by: STUDENT IN AN ORGANIZED HEALTH CARE EDUCATION/TRAINING PROGRAM

## 2021-10-09 PROCEDURE — 6360000002 HC RX W HCPCS: Performed by: PEDIATRICS

## 2021-10-09 PROCEDURE — 94761 N-INVAS EAR/PLS OXIMETRY MLT: CPT

## 2021-10-09 PROCEDURE — 94640 AIRWAY INHALATION TREATMENT: CPT

## 2021-10-09 PROCEDURE — 6370000000 HC RX 637 (ALT 250 FOR IP): Performed by: PEDIATRICS

## 2021-10-09 PROCEDURE — 99233 SBSQ HOSP IP/OBS HIGH 50: CPT | Performed by: PEDIATRICS

## 2021-10-09 PROCEDURE — 2700000000 HC OXYGEN THERAPY PER DAY

## 2021-10-09 PROCEDURE — 6370000000 HC RX 637 (ALT 250 FOR IP): Performed by: INTERNAL MEDICINE

## 2021-10-09 PROCEDURE — 82948 REAGENT STRIP/BLOOD GLUCOSE: CPT

## 2021-10-09 RX ORDER — INSULIN GLARGINE 100 [IU]/ML
20 INJECTION, SOLUTION SUBCUTANEOUS DAILY
Status: DISCONTINUED | OUTPATIENT
Start: 2021-10-10 | End: 2021-10-10

## 2021-10-09 RX ADMIN — IPRATROPIUM BROMIDE 2 PUFF: 17 AEROSOL, METERED RESPIRATORY (INHALATION) at 15:59

## 2021-10-09 RX ADMIN — Medication 50 MG: at 09:18

## 2021-10-09 RX ADMIN — DOXAZOSIN 4 MG: 4 TABLET ORAL at 09:19

## 2021-10-09 RX ADMIN — ENOXAPARIN SODIUM 30 MG: 30 INJECTION SUBCUTANEOUS at 21:43

## 2021-10-09 RX ADMIN — DEXAMETHASONE SODIUM PHOSPHATE 10 MG: 4 INJECTION, SOLUTION INTRAMUSCULAR; INTRAVENOUS at 09:19

## 2021-10-09 RX ADMIN — CEFTRIAXONE SODIUM 1000 MG: 1 INJECTION, POWDER, FOR SOLUTION INTRAMUSCULAR; INTRAVENOUS at 09:30

## 2021-10-09 RX ADMIN — SODIUM CHLORIDE, PRESERVATIVE FREE 10 ML: 5 INJECTION INTRAVENOUS at 09:20

## 2021-10-09 RX ADMIN — AMLODIPINE BESYLATE 10 MG: 10 TABLET ORAL at 09:17

## 2021-10-09 RX ADMIN — HYDRALAZINE HYDROCHLORIDE 100 MG: 50 TABLET ORAL at 14:06

## 2021-10-09 RX ADMIN — Medication 6000 UNITS: at 09:17

## 2021-10-09 RX ADMIN — GABAPENTIN 300 MG: 300 CAPSULE ORAL at 09:18

## 2021-10-09 RX ADMIN — BARICITINIB 2 MG: 2 TABLET, FILM COATED ORAL at 09:39

## 2021-10-09 RX ADMIN — AZITHROMYCIN DIHYDRATE 500 MG: 500 INJECTION, POWDER, LYOPHILIZED, FOR SOLUTION INTRAVENOUS at 14:05

## 2021-10-09 RX ADMIN — ATORVASTATIN CALCIUM 10 MG: 10 TABLET, FILM COATED ORAL at 09:16

## 2021-10-09 RX ADMIN — ASPIRIN 81 MG: 81 TABLET, FILM COATED ORAL at 09:39

## 2021-10-09 RX ADMIN — INSULIN LISPRO 4 UNITS: 100 INJECTION, SOLUTION INTRAVENOUS; SUBCUTANEOUS at 21:07

## 2021-10-09 RX ADMIN — ALBUTEROL SULFATE 2 PUFF: 90 AEROSOL, METERED RESPIRATORY (INHALATION) at 08:40

## 2021-10-09 RX ADMIN — GLIPIZIDE 5 MG: 5 TABLET ORAL at 09:18

## 2021-10-09 RX ADMIN — INSULIN LISPRO 8 UNITS: 100 INJECTION, SOLUTION INTRAVENOUS; SUBCUTANEOUS at 17:09

## 2021-10-09 RX ADMIN — ENOXAPARIN SODIUM 30 MG: 30 INJECTION SUBCUTANEOUS at 09:39

## 2021-10-09 RX ADMIN — ALBUTEROL SULFATE 2 PUFF: 90 AEROSOL, METERED RESPIRATORY (INHALATION) at 15:59

## 2021-10-09 RX ADMIN — FUROSEMIDE 20 MG: 20 TABLET ORAL at 16:47

## 2021-10-09 RX ADMIN — FUROSEMIDE 20 MG: 20 TABLET ORAL at 09:18

## 2021-10-09 RX ADMIN — ALBUTEROL SULFATE 2 PUFF: 90 AEROSOL, METERED RESPIRATORY (INHALATION) at 20:01

## 2021-10-09 RX ADMIN — POTASSIUM CHLORIDE 20 MEQ: 1500 TABLET, EXTENDED RELEASE ORAL at 09:39

## 2021-10-09 RX ADMIN — LOSARTAN POTASSIUM 100 MG: 100 TABLET, FILM COATED ORAL at 09:16

## 2021-10-09 RX ADMIN — ALBUTEROL SULFATE 2 PUFF: 90 AEROSOL, METERED RESPIRATORY (INHALATION) at 11:57

## 2021-10-09 RX ADMIN — OXYCODONE HYDROCHLORIDE AND ACETAMINOPHEN 500 MG: 500 TABLET ORAL at 09:18

## 2021-10-09 RX ADMIN — ALLOPURINOL 300 MG: 300 TABLET ORAL at 06:50

## 2021-10-09 RX ADMIN — IPRATROPIUM BROMIDE 2 PUFF: 17 AEROSOL, METERED RESPIRATORY (INHALATION) at 08:40

## 2021-10-09 RX ADMIN — HYDRALAZINE HYDROCHLORIDE 100 MG: 50 TABLET ORAL at 21:40

## 2021-10-09 RX ADMIN — IPRATROPIUM BROMIDE 2 PUFF: 17 AEROSOL, METERED RESPIRATORY (INHALATION) at 11:58

## 2021-10-09 RX ADMIN — SULFASALAZINE 500 MG: 500 TABLET ORAL at 21:41

## 2021-10-09 RX ADMIN — CARVEDILOL 25 MG: 25 TABLET, FILM COATED ORAL at 21:40

## 2021-10-09 RX ADMIN — GABAPENTIN 300 MG: 300 CAPSULE ORAL at 21:40

## 2021-10-09 RX ADMIN — INSULIN LISPRO 2 UNITS: 100 INJECTION, SOLUTION INTRAVENOUS; SUBCUTANEOUS at 12:29

## 2021-10-09 RX ADMIN — INSULIN GLARGINE 15 UNITS: 100 INJECTION, SOLUTION SUBCUTANEOUS at 12:29

## 2021-10-09 RX ADMIN — CARVEDILOL 25 MG: 25 TABLET, FILM COATED ORAL at 09:18

## 2021-10-09 RX ADMIN — GLIPIZIDE 5 MG: 5 TABLET ORAL at 16:47

## 2021-10-09 RX ADMIN — SULFASALAZINE 500 MG: 500 TABLET ORAL at 09:19

## 2021-10-09 RX ADMIN — IPRATROPIUM BROMIDE 2 PUFF: 17 AEROSOL, METERED RESPIRATORY (INHALATION) at 20:01

## 2021-10-09 RX ADMIN — HYDRALAZINE HYDROCHLORIDE 100 MG: 50 TABLET ORAL at 09:17

## 2021-10-09 ASSESSMENT — PAIN SCALES - GENERAL
PAINLEVEL_OUTOF10: 0

## 2021-10-09 NOTE — PLAN OF CARE
Problem: Falls - Risk of:  Goal: Will remain free from falls  Description: Will remain free from falls  Outcome: Met This Shift  Goal: Absence of physical injury  Description: Absence of physical injury  Outcome: Met This Shift     Problem:  Body Temperature -  Risk of, Imbalanced  Goal: Ability to maintain a body temperature within defined limits  Outcome: Met This Shift  Goal: Will regain or maintain usual level of consciousness  Outcome: Met This Shift  Goal: Complications related to the disease process, condition or treatment will be avoided or minimized  Outcome: Met This Shift     Problem: Isolation Precautions - Risk of Spread of Infection  Goal: Prevent transmission of infection  Outcome: Met This Shift     Problem: Nutrition Deficits  Goal: Optimize nutritional status  Outcome: Met This Shift     Problem: Risk for Fluid Volume Deficit  Goal: Maintain normal heart rhythm  Outcome: Met This Shift  Goal: Maintain absence of muscle cramping  Outcome: Met This Shift  Goal: Maintain normal serum potassium, sodium, calcium, phosphorus, and pH  Outcome: Met This Shift     Problem: Loneliness or Risk for Loneliness  Goal: Demonstrate positive use of time alone when socialization is not possible  Outcome: Met This Shift     Problem: Fatigue  Goal: Verbalize increase energy and improved vitality  Outcome: Met This Shift     Problem: Patient Education: Go to Patient Education Activity  Goal: Patient/Family Education  Outcome: Met This Shift     Problem: Musculor/Skeletal Functional Status  Goal: Highest potential functional level  Outcome: Met This Shift

## 2021-10-09 NOTE — PROGRESS NOTES
2.    Hospitalist Progress Note    Patient:  Maria Esther Steel    YOB: 1955  Unit/Bed:8B-29/029-A  MRN: [id number]    Acct: [de-identified]   PCP: FELI Larsen CNP    Date of Admission: 10/3/2021      Assessment/Plan:    Acute hypoxic respiratory failure  -Secondary to COVID-19/atypical or bacterial pneumonia. -Currently on high flow oxygen with 85% FiO2 and 60 L/min of flow to maintain O2 saturations between at 92%. Treat underlying causes. COVID-19 pneumonia:  -CRP elevated more than 18 on admission. -O2 supplementation with weaning per RT and COVID-19 protocol.  -Continue baricitinib, Rocephin, Decadron, vitamin D, vitamin C, zinc, and remdesivir. Sepsis secondary to COVID-19 pneumonia and possible bacterial pneumonia:  Patient on admission had a temperature of 102.3, heart rate of 124, and tachypneic with an identifiable source of infection. Also acute kidney injury and now leukopenia. All criteria for sepsis met. Procalcitonin elevated at 0.33 on admission. Continue IV antibiotic coverage with ceftriaxone (today is day 7 of 7) and azithromycin (today is day 3 of 5). Strep pneumo and Legionella urine antigen negative. Treat underlying causes (COVID-19 and bacterial pneumonia). Suspected bacterial/atypical pneumonia:  Both pneumococcus and Legionella urine antigen negative. Status post completion of a 7-day course of Rocephin 10/9. Continue azithromycin for 2 more days. Coronary artery disease:  Continue optimal medical management with Coreg, aspirin, statin, and losartan. Hypertension  Mildly elevated readings today. Continue amlodipine, Coreg, hydralazine, doxazosin, losartan. KVO IV fluids.     Hyperlipidemia:  -continue Lipitor     HALEIGH  -Creatinine baseline 1.09. Now slightly improving down to 1.3 after gentle IV fluid hydration. Patient seems to be volume depleted and dehydrated with a significantly increased BUN to creatinine ratio.   Again now slightly improving. Saline lock IV fluids  Resumed oral home dose Lasix today 10/9. Continue to hold Aldactone and likely discontinue upon discharge. Repeat renal function in a.m.     Diabetes  Steroid-induced hyperglycemia. Blood sugars slightly improved with Lantus but remains mostly controlled in the 200 range. Will increase Lantus dose to 20 units subcu every morning  Improved early morning hypoglycemia with changing Lantus to a.m. Continue Accu-Cheks before every meal and at bedtime and diabetic diet. Continue sliding scale insulin coverage. Continue home dose glyburide.      Leukopenia  Improving. Likely secondary to Covid. Repeat CBC in a.m. Monitor outpatient. Gout  -Continue allopurinol    Fluids/electrolytes/nutrition:  Saline lock IV fluids  Borderline hypokalemia resolved. Diabetic diet. Expected discharge date: More than a week. Disposition:   [] Home  [] TCU  [] Rehab  [] Psych  [x] SNF  [] [place]  [] Other-    ===================================================================      Chief Complaint: Hypoxia and shortness of breath. Subjective (past 24 hours):   No acute eliciting overnight. Responding well to Lasix. Tolerating oral intake and reports no issues with elimination.       Medications:  Reviewed    Infusion Medications    sodium chloride      dextrose       Scheduled Medications    [START ON 10/10/2021] insulin glargine  20 Units SubCUTAneous Daily    furosemide  20 mg Oral BID    azithromycin  500 mg IntraVENous Q24H    baricitinib  2 mg Oral Daily    albuterol sulfate HFA  2 puff Inhalation Q4H WA    ipratropium  2 puff Inhalation Q4H WA    allopurinol  300 mg Oral QAM AC    amLODIPine  10 mg Oral Daily    aspirin  81 mg Oral Daily    carvedilol  25 mg Oral BID    gabapentin  300 mg Oral BID    glipiZIDE  5 mg Oral BID AC    hydrALAZINE  100 mg Oral TID    potassium chloride  20 mEq Oral Daily    atorvastatin  10 mg Oral Daily  [Held by provider] spironolactone  25 mg Oral BID    sulfaSALAzine  500 mg Oral BID    losartan  100 mg Oral Daily    doxazosin  4 mg Oral Daily    enoxaparin  30 mg SubCUTAneous BID    Vitamin D  6,000 Units Oral Daily    Followed by   Stephen Oliver ON 10/10/2021] Vitamin D  2,000 Units Oral Daily    ascorbic acid  500 mg Oral Daily    zinc sulfate  50 mg Oral Daily    sodium chloride flush  5-40 mL IntraVENous 2 times per day    insulin lispro  0-12 Units SubCUTAneous TID WC    insulin lispro  0-6 Units SubCUTAneous Nightly    dexamethasone  10 mg IntraVENous Q24H     PRN Meds: sodium chloride, acetaminophen **OR** acetaminophen, sodium chloride flush, sodium chloride, ondansetron **OR** ondansetron, polyethylene glycol, glucose, dextrose, glucagon (rDNA), dextrose      ROS: reviewed from prior note, full ROS unchanged unless otherwise stated in hospital course/subjective portion. Intake/Output Summary (Last 24 hours) at 10/9/2021 1929  Last data filed at 10/9/2021 1758  Gross per 24 hour   Intake 1061.5 ml   Output 1475 ml   Net -413.5 ml       Exam:  /73   Pulse 78   Temp 98.1 °F (36.7 °C) (Oral)   Resp 19   Ht 5' 8\" (1.727 m)   Wt 219 lb (99.3 kg)   SpO2 98%   BMI 33.30 kg/m²     General appearance: No distress, well developed, appears stated age. Eyes:  PERRL. Conjunctivae/corneas clear. HENT: Head normal appearing. Nares normal. Oral mucosa moist.  Hearing intact. Neck: Supple, with full range of motion. Trachea midline. No gross JVD appreciated. Respiratory: Diffuse inspiratory crackles throughout both lung fields bilaterally, no wheezing or retractions, no accessory muscle use. Cardiovascular: Normal rate, regular rhythm with normal S1/S2 without murmurs. No lower extremity edema. Abdomen: Soft, non-tender, non-distended with normal bowel sounds. Musculoskeletal: No joint swelling or tenderness. Normal tone. No abnormal movements. Skin: Warm and dry.  No rashes or lesions. Neurologic:  No focal sensory/motor deficits in the upper or lower extremities. Cranial nerves:  grossly non-focal 2-12. Psychiatric: Alert and oriented, normal insight and thought content. Capillary Refill: Brisk,< 3 seconds. Peripheral Pulses: +2 palpable, equal bilaterally. Labs:   No results for input(s): WBC, HGB, HCT, PLT in the last 72 hours. Recent Labs     10/07/21  0359 10/08/21  0414    146*   K 3.5 3.8    104   CO2 37* 34*   BUN 52* 46*   CREATININE 1.5* 1.3*   CALCIUM 8.5 8.3*     No results for input(s): AST, ALT, BILIDIR, BILITOT, ALKPHOS in the last 72 hours. No results for input(s): INR in the last 72 hours. No results for input(s): Fred Jakes in the last 72 hours. No results for input(s): PROCAL in the last 72 hours. Lab Results   Component Value Date    NITRU NEGATIVE 08/04/2019    WBCUA 0-2 02/02/2019    BACTERIA NONE 02/02/2019    RBCUA 0-2 02/02/2019    BLOODU NEGATIVE 08/04/2019    SPECGRAV 1.015 08/04/2019    GLUCOSEU NEGATIVE 02/02/2019       Radiology (48 hours):  No results found. DVT prophylaxis:    [x] Lovenox  [] SCDs  [] SQ Heparin  [] Encourage ambulation   [] Already on Anticoagulation       Diet: ADULT DIET; Regular; 4 carb choices (60 gm/meal); Low Sodium (2 gm)  Code Status: Full Code  PT/OT: We will assess   Tele: Continue   IVF: Saline lock IV fluids.     Electronically signed by Katya Hull MD on 10/9/2021 at 7:29 PM

## 2021-10-09 NOTE — PROGRESS NOTES
RN attempted to give pt family an update on plan of care, no answer. Left voicemail with unit phone number if they would like to call back.     Noah Klein RN

## 2021-10-10 LAB
ALLEN TEST: ABNORMAL
ANION GAP SERPL CALCULATED.3IONS-SCNC: 7 MEQ/L (ref 8–16)
BASE EXCESS (CALCULATED): 10.6 MMOL/L (ref -2.5–2.5)
BASOPHILS # BLD: 0.2 %
BASOPHILS ABSOLUTE: 0 THOU/MM3 (ref 0–0.1)
BUN BLDV-MCNC: 37 MG/DL (ref 7–22)
CALCIUM SERPL-MCNC: 7.9 MG/DL (ref 8.5–10.5)
CHLORIDE BLD-SCNC: 100 MEQ/L (ref 98–111)
CO2: 36 MEQ/L (ref 23–33)
COLLECTED BY:: ABNORMAL
CREAT SERPL-MCNC: 1.2 MG/DL (ref 0.4–1.2)
DEVICE: ABNORMAL
EOSINOPHIL # BLD: 1 %
EOSINOPHILS ABSOLUTE: 0 THOU/MM3 (ref 0–0.4)
ERYTHROCYTE [DISTWIDTH] IN BLOOD BY AUTOMATED COUNT: 14.3 % (ref 11.5–14.5)
ERYTHROCYTE [DISTWIDTH] IN BLOOD BY AUTOMATED COUNT: 48.5 FL (ref 35–45)
GFR SERPL CREATININE-BSD FRML MDRD: 60 ML/MIN/1.73M2
GLUCOSE BLD-MCNC: 101 MG/DL (ref 70–108)
GLUCOSE BLD-MCNC: 150 MG/DL (ref 70–108)
GLUCOSE BLD-MCNC: 185 MG/DL (ref 70–108)
GLUCOSE BLD-MCNC: 85 MG/DL (ref 70–108)
GLUCOSE BLD-MCNC: 98 MG/DL (ref 70–108)
HCO3: 37 MMOL/L (ref 23–28)
HCT VFR BLD CALC: 36.5 % (ref 42–52)
HEMOGLOBIN: 10.9 GM/DL (ref 14–18)
IFIO2: 70
IMMATURE GRANS (ABS): 0.05 THOU/MM3 (ref 0–0.07)
IMMATURE GRANULOCYTES: 1.2 %
LYMPHOCYTES # BLD: 19.2 %
LYMPHOCYTES ABSOLUTE: 0.8 THOU/MM3 (ref 1–4.8)
MAGNESIUM: 1.9 MG/DL (ref 1.6–2.4)
MCH RBC QN AUTO: 28.2 PG (ref 26–33)
MCHC RBC AUTO-ENTMCNC: 29.9 GM/DL (ref 32.2–35.5)
MCV RBC AUTO: 94.3 FL (ref 80–94)
MONOCYTES # BLD: 13.3 %
MONOCYTES ABSOLUTE: 0.6 THOU/MM3 (ref 0.4–1.3)
NUCLEATED RED BLOOD CELLS: 0 /100 WBC
O2 SATURATION: 98 %
PCO2: 60 MMHG (ref 35–45)
PH BLOOD GAS: 7.41 (ref 7.35–7.45)
PLATELET # BLD: 180 THOU/MM3 (ref 130–400)
PMV BLD AUTO: 11.2 FL (ref 9.4–12.4)
PO2: 103 MMHG (ref 71–104)
POTASSIUM SERPL-SCNC: 3.6 MEQ/L (ref 3.5–5.2)
RBC # BLD: 3.87 MILL/MM3 (ref 4.7–6.1)
SEG NEUTROPHILS: 65.1 %
SEGMENTED NEUTROPHILS ABSOLUTE COUNT: 2.7 THOU/MM3 (ref 1.8–7.7)
SODIUM BLD-SCNC: 143 MEQ/L (ref 135–145)
SOURCE, BLOOD GAS: ABNORMAL
VITAMIN D 25-HYDROXY: 45 NG/ML (ref 30–100)
WBC # BLD: 4.2 THOU/MM3 (ref 4.8–10.8)

## 2021-10-10 PROCEDURE — 6370000000 HC RX 637 (ALT 250 FOR IP): Performed by: STUDENT IN AN ORGANIZED HEALTH CARE EDUCATION/TRAINING PROGRAM

## 2021-10-10 PROCEDURE — 80048 BASIC METABOLIC PNL TOTAL CA: CPT

## 2021-10-10 PROCEDURE — 82803 BLOOD GASES ANY COMBINATION: CPT

## 2021-10-10 PROCEDURE — 94761 N-INVAS EAR/PLS OXIMETRY MLT: CPT

## 2021-10-10 PROCEDURE — 2060000000 HC ICU INTERMEDIATE R&B

## 2021-10-10 PROCEDURE — 6360000002 HC RX W HCPCS: Performed by: HOSPITALIST

## 2021-10-10 PROCEDURE — 83735 ASSAY OF MAGNESIUM: CPT

## 2021-10-10 PROCEDURE — 36415 COLL VENOUS BLD VENIPUNCTURE: CPT

## 2021-10-10 PROCEDURE — 99233 SBSQ HOSP IP/OBS HIGH 50: CPT | Performed by: HOSPITALIST

## 2021-10-10 PROCEDURE — 82948 REAGENT STRIP/BLOOD GLUCOSE: CPT

## 2021-10-10 PROCEDURE — 94760 N-INVAS EAR/PLS OXIMETRY 1: CPT

## 2021-10-10 PROCEDURE — 6370000000 HC RX 637 (ALT 250 FOR IP): Performed by: INTERNAL MEDICINE

## 2021-10-10 PROCEDURE — 2700000000 HC OXYGEN THERAPY PER DAY

## 2021-10-10 PROCEDURE — 85025 COMPLETE CBC W/AUTO DIFF WBC: CPT

## 2021-10-10 PROCEDURE — 82306 VITAMIN D 25 HYDROXY: CPT

## 2021-10-10 PROCEDURE — 36600 WITHDRAWAL OF ARTERIAL BLOOD: CPT

## 2021-10-10 RX ORDER — INSULIN GLARGINE 100 [IU]/ML
15 INJECTION, SOLUTION SUBCUTANEOUS DAILY
Status: DISCONTINUED | OUTPATIENT
Start: 2021-10-11 | End: 2021-10-11

## 2021-10-10 RX ORDER — ALBUTEROL SULFATE 90 UG/1
2 AEROSOL, METERED RESPIRATORY (INHALATION) EVERY 4 HOURS PRN
Status: DISCONTINUED | OUTPATIENT
Start: 2021-10-10 | End: 2021-10-18 | Stop reason: HOSPADM

## 2021-10-10 RX ORDER — PANTOPRAZOLE SODIUM 40 MG/1
40 TABLET, DELAYED RELEASE ORAL
Status: DISCONTINUED | OUTPATIENT
Start: 2021-10-11 | End: 2021-10-18 | Stop reason: HOSPADM

## 2021-10-10 RX ORDER — DEXAMETHASONE 4 MG/1
6 TABLET ORAL DAILY
Status: DISCONTINUED | OUTPATIENT
Start: 2021-10-10 | End: 2021-10-18 | Stop reason: HOSPADM

## 2021-10-10 RX ADMIN — ASPIRIN 81 MG: 81 TABLET, FILM COATED ORAL at 09:34

## 2021-10-10 RX ADMIN — CARVEDILOL 25 MG: 25 TABLET, FILM COATED ORAL at 09:34

## 2021-10-10 RX ADMIN — OXYCODONE HYDROCHLORIDE AND ACETAMINOPHEN 500 MG: 500 TABLET ORAL at 09:34

## 2021-10-10 RX ADMIN — LOSARTAN POTASSIUM 100 MG: 100 TABLET, FILM COATED ORAL at 09:34

## 2021-10-10 RX ADMIN — Medication 2000 UNITS: at 09:35

## 2021-10-10 RX ADMIN — AMLODIPINE BESYLATE 10 MG: 10 TABLET ORAL at 09:34

## 2021-10-10 RX ADMIN — INSULIN LISPRO 1 UNITS: 100 INJECTION, SOLUTION INTRAVENOUS; SUBCUTANEOUS at 20:53

## 2021-10-10 RX ADMIN — DOXAZOSIN 4 MG: 4 TABLET ORAL at 09:35

## 2021-10-10 RX ADMIN — HYDRALAZINE HYDROCHLORIDE 100 MG: 50 TABLET ORAL at 20:52

## 2021-10-10 RX ADMIN — GABAPENTIN 300 MG: 300 CAPSULE ORAL at 20:52

## 2021-10-10 RX ADMIN — Medication 50 MG: at 09:34

## 2021-10-10 RX ADMIN — DEXAMETHASONE 6 MG: 4 TABLET ORAL at 14:55

## 2021-10-10 RX ADMIN — BARICITINIB 2 MG: 2 TABLET, FILM COATED ORAL at 09:34

## 2021-10-10 RX ADMIN — ENOXAPARIN SODIUM 40 MG: 40 INJECTION SUBCUTANEOUS at 20:52

## 2021-10-10 RX ADMIN — HYDRALAZINE HYDROCHLORIDE 100 MG: 50 TABLET ORAL at 14:54

## 2021-10-10 RX ADMIN — HYDRALAZINE HYDROCHLORIDE 100 MG: 50 TABLET ORAL at 09:34

## 2021-10-10 RX ADMIN — CARVEDILOL 25 MG: 25 TABLET, FILM COATED ORAL at 20:52

## 2021-10-10 RX ADMIN — GABAPENTIN 300 MG: 300 CAPSULE ORAL at 09:33

## 2021-10-10 RX ADMIN — ATORVASTATIN CALCIUM 10 MG: 10 TABLET, FILM COATED ORAL at 09:35

## 2021-10-10 RX ADMIN — ALLOPURINOL 300 MG: 300 TABLET ORAL at 09:35

## 2021-10-10 RX ADMIN — POTASSIUM CHLORIDE 20 MEQ: 1500 TABLET, EXTENDED RELEASE ORAL at 09:35

## 2021-10-10 RX ADMIN — GLIPIZIDE 5 MG: 5 TABLET ORAL at 09:34

## 2021-10-10 RX ADMIN — INSULIN LISPRO 1 UNITS: 100 INJECTION, SOLUTION INTRAVENOUS; SUBCUTANEOUS at 14:39

## 2021-10-10 ASSESSMENT — PAIN SCALES - GENERAL
PAINLEVEL_OUTOF10: 0
PAINLEVEL_OUTOF10: 0

## 2021-10-10 NOTE — PROGRESS NOTES
Hospitalist Progress Note      Patient:  Alin Richards    Unit/Bed:8B-29/029-A  YOB: 1955  MRN: [id number]   Acct: [de-identified]   PCP: FELI Calle - CNP  Date of Admission: 10/3/2021    Assessment/Plan:    Acute hypoxic and hypercapneic (unkown chronicity) respiratory failure 2/2 COVID-19 pneumonia:  -Secondary to COVID-19/atypical or bacterial pneumonia. -Currently on HFO w/ SaO2 at high 90s; RN aware to wean aggressively as tolerated to SaO2 greater than 90%. IS/Acapella/PT to see  On Baricitinib (noted previously on Remdesivir as well) and Dexamethasone (switched to Po at 6 mg QD) and added PPI  - Pt completed 7-day empirical treatment course of Abx for possible superimposed bacterial CAP. D/c'ed ABx  - Already on ASA 81 QD  -Ok to continue w/ vitamin D, vitamin C, zinc  - D/c'ed antitussives and switched MAHOGANY to prn  - repeat ABG; no documented Hx of COPD; pt former heavy smoker; may add empiric tx for COPD; pt will benefit from OP PFT and sleep study        Sepsis secondary to COVID-19 pneumonia and possible bacterial pneumonia:  Resolved     Coronary artery disease:  Stable. CMT.    Hx of HTN  Fairly controlled on home meds. CCM and monitor     Hyperlipidemia:  - on statin    Vitamin D deficiency: on cholecacipherol; repeat 25(OH) vit D level sent.     CKD stage III: stable. Will monitor. Avoid nephrotoxins. D/c'ed IVF       Hx of DM II: well-controlled on home OHA and Insluin glargine last A1C 5.7%  Modified to MDI to standing order plus SSI as needed; pt back on home Glipizide but off Metformin. systemic CS also contributing to hyperglycemia. Will reassess response to treatment. diabetic diet, SSI, accuchecks, and hypoglycemia orders in place. Leukopenia (resolved)  Likely secondary to Covid. Hx of HFpEF: clinically euvolemic. CMT. I/O, daily wt, salt (2 gr) and fluid (2L) restriction       Gout  - stable.  Continue home allopurinol     Obesity w/ BMI 33.30       Chief Complaint: SOB    Initial H and P:-    Admitted for management of Acute hypoxic respiratory failure secondary to COVID 19. I took over care on 10/10/2021      Subjective (past 24 hours): Not engaged in conversation; feeling improved. SOB better. Denies CP/fever/chils/palpitation/diarrhea/N/V/abdo pain      Past medical history, family history, social history and allergies reviewed again and is unchanged since admission. ROS (All review of systems completed. Pertinent positives noted.  Otherwise All other systems reviewed and negative.)     Medications:  Reviewed    Infusion Medications    sodium chloride      dextrose       Scheduled Medications    insulin glargine  20 Units SubCUTAneous Daily    furosemide  20 mg Oral BID    azithromycin  500 mg IntraVENous Q24H    baricitinib  2 mg Oral Daily    albuterol sulfate HFA  2 puff Inhalation Q4H WA    ipratropium  2 puff Inhalation Q4H WA    allopurinol  300 mg Oral QAM AC    amLODIPine  10 mg Oral Daily    aspirin  81 mg Oral Daily    carvedilol  25 mg Oral BID    gabapentin  300 mg Oral BID    glipiZIDE  5 mg Oral BID AC    hydrALAZINE  100 mg Oral TID    potassium chloride  20 mEq Oral Daily    atorvastatin  10 mg Oral Daily    [Held by provider] spironolactone  25 mg Oral BID    sulfaSALAzine  500 mg Oral BID    losartan  100 mg Oral Daily    doxazosin  4 mg Oral Daily    enoxaparin  30 mg SubCUTAneous BID    Vitamin D  6,000 Units Oral Daily    Followed by   Sawyer Rodriguez Vitamin D  2,000 Units Oral Daily    ascorbic acid  500 mg Oral Daily    zinc sulfate  50 mg Oral Daily    sodium chloride flush  5-40 mL IntraVENous 2 times per day    insulin lispro  0-12 Units SubCUTAneous TID WC    insulin lispro  0-6 Units SubCUTAneous Nightly    dexamethasone  10 mg IntraVENous Q24H     PRN Meds: sodium chloride, acetaminophen **OR** acetaminophen, sodium chloride flush, sodium chloride, ondansetron **OR** ondansetron, polyethylene glycol, glucose, dextrose, glucagon (rDNA), dextrose      Intake/Output Summary (Last 24 hours) at 10/10/2021 0855  Last data filed at 10/9/2021 2305  Gross per 24 hour   Intake 1268.34 ml   Output 2600 ml   Net -1331.66 ml       Diet:  ADULT DIET; Regular; 4 carb choices (60 gm/meal); Low Sodium (2 gm)    Exam:  BP (!) 146/76   Pulse 70   Temp 98.1 °F (36.7 °C) (Oral)   Resp 20   Ht 5' 8\" (1.727 m)   Wt 219 lb (99.3 kg)   SpO2 95%   BMI 33.30 kg/m²   General appearance: Obese. No apparent distress, appears stated age and cooperative. HEENT: Pupils equal, round, and reactive to light. Conjunctivae/corneas clear. Neck: Supple, with full range of motion. No jugular venous distention. Trachea midline. Respiratory:  Normal respiratory effort. Clear to auscultation, bilaterally without Rales/Wheezes/Rhonchi. Cardiovascular: Regular rate and rhythm with normal S1/S2 without murmurs, rubs or gallops. Abdomen: Soft, non-tender, non-distended with normal bowel sounds. Musculoskeletal: passive and active ROM x 4 extremities. Skin: Skin color, texture, turgor normal.  No rashes or lesions. Neurologic:  Neurovascularly intact without any focal sensory/motor deficits. Cranial nerves: II-XII intact, grossly non-focal.  Psychiatric: Alert and oriented, thought content appropriate, normal insight  Capillary Refill: Brisk,< 3 seconds   Peripheral Pulses: +2 palpable, equal bilaterally     Labs:   Recent Labs     10/10/21  0558   WBC 4.2*   HGB 10.9*   HCT 36.5*        Recent Labs     10/08/21  0414 10/10/21  0558   * 143   K 3.8 3.6    100   CO2 34* 36*   BUN 46* 37*   CREATININE 1.3* 1.2   CALCIUM 8.3* 7.9*     No results for input(s): AST, ALT, BILIDIR, BILITOT, ALKPHOS in the last 72 hours. No results for input(s): INR in the last 72 hours. No results for input(s): Assunta Bales in the last 72 hours.     Microbiology:    Blood culture #1:   Lab Results Component Value Date    BC No growth-preliminary  No growth   01/30/2019       Blood culture #2:No results found for: Jennifer Cabral    Organism:  Lab Results   Component Value Date    ORG Mixed Growth 08/04/2019         Lab Results   Component Value Date    LABGRAM  02/02/2019     Rare segmented neutrophils observed. No bacteria seen. MRSA culture only:No results found for: [facility]    Urine culture:   Lab Results   Component Value Date    LABURIN  08/04/2019     Mixed growth. The mixture of organisms present represents  both organisms that may cause urinary tract infections and  organisms that are not a common cause of urinary tract  infections and are possibly skin ky or distal urethral  ky. Respiratory culture: No results found for: CULTRESP    Aerobic and Anaerobic :  Lab Results   Component Value Date    LABAERO No growth-preliminary  No growth   02/02/2019     Lab Results   Component Value Date    LABANAE No growth-preliminary  No growth   02/02/2019       Urinalysis:      Lab Results   Component Value Date    NITRU NEGATIVE 08/04/2019    WBCUA 0-2 02/02/2019    BACTERIA NONE 02/02/2019    RBCUA 0-2 02/02/2019    BLOODU NEGATIVE 08/04/2019    SPECGRAV 1.015 08/04/2019    GLUCOSEU NEGATIVE 02/02/2019       Radiology:  XR CHEST PORTABLE   Final Result   Impression:   Bilateral pneumonia concerning for SARS-coronavirus-2 infection. This document has been electronically signed by: Saud Cueto MD on    10/03/2021 05:21 AM        XR CHEST PORTABLE    Result Date: 10/3/2021  Single view chest Comparison: 8/2/2019 Findings: Moderate cardiomegaly with obscuration of the left heart border from infiltrate at the left base and left mid lung. New infiltrate of groundglass nature is scattered through right upper lobe and right base medially with more confluent changes laterally across the right upper lobe and right midlung.      Impression: Bilateral pneumonia concerning for SARS-coronavirus-2 infection. This document has been electronically signed by: Michael Landaverde MD on 10/03/2021 05:21 AM    With RN in room, patient was updated about and agreed upon the treatment plan, all the questions and concerns were addressed. Patient was seen in PPE (PERSONAL PROTECTIVE EQUIPMENT). Patient was interviewed in Strict Airborne and Droplet Precautions.     Electronically signed by Hanane Monae MD on 10/10/2021 at 8:55 AM

## 2021-10-11 ENCOUNTER — TELEPHONE (OUTPATIENT)
Dept: CARDIOLOGY CLINIC | Age: 66
End: 2021-10-11

## 2021-10-11 LAB
ALBUMIN SERPL-MCNC: 3.4 G/DL (ref 3.5–5.1)
ALP BLD-CCNC: 49 U/L (ref 38–126)
ALT SERPL-CCNC: 37 U/L (ref 11–66)
ANION GAP SERPL CALCULATED.3IONS-SCNC: 10 MEQ/L (ref 8–16)
AST SERPL-CCNC: 33 U/L (ref 5–40)
BASOPHILS # BLD: 0 %
BASOPHILS ABSOLUTE: 0 THOU/MM3 (ref 0–0.1)
BILIRUB SERPL-MCNC: 0.3 MG/DL (ref 0.3–1.2)
BILIRUBIN DIRECT: < 0.2 MG/DL (ref 0–0.3)
BUN BLDV-MCNC: 35 MG/DL (ref 7–22)
CALCIUM SERPL-MCNC: 7.9 MG/DL (ref 8.5–10.5)
CHLORIDE BLD-SCNC: 102 MEQ/L (ref 98–111)
CO2: 33 MEQ/L (ref 23–33)
CREAT SERPL-MCNC: 1 MG/DL (ref 0.4–1.2)
EOSINOPHIL # BLD: 0.5 %
EOSINOPHILS ABSOLUTE: 0 THOU/MM3 (ref 0–0.4)
ERYTHROCYTE [DISTWIDTH] IN BLOOD BY AUTOMATED COUNT: 14.3 % (ref 11.5–14.5)
ERYTHROCYTE [DISTWIDTH] IN BLOOD BY AUTOMATED COUNT: 48.1 FL (ref 35–45)
GFR SERPL CREATININE-BSD FRML MDRD: 74 ML/MIN/1.73M2
GLUCOSE BLD-MCNC: 107 MG/DL (ref 70–108)
GLUCOSE BLD-MCNC: 124 MG/DL (ref 70–108)
GLUCOSE BLD-MCNC: 156 MG/DL (ref 70–108)
GLUCOSE BLD-MCNC: 182 MG/DL (ref 70–108)
GLUCOSE BLD-MCNC: 201 MG/DL (ref 70–108)
HCT VFR BLD CALC: 36 % (ref 42–52)
HEMOGLOBIN: 11 GM/DL (ref 14–18)
IMMATURE GRANS (ABS): 0.03 THOU/MM3 (ref 0–0.07)
IMMATURE GRANULOCYTES: 0.7 %
LYMPHOCYTES # BLD: 12.5 %
LYMPHOCYTES ABSOLUTE: 0.6 THOU/MM3 (ref 1–4.8)
MCH RBC QN AUTO: 28.3 PG (ref 26–33)
MCHC RBC AUTO-ENTMCNC: 30.6 GM/DL (ref 32.2–35.5)
MCV RBC AUTO: 92.5 FL (ref 80–94)
MONOCYTES # BLD: 10.5 %
MONOCYTES ABSOLUTE: 0.5 THOU/MM3 (ref 0.4–1.3)
NUCLEATED RED BLOOD CELLS: 0 /100 WBC
PLATELET # BLD: 196 THOU/MM3 (ref 130–400)
PMV BLD AUTO: 11 FL (ref 9.4–12.4)
POTASSIUM SERPL-SCNC: 4.1 MEQ/L (ref 3.5–5.2)
RBC # BLD: 3.89 MILL/MM3 (ref 4.7–6.1)
SEG NEUTROPHILS: 75.8 %
SEGMENTED NEUTROPHILS ABSOLUTE COUNT: 3.3 THOU/MM3 (ref 1.8–7.7)
SODIUM BLD-SCNC: 145 MEQ/L (ref 135–145)
TOTAL PROTEIN: 5.2 G/DL (ref 6.1–8)
WBC # BLD: 4.4 THOU/MM3 (ref 4.8–10.8)

## 2021-10-11 PROCEDURE — 2060000000 HC ICU INTERMEDIATE R&B

## 2021-10-11 PROCEDURE — 2700000000 HC OXYGEN THERAPY PER DAY

## 2021-10-11 PROCEDURE — 6370000000 HC RX 637 (ALT 250 FOR IP): Performed by: STUDENT IN AN ORGANIZED HEALTH CARE EDUCATION/TRAINING PROGRAM

## 2021-10-11 PROCEDURE — 97116 GAIT TRAINING THERAPY: CPT

## 2021-10-11 PROCEDURE — 6370000000 HC RX 637 (ALT 250 FOR IP): Performed by: HOSPITALIST

## 2021-10-11 PROCEDURE — 80053 COMPREHEN METABOLIC PANEL: CPT

## 2021-10-11 PROCEDURE — 82948 REAGENT STRIP/BLOOD GLUCOSE: CPT

## 2021-10-11 PROCEDURE — 2580000003 HC RX 258: Performed by: STUDENT IN AN ORGANIZED HEALTH CARE EDUCATION/TRAINING PROGRAM

## 2021-10-11 PROCEDURE — 97110 THERAPEUTIC EXERCISES: CPT

## 2021-10-11 PROCEDURE — 99233 SBSQ HOSP IP/OBS HIGH 50: CPT | Performed by: HOSPITALIST

## 2021-10-11 PROCEDURE — 97530 THERAPEUTIC ACTIVITIES: CPT

## 2021-10-11 PROCEDURE — 6360000002 HC RX W HCPCS: Performed by: HOSPITALIST

## 2021-10-11 PROCEDURE — 97535 SELF CARE MNGMENT TRAINING: CPT

## 2021-10-11 PROCEDURE — 82248 BILIRUBIN DIRECT: CPT

## 2021-10-11 PROCEDURE — 94761 N-INVAS EAR/PLS OXIMETRY MLT: CPT

## 2021-10-11 PROCEDURE — 85025 COMPLETE CBC W/AUTO DIFF WBC: CPT

## 2021-10-11 PROCEDURE — 6370000000 HC RX 637 (ALT 250 FOR IP): Performed by: INTERNAL MEDICINE

## 2021-10-11 PROCEDURE — 36415 COLL VENOUS BLD VENIPUNCTURE: CPT

## 2021-10-11 RX ORDER — INSULIN GLARGINE 100 [IU]/ML
18 INJECTION, SOLUTION SUBCUTANEOUS DAILY
Status: DISCONTINUED | OUTPATIENT
Start: 2021-10-12 | End: 2021-10-12

## 2021-10-11 RX ADMIN — Medication 2000 UNITS: at 08:31

## 2021-10-11 RX ADMIN — HYDRALAZINE HYDROCHLORIDE 100 MG: 50 TABLET ORAL at 21:01

## 2021-10-11 RX ADMIN — SULFASALAZINE 500 MG: 500 TABLET ORAL at 08:23

## 2021-10-11 RX ADMIN — BARICITINIB 2 MG: 2 TABLET, FILM COATED ORAL at 08:20

## 2021-10-11 RX ADMIN — DEXAMETHASONE 6 MG: 4 TABLET ORAL at 08:22

## 2021-10-11 RX ADMIN — GLIPIZIDE 5 MG: 5 TABLET ORAL at 15:57

## 2021-10-11 RX ADMIN — LOSARTAN POTASSIUM 100 MG: 100 TABLET, FILM COATED ORAL at 08:21

## 2021-10-11 RX ADMIN — INSULIN LISPRO 2 UNITS: 100 INJECTION, SOLUTION INTRAVENOUS; SUBCUTANEOUS at 12:10

## 2021-10-11 RX ADMIN — ATORVASTATIN CALCIUM 10 MG: 10 TABLET, FILM COATED ORAL at 08:22

## 2021-10-11 RX ADMIN — HYDRALAZINE HYDROCHLORIDE 100 MG: 50 TABLET ORAL at 09:58

## 2021-10-11 RX ADMIN — GLIPIZIDE 5 MG: 5 TABLET ORAL at 08:20

## 2021-10-11 RX ADMIN — POTASSIUM CHLORIDE 20 MEQ: 1500 TABLET, EXTENDED RELEASE ORAL at 08:26

## 2021-10-11 RX ADMIN — INSULIN GLARGINE 15 UNITS: 100 INJECTION, SOLUTION SUBCUTANEOUS at 08:15

## 2021-10-11 RX ADMIN — PANTOPRAZOLE SODIUM 40 MG: 40 TABLET, DELAYED RELEASE ORAL at 08:26

## 2021-10-11 RX ADMIN — ENOXAPARIN SODIUM 40 MG: 40 INJECTION SUBCUTANEOUS at 08:20

## 2021-10-11 RX ADMIN — AMLODIPINE BESYLATE 10 MG: 10 TABLET ORAL at 08:23

## 2021-10-11 RX ADMIN — SODIUM CHLORIDE, PRESERVATIVE FREE 10 ML: 5 INJECTION INTRAVENOUS at 08:30

## 2021-10-11 RX ADMIN — ASPIRIN 81 MG: 81 TABLET, FILM COATED ORAL at 08:23

## 2021-10-11 RX ADMIN — SODIUM CHLORIDE, PRESERVATIVE FREE 10 ML: 5 INJECTION INTRAVENOUS at 21:10

## 2021-10-11 RX ADMIN — CARVEDILOL 25 MG: 25 TABLET, FILM COATED ORAL at 21:01

## 2021-10-11 RX ADMIN — GABAPENTIN 300 MG: 300 CAPSULE ORAL at 08:20

## 2021-10-11 RX ADMIN — INSULIN LISPRO 1 UNITS: 100 INJECTION, SOLUTION INTRAVENOUS; SUBCUTANEOUS at 21:03

## 2021-10-11 RX ADMIN — ENOXAPARIN SODIUM 40 MG: 40 INJECTION SUBCUTANEOUS at 21:06

## 2021-10-11 RX ADMIN — OXYCODONE HYDROCHLORIDE AND ACETAMINOPHEN 500 MG: 500 TABLET ORAL at 08:23

## 2021-10-11 RX ADMIN — DOXAZOSIN 4 MG: 4 TABLET ORAL at 08:20

## 2021-10-11 RX ADMIN — ALLOPURINOL 300 MG: 300 TABLET ORAL at 08:23

## 2021-10-11 RX ADMIN — Medication 50 MG: at 08:24

## 2021-10-11 RX ADMIN — SULFASALAZINE 500 MG: 500 TABLET ORAL at 22:39

## 2021-10-11 RX ADMIN — CARVEDILOL 25 MG: 25 TABLET, FILM COATED ORAL at 08:22

## 2021-10-11 RX ADMIN — GABAPENTIN 300 MG: 300 CAPSULE ORAL at 21:01

## 2021-10-11 RX ADMIN — HYDRALAZINE HYDROCHLORIDE 100 MG: 50 TABLET ORAL at 15:57

## 2021-10-11 ASSESSMENT — PAIN SCALES - GENERAL
PAINLEVEL_OUTOF10: 0
PAINLEVEL_OUTOF10: 0

## 2021-10-11 NOTE — ACP (ADVANCE CARE PLANNING)
Advance Care Planning     Advance Care Planning Inpatient Note  [facility] Department    Today's Date: 10/11/2021  Unit: [facility] 8B    Received request from IDT Member. Upon review of chart and communication with care team, patient's decision making abilities are not in question. . Patient was/were present in the room during visit. Goals of ACP Conversation:  Discuss advance care planning documents    Health Care Decision Makers:       Primary Decision Maker: Janet Cash Spouse - [phone]    Summary:  Verified Documents    Advance Care Planning Documents (Patient Wishes):  Healthcare Power of /Advance Directive Appointment of Health Care Agent  Living Will/Advance Directive     Assessment:  Pt is a 68y. o. male sitting in easychair watching television, in 8B-29. An IDT member referred him to [facility], for both ACP conversation as well as reviewing their existent Advance Directive documentation for clarity and edits/updates if desired. Lo Farooq has a good positive mental attitude and shared \"I'll be out of here soon; I'm sure of it. \" He expressed gratitude for the conversation and review of his documentation    Interventions:  Reviewed but did not complete ACP document    Care Preferences Communicated:   No    Outcomes/Plan:  Existing advance directive reviewed with patient; no changes to patient's previously recorded wishes.     Electronically signed by Costa Adams on 10/11/2021 at 6:44 PM

## 2021-10-11 NOTE — ACP (ADVANCE CARE PLANNING)
Advance Care Planning     Advance Care Planning Inpatient Note  [facility] Department    Today's Date: 10/11/2021  Unit: [facility]G 8B    Received request from IDT Member. Upon review of chart and communication with care team, Spiritual Care will defer advance care planning with patient at this time. . Patient was/were present in the room during visit. Goals of ACP Conversation:  Geraldine Lozoya was not possible, due to pt's hearing issues. Health Care Decision Makers:       Primary Decision Maker: Tracey Ferraro Spouse - [phone]    Summary:  Verified Healthcare Decision [facility]    Advance Care Planning Documents (Patient Wishes):  conversation not possible due to pt's hearing issues. Assessment:  Pt had hearing issues and a conversation was not possible at this time.  suggest another  attempt conversation wearing a caper to see if pt has more success with possibility of lip reading. Interventions:  conversation not possible due to pt's hearing issues    Care Preferences Communicated:   No    Outcomes/Plan:  conversation not possible due to pt's hearing issues.     Electronically signed by Judy Muñoz,  Intern on 10/11/2021 at 3:45 PM

## 2021-10-11 NOTE — CARE COORDINATION
10/11/21, 2:43 PM EDT    DISCHARGE PLANNING UPDATE       Barriers to Discharge: weaning oxygen currently HHF 45 L/min, 67 % FiO2, sats 90%. Vitamin D, vitamin C, zinc. Baricitinib and po Decadron now. Patient Goals/Plan/Treatment Preferences: Plans return home at discharge with wife; monitor for O2 needs and HH. Needs OP PFT and sleep study post discharge.

## 2021-10-11 NOTE — PROGRESS NOTES
[address] [facility] 8B  Occupational Therapy  Daily Note  Time:   Time In: 5646  Time Out: 0940  Timed Code Treatment Minutes: 24 Minutes  Minutes: 24          Date: 10/11/2021  Patient Name: Yahaira Zurita,   Gender: male      Room: -29/029-A  MRN: [id number]  : 1955  (74 y.o.)  Referring Practitioner: Nikko Singh MD  Diagnosis: acute respiratory failure with hypoxemia  Additional Pertinent Hx: Per H&P:Patient reports feeling worsening shortness of breath over the last 2 days. His wife tested positive for COVID-19 a few days ago. Decided to get evaluated in the ED today and called EMS. While in the ED he was found to be hypoxemic on room air at 78%. Pt found to haveCOVID-19 Pneumonia with Sepsis    Restrictions/Precautions:  Restrictions/Precautions: General Precautions, Fall Risk, Isolation  Position Activity Restriction  Other position/activity restrictions: droplet +;     SUBJECTIVE: Nurse Nikki Bliss ok'd session, Up in chair upon arrival, agreeable to OT session    PAIN: 0/10:     Vitals: Patient on 45 L O2 at 65% via High Flow  upon arrival to room. Patient O2 sats at 95 %. Upon activity patient dropping O2 at 85 %. Pt requiring minimal rest break(s) to recover. COGNITION: Slow Processing and VERY [place]    ADL:   Feeding: with set-up. A for opening containers  Grooming: with set-up. washed face sitting in bedside chair. A for using urinal while standing  BALANCE:  Standing Balance: Stand By Assistance. greater than 2 minutes with 1  UE support    BED MOBILITY:  Not Tested    TRANSFERS:  Sit to Stand:  Stand By Assistance. bedside chair  Stand to Sit: Stand By Assistance. ADDITIONAL ACTIVITIES:  Guided patient through BUE AROM this date x10 reps x1 set in chair in order to increase BUE strength and improve activity tolerance for ADLs and homemaking tasks. ASSESSMENT:     Activity Tolerance:  Patient tolerance of  treatment: good.        Discharge Recommendations: Patient would benefit from continued therapy after discharge, Continue to assess pending progress   Equipment Recommendations: Other: will continue to monitor pending progress  Plan: Times per week: 5x  Current Treatment Recommendations: Strengthening, Balance Training, Functional Mobility Training, Endurance Training, Safety Education & Training, Patient/Caregiver Education & Training, Equipment Evaluation, Education, & procurement, Self-Care / ADL    Patient Education  Patient Education: ADL's and Home Exercise Program    Goals  Short term goals  Time Frame for Short term goals: by discharge  Short term goal 1: Pt will increase activity tolerance for functional mobility to/from [facility] or chair, progressing to bathroom as able, with supervision and Sp02 >=90% in prep for ADL completion. Short term goal 2: Pt will complete BADL tasks with supervision, incorporating ECT prn, to increase independence and ease with self care tasks. Short term goal 3: Pt will tolerate dynamic standing X4-5 minutes with supervision and Sp02 >=90% in prep for sinkside grooming/toileting tasks. Following session, patient left in safe position with all fall risk precautions in place.

## 2021-10-11 NOTE — PROGRESS NOTES
[address] [address]  INPATIENT PHYSICAL THERAPY  DAILY NOTE  [facility] 8B - 8B-29/029-A      Time In: 1026  Time Out: 1104  Timed Code Treatment Minutes: 45 Minutes  Minutes: 38          Date: 10/11/2021  Patient Name: Loni Marsh,  Gender:  male        MRN: [id number]  : 1955  (74 y.o.)     Referring Practitioner: Dr. Sharon Ruby  Diagnosis: acute respiratory failure with hypoxemia  Additional Pertinent Hx: admit with above diagnosis, COVID-19 pneumonia with sepsis, DM, CHF     Prior Level of Function:  Lives With: Spouse  Type of Home: House  Home Layout: One level  Home Access: Stairs to enter with rails  Entrance Stairs - Number of Steps: 2  Home Equipment:  (no AD PTA,)   Bathroom Shower/Tub: Walk-in shower  Bathroom Toilet: Standard  Bathroom Equipment: Shower chair    ADL Assistance: Independent  Homemaking Assistance: Independent (shares IADL tasks with wife)  Ambulation Assistance: Independent  Transfer Assistance: Independent  Additional Comments: No home O2    Restrictions/Precautions:  Restrictions/Precautions: General Precautions, Fall Risk, Isolation  Position Activity Restriction  Other position/activity restrictions: droplet +;       SUBJECTIVE: pt up in recliner on arrival he cont to ask throughout session if he is going home today- pt is very [place] as he didn't follow directions well I feel this was due to being [facility] and easily distracted     PAIN: 0/10:       Vitals: Oxygen: hig flow 45L @ 66%- O2 sats 88-95%  Heart Rate: 80's    OBJECTIVE:  Bed Mobility:  Not Tested    Transfers:  Sit to Stand: Stand By Assistance  Stand to Sit:Stand By Assistance  * however pt impulsive and demonstrated decreased awareness of his high flow lines   Ambulation:  Contact Guard Assistance  Distance: few feet forward and back several times- pt limited due to high flow O2   Surface: Level Tile  Device:he did reach for tray table at times   Gait Deviations:  Slow Tonya and decreased awareness of lines but no loss of balance     Balance:  standing w/o UE at support pt reaching above head and knee and slighlty out of base of support with CGA  - static standing w/o UE at support he completed trunk ext and breathing exercises - pts O2 sats were at the best when he was in standing position      Exercise:  Patient was guided in 2 set(s) 10 reps of exercise to both lower extremities. Ankle pumps, Upper trunk rotations, Seated marches, Seated hamstring curls, Seated heel/toe raises, Long arc quads and  incentive spirometer . Exercises were completed for increased independence with functional mobility. Functional Outcome Measures: Completed  AM-PAC Inpatient Mobility Raw Score : 17  AM-PAC Inpatient T-Scale Score : 42.13    ASSESSMENT:  Assessment: Patient progressing toward established goals. and However he cont to require high flow O2 and is limited with mobility due to this. He would benefit from cont skilled therapy   Activity Tolerance:  Patient tolerance of  treatment: fair. Equipment Recommendations: Other: cont to assess needs  Discharge Recommendations:    Continue to assess pending progress    Plan: Times per week: 5X C  Times per day: Daily  Specific instructions for Next Treatment: therex and mobility    Patient Education  Patient Education: Plan of Care    Goals:  Patient goals : not stated  Short term goals  Time Frame for Short term goals: by discharge  Short term goal 1: bed mobility with S to get in/out of bed  Short term goal 2: transfer with S to get in/out of chairs  Short term goal 3: amb >100'x1 with/without AD and S, maintaining O2 sats >90% on </=4L O2, to walk safely in home  Short term goal 4: negotiate 2 steps with HR and SBA to enter home safely  Long term goals  Time Frame for Long term goals : no LTGs set secondary to short ELOS    Following session, patient left in safe position with all fall risk precautions in place.

## 2021-10-11 NOTE — PROGRESS NOTES
Hospitalist Progress Note      Patient:  Armand Felipe    Unit/Bed:8B-29/029-A  YOB: 1955  MRN: [id number]   Acct: [de-identified]   PCP: FELI Oro - CNP  Date of Admission: 10/3/2021    Assessment/Plan:    Acute hypoxic and hypercapneic (unkown chronicity) respiratory failure 2/2 COVID-19 pneumonia:  -Secondary to COVID-19/atypical or bacterial pneumonia. -Currently on HFO w/ SaO2 at high 90s; RN aware to wean aggressively as tolerated to SaO2 greater than 90%. IS/Acapella/PT to see  On Baricitinib (noted previously on Remdesivir as well) and Dexamethasone (switched to Po at 6 mg QD) and added PPI  - Pt completed 7-day empirical treatment course of Abx for possible superimposed bacterial CAP. D/c'ed ABx  - Already on ASA 81 QD  -Ok to continue w/ vitamin D, vitamin C, zinc  - D/c'ed antitussives and switched MAHOGANY to prn  - repeat ABG; no documented Hx of COPD; pt former heavy smoker; may add empiric tx for COPD; pt will benefit from OP PFT and sleep study     10/11: improving; slow wean; CCM       Sepsis secondary to COVID-19 pneumonia and possible bacterial pneumonia:  Resolved     Coronary artery disease:  Stable. CMT.    Hx of HTN  Fairly controlled on home meds. CCM and monitor     Hyperlipidemia:  - on statin    Vitamin D deficiency: on cholecacipherol; repeat 25(OH) vit D level sent.     CKD stage III: stable. Will monitor. Avoid nephrotoxins. D/c'ed IVF       Hx of DM II: well-controlled on home OHA and Insluin glargine last A1C 5.7%  Modified to MDI to standing order plus SSI as needed; pt back on home Glipizide but off Metformin. systemic CS also contributing to hyperglycemia. Will reassess response to treatment. diabetic diet, SSI, accuchecks, and hypoglycemia orders in place.     10/11: BS much improved; further optimized MDI regimen based on last 24-hr SSi requirements; will reassess response    Leukopenia (resolved)  Likely secondary to Covid. Hx of HFpEF: clinically euvolemic. CMT. I/O, daily wt, salt (2 gr) and fluid (2L) restriction       Gout  - stable. Continue home allopurinol     Obesity w/ BMI 33.30       Chief Complaint: SOB    Initial H and P:-    Admitted for management of Acute hypoxic respiratory failure secondary to COVID 19. I took over care on 10/11/2021      Subjective (past 24 hours):   Overall, feeling improved. SOB improved. Denies CP/fever/chils/palpitation/diarrhea/N/V/abdo pain      Past medical history, family history, social history and allergies reviewed again and is unchanged since admission. ROS (All review of systems completed. Pertinent positives noted.  Otherwise All other systems reviewed and negative.)     Medications:  Reviewed    Infusion Medications    sodium chloride      dextrose       Scheduled Medications    dexamethasone  6 mg Oral Daily    pantoprazole  40 mg Oral QAM AC    enoxaparin  40 mg SubCUTAneous BID    insulin glargine  15 Units SubCUTAneous Daily    insulin lispro  5 Units SubCUTAneous TID WC    insulin lispro  0-6 Units SubCUTAneous 4x Daily PC & HS    baricitinib  2 mg Oral Daily    allopurinol  300 mg Oral QAM AC    amLODIPine  10 mg Oral Daily    aspirin  81 mg Oral Daily    carvedilol  25 mg Oral BID    gabapentin  300 mg Oral BID    glipiZIDE  5 mg Oral BID AC    hydrALAZINE  100 mg Oral TID    potassium chloride  20 mEq Oral Daily    atorvastatin  10 mg Oral Daily    sulfaSALAzine  500 mg Oral BID    losartan  100 mg Oral Daily    doxazosin  4 mg Oral Daily    Vitamin D  2,000 Units Oral Daily    ascorbic acid  500 mg Oral Daily    zinc sulfate  50 mg Oral Daily    sodium chloride flush  5-40 mL IntraVENous 2 times per day     PRN Meds: albuterol sulfate HFA, ipratropium, sodium chloride, acetaminophen **OR** acetaminophen, sodium chloride flush, sodium chloride, ondansetron **OR** ondansetron, polyethylene glycol, glucose, dextrose, glucagon (rDNA), dextrose      Intake/Output Summary (Last 24 hours) at 10/11/2021 0848  Last data filed at 10/11/2021 0830  Gross per 24 hour   Intake 510 ml   Output 150 ml   Net 360 ml       Diet:  ADULT DIET; Regular; 4 carb choices (60 gm/meal); Low Sodium (2 gm)    Exam:  BP (!) 150/71   Pulse 74   Temp 97.8 °F (36.6 °C) (Oral)   Resp 24   Ht 5' 8\" (1.727 m)   Wt 219 lb (99.3 kg)   SpO2 90%   BMI 33.30 kg/m²   General appearance: Obese. No apparent distress, appears stated age and cooperative. HEENT: Pupils equal, round, and reactive to light. Conjunctivae/corneas clear. Neck: Supple, with full range of motion. No jugular venous distention. Trachea midline. Respiratory:  Normal respiratory effort. Clear to auscultation, bilaterally without Rales/Wheezes/Rhonchi. Cardiovascular: Regular rate and rhythm with normal S1/S2 without murmurs, rubs or gallops. Abdomen: Soft, non-tender, non-distended with normal bowel sounds. Musculoskeletal: passive and active ROM x 4 extremities. Skin: Skin color, texture, turgor normal.  No rashes or lesions. Neurologic:  Neurovascularly intact without any focal sensory/motor deficits. Cranial nerves: II-XII intact, grossly non-focal.  Psychiatric: Alert and oriented, thought content appropriate, normal insight  Capillary Refill: Brisk,< 3 seconds   Peripheral Pulses: +2 palpable, equal bilaterally     Labs:   Recent Labs     10/10/21  0558 10/11/21  0651   WBC 4.2* 4.4*   HGB 10.9* 11.0*   HCT 36.5* 36.0*    196     Recent Labs     10/10/21  0558 10/11/21  0650    145   K 3.6 4.1    102   CO2 36* 33   BUN 37* 35*   CREATININE 1.2 1.0   CALCIUM 7.9* 7.9*     Recent Labs     10/11/21  0650   AST 33   ALT 37   BILIDIR <0.2   BILITOT 0.3   ALKPHOS 49     No results for input(s): INR in the last 72 hours. No results for input(s): Wayna Khat in the last 72 hours.     Microbiology:    Blood culture #1:   Lab Results   Component Value Date    BC No growth-preliminary  No growth   01/30/2019       Blood culture #2:No results found for: Marinus Risk    Organism:  Lab Results   Component Value Date    ORG Mixed Growth 08/04/2019         Lab Results   Component Value Date    LABGRAM  02/02/2019     Rare segmented neutrophils observed. No bacteria seen. MRSA culture only:No results found for: [facility]    Urine culture:   Lab Results   Component Value Date    LABURIN  08/04/2019     Mixed growth. The mixture of organisms present represents  both organisms that may cause urinary tract infections and  organisms that are not a common cause of urinary tract  infections and are possibly skin ky or distal urethral  ky. Respiratory culture: No results found for: CULTRESP    Aerobic and Anaerobic :  Lab Results   Component Value Date    LABAERO No growth-preliminary  No growth   02/02/2019     Lab Results   Component Value Date    LABANAE No growth-preliminary  No growth   02/02/2019       Urinalysis:      Lab Results   Component Value Date    NITRU NEGATIVE 08/04/2019    WBCUA 0-2 02/02/2019    BACTERIA NONE 02/02/2019    RBCUA 0-2 02/02/2019    BLOODU NEGATIVE 08/04/2019    SPECGRAV 1.015 08/04/2019    GLUCOSEU NEGATIVE 02/02/2019       Radiology:  XR CHEST PORTABLE   Final Result   Impression:   Bilateral pneumonia concerning for SARS-coronavirus-2 infection. This document has been electronically signed by: Carlos Wong MD on    10/03/2021 05:21 AM        XR CHEST PORTABLE    Result Date: 10/3/2021  Single view chest Comparison: 8/2/2019 Findings: Moderate cardiomegaly with obscuration of the left heart border from infiltrate at the left base and left mid lung. New infiltrate of groundglass nature is scattered through right upper lobe and right base medially with more confluent changes laterally across the right upper lobe and right midlung. Impression: Bilateral pneumonia concerning for SARS-coronavirus-2 infection.  This document has been electronically signed by: Sandro Castro MD on 10/03/2021 05:21 AM    With RN in room, patient was updated about and agreed upon the treatment plan, all the questions and concerns were addressed. Patient was seen in PPE (PERSONAL PROTECTIVE EQUIPMENT). Patient was interviewed in Strict Airborne and Droplet Precautions.     Electronically signed by Rosamaria Allen MD on 10/11/2021 at 8:48 AM

## 2021-10-12 LAB
ALBUMIN SERPL-MCNC: 3.5 G/DL (ref 3.5–5.1)
ALP BLD-CCNC: 57 U/L (ref 38–126)
ALT SERPL-CCNC: 37 U/L (ref 11–66)
ANION GAP SERPL CALCULATED.3IONS-SCNC: 8 MEQ/L (ref 8–16)
AST SERPL-CCNC: 38 U/L (ref 5–40)
BASOPHILS # BLD: 0 %
BASOPHILS ABSOLUTE: 0 THOU/MM3 (ref 0–0.1)
BILIRUB SERPL-MCNC: 0.3 MG/DL (ref 0.3–1.2)
BILIRUBIN DIRECT: < 0.2 MG/DL (ref 0–0.3)
BUN BLDV-MCNC: 26 MG/DL (ref 7–22)
CALCIUM SERPL-MCNC: 8.2 MG/DL (ref 8.5–10.5)
CHLORIDE BLD-SCNC: 98 MEQ/L (ref 98–111)
CO2: 32 MEQ/L (ref 23–33)
CREAT SERPL-MCNC: 1 MG/DL (ref 0.4–1.2)
EOSINOPHIL # BLD: 0.5 %
EOSINOPHILS ABSOLUTE: 0 THOU/MM3 (ref 0–0.4)
ERYTHROCYTE [DISTWIDTH] IN BLOOD BY AUTOMATED COUNT: 14 % (ref 11.5–14.5)
ERYTHROCYTE [DISTWIDTH] IN BLOOD BY AUTOMATED COUNT: 47.8 FL (ref 35–45)
GFR SERPL CREATININE-BSD FRML MDRD: 74 ML/MIN/1.73M2
GLUCOSE BLD-MCNC: 143 MG/DL (ref 70–108)
GLUCOSE BLD-MCNC: 220 MG/DL (ref 70–108)
GLUCOSE BLD-MCNC: 226 MG/DL (ref 70–108)
GLUCOSE BLD-MCNC: 240 MG/DL (ref 70–108)
GLUCOSE BLD-MCNC: 273 MG/DL (ref 70–108)
GLUCOSE BLD-MCNC: 58 MG/DL (ref 70–108)
HCT VFR BLD CALC: 36.8 % (ref 42–52)
HEMOGLOBIN: 11.2 GM/DL (ref 14–18)
IMMATURE GRANS (ABS): 0.05 THOU/MM3 (ref 0–0.07)
IMMATURE GRANULOCYTES: 1.3 %
LYMPHOCYTES # BLD: 10 %
LYMPHOCYTES ABSOLUTE: 0.4 THOU/MM3 (ref 1–4.8)
MCH RBC QN AUTO: 28.4 PG (ref 26–33)
MCHC RBC AUTO-ENTMCNC: 30.4 GM/DL (ref 32.2–35.5)
MCV RBC AUTO: 93.2 FL (ref 80–94)
MONOCYTES # BLD: 6.8 %
MONOCYTES ABSOLUTE: 0.3 THOU/MM3 (ref 0.4–1.3)
NUCLEATED RED BLOOD CELLS: 0 /100 WBC
PLATELET # BLD: 216 THOU/MM3 (ref 130–400)
PMV BLD AUTO: 11.5 FL (ref 9.4–12.4)
POTASSIUM SERPL-SCNC: 4.7 MEQ/L (ref 3.5–5.2)
RBC # BLD: 3.95 MILL/MM3 (ref 4.7–6.1)
SEG NEUTROPHILS: 81.4 %
SEGMENTED NEUTROPHILS ABSOLUTE COUNT: 3.1 THOU/MM3 (ref 1.8–7.7)
SODIUM BLD-SCNC: 138 MEQ/L (ref 135–145)
TOTAL PROTEIN: 5.5 G/DL (ref 6.1–8)
WBC # BLD: 3.8 THOU/MM3 (ref 4.8–10.8)

## 2021-10-12 PROCEDURE — 94761 N-INVAS EAR/PLS OXIMETRY MLT: CPT

## 2021-10-12 PROCEDURE — 82948 REAGENT STRIP/BLOOD GLUCOSE: CPT

## 2021-10-12 PROCEDURE — 82248 BILIRUBIN DIRECT: CPT

## 2021-10-12 PROCEDURE — 85025 COMPLETE CBC W/AUTO DIFF WBC: CPT

## 2021-10-12 PROCEDURE — 97110 THERAPEUTIC EXERCISES: CPT

## 2021-10-12 PROCEDURE — 6370000000 HC RX 637 (ALT 250 FOR IP): Performed by: STUDENT IN AN ORGANIZED HEALTH CARE EDUCATION/TRAINING PROGRAM

## 2021-10-12 PROCEDURE — 97530 THERAPEUTIC ACTIVITIES: CPT

## 2021-10-12 PROCEDURE — 2700000000 HC OXYGEN THERAPY PER DAY

## 2021-10-12 PROCEDURE — 6370000000 HC RX 637 (ALT 250 FOR IP): Performed by: HOSPITALIST

## 2021-10-12 PROCEDURE — 99233 SBSQ HOSP IP/OBS HIGH 50: CPT | Performed by: HOSPITALIST

## 2021-10-12 PROCEDURE — 6360000002 HC RX W HCPCS: Performed by: HOSPITALIST

## 2021-10-12 PROCEDURE — 2060000000 HC ICU INTERMEDIATE R&B

## 2021-10-12 PROCEDURE — 80053 COMPREHEN METABOLIC PANEL: CPT

## 2021-10-12 PROCEDURE — 97535 SELF CARE MNGMENT TRAINING: CPT

## 2021-10-12 PROCEDURE — 36415 COLL VENOUS BLD VENIPUNCTURE: CPT

## 2021-10-12 PROCEDURE — 97116 GAIT TRAINING THERAPY: CPT

## 2021-10-12 PROCEDURE — 2580000003 HC RX 258: Performed by: STUDENT IN AN ORGANIZED HEALTH CARE EDUCATION/TRAINING PROGRAM

## 2021-10-12 PROCEDURE — 6370000000 HC RX 637 (ALT 250 FOR IP): Performed by: INTERNAL MEDICINE

## 2021-10-12 RX ORDER — INSULIN GLARGINE 100 [IU]/ML
15 INJECTION, SOLUTION SUBCUTANEOUS DAILY
Status: DISCONTINUED | OUTPATIENT
Start: 2021-10-13 | End: 2021-10-15

## 2021-10-12 RX ADMIN — GABAPENTIN 300 MG: 300 CAPSULE ORAL at 20:53

## 2021-10-12 RX ADMIN — ASPIRIN 81 MG: 81 TABLET, FILM COATED ORAL at 08:58

## 2021-10-12 RX ADMIN — ATORVASTATIN CALCIUM 10 MG: 10 TABLET, FILM COATED ORAL at 08:59

## 2021-10-12 RX ADMIN — INSULIN LISPRO 2 UNITS: 100 INJECTION, SOLUTION INTRAVENOUS; SUBCUTANEOUS at 18:15

## 2021-10-12 RX ADMIN — BARICITINIB 2 MG: 2 TABLET, FILM COATED ORAL at 08:59

## 2021-10-12 RX ADMIN — DOXAZOSIN 4 MG: 4 TABLET ORAL at 08:58

## 2021-10-12 RX ADMIN — SODIUM CHLORIDE, PRESERVATIVE FREE 10 ML: 5 INJECTION INTRAVENOUS at 20:54

## 2021-10-12 RX ADMIN — POTASSIUM CHLORIDE 20 MEQ: 1500 TABLET, EXTENDED RELEASE ORAL at 08:58

## 2021-10-12 RX ADMIN — HYDRALAZINE HYDROCHLORIDE 100 MG: 50 TABLET ORAL at 08:58

## 2021-10-12 RX ADMIN — ALLOPURINOL 300 MG: 300 TABLET ORAL at 04:04

## 2021-10-12 RX ADMIN — CARVEDILOL 25 MG: 25 TABLET, FILM COATED ORAL at 20:53

## 2021-10-12 RX ADMIN — HYDRALAZINE HYDROCHLORIDE 100 MG: 50 TABLET ORAL at 13:35

## 2021-10-12 RX ADMIN — INSULIN LISPRO 5 UNITS: 100 INJECTION, SOLUTION INTRAVENOUS; SUBCUTANEOUS at 18:00

## 2021-10-12 RX ADMIN — OXYCODONE HYDROCHLORIDE AND ACETAMINOPHEN 500 MG: 500 TABLET ORAL at 08:59

## 2021-10-12 RX ADMIN — ENOXAPARIN SODIUM 40 MG: 40 INJECTION SUBCUTANEOUS at 20:53

## 2021-10-12 RX ADMIN — INSULIN LISPRO 3 UNITS: 100 INJECTION, SOLUTION INTRAVENOUS; SUBCUTANEOUS at 13:35

## 2021-10-12 RX ADMIN — Medication 2000 UNITS: at 09:00

## 2021-10-12 RX ADMIN — INSULIN GLARGINE 18 UNITS: 100 INJECTION, SOLUTION SUBCUTANEOUS at 08:58

## 2021-10-12 RX ADMIN — DEXAMETHASONE 6 MG: 4 TABLET ORAL at 08:58

## 2021-10-12 RX ADMIN — SULFASALAZINE 500 MG: 500 TABLET ORAL at 20:53

## 2021-10-12 RX ADMIN — AMLODIPINE BESYLATE 10 MG: 10 TABLET ORAL at 08:59

## 2021-10-12 RX ADMIN — SULFASALAZINE 500 MG: 500 TABLET ORAL at 08:58

## 2021-10-12 RX ADMIN — LOSARTAN POTASSIUM 100 MG: 100 TABLET, FILM COATED ORAL at 08:58

## 2021-10-12 RX ADMIN — GLIPIZIDE 5 MG: 5 TABLET ORAL at 04:04

## 2021-10-12 RX ADMIN — GABAPENTIN 300 MG: 300 CAPSULE ORAL at 08:58

## 2021-10-12 RX ADMIN — HYDRALAZINE HYDROCHLORIDE 100 MG: 50 TABLET ORAL at 20:53

## 2021-10-12 RX ADMIN — CARVEDILOL 25 MG: 25 TABLET, FILM COATED ORAL at 08:59

## 2021-10-12 RX ADMIN — INSULIN LISPRO 2 UNITS: 100 INJECTION, SOLUTION INTRAVENOUS; SUBCUTANEOUS at 21:55

## 2021-10-12 RX ADMIN — PANTOPRAZOLE SODIUM 40 MG: 40 TABLET, DELAYED RELEASE ORAL at 04:04

## 2021-10-12 RX ADMIN — ENOXAPARIN SODIUM 40 MG: 40 INJECTION SUBCUTANEOUS at 08:58

## 2021-10-12 RX ADMIN — Medication 50 MG: at 08:58

## 2021-10-12 RX ADMIN — SODIUM CHLORIDE, PRESERVATIVE FREE 10 ML: 5 INJECTION INTRAVENOUS at 09:01

## 2021-10-12 ASSESSMENT — PAIN SCALES - GENERAL
PAINLEVEL_OUTOF10: 0

## 2021-10-12 NOTE — PROGRESS NOTES
[facility]  INPATIENT PHYSICAL THERAPY  DAILY NOTE  [facility] MED SURG 8B - 8B-29/029-A      Time In: 2586  Time Out: 1422  Timed Code Treatment Minutes: 36 Minutes  Minutes: 40          Date: 10/12/2021  Patient Name: Johnson Alvarez,  Gender:  male        MRN: [id number]  : 1955  (74 y.o.)     Referring Practitioner: Dr. Jorge Arenas  Diagnosis: acute respiratory failure with hypoxemia  Additional Pertinent Hx: admit with above diagnosis, COVID-19 pneumonia with sepsis, DM, CHF     Prior Level of Function:  Lives With: Spouse  Type of Home: House  Home Layout: One level  Home Access: Stairs to enter with rails  Entrance Stairs - Number of Steps: 2  Home Equipment:  (no AD PTA,)   Bathroom Shower/Tub: Walk-in shower  Bathroom Toilet: Standard  Bathroom Equipment: Shower chair    ADL Assistance: Independent  Homemaking Assistance: Independent (shares IADL tasks with wife)  Ambulation Assistance: Independent  Transfer Assistance: Independent  Additional Comments: No home O2    Restrictions/Precautions:  Restrictions/Precautions: General Precautions, Fall Risk, Isolation  Position Activity Restriction  Other position/activity restrictions: droplet +;       SUBJECTIVE: pt very [place] he needed redirected throughout session     PAIN: 0/10:       Vitals: Oxygen: pt on 6L O2 - sats between 90-95%  Heart Rate: 70's      OBJECTIVE:  Bed Mobility:  Not Tested    Transfers:  Sit to Stand: Contact Guard Assistance  Stand to Sit:Contact Guard Assistance    Ambulation:  Contact Guard Assistance  Distance: 50x1, 20x1  Surface: Level Tile  Device:No Device  Gait Deviations:  Slow Tonya and very wide base of support and waddle type pattern, noted trunk shortening at right with decreased wt shifting over left LE- pt reported that he had an old injury w/ his right LE - we discussed to trial rolling walker next session for balance and energy conservation- pt also needed assist to manage his O2 line     Balance:  dynamic balance w/o UE at support with one to no UE at support with CGA with min trunk sway but no loss of balance pt reaching shoulder ht and knee ht and ~ 2in out of base of support     Exercise:  Patient was guided in 2 set(s) 10 reps of exercise to both lower extremities. Seated hamstring curls, Long arc quads, Standing heel/toe raises, Standing marches, Standing hip abduction/adduction, Standing hip extension, Standing hamstring curls, Mini squats and standing ex with UE at support, pt also completed standing shoulder rolls and horz abd add with breathing technique and had pt use the incentive spirometer . Exercises were completed for increased independence with functional mobility. Functional Outcome Measures: Completed  AM-PAC Inpatient Mobility Raw Score : 17  AM-PAC Inpatient T-Scale Score : 42.13    ASSESSMENT:  Assessment: Patient progressing toward established goals. and Pt was able to tolerate increased activity this date as he was on 6L of O2 vs the high flow- he was a little unsteady wtih gait and will trial a rolling walker next visit. Pt would benefit from cont skilled therapy   Activity Tolerance:  Patient tolerance of  treatment: fair. Equipment Recommendations: Other: cont to assess needs  Discharge Recommendations:    Continue to assess pending progress    Plan: Times per week: 5X C  Times per day: Daily  Specific instructions for Next Treatment: therex and mobility    Patient Education  Patient Education: Plan of Care    Goals:  Patient goals : not stated  Short term goals  Time Frame for Short term goals: by discharge  Short term goal 1: bed mobility with S to get in/out of bed  Short term goal 2: transfer with S to get in/out of chairs  Short term goal 3: amb >100'x1 with/without AD and S, maintaining O2 sats >90% on </=4L O2, to walk safely in home  Short term goal 4: negotiate 2 steps with HR and SBA to enter home safely  Long term goals  Time Frame for Long term goals : no LTGs set secondary to short ELOS    Following session, patient left in safe position with all fall risk precautions in place.

## 2021-10-12 NOTE — PROGRESS NOTES
Patient BG 58. 12oz juice given and 4 gram crackers with peanut butter. Patient asymptomatic. Will reassess BG is 15min.

## 2021-10-12 NOTE — PROGRESS NOTES
Talked with Dr. Marlo Gomez and is okay to remove high flow from patient and start on NC at 6L if tolerated by patient.  This nurse started new oxygen therapy and SpO2 staying at 94%

## 2021-10-12 NOTE — PROGRESS NOTES
[facility] 8B  Occupational Therapy  Daily Note  Time:    Time In: 1279  Time Out: 3665  Timed Code Treatment Minutes: 44 Minutes  Minutes: 39          Date: 10/12/2021  Patient Name: Lora Alvarez,   Gender: male      Room: -29/029-A  MRN: [id number]  : 1955  (74 y.o.)  Referring Practitioner: Tish Boyer MD  Diagnosis: acute respiratory failure with hypoxemia  Additional Pertinent Hx: Per H&P:Patient reports feeling worsening shortness of breath over the last 2 days. His wife tested positive for COVID-19 a few days ago. Decided to get evaluated in the ED today and called EMS. While in the ED he was found to be hypoxemic on room air at 78%. Pt found to haveCOVID-19 Pneumonia with Sepsis    Restrictions/Precautions:  Restrictions/Precautions: General Precautions, Fall Risk, Isolation  Position Activity Restriction  Other position/activity restrictions: droplet +;     SUBJECTIVE: Pt seated in bedside chair upon arrival, agreeable to OT session. PAIN: 0/10:     Vitals: Oxygen: 91% with HFNC at 45 LPM, 45% Fio2  Heart Rate: 73 bpm   All vitals remained stable throughout    COGNITION: WFL although very [place]    ADL:   Toileting: Stand By Assistance. Tolerated standing ~2 minutes to complete   Toilet Transfer: Stand By Assistance. To/from [facility] . BALANCE:  Sitting Balance:  Independent. Standing Balance: Stand By Assistance. BED MOBILITY:  Not Tested    TRANSFERS:  Sit to Stand:  Stand By Assistance. Stand to Sit: Stand By Assistance. Completed X3 trials    FUNCTIONAL MOBILITY:  Assistive Device: None  Assist Level:  Stand By Assistance. Distance:  to/from [facility]      ADDITIONAL ACTIVITIES:  Pt completed BUE moderate resistance theraband exercises while seated in chair. Pt completed 1 set X 15 repetitions in all planes with short rest breaks in between variations. Minimal cues for quality of movement/overall technique.  Exercises completed to increase overall strength/endurance needed for ADLs and transfers. ASSESSMENT:     Activity Tolerance:  Patient tolerance of  treatment: good. Discharge Recommendations: Patient would benefit from continued therapy after discharge, Continue to assess pending progress    Equipment Recommendations: Other: will continue to monitor pending progress  Plan: Times per week: 5x  Current Treatment Recommendations: Strengthening, Balance Training, Functional Mobility Training, Endurance Training, Safety Education & Training, Patient/Caregiver Education & Training, Equipment Evaluation, Education, & procurement, Self-Care / ADL    Patient Education  Patient Education: Home Exercise Program    Goals  Short term goals  Time Frame for Short term goals: by discharge  Short term goal 1: Pt will increase activity tolerance for functional mobility to/from [facility] or chair, progressing to bathroom as able, with supervision and Sp02 >=90% in prep for ADL completion. Short term goal 2: Pt will complete BADL tasks with supervision, incorporating ECT prn, to increase independence and ease with self care tasks. Short term goal 3: Pt will tolerate dynamic standing X4-5 minutes with supervision and Sp02 >=90% in prep for sinkside grooming/toileting tasks. Following session, patient left in safe position with all fall risk precautions in place.

## 2021-10-12 NOTE — PROGRESS NOTES
Hospitalist Progress Note      Patient:  Ami Nogueira    Unit/Bed:8B-29/029-A  YOB: 1955  MRN: [id number]   Acct: [de-identified]   PCP: FELI Edwards - CNP  Date of Admission: 10/3/2021    Assessment/Plan:    Acute hypoxic and hypercapneic (unkown chronicity) respiratory failure 2/2 COVID-19 pneumonia:  -Secondary to COVID-19/atypical or bacterial pneumonia. -Currently on HFO w/ SaO2 at high 90s; RN aware to wean aggressively as tolerated to SaO2 greater than 90%. IS/Acapella/PT to see  On Baricitinib (noted previously on Remdesivir as well) and Dexamethasone (switched to Po at 6 mg QD) and added PPI  - Pt completed 7-day empirical treatment course of Abx for possible superimposed bacterial CAP. D/c'ed ABx  - Already on ASA 81 QD  -Ok to continue w/ vitamin D, vitamin C, zinc  - D/c'ed antitussives and switched MAHOGANY to prn  - repeat ABG; no documented Hx of COPD; pt former heavy smoker; may add empiric tx for COPD; pt will benefit from OP PFT and sleep study     10/12: improving; slowly weaning; RN aware to wean off HFO down to NC at 6 lpm as tolerated. CCM       Sepsis secondary to COVID-19 pneumonia and possible bacterial pneumonia:  Resolved     Coronary artery disease:  Stable. CMT.    Hx of HTN  Fairly controlled on home meds. CCM and monitor     Hyperlipidemia:  - on statin    Vitamin D deficiency: on cholecacipherol; repeat 25(OH) vit D level sent.     CKD stage III: stable. Will monitor. Avoid nephrotoxins. D/c'ed IVF. Repeat BMP pending       Hx of DM II: well-controlled on home OHA and Insluin glargine last A1C 5.7%  Modified to MDI to standing order plus SSI as needed; pt back on home Glipizide but off Metformin. systemic CS also contributing to hyperglycemia. Will reassess response to treatment. diabetic diet, SSI, accuchecks, and hypoglycemia orders in place.     10/11: BS much improved; further optimized MDI regimen based on last 24-hr SSi requirements; will reassess response  10/12: noted an episode of asymptomatic hypoglycemia w/ BS 58; held Glipizide and slightly reduced MDI. Will monitor BS closely; hypoglycemia protocol is in place  Leukopenia (resolved)  Likely secondary to Covid. Hx of HFpEF: clinically euvolemic. CMT. I/O, daily wt, salt (2 gr) and fluid (2L) restriction       Gout  - stable. Continue home allopurinol     Obesity w/ BMI 33.30       Chief Complaint: SOB    Initial H and P:-    Admitted for management of Acute hypoxic respiratory failure secondary to COVID 19. I took over care on 10/12/2021      Subjective (past 24 hours):   Feeling better. SOB improved. Denies CP/fever/chils/palpitation/diarrhea/N/V/abdo pain      Past medical history, family history, social history and allergies reviewed again and is unchanged since admission. ROS (All review of systems completed. Pertinent positives noted.  Otherwise All other systems reviewed and negative.)     Medications:  Reviewed    Infusion Medications    sodium chloride      dextrose       Scheduled Medications    insulin glargine  18 Units SubCUTAneous Daily    insulin lispro  6 Units SubCUTAneous TID WC    dexamethasone  6 mg Oral Daily    pantoprazole  40 mg Oral QAM AC    enoxaparin  40 mg SubCUTAneous BID    insulin lispro  0-6 Units SubCUTAneous 4x Daily PC & HS    baricitinib  2 mg Oral Daily    allopurinol  300 mg Oral QAM AC    amLODIPine  10 mg Oral Daily    aspirin  81 mg Oral Daily    carvedilol  25 mg Oral BID    gabapentin  300 mg Oral BID    glipiZIDE  5 mg Oral BID AC    hydrALAZINE  100 mg Oral TID    potassium chloride  20 mEq Oral Daily    atorvastatin  10 mg Oral Daily    sulfaSALAzine  500 mg Oral BID    losartan  100 mg Oral Daily    doxazosin  4 mg Oral Daily    Vitamin D  2,000 Units Oral Daily    ascorbic acid  500 mg Oral Daily    zinc sulfate  50 mg Oral Daily    sodium chloride flush  5-40 mL IntraVENous 2 times per day     PRN Meds: albuterol sulfate HFA, ipratropium, sodium chloride, acetaminophen **OR** acetaminophen, sodium chloride flush, sodium chloride, ondansetron **OR** ondansetron, polyethylene glycol, glucose, dextrose, glucagon (rDNA), dextrose      Intake/Output Summary (Last 24 hours) at 10/12/2021 0810  Last data filed at 10/12/2021 0747  Gross per 24 hour   Intake 360 ml   Output 150 ml   Net 210 ml       Diet:  ADULT DIET; Regular; 4 carb choices (60 gm/meal); Low Sodium (2 gm)    Exam:  /71   Pulse 75   Temp 98.3 °F (36.8 °C) (Oral)   Resp 18   Ht 5' 8\" (1.727 m)   Wt 219 lb (99.3 kg)   SpO2 90%   BMI 33.30 kg/m²   General appearance: Obese. No apparent distress, appears stated age and cooperative. HEENT: Pupils equal, round, and reactive to light. Conjunctivae/corneas clear. Neck: Supple, with full range of motion. No jugular venous distention. Trachea midline. Respiratory:  Normal respiratory effort. Clear to auscultation, bilaterally without Rales/Wheezes/Rhonchi. Cardiovascular: Regular rate and rhythm with normal S1/S2 without murmurs, rubs or gallops. Abdomen: Soft, non-tender, non-distended with normal bowel sounds. Musculoskeletal: passive and active ROM x 4 extremities. Skin: Skin color, texture, turgor normal.  No rashes or lesions. Neurologic:  Neurovascularly intact without any focal sensory/motor deficits.  Cranial nerves: II-XII intact, grossly non-focal.  Psychiatric: Alert and oriented, thought content appropriate, normal insight  Capillary Refill: Brisk,< 3 seconds   Peripheral Pulses: +2 palpable, equal bilaterally     Labs:   Recent Labs     10/10/21  0558 10/11/21  0651   WBC 4.2* 4.4*   HGB 10.9* 11.0*   HCT 36.5* 36.0*    196     Recent Labs     10/10/21  0558 10/11/21  0650    145   K 3.6 4.1    102   CO2 36* 33   BUN 37* 35*   CREATININE 1.2 1.0   CALCIUM 7.9* 7.9*     Recent Labs     10/11/21  0650   AST 33   ALT 37   BILIDIR <0.2 BILITOT 0.3   ALKPHOS 49     No results for input(s): INR in the last 72 hours. No results for input(s): [facility] in the last 72 hours. Microbiology:    Blood culture #1:   Lab Results   Component Value Date    BC No growth-preliminary  No growth   01/30/2019       Blood culture #2:No results found for: Reilly Tilley    Organism:  Lab Results   Component Value Date    ORG Mixed Growth 08/04/2019         Lab Results   Component Value Date    LABGRAM  02/02/2019     Rare segmented neutrophils observed. No bacteria seen. MRSA culture only:No results found for: [facility]    Urine culture:   Lab Results   Component Value Date    LABURIN  08/04/2019     Mixed growth. The mixture of organisms present represents  both organisms that may cause urinary tract infections and  organisms that are not a common cause of urinary tract  infections and are possibly skin ky or distal urethral  ky. Respiratory culture: No results found for: CULTRESP    Aerobic and Anaerobic :  Lab Results   Component Value Date    LABAERO No growth-preliminary  No growth   02/02/2019     Lab Results   Component Value Date    LABANAE No growth-preliminary  No growth   02/02/2019       Urinalysis:      Lab Results   Component Value Date    NITRU NEGATIVE 08/04/2019    WBCUA 0-2 02/02/2019    BACTERIA NONE 02/02/2019    RBCUA 0-2 02/02/2019    BLOODU NEGATIVE 08/04/2019    SPECGRAV 1.015 08/04/2019    GLUCOSEU NEGATIVE 02/02/2019       Radiology:  XR CHEST PORTABLE   Final Result   Impression:   Bilateral pneumonia concerning for SARS-coronavirus-2 infection. This document has been electronically signed by: Puneet Doe MD on    10/03/2021 05:21 AM        XR CHEST PORTABLE    Result Date: 10/3/2021  Single view chest Comparison: 8/2/2019 Findings: Moderate cardiomegaly with obscuration of the left heart border from infiltrate at the left base and left mid lung.  New infiltrate of groundglass nature is scattered through right upper lobe and right base medially with more confluent changes laterally across the right upper lobe and right midlung. Impression: Bilateral pneumonia concerning for SARS-coronavirus-2 infection. This document has been electronically signed by: Puneet Doe MD on 10/03/2021 05:21 AM    With RN in room, patient was updated about and agreed upon the treatment plan, all the questions and concerns were addressed. Patient was seen in PPE (PERSONAL PROTECTIVE EQUIPMENT). Patient was interviewed in Strict Airborne and Droplet Precautions.     Electronically signed by Liborio Sevilla MD on 10/12/2021 at 8:10 AM

## 2021-10-12 NOTE — CARE COORDINATION
Discharge Planning Update:    Remains on high flow 45%, 45L. Weaning as tolerates. Baricitinib. Decadron. Vit C, D, zinc. PT/OT. Incentive spirometer. Plans home with wife, following for [place] and O2 needs.

## 2021-10-13 LAB
ALBUMIN SERPL-MCNC: 3.3 G/DL (ref 3.5–5.1)
ALP BLD-CCNC: 48 U/L (ref 38–126)
ALT SERPL-CCNC: 33 U/L (ref 11–66)
ANION GAP SERPL CALCULATED.3IONS-SCNC: 9 MEQ/L (ref 8–16)
AST SERPL-CCNC: 36 U/L (ref 5–40)
BASOPHILS # BLD: 0.2 %
BASOPHILS ABSOLUTE: 0 THOU/MM3 (ref 0–0.1)
BILIRUB SERPL-MCNC: 0.3 MG/DL (ref 0.3–1.2)
BILIRUBIN DIRECT: < 0.2 MG/DL (ref 0–0.3)
BUN BLDV-MCNC: 24 MG/DL (ref 7–22)
CALCIUM SERPL-MCNC: 8.1 MG/DL (ref 8.5–10.5)
CHLORIDE BLD-SCNC: 99 MEQ/L (ref 98–111)
CO2: 32 MEQ/L (ref 23–33)
CREAT SERPL-MCNC: 0.9 MG/DL (ref 0.4–1.2)
EOSINOPHIL # BLD: 2.8 %
EOSINOPHILS ABSOLUTE: 0.1 THOU/MM3 (ref 0–0.4)
ERYTHROCYTE [DISTWIDTH] IN BLOOD BY AUTOMATED COUNT: 14.1 % (ref 11.5–14.5)
ERYTHROCYTE [DISTWIDTH] IN BLOOD BY AUTOMATED COUNT: 48 FL (ref 35–45)
GFR SERPL CREATININE-BSD FRML MDRD: 84 ML/MIN/1.73M2
GLUCOSE BLD-MCNC: 127 MG/DL (ref 70–108)
GLUCOSE BLD-MCNC: 139 MG/DL (ref 70–108)
GLUCOSE BLD-MCNC: 188 MG/DL (ref 70–108)
GLUCOSE BLD-MCNC: 194 MG/DL (ref 70–108)
GLUCOSE BLD-MCNC: 221 MG/DL (ref 70–108)
HCT VFR BLD CALC: 35.2 % (ref 42–52)
HEMOGLOBIN: 10.7 GM/DL (ref 14–18)
IMMATURE GRANS (ABS): 0.03 THOU/MM3 (ref 0–0.07)
IMMATURE GRANULOCYTES: 0.6 %
LYMPHOCYTES # BLD: 19.5 %
LYMPHOCYTES ABSOLUTE: 1 THOU/MM3 (ref 1–4.8)
MCH RBC QN AUTO: 28.2 PG (ref 26–33)
MCHC RBC AUTO-ENTMCNC: 30.4 GM/DL (ref 32.2–35.5)
MCV RBC AUTO: 92.9 FL (ref 80–94)
MONOCYTES # BLD: 12.4 %
MONOCYTES ABSOLUTE: 0.6 THOU/MM3 (ref 0.4–1.3)
NUCLEATED RED BLOOD CELLS: 0 /100 WBC
PLATELET # BLD: 192 THOU/MM3 (ref 130–400)
PMV BLD AUTO: 11.6 FL (ref 9.4–12.4)
POTASSIUM SERPL-SCNC: 3.8 MEQ/L (ref 3.5–5.2)
RBC # BLD: 3.79 MILL/MM3 (ref 4.7–6.1)
SEG NEUTROPHILS: 64.5 %
SEGMENTED NEUTROPHILS ABSOLUTE COUNT: 3.2 THOU/MM3 (ref 1.8–7.7)
SODIUM BLD-SCNC: 140 MEQ/L (ref 135–145)
TOTAL PROTEIN: 5 G/DL (ref 6.1–8)
WBC # BLD: 4.9 THOU/MM3 (ref 4.8–10.8)

## 2021-10-13 PROCEDURE — 85025 COMPLETE CBC W/AUTO DIFF WBC: CPT

## 2021-10-13 PROCEDURE — 82948 REAGENT STRIP/BLOOD GLUCOSE: CPT

## 2021-10-13 PROCEDURE — 6370000000 HC RX 637 (ALT 250 FOR IP): Performed by: HOSPITALIST

## 2021-10-13 PROCEDURE — 6370000000 HC RX 637 (ALT 250 FOR IP): Performed by: STUDENT IN AN ORGANIZED HEALTH CARE EDUCATION/TRAINING PROGRAM

## 2021-10-13 PROCEDURE — 6360000002 HC RX W HCPCS: Performed by: HOSPITALIST

## 2021-10-13 PROCEDURE — 2060000000 HC ICU INTERMEDIATE R&B

## 2021-10-13 PROCEDURE — 97110 THERAPEUTIC EXERCISES: CPT

## 2021-10-13 PROCEDURE — 97530 THERAPEUTIC ACTIVITIES: CPT

## 2021-10-13 PROCEDURE — 2580000003 HC RX 258: Performed by: STUDENT IN AN ORGANIZED HEALTH CARE EDUCATION/TRAINING PROGRAM

## 2021-10-13 PROCEDURE — 80053 COMPREHEN METABOLIC PANEL: CPT

## 2021-10-13 PROCEDURE — 99233 SBSQ HOSP IP/OBS HIGH 50: CPT | Performed by: HOSPITALIST

## 2021-10-13 PROCEDURE — 97116 GAIT TRAINING THERAPY: CPT

## 2021-10-13 PROCEDURE — 82248 BILIRUBIN DIRECT: CPT

## 2021-10-13 PROCEDURE — 36415 COLL VENOUS BLD VENIPUNCTURE: CPT

## 2021-10-13 PROCEDURE — 94669 MECHANICAL CHEST WALL OSCILL: CPT

## 2021-10-13 PROCEDURE — 2700000000 HC OXYGEN THERAPY PER DAY

## 2021-10-13 PROCEDURE — 6370000000 HC RX 637 (ALT 250 FOR IP): Performed by: INTERNAL MEDICINE

## 2021-10-13 RX ADMIN — DOXAZOSIN 4 MG: 4 TABLET ORAL at 08:33

## 2021-10-13 RX ADMIN — ENOXAPARIN SODIUM 40 MG: 40 INJECTION SUBCUTANEOUS at 08:33

## 2021-10-13 RX ADMIN — SULFASALAZINE 500 MG: 500 TABLET ORAL at 08:32

## 2021-10-13 RX ADMIN — ATORVASTATIN CALCIUM 10 MG: 10 TABLET, FILM COATED ORAL at 08:33

## 2021-10-13 RX ADMIN — ASPIRIN 81 MG: 81 TABLET, FILM COATED ORAL at 08:33

## 2021-10-13 RX ADMIN — INSULIN LISPRO 5 UNITS: 100 INJECTION, SOLUTION INTRAVENOUS; SUBCUTANEOUS at 17:57

## 2021-10-13 RX ADMIN — INSULIN LISPRO 1 UNITS: 100 INJECTION, SOLUTION INTRAVENOUS; SUBCUTANEOUS at 21:46

## 2021-10-13 RX ADMIN — INSULIN GLARGINE 15 UNITS: 100 INJECTION, SOLUTION SUBCUTANEOUS at 08:33

## 2021-10-13 RX ADMIN — HYDRALAZINE HYDROCHLORIDE 100 MG: 50 TABLET ORAL at 20:20

## 2021-10-13 RX ADMIN — INSULIN LISPRO 5 UNITS: 100 INJECTION, SOLUTION INTRAVENOUS; SUBCUTANEOUS at 08:33

## 2021-10-13 RX ADMIN — CARVEDILOL 25 MG: 25 TABLET, FILM COATED ORAL at 08:33

## 2021-10-13 RX ADMIN — Medication 50 MG: at 08:32

## 2021-10-13 RX ADMIN — GABAPENTIN 300 MG: 300 CAPSULE ORAL at 20:20

## 2021-10-13 RX ADMIN — OXYCODONE HYDROCHLORIDE AND ACETAMINOPHEN 500 MG: 500 TABLET ORAL at 08:33

## 2021-10-13 RX ADMIN — BARICITINIB 2 MG: 2 TABLET, FILM COATED ORAL at 08:32

## 2021-10-13 RX ADMIN — SODIUM CHLORIDE, PRESERVATIVE FREE 10 ML: 5 INJECTION INTRAVENOUS at 08:33

## 2021-10-13 RX ADMIN — DEXAMETHASONE 6 MG: 4 TABLET ORAL at 08:32

## 2021-10-13 RX ADMIN — HYDRALAZINE HYDROCHLORIDE 100 MG: 50 TABLET ORAL at 14:03

## 2021-10-13 RX ADMIN — POTASSIUM CHLORIDE 20 MEQ: 1500 TABLET, EXTENDED RELEASE ORAL at 08:33

## 2021-10-13 RX ADMIN — SODIUM CHLORIDE, PRESERVATIVE FREE 10 ML: 5 INJECTION INTRAVENOUS at 20:21

## 2021-10-13 RX ADMIN — PANTOPRAZOLE SODIUM 40 MG: 40 TABLET, DELAYED RELEASE ORAL at 05:52

## 2021-10-13 RX ADMIN — INSULIN LISPRO 1 UNITS: 100 INJECTION, SOLUTION INTRAVENOUS; SUBCUTANEOUS at 12:31

## 2021-10-13 RX ADMIN — CARVEDILOL 25 MG: 25 TABLET, FILM COATED ORAL at 20:19

## 2021-10-13 RX ADMIN — AMLODIPINE BESYLATE 10 MG: 10 TABLET ORAL at 08:33

## 2021-10-13 RX ADMIN — ENOXAPARIN SODIUM 40 MG: 40 INJECTION SUBCUTANEOUS at 20:20

## 2021-10-13 RX ADMIN — GABAPENTIN 300 MG: 300 CAPSULE ORAL at 08:33

## 2021-10-13 RX ADMIN — INSULIN LISPRO 2 UNITS: 100 INJECTION, SOLUTION INTRAVENOUS; SUBCUTANEOUS at 17:58

## 2021-10-13 RX ADMIN — SULFASALAZINE 500 MG: 500 TABLET ORAL at 20:20

## 2021-10-13 RX ADMIN — Medication 2000 UNITS: at 08:32

## 2021-10-13 RX ADMIN — ALLOPURINOL 300 MG: 300 TABLET ORAL at 05:52

## 2021-10-13 RX ADMIN — LOSARTAN POTASSIUM 100 MG: 100 TABLET, FILM COATED ORAL at 08:33

## 2021-10-13 RX ADMIN — INSULIN LISPRO 5 UNITS: 100 INJECTION, SOLUTION INTRAVENOUS; SUBCUTANEOUS at 12:31

## 2021-10-13 RX ADMIN — HYDRALAZINE HYDROCHLORIDE 100 MG: 50 TABLET ORAL at 08:33

## 2021-10-13 ASSESSMENT — PAIN SCALES - GENERAL
PAINLEVEL_OUTOF10: 0

## 2021-10-13 NOTE — PROGRESS NOTES
Comprehensive Nutrition Assessment    Type and Reason for Visit:  Initial, RD Nutrition Re-Screen/LOS    Nutrition Recommendations/Plan:   -Consider MVI. -ONS initiated: Glucerna BID. -Continue current diet. Nutrition Assessment:    Pt. nutritionally compromised AEB late phase hypermetabolic covid. At risk for further nutrition compromise r/t skin integrity issues, potential true weight loss (hard to assess with edema), some meal intake less than 75%, admit with hypoxia, respiratory failure, sepsis, covid pneumonia, signs symptoms started 10/1, covid positive 10/3,  and underlying medical condition (hx: DB-HgbA1c 5.7% 10/3/21, obesity, CHF, HTN, HLD, CAD, previous smoking). Nutrition recommendations/interventions as per above. Malnutrition Assessment:  Malnutrition Status: At risk for malnutrition (Comment)    Context:  Acute Illness     Findings of the 6 clinical characteristics of malnutrition:  Energy Intake:  Mild decrease in energy intake (Comment)  Weight Loss:  Unable to assess (hard to assess wwith edema, note weight down 8% from 10/4-10/9, monitoring trends)     Body Fat Loss:  No significant body fat loss     Muscle Mass Loss:  No significant muscle mass loss    Fluid Accumulation:    Generalized   Strength:  Not Performed    Estimated Daily Nutrient Needs:  Energy (kcal):  2153-3644 kcals (30-32 kcals/kg IBW/day)-late phase; Weight Used for Energy Requirements:  Ideal (70 kg)     Protein (g):  ~ 140 grams (2 grams/kg IBW/day); Weight Used for Protein Requirements:  Ideal (70 kg)          Nutrition Related Findings:   Pt seen, was weaned off HFNC to nasal canula today, on 3 liters. Reports appetite doing pretty good, agreeable to ONS. Meal intake: %. MAP 95. 10/13/21: BUN 24, Creatinine 0.9, Glucose 127, Chemstick 188. Rx: vitamin C, lipitor, decadron, lantus, humalog, vitamin D, zincate.   BM 10/12/21      Wounds:   (stage I buttocks, groin pink/purple)       Current Nutrition Therapies:    ADULT DIET; Regular; 4 carb choices (60 gm/meal); Low Sodium (2 gm)  Adult Oral Nutrition Supplement; Diabetic Oral Supplement    Anthropometric Measures:  · Height: 5' 8\" (172.7 cm)  · Current Body Weight: 219 lb (99.3 kg) (10/9/21 + 1 generalized edema)   · Admission Body Weight: 234 lb 11.2 oz (106.5 kg) (10/4/21 trace LE edema)    · Usual Body Weight:  (\"hard to say, fluctuates with edema\", ~ 220-230# per pt. Per EMR: 8/3/21: 694#89.9TK, 9/27/21: 232#9. 6oz)     · Ideal Body Weight: 154 lbs;   · BMI: 33.3  · Adjusted Body Weight:  ; No Adjustment   · BMI Categories: Obese Class 1 (BMI 30.0-34. 9)       Nutrition Diagnosis:   · Increased nutrient needs related to catabolic illness as evidenced by wounds (late phase-covid)      Nutrition Interventions:   Food and/or Nutrient Delivery:  Continue Current Diet, Start Oral Nutrition Supplement  Nutrition Education/Counseling:  Education initiated (Encouraged oral intake and ONS use)   Coordination of Nutrition Care:  Continue to monitor while inpatient    Goals:  Patient will consume 75% or more of meals during LOS. Nutrition Monitoring and Evaluation:   Behavioral-Environmental Outcomes:  None Identified   Food/Nutrient Intake Outcomes:  Food and Nutrient Intake, Supplement Intake, Vitamin/Mineral Intake  Physical Signs/Symptoms Outcomes:  Biochemical Data, Fluid Status or Edema, Weight, Skin, Nutrition Focused Physical Findings, GI Status     Discharge Planning:     Too soon to determine     Electronically signed by Ruddy Calvert RD, LD on 10/13/21 at 3:42 PM EDT    Contact: [phone]

## 2021-10-13 NOTE — PROGRESS NOTES
[address] [address]  INPATIENT PHYSICAL THERAPY  DAILY NOTE  [facility] 8B - 8B-29/029-A      Time In: 8554  Time Out: 1621  Timed Code Treatment Minutes: 36 Minutes  Minutes: 40          Date: 10/13/2021  Patient Name: Alin Richards,  Gender:  male        MRN: [id number]  : 1955  (74 y.o.)     Referring Practitioner: Dr. Lori Crane  Diagnosis: acute respiratory failure with hypoxemia  Additional Pertinent Hx: admit with above diagnosis, COVID-19 pneumonia with sepsis, DM, CHF     Prior Level of Function:  Lives With: Spouse  Type of Home: House  Home Layout: One level  Home Access: Stairs to enter with rails  Entrance Stairs - Number of Steps: 2  Home Equipment:  (no AD PTA,)   Bathroom Shower/Tub: Walk-in shower  Bathroom Toilet: Standard  Bathroom Equipment: Shower chair    ADL Assistance: Independent  Homemaking Assistance: Independent (shares IADL tasks with wife)  Ambulation Assistance: Independent  Transfer Assistance: Independent  Additional Comments: No home O2    Restrictions/Precautions:  Restrictions/Precautions: General Precautions, Fall Risk, Isolation  Position Activity Restriction  Other position/activity restrictions: droplet +;       SUBJECTIVE: pt very [place] he needed directions repeated frequently- he was agreeable for therapy and eager for a walk     PAIN: 0/10:       Vitals: Oxygen: pt on 6L O2 throughout session and sats 93-97%     OBJECTIVE:  Bed Mobility:  Not Tested    Transfers:  Sit to Stand: Contact Guard Assistance, to SBA cues for safe hand placmeent when walker being used   Stand to [facility] 68, cues to reach back and to control descend    Ambulation:  Contact Guard Assistance, to SBA - however he needed assist for O2 tank and line management   Distance: 90x1  Surface: Level Tile  Device:Rolling Walker  Gait Deviations:  Slow [place] with decreased knee flexion at right LE and step to type pattern gait also noted decreased heel strike at right LE, he needed cues for walker safety and assist for O2 line management - O2 sats > 93% during gait     Balance:  dynamic balance w/o UE at support wtih close SBA pt reaching out of base of support to complete task     Exercise:  Patient was guided in 1 set(s) 10 reps of exercise to both lower extremities. Shoulder horizontal abduction/adduction, Shoulder rolls, Long arc quads, Standing marches, Standing hip abduction/adduction, Standing hip extension, Standing hamstring curls, Mini squats and standing ex wtih UE at support and cues for breathing tech as he is exercising . Exercises were completed for increased independence with functional mobility. Functional Outcome Measures: Completed  AM-PAC Inpatient Mobility Raw Score : 17  AM-PAC Inpatient T-Scale Score : 42.13    ASSESSMENT:  Assessment: Patient progressing toward established goals. and He did require cues for safety throughout session. Activity Tolerance:  Patient tolerance of  treatment: fair. Equipment Recommendations: Other: cont to assess needs  Discharge Recommendations:    Continue to assess pending progress anticipate home with 24 hour assist     Plan: Times per week: 5X C  Times per day: Daily  Specific instructions for Next Treatment: therex and mobility    Patient Education  Patient Education: Plan of Care    Goals:  Patient goals : not stated  Short term goals  Time Frame for Short term goals: by discharge  Short term goal 1: bed mobility with S to get in/out of bed  Short term goal 2: transfer with S to get in/out of chairs  Short term goal 3: amb >100'x1 with/without AD and S, maintaining O2 sats >90% on </=4L O2, to walk safely in home  Short term goal 4: negotiate 2 steps with HR and SBA to enter home safely  Long term goals  Time Frame for Long term goals : no LTGs set secondary to short ELOS    Following session, patient left in safe position with all fall risk precautions in place.

## 2021-10-13 NOTE — ACP (ADVANCE CARE PLANNING)
Advance Care Planning     Advance Care Planning Inpatient Note  [facility] Department    Today's Date: 10/13/2021  Unit: [facility] SURG 8B    Received request from rounding. Upon review of chart and communication with care team, patient's decision making abilities are not in question. . Patient was/were present in the room during visit. Goals of ACP Conversation:  Facilitate a discussion related to patient's goals of care as they align with the patient's values and beliefs. Health Care Decision Makers:       Primary Decision Maker: Lolismoise Mcdermott spouse - [phone]    Advance Care Planning Documents (Patient Wishes):  Healthcare Power of /Advance Directive Appointment of [address]  Living Will/Advance Directive     Assessment:  Patient was sitting in his chair watching TV while receiving oxygen to assist with breathing upon entry. * He was welcoming and approachable, and engaged with  in conversation. He was hard-of-hearing, so  approached closely to engage in conversation during the visit. * He was alert and oriented with decision-making capacity to express his wishes at this time. * He feels he is improving, and remains hopeful for discharge home soon. Interventions:   was rounding in the unit to engage patients in [address] conversations, as well as confirm care preferences moving forward. *  provided prayer with patient prior to departing. Care Preferences Communicated:  Ventilation:   If the patient, in their present state of health, suddenly became very ill and unable to breathe on their own  the patient would desire the use of a ventilator (breathing machine). Resuscitation:  In the event the patient's heart stopped as a result of an underlying serious health condition, the patient communicates a preference for  resuscitative attempts (CPR).     * Patient expressed for both care preferences discussed, \"I think so.\"    Outcomes/Plan:  ACP Discussion: Completed    Electronically signed by Timoteo Rooney on 10/13/2021 at 1:47 PM

## 2021-10-13 NOTE — PROGRESS NOTES
[facility] 8B  Occupational Therapy  Daily Note  Time:   Time In: 0848  Time Out: 0900  Timed Code Treatment Minutes: 12 Minutes  Minutes: 12          Date: 10/13/2021  Patient Name: Kermit Fontenot,   Gender: male      Room: -29/029-A  MRN: [id number]  : 1955  (74 y.o.)  Referring Practitioner: Dhaval Braun MD  Diagnosis: acute respiratory failure with hypoxemia  Additional Pertinent Hx: Per H&P:Patient reports feeling worsening shortness of breath over the last 2 days. His wife tested positive for COVID-19 a few days ago. Decided to get evaluated in the ED today and called EMS. While in the ED he was found to be hypoxemic on room air at 78%. Pt found to haveCOVID-19 Pneumonia with Sepsis    Restrictions/Precautions:  Restrictions/Precautions: General Precautions, Fall Risk, Isolation  Position Activity Restriction  Other position/activity restrictions: droplet +;     SUBJECTIVE: Pt up in chair, agreeable to OT session upon OT arrival.    PAIN: did not rate    Vitals: Oxygen:   Patient on 6 L O2 via Nasal Canula  upon arrival to room. Patient O2 sats at 96 %. Upon activity patient maintaining O2 at 90-94 %. Pt requiring min rest break(s) to recover. SOB noted. COGNITION: Slow Processing    ADL:   No ADL's completed this session. Archie Dunn BALANCE:  Sitting Balance:  Supervision. BED MOBILITY:  Not Tested    TRANSFERS:  Not tested    ADDITIONAL ACTIVITIES:  Pt completed B UE exs with green resistance band x 15 reps, x 1 set, while following a handout. Good tolerance of exs, with min RBs throughout, and min cues for tech with resistance band. ASSESSMENT:     Activity Tolerance:  Patient tolerance of  treatment: good. Session limited d/t breakfast tray arriving. Discharge Recommendations: Patient would benefit from continued therapy after discharge, Continue to assess pending progress   Equipment Recommendations:  Other: will continue to monitor pending progress  Plan: Times per week: 5x  Current Treatment Recommendations: Strengthening, Balance Training, Functional Mobility Training, Endurance Training, Safety Education & Training, Patient/Caregiver Education & Training, Equipment Evaluation, Education, & procurement, Self-Care / ADL    Patient Education  Patient Education: Home Exercise Program    Goals  Short term goals  Time Frame for Short term goals: by discharge  Short term goal 1: Pt will increase activity tolerance for functional mobility to/from [facility] or chair, progressing to bathroom as able, with supervision and Sp02 >=90% in prep for ADL completion. Short term goal 2: Pt will complete BADL tasks with supervision, incorporating ECT prn, to increase independence and ease with self care tasks. Short term goal 3: Pt will tolerate dynamic standing X4-5 minutes with supervision and Sp02 >=90% in prep for sinkside grooming/toileting tasks. Following session, patient left in safe position with all fall risk precautions in place.

## 2021-10-13 NOTE — PROGRESS NOTES
Hospitalist Progress Note      Patient:  Zechariah Norman    Unit/Bed:8B-29/029-A  YOB: 1955  MRN: [id number]   Acct: [de-identified]   PCP: FELI Lugo CNP  Date of Admission: 10/3/2021    Assessment/Plan:    Acute hypoxic and hypercapneic (unkown chronicity) respiratory failure 2/2 COVID-19 pneumonia:  -Secondary to COVID-19/atypical or bacterial pneumonia. -Currently on HFO w/ SaO2 at high 90s; RN aware to wean aggressively as tolerated to SaO2 greater than 90%. IS/Acapella/PT to see  On Baricitinib (noted previously on Remdesivir as well) and Dexamethasone (switched to Po at 6 mg QD) and added PPI  - Pt completed 7-day empirical treatment course of Abx for possible superimposed bacterial CAP. D/c'ed ABx  - Already on ASA 81 QD  -Ok to continue w/ vitamin D, vitamin C, zinc  - D/c'ed antitussives and switched MAHOGANY to prn  - repeat ABG; no documented Hx of COPD; pt former heavy smoker; may add empiric tx for COPD; pt will benefit from OP PFT and sleep study     10/13: improving; slowly weaningsuccessfully weaned off HFO down to NC at 6 lpm yesterday. Currently on 4 lpm via. CCM. RN aware to wean as tolerated to SaO2 greater than 90%         Sepsis secondary to COVID-19 pneumonia and possible bacterial pneumonia:  Resolved     Coronary artery disease:  Stable. CMT.    Hx of HTN  Fairly controlled on home meds. CCM and monitor     Hyperlipidemia:  - on statin    Vitamin D deficiency: on cholecacipherol; repeat 25(OH) vit D level sent.     CKD stage III: stable. Will monitor. Avoid nephrotoxins. D/c'ed IVF. Cr 0.9. eGFR 84. Previously diagnosed CKD III may have been d/t aggressive diuresis. Will monitor.       Hx of DM II: well-controlled on home OHA and Insluin glargine last A1C 5.7%  Modified to MDI to standing order plus SSI as needed; pt back on home Glipizide but off Metformin. systemic CS also contributing to hyperglycemia.  Will reassess response to treatment. diabetic diet, SSI, accuchecks, and hypoglycemia orders in place. 10/11: BS much improved; further optimized MDI regimen based on last 24-hr SSi requirements; will reassess response  10/12: noted an episode of asymptomatic hypoglycemia w/ BS 58; held Glipizide and slightly reduced MDI. Will monitor BS closely; hypoglycemia protocol is in place    10/13: much improved. CCM  Leukopenia (resolved)  Likely secondary to Covid. Hx of HFpEF: clinically euvolemic. CMT. I/O, daily wt, salt (2 gr) and fluid (2L) restriction       Gout  - stable. Continue home allopurinol     Obesity w/ BMI 33.30    PT/OT following.       Chief Complaint: SOB    Initial H and P:-    Admitted for management of Acute hypoxic respiratory failure secondary to COVID 19. I took over care on 10/13/2021      Subjective (past 24 hours):   Feeling better overall. SOB improved. Denies CP/fever/chils/palpitation/diarrhea/N/V/abdo pain      Past medical history, family history, social history and allergies reviewed again and is unchanged since admission. ROS (All review of systems completed. Pertinent positives noted.  Otherwise All other systems reviewed and negative.)     Medications:  Reviewed    Infusion Medications    sodium chloride      dextrose       Scheduled Medications    insulin glargine  15 Units SubCUTAneous Daily    insulin lispro  5 Units SubCUTAneous TID WC    dexamethasone  6 mg Oral Daily    pantoprazole  40 mg Oral QAM AC    enoxaparin  40 mg SubCUTAneous BID    insulin lispro  0-6 Units SubCUTAneous 4x Daily PC & HS    baricitinib  2 mg Oral Daily    allopurinol  300 mg Oral QAM AC    amLODIPine  10 mg Oral Daily    aspirin  81 mg Oral Daily    carvedilol  25 mg Oral BID    gabapentin  300 mg Oral BID    [Held by provider] glipiZIDE  5 mg Oral BID AC    hydrALAZINE  100 mg Oral TID    potassium chloride  20 mEq Oral Daily    atorvastatin  10 mg Oral Daily    sulfaSALAzine 500 mg Oral BID    losartan  100 mg Oral Daily    doxazosin  4 mg Oral Daily    Vitamin D  2,000 Units Oral Daily    ascorbic acid  500 mg Oral Daily    zinc sulfate  50 mg Oral Daily    sodium chloride flush  5-40 mL IntraVENous 2 times per day     PRN Meds: albuterol sulfate HFA, ipratropium, sodium chloride, acetaminophen **OR** acetaminophen, sodium chloride flush, sodium chloride, ondansetron **OR** ondansetron, polyethylene glycol, glucose, dextrose, glucagon (rDNA), dextrose      Intake/Output Summary (Last 24 hours) at 10/13/2021 0816  Last data filed at 10/12/2021 1637  Gross per 24 hour   Intake --   Output 200 ml   Net -200 ml       Diet:  ADULT DIET; Regular; 4 carb choices (60 gm/meal); Low Sodium (2 gm)    Exam:  BP (!) 164/80   Pulse 76   Temp 97.6 °F (36.4 °C) (Oral)   Resp 18   Ht 5' 8\" (1.727 m)   Wt 219 lb (99.3 kg)   SpO2 92%   BMI 33.30 kg/m²   General appearance: Obese. No apparent distress, appears stated age and cooperative. HEENT: Pupils equal, round, and reactive to light. Conjunctivae/corneas clear. Neck: Supple, with full range of motion. No jugular venous distention. Trachea midline. Respiratory:  Normal respiratory effort. Clear to auscultation, bilaterally without Rales/Wheezes/Rhonchi. Cardiovascular: Regular rate and rhythm with normal S1/S2 without murmurs, rubs or gallops. Abdomen: Soft, non-tender, non-distended with normal bowel sounds. Musculoskeletal: passive and active ROM x 4 extremities. Skin: Skin color, texture, turgor normal.  No rashes or lesions. Neurologic:  Neurovascularly intact without any focal sensory/motor deficits.  Cranial nerves: II-XII intact, grossly non-focal.  Psychiatric: Alert and oriented, thought content appropriate, normal insight  Capillary Refill: Brisk,< 3 seconds   Peripheral Pulses: +2 palpable, equal bilaterally     Labs:   Recent Labs     10/11/21  0651 10/12/21  1938 10/13/21  0646   WBC 4.4* 3.8* 4.9   HGB 11.0* 11.2* 10.7*   HCT 36.0* 36.8* 35.2*    216 192     Recent Labs     10/11/21  0650 10/12/21  1938 10/13/21  0646    138 140   K 4.1 4.7 3.8    98 99   CO2 33 32 32   BUN 35* 26* 24*   CREATININE 1.0 1.0 0.9   CALCIUM 7.9* 8.2* 8.1*     Recent Labs     10/11/21  0650 10/12/21  1938 10/13/21  0646   AST 33 38 36   ALT 37 37 33   BILIDIR <0.2 <0.2 <0.2   BILITOT 0.3 0.3 0.3   ALKPHOS 49 57 48     No results for input(s): INR in the last 72 hours. No results for input(s): Kaleen Hope in the last 72 hours. Microbiology:    Blood culture #1:   Lab Results   Component Value Date    BC No growth-preliminary  No growth   01/30/2019       Blood culture #2:No results found for: Stefanie Vaca    Organism:  Lab Results   Component Value Date    ORG Mixed Growth 08/04/2019         Lab Results   Component Value Date    LABGRAM  02/02/2019     Rare segmented neutrophils observed. No bacteria seen. MRSA culture only:No results found for: [facility]    Urine culture:   Lab Results   Component Value Date    LABURIN  08/04/2019     Mixed growth. The mixture of organisms present represents  both organisms that may cause urinary tract infections and  organisms that are not a common cause of urinary tract  infections and are possibly skin ky or distal urethral  ky.          Respiratory culture: No results found for: CULTRESP    Aerobic and Anaerobic :  Lab Results   Component Value Date    LABAERO No growth-preliminary  No growth   02/02/2019     Lab Results   Component Value Date    LABANAE No growth-preliminary  No growth   02/02/2019       Urinalysis:      Lab Results   Component Value Date    NITRU NEGATIVE 08/04/2019    WBCUA 0-2 02/02/2019    BACTERIA NONE 02/02/2019    RBCUA 0-2 02/02/2019    BLOODU NEGATIVE 08/04/2019    SPECGRAV 1.015 08/04/2019    GLUCOSEU NEGATIVE 02/02/2019       Radiology:  XR CHEST PORTABLE   Final Result   Impression:   Bilateral pneumonia concerning for SARS-coronavirus-2

## 2021-10-13 NOTE — CARE COORDINATION
10/13/21, 8:35 AM EDT    DISCHARGE PLANNING UPDATE       Weaned off HHF, curretly on oxygen 4-6L/min NC. Baricitinib. Decadron. Vit C, D, zinc. PT/OT. IS and Acapella. Patient Goals/Plan/Treatment Preferences: Planning home ith wife and following for possible HH and oxygen requirements.

## 2021-10-14 LAB
ALBUMIN SERPL-MCNC: 3.3 G/DL (ref 3.5–5.1)
ALP BLD-CCNC: 46 U/L (ref 38–126)
ALT SERPL-CCNC: 45 U/L (ref 11–66)
ANION GAP SERPL CALCULATED.3IONS-SCNC: 7 MEQ/L (ref 8–16)
AST SERPL-CCNC: 48 U/L (ref 5–40)
BASOPHILS # BLD: 0.2 %
BASOPHILS ABSOLUTE: 0 THOU/MM3 (ref 0–0.1)
BILIRUB SERPL-MCNC: 0.3 MG/DL (ref 0.3–1.2)
BILIRUBIN DIRECT: < 0.2 MG/DL (ref 0–0.3)
BUN BLDV-MCNC: 23 MG/DL (ref 7–22)
CALCIUM SERPL-MCNC: 8.1 MG/DL (ref 8.5–10.5)
CHLORIDE BLD-SCNC: 101 MEQ/L (ref 98–111)
CO2: 34 MEQ/L (ref 23–33)
CREAT SERPL-MCNC: 1 MG/DL (ref 0.4–1.2)
EOSINOPHIL # BLD: 2.3 %
EOSINOPHILS ABSOLUTE: 0.1 THOU/MM3 (ref 0–0.4)
ERYTHROCYTE [DISTWIDTH] IN BLOOD BY AUTOMATED COUNT: 14.3 % (ref 11.5–14.5)
ERYTHROCYTE [DISTWIDTH] IN BLOOD BY AUTOMATED COUNT: 49.1 FL (ref 35–45)
GFR SERPL CREATININE-BSD FRML MDRD: 74 ML/MIN/1.73M2
GLUCOSE BLD-MCNC: 126 MG/DL (ref 70–108)
GLUCOSE BLD-MCNC: 175 MG/DL (ref 70–108)
GLUCOSE BLD-MCNC: 180 MG/DL (ref 70–108)
GLUCOSE BLD-MCNC: 308 MG/DL (ref 70–108)
GLUCOSE BLD-MCNC: 88 MG/DL (ref 70–108)
HCT VFR BLD CALC: 34.8 % (ref 42–52)
HEMOGLOBIN: 10.4 GM/DL (ref 14–18)
IMMATURE GRANS (ABS): 0.04 THOU/MM3 (ref 0–0.07)
IMMATURE GRANULOCYTES: 0.8 %
LYMPHOCYTES # BLD: 23.8 %
LYMPHOCYTES ABSOLUTE: 1.1 THOU/MM3 (ref 1–4.8)
MCH RBC QN AUTO: 28 PG (ref 26–33)
MCHC RBC AUTO-ENTMCNC: 29.9 GM/DL (ref 32.2–35.5)
MCV RBC AUTO: 93.5 FL (ref 80–94)
MONOCYTES # BLD: 13 %
MONOCYTES ABSOLUTE: 0.6 THOU/MM3 (ref 0.4–1.3)
NUCLEATED RED BLOOD CELLS: 0 /100 WBC
PLATELET # BLD: 203 THOU/MM3 (ref 130–400)
PMV BLD AUTO: 11.8 FL (ref 9.4–12.4)
POTASSIUM SERPL-SCNC: 3.6 MEQ/L (ref 3.5–5.2)
RBC # BLD: 3.72 MILL/MM3 (ref 4.7–6.1)
SEG NEUTROPHILS: 59.9 %
SEGMENTED NEUTROPHILS ABSOLUTE COUNT: 2.9 THOU/MM3 (ref 1.8–7.7)
SODIUM BLD-SCNC: 142 MEQ/L (ref 135–145)
TOTAL PROTEIN: 5.1 G/DL (ref 6.1–8)
WBC # BLD: 4.8 THOU/MM3 (ref 4.8–10.8)

## 2021-10-14 PROCEDURE — 6370000000 HC RX 637 (ALT 250 FOR IP): Performed by: INTERNAL MEDICINE

## 2021-10-14 PROCEDURE — 80053 COMPREHEN METABOLIC PANEL: CPT

## 2021-10-14 PROCEDURE — 6370000000 HC RX 637 (ALT 250 FOR IP): Performed by: HOSPITALIST

## 2021-10-14 PROCEDURE — 36415 COLL VENOUS BLD VENIPUNCTURE: CPT

## 2021-10-14 PROCEDURE — 2060000000 HC ICU INTERMEDIATE R&B

## 2021-10-14 PROCEDURE — 97116 GAIT TRAINING THERAPY: CPT

## 2021-10-14 PROCEDURE — 85025 COMPLETE CBC W/AUTO DIFF WBC: CPT

## 2021-10-14 PROCEDURE — 94761 N-INVAS EAR/PLS OXIMETRY MLT: CPT

## 2021-10-14 PROCEDURE — 97535 SELF CARE MNGMENT TRAINING: CPT

## 2021-10-14 PROCEDURE — 97110 THERAPEUTIC EXERCISES: CPT

## 2021-10-14 PROCEDURE — 82948 REAGENT STRIP/BLOOD GLUCOSE: CPT

## 2021-10-14 PROCEDURE — 82248 BILIRUBIN DIRECT: CPT

## 2021-10-14 PROCEDURE — 6370000000 HC RX 637 (ALT 250 FOR IP): Performed by: STUDENT IN AN ORGANIZED HEALTH CARE EDUCATION/TRAINING PROGRAM

## 2021-10-14 PROCEDURE — 2700000000 HC OXYGEN THERAPY PER DAY

## 2021-10-14 PROCEDURE — 99233 SBSQ HOSP IP/OBS HIGH 50: CPT | Performed by: HOSPITALIST

## 2021-10-14 PROCEDURE — 2580000003 HC RX 258: Performed by: STUDENT IN AN ORGANIZED HEALTH CARE EDUCATION/TRAINING PROGRAM

## 2021-10-14 PROCEDURE — 97530 THERAPEUTIC ACTIVITIES: CPT

## 2021-10-14 PROCEDURE — 6360000002 HC RX W HCPCS: Performed by: HOSPITALIST

## 2021-10-14 RX ORDER — VITAMIN B COMPLEX
1000 TABLET ORAL DAILY
Status: DISCONTINUED | OUTPATIENT
Start: 2021-10-15 | End: 2021-10-18 | Stop reason: HOSPADM

## 2021-10-14 RX ADMIN — ATORVASTATIN CALCIUM 10 MG: 10 TABLET, FILM COATED ORAL at 10:38

## 2021-10-14 RX ADMIN — AMLODIPINE BESYLATE 10 MG: 10 TABLET ORAL at 10:38

## 2021-10-14 RX ADMIN — SODIUM CHLORIDE, PRESERVATIVE FREE 10 ML: 5 INJECTION INTRAVENOUS at 10:39

## 2021-10-14 RX ADMIN — HYDRALAZINE HYDROCHLORIDE 100 MG: 50 TABLET ORAL at 20:47

## 2021-10-14 RX ADMIN — INSULIN LISPRO 4 UNITS: 100 INJECTION, SOLUTION INTRAVENOUS; SUBCUTANEOUS at 21:58

## 2021-10-14 RX ADMIN — Medication 2000 UNITS: at 10:39

## 2021-10-14 RX ADMIN — SULFASALAZINE 500 MG: 500 TABLET ORAL at 20:47

## 2021-10-14 RX ADMIN — INSULIN GLARGINE 15 UNITS: 100 INJECTION, SOLUTION SUBCUTANEOUS at 13:50

## 2021-10-14 RX ADMIN — DEXAMETHASONE 6 MG: 4 TABLET ORAL at 10:38

## 2021-10-14 RX ADMIN — HYDRALAZINE HYDROCHLORIDE 100 MG: 50 TABLET ORAL at 13:54

## 2021-10-14 RX ADMIN — INSULIN LISPRO 1 UNITS: 100 INJECTION, SOLUTION INTRAVENOUS; SUBCUTANEOUS at 10:38

## 2021-10-14 RX ADMIN — INSULIN LISPRO 5 UNITS: 100 INJECTION, SOLUTION INTRAVENOUS; SUBCUTANEOUS at 10:38

## 2021-10-14 RX ADMIN — GABAPENTIN 300 MG: 300 CAPSULE ORAL at 10:38

## 2021-10-14 RX ADMIN — ENOXAPARIN SODIUM 40 MG: 40 INJECTION SUBCUTANEOUS at 20:47

## 2021-10-14 RX ADMIN — LOSARTAN POTASSIUM 100 MG: 100 TABLET, FILM COATED ORAL at 10:38

## 2021-10-14 RX ADMIN — Medication 50 MG: at 10:37

## 2021-10-14 RX ADMIN — ALLOPURINOL 300 MG: 300 TABLET ORAL at 05:42

## 2021-10-14 RX ADMIN — INSULIN LISPRO 5 UNITS: 100 INJECTION, SOLUTION INTRAVENOUS; SUBCUTANEOUS at 18:43

## 2021-10-14 RX ADMIN — HYDRALAZINE HYDROCHLORIDE 100 MG: 50 TABLET ORAL at 10:37

## 2021-10-14 RX ADMIN — CARVEDILOL 25 MG: 25 TABLET, FILM COATED ORAL at 10:38

## 2021-10-14 RX ADMIN — DOXAZOSIN 4 MG: 4 TABLET ORAL at 10:37

## 2021-10-14 RX ADMIN — SODIUM CHLORIDE, PRESERVATIVE FREE 10 ML: 5 INJECTION INTRAVENOUS at 20:47

## 2021-10-14 RX ADMIN — GABAPENTIN 300 MG: 300 CAPSULE ORAL at 20:47

## 2021-10-14 RX ADMIN — INSULIN LISPRO 5 UNITS: 100 INJECTION, SOLUTION INTRAVENOUS; SUBCUTANEOUS at 13:50

## 2021-10-14 RX ADMIN — OXYCODONE HYDROCHLORIDE AND ACETAMINOPHEN 500 MG: 500 TABLET ORAL at 10:38

## 2021-10-14 RX ADMIN — ASPIRIN 81 MG: 81 TABLET, FILM COATED ORAL at 10:37

## 2021-10-14 RX ADMIN — INSULIN LISPRO 1 UNITS: 100 INJECTION, SOLUTION INTRAVENOUS; SUBCUTANEOUS at 13:51

## 2021-10-14 RX ADMIN — BARICITINIB 2 MG: 2 TABLET, FILM COATED ORAL at 10:38

## 2021-10-14 RX ADMIN — INSULIN LISPRO 1 UNITS: 100 INJECTION, SOLUTION INTRAVENOUS; SUBCUTANEOUS at 18:43

## 2021-10-14 RX ADMIN — POTASSIUM CHLORIDE 20 MEQ: 1500 TABLET, EXTENDED RELEASE ORAL at 10:38

## 2021-10-14 RX ADMIN — CARVEDILOL 25 MG: 25 TABLET, FILM COATED ORAL at 20:47

## 2021-10-14 RX ADMIN — SULFASALAZINE 500 MG: 500 TABLET ORAL at 10:38

## 2021-10-14 RX ADMIN — ENOXAPARIN SODIUM 40 MG: 40 INJECTION SUBCUTANEOUS at 10:39

## 2021-10-14 RX ADMIN — PANTOPRAZOLE SODIUM 40 MG: 40 TABLET, DELAYED RELEASE ORAL at 05:42

## 2021-10-14 ASSESSMENT — PAIN SCALES - GENERAL
PAINLEVEL_OUTOF10: 0

## 2021-10-14 NOTE — PROGRESS NOTES
Hospitalist Progress Note      Patient:  Cathi Boast    Unit/Bed:8B-29/029-A  YOB: 1955  MRN: [id number]   Acct: [de-identified]   PCP: FELI Mccallum - CNP  Date of Admission: 10/3/2021    Assessment/Plan:    Acute hypoxic and hypercapneic (unkown chronicity) respiratory failure 2/2 COVID-19 pneumonia:  -Secondary to COVID-19/atypical or bacterial pneumonia. -Currently on HFO w/ SaO2 at high 90s; RN aware to wean aggressively as tolerated to SaO2 greater than 90%. IS/Acapella/PT to see  On Baricitinib (noted previously on Remdesivir as well) and Dexamethasone (switched to Po at 6 mg QD) and added PPI  - Pt completed 7-day empirical treatment course of Abx for possible superimposed bacterial CAP. D/c'ed ABx  - Already on ASA 81 QD  -Ok to continue w/ vitamin D, vitamin C, zinc  - D/c'ed antitussives and switched MAHOGANY to prn  - repeat ABG; no documented Hx of COPD; pt former heavy smoker; may add empiric tx for COPD; pt will benefit from OP PFT and sleep study     10/14: improving; weaning successfully weaned off HFO down to NC at 6 lpm yesterday. Currently on 3-5 lpm via. CCM. RN aware to wean aggressively as tolerated to SaO2 greater than 90% w/ planned home O2 eval tomorrow if SaO2 consistently in 2-3 lpm at rest         Sepsis secondary to COVID-19 pneumonia and possible bacterial pneumonia:  Resolved     Coronary artery disease:  Stable. CMT.    Hx of HTN  Fairly controlled on home meds. CCM and monitor     Hyperlipidemia:  - on statin    Vitamin D deficiency: on cholecacipherol; repeat 25(OH) vit D level WNls now     CKD stage III: stable. Will monitor. Avoid nephrotoxins. D/c'ed IVF. Cr 0.9. eGFR 84. Previously diagnosed CKD III may have been d/t aggressive diuresis.  Will monitor.       Hx of DM II: well-controlled on home OHA and Insluin glargine last A1C 5.7%  Modified to MDI to standing order plus SSI as needed; pt back on home Glipizide but off Metformin. systemic CS also contributing to hyperglycemia. Will reassess response to treatment. diabetic diet, SSI, accuchecks, and hypoglycemia orders in place. 10/11: BS much improved; further optimized MDI regimen based on last 24-hr SSi requirements; will reassess response  10/12: noted an episode of asymptomatic hypoglycemia w/ BS 58; held Glipizide and slightly reduced MDI. Will monitor BS closely; hypoglycemia protocol is in place    10/14: fairly controlled. CCM  Leukopenia (resolved)  Likely secondary to Covid. Hx of HFpEF: clinically euvolemic. CMT. I/O, daily wt, salt (2 gr) and fluid (2L) restriction       Gout  - stable. Continue home allopurinol     Obesity w/ BMI 33.30    PT/OT following.       Chief Complaint: SOB    Initial H and P:-    Admitted for management of Acute hypoxic respiratory failure secondary to COVID 19. I took over care on 10/14/2021      Subjective (past 24 hours):   Feeling better overall. SOB improved. Denies CP/fever/chils/palpitation/diarrhea/N/V/abdo pain      Past medical history, family history, social history and allergies reviewed again and is unchanged since admission. ROS (All review of systems completed. Pertinent positives noted.  Otherwise All other systems reviewed and negative.)     Medications:  Reviewed    Infusion Medications    sodium chloride      dextrose       Scheduled Medications    insulin glargine  15 Units SubCUTAneous Daily    insulin lispro  5 Units SubCUTAneous TID WC    dexamethasone  6 mg Oral Daily    pantoprazole  40 mg Oral QAM AC    enoxaparin  40 mg SubCUTAneous BID    insulin lispro  0-6 Units SubCUTAneous 4x Daily PC & HS    baricitinib  2 mg Oral Daily    allopurinol  300 mg Oral QAM AC    amLODIPine  10 mg Oral Daily    aspirin  81 mg Oral Daily    carvedilol  25 mg Oral BID    gabapentin  300 mg Oral BID    [Held by provider] glipiZIDE  5 mg Oral BID AC    hydrALAZINE  100 mg Oral TID    potassium chloride  20 mEq Oral Daily    atorvastatin  10 mg Oral Daily    sulfaSALAzine  500 mg Oral BID    losartan  100 mg Oral Daily    doxazosin  4 mg Oral Daily    Vitamin D  2,000 Units Oral Daily    ascorbic acid  500 mg Oral Daily    zinc sulfate  50 mg Oral Daily    sodium chloride flush  5-40 mL IntraVENous 2 times per day     PRN Meds: albuterol sulfate HFA, ipratropium, sodium chloride, acetaminophen **OR** acetaminophen, sodium chloride flush, sodium chloride, ondansetron **OR** ondansetron, polyethylene glycol, glucose, dextrose, glucagon (rDNA), dextrose      Intake/Output Summary (Last 24 hours) at 10/14/2021 0845  Last data filed at 10/13/2021 1951  Gross per 24 hour   Intake 550 ml   Output --   Net 550 ml       Diet:  ADULT DIET; Regular; 4 carb choices (60 gm/meal); Low Sodium (2 gm)  Adult Oral Nutrition Supplement; Diabetic Oral Supplement    Exam:  BP (!) 141/73   Pulse 90   Temp 97.9 °F (36.6 °C) (Oral)   Resp 20   Ht 5' 8\" (1.727 m)   Wt 219 lb (99.3 kg)   SpO2 91%   BMI 33.30 kg/m²   General appearance: Obese. No apparent distress, appears stated age and cooperative. HEENT: Pupils equal, round, and reactive to light. Conjunctivae/corneas clear. Neck: Supple, with full range of motion. No jugular venous distention. Trachea midline. Respiratory:  Normal respiratory effort. Clear to auscultation, bilaterally without Rales/Wheezes/Rhonchi. Cardiovascular: Regular rate and rhythm with normal S1/S2 without murmurs, rubs or gallops. Abdomen: Soft, non-tender, non-distended with normal bowel sounds. Musculoskeletal: passive and active ROM x 4 extremities. Skin: Skin color, texture, turgor normal.  No rashes or lesions. Neurologic:  Neurovascularly intact without any focal sensory/motor deficits.  Cranial nerves: II-XII intact, grossly non-focal.  Psychiatric: Alert and oriented, thought content appropriate, normal insight  Capillary Refill: Brisk,< 3 seconds   Peripheral Impression:   Bilateral pneumonia concerning for SARS-coronavirus-2 infection. This document has been electronically signed by: Jossy Dixon MD on    10/03/2021 05:21 AM        XR CHEST PORTABLE    Result Date: 10/3/2021  Single view chest Comparison: 8/2/2019 Findings: Moderate cardiomegaly with obscuration of the left heart border from infiltrate at the left base and left mid lung. New infiltrate of groundglass nature is scattered through right upper lobe and right base medially with more confluent changes laterally across the right upper lobe and right midlung. Impression: Bilateral pneumonia concerning for SARS-coronavirus-2 infection. This document has been electronically signed by: Jossy Dixon MD on 10/03/2021 05:21 AM    With RN in room, patient was updated about and agreed upon the treatment plan, all the questions and concerns were addressed. Patient was seen in PPE (PERSONAL PROTECTIVE EQUIPMENT). Patient was interviewed in Strict Airborne and Droplet Precautions.     Electronically signed by Mau Bobo MD on 10/14/2021 at 8:45 AM

## 2021-10-14 NOTE — PROGRESS NOTES
[address] [facility] 8B  Occupational Therapy  Daily Note  Time:   Time In: 3418  Time Out: 8668  Timed Code Treatment Minutes: 30 Minutes  Minutes: 30          Date: 10/14/2021  Patient Name: Cristi Johnson,   Gender: male      Room: -29/029-A  MRN: [id number]  : 1955  (74 y.o.)  Referring Practitioner: Michelle Lawson MD  Diagnosis: acute respiratory failure with hypoxemia  Additional Pertinent Hx: Per H&P:Patient reports feeling worsening shortness of breath over the last 2 days. His wife tested positive for COVID-19 a few days ago. Decided to get evaluated in the ED today and called EMS. While in the ED he was found to be hypoxemic on room air at 78%. Pt found to haveCOVID-19 Pneumonia with Sepsis    Restrictions/Precautions:  Restrictions/Precautions: General Precautions, Fall Risk, Isolation  Position Activity Restriction  Other position/activity restrictions: droplet +;     SUBJECTIVE: RN approved session, Up in chair upon arrival, agreeable to OT session, [facility]    PAIN: 0/10:     Vitals: Patient on 6 L O2 via Nasal Canula  upon arrival to room. Patient O2 sats at 93 %. Upon activity patient dropping O2 at 84 %. Pt requiring moderate rest break(s) to recover. COGNITION: Slow Processing, Decreased Insight, Impaired Memory, Decreased Problem Solving and Decreased Safety Awareness    ADL:   Grooming: with set-up. combed hair   Bathing: Stand By Assistance. Completed bath sitting in bedside chair  Upper Extremity Dressing: with set-up. hospital gown  Lower Extremity Dressing: Supervision. doffed/donned B slipper socks . BED MOBILITY:  Not Tested    TRANSFERS:  Sit to Stand:  Stand By Assistance. Bedside chair  Stand to Sit: Stand By Assistance.         ADDITIONAL ACTIVITIES:  Patient completed BUE strengthening exercises with skilled education on HEP: completed x10 reps x1 set with a yellow band in all joints and all planes in order to improve UE strength and activity tolerance required for BADL routine and toilet / shower transfers. Patient tolerated well, requiring minimal rest breaks. Patient also required minimal cues for technique. ASSESSMENT:     Activity Tolerance:  Patient tolerance of  treatment: good. Discharge Recommendations: Patient would benefit from continued therapy after discharge, Continue to assess pending progress   Equipment Recommendations: Other: will continue to monitor pending progress  Plan: Times per week: 5x  Current Treatment Recommendations: Strengthening, Balance Training, Functional Mobility Training, Endurance Training, Safety Education & Training, Patient/Caregiver Education & Training, Equipment Evaluation, Education, & procurement, Self-Care / ADL    Patient Education  Patient Education: ADL's and Home Exercise Program    Goals  Short term goals  Time Frame for Short term goals: by discharge  Short term goal 1: Pt will increase activity tolerance for functional mobility to/from [facility] or chair, progressing to bathroom as able, with supervision and Sp02 >=90% in prep for ADL completion. Short term goal 2: Pt will complete BADL tasks with supervision, incorporating ECT prn, to increase independence and ease with self care tasks. Short term goal 3: Pt will tolerate dynamic standing X4-5 minutes with supervision and Sp02 >=90% in prep for sinkside grooming/toileting tasks. Following session, patient left in safe position with all fall risk precautions in place.

## 2021-10-14 NOTE — PROGRESS NOTES
[facility]  INPATIENT PHYSICAL THERAPY  DAILY NOTE  [facility] SURG 8B - 8B-29/029-A      Time In: 6904  Time Out: 1027  Timed Code Treatment Minutes: 44 Minutes  Minutes: 39          Date: 10/14/2021  Patient Name: Geneva Salguero,  Gender:  male        MRN: [id number]  : 1955  (74 y.o.)      Referring Practitioner: Dr. Aubrie Monge  Diagnosis: acute respiratory failure with hypoxemia  Additional Pertinent Hx: admit with above diagnosis, COVID-19 pneumonia with sepsis, DM, CHF     Prior Level of Function:  Lives With: Spouse  Type of Home: House  Home Layout: One level  Home Access: Stairs to enter with rails  Entrance Stairs - Number of Steps: 2  Home Equipment:  (no AD PTA,)   Bathroom Shower/Tub: Walk-in shower  Bathroom Toilet: Standard  Bathroom Equipment: Shower chair    ADL Assistance: Independent  Homemaking Assistance: Independent (shares IADL tasks with wife)  Ambulation Assistance: Independent  Transfer Assistance: Independent  Additional Comments: No home O2    Restrictions/Precautions:  Restrictions/Precautions: General Precautions, Fall Risk, Isolation  Position Activity Restriction  Other position/activity restrictions: droplet +;       SUBJECTIVE: pt cooperative for therapy and asking about going home- he needed cues for his O2 line management during session and he stated \"Im not going to use this at home\"      PAIN: 0/10:       Vitals: Oxygen: 6L - sats from 91-98%    OBJECTIVE:  Bed Mobility:  Not Tested    Transfers:  Sit to Stand: Stand By Assistance  Stand to Sit:Stand By Assistance  Cues for hand placement   Ambulation:  Stand By Assistance  Distance: 140x1  Surface: Level Tile  Device:Rolling Walker  Gait Deviations:  Slow Tonya and with step to pattern and decreased knee flexion during swing phase and slight ER at right LE along with decreased heel strike- pt needed assist for O2 tank and max cues for line awareness       Exercise:  Patient was guided in 1 set(s) 10 reps of exercise to both lower extremities. Scapular retraction, Standing heel/toe raises, Standing marches, Standing hip abduction/adduction, Standing hamstring curls, Standing hip flexion, Mini squats and standing shoulder horz abd/add, and shoulder rolls with breathing technique . Exercises were completed for increased independence with functional mobility. Functional Outcome Measures: Completed  AM-PAC Inpatient Mobility Raw Score : 18  AM-PAC Inpatient T-Scale Score : 43.63    ASSESSMENT:  Assessment: Patient progressing toward established goals. and However he needed cues for safety with his O2 line in prep for home going , he tolerated increased walking distance but was fatigued at this distance. Pt would benefit from cont skilled therapy   Activity Tolerance:  Patient tolerance of  treatment: fair. Rest breaks needed        Equipment Recommendations: Other: cont to assess needs  Discharge Recommendations:    Continue to assess pending progress anticipate home with assist PRN     Plan: Times per week: 5X C  Times per day: Daily  Specific instructions for Next Treatment: therex and mobility    Patient Education  Patient Education: extended time spent with education on O2 line management and safety     Goals:  Patient goals : not stated  Short term goals  Time Frame for Short term goals: by discharge  Short term goal 1: bed mobility with S to get in/out of bed  Short term goal 2: transfer with S to get in/out of chairs  Short term goal 3: amb >100'x1 with/without AD and S, maintaining O2 sats >90% on </=4L O2, to walk safely in home  Short term goal 4: negotiate 2 steps with HR and SBA to enter home safely  Long term goals  Time Frame for Long term goals : no LTGs set secondary to short ELOS    Following session, patient left in safe position with all fall risk precautions in place.

## 2021-10-14 NOTE — PLAN OF CARE
Patient remain on 6L NC. Walked glover with PT and up in chair.    Problem: Falls - Risk of:  Goal: Will remain free from falls  Description: Will remain free from falls  Outcome: Ongoing  Goal: Absence of physical injury  Description: Absence of physical injury  Outcome: Ongoing     Problem: Isolation Precautions - Risk of Spread of Infection  Goal: Prevent transmission of infection  Outcome: Ongoing     Problem: Skin Integrity:  Goal: Will show no infection signs and symptoms  Description: Will show no infection signs and symptoms  Outcome: Ongoing  Goal: Absence of new skin breakdown  Description: Absence of new skin breakdown  Outcome: Ongoing

## 2021-10-14 NOTE — CARE COORDINATION
Discharge Planning Update:    Weaned to 3L NC, possible home O2 eval per report. Did speak with Kal Cook, he prefers [facility] DME if home O2 is needed. Baricitinib and decadron. Vit C, D, zinc. IMM given. Update: Kal Cook is now requiring 6L NC, sats low 90s.

## 2021-10-15 LAB
ALBUMIN SERPL-MCNC: 3.3 G/DL (ref 3.5–5.1)
ALP BLD-CCNC: 55 U/L (ref 38–126)
ALT SERPL-CCNC: 53 U/L (ref 11–66)
ANION GAP SERPL CALCULATED.3IONS-SCNC: 5 MEQ/L (ref 8–16)
AST SERPL-CCNC: 46 U/L (ref 5–40)
BASOPHILS # BLD: 0.2 %
BASOPHILS ABSOLUTE: 0 THOU/MM3 (ref 0–0.1)
BILIRUB SERPL-MCNC: 0.3 MG/DL (ref 0.3–1.2)
BILIRUBIN DIRECT: < 0.2 MG/DL (ref 0–0.3)
BUN BLDV-MCNC: 23 MG/DL (ref 7–22)
CALCIUM SERPL-MCNC: 8.1 MG/DL (ref 8.5–10.5)
CHLORIDE BLD-SCNC: 103 MEQ/L (ref 98–111)
CO2: 33 MEQ/L (ref 23–33)
CREAT SERPL-MCNC: 1.1 MG/DL (ref 0.4–1.2)
EOSINOPHIL # BLD: 0.4 %
EOSINOPHILS ABSOLUTE: 0 THOU/MM3 (ref 0–0.4)
ERYTHROCYTE [DISTWIDTH] IN BLOOD BY AUTOMATED COUNT: 14.3 % (ref 11.5–14.5)
ERYTHROCYTE [DISTWIDTH] IN BLOOD BY AUTOMATED COUNT: 48.3 FL (ref 35–45)
GFR SERPL CREATININE-BSD FRML MDRD: 67 ML/MIN/1.73M2
GLUCOSE BLD-MCNC: 108 MG/DL (ref 70–108)
GLUCOSE BLD-MCNC: 110 MG/DL (ref 70–108)
GLUCOSE BLD-MCNC: 144 MG/DL (ref 70–108)
GLUCOSE BLD-MCNC: 194 MG/DL (ref 70–108)
GLUCOSE BLD-MCNC: 212 MG/DL (ref 70–108)
HCT VFR BLD CALC: 34 % (ref 42–52)
HEMOGLOBIN: 10.2 GM/DL (ref 14–18)
IMMATURE GRANS (ABS): 0.06 THOU/MM3 (ref 0–0.07)
IMMATURE GRANULOCYTES: 1.1 %
LYMPHOCYTES # BLD: 15.6 %
LYMPHOCYTES ABSOLUTE: 0.8 THOU/MM3 (ref 1–4.8)
MCH RBC QN AUTO: 27.8 PG (ref 26–33)
MCHC RBC AUTO-ENTMCNC: 30 GM/DL (ref 32.2–35.5)
MCV RBC AUTO: 92.6 FL (ref 80–94)
MONOCYTES # BLD: 11.1 %
MONOCYTES ABSOLUTE: 0.6 THOU/MM3 (ref 0.4–1.3)
NUCLEATED RED BLOOD CELLS: 0 /100 WBC
PLATELET # BLD: 208 THOU/MM3 (ref 130–400)
PMV BLD AUTO: 12.1 FL (ref 9.4–12.4)
POTASSIUM SERPL-SCNC: 3.8 MEQ/L (ref 3.5–5.2)
RBC # BLD: 3.67 MILL/MM3 (ref 4.7–6.1)
SEG NEUTROPHILS: 71.6 %
SEGMENTED NEUTROPHILS ABSOLUTE COUNT: 3.8 THOU/MM3 (ref 1.8–7.7)
SODIUM BLD-SCNC: 141 MEQ/L (ref 135–145)
TOTAL PROTEIN: 5 G/DL (ref 6.1–8)
WBC # BLD: 5.3 THOU/MM3 (ref 4.8–10.8)

## 2021-10-15 PROCEDURE — 97530 THERAPEUTIC ACTIVITIES: CPT

## 2021-10-15 PROCEDURE — 6370000000 HC RX 637 (ALT 250 FOR IP): Performed by: HOSPITALIST

## 2021-10-15 PROCEDURE — 2060000000 HC ICU INTERMEDIATE R&B

## 2021-10-15 PROCEDURE — 97116 GAIT TRAINING THERAPY: CPT

## 2021-10-15 PROCEDURE — 82248 BILIRUBIN DIRECT: CPT

## 2021-10-15 PROCEDURE — 94761 N-INVAS EAR/PLS OXIMETRY MLT: CPT

## 2021-10-15 PROCEDURE — 99233 SBSQ HOSP IP/OBS HIGH 50: CPT | Performed by: INTERNAL MEDICINE

## 2021-10-15 PROCEDURE — 6370000000 HC RX 637 (ALT 250 FOR IP): Performed by: STUDENT IN AN ORGANIZED HEALTH CARE EDUCATION/TRAINING PROGRAM

## 2021-10-15 PROCEDURE — 6360000002 HC RX W HCPCS: Performed by: HOSPITALIST

## 2021-10-15 PROCEDURE — 82948 REAGENT STRIP/BLOOD GLUCOSE: CPT

## 2021-10-15 PROCEDURE — 36415 COLL VENOUS BLD VENIPUNCTURE: CPT

## 2021-10-15 PROCEDURE — 85025 COMPLETE CBC W/AUTO DIFF WBC: CPT

## 2021-10-15 PROCEDURE — 6370000000 HC RX 637 (ALT 250 FOR IP): Performed by: INTERNAL MEDICINE

## 2021-10-15 PROCEDURE — 97110 THERAPEUTIC EXERCISES: CPT

## 2021-10-15 PROCEDURE — 80053 COMPREHEN METABOLIC PANEL: CPT

## 2021-10-15 PROCEDURE — 2580000003 HC RX 258: Performed by: STUDENT IN AN ORGANIZED HEALTH CARE EDUCATION/TRAINING PROGRAM

## 2021-10-15 RX ORDER — INSULIN GLARGINE 100 [IU]/ML
20 INJECTION, SOLUTION SUBCUTANEOUS DAILY
Status: DISCONTINUED | OUTPATIENT
Start: 2021-10-16 | End: 2021-10-17

## 2021-10-15 RX ADMIN — CARVEDILOL 25 MG: 25 TABLET, FILM COATED ORAL at 21:13

## 2021-10-15 RX ADMIN — LOSARTAN POTASSIUM 100 MG: 100 TABLET, FILM COATED ORAL at 10:30

## 2021-10-15 RX ADMIN — SODIUM CHLORIDE, PRESERVATIVE FREE 10 ML: 5 INJECTION INTRAVENOUS at 21:13

## 2021-10-15 RX ADMIN — GABAPENTIN 300 MG: 300 CAPSULE ORAL at 08:36

## 2021-10-15 RX ADMIN — DOXAZOSIN 4 MG: 4 TABLET ORAL at 08:36

## 2021-10-15 RX ADMIN — Medication 1000 UNITS: at 08:35

## 2021-10-15 RX ADMIN — PANTOPRAZOLE SODIUM 40 MG: 40 TABLET, DELAYED RELEASE ORAL at 04:14

## 2021-10-15 RX ADMIN — ASPIRIN 81 MG: 81 TABLET, FILM COATED ORAL at 08:36

## 2021-10-15 RX ADMIN — OXYCODONE HYDROCHLORIDE AND ACETAMINOPHEN 500 MG: 500 TABLET ORAL at 08:36

## 2021-10-15 RX ADMIN — DEXAMETHASONE 6 MG: 4 TABLET ORAL at 08:36

## 2021-10-15 RX ADMIN — SODIUM CHLORIDE, PRESERVATIVE FREE 10 ML: 5 INJECTION INTRAVENOUS at 09:42

## 2021-10-15 RX ADMIN — SULFASALAZINE 500 MG: 500 TABLET ORAL at 22:58

## 2021-10-15 RX ADMIN — HYDRALAZINE HYDROCHLORIDE 100 MG: 50 TABLET ORAL at 08:35

## 2021-10-15 RX ADMIN — Medication 50 MG: at 08:35

## 2021-10-15 RX ADMIN — HYDRALAZINE HYDROCHLORIDE 100 MG: 50 TABLET ORAL at 13:52

## 2021-10-15 RX ADMIN — INSULIN LISPRO 5 UNITS: 100 INJECTION, SOLUTION INTRAVENOUS; SUBCUTANEOUS at 13:08

## 2021-10-15 RX ADMIN — INSULIN LISPRO 1 UNITS: 100 INJECTION, SOLUTION INTRAVENOUS; SUBCUTANEOUS at 17:42

## 2021-10-15 RX ADMIN — ALLOPURINOL 300 MG: 300 TABLET ORAL at 04:14

## 2021-10-15 RX ADMIN — INSULIN LISPRO 1 UNITS: 100 INJECTION, SOLUTION INTRAVENOUS; SUBCUTANEOUS at 20:23

## 2021-10-15 RX ADMIN — ENOXAPARIN SODIUM 40 MG: 40 INJECTION SUBCUTANEOUS at 08:36

## 2021-10-15 RX ADMIN — GABAPENTIN 300 MG: 300 CAPSULE ORAL at 21:13

## 2021-10-15 RX ADMIN — INSULIN LISPRO 5 UNITS: 100 INJECTION, SOLUTION INTRAVENOUS; SUBCUTANEOUS at 09:54

## 2021-10-15 RX ADMIN — INSULIN LISPRO 2 UNITS: 100 INJECTION, SOLUTION INTRAVENOUS; SUBCUTANEOUS at 13:07

## 2021-10-15 RX ADMIN — SULFASALAZINE 500 MG: 500 TABLET ORAL at 08:35

## 2021-10-15 RX ADMIN — BARICITINIB 2 MG: 2 TABLET, FILM COATED ORAL at 08:35

## 2021-10-15 RX ADMIN — ENOXAPARIN SODIUM 40 MG: 40 INJECTION SUBCUTANEOUS at 21:13

## 2021-10-15 RX ADMIN — HYDRALAZINE HYDROCHLORIDE 100 MG: 50 TABLET ORAL at 21:12

## 2021-10-15 RX ADMIN — INSULIN LISPRO 5 UNITS: 100 INJECTION, SOLUTION INTRAVENOUS; SUBCUTANEOUS at 17:41

## 2021-10-15 RX ADMIN — INSULIN GLARGINE 15 UNITS: 100 INJECTION, SOLUTION SUBCUTANEOUS at 09:53

## 2021-10-15 RX ADMIN — AMLODIPINE BESYLATE 10 MG: 10 TABLET ORAL at 08:36

## 2021-10-15 RX ADMIN — POTASSIUM CHLORIDE 20 MEQ: 1500 TABLET, EXTENDED RELEASE ORAL at 08:36

## 2021-10-15 RX ADMIN — ATORVASTATIN CALCIUM 10 MG: 10 TABLET, FILM COATED ORAL at 08:36

## 2021-10-15 RX ADMIN — CARVEDILOL 25 MG: 25 TABLET, FILM COATED ORAL at 08:36

## 2021-10-15 ASSESSMENT — PAIN SCALES - GENERAL
PAINLEVEL_OUTOF10: 0
PAINLEVEL_OUTOF10: 0

## 2021-10-15 NOTE — CARE COORDINATION
Discharge planning update:    Pt on 6 liters oxygen with saturations at 92%. Afebrile. Albuterol inhalers, Vitamin C,D,zinc, Asa, baricitinib, IV Decadron, Lovenox, diabetes management, Protonix. Electrolyte replacement protocols. From home. Will follow for needs.    Electronically signed by Sussy Springer RN on 10/15/2021 at 11:43 AM

## 2021-10-15 NOTE — PROGRESS NOTES
[address] [address]  INPATIENT PHYSICAL THERAPY  DAILY NOTE  [facility] 8B - 8B-29/029-A      Time In: 1327  Time Out: 7011  Timed Code Treatment Minutes: 44 Minutes  Minutes: 38          Date: 10/15/2021  Patient Name: Chris Shine,  Gender:  male        MRN: [id number]  : 1955  (74 y.o.)     Referring Practitioner: Dr. Ga Iqbal  Diagnosis: acute respiratory failure with hypoxemia  Additional Pertinent Hx: admit with above diagnosis, COVID-19 pneumonia with sepsis, DM, CHF     Prior Level of Function:  Lives With: Spouse  Type of Home: House  Home Layout: One level  Home Access: Stairs to enter with rails  Entrance Stairs - Number of Steps: 2  Home Equipment:  (no AD PTA,)   Bathroom Shower/Tub: Walk-in shower  Bathroom Toilet: Standard  Bathroom Equipment: Shower chair    ADL Assistance: Independent  Homemaking Assistance: Independent (shares IADL tasks with wife)  Ambulation Assistance: Independent  Transfer Assistance: Independent  Additional Comments: No home O2    Restrictions/Precautions:  Restrictions/Precautions: General Precautions, Fall Risk, Isolation  Position Activity Restriction  Other position/activity restrictions: droplet +;       SUBJECTIVE: pt up in chair on arrival he is frustrated and wants to go home pt needed encouragement initially, he demonstrated - pt asked to use bathroom during session-  He did better with O2 line management but still required cues     PAIN: 0/10:       Vitals: Oxygen: 6L O2 - sats 93-98%    OBJECTIVE:  Bed Mobility:  Not Tested    Transfers:  Sit to Stand: Stand By Assistance  Stand to Sit:Stand By Assistance    Ambulation:  Stand By Assistance, to CGA   Distance: 150x1, short distances in room   Surface: Level Tile  Device:he needed rolling walker for long walk and no AD in room   Gait Deviations:  W/ walker noted slow [place] with step to pattern and ER at right LE and demonstrated decreased heel strike and toe off along with decreased knee flexion during swing phase- w/o AD noted arms in high guarded position with waddle type patter with still a step to pattern- he did better managing his O2 line when he wasn't using and AD vs with the walker however still cues provided for safety     Balance:  standing balance w/o UE at support pt reaching out of base with SBA noted wide base of support     Exercise:  Patient was guided in 1 set(s) 10 reps of exercise to both lower extremities. Long arc quads, Standing heel/toe raises, Standing marches, Standing hip abduction/adduction, Standing hamstring curls, Mini squats and all standing ex with UE at support . Exercises were completed for increased independence with functional mobility. Functional Outcome Measures: Completed  AM-PAC Inpatient Mobility Raw Score : 18  AM-PAC Inpatient T-Scale Score : 43.63    ASSESSMENT:  Assessment: Patient progressing toward established goals. and He tolerated increased activity this date however still required 6L O2. He did better with O2 line management but still needing cues for safety. Pt would benefit from cont skilled therapy. Activity Tolerance:  Patient tolerance of  treatment: good. Equipment Recommendations: Other: cont to assess needs  Discharge Recommendations:    Continue to assess pending progress    Plan: Times per week: 5X C  Times per day: Daily  Specific instructions for Next Treatment: therex and mobility    Patient Education  Patient Education: Plan of Care    Goals:  Patient goals : not stated  Short term goals  Time Frame for Short term goals: by discharge  Short term goal 1: bed mobility with S to get in/out of bed  Short term goal 2: transfer with S to get in/out of chairs  Short term goal 3: amb >100'x1 with/without AD and S, maintaining O2 sats >90% on </=4L O2, to walk safely in home  Short term goal 4: negotiate 2 steps with HR and SBA to enter home safely  Long term goals  Time Frame for Long term goals : no LTGs set secondary to short ELOS    Following session, patient left in safe position with all fall risk precautions in place.

## 2021-10-15 NOTE — PROGRESS NOTES
bathroom and and household distance in hallway   Grossly steady without LOB. Minimal SOB noted with cues required for pursed lip breathing. Pt remained on 6L o2 during functional mobility. Functional mobility completed to increase overall activity tolerance needed for ADL tasks. ADDITIONAL ACTIVITIES:  Pt completed BUE moderate resistance theraband exercises while seated in chair. Pt completed 1 set X 15 repetitions in all planes with short rest breaks in between variations. Exercises completed to increase overall strength/endurance needed for ADLs and transfers. ASSESSMENT:     Activity Tolerance:  Patient tolerance of  treatment: good. Discharge Recommendations: Home with assist PRN, Home with Home health OT    Equipment Recommendations: Other: will continue to monitor pending progress  Plan: Times per week: 5x  Current Treatment Recommendations: Strengthening, Balance Training, Functional Mobility Training, Endurance Training, Safety Education & Training, Patient/Caregiver Education & Training, Equipment Evaluation, Education, & procurement, Self-Care / ADL    Patient Education  Patient Education: Plan of Care, Energy Conservation, Home Exercise Program and Importance of Increasing Activity    Goals  Short term goals  Time Frame for Short term goals: by discharge  Short term goal 1: Pt will increase activity tolerance for functional mobility to/from [facility] or chair, progressing to bathroom as able, with supervision and Sp02 >=90% in prep for ADL completion. Short term goal 2: Pt will complete BADL tasks with supervision, incorporating ECT prn, to increase independence and ease with self care tasks. Short term goal 3: Pt will tolerate dynamic standing X4-5 minutes with supervision and Sp02 >=90% in prep for sinkside grooming/toileting tasks. Following session, patient left in safe position with all fall risk precautions in place.

## 2021-10-16 LAB
ALBUMIN SERPL-MCNC: 3.3 G/DL (ref 3.5–5.1)
ALP BLD-CCNC: 48 U/L (ref 38–126)
ALT SERPL-CCNC: 54 U/L (ref 11–66)
ANION GAP SERPL CALCULATED.3IONS-SCNC: 5 MEQ/L (ref 8–16)
AST SERPL-CCNC: 47 U/L (ref 5–40)
BASOPHILS # BLD: 0.2 %
BASOPHILS ABSOLUTE: 0 THOU/MM3 (ref 0–0.1)
BILIRUB SERPL-MCNC: 0.2 MG/DL (ref 0.3–1.2)
BILIRUBIN DIRECT: < 0.2 MG/DL (ref 0–0.3)
BUN BLDV-MCNC: 19 MG/DL (ref 7–22)
CALCIUM SERPL-MCNC: 8 MG/DL (ref 8.5–10.5)
CHLORIDE BLD-SCNC: 102 MEQ/L (ref 98–111)
CO2: 35 MEQ/L (ref 23–33)
CREAT SERPL-MCNC: 1 MG/DL (ref 0.4–1.2)
EOSINOPHIL # BLD: 0.7 %
EOSINOPHILS ABSOLUTE: 0 THOU/MM3 (ref 0–0.4)
ERYTHROCYTE [DISTWIDTH] IN BLOOD BY AUTOMATED COUNT: 14.4 % (ref 11.5–14.5)
ERYTHROCYTE [DISTWIDTH] IN BLOOD BY AUTOMATED COUNT: 49 FL (ref 35–45)
GFR SERPL CREATININE-BSD FRML MDRD: 74 ML/MIN/1.73M2
GLUCOSE BLD-MCNC: 209 MG/DL (ref 70–108)
GLUCOSE BLD-MCNC: 232 MG/DL (ref 70–108)
GLUCOSE BLD-MCNC: 259 MG/DL (ref 70–108)
GLUCOSE BLD-MCNC: 80 MG/DL (ref 70–108)
GLUCOSE BLD-MCNC: 86 MG/DL (ref 70–108)
HCT VFR BLD CALC: 33.1 % (ref 42–52)
HEMOGLOBIN: 10 GM/DL (ref 14–18)
IMMATURE GRANS (ABS): 0.02 THOU/MM3 (ref 0–0.07)
IMMATURE GRANULOCYTES: 0.4 %
LYMPHOCYTES # BLD: 25 %
LYMPHOCYTES ABSOLUTE: 1.2 THOU/MM3 (ref 1–4.8)
MCH RBC QN AUTO: 28.2 PG (ref 26–33)
MCHC RBC AUTO-ENTMCNC: 30.2 GM/DL (ref 32.2–35.5)
MCV RBC AUTO: 93.2 FL (ref 80–94)
MONOCYTES # BLD: 13.2 %
MONOCYTES ABSOLUTE: 0.6 THOU/MM3 (ref 0.4–1.3)
NUCLEATED RED BLOOD CELLS: 0 /100 WBC
PLATELET # BLD: 204 THOU/MM3 (ref 130–400)
PMV BLD AUTO: 11.4 FL (ref 9.4–12.4)
POTASSIUM SERPL-SCNC: 3.8 MEQ/L (ref 3.5–5.2)
RBC # BLD: 3.55 MILL/MM3 (ref 4.7–6.1)
SEG NEUTROPHILS: 60.5 %
SEGMENTED NEUTROPHILS ABSOLUTE COUNT: 2.8 THOU/MM3 (ref 1.8–7.7)
SODIUM BLD-SCNC: 142 MEQ/L (ref 135–145)
TOTAL PROTEIN: 5 G/DL (ref 6.1–8)
WBC # BLD: 4.6 THOU/MM3 (ref 4.8–10.8)

## 2021-10-16 PROCEDURE — 6370000000 HC RX 637 (ALT 250 FOR IP): Performed by: STUDENT IN AN ORGANIZED HEALTH CARE EDUCATION/TRAINING PROGRAM

## 2021-10-16 PROCEDURE — 85025 COMPLETE CBC W/AUTO DIFF WBC: CPT

## 2021-10-16 PROCEDURE — 99233 SBSQ HOSP IP/OBS HIGH 50: CPT | Performed by: INTERNAL MEDICINE

## 2021-10-16 PROCEDURE — 36415 COLL VENOUS BLD VENIPUNCTURE: CPT

## 2021-10-16 PROCEDURE — 2060000000 HC ICU INTERMEDIATE R&B

## 2021-10-16 PROCEDURE — 80053 COMPREHEN METABOLIC PANEL: CPT

## 2021-10-16 PROCEDURE — 6360000002 HC RX W HCPCS: Performed by: HOSPITALIST

## 2021-10-16 PROCEDURE — 82948 REAGENT STRIP/BLOOD GLUCOSE: CPT

## 2021-10-16 PROCEDURE — 82248 BILIRUBIN DIRECT: CPT

## 2021-10-16 PROCEDURE — 6370000000 HC RX 637 (ALT 250 FOR IP): Performed by: INTERNAL MEDICINE

## 2021-10-16 PROCEDURE — 2580000003 HC RX 258: Performed by: STUDENT IN AN ORGANIZED HEALTH CARE EDUCATION/TRAINING PROGRAM

## 2021-10-16 PROCEDURE — 6370000000 HC RX 637 (ALT 250 FOR IP): Performed by: HOSPITALIST

## 2021-10-16 RX ADMIN — ALLOPURINOL 300 MG: 300 TABLET ORAL at 06:15

## 2021-10-16 RX ADMIN — PANTOPRAZOLE SODIUM 40 MG: 40 TABLET, DELAYED RELEASE ORAL at 06:15

## 2021-10-16 RX ADMIN — SULFASALAZINE 500 MG: 500 TABLET ORAL at 09:01

## 2021-10-16 RX ADMIN — AMLODIPINE BESYLATE 10 MG: 10 TABLET ORAL at 09:01

## 2021-10-16 RX ADMIN — INSULIN LISPRO 3 UNITS: 100 INJECTION, SOLUTION INTRAVENOUS; SUBCUTANEOUS at 20:14

## 2021-10-16 RX ADMIN — HYDRALAZINE HYDROCHLORIDE 100 MG: 50 TABLET ORAL at 09:01

## 2021-10-16 RX ADMIN — SODIUM CHLORIDE, PRESERVATIVE FREE 10 ML: 5 INJECTION INTRAVENOUS at 09:01

## 2021-10-16 RX ADMIN — SODIUM CHLORIDE, PRESERVATIVE FREE 10 ML: 5 INJECTION INTRAVENOUS at 20:29

## 2021-10-16 RX ADMIN — CARVEDILOL 25 MG: 25 TABLET, FILM COATED ORAL at 09:01

## 2021-10-16 RX ADMIN — HYDRALAZINE HYDROCHLORIDE 100 MG: 50 TABLET ORAL at 14:47

## 2021-10-16 RX ADMIN — INSULIN LISPRO 5 UNITS: 100 INJECTION, SOLUTION INTRAVENOUS; SUBCUTANEOUS at 18:19

## 2021-10-16 RX ADMIN — ASPIRIN 81 MG: 81 TABLET, FILM COATED ORAL at 09:00

## 2021-10-16 RX ADMIN — INSULIN LISPRO 2 UNITS: 100 INJECTION, SOLUTION INTRAVENOUS; SUBCUTANEOUS at 18:19

## 2021-10-16 RX ADMIN — Medication 50 MG: at 09:00

## 2021-10-16 RX ADMIN — SULFASALAZINE 500 MG: 500 TABLET ORAL at 20:28

## 2021-10-16 RX ADMIN — ENOXAPARIN SODIUM 40 MG: 40 INJECTION SUBCUTANEOUS at 09:01

## 2021-10-16 RX ADMIN — POTASSIUM CHLORIDE 20 MEQ: 1500 TABLET, EXTENDED RELEASE ORAL at 09:00

## 2021-10-16 RX ADMIN — BARICITINIB 2 MG: 2 TABLET, FILM COATED ORAL at 09:00

## 2021-10-16 RX ADMIN — INSULIN LISPRO 5 UNITS: 100 INJECTION, SOLUTION INTRAVENOUS; SUBCUTANEOUS at 12:13

## 2021-10-16 RX ADMIN — ATORVASTATIN CALCIUM 10 MG: 10 TABLET, FILM COATED ORAL at 09:01

## 2021-10-16 RX ADMIN — CARVEDILOL 25 MG: 25 TABLET, FILM COATED ORAL at 20:28

## 2021-10-16 RX ADMIN — HYDRALAZINE HYDROCHLORIDE 100 MG: 50 TABLET ORAL at 20:28

## 2021-10-16 RX ADMIN — INSULIN LISPRO 2 UNITS: 100 INJECTION, SOLUTION INTRAVENOUS; SUBCUTANEOUS at 12:13

## 2021-10-16 RX ADMIN — OXYCODONE HYDROCHLORIDE AND ACETAMINOPHEN 500 MG: 500 TABLET ORAL at 09:01

## 2021-10-16 RX ADMIN — GABAPENTIN 300 MG: 300 CAPSULE ORAL at 20:28

## 2021-10-16 RX ADMIN — DOXAZOSIN 4 MG: 4 TABLET ORAL at 09:01

## 2021-10-16 RX ADMIN — GABAPENTIN 300 MG: 300 CAPSULE ORAL at 09:01

## 2021-10-16 RX ADMIN — LOSARTAN POTASSIUM 100 MG: 100 TABLET, FILM COATED ORAL at 09:01

## 2021-10-16 RX ADMIN — DEXAMETHASONE 6 MG: 4 TABLET ORAL at 09:01

## 2021-10-16 RX ADMIN — Medication 1000 UNITS: at 09:00

## 2021-10-16 RX ADMIN — ENOXAPARIN SODIUM 40 MG: 40 INJECTION SUBCUTANEOUS at 20:29

## 2021-10-16 ASSESSMENT — PAIN SCALES - GENERAL: PAINLEVEL_OUTOF10: 0

## 2021-10-16 NOTE — PROGRESS NOTES
Hospitalist Progress Note      Patient:  Carlos Floyd    Unit/Bed:8B-29/029-A  YOB: 1955  MRN: [id number]   Acct: [de-identified]   PCP: FELI Cordova - CNP  Date of Admission: 10/3/2021     Patient was seen earlier today on 8B 34    Assessment/Plan:    Acute hypoxic and hypercapneic (unkown chronicity) respiratory failure 2/2 COVID-19 pneumonia:  -Secondary to COVID-19/atypical or bacterial pneumonia. -Currently on HFO w/ SaO2 at high 90s; RN aware to wean aggressively as tolerated to SaO2 greater than 90%. IS/Acapella/PT to see  On Baricitinib (noted previously on Remdesivir as well) and Dexamethasone (switched to Po at 6 mg QD) and added PPI  - Pt completed 7-day empirical treatment course of Abx for possible superimposed bacterial CAP. D/c'ed ABx  - Already on ASA 81 QD  -Ok to continue w/ vitamin D, vitamin C, zinc  - D/c'ed antitussives and switched MAHOGANY to prn  - repeat ABG; no documented Hx of COPD; pt former heavy smoker; may add empiric tx for COPD; pt will benefit from OP PFT and sleep study     10/14: improving; weaning successfully weaned off HFO down to NC at 6 lpm yesterday. Currently on 3-5 lpm via. CCM. RN aware to wean aggressively as tolerated to SaO2 greater than 90% w/ planned home O2 eval tomorrow if SaO2 consistently in 2-3 lpm at rest    10/15 patient remains on supplemental oxygen 3 to 5 L, with exertion/walking he needs 6 L per nursing staff. He was on high flow  Oxygen 2 days ago, making slow improvements.         Sepsis secondary to COVID-19 pneumonia and possible bacterial pneumonia:  Resolved     Coronary artery disease:  Stable. CMT.    Hx of HTN  Fairly controlled on home meds. CCM and monitor     Hyperlipidemia:  - on statin    Vitamin D deficiency: on cholecacipherol; repeat 25(OH) vit D level WNls now     CKD stage III: stable. Will monitor. Avoid nephrotoxins. D/c'ed IVF. Cr 0.9. eGFR 84.  Previously Units SubCUTAneous Daily    insulin lispro  5 Units SubCUTAneous TID WC    dexamethasone  6 mg Oral Daily    pantoprazole  40 mg Oral QAM AC    enoxaparin  40 mg SubCUTAneous BID    insulin lispro  0-6 Units SubCUTAneous 4x Daily PC & HS    baricitinib  2 mg Oral Daily    allopurinol  300 mg Oral QAM AC    amLODIPine  10 mg Oral Daily    aspirin  81 mg Oral Daily    carvedilol  25 mg Oral BID    gabapentin  300 mg Oral BID    [Held by provider] glipiZIDE  5 mg Oral BID AC    hydrALAZINE  100 mg Oral TID    potassium chloride  20 mEq Oral Daily    atorvastatin  10 mg Oral Daily    sulfaSALAzine  500 mg Oral BID    losartan  100 mg Oral Daily    doxazosin  4 mg Oral Daily    ascorbic acid  500 mg Oral Daily    zinc sulfate  50 mg Oral Daily    sodium chloride flush  5-40 mL IntraVENous 2 times per day     PRN Meds: albuterol sulfate HFA, ipratropium, sodium chloride, acetaminophen **OR** acetaminophen, sodium chloride flush, sodium chloride, ondansetron **OR** ondansetron, polyethylene glycol, glucose, dextrose, glucagon (rDNA), dextrose      Intake/Output Summary (Last 24 hours) at 10/15/2021 2027  Last data filed at 10/15/2021 1918  Gross per 24 hour   Intake 960 ml   Output 0 ml   Net 960 ml       Diet:  ADULT DIET; Regular; 4 carb choices (60 gm/meal); Low Sodium (2 gm)  Adult Oral Nutrition Supplement; Diabetic Oral Supplement    Exam:  BP (!) 155/73   Pulse 76   Temp 98.3 °F (36.8 °C) (Oral)   Resp 18   Ht 5' 8\" (1.727 m)   Wt 219 lb (99.3 kg)   SpO2 92%   BMI 33.30 kg/m²   General appearance: Obese. No apparent distress, appears stated age and cooperative. HEENT: Pupils equal, round, and reactive to light. Conjunctivae/corneas clear. Neck: Supple, with full range of motion. No jugular venous distention. Trachea midline. Respiratory:  Normal respiratory effort. Clear to auscultation, bilaterally without Rales/Wheezes/Rhonchi.   Cardiovascular: Regular rate and rhythm with normal Respiratory culture: No results found for: CULTRESP    Aerobic and Anaerobic :  Lab Results   Component Value Date    LABAERO No growth-preliminary  No growth   02/02/2019     Lab Results   Component Value Date    LABANAE No growth-preliminary  No growth   02/02/2019       Urinalysis:      Lab Results   Component Value Date    NITRU NEGATIVE 08/04/2019    WBCUA 0-2 02/02/2019    BACTERIA NONE 02/02/2019    RBCUA 0-2 02/02/2019    BLOODU NEGATIVE 08/04/2019    SPECGRAV 1.015 08/04/2019    GLUCOSEU NEGATIVE 02/02/2019       Radiology:  XR CHEST PORTABLE   Final Result   Impression:   Bilateral pneumonia concerning for SARS-coronavirus-2 infection. This document has been electronically signed by: Rey Soriano MD on    10/03/2021 05:21 AM        XR CHEST PORTABLE    Result Date: 10/3/2021  Single view chest Comparison: 8/2/2019 Findings: Moderate cardiomegaly with obscuration of the left heart border from infiltrate at the left base and left mid lung. New infiltrate of groundglass nature is scattered through right upper lobe and right base medially with more confluent changes laterally across the right upper lobe and right midlung. Impression: Bilateral pneumonia concerning for SARS-coronavirus-2 infection. This document has been electronically signed by: Rey Soriano MD on 10/03/2021 05:21 AM    With RN in room, patient was updated about and agreed upon the treatment plan, all the questions and concerns were addressed. Patient was seen in PPE (PERSONAL PROTECTIVE EQUIPMENT). Patient was interviewed in Strict Airborne and Droplet Precautions.     Electronically signed by Angelina Paez MD on 10/15/2021 at 8:27 PM

## 2021-10-16 NOTE — PROGRESS NOTES
Telephone call to daughter, Devi Lawton, for patient update. No answer, voicemail left to call unit.

## 2021-10-17 LAB
ALBUMIN SERPL-MCNC: 3.5 G/DL (ref 3.5–5.1)
ALP BLD-CCNC: 52 U/L (ref 38–126)
ALT SERPL-CCNC: 63 U/L (ref 11–66)
ANION GAP SERPL CALCULATED.3IONS-SCNC: 6 MEQ/L (ref 8–16)
AST SERPL-CCNC: 47 U/L (ref 5–40)
BASOPHILS # BLD: 0.2 %
BASOPHILS ABSOLUTE: 0 THOU/MM3 (ref 0–0.1)
BILIRUB SERPL-MCNC: 0.3 MG/DL (ref 0.3–1.2)
BILIRUBIN DIRECT: < 0.2 MG/DL (ref 0–0.3)
BUN BLDV-MCNC: 22 MG/DL (ref 7–22)
CALCIUM SERPL-MCNC: 8.2 MG/DL (ref 8.5–10.5)
CHLORIDE BLD-SCNC: 100 MEQ/L (ref 98–111)
CO2: 33 MEQ/L (ref 23–33)
CREAT SERPL-MCNC: 1 MG/DL (ref 0.4–1.2)
EOSINOPHIL # BLD: 0.9 %
EOSINOPHILS ABSOLUTE: 0 THOU/MM3 (ref 0–0.4)
ERYTHROCYTE [DISTWIDTH] IN BLOOD BY AUTOMATED COUNT: 14.4 % (ref 11.5–14.5)
ERYTHROCYTE [DISTWIDTH] IN BLOOD BY AUTOMATED COUNT: 48.9 FL (ref 35–45)
GFR SERPL CREATININE-BSD FRML MDRD: 74 ML/MIN/1.73M2
GLUCOSE BLD-MCNC: 165 MG/DL (ref 70–108)
GLUCOSE BLD-MCNC: 210 MG/DL (ref 70–108)
GLUCOSE BLD-MCNC: 232 MG/DL (ref 70–108)
GLUCOSE BLD-MCNC: 89 MG/DL (ref 70–108)
GLUCOSE BLD-MCNC: 98 MG/DL (ref 70–108)
HCT VFR BLD CALC: 35.1 % (ref 42–52)
HEMOGLOBIN: 10.7 GM/DL (ref 14–18)
IMMATURE GRANS (ABS): 0.02 THOU/MM3 (ref 0–0.07)
IMMATURE GRANULOCYTES: 0.4 %
LYMPHOCYTES # BLD: 21.9 %
LYMPHOCYTES ABSOLUTE: 1.2 THOU/MM3 (ref 1–4.8)
MCH RBC QN AUTO: 28.3 PG (ref 26–33)
MCHC RBC AUTO-ENTMCNC: 30.5 GM/DL (ref 32.2–35.5)
MCV RBC AUTO: 92.9 FL (ref 80–94)
MONOCYTES # BLD: 11.4 %
MONOCYTES ABSOLUTE: 0.6 THOU/MM3 (ref 0.4–1.3)
NUCLEATED RED BLOOD CELLS: 0 /100 WBC
PLATELET # BLD: 220 THOU/MM3 (ref 130–400)
PMV BLD AUTO: 11.5 FL (ref 9.4–12.4)
POTASSIUM SERPL-SCNC: 3.9 MEQ/L (ref 3.5–5.2)
RBC # BLD: 3.78 MILL/MM3 (ref 4.7–6.1)
SEG NEUTROPHILS: 65.2 %
SEGMENTED NEUTROPHILS ABSOLUTE COUNT: 3.6 THOU/MM3 (ref 1.8–7.7)
SODIUM BLD-SCNC: 139 MEQ/L (ref 135–145)
TOTAL PROTEIN: 5.2 G/DL (ref 6.1–8)
WBC # BLD: 5.5 THOU/MM3 (ref 4.8–10.8)

## 2021-10-17 PROCEDURE — 36415 COLL VENOUS BLD VENIPUNCTURE: CPT

## 2021-10-17 PROCEDURE — 6370000000 HC RX 637 (ALT 250 FOR IP): Performed by: STUDENT IN AN ORGANIZED HEALTH CARE EDUCATION/TRAINING PROGRAM

## 2021-10-17 PROCEDURE — 6370000000 HC RX 637 (ALT 250 FOR IP): Performed by: INTERNAL MEDICINE

## 2021-10-17 PROCEDURE — 99233 SBSQ HOSP IP/OBS HIGH 50: CPT | Performed by: INTERNAL MEDICINE

## 2021-10-17 PROCEDURE — 6360000002 HC RX W HCPCS: Performed by: HOSPITALIST

## 2021-10-17 PROCEDURE — 82248 BILIRUBIN DIRECT: CPT

## 2021-10-17 PROCEDURE — 80053 COMPREHEN METABOLIC PANEL: CPT

## 2021-10-17 PROCEDURE — 85025 COMPLETE CBC W/AUTO DIFF WBC: CPT

## 2021-10-17 PROCEDURE — 2580000003 HC RX 258: Performed by: STUDENT IN AN ORGANIZED HEALTH CARE EDUCATION/TRAINING PROGRAM

## 2021-10-17 PROCEDURE — 6370000000 HC RX 637 (ALT 250 FOR IP): Performed by: HOSPITALIST

## 2021-10-17 PROCEDURE — 2060000000 HC ICU INTERMEDIATE R&B

## 2021-10-17 PROCEDURE — 82948 REAGENT STRIP/BLOOD GLUCOSE: CPT

## 2021-10-17 RX ORDER — INSULIN GLARGINE 100 [IU]/ML
10 INJECTION, SOLUTION SUBCUTANEOUS 2 TIMES DAILY
Status: DISCONTINUED | OUTPATIENT
Start: 2021-10-18 | End: 2021-10-18 | Stop reason: HOSPADM

## 2021-10-17 RX ORDER — CHLORTHALIDONE 25 MG/1
25 TABLET ORAL DAILY
Status: DISCONTINUED | OUTPATIENT
Start: 2021-10-17 | End: 2021-10-18 | Stop reason: HOSPADM

## 2021-10-17 RX ADMIN — AMLODIPINE BESYLATE 10 MG: 10 TABLET ORAL at 07:58

## 2021-10-17 RX ADMIN — POTASSIUM CHLORIDE 20 MEQ: 1500 TABLET, EXTENDED RELEASE ORAL at 07:57

## 2021-10-17 RX ADMIN — GABAPENTIN 300 MG: 300 CAPSULE ORAL at 21:20

## 2021-10-17 RX ADMIN — INSULIN LISPRO 1 UNITS: 100 INJECTION, SOLUTION INTRAVENOUS; SUBCUTANEOUS at 18:33

## 2021-10-17 RX ADMIN — CHLORTHALIDONE 25 MG: 25 TABLET ORAL at 21:20

## 2021-10-17 RX ADMIN — HYDRALAZINE HYDROCHLORIDE 100 MG: 50 TABLET ORAL at 15:37

## 2021-10-17 RX ADMIN — ASPIRIN 81 MG: 81 TABLET, FILM COATED ORAL at 07:57

## 2021-10-17 RX ADMIN — ENOXAPARIN SODIUM 40 MG: 40 INJECTION SUBCUTANEOUS at 07:58

## 2021-10-17 RX ADMIN — SODIUM CHLORIDE, PRESERVATIVE FREE 10 ML: 5 INJECTION INTRAVENOUS at 21:26

## 2021-10-17 RX ADMIN — BARICITINIB 2 MG: 2 TABLET, FILM COATED ORAL at 07:57

## 2021-10-17 RX ADMIN — CARVEDILOL 25 MG: 25 TABLET, FILM COATED ORAL at 07:58

## 2021-10-17 RX ADMIN — ENOXAPARIN SODIUM 40 MG: 40 INJECTION SUBCUTANEOUS at 21:26

## 2021-10-17 RX ADMIN — DOXAZOSIN 4 MG: 4 TABLET ORAL at 07:58

## 2021-10-17 RX ADMIN — SODIUM CHLORIDE, PRESERVATIVE FREE 10 ML: 5 INJECTION INTRAVENOUS at 07:58

## 2021-10-17 RX ADMIN — DEXAMETHASONE 6 MG: 4 TABLET ORAL at 07:58

## 2021-10-17 RX ADMIN — LOSARTAN POTASSIUM 100 MG: 100 TABLET, FILM COATED ORAL at 07:57

## 2021-10-17 RX ADMIN — INSULIN LISPRO 5 UNITS: 100 INJECTION, SOLUTION INTRAVENOUS; SUBCUTANEOUS at 12:46

## 2021-10-17 RX ADMIN — CARVEDILOL 25 MG: 25 TABLET, FILM COATED ORAL at 21:19

## 2021-10-17 RX ADMIN — SULFASALAZINE 500 MG: 500 TABLET ORAL at 07:57

## 2021-10-17 RX ADMIN — Medication 1000 UNITS: at 07:57

## 2021-10-17 RX ADMIN — INSULIN GLARGINE 20 UNITS: 100 INJECTION, SOLUTION SUBCUTANEOUS at 08:30

## 2021-10-17 RX ADMIN — Medication 50 MG: at 07:57

## 2021-10-17 RX ADMIN — HYDRALAZINE HYDROCHLORIDE 100 MG: 50 TABLET ORAL at 21:20

## 2021-10-17 RX ADMIN — INSULIN LISPRO 2 UNITS: 100 INJECTION, SOLUTION INTRAVENOUS; SUBCUTANEOUS at 21:20

## 2021-10-17 RX ADMIN — SULFASALAZINE 500 MG: 500 TABLET ORAL at 21:20

## 2021-10-17 RX ADMIN — HYDRALAZINE HYDROCHLORIDE 100 MG: 50 TABLET ORAL at 07:58

## 2021-10-17 RX ADMIN — PANTOPRAZOLE SODIUM 40 MG: 40 TABLET, DELAYED RELEASE ORAL at 05:06

## 2021-10-17 RX ADMIN — ATORVASTATIN CALCIUM 10 MG: 10 TABLET, FILM COATED ORAL at 07:58

## 2021-10-17 RX ADMIN — GABAPENTIN 300 MG: 300 CAPSULE ORAL at 07:58

## 2021-10-17 RX ADMIN — INSULIN LISPRO 5 UNITS: 100 INJECTION, SOLUTION INTRAVENOUS; SUBCUTANEOUS at 17:00

## 2021-10-17 RX ADMIN — OXYCODONE HYDROCHLORIDE AND ACETAMINOPHEN 500 MG: 500 TABLET ORAL at 07:58

## 2021-10-17 RX ADMIN — ALLOPURINOL 300 MG: 300 TABLET ORAL at 05:05

## 2021-10-17 RX ADMIN — INSULIN LISPRO 2 UNITS: 100 INJECTION, SOLUTION INTRAVENOUS; SUBCUTANEOUS at 12:46

## 2021-10-17 ASSESSMENT — PAIN SCALES - GENERAL
PAINLEVEL_OUTOF10: 0

## 2021-10-17 NOTE — PROGRESS NOTES
Patients wife, Rox Weaver, updated with status and plan of care. All questions answered at this time.

## 2021-10-17 NOTE — PROGRESS NOTES
Hospitalist Progress Note      Patient:  Lora Alvarez    Unit/Bed:8B-29/029-A  YOB: 1955  MRN: [id number]   Acct: [de-identified]   PCP: FELI Chi - CNP  Date of Admission: 10/3/2021     Patient was seen  today on 8B 29    Assessment/Plan:    Acute hypoxic and hypercapneic (unkown chronicity) respiratory failure 2/2 COVID-19 pneumonia:  -Secondary to COVID-19/atypical or bacterial pneumonia. -Currently on HFO w/ SaO2 at high 90s; RN aware to wean aggressively as tolerated to SaO2 greater than 90%. IS/Acapella/PT to see  On Baricitinib (noted previously on Remdesivir as well) and Dexamethasone (switched to Po at 6 mg QD) and added PPI  - Pt completed 7-day empirical treatment course of Abx for possible superimposed bacterial CAP. D/c'ed ABx  - Already on ASA 81 QD  -Ok to continue w/ vitamin D, vitamin C, zinc  - D/c'ed antitussives and switched MAHOGANY to prn  - repeat ABG; no documented Hx of COPD; pt former heavy smoker; may add empiric tx for COPD; pt will benefit from OP PFT and sleep study     10/14: improving; weaning successfully weaned off HFO down to NC at 6 lpm yesterday. Currently on 3-5 lpm via. CCM. RN aware to wean aggressively as tolerated to SaO2 greater than 90% w/ planned home O2 eval tomorrow if SaO2 consistently in 2-3 lpm at rest    10/15 patient remains on supplemental oxygen 3 to 5 L, with exertion/walking he needs 6 L per nursing staff. He was on high flow  Oxygen 2 days ago, making slow improvements. 10/16  He was seen this morning, oxygen status remains the same  Not ready for discharge, obese WM not in distress. 10/17 oxygenation requirements are down but he still desaturating during the night, likely secondary to SRAVAN which is undiagnosed. Patient remains on oxygen along with baricitinib, other supportive treatment.   Initiate patient on BiPAP tonight while he sleeps.         Sepsis secondary to COVID-19 pneumonia and possible bacterial pneumonia:  Resolved     Coronary artery disease:  Stable. CMT.    Hx of HTN  Fairly controlled on home meds. CCM and monitor     Hyperlipidemia:  - on statin    Vitamin D deficiency: on cholecacipherol; repeat 25(OH) vit D level WNls now     CKD stage III: stable. Will monitor. Avoid nephrotoxins. D/c'ed IVF. Cr 0.9. eGFR 84. Previously diagnosed CKD III may have been d/t aggressive diuresis. Will monitor.       Hx of DM II: well-controlled on home OHA and Insluin glargine last A1C 5.7%  Modified to MDI to standing order plus SSI as needed; pt back on home Glipizide but off Metformin. systemic CS also contributing to hyperglycemia. Will reassess response to treatment. diabetic diet, SSI, accuchecks, and hypoglycemia orders in place. 10/11: BS much improved; further optimized MDI regimen based on last 24-hr SSi requirements; will reassess response  10/12: noted an episode of asymptomatic hypoglycemia w/ BS 58; held Glipizide and slightly reduced MDI. Will monitor BS closely; hypoglycemia protocol is in place    10/14: fairly controlled. CCM    10/15 continue with current treatment blood sugars have been 110, 212, 144 and 194 on BMP this morning blood sugar was 108. Patient currently on Glucotrol 5 mg twice daily, Humalog per scale and Lantus 15 units daily. We will increase the latter to 20 units      10/16 FSBG ranging from 80 to 259 , lantus was increased this morning , will monitor, sugars likely high due to current stressors,   And infection. .    10/17 blood sugars are 89, 98 in the morning,  then 232, 165      Leukopenia (resolved)  Likely secondary to Covid. Hx of HFpEF: clinically euvolemic. CMT. I/O, daily wt, salt (2 gr) and fluid (2L) restriction       Gout  - stable.  Continue home allopurinol     Obesity w/ BMI 33.30    PT/OT following.       Chief Complaint: SOB    Initial H and P:-      Admitted for management of Acute hypoxic respiratory failure secondary to COVID 19. I took over care on 10/17/2021      Subjective (past 24 hours):     Feeling better overall. SOB improved. Denies CP/fever/chils/palpitation/diarrhea/N/V/abdo pain      Past medical history, family history, social history and allergies reviewed again and is unchanged since admission. ROS (All review of systems completed. Pertinent positives noted. Otherwise All other systems reviewed and negative.)     Medications:  Reviewed    Infusion Medications    sodium chloride      dextrose       Scheduled Medications    insulin glargine  20 Units SubCUTAneous Daily    Vitamin D  1,000 Units Oral Daily    insulin lispro  5 Units SubCUTAneous TID WC    dexamethasone  6 mg Oral Daily    pantoprazole  40 mg Oral QAM AC    enoxaparin  40 mg SubCUTAneous BID    insulin lispro  0-6 Units SubCUTAneous 4x Daily PC & HS    baricitinib  2 mg Oral Daily    allopurinol  300 mg Oral QAM AC    amLODIPine  10 mg Oral Daily    aspirin  81 mg Oral Daily    carvedilol  25 mg Oral BID    gabapentin  300 mg Oral BID    [Held by provider] glipiZIDE  5 mg Oral BID AC    hydrALAZINE  100 mg Oral TID    potassium chloride  20 mEq Oral Daily    atorvastatin  10 mg Oral Daily    sulfaSALAzine  500 mg Oral BID    losartan  100 mg Oral Daily    doxazosin  4 mg Oral Daily    ascorbic acid  500 mg Oral Daily    zinc sulfate  50 mg Oral Daily    sodium chloride flush  5-40 mL IntraVENous 2 times per day     PRN Meds: albuterol sulfate HFA, ipratropium, sodium chloride, acetaminophen **OR** acetaminophen, sodium chloride flush, sodium chloride, ondansetron **OR** ondansetron, polyethylene glycol, glucose, dextrose, glucagon (rDNA), dextrose      Intake/Output Summary (Last 24 hours) at 10/17/2021 1835  Last data filed at 10/17/2021 1630  Gross per 24 hour   Intake 810 ml   Output 1350 ml   Net -540 ml       Diet:  ADULT DIET; Regular; 4 carb choices (60 gm/meal);  Low Sodium (2 gm)  Adult Oral Nutrition Supplement; Diabetic Oral Supplement    Exam:  BP (!) 146/71   Pulse 70   Temp 98 °F (36.7 °C) (Oral)   Resp 18   Ht 5' 8\" (1.727 m)   Wt 229 lb 4.8 oz (104 kg)   SpO2 93%   BMI 34.86 kg/m²   General appearance: Obese. No apparent distress, appears stated age and cooperative. HEENT: Pupils equal, round, and reactive to light. Conjunctivae/corneas clear. Neck: Supple, with full range of motion. No jugular venous distention. Trachea midline. Respiratory:  Normal respiratory effort.,  Distant breath sounds,  no Rales/Wheezes/Rhonchi. Cardiovascular: Regular rate and rhythm with normal S1/S2 without murmurs, rubs or gallops. Abdomen: Soft, non-tender, non-distended with normal bowel sounds. Musculoskeletal: passive and active ROM x 4 extremities. Skin: Skin color, texture, turgor normal.  No rashes or lesions. Neurologic:  Neurovascularly intact without any focal sensory/motor deficits. Cranial nerves: II-XII intact, grossly non-focal.  Psychiatric: Alert and oriented, thought content appropriate, normal insight  Capillary Refill: Brisk,< 3 seconds   Peripheral Pulses: +2 palpable, equal bilaterally     Labs:   Recent Labs     10/15/21  0725 10/16/21  0722 10/17/21  0641   WBC 5.3 4.6* 5.5   HGB 10.2* 10.0* 10.7*   HCT 34.0* 33.1* 35.1*    204 220     Recent Labs     10/15/21  0725 10/16/21  0722 10/17/21  0641    142 139   K 3.8 3.8 3.9    102 100   CO2 33 35* 33   BUN 23* 19 22   CREATININE 1.1 1.0 1.0   CALCIUM 8.1* 8.0* 8.2*     Recent Labs     10/15/21  0725 10/16/21  0722 10/17/21  0641   AST 46* 47* 47*   ALT 53 54 63   BILIDIR <0.2 <0.2 <0.2   BILITOT 0.3 0.2* 0.3   ALKPHOS 55 48 52     No results for input(s): INR in the last 72 hours. No results for input(s): Cherre Gash in the last 72 hours.     Microbiology:    Blood culture #1:   Lab Results   Component Value Date    BC No growth-preliminary  No growth   01/30/2019       Blood culture #2:No results found for: BLOODCULT2    Organism:  Lab Results   Component Value Date    ORG Mixed Growth 08/04/2019         Lab Results   Component Value Date    LABGRAM  02/02/2019     Rare segmented neutrophils observed. No bacteria seen. MRSA culture only:No results found for: [facility]    Urine culture:   Lab Results   Component Value Date    LABURIN  08/04/2019     Mixed growth. The mixture of organisms present represents  both organisms that may cause urinary tract infections and  organisms that are not a common cause of urinary tract  infections and are possibly skin ky or distal urethral  ky. Respiratory culture: No results found for: CULTRESP    Aerobic and Anaerobic :  Lab Results   Component Value Date    LABAERO No growth-preliminary  No growth   02/02/2019     Lab Results   Component Value Date    LABANAE No growth-preliminary  No growth   02/02/2019       Urinalysis:      Lab Results   Component Value Date    NITRU NEGATIVE 08/04/2019    WBCUA 0-2 02/02/2019    BACTERIA NONE 02/02/2019    RBCUA 0-2 02/02/2019    BLOODU NEGATIVE 08/04/2019    SPECGRAV 1.015 08/04/2019    GLUCOSEU NEGATIVE 02/02/2019       Radiology:  XR CHEST PORTABLE   Final Result   Impression:   Bilateral pneumonia concerning for SARS-coronavirus-2 infection. This document has been electronically signed by: Jeimy Irwin MD on    10/03/2021 05:21 AM        XR CHEST PORTABLE    Result Date: 10/3/2021  Single view chest Comparison: 8/2/2019 Findings: Moderate cardiomegaly with obscuration of the left heart border from infiltrate at the left base and left mid lung. New infiltrate of groundglass nature is scattered through right upper lobe and right base medially with more confluent changes laterally across the right upper lobe and right midlung. Impression: Bilateral pneumonia concerning for SARS-coronavirus-2 infection.  This document has been electronically signed by: Jeimy Irwin MD on 10/03/2021 05:21 AM    With RN in room, patient was updated about and agreed upon the treatment plan, all the questions and concerns were addressed. Patient was seen in PPE (PERSONAL PROTECTIVE EQUIPMENT). Patient was interviewed in Strict Airborne and Droplet Precautions.       Electronically signed by Chanel Murillo MD on 10/17/2021 at 6:43 PM

## 2021-10-17 NOTE — PROGRESS NOTES
Hospitalist Progress Note      Patient:  Tri Eldridge    Unit/Bed:8B-29/029-A  YOB: 1955  MRN: [id number]   Acct: [de-identified]   PCP: Lilyan Felty, APRN - CNP  Date of Admission: 10/3/2021     Patient was seen earlier today on 8B 34    Assessment/Plan:    Acute hypoxic and hypercapneic (unkown chronicity) respiratory failure 2/2 COVID-19 pneumonia:  -Secondary to COVID-19/atypical or bacterial pneumonia. -Currently on HFO w/ SaO2 at high 90s; RN aware to wean aggressively as tolerated to SaO2 greater than 90%. IS/Acapella/PT to see  On Baricitinib (noted previously on Remdesivir as well) and Dexamethasone (switched to Po at 6 mg QD) and added PPI  - Pt completed 7-day empirical treatment course of Abx for possible superimposed bacterial CAP. D/c'ed ABx  - Already on ASA 81 QD  -Ok to continue w/ vitamin D, vitamin C, zinc  - D/c'ed antitussives and switched MAHOGANY to prn  - repeat ABG; no documented Hx of COPD; pt former heavy smoker; may add empiric tx for COPD; pt will benefit from OP PFT and sleep study     10/14: improving; weaning successfully weaned off HFO down to NC at 6 lpm yesterday. Currently on 3-5 lpm via. CCM. RN aware to wean aggressively as tolerated to SaO2 greater than 90% w/ planned home O2 eval tomorrow if SaO2 consistently in 2-3 lpm at rest    10/15 patient remains on supplemental oxygen 3 to 5 L, with exertion/walking he needs 6 L per nursing staff. He was on high flow  Oxygen 2 days ago, making slow improvements. 10/16  He was seen this morning, oxygen status remains the same  Not ready for discharge, obese WM not in distress.         Sepsis secondary to COVID-19 pneumonia and possible bacterial pneumonia:  Resolved     Coronary artery disease:  Stable. CMT.    Hx of HTN  Fairly controlled on home meds.  CCM and monitor     Hyperlipidemia:  - on statin    Vitamin D deficiency: on cholecacipherol; repeat 25(OH) vit D level WNls now     CKD stage III: stable. Will monitor. Avoid nephrotoxins. D/c'ed IVF. Cr 0.9. eGFR 84. Previously diagnosed CKD III may have been d/t aggressive diuresis. Will monitor.       Hx of DM II: well-controlled on home OHA and Insluin glargine last A1C 5.7%  Modified to MDI to standing order plus SSI as needed; pt back on home Glipizide but off Metformin. systemic CS also contributing to hyperglycemia. Will reassess response to treatment. diabetic diet, SSI, accuchecks, and hypoglycemia orders in place. 10/11: BS much improved; further optimized MDI regimen based on last 24-hr SSi requirements; will reassess response  10/12: noted an episode of asymptomatic hypoglycemia w/ BS 58; held Glipizide and slightly reduced MDI. Will monitor BS closely; hypoglycemia protocol is in place    10/14: fairly controlled. CCM    10/15 continue with current treatment blood sugars have been 110, 212, 144 and 194 on BMP this morning blood sugar was 108. Patient currently on Glucotrol 5 mg twice daily, Humalog per scale and Lantus 15 units daily. We will increase the latter to 20 units      10/16 FSBG ranging from 80 to 259 , lantus was increased this morning , will monitor, sugars likely high due to current stressors,   And infction. Leukopenia (resolved)  Likely secondary to Covid. Hx of HFpEF: clinically euvolemic. CMT. I/O, daily wt, salt (2 gr) and fluid (2L) restriction       Gout  - stable. Continue home allopurinol     Obesity w/ BMI 33.30    PT/OT following.       Chief Complaint: SOB    Initial H and P:-      Admitted for management of Acute hypoxic respiratory failure secondary to COVID 19. I took over care on 10/16/2021      Subjective (past 24 hours):     Feeling better overall. SOB improved. Denies CP/fever/chils/palpitation/diarrhea/N/V/abdo pain      Past medical history, family history, social history and allergies reviewed again and is unchanged since admission.     ROS (All review of systems completed. Pertinent positives noted. Otherwise All other systems reviewed and negative.)     Medications:  Reviewed    Infusion Medications    sodium chloride      dextrose       Scheduled Medications    insulin glargine  20 Units SubCUTAneous Daily    Vitamin D  1,000 Units Oral Daily    insulin lispro  5 Units SubCUTAneous TID WC    dexamethasone  6 mg Oral Daily    pantoprazole  40 mg Oral QAM AC    enoxaparin  40 mg SubCUTAneous BID    insulin lispro  0-6 Units SubCUTAneous 4x Daily PC & HS    baricitinib  2 mg Oral Daily    allopurinol  300 mg Oral QAM AC    amLODIPine  10 mg Oral Daily    aspirin  81 mg Oral Daily    carvedilol  25 mg Oral BID    gabapentin  300 mg Oral BID    [Held by provider] glipiZIDE  5 mg Oral BID AC    hydrALAZINE  100 mg Oral TID    potassium chloride  20 mEq Oral Daily    atorvastatin  10 mg Oral Daily    sulfaSALAzine  500 mg Oral BID    losartan  100 mg Oral Daily    doxazosin  4 mg Oral Daily    ascorbic acid  500 mg Oral Daily    zinc sulfate  50 mg Oral Daily    sodium chloride flush  5-40 mL IntraVENous 2 times per day     PRN Meds: albuterol sulfate HFA, ipratropium, sodium chloride, acetaminophen **OR** acetaminophen, sodium chloride flush, sodium chloride, ondansetron **OR** ondansetron, polyethylene glycol, glucose, dextrose, glucagon (rDNA), dextrose      Intake/Output Summary (Last 24 hours) at 10/16/2021 2019  Last data filed at 10/16/2021 1947  Gross per 24 hour   Intake 1560 ml   Output 1750 ml   Net -190 ml       Diet:  ADULT DIET; Regular; 4 carb choices (60 gm/meal); Low Sodium (2 gm)  Adult Oral Nutrition Supplement; Diabetic Oral Supplement    Exam:  BP (!) 145/68   Pulse 60   Temp 97.2 °F (36.2 °C) (Oral)   Resp 18   Ht 5' 8\" (1.727 m)   Wt 219 lb (99.3 kg)   SpO2 100%   BMI 33.30 kg/m²   General appearance: Obese. No apparent distress, appears stated age and cooperative. HEENT: Pupils equal, round, and reactive to light. Conjunctivae/corneas clear. Neck: Supple, with full range of motion. No jugular venous distention. Trachea midline. Respiratory:  Normal respiratory effort. Clear to auscultation, bilaterally without Rales/Wheezes/Rhonchi. Cardiovascular: Regular rate and rhythm with normal S1/S2 without murmurs, rubs or gallops. Abdomen: Soft, non-tender, non-distended with normal bowel sounds. Musculoskeletal: passive and active ROM x 4 extremities. Skin: Skin color, texture, turgor normal.  No rashes or lesions. Neurologic:  Neurovascularly intact without any focal sensory/motor deficits. Cranial nerves: II-XII intact, grossly non-focal.  Psychiatric: Alert and oriented, thought content appropriate, normal insight  Capillary Refill: Brisk,< 3 seconds   Peripheral Pulses: +2 palpable, equal bilaterally     Labs:   Recent Labs     10/14/21  0755 10/15/21  0725 10/16/21  0722   WBC 4.8 5.3 4.6*   HGB 10.4* 10.2* 10.0*   HCT 34.8* 34.0* 33.1*    208 204     Recent Labs     10/14/21  0755 10/15/21  0725 10/16/21  0722    141 142   K 3.6 3.8 3.8    103 102   CO2 34* 33 35*   BUN 23* 23* 19   CREATININE 1.0 1.1 1.0   CALCIUM 8.1* 8.1* 8.0*     Recent Labs     10/14/21  0755 10/15/21  0725 10/16/21  0722   AST 48* 46* 47*   ALT 45 53 54   BILIDIR <0.2 <0.2 <0.2   BILITOT 0.3 0.3 0.2*   ALKPHOS 46 55 48     No results for input(s): INR in the last 72 hours. No results for input(s): Ronaldo Shackle in the last 72 hours. Microbiology:    Blood culture #1:   Lab Results   Component Value Date    BC No growth-preliminary  No growth   01/30/2019       Blood culture #2:No results found for: David Go    Organism:  Lab Results   Component Value Date    ORG Mixed Growth 08/04/2019         Lab Results   Component Value Date    LABGRAM  02/02/2019     Rare segmented neutrophils observed. No bacteria seen.          MRSA culture only:No results found for: [facility]    Urine culture:   Lab Results   Component Value Date LABURIN  08/04/2019     Mixed growth. The mixture of organisms present represents  both organisms that may cause urinary tract infections and  organisms that are not a common cause of urinary tract  infections and are possibly skin ky or distal urethral  ky. Respiratory culture: No results found for: CULTRESP    Aerobic and Anaerobic :  Lab Results   Component Value Date    LABAERO No growth-preliminary  No growth   02/02/2019     Lab Results   Component Value Date    LABANAE No growth-preliminary  No growth   02/02/2019       Urinalysis:      Lab Results   Component Value Date    NITRU NEGATIVE 08/04/2019    WBCUA 0-2 02/02/2019    BACTERIA NONE 02/02/2019    RBCUA 0-2 02/02/2019    BLOODU NEGATIVE 08/04/2019    SPECGRAV 1.015 08/04/2019    GLUCOSEU NEGATIVE 02/02/2019       Radiology:  XR CHEST PORTABLE   Final Result   Impression:   Bilateral pneumonia concerning for SARS-coronavirus-2 infection. This document has been electronically signed by: Doris Closs, MD on    10/03/2021 05:21 AM        XR CHEST PORTABLE    Result Date: 10/3/2021  Single view chest Comparison: 8/2/2019 Findings: Moderate cardiomegaly with obscuration of the left heart border from infiltrate at the left base and left mid lung. New infiltrate of groundglass nature is scattered through right upper lobe and right base medially with more confluent changes laterally across the right upper lobe and right midlung. Impression: Bilateral pneumonia concerning for SARS-coronavirus-2 infection. This document has been electronically signed by: Doris Closs, MD on 10/03/2021 05:21 AM    With RN in room, patient was updated about and agreed upon the treatment plan, all the questions and concerns were addressed. Patient was seen in PPE (PERSONAL PROTECTIVE EQUIPMENT). Patient was interviewed in Strict Airborne and Droplet Precautions.       Electronically signed by Chanel Murillo MD on 10/16/2021 at 8:19 PM

## 2021-10-18 VITALS
RESPIRATION RATE: 18 BRPM | BODY MASS INDEX: 34.75 KG/M2 | SYSTOLIC BLOOD PRESSURE: 132 MMHG | HEART RATE: 69 BPM | WEIGHT: 229.3 LBS | HEIGHT: 68 IN | DIASTOLIC BLOOD PRESSURE: 64 MMHG | TEMPERATURE: 98.2 F | OXYGEN SATURATION: 94 %

## 2021-10-18 LAB
GLUCOSE BLD-MCNC: 168 MG/DL (ref 70–108)
GLUCOSE BLD-MCNC: 191 MG/DL (ref 70–108)
GLUCOSE BLD-MCNC: 92 MG/DL (ref 70–108)
MAGNESIUM: 2 MG/DL (ref 1.6–2.4)

## 2021-10-18 PROCEDURE — 99239 HOSP IP/OBS DSCHRG MGMT >30: CPT | Performed by: STUDENT IN AN ORGANIZED HEALTH CARE EDUCATION/TRAINING PROGRAM

## 2021-10-18 PROCEDURE — 94660 CPAP INITIATION&MGMT: CPT

## 2021-10-18 PROCEDURE — 97116 GAIT TRAINING THERAPY: CPT

## 2021-10-18 PROCEDURE — 97110 THERAPEUTIC EXERCISES: CPT

## 2021-10-18 PROCEDURE — 97535 SELF CARE MNGMENT TRAINING: CPT

## 2021-10-18 PROCEDURE — 6370000000 HC RX 637 (ALT 250 FOR IP): Performed by: INTERNAL MEDICINE

## 2021-10-18 PROCEDURE — 97530 THERAPEUTIC ACTIVITIES: CPT

## 2021-10-18 PROCEDURE — 6360000002 HC RX W HCPCS: Performed by: HOSPITALIST

## 2021-10-18 PROCEDURE — 83735 ASSAY OF MAGNESIUM: CPT

## 2021-10-18 PROCEDURE — 2700000000 HC OXYGEN THERAPY PER DAY

## 2021-10-18 PROCEDURE — 36415 COLL VENOUS BLD VENIPUNCTURE: CPT

## 2021-10-18 PROCEDURE — 82948 REAGENT STRIP/BLOOD GLUCOSE: CPT

## 2021-10-18 PROCEDURE — 6370000000 HC RX 637 (ALT 250 FOR IP): Performed by: HOSPITALIST

## 2021-10-18 PROCEDURE — 2580000003 HC RX 258: Performed by: STUDENT IN AN ORGANIZED HEALTH CARE EDUCATION/TRAINING PROGRAM

## 2021-10-18 PROCEDURE — 6370000000 HC RX 637 (ALT 250 FOR IP): Performed by: STUDENT IN AN ORGANIZED HEALTH CARE EDUCATION/TRAINING PROGRAM

## 2021-10-18 RX ORDER — ALBUTEROL SULFATE 90 UG/1
2 AEROSOL, METERED RESPIRATORY (INHALATION) EVERY 4 HOURS PRN
Qty: 18 G | Refills: 3 | Status: SHIPPED | OUTPATIENT
Start: 2021-10-18 | End: 2022-10-03 | Stop reason: ALTCHOICE

## 2021-10-18 RX ORDER — DEXAMETHASONE 6 MG/1
6 TABLET ORAL DAILY
Qty: 10 TABLET | Refills: 0 | Status: SHIPPED | OUTPATIENT
Start: 2021-10-19 | End: 2021-10-29

## 2021-10-18 RX ORDER — PANTOPRAZOLE SODIUM 40 MG/1
40 TABLET, DELAYED RELEASE ORAL
Qty: 30 TABLET | Refills: 3 | Status: SHIPPED | OUTPATIENT
Start: 2021-10-19 | End: 2022-10-03 | Stop reason: ALTCHOICE

## 2021-10-18 RX ORDER — ZINC SULFATE 50(220)MG
50 CAPSULE ORAL DAILY
Qty: 30 CAPSULE | Refills: 3 | COMMUNITY
Start: 2021-10-19

## 2021-10-18 RX ADMIN — CHLORTHALIDONE 25 MG: 25 TABLET ORAL at 09:28

## 2021-10-18 RX ADMIN — Medication 50 MG: at 09:26

## 2021-10-18 RX ADMIN — DEXAMETHASONE 6 MG: 4 TABLET ORAL at 09:28

## 2021-10-18 RX ADMIN — HYDRALAZINE HYDROCHLORIDE 100 MG: 50 TABLET ORAL at 12:57

## 2021-10-18 RX ADMIN — INSULIN LISPRO 5 UNITS: 100 INJECTION, SOLUTION INTRAVENOUS; SUBCUTANEOUS at 09:00

## 2021-10-18 RX ADMIN — INSULIN LISPRO 5 UNITS: 100 INJECTION, SOLUTION INTRAVENOUS; SUBCUTANEOUS at 12:30

## 2021-10-18 RX ADMIN — SODIUM CHLORIDE, PRESERVATIVE FREE 10 ML: 5 INJECTION INTRAVENOUS at 09:46

## 2021-10-18 RX ADMIN — SULFASALAZINE 500 MG: 500 TABLET ORAL at 09:25

## 2021-10-18 RX ADMIN — HYDRALAZINE HYDROCHLORIDE 100 MG: 50 TABLET ORAL at 09:26

## 2021-10-18 RX ADMIN — ENOXAPARIN SODIUM 40 MG: 40 INJECTION SUBCUTANEOUS at 09:45

## 2021-10-18 RX ADMIN — Medication 1000 UNITS: at 09:32

## 2021-10-18 RX ADMIN — INSULIN GLARGINE 10 UNITS: 100 INJECTION, SOLUTION SUBCUTANEOUS at 10:00

## 2021-10-18 RX ADMIN — BARICITINIB 2 MG: 2 TABLET, FILM COATED ORAL at 09:46

## 2021-10-18 RX ADMIN — ALLOPURINOL 300 MG: 300 TABLET ORAL at 05:55

## 2021-10-18 RX ADMIN — CARVEDILOL 25 MG: 25 TABLET, FILM COATED ORAL at 09:26

## 2021-10-18 RX ADMIN — AMLODIPINE BESYLATE 10 MG: 10 TABLET ORAL at 09:26

## 2021-10-18 RX ADMIN — INSULIN LISPRO 1 UNITS: 100 INJECTION, SOLUTION INTRAVENOUS; SUBCUTANEOUS at 12:30

## 2021-10-18 RX ADMIN — PANTOPRAZOLE SODIUM 40 MG: 40 TABLET, DELAYED RELEASE ORAL at 05:55

## 2021-10-18 RX ADMIN — ATORVASTATIN CALCIUM 10 MG: 10 TABLET, FILM COATED ORAL at 09:47

## 2021-10-18 RX ADMIN — OXYCODONE HYDROCHLORIDE AND ACETAMINOPHEN 500 MG: 500 TABLET ORAL at 09:27

## 2021-10-18 RX ADMIN — DOXAZOSIN 4 MG: 4 TABLET ORAL at 09:27

## 2021-10-18 RX ADMIN — GABAPENTIN 300 MG: 300 CAPSULE ORAL at 09:26

## 2021-10-18 RX ADMIN — LOSARTAN POTASSIUM 100 MG: 100 TABLET, FILM COATED ORAL at 09:26

## 2021-10-18 RX ADMIN — ASPIRIN 81 MG: 81 TABLET, FILM COATED ORAL at 10:24

## 2021-10-18 RX ADMIN — POTASSIUM CHLORIDE 20 MEQ: 1500 TABLET, EXTENDED RELEASE ORAL at 09:46

## 2021-10-18 ASSESSMENT — PAIN SCALES - GENERAL: PAINLEVEL_OUTOF10: 0

## 2021-10-18 NOTE — PROGRESS NOTES
[facility] 8B  Occupational Therapy  Daily Note  Time:   Time In: 6814  Time Out: 5272  Timed Code Treatment Minutes: 26 Minutes  Minutes: 26          Date: 10/18/2021  Patient Name: Cande Azevedo,   Gender: male      Room: -29/029-A  MRN: [id number]  : 1955  (74 y.o.)  Referring Practitioner: Joseph Soria MD  Diagnosis: acute respiratory failure with hypoxemia  Additional Pertinent Hx: Per H&P:Patient reports feeling worsening shortness of breath over the last 2 days. His wife tested positive for COVID-19 a few days ago. Decided to get evaluated in the ED today and called EMS. While in the ED he was found to be hypoxemic on room air at 78%. Pt found to haveCOVID-19 Pneumonia with Sepsis    Restrictions/Precautions:  Restrictions/Precautions: General Precautions, Fall Risk, Isolation  Position Activity Restriction  Other position/activity restrictions: droplet +;     SUBJECTIVE: Nurse Nan samuel session,. Up in chair upon arrival, agreeable to OT session, [facility]    PAIN: 0/10:     Vitals: Patient on 1 L O2 via Nasal Canula  upon arrival to room. Patient O2 sats at 91 %. Upon activity patient dropping O2 at 86 %. Pt requiring minimal rest break(s) to recover. \    COGNITION: Slow Processing, Decreased Insight and Decreased Problem Solving    ADL:   Grooming: with set-up. combed hair  Toileting: Stand By Assistance. Toilet Transfer: Stand By Assistance. STS. BALANCE:  Standing Balance: Stand By Assistance. x 3 minutes and x 2 minutes with 1 UE support    BED MOBILITY:  Not Tested    TRANSFERS:  Sit to Stand:  Stand By Assistance. bedside chair  Stand to Sit: Stand By Assistance. FUNCTIONAL MOBILITY:  Assistive Device: None  Assist Level:  Stand By Assistance. Distance:  To and from bathroom  Slow pace     ADDITIONAL ACTIVITIES:  Patient completed BUE strengthening exercises with skilled education on HEP: completed x10 reps x1 set with a resistive band in all joints and all planes in order to improve UE strength and activity tolerance required for BADL routine and toilet / shower transfers. Patient tolerated well, requiring minimal rest breaks. ASSESSMENT:     Activity Tolerance:  Patient tolerance of  treatment: good. Discharge Recommendations: Home with assist PRN, Home with Home health OT   Equipment Recommendations: Other: will continue to monitor pending progress  Plan: Times per week: 5x  Current Treatment Recommendations: Strengthening, Balance Training, Functional Mobility Training, Endurance Training, Safety Education & Training, Patient/Caregiver Education & Training, Equipment Evaluation, Education, & procurement, Self-Care / ADL    Patient Education  Patient Education: ADL's    Goals  Short term goals  Time Frame for Short term goals: by discharge  Short term goal 1: Pt will increase activity tolerance for functional mobility to/from [facility] or chair, progressing to bathroom as able, with supervision and Sp02 >=90% in prep for ADL completion. Short term goal 2: Pt will complete BADL tasks with supervision, incorporating ECT prn, to increase independence and ease with self care tasks. Short term goal 3: Pt will tolerate dynamic standing X4-5 minutes with supervision and Sp02 >=90% in prep for sinkside grooming/toileting tasks. Following session, patient left in safe position with all fall risk precautions in place.

## 2021-10-18 NOTE — FLOWSHEET NOTE
10/18/21 0409   Provider Notification   Reason for Communication   (Stat Lab not drawn)   Provider Name lab   Provider Notification   (Lab)   Method of Communication Call   Response Other (Comment)  (\"will be up as soon as we can\")   Notification Time [phone] and spoke to Niharika. Stat lab ordered 1 hour ago has not been drawn. Fidelina transferred to phlebotomy, Lanny answered. RN informed her stat magnesium lab draw was order an hour ago and it has not been drawn. She stated, \"Okay, We'll be up as soon as we can\".

## 2021-10-18 NOTE — CARE COORDINATION
DISCHARGE/PLANNING EVALUATION  10/18/21, 9:01 AM EDT    Reason for Referral: discharge planning  Mental Status: alert and orientated  Decision Making: self  Family/Social/Home Environment: lives at home with wife  Current Services including food security, transportation and housekeeping: patient reports he and wife change household responsibilities and chores  Current Equipment:none  Payment Source:[facility] Medicare  Concerns or Barriers to Discharge: none  Post acute provider list with quality measures, geographic area and applicable managed care information provided. Questions regarding selection process answered:Patient open to home health. Patient will review home health list. SW will visit with patient again later today on his preference for home health    Teach Back Method used with patient  regarding care plan and needs  Patient verbalizes understanding of the plan of care and contribute to goal setting. Patient goals, treatment preferences and discharge plan: Return home with wife and home health for continued therapy and nursing. Pt will decide which home health he would like to use. 3:39 PM  Patient discharging home today. SW met with pt, her would like to use [facility]. SW did let nursing know, they did ask provider for orders for home health. SW called [facility], referral made. She did ask for history and physical, facesheet, and orders faxed to [phone], SW did fax history and physical and  Facesheet. VAL will fax orders once available.      Electronically signed by Pato Jeffers on 10/18/2021 at 9:01 AM

## 2021-10-18 NOTE — FLOWSHEET NOTE
10/18/21 0204   Provider Notification   Reason for Communication Review case  (order time and draw time)   Provider Name Chemistry   Provider Notification Other (Comment)  (lab/chemistry)   Method of Communication Call   Response Other (Comment)  (reordering)   Notification Time 0205     Spoke with chemistry regarding Magnesium order placed on 10/18 but draw time showing 10/17. She stated lab was added to draw from previous. RN informed her lab needed to be drawn mow for a more recent and accurate level. She stated she would cancel out order and resend it for it to be drawn now.

## 2021-10-18 NOTE — PROGRESS NOTES
[address] [address]  INPATIENT PHYSICAL THERAPY  DAILY NOTE  [facility] 8B - 8B-29/029-A  Time In: 1108  Time Out: 6677  Timed Code Treatment Minutes: 23 Minutes  Minutes: 23          Date: 10/18/2021  Patient Name: Latosha Zeng,  Gender:  male        MRN: [id number]  : 1955  (74 y.o.)     Referring Practitioner: Dr. Nnamdi Villalta  Diagnosis: acute respiratory failure with hypoxemia  Additional Pertinent Hx: admit with above diagnosis, COVID-19 pneumonia with sepsis, DM, CHF     Prior Level of Function:  Lives With: Spouse  Type of Home: House  Home Layout: One level  Home Access: Stairs to enter with rails  Entrance Stairs - Number of Steps: 2  Home Equipment:  (no AD PTA,)   Bathroom Shower/Tub: Walk-in shower  Bathroom Toilet: Standard  Bathroom Equipment: Shower chair    ADL Assistance: Independent  Homemaking Assistance: Independent (shares IADL tasks with wife)  Ambulation Assistance: Independent  Transfer Assistance: Independent  Additional Comments: No home O2    Restrictions/Precautions:  Restrictions/Precautions: General Precautions, Fall Risk, Isolation  Position Activity Restriction  Other position/activity restrictions: droplet +;     SUBJECTIVE: OK to see pt per nursing. Per OT, Sudheer Martin, pt wanting to walk in hallway this date.  Upon arrival, pt sitting in bedside chair agreeable to PT session at this time, wanting to walk and agreeable to exercises    PAIN: denies    Vitals: Oxygen: 1 L of O2 88-92% during amb with, 86% following, amb with recovery >90% within 30 seconds, >90% with exercises  92% at rest, VC for proper breathing mechanics with good demo  OBJECTIVE:  Bed Mobility:  Not Tested    Transfers:  Sit to Stand: Stand By Assistance, X 1, cues for hand placement, with verbal cues  Stand to Sit:Stand By Assistance, X 1, cues for hand placement, with verbal cues  RW for support, no AD required, cues for improved safety with fair demo  Ambulation:  Stand By Assistance  Distance: 150 ft  Surface: Level Tile  Device:Rolling Walker  Gait Deviations: Forward Flexed Posture, Slow [place]na, Decreased Step Length on Right, Decreased Weight Shift Right, Lean to Left, Decreased Gait Speed, Decreased Heel Strike on Right and reduced foot clearance, noted, cues for safety and improved gait mechanics with fair demo noted, no LOB with completion    Balance:  Static Sitting Balance:  Supervision, X 1  Static Standing Balance: Stand By Assistance, X 1, with cues for safety, with verbal cues   RW for support in standing for safety, no LOB noted  Exercise:  Patient was guided in 1 set(s) 20 reps of exercise to both lower extremities. Seated marches, Seated hamstring curls, Seated heel/toe raises, Long arc quads, Seated isometric hip adduction and Seated abduction/adduction. Exercises were completed for increased independence with functional mobility. VC and visual cues for proper technique of exercises for completion with good demo and control noted to improve eccentric and concentric strength and mobility    Functional Outcome Measures: Completed  AM-PAC Inpatient Mobility Raw Score : 18  AM-PAC Inpatient T-Scale Score : 43.63    ASSESSMENT:  Assessment: Patient progressing toward established goals. Activity Tolerance:  Patient tolerance of  treatment: good. Equipment Recommendations: Other: cont to assess needs  Discharge Recommendations: Continue to assess pending progress    Plan: Times per week: 5X C  Times per day: Daily  Specific instructions for Next Treatment: therex and mobility    Patient Education  Patient Education: Plan of Care, Home Exercise Program, Equipment Education, Transfers, Gait, Use of Gait [place], Verbal Exercise Instruction,  - Patient Verbalized Understanding, - Patient Requires Continued Education    Goals:  Patient goals : not stated  Short term goals  Time Frame for Short term goals: by discharge  Short term goal 1: bed mobility with S to get in/out of bed  Short term goal 2: transfer with S to get in/out of chairs  Short term goal 3: amb >100'x1 with/without AD and S, maintaining O2 sats >90% on </=4L O2, to walk safely in home  Short term goal 4: negotiate 2 steps with HR and SBA to enter home safely  Long term goals  Time Frame for Long term goals : no LTGs set secondary to short ELOS    Following session, patient left in safe position with all fall risk precautions in place. Pt left in chair with all needs and call light in reach following session, chair alarm on, nursing aware.

## 2021-10-18 NOTE — DISCHARGE SUMMARY
Hospitalist Discharge Summary        Patient: Alin Richards  YOB: 1955  MRN: [id number]   Acct: [de-identified]    Primary Care Physician: FELI Calle CNP    Admit date  10/3/2021    Discharge date:  10/18/2021 5:14 PM    Chief Complaint on presentation :-    COVID-19 pneumonia    Discharge Assessment and Plan:-   1. Acute hypoxic respiratory failure  2. COVID-19 pneumonia  a. Patient tested positive on 10/3/2021.  b. Patient is not vaccinated. c. Patient treated with remdesivir and Decadron therapy as well as baricitinib. Also received empiric Rocephin for possible superimposed bacterial pneumonia. d. Supple oxygen was weaned down to regular cannula. Home oxygen evaluation was completed and the patient required 4 L with exertion and 0 L at rest.  e. We will also have PT/OT and home health services assist with the patient post discharge. Chronic conditions:  3. Diabetes  4. HFpEF  5. Hypertension  6. Hyperlipidemia    Initial H and P and Hospital course:-  Patient presents to the hospital on 10/3 with complaints of shortness of breath and was found to be COVID-19 positive. He was started on Decadron and remdesivir therapy. He also received baricitinib due to elevated inflammatory markers. He was placed on supplemental oxygen which help maintain appropriate oxygen saturation. Due to concern for possible superimposed bacterial pneumonia, the patient completed a course of Rocephin as empiric coverage. The patient supplemental oxygen was weaned down to a reasonable level. He will complete Decadron therapy at home. He underwent home oxygen evaluation prior to discharge and qualified for 4 L with exertion 0 L at rest.  There was some concern for possible obstructive sleep apnea with mild desaturations at night. Recommend the patient have outpatient sleep study completed after he recovers from his acute illness.     Physical Exam:-  Vitals:   Patient Vitals for the past 24 hrs:   BP Temp Temp src Pulse Resp SpO2   10/18/21 1526 132/64 98.2 °F (36.8 °C) Oral 69 18 94 %   10/18/21 1246 -- -- -- -- -- 90 %   10/18/21 1108 (!) 172/97 97.6 °F (36.4 °C) Oral 75 18 90 %   10/18/21 0930 -- 98.2 °F (36.8 °C) Oral -- -- --   10/18/21 0926 128/68 -- -- 84 20 94 %   10/18/21 0835 -- -- -- -- -- 90 %   10/18/21 0300 132/84 98.6 °F (37 °C) Axillary 70 20 95 %   10/18/21 0202 -- -- -- -- 19 --   10/17/21 2300 137/75 98.6 °F (37 °C) Oral 61 20 98 %   10/17/21 2115 (!) 153/74 97.5 °F (36.4 °C) Oral 75 20 94 %     Weight:   Weight: 229 lb 4.8 oz (104 kg)   24 hour intake/output:     Intake/Output Summary (Last 24 hours) at 10/18/2021 1714  Last data filed at 10/18/2021 1414  Gross per 24 hour   Intake 730 ml   Output 2550 ml   Net -1820 ml       1. General appearance: No apparent distress, appears stated age and cooperative. 2. HEENT: Normal cephalic, atraumatic without obvious deformity. Pupils equal, round, and reactive to light. Extra ocular muscles intact. Conjunctivae/corneas clear. 3. Neck: Supple, with full range of motion. No jugular venous distention. Trachea midline. 4. Respiratory:  Normal respiratory effort. Clear to auscultation, bilaterally without Rales/Wheezes/Rhonchi. 5. Cardiovascular: Regular rate and rhythm with normal S1/S2 without murmurs, rubs or gallops. 6. Abdomen: Soft, non-tender, non-distended with normal bowel sounds. 7. Musculoskeletal:  No clubbing, cyanosis or edema bilaterally. 8. Skin: Skin color, texture, turgor normal.  No rashes or lesions. 9. Neurologic:  Neurovascularly intact without any focal sensory/motor deficits. Cranial nerves: II-XII intact, grossly non-focal.  10. Psychiatric: Alert and oriented, thought content appropriate, normal insight  11. Capillary Refill: Brisk,< 3 seconds   12.  Peripheral Pulses: +2 palpable, equal bilaterally       Discharge Medications:-      Medication List      START taking these medications    albuterol sulfate HFA tablet  Commonly known as: AZULFIDINE     telmisartan 80 MG tablet  Commonly known as: MICARDIS     terazosin 5 MG capsule  Commonly known as: HYTRIN     vitamin D 25 MCG (1000 UT) Caps           Where to Get Your Medications      These medications were sent to [address], [address]  [address], [place] [address]    Phone: [phone]   · albuterol sulfate  (90 Base) MCG/ACT inhaler  · dexamethasone 6 MG tablet  · pantoprazole 40 MG tablet     You can get these medications from any pharmacy    You don't need a prescription for these medications  · zinc sulfate 220 (50 Zn) MG capsule          Labs :-  Recent Results (from the past 72 hour(s))   POCT Glucose    Collection Time: 10/15/21  7:16 PM   Result Value Ref Range    POC Glucose 194 (H) 70 - 108 mg/dl   Basic Metabolic Panel    Collection Time: 10/16/21  7:22 AM   Result Value Ref Range    Sodium 142 135 - 145 meq/L    Potassium 3.8 3.5 - 5.2 meq/L    Chloride 102 98 - 111 meq/L    CO2 35 (H) 23 - 33 meq/L    Glucose 80 70 - 108 mg/dL    BUN 19 7 - 22 mg/dL    CREATININE 1.0 0.4 - 1.2 mg/dL    Calcium 8.0 (L) 8.5 - 10.5 mg/dL   CBC Auto Differential    Collection Time: 10/16/21  7:22 AM   Result Value Ref Range    WBC 4.6 (L) 4.8 - 10.8 thou/mm3    RBC 3.55 (L) 4.70 - 6.10 mill/mm3    Hemoglobin 10.0 (L) 14.0 - 18.0 gm/dl    Hematocrit 33.1 (L) 42.0 - 52.0 %    MCV 93.2 80.0 - 94.0 fL    MCH 28.2 26.0 - 33.0 pg    MCHC 30.2 (L) 32.2 - 35.5 gm/dl    RDW-CV 14.4 11.5 - 14.5 %    RDW-SD 49.0 (H) 35.0 - 45.0 fL    Platelets 276 011 - 924 thou/mm3    MPV 11.4 9.4 - 12.4 fL    Seg Neutrophils 60.5 %    Lymphocytes 25.0 %    Monocytes 13.2 %    Eosinophils 0.7 %    Basophils 0.2 %    Immature Granulocytes 0.4 %    Segs Absolute 2.8 1 - 7 thou/mm3    Lymphocytes Absolute 1.2 1.0 - 4.8 thou/mm3    Monocytes Absolute 0.6 0.4 - 1.3 thou/mm3    Eosinophils Absolute 0.0 0.0 - 0.4 thou/mm3    Basophils Absolute 0.0 0.0 - 0.1 thou/mm3    Immature Grans (Abs) 0.02 0.00 - 0.07 thou/mm3    nRBC 0 /100 wbc   Hepatic Function Panel    Collection Time: 10/16/21  7:22 AM   Result Value Ref Range    Albumin 3.3 (L) 3.5 - 5.1 g/dL    Total Bilirubin 0.2 (L) 0.3 - 1.2 mg/dL    Bilirubin, Direct <0.2 0.0 - 0.3 mg/dL    Alkaline Phosphatase 48 38 - 126 U/L    AST 47 (H) 5 - 40 U/L    ALT 54 11 - 66 U/L    Total Protein 5.0 (L) 6.1 - 8.0 g/dL   Anion Gap    Collection Time: 10/16/21  7:22 AM   Result Value Ref Range    Anion Gap 5.0 (L) 8.0 - 16.0 meq/L   Glomerular Filtration Rate, Estimated    Collection Time: 10/16/21  7:22 AM   Result Value Ref Range    Est, Glom Filt Rate 74 (A) ml/min/1.73m2   POCT Glucose    Collection Time: 10/16/21  8:18 AM   Result Value Ref Range    POC Glucose 86 70 - 108 mg/dl   POCT Glucose    Collection Time: 10/16/21 11:31 AM   Result Value Ref Range    POC Glucose 209 (H) 70 - 108 mg/dl   POCT Glucose    Collection Time: 10/16/21  4:28 PM   Result Value Ref Range    POC Glucose 232 (H) 70 - 108 mg/dl   POCT Glucose    Collection Time: 10/16/21  7:48 PM   Result Value Ref Range    POC Glucose 259 (H) 70 - 108 mg/dl   Basic Metabolic Panel    Collection Time: 10/17/21  6:41 AM   Result Value Ref Range    Sodium 139 135 - 145 meq/L    Potassium 3.9 3.5 - 5.2 meq/L    Chloride 100 98 - 111 meq/L    CO2 33 23 - 33 meq/L    Glucose 89 70 - 108 mg/dL    BUN 22 7 - 22 mg/dL    CREATININE 1.0 0.4 - 1.2 mg/dL    Calcium 8.2 (L) 8.5 - 10.5 mg/dL   CBC Auto Differential    Collection Time: 10/17/21  6:41 AM   Result Value Ref Range    WBC 5.5 4.8 - 10.8 thou/mm3    RBC 3.78 (L) 4.70 - 6.10 mill/mm3    Hemoglobin 10.7 (L) 14.0 - 18.0 gm/dl    Hematocrit 35.1 (L) 42.0 - 52.0 %    MCV 92.9 80.0 - 94.0 fL    MCH 28.3 26.0 - 33.0 pg    MCHC 30.5 (L) 32.2 - 35.5 gm/dl    RDW-CV 14.4 11.5 - 14.5 %    RDW-SD 48.9 (H) 35.0 - 45.0 fL    Platelets 346 206 - 722 thou/mm3    MPV 11.5 9.4 - 12.4 fL    Seg Neutrophils 65.2 %    Lymphocytes 21.9 %    Monocytes 11.4 %    Eosinophils 0.9 %    Basophils 0.2 %    Immature Granulocytes 0.4 %    Segs Absolute 3.6 1 - 7 thou/mm3    Lymphocytes Absolute 1.2 1.0 - 4.8 thou/mm3    Monocytes Absolute 0.6 0.4 - 1.3 thou/mm3    Eosinophils Absolute 0.0 0.0 - 0.4 thou/mm3    Basophils Absolute 0.0 0.0 - 0.1 thou/mm3    Immature Grans (Abs) 0.02 0.00 - 0.07 thou/mm3    nRBC 0 /100 wbc   Hepatic Function Panel    Collection Time: 10/17/21  6:41 AM   Result Value Ref Range    Albumin 3.5 3.5 - 5.1 g/dL    Total Bilirubin 0.3 0.3 - 1.2 mg/dL    Bilirubin, Direct <0.2 0.0 - 0.3 mg/dL    Alkaline Phosphatase 52 38 - 126 U/L    AST 47 (H) 5 - 40 U/L    ALT 63 11 - 66 U/L    Total Protein 5.2 (L) 6.1 - 8.0 g/dL   Anion Gap    Collection Time: 10/17/21  6:41 AM   Result Value Ref Range    Anion Gap 6.0 (L) 8.0 - 16.0 meq/L   Glomerular Filtration Rate, Estimated    Collection Time: 10/17/21  6:41 AM   Result Value Ref Range    Est, Glom Filt Rate 74 (A) ml/min/1.73m2   POCT Glucose    Collection Time: 10/17/21  8:24 AM   Result Value Ref Range    POC Glucose 98 70 - 108 mg/dl   POCT Glucose    Collection Time: 10/17/21 11:56 AM   Result Value Ref Range    POC Glucose 232 (H) 70 - 108 mg/dl   POCT Glucose    Collection Time: 10/17/21  5:31 PM   Result Value Ref Range    POC Glucose 165 (H) 70 - 108 mg/dl   POCT Glucose    Collection Time: 10/17/21  8:10 PM   Result Value Ref Range    POC Glucose 210 (H) 70 - 108 mg/dl   Magnesium    Collection Time: 10/18/21  4:25 AM   Result Value Ref Range    Magnesium 2.0 1.6 - 2.4 mg/dL   POCT Glucose    Collection Time: 10/18/21  8:30 AM   Result Value Ref Range    POC Glucose 92 70 - 108 mg/dl   POCT Glucose    Collection Time: 10/18/21 12:00 PM   Result Value Ref Range    POC Glucose 168 (H) 70 - 108 mg/dl   POCT Glucose    Collection Time: 10/18/21  4:48 PM   Result Value Ref Range    POC Glucose 191 (H) 70 - 108 mg/dl Microbiology:    Blood culture #1:   Lab Results   Component Value Date    BC No growth-preliminary  No growth   01/30/2019       Blood culture #2:No results found for: Ruddy Britt    Organism:    Lab Results   Component Value Date    LABGRAM  02/02/2019     Rare segmented neutrophils observed. No bacteria seen. MRSA culture only:No results found for: [facility]    Urine culture:   Lab Results   Component Value Date    LABURIN  08/04/2019     Mixed growth. The mixture of organisms present represents  both organisms that may cause urinary tract infections and  organisms that are not a common cause of urinary tract  infections and are possibly skin ky or distal urethral  ky. Lab Results   Component Value Date    ORG Mixed Growth 08/04/2019        Respiratory culture: No results found for: CULTRESP    Aerobic and Anaerobic :  Lab Results   Component Value Date    LABAERO No growth-preliminary  No growth   02/02/2019     Lab Results   Component Value Date    LABANAE No growth-preliminary  No growth   02/02/2019       Urinalysis:      Lab Results   Component Value Date    NITRU NEGATIVE 08/04/2019    WBCUA 0-2 02/02/2019    BACTERIA NONE 02/02/2019    RBCUA 0-2 02/02/2019    BLOODU NEGATIVE 08/04/2019    SPECGRAV 1.015 08/04/2019    GLUCOSEU NEGATIVE 02/02/2019       Radiology:-  XR CHEST PORTABLE    Result Date: 10/3/2021  Single view chest Comparison: 8/2/2019 Findings: Moderate cardiomegaly with obscuration of the left heart border from infiltrate at the left base and left mid lung. New infiltrate of groundglass nature is scattered through right upper lobe and right base medially with more confluent changes laterally across the right upper lobe and right midlung. Impression: Bilateral pneumonia concerning for SARS-coronavirus-2 infection.  This document has been electronically signed by: Thierry Mccoy MD on 10/03/2021 05:21 AM       Follow-up scheduled after discharge :-    in 5-7 days with FELI Luna CNP    Consultations during this hospital stay:-  [] NONE [] Cardiology  [] Nephrology  [] Hemo onco   [] GI   [] ID  [] Endocrine  [] Pulm    [] Neuro    [] Psych   [] Urology  [] ENT   [] G SURGERY   []Ortho    []CV surg    [] Palliative  [] Hospice [] Pain management   []    []TCU   [] PT/OT  OTHERS:-    Disposition: home  Condition at Discharge: Stable    Time Spent:- 35 minutes    Electronically signed by Enedelia Mendes DO on 10/18/21 at 5:14 PM EDT   Discharging Hospitalist

## 2021-10-18 NOTE — PLAN OF CARE
Problem: Discharge Planning:  Goal: Discharged to appropriate level of care  Description: Discharged to appropriate level of care  Outcome: Completed   SW consult received and completed. SW see note 10/18.

## 2021-10-18 NOTE — PROGRESS NOTES
A home oxygen evaluation has been completed. [x]Patient is an inpatient. It is expected that the patient will be discharged within the next 48 hours. Qualified provider to write order for home prescription if patient qualifies. Social service/care managers will arrange for home oxygen. If patient is active, arrange for Home Medical supplier to assess for Oxygen Conserving Device per pulse oximetry. []Patient is an outpatient. Results will be faxed to the ordering provider. Qualified provider to write order for home prescription if patient qualifies and arranges for home oxygen. Patient was placed on room air for 20 minutes. SpO2 was 90 % on room air at rest. Patients SpO2 was 89% or above and did not qualify for home oxygen. Patient was walked for 6 minutes. SpO2 was 81 % during walking. Patients SpO2 was below 89% and qualified for home oxygen. Oxygen was applied at 4 lpm via nasal cannula to maintain a SpO2 between 90-92% while walking. Actual SpO2 was 91 %. Ambulated in hallway with walker & 2 assist. Tolerated well. Returned to chair, recovered quickly, O2 weaned to 1 lpm with SpO2 at 94%.

## 2021-10-18 NOTE — FLOWSHEET NOTE
10/18/21 0128   Provider Notification   Reason for Communication Review case;New orders   [facility]   Provider Notification Physician   Method of Communication Secure Message   Response See orders   Notification Time 0126     Felicity Moreno 4W36 patient admitted on the 3rd for covid. Patient had 11 beats of Vtach and was asymptomatic. Patient had labs drawn 10/17 in the morning- Potassium 3.9 but Magnesium wasn't checked. Are there any new orders you would like? Thank You    Reviewed case with Dr. Lisa Olguin who ordered a Magnesium level drawn. Orders placed.

## 2021-10-18 NOTE — CARE COORDINATION
Discharge Planning Update:     Remains on 8B. Spoke with pt, he is hoping for discharge home today. If home O2 needed,  pt is agreeable to [facility]. O2 is down to 1L/nc, sats 90%. Pt did qualify for home O2, await orders. SW following for home health. Update: 3:10 PM EDT    Orders received for discharge and home O2. Referral called to Sahra Tapia at [facility]. Updated Nan VELA.

## 2021-10-19 ENCOUNTER — CARE COORDINATION (OUTPATIENT)
Dept: CASE MANAGEMENT | Age: 66
End: 2021-10-19

## 2021-10-19 NOTE — CARE COORDINATION
Covid-19 Initial Follow Up Call    Patient contacted regarding COVID-19 risk, exposure and diagnosis. Discussed COVID-19 related testing which was available at this time. Test results were positive. Patient informed of results, if available? Yes. Care Transition Nurse contacted the family by telephone to perform post discharge assessment. Call within 2 business days of discharge: Yes. Verified name and  with family as identifiers. Provided introduction to self, and explanation of the CTN/ACM role, and reason for call due to risk factors for infection and/or exposure to COVID-19. Spoke with wife, Dorcas Evangelista, said Erin Smith is doing pretty good. Denies Covid symptoms other than an occasional cough. Is using the IS several times a day. Is using O2 @ 4L with activity but kept it on during the night. Thinks it is because he was used to having it on in the hospital.  Suggested since he did not qualify for O2 at rest to check his sats at night and if above 90%, not to use the O2. If he wants to use it and sats are above 90% to use 2L instead of 4L. Then monitor throughout the day and keep sats above 90%, if below at rest apply O2 at 1-2 L and check sats. Main goal is to keep sats above 90%. Voiced understanding. Reviewed medications and is taking as prescribed. FBS was 229, he is on a steroid for 10 days. Should stabilize once he has completed them. His appetite and fluid intake is good. Has a f/u with PCP on Thurs. No other questions or concerns at this time. Will continue to follow. Confirmed [facility] referral with Jose Norton. Symptoms reviewed with family who verbalized the following symptoms: no new symptoms and no worsening symptoms. Due to no new or worsening symptoms encounter was not routed to provider for escalation. Discussed follow-up appointments. If no appointment was previously scheduled, appointment scheduling offered: na.   [facility] follow up appointment(s):   Future Appointments   Date Time Provider Parveen Alexander   7/26/2022  2:45 PM Mary Ojeda MD N [facility] [place]   9/27/2022  2:00 PM FELI Keen CNP N [facility] [place]     [id number] follow up appointment(s): PCP 10/21    Non-face-to-face services provided:  Scheduled appointment with PCP-10/21  Obtained and reviewed discharge summary and/or continuity of care documents     Advance Care Planning:   Does patient have an Advance Directive:  reviewed and current. Educated patient about risk for severe COVID-19 due to risk factors according to CDC guidelines. CTN reviewed discharge instructions, medical action plan and red flag symptoms with the family who verbalized understanding. Discussed COVID vaccination status: Yes. Education provided on COVID-19 vaccination as appropriate. Discussed exposure protocols and quarantine with CDC Guidelines. Family was given an opportunity to verbalize any questions and concerns and agrees to contact CTN or health care provider for questions related to their healthcare. Reviewed and educated family on any new and changed medications related to discharge diagnosis     Was patient discharged with a pulse oximeter? No, has one at home. Discussed and confirmed pulse oximeter discharge instructions and when to notify provider or seek emergency care. CTN provided contact information. Plan for follow-up call in 5-7 days based on severity of symptoms and risk factors.

## 2021-10-19 NOTE — TELEPHONE ENCOUNTER
Reviewed lab work, will continue with change in medication. I reviewed AVS and it does not appear that diuretics were adjusted, please verify w/ patient.  Thank you

## 2021-10-19 NOTE — CARE COORDINATION
10/19/21, 11:15 AM EDT    Patient goals/plan/ treatment preferences discussed by  and . Patient goals/plan/ treatment preferences reviewed with patient/ family. Patient/ family verbalize understanding of discharge plan and are in agreement with goal/plan/treatment preferences. Understanding was demonstrated using the teach back method. AVS provided by RN at time of discharge, which includes all necessary medical information pertaining to the patients current course of illness, treatment, post-discharge goals of care, and treatment preferences. Services After Discharge  Services At/After Discharge: Nursing Services, OT, PT ([facility])   IMM Letter  IMM Letter given to Patient/Family/Significant other/Guardian/POA/by[de-identified]   IMM Letter date given[de-identified] 10/18/21  IMM Letter time given[de-identified] 9946       Message from [address], they cannot take pt one. VAL did review chart, appears HH order went to [address] VAL did call [email or web address], left a voicemail for call back. Call to pt, wife reports pt sleeping, SW did update her on [place], let her know waiting to hear back from [address], she reports they are okay with using [address] if they can see pt. Call from [facility], they have orders on patient and see him.

## 2021-10-21 NOTE — PROGRESS NOTES
CLINICAL PHARMACY NOTE: MEDS TO BEDS    Total # of Prescriptions Filled: 3   The following medications were delivered to the patient:  Dexamethasone 6mg  Albuterol HFA  Pantoprazole 40mg    Additional Documentation:

## 2021-10-27 ENCOUNTER — CARE COORDINATION (OUTPATIENT)
Dept: CASE MANAGEMENT | Age: 66
End: 2021-10-27

## 2021-10-27 NOTE — CARE COORDINATION
[facility]    COVID CALL    10/27/2021    Patient: Dary Garcia  Patient : 1955   MRN: [id number]  Reason for Admission:  Covid 19  Discharge Date: 10/18/21 RARS: Readmission Risk Score: 23    COVID Follow Up Call:    Patient is in shower. Spoke to wife, Rommel Rose (on HIPAA). She states patient is doing very well. Only uses Oxygen 4/L at night. SPO2 93%-94% all the time. Denies, Chest pain, SOB, fever & chills, loss of taste or smell, extreme fatigue, dizziness, N/V/D, Pain and body aches, confusion. Patient's wife confirms all medications are available and are being taken as directed. Blood sugar 145 today. Patient has no symptoms of hyperglycemia or hypoglycemia. Taking all Diabetic medications as directed. Patient has no needs or concerns for writer at this time. Will continue to follow. Gerson Tinoco LPN    [phone]  Jonathan Winchester / [place] [id number] Coordinator      Patient contacted regarding COVID-19 risk, exposure, diagnosis, pulse oximeter ordered at discharge and monoclonal antibody infusion follow up. Discussed COVID-19 related testing which was available at this time. Test results were positive. Patient informed of results, if available? Yes    LPN Care Coordinator contacted the patient by telephone to perform follow-up assessment. Verified name and  with family as identifiers. Patient has following risk factors of: heart failure. Symptoms reviewed with family who verbalized the following symptoms: no new symptoms and no worsening symptoms. Due to no new or worsening symptoms encounter was not routed to provider for escalation. Educated patient about risk for severe COVID-19 due to risk factors according to CDC guidelines. LPN CC reviewed discharge instructions, medical action plan and red flag symptoms with the family who verbalized understanding.      Discussed COVID vaccination status: No.  Family was given an opportunity to verbalize any questions and concerns and agrees to contact LPN CC or health care provider for questions related to their healthcare. Was patient discharged with a pulse oximeter? Yes Discussed and confirmed pulse oximeter discharge instructions and when to notify provider or seek emergency care. LPN CC provided contact information. Plan for follow-up call in 5-7 days based on severity of symptoms and risk factors. Care Transitions Subsequent and Final Call    Subsequent and Final Calls  Do you have any ongoing symptoms?: No  Have your medications changed?: No  Do you have any questions related to your medications?: No  Do you currently have any active services?: No  Do you have any needs or concerns that I can assist you with?: No  Identified Barriers: None  Care Transitions Interventions  Other Interventions:            Follow Up  Future Appointments   Date Time Provider Department Center   7/26/2022  2:45 PM Natalie Allan MD N [facility] Heart [facility] [place]   9/27/2022  2:00 PM FELI Land - CNP N B[facility] CHF [facility] Joseph LPN

## 2021-11-05 ENCOUNTER — CARE COORDINATION (OUTPATIENT)
Dept: CASE MANAGEMENT | Age: 66
End: 2021-11-05

## 2021-11-05 NOTE — CARE COORDINATION
You Patient resolved from the Care Transitions episode on 11/5/2021  Discussed COVID-19 related testing which was available at this time. Test results were positive. Patient informed of results, if available? Yes    Patient/family has been provided the following resources and education related to COVID-19:                         Signs, symptoms and red flags related to COVID-19            CDC exposure and quarantine guidelines            [facility]it exposure contact - [phone]            Contact for their local Department of Health                 Patient currently reports that the following symptoms have improved:  -, fever, fatigue, pain or aching joints, cough, shortness of breath, confusion or unusual change in mental status, chills or shaking, sweating, fast heart rate, fast breathing, dizziness/lightheadedness, less urine output, cold, clammy, and pale skin, low body temperature and no new/worsening symptoms     No further outreach scheduled with this CTN/ACM. Episode of Care resolved. Patient has this CTN/ACM contact information if future needs arise.     Zack Ramirez RN CTN

## 2022-01-12 RX ORDER — HYDRALAZINE HYDROCHLORIDE 100 MG/1
TABLET, FILM COATED ORAL
Qty: 90 TABLET | Refills: 1 | Status: SHIPPED | OUTPATIENT
Start: 2022-01-12 | End: 2022-03-21 | Stop reason: SDUPTHER

## 2022-01-19 ENCOUNTER — HOSPITAL ENCOUNTER (OUTPATIENT)
Dept: RESPIRATORY THERAPY | Age: 67
Discharge: HOME | End: 2022-01-19
Payer: MEDICARE

## 2022-01-19 PROCEDURE — 94762 N-INVAS EAR/PLS OXIMTRY CONT: CPT

## 2022-01-19 NOTE — PROGRESS NOTES
Instructions were given for an overnight nocturnal pulse oximetry study. The assigned GE number of the pulse oximetry was [id number]. A log sheet was completed. Patient was instructed on documenting any events that occurred throughout the night on the log sheet. The procedure was explained to the learner(s). Patient understanding of the procedure was good. Patient does have a mean of transportation to bring back the study the next day. A patient task was placed in the patients chart for the  to download the nocturnal study and fax the results to the ordering provider for interpretation. The pulse oximetrys memory was cleared. Patient had no questions and was sent home with the pulse oximeter.

## 2022-02-08 ENCOUNTER — INITIAL CONSULT (OUTPATIENT)
Dept: PULMONOLOGY | Age: 67
End: 2022-02-08
Payer: MEDICARE

## 2022-02-08 VITALS
DIASTOLIC BLOOD PRESSURE: 68 MMHG | HEIGHT: 68 IN | OXYGEN SATURATION: 95 % | BODY MASS INDEX: 35.61 KG/M2 | TEMPERATURE: 98.4 F | WEIGHT: 235 LBS | HEART RATE: 84 BPM | SYSTOLIC BLOOD PRESSURE: 118 MMHG

## 2022-02-08 DIAGNOSIS — I50.32 CHRONIC DIASTOLIC CONGESTIVE HEART FAILURE (HCC): ICD-10-CM

## 2022-02-08 DIAGNOSIS — I10 ESSENTIAL HYPERTENSION: ICD-10-CM

## 2022-02-08 DIAGNOSIS — R06.83 SNORING: Primary | ICD-10-CM

## 2022-02-08 DIAGNOSIS — I25.10 CORONARY ARTERY DISEASE INVOLVING NATIVE CORONARY ARTERY OF NATIVE HEART WITHOUT ANGINA PECTORIS: ICD-10-CM

## 2022-02-08 DIAGNOSIS — G47.30 SLEEP APNEA, UNSPECIFIED TYPE: ICD-10-CM

## 2022-02-08 PROCEDURE — 99214 OFFICE O/P EST MOD 30 MIN: CPT | Performed by: INTERNAL MEDICINE

## 2022-02-08 RX ORDER — OFLOXACIN 3 MG/ML
5 SOLUTION AURICULAR (OTIC) 2 TIMES DAILY
COMMUNITY
End: 2022-10-03

## 2022-02-08 RX ORDER — PREDNISOLONE SODIUM PHOSPHATE 10 MG/ML
1 SOLUTION/ DROPS OPHTHALMIC 4 TIMES DAILY
COMMUNITY
End: 2022-10-03

## 2022-02-08 NOTE — PROGRESS NOTES
[facility] initial consultation note    Rob Arcos                                                Chief complaint: Rob Arcos is a 74 y. o.oldmale came for further evaluation regarding his ?sleep apnea  with referral from FELI Najera CNP. He underwent nocturnal pulse oximetry study performed on 2021 with a referral from his family physician Mr. Divya Colbert CNP. Patient's oxygen saturation remained less than 89% for a period of 1 hour 24 minutes. He came for evaluation regarding his nocturnal hypoxia with a high suspicion for obstructive sleep apnea by his family physician. [place]:    Sleep/Wake schedule:  Usual time to go to bed during the work/regular day of week: 4:30 AM  Usual time to wake up during the work//regular day of week: 1 PM to 2 PM  He is currently retired and staying at home. He sleeps very late. Over the weekends his sleep schedule: [x] Remain same. He usually falls a sleep in less than: Approximately 30 minutes  He takes naps: No.     Sleep Hygiene:  Is the temperature and evironment in his bed room is acceptable to him: Yes. He watches Television in his bed room: Yes. He read books, study, pay bills etc in the bed: No.   Frequency He wake up during night/sleep: 3-4  Majority of nocturnal awakenings are for urination: Yes. Difficulty in falling back to sleep after nocturnal awakenings: No  .  Do you drink coffee: No.       Do you drink caffeinated beverages i.e sodas: Yes. He drinks 2, 7up drinks per day.   Do you drink tea: No.     Do you drink alcoholic beverages: No.    History of recreational drug use: No    Social History     Tobacco Use    Smoking status: Former Smoker     Packs/day: 1.00     Years: 37.00     Pack years: 37.00     Types: Cigarettes     Quit date: 2013     Years since quittin.1    Smokeless tobacco: Never Used   Vaping Use    Vaping Use: Never used   Substance Use Topics    Alcohol use: No    Drug use: No       Sleep apnea symptoms:  Noticed to have loud snoring:Yes. Noted by his family member- spouse  Witnessed apneas during sleep noticed: No.   History of choking and gasping sensation at night time: No.   History of headaches in the morning:No.   History of dry mouth in the morning: No.   History of palpitations during night time/nocturnal awakenings: No.   History of sweating during night time/nocturnal awakenings: No.      General:  History of head injury in the past: No.   History of seizures: No  Rest less legs syndrome symptoms:NO  History suggestive of periodic limb movements during sleep: NO  History suggestive of hypnagogic hallucinations: NO  History suggestive of hypnopompic hallucinations: NO  History suggestive of sleep talking:NO  History suggestive of sleep walking:NO  History suggestive of bruxism: NO     History suggestive of cataplexy: NO  History suggestive of sleep paralysis: NO    Family history of sleep disorders:  Family history of obstructive sleep apnea: NO.  Family history of Narcolepsy: NO.  Family history of Rest less legs syndrome : NO.    History regarding old sleep studies:  Prior history of sleep study: No.  Using CPAP device: No.  Currently using home Oxygen: He is currently using 2 L of oxygen via nasal cannula at nighttime. He was prescribed with home O2 by his family physician Mr. Rodriguez Terry CNP     Patient considerations:  Is the patient is ambulatory: Yes  Patient is currently using: None of these Wheelchair, BECC or Changers.   Para/Quadriplegic: NO  Hearing deficit : NO  Claustrophobic: NO  MDD : NO  Blind: NO  Incontinent: NO  Para/Quadraplegi: NO.   Need transportation to and from Sleep Center:NO      Social History:  Social History     Tobacco Use    Smoking status: Former Smoker     Packs/day: 1.00     Years: 37.00     Pack years: 37.00     Types: Cigarettes     Quit date: 1/1/2013     Years since quittin.1    Smokeless tobacco: Never Used   Vaping Use    Vaping Use: Never used   Substance Use Topics    Alcohol use: No    Drug use: No   .  He is currently working: No.       [x] Retired.                             Past Medical History:   Diagnosis Date    Arthritis     CAD (coronary artery disease) 2012    CH[facility]congestive heart failure) ([facility])     Diabetes mellitus ([facility] [address]     Diverticulosis of colon     History of chest pain     History of tobacco abuse     Hyperlipidemia     Hypertension     Pneumonia        Past Surgical History:   Procedure Laterality Date    APPENDECTOMY      CARDIAC SURGERY      heart cath    COLECTOMY      D/T DIVERTICULOSIS    COLONOSCOPY      DIAGNOSTIC CARDIAC CATH LAB PROCEDURE  2010    EF 60% C MILD DISEASE IN THE PROXIMAL  PORTION BEFORE THE BIFURCATION OF D1,MILD DISEASE IS NOTED IN THE RCA , DIFFUSE AT 10-20% RCA ALSO HAS 10-20% LESIONS    DILATATION, ESOPHAGUS      TRANSTHORACIC ECHOCARDIOGRAM  10/22/2010    EF 55-65% C MILD LFT ATRIAL DILATATION, GRADE 1 DIASOLIC DYSFUNCTION       Allergies   Allergen Reactions    Lorazepam Other (See Comments)     Light headed, dizzy, blurred vision       Current Outpatient Medications   Medication Sig Dispense Refill    prednisoLONE sodium phosphate (INFLAMASE FORTE) 1 % ophthalmic solution 1 drop 4 times daily      ofloxacin (FLOXIN) 0.3 % otic solution 5 drops 2 times daily      hydrALAZINE (APRESOLINE) 100 MG tablet TAKE 1 TABLET BY MOUTH THREE TIMES DAILY 90 tablet 1    albuterol sulfate  (90 Base) MCG/ACT inhaler Inhale 2 puffs into the lungs every 4 hours as needed for Wheezing 18 g 3    zinc sulfate (ZINCATE) 220 (50 Zn) MG capsule Take 1 capsule by mouth daily 30 capsule 3    pantoprazole (PROTONIX) 40 MG tablet Take 1 tablet by mouth every morning (before breakfast) 30 tablet 3    furosemide (LASIX) 20 MG tablet Take 1 tablet by mouth 2 times daily 90 tablet 3    potassium chloride (KLOR-CON M) 20 MEQ extended release tablet Take 10 mEq by mouth daily      vitamin D 25 MCG (1000 UT) CAPS Take 3 capsules by mouth daily      allopurinol (ZYLOPRIM) 300 MG tablet Take 300 mg by mouth every morning (before breakfast)      terazosin (HYTRIN) 5 MG capsule Take 5 mg by mouth daily      telmisartan (MICARDIS) 80 MG tablet Take 80 mg by mouth daily      acetaminophen (TYLENOL) 500 MG tablet Take 500 mg by mouth every 6 hours as needed for Pain      Misc Natural Products (GLUCOSAMINE CHOND COMPLEX/MSM PO) Take by mouth 2 times daily       carvedilol (COREG) 25 MG tablet Take 1 tablet by mouth 2 times daily 60 tablet 5    amLODIPine (NORVASC) 10 MG tablet Take 1 tablet by mouth daily 30 tablet 5    simvastatin (ZOCOR) 20 MG tablet Take 1 tablet by mouth nightly 30 tablet 5    spironolactone (ALDACTONE) 25 MG tablet Take 1 tablet by mouth 2 times daily 60 tablet 5    sulfaSALAzine (AZULFIDINE) 500 MG tablet Take 500 mg by mouth 2 times daily       ferrous sulfate 325 (65 Fe) MG tablet One tab every 48 hr.  Take with vitamn C 500 mg (Patient taking differently: One tab every other day . Take with vitamn C 500 mg) 30 tablet 3    vitamin C (VITAMIN C) 500 MG tablet Take 1 tablet by mouth every 48 hours (Patient taking differently: Take 500 mg by mouth every other day ) 30 tablet 3    aspirin 81 MG EC tablet Take 81 mg by mouth daily      Insulin Detemir (LEVEMIR SC) Inject 20 Units into the skin nightly       metFORMIN (GLUCOPHAGE) 500 MG tablet Take 1,000 mg by mouth 2 times daily (with meals)      gabapentin (NEURONTIN) 300 MG capsule Take 300 mg by mouth 2 times daily. .      glimepiride (AMARYL) 2 MG tablet Take 2 mg by mouth 2 times daily        No current facility-administered medications for this visit.        Family History   Problem Relation Age of Onset    Hypertension Father     High Cholesterol Father     Diabetes Father         Review of Systems: General/Constitutional: No recent loss of weight or appetite changes. No fever or chills. HENT: Negative. Eyes: Negative. S/p bilateral cataract surgery. Upper respiratory tract: No nasal stuffiness or post nasal drip. Lower respiratory tract/ lungs: No cough or sputum production. No hemoptysis. Cardiovascular: No palpitations or chest pain. Gastrointestinal: No nausea or vomiting. Neurological: No focal neurologiacal weakness. Extremities: No edema. Musculoskeletal: No complaints. Genitourinary: No complaints. Hematological: Negative. Psychiatric/Behavioral: Negative. Skin: No itching. /68 (Site: Left Lower Arm, Position: Sitting, Cuff Size: Medium Adult)   Pulse 84   Temp 98.4 °F (36.9 °C)   Ht 5' 8\" (1.727 m)   Wt 235 lb (106.6 kg)   SpO2 95% Comment: room air at rest  BMI 35.73 kg/m²   BMI:  Body mass index is 35.73 kg/m². Mallampati airway Class:4  Neck Circumference:.20 Inches  Jolley sleepiness score 2/8/22: 7  Sleep apnea quality of life questionnaire: 62      Physical Exam:   Nursing note and vitals reviewed. Constitutional: Patient appears well built and well nourished. No distress. Patient is oriented to person, place, and time. HENT:   Head: Normocephalic and atraumatic. Right Ear: External ear normal.   Left Ear: External ear normal.   Mouth/Throat: Oropharynx is clear and moist.  No oral thrush. Eyes: Conjunctivae are normal. Pupils are equal, round, and reactive to light. No scleral icterus. Neck: Neck supple. No JVD present. No tracheal deviation present. Cardiovascular: Normal rate, regular rhythm, normal heart sounds. No murmur heard. Pulmonary/Chest: Effort normal and breath sounds normal. No stridor. No respiratory distress. No wheezes. No rales. Patient exhibits no tenderness. Abdominal: Soft. Patient exhibits no distension. No tenderness. Musculoskeletal: Normal range of motion. Extremities: Patient exhibits no edema and no tenderness. Lymphadenopathy:  No cervical adenopathy. Neurological: Patient is alert and oriented to person, place, and time. Skin: Skin is warm and dry. Patient is not diaphoretic. Psychiatric: Patient  has a normal mood and affect. Patient behavior is normal.     Diagnostic Data:  None related sleep. Right Ventricle   The right ventricular size was normal with normal systolic function and   wall thickness. Tricuspid Valve   The tricuspid valve structure was normal with normal leaflet separation. DOPPLER: There was no evidence of tricuspid stenosis. Trivial tricuspid regurgitation visualized. Conclusions      Summary   Normal left ventricle size and systolic function. Ejection fraction was   estimated at 60 to 65 %. There were no regional left ventricular wall   motion abnormalities and wall thickness was within normal limits. Doppler parameters were consistent with abnormal left ventricular   relaxation (grade 1 diastolic dysfunction). The left atrium is Mildly dilated. There is a small anterior and posterior pericardial effusion with no   evidence of hemodynamic compromise. Signature      ----------------------------------------------------------------   Electronically signed by Sang Loya MD (Interpreting   physician) on 09/03/2020 at 06:48 PM   ----------------------------------------------------------------    Assessment:  -Snoring without witnessed apneas,frequent nocturnal awakenings- Need to evaluate for obstructive sleep apnea. -Inadequate sleep hygiene.  -Circadian sleep phase delayed sleep syndrome.  -Essential hypertension on treatment with medications-under control  -Coronary artery disease. He is following with Dr. Sue Richards MD from cardial service. -S/p bilateral cataract surgery.  -Gastroesophageal reflux disease on treatment with PPI. -Chronic diastolic congestive heart failure.   He is following with Dr. Sue Richards MD.    Recommendations/Plan:  -Will schedule patient for polysomnogram in the sleep lab. -I had a discussion with patient regarding avialable treatment options for his sleep disorder breathing including but not limited to CPAP titration in the sleep lab Vs.Dental appliance placement with referral to a local dentist Vs other available surgical options including Uvulopalatopharyngoplasty, maxillomandibular ostomy, inspire device placement and tracheostomy as last option. At the end of discussion, he is not decided on his  treatment if he found to have obstructive sleep apnea at this time.  -We will see Ruddy Nurse back in 1week after the sleep study to go over the sleep study results and further management options.  -Ruddy Nurse was educated about Chronotherapy for Circadian sleep disorder with phase delay and mechanism. He was advised to start Chronotherapy to maintain desired sleep schedule. -He was advised to continue his home oxygen 2 L of oxygen via nasal cannula during nighttime until his sleep study reports were available. -He was educated to practice good sleep hygiene practices. He was provided with a good sleep hygiene hand out.  -Shoaib Chao was advised to make earlier appointment with my clinic if he develops any worsening of sleep symptoms. He verbalizes understanding.  -He was advised to loose weight by controlling diet and doing exercise once cleared by his cardiologist Dr. Kayli Murphy MD  - Ruddy Nurse and his wife were educated about my impression and plan. They verbalizes understanding.      -I personally reviewed updated the Past medical hx, Past surgical hx,Social hx, Family hx, Medications, Allergies in the discrete data section of the patient chart along with labs, Pulmonary medicine,Sleep medicine related, Pathological, Microbiological and Radiological investigations.

## 2022-02-08 NOTE — PATIENT INSTRUCTIONS
Recommendations/Plan:  -Will schedule patient for polysomnogram in the sleep lab. -I had a discussion with patient regarding avialable treatment options for his sleep disorder breathing including but not limited to CPAP titration in the sleep lab Vs.Dental appliance placement with referral to a local dentist Vs other available surgical options including Uvulopalatopharyngoplasty, maxillomandibular ostomy, inspire device placement and tracheostomy as last option. At the end of discussion, he is not decided on his  treatment if he found to have obstructive sleep apnea at this time.  -We will see Falguni Gr back in 1week after the sleep study to go over the sleep study results and further management options.  -Falguni Gr was educated about Chronotherapy for Circadian sleep disorder with phase delay and mechanism. He was advised to start Chronotherapy to maintain desired sleep schedule. -He was advised to continue his home oxygen 2 L of oxygen via nasal cannula during nighttime until his sleep study reports were available. -He was educated to practice good sleep hygiene practices. He was provided with a good sleep hygiene hand out.  -Dian West was advised to make earlier appointment with my clinic if he develops any worsening of sleep symptoms. He verbalizes understanding.  -He was advised to loose weight by controlling diet and doing exercise once cleared by his cardiologist Dr. Casa Valdovinos MD  - Falguni Gr and his wife were educated about my impression and plan.   They verbalizes understanding

## 2022-02-08 NOTE — PROGRESS NOTES
Chief Complaint: New sleep consult, no prior studies  Mallampati airway Class:4  Neck Circumference:.20 Inches    Richton sleepiness score 2/8/22:   Sleep apnea quality of life questionnaire:.

## 2022-02-24 RX ORDER — FUROSEMIDE 20 MG/1
20 TABLET ORAL 2 TIMES DAILY
Qty: 180 TABLET | Refills: 3 | Status: SHIPPED | OUTPATIENT
Start: 2022-02-24 | End: 2022-07-05 | Stop reason: ALTCHOICE

## 2022-03-01 ENCOUNTER — HOSPITAL ENCOUNTER (OUTPATIENT)
Dept: SLEEP CENTER | Age: 67
Discharge: HOME | End: 2022-03-03
Payer: MEDICARE

## 2022-03-01 DIAGNOSIS — I10 ESSENTIAL HYPERTENSION: ICD-10-CM

## 2022-03-01 DIAGNOSIS — I25.10 CORONARY ARTERY DISEASE INVOLVING NATIVE CORONARY ARTERY OF NATIVE HEART WITHOUT ANGINA PECTORIS: ICD-10-CM

## 2022-03-01 DIAGNOSIS — G47.30 SLEEP APNEA, UNSPECIFIED TYPE: ICD-10-CM

## 2022-03-01 DIAGNOSIS — I50.32 CHRONIC DIASTOLIC CONGESTIVE HEART FAILURE (HCC): ICD-10-CM

## 2022-03-01 DIAGNOSIS — R06.83 SNORING: ICD-10-CM

## 2022-03-01 PROCEDURE — 95810 POLYSOM 6/> YRS 4/> PARAM: CPT

## 2022-03-02 LAB — STATUS: NORMAL

## 2022-03-03 NOTE — PROGRESS NOTES
[address] [place] [address]                               SLEEP STUDY REPORT    PATIENT NAME: Austyn Jimenez                  :        1955  MED REC NO:   [id number]                           ROOM:  ACCOUNT NO:   [de-identified]                           ADMIT DATE: 2022  PROVIDER:     Calvin Keller MD    DATE OF STUDY:  2022    REFERRING PROVIDER:  Azeem Mccullough CNP    Patient height is 68 inches, weight is 235 pounds with a BMI of 35.7. HISTORY:  The patient is a 58-year-old gentleman who was initially  evaluated by me on 2022. The patient gave a history of snoring  without witnessed apneas. The patient is having frequent nocturnal  awakenings. The patient had associated comorbidities including  essential hypertension, coronary artery disease, and gastroesophageal  reflux disease. The patient was also diagnosed with chronic diastolic  congestive heart failure. The patient is scheduled for sleep study to  evaluate for sleep apnea as an etiology for frequent nocturnal  awakenings. METHODS:  The patient underwent digital polysomnography in compliance  with the standards and specifications from the AASM Manual including the  simultaneous recording of 3 EEG channels (F4-M1, C4-M1, and O2-M1 with  back up electrodes F3-M2, C3-M2, and O1-M2), 2 EOG channels (E1-M2, and  E2-M1,), EMG (chin, left & right leg), EKG, Nonin pulse oximetry with   less than 2 second averaging time, body position, airflow recorded by  oral-nasal thermal sensor and nasal air pressure transducer, plus  respiratory effort recorded by calibrated respiratory inductance  plethysmography (RIP), flow volume loop, sound and video.   Sleep staging  and scoring followed the standard put forth by the American Academy of  Sleep Medicine and utilized the 1B obstructive hypopnea event  desaturation of 4 percent or greater. INTERPRETATION:  This is a baseline sleep study, and the study was  performed on 03/01/2022. The study was started at 10:27 p.m. and was  terminated at 05:01 a.m. with a total recording time of 394.1 minutes,  sleep period time was 388.9 minutes, total sleep time was 342 minutes,  and overall sleep efficiency was 86.8%. The sleep onset latency was 5.2  minutes, and wake after sleep onset was 46.9 minutes. REM sleep latency  was 305 minutes. SLEEP STAGING AND DISTRIBUTION SUMMARY:  Revealed the patient spent  119.5 minutes in stage I consisting of 34.9%, 176 minutes in stage II  consisting of 51.5%, 23.5 minutes in stage III consisting of 6.9%, 23  minutes in REM sleep consisting of 6.7% of total sleep time. RESPIRATORY EVENT ANALYSIS:  Revealed the patient had a total of 22  apneas. Out of 22 apneas, 4 are central in nature, 15 are obstructive,  and 3 are mixed in nature. The total number of apneas and hypopneas  recorded during the study was 380. The patient also had a total of 358  hypopneas. All of them are obstructive in nature. The patient's  apnea-hypopnea index of 66.7. The patient's REM sleep apnea-hypopnea  index was 104.3. POSITION ANALYSIS:  Revealed the patient spent 342 minutes in supine  position with a supine apnea-hypopnea index of 66.5. PERIODIC LIMB MOVEMENT ANALYSIS:  Revealed the patient had a total of 5  periodic limb movements. Out of 5, 2 of them are associated with  arousals with a PLM index of 0.9. PLM arousal index is 0.4. The  patient had a total of 43 spontaneous arousals with a spontaneous  arousal index of 7.5. OXYGEN SATURATION MONITORING:  Revealed the patient had a maximum oxygen  desaturation to 62% with a mean oxygen saturation of 87.1%. The patient  spent a total of 219.3 minutes below oxygen saturation less than 88%. EKG MONITORING:  Revealed normal sinus rhythm. The patient had  occasional premature ventricular contractions.     The patient was found to have moderate snoring during the sleep study. The patient woke up two times during sleep to go to restroom. The patient moved to a recliner in epoch #444 and remained in recliner  during the rest of the study. IMPRESSION:  1. Severe obstructive sleep apnea with worsening of respiratory events  in REM sleep. 2.  Decreased and delayed REM sleep limiting the interpretation of the  sleep study. 3.  Periodic limb movements with no significant arousals. 4.  Nocturnal hypoxia. The patient spent a total of 219.3 minutes below  oxygen saturation less than 88%. 5.  Essential hypertension. 6.  Coronary artery disease. 7.  Gastroesophageal reflux disease. 8.  Chronic diastolic congestive heart failure. RECOMMENDATIONS:  The patient should be scheduled for followup with my  clinic as soon as possible to discuss about the sleep study findings for  further management. Thanks to Shannan Jiménez CNP, for giving me this opportunity to  participate in the care of this pleasant gentleman.         Toribio Lopez MD    D: 03/02/2022 15:58:52       T: 03/03/2022 11:42:56     [email or web address]  Job#: [id number]     Doc#: [id number]    CC:

## 2022-03-14 ENCOUNTER — OFFICE VISIT (OUTPATIENT)
Dept: PULMONOLOGY | Age: 67
End: 2022-03-14
Payer: MEDICARE

## 2022-03-14 VITALS
WEIGHT: 233.6 LBS | OXYGEN SATURATION: 91 % | HEIGHT: 68 IN | HEART RATE: 100 BPM | DIASTOLIC BLOOD PRESSURE: 70 MMHG | TEMPERATURE: 98.4 F | BODY MASS INDEX: 35.4 KG/M2 | SYSTOLIC BLOOD PRESSURE: 134 MMHG

## 2022-03-14 DIAGNOSIS — G47.10 HYPERSOMNIA: ICD-10-CM

## 2022-03-14 DIAGNOSIS — I50.32 CHRONIC DIASTOLIC CONGESTIVE HEART FAILURE (HCC): ICD-10-CM

## 2022-03-14 DIAGNOSIS — G47.33 OSA (OBSTRUCTIVE SLEEP APNEA): Primary | ICD-10-CM

## 2022-03-14 DIAGNOSIS — G47.34 NOCTURNAL HYPOXEMIA: ICD-10-CM

## 2022-03-14 DIAGNOSIS — R06.83 SNORING: ICD-10-CM

## 2022-03-14 PROCEDURE — 99214 OFFICE O/P EST MOD 30 MIN: CPT | Performed by: PHYSICIAN ASSISTANT

## 2022-03-14 ASSESSMENT — ENCOUNTER SYMPTOMS
WHEEZING: 0
NAUSEA: 0
BACK PAIN: 0
ALLERGIC/IMMUNOLOGIC NEGATIVE: 1
DIARRHEA: 0
EYES NEGATIVE: 1
CHEST TIGHTNESS: 0
STRIDOR: 0
COUGH: 0
SHORTNESS OF BREATH: 0

## 2022-03-14 NOTE — PROGRESS NOTES
[facility]    Joel Milligan, 74 y.o.  [phone]      Pt of [facility]  Nurses Notes   Timothy Pearson is here today for PSG results   Study Results  Initial Study Date -  3/1/2022  AHI -  66.7    TotalEvents - 380  (Apneas  22  Hypopneas 358  Central  4)  LM w/Arousals - 2  Sleep Efficiency - 86.8 % (Total Sleep Time - 342.0 min)  Time with Sats below 88% - 219.3 min  Interval History       Joel Milligan is a 74 y.o. old male who comes in to review the results of his recent sleep study, to answer questions and to explore options for treatment.  he continues to have symptoms of snoring, excessive daytime sleepiness, feels sleepy during the day.,     Allergies  Allergies   Allergen Reactions    Lorazepam Other (See Comments)     Light headed, dizzy, blurred vision     Meds  Current Outpatient Medications   Medication Sig Dispense Refill    furosemide (LASIX) 20 MG tablet Take 1 tablet by mouth 2 times daily 180 tablet 3    prednisoLONE sodium phosphate (INFLAMASE FORTE) 1 % ophthalmic solution 1 drop 4 times daily      ofloxacin (FLOXIN) 0.3 % otic solution 5 drops 2 times daily      hydrALAZINE (APRESOLINE) 100 MG tablet TAKE 1 TABLET BY MOUTH THREE TIMES DAILY 90 tablet 1    albuterol sulfate  (90 Base) MCG/ACT inhaler Inhale 2 puffs into the lungs every 4 hours as needed for Wheezing 18 g 3    zinc sulfate (ZINCATE) 220 (50 Zn) MG capsule Take 1 capsule by mouth daily 30 capsule 3    pantoprazole (PROTONIX) 40 MG tablet Take 1 tablet by mouth every morning (before breakfast) 30 tablet 3    potassium chloride (KLOR-CON M) 20 MEQ extended release tablet Take 10 mEq by mouth daily      vitamin D 25 MCG (1000 UT) CAPS Take 3 capsules by mouth daily      allopurinol (ZYLOPRIM) 300 MG tablet Take 300 mg by mouth every morning (before breakfast)      terazosin (HYTRIN) 5 MG capsule Take 5 mg by mouth daily      telmisartan (MICARDIS) 80 MG tablet Take 80 mg by mouth daily      acetaminophen (TYLENOL) 500 MG tablet Take 500 mg by mouth every 6 hours as needed for Pain      Misc Natural Products (GLUCOSAMINE CHOND COMPLEX/MSM PO) Take by mouth 2 times daily       carvedilol (COREG) 25 MG tablet Take 1 tablet by mouth 2 times daily 60 tablet 5    amLODIPine (NORVASC) 10 MG tablet Take 1 tablet by mouth daily 30 tablet 5    simvastatin (ZOCOR) 20 MG tablet Take 1 tablet by mouth nightly 30 tablet 5    spironolactone (ALDACTONE) 25 MG tablet Take 1 tablet by mouth 2 times daily 60 tablet 5    sulfaSALAzine (AZULFIDINE) 500 MG tablet Take 500 mg by mouth 2 times daily       ferrous sulfate 325 (65 Fe) MG tablet One tab every 48 hr.  Take with vitamn C 500 mg (Patient taking differently: One tab every other day . Take with vitamn C 500 mg) 30 tablet 3    vitamin C (VITAMIN C) 500 MG tablet Take 1 tablet by mouth every 48 hours (Patient taking differently: Take 500 mg by mouth every other day ) 30 tablet 3    aspirin 81 MG EC tablet Take 81 mg by mouth daily      Insulin Detemir (LEVEMIR SC) Inject 20 Units into the skin nightly       metFORMIN (GLUCOPHAGE) 500 MG tablet Take 1,000 mg by mouth 2 times daily (with meals)      gabapentin (NEURONTIN) 300 MG capsule Take 300 mg by mouth 2 times daily. .      glimepiride (AMARYL) 2 MG tablet Take 2 mg by mouth 2 times daily        No current facility-administered medications for this visit. ROS  Review of Systems   Constitutional: Negative for activity change, appetite change, chills and fever. HENT: Negative for congestion and postnasal drip. Eyes: Negative. Respiratory: Negative for cough, chest tightness, shortness of breath, wheezing and stridor. Cardiovascular: Negative for chest pain and leg swelling. Gastrointestinal: Negative for diarrhea and nausea. Endocrine: Negative. Genitourinary: Negative. Musculoskeletal: Negative. Negative for arthralgias and back pain. Skin: Negative. Allergic/Immunologic: Negative. Neurological: Negative. Negative for dizziness and light-headedness. Psychiatric/Behavioral: Negative. All other systems reviewed and are negative. Exam  Vitals -  /70 (Site: Left Upper Arm, Position: Sitting, Cuff Size: Large Adult)   Pulse 100   Temp 98.4 °F (36.9 °C) (Temporal)   Ht 5' 8\" (1.727 m)   Wt 233 lb 9.6 oz (106 kg)   SpO2 91%   BMI 35.52 kg/m²    Body mass index is 35.52 kg/m². Oxygen level - Room Air  Physical Exam  Constitutional:       Appearance: He is well-developed. HENT:      Head: Normocephalic and atraumatic. Right Ear: External ear normal.      Left Ear: External ear normal.   Eyes:      Conjunctiva/sclera: Conjunctivae normal.      Pupils: Pupils are equal, round, and reactive to light. Cardiovascular:      Rate and Rhythm: Normal rate and regular rhythm. Heart sounds: Normal heart sounds. Pulmonary:      Effort: Pulmonary effort is normal.      Breath sounds: Normal breath sounds. Musculoskeletal:         General: Normal range of motion. Cervical back: Normal range of motion and neck supple. Skin:     General: Skin is warm and dry. Neurological:      Mental Status: He is alert and oriented to person, place, and time. Psychiatric:         Behavior: Behavior normal.         Thought Content: Thought content normal.         Judgment: Judgment normal.        Assessment   Diagnosis Orders   1. SRAVAN (obstructive sleep apnea)     2. Snoring     3. Chronic diastolic congestive heart failure ([address]     4. Nocturnal hypoxemia     5. Hypersomnia        Recommendations  PSG positive for sleep apnea  Several options for treatment were discussed including positional therapy, OAT and CPAP. The benefits and limitations of each were discussed. Given significant hypoxemia, recommend in lab titration   After addressing his  concerns, Bing Brock  decided to move ahead with a CPAP titration.    Bing Brock  also was interested trying out some masks before the study.      Schedule for a CPAP Titration   Mask desensitization   Follow up 6-8 weeks after PAP set up        Rober Phillips PA-C, [facility]  3/14/2022

## 2022-03-21 RX ORDER — HYDRALAZINE HYDROCHLORIDE 100 MG/1
TABLET, FILM COATED ORAL
Qty: 90 TABLET | Refills: 0 | Status: SHIPPED | OUTPATIENT
Start: 2022-03-21 | End: 2022-10-03

## 2022-03-21 RX ORDER — HYDRALAZINE HYDROCHLORIDE 100 MG/1
TABLET, FILM COATED ORAL
Qty: 90 TABLET | Refills: 3 | Status: SHIPPED | OUTPATIENT
Start: 2022-03-21 | End: 2022-07-26 | Stop reason: SDUPTHER

## 2022-03-21 NOTE — TELEPHONE ENCOUNTER
Otis Vogt called requesting a refill on the following medications:  Requested Prescriptions     Pending Prescriptions Disp Refills    hydrALAZINE (APRESOLINE) 100 MG tablet 90 tablet 1     Pharmacy verified:  [email or web address]      Date of last visit: 07-20-21  Date of next visit (if applicable): 2/73/8295

## 2022-04-12 ENCOUNTER — HOSPITAL ENCOUNTER (OUTPATIENT)
Dept: SLEEP CENTER | Age: 67
Discharge: HOME | End: 2022-04-14
Payer: MEDICARE

## 2022-04-12 DIAGNOSIS — G47.33 OSA (OBSTRUCTIVE SLEEP APNEA): ICD-10-CM

## 2022-04-12 PROCEDURE — 95811 POLYSOM 6/>YRS CPAP 4/> PARM: CPT

## 2022-04-13 DIAGNOSIS — G47.10 HYPERSOMNIA: ICD-10-CM

## 2022-04-13 DIAGNOSIS — G47.33 OSA TREATED WITH BIPAP: ICD-10-CM

## 2022-04-13 DIAGNOSIS — G47.33 OSA (OBSTRUCTIVE SLEEP APNEA): Primary | ICD-10-CM

## 2022-04-13 LAB — STATUS: NORMAL

## 2022-04-14 NOTE — PROGRESS NOTES
[address] [place] [address]                               SLEEP STUDY REPORT    PATIENT NAME: Yuliana Valdez                  :        1955  MED REC NO:   [id number]                           ROOM:  ACCOUNT NO:   [de-identified]                           ADMIT DATE: 2022  PROVIDER:     Saeed Keller MD    DATE OF STUDY:  2022    REFERRING PROVIDER:  Kimberly Jackson PA-C    The patient's height is 68 inches, weight is 233 pounds with a BMI of  35.4. HISTORY:  The patient is a 26-year-old gentleman who underwent initial  baseline sleep study on 2022. The patient was diagnosed with  severe obstructive sleep apnea with worsening of respiratory events in  REM sleep. The patient was subsequently evaluated by Kimberly Jackson PA-C, on 2022. The patient is scheduled for CPAP/BiPAP  titration as a treatment. METHODS:  The patient underwent digital polysomnography in compliance  with the standards and specifications from the AASM Manual including the  simultaneous recording of 3 EEG channels (F4-M1, C4-M1, and O2-M1 with  back up electrodes F3-M2, C3-M2, and O1-M2), 2 EOG channels (E1-M2, and  E2-M1,), EMG (chin, left & right leg), EKG, Nonin pulse oximetry with   less than 2 second averaging time, body position, airflow recorded by  oral-nasal thermal sensor and nasal air pressure transducer, plus  respiratory effort recorded by calibrated respiratory inductance  plethysmography (RIP), flow volume loop, sound and video. Sleep staging  and scoring followed the standard put forth by the American Academy of  Sleep Medicine and utilized the 1B obstructive hypopnea event  desaturation of 4 percent or greater. INTERPRETATION:  This is a CPAP/BiPAP titration study. The study was  performed on 2022.   The study was started at 10:27 p.m. and was  terminated at 04:27 a.m. with a total recording time of 359.8 minutes,  and sleep period time was 353.9 minutes. Total sleep time was 240  minutes, and overall sleep efficiency was 66.7%. The sleep onset  latency was 5.9 minutes, and wake after sleep onset was 113.9 minutes. REM sleep latency was 313 minutes. SLEEP STAGING AND DISTRIBUTION SUMMARY:  Revealed the patient spent 71.5  minutes in stage I consisting of 29.8% of total sleep time, 159 minutes  in stage II consisting of 66.3%, 9.5 minutes in REM sleep consisting of  4% of total sleep time. The patient had absent slow wave sleep. CPAP/BIPAP TITRATION STUDY:  The CPAP/BiPAP titration study was started  with a CPAP pressure of 6 cm of water. The CPAP pressure was gradually  increased to a CPAP pressure of 13 cm of water by titrating to apneas  and hypopneas. Due to persistence of residual uncontrolled  apnea-hypopnea index, the CPAP mode was changed to bilevel pressures due  to failed CPAP therapy. The BiPAP pressures were started with a BiPAP  pressure of 14/10 cm of water. The BiPAP pressure was further increased  to a final BiPAP pressure of 18/14 cm of water. At a BiPAP pressure of  18/14 cm of water, the patient spent 30.47 minutes in bed. Out of 30.47  minutes, the patient slept for a period of 9 minutes 26 seconds in  non-REM sleep. The patient did not achieve REM sleep at this pressure. At a final BiPAP pressure of 18/14 cm of water, the patient did not have  any apneas or hypopneas with an apnea-hypopnea index of 0. The maximum  oxygen desaturation recorded at this pressure was 90% with a mean oxygen  saturation of 94.2%. PERIODIC LIMB MOVEMENT ANALYSIS:  Revealed the patient did not have any  periodic limb movements. The patient had a total of 14 spontaneous  arousals with a spontaneous arousal index of 3.5. EKG MONITORING:  Revealed normal sinus rhythm. The patient had  occasional premature ventricular contractions. IMPRESSION:  1.   Severe obstructive sleep apnea with worsening of respiratory events  in REM sleep. The patient had acceptable titration to a BiPAP pressure  of 18/14 cm of water with room air. 2.  Decreased and delayed REM sleep limiting the interpretation of the  titration study. 3.  Nocturnal hypoxia noted during baseline sleep study, resolved with  BiPAP therapy. 4.  Essential hypertension. 5.  Coronary artery disease. 6.  Chronic diastolic congestive heart failure. RECOMMENDATIONS:  1. For the patient's sleep-disordered breathing, we recommend starting  therapy with BiPAP pressures of 18/14 cm of water with room air. 2.  Specific recommendations include the patient's choice of interface  was medium-size F2 mask. 3.  The patient should be scheduled for followup with Ms. Hanna Hamilton' clinic in six to eight weeks on recommended final BiPAP  pressures for clinical reevaluation with review of download. 4.  Thanks to Hanna Hamilton PA-C, for giving me this opportunity  to participate in the care of this pleasant gentleman.         Brice Bear MD    D: 04/13/2022 20:19:10       T: 04/14/2022 15:09:55     [email or web address]  Job#: [id number]     Doc#: [id number]    CC:

## 2022-04-15 ENCOUNTER — TELEPHONE (OUTPATIENT)
Dept: SLEEP CENTER | Age: 67
End: 2022-04-15

## 2022-06-20 ENCOUNTER — OFFICE VISIT (OUTPATIENT)
Dept: PULMONOLOGY | Age: 67
End: 2022-06-20
Payer: MEDICARE

## 2022-06-20 VITALS
BODY MASS INDEX: 34.86 KG/M2 | TEMPERATURE: 97.5 F | OXYGEN SATURATION: 95 % | HEIGHT: 68 IN | HEART RATE: 70 BPM | DIASTOLIC BLOOD PRESSURE: 68 MMHG | WEIGHT: 230 LBS | SYSTOLIC BLOOD PRESSURE: 120 MMHG

## 2022-06-20 DIAGNOSIS — G47.33 OSA TREATED WITH BIPAP: Primary | ICD-10-CM

## 2022-06-20 DIAGNOSIS — G47.10 HYPERSOMNIA: ICD-10-CM

## 2022-06-20 PROCEDURE — 1123F ACP DISCUSS/DSCN MKR DOCD: CPT | Performed by: PHYSICIAN ASSISTANT

## 2022-06-20 PROCEDURE — 99213 OFFICE O/P EST LOW 20 MIN: CPT | Performed by: PHYSICIAN ASSISTANT

## 2022-06-20 ASSESSMENT — ENCOUNTER SYMPTOMS
EYES NEGATIVE: 1
BACK PAIN: 0
DIARRHEA: 0
SHORTNESS OF BREATH: 0
STRIDOR: 0
COUGH: 0
CHEST TIGHTNESS: 0
WHEEZING: 0
ALLERGIC/IMMUNOLOGIC NEGATIVE: 1
NAUSEA: 0

## 2022-07-05 ENCOUNTER — TELEPHONE (OUTPATIENT)
Dept: CARDIOLOGY CLINIC | Age: 67
End: 2022-07-05

## 2022-07-05 ENCOUNTER — OFFICE VISIT (OUTPATIENT)
Dept: CARDIOLOGY CLINIC | Age: 67
End: 2022-07-05
Payer: MEDICARE

## 2022-07-05 VITALS
WEIGHT: 229 LBS | HEIGHT: 68 IN | DIASTOLIC BLOOD PRESSURE: 64 MMHG | SYSTOLIC BLOOD PRESSURE: 142 MMHG | BODY MASS INDEX: 34.71 KG/M2 | HEART RATE: 72 BPM

## 2022-07-05 DIAGNOSIS — R60.0 LEG EDEMA: ICD-10-CM

## 2022-07-05 DIAGNOSIS — I50.32 CHRONIC HEART FAILURE WITH PRESERVED EJECTION FRACTION (HCC): ICD-10-CM

## 2022-07-05 DIAGNOSIS — I25.10 CAD IN NATIVE ARTERY: Primary | ICD-10-CM

## 2022-07-05 DIAGNOSIS — R06.09 DOE (DYSPNEA ON EXERTION): ICD-10-CM

## 2022-07-05 PROCEDURE — 99214 OFFICE O/P EST MOD 30 MIN: CPT | Performed by: INTERNAL MEDICINE

## 2022-07-05 PROCEDURE — 1123F ACP DISCUSS/DSCN MKR DOCD: CPT | Performed by: INTERNAL MEDICINE

## 2022-07-05 RX ORDER — SPIRONOLACTONE 25 MG/1
25 TABLET ORAL 2 TIMES DAILY
Qty: 60 TABLET | Refills: 5 | Status: SHIPPED | OUTPATIENT
Start: 2022-07-05

## 2022-07-05 RX ORDER — CARVEDILOL 25 MG/1
25 TABLET ORAL 2 TIMES DAILY
Qty: 60 TABLET | Refills: 5 | Status: SHIPPED | OUTPATIENT
Start: 2022-07-05

## 2022-07-05 RX ORDER — AMLODIPINE BESYLATE 10 MG/1
10 TABLET ORAL DAILY
Qty: 30 TABLET | Refills: 5 | Status: SHIPPED | OUTPATIENT
Start: 2022-07-05

## 2022-07-05 RX ORDER — FUROSEMIDE 40 MG/1
40 TABLET ORAL 2 TIMES DAILY
Qty: 90 TABLET | Refills: 1 | Status: SHIPPED | OUTPATIENT
Start: 2022-07-05

## 2022-07-05 RX ORDER — SIMVASTATIN 20 MG
20 TABLET ORAL NIGHTLY
Qty: 30 TABLET | Refills: 5 | Status: SHIPPED | OUTPATIENT
Start: 2022-07-05

## 2022-07-05 NOTE — PROGRESS NOTES
pantoprazole (PROTONIX) 40 MG tablet, Take 1 tablet by mouth every morning (before breakfast), Disp: 30 tablet, Rfl: 3    potassium chloride (KLOR-CON M) 20 MEQ extended release tablet, Take 10 mEq by mouth daily, Disp: , Rfl:     vitamin D 25 MCG (1000 UT) CAPS, Take 3 capsules by mouth daily, Disp: , Rfl:     allopurinol (ZYLOPRIM) 300 MG tablet, Take 300 mg by mouth every morning (before breakfast), Disp: , Rfl:     terazosin (HYTRIN) 5 MG capsule, Take 5 mg by mouth daily, Disp: , Rfl:     telmisartan (MICARDIS) 80 MG tablet, Take 80 mg by mouth daily, Disp: , Rfl:     acetaminophen (TYLENOL) 500 MG tablet, Take 500 mg by mouth every 6 hours as needed for Pain, Disp: , Rfl:     Misc Natural Products (GLUCOSAMINE CHOND COMPLEX/MSM PO), Take by mouth 2 times daily , Disp: , Rfl:     carvedilol (COREG) 25 MG tablet, Take 1 tablet by mouth 2 times daily, Disp: 60 tablet, Rfl: 5    amLODIPine (NORVASC) 10 MG tablet, Take 1 tablet by mouth daily, Disp: 30 tablet, Rfl: 5    simvastatin (ZOCOR) 20 MG tablet, Take 1 tablet by mouth nightly, Disp: 30 tablet, Rfl: 5    spironolactone (ALDACTONE) 25 MG tablet, Take 1 tablet by mouth 2 times daily, Disp: 60 tablet, Rfl: 5    sulfaSALAzine (AZULFIDINE) 500 MG tablet, Take 500 mg by mouth 2 times daily , Disp: , Rfl:     ferrous sulfate 325 (65 Fe) MG tablet, One tab every 48 hr.  Take with vitamn C 500 mg (Patient taking differently: One tab every other day .   Take with vitamn C 500 mg), Disp: 30 tablet, Rfl: 3    vitamin C (VITAMIN C) 500 MG tablet, Take 1 tablet by mouth every 48 hours (Patient taking differently: Take 500 mg by mouth every other day ), Disp: 30 tablet, Rfl: 3    aspirin 81 MG EC tablet, Take 81 mg by mouth daily, Disp: , Rfl:     Insulin Detemir (LEVEMIR SC), Inject 20 Units into the skin nightly , Disp: , Rfl:     metFORMIN (GLUCOPHAGE) 500 MG tablet, Take 1,000 mg by mouth 2 times daily (with meals), Disp: , Rfl:     gabapentin (NEURONTIN) 300 MG capsule, Take 300 mg by mouth 2 times daily. ., Disp: , Rfl:     glimepiride (AMARYL) 2 MG tablet, Take 2 mg by mouth 2 times daily , Disp: , Rfl:     Past Medical History  Petar Cruz  has a past medical history of Arthritis, CAD (coronary artery disease), CHF (congestive heart failure) ([facility] [address] Diabetes mellitus ([facility] [address] Diverticulosis of colon, History of chest pain, History of tobacco abuse, Hyperlipidemia, Hypertension, and Pneumonia. Social History  Petar Cruz  reports that he quit smoking about 9 years ago. His smoking use included cigarettes. He has a 37.00 pack-year smoking history. He has never used smokeless tobacco. He reports that he does not drink alcohol and does not use drugs. Family History  Petar Cruz family history includes Diabetes in his father; High Cholesterol in his father; Hypertension in his father. Past Surgical History   Past Surgical History:   Procedure Laterality Date    APPENDECTOMY      CARDIAC SURGERY      heart cath    COLECTOMY  2001    D/T DIVERTICULOSIS    COLONOSCOPY      DIAGNOSTIC CARDIAC CATH LAB PROCEDURE  09/17/2010    EF 60% C MILD DISEASE IN THE PROXIMAL  PORTION BEFORE THE BIFURCATION OF D1,MILD DISEASE IS NOTED IN THE RCA , DIFFUSE AT 10-20% RCA ALSO HAS 10-20% LESIONS    DILATATION, ESOPHAGUS      TRANSTHORACIC ECHOCARDIOGRAM  10/22/2010    EF 55-65% C MILD LFT ATRIAL DILATATION, GRADE 1 DIASOLIC DYSFUNCTION       Review of Systems   Constitutional: Negative for chills and fever  HENT: Negative for congestion, sinus pressure, sneezing and sore throat. Eyes: Negative for pain, discharge, redness and itching. Respiratory: Negative for apnea, cough  Gastrointestinal: Negative for blood in stool, constipation, diarrhea   Endocrine: Negative for cold intolerance, heat intolerance, polydipsia. Genitourinary: Negative for dysuria, enuresis, flank pain and hematuria. Musculoskeletal: Negative for arthralgias, joint swelling and neck pain. Neurological: Negative for numbness and headaches. Psychiatric/Behavioral: Negative for agitation, confusion, decreased concentration and dysphoric mood. Objective:     BP (!) 142/64   Pulse 72   Ht 5' 8\" (1.727 m)   Wt 229 lb (103.9 kg)   BMI 34.82 kg/m²     Wt Readings from Last 3 Encounters:   07/05/22 229 lb (103.9 kg)   06/20/22 230 lb (104.3 kg)   03/14/22 233 lb 9.6 oz (106 kg)     BP Readings from Last 3 Encounters:   07/05/22 (!) 142/64   06/20/22 120/68   03/14/22 134/70       Nursing note and vitals reviewed. Physical Exam   Constitutional: Oriented to person, place, and time. Appears well-developed and well-nourished. ENT: Moist mucous membranes. No bleeding. Tongue is midline. Head: Normocephalic and atraumatic. Eyes: EOM are normal. Pupils are equal, round, and reactive to light. Neck: Normal range of motion. Neck supple. No JVD present. Cardiovascular: Normal rate, regular rhythm, systolic murmur, no rubs, and intact distal pulses. Pulmonary/Chest: Effort normal and breath sounds normal. No respiratory distress. No wheezes. No rales. Abdominal: Soft. Bowel sounds are normal. No distension. There is no tenderness. Musculoskeletal: Normal range of motion. +1 pitting edema in legs   Neurological: Alert and oriented to person, place, and time. No cranial nerve deficit. Coordination normal.   Skin: Skin is warm and dry. Psychiatric: Normal mood and affect.        No results found for: CKTOTAL, CKMB, CKMBINDEX    Lab Results   Component Value Date/Time    WBC 5.5 10/17/2021 06:41 AM    RBC 3.78 10/17/2021 06:41 AM    RBC 3.92 01/09/2012 01:31 PM    HGB 10.7 10/17/2021 06:41 AM    HCT 35.1 10/17/2021 06:41 AM    MCV 92.9 10/17/2021 06:41 AM    MCH 28.3 10/17/2021 06:41 AM    MCHC 30.5 10/17/2021 06:41 AM    RDW 16.1 09/28/2020 09:46 AM     10/17/2021 06:41 AM    MPV 11.5 10/17/2021 06:41 AM       Lab Results   Component Value Date/Time     10/17/2021 06:41 AM K 3.9 10/17/2021 06:41 AM    K 4.1 10/04/2021 04:43 AM     10/17/2021 06:41 AM    CO2 33 10/17/2021 06:41 AM    BUN 22 10/17/2021 06:41 AM    LABALBU 3.5 10/17/2021 06:41 AM    CREATININE 1.0 10/17/2021 06:41 AM    CALCIUM 8.2 10/17/2021 06:41 AM    LABGLOM 74 10/17/2021 06:41 AM    GLUCOSE 89 10/17/2021 06:41 AM    GLUCOSE 118 07/21/2021 04:05 PM       Lab Results   Component Value Date/Time    ALKPHOS 52 10/17/2021 06:41 AM    ALT 63 10/17/2021 06:41 AM    AST 47 10/17/2021 06:41 AM    PROT 5.2 10/17/2021 06:41 AM    BILITOT 0.3 10/17/2021 06:41 AM    BILIDIR <0.2 10/17/2021 06:41 AM    LABALBU 3.5 10/17/2021 06:41 AM       Lab Results   Component Value Date/Time    MG 2.0 10/18/2021 04:25 AM       No results found for: INR, PROTIME      Lab Results   Component Value Date/Time    LABA1C 5.7 10/03/2021 04:50 AM       Lab Results   Component Value Date/Time    TRIG 100 08/03/2019 05:20 AM    HDL 41 08/03/2019 05:20 AM    LDLCALC 42 08/03/2019 05:20 AM    LDLDIRECT 57 02/13/2017 12:31 PM       No results found for: TSH      Testing Reviewed:      I have individually reviewed the cardiac test below:    ECHO: Results for orders placed during the hospital encounter of 09/03/20    ECHO Complete 2D W Doppler W Color    Narrative  Transthoracic Echocardiography Report (TTE)    Demographics    Patient Name   Eva Gonzalez  Gender               Male  E    MR #           [id number]        Race                     Ethnicity    Account #      [de-identified]        Room Number    Accession      [id number]        Date of Study        09/03/2020  Number    Date of Birth  1955       Referring Physician  JOSE DANIEL Villegas CNP    Age            77 year(s)       Sonographer          Darius Wilkins RDCS    Interpreting         Echo reader of the  Physician            mady Nolasco MD    Procedure    Type of Study    TTE procedure:ECHOCARDIOGRAM COMPLETE 2D W DOPPLER W COLOR.     Procedure Date  Date: 09/03/2020 Start: 03:56 PM    Study Location: Echo Lab  Technical Quality: Limited visualization due to poor acoustical window. Indications:Evaluate left ventricular function. Additional Medical History:Coronary artery disease, Former smoker,  Hypertension, hyperlipidemia, Diabetes, CHF    Patient Status: Routine    Height: 67.72 inches Weight: 221.01 pounds BSA: 2.13 m^2 BMI: 33.89 kg/m^2    BP: 142/82 mmHg    Allergies  - Other allergy:(lorazepam). Conclusions    Summary  Normal left ventricle size and systolic function. Ejection fraction was  estimated at 60 to 65 %. There were no regional left ventricular wall  motion abnormalities and wall thickness was within normal limits. Doppler parameters were consistent with abnormal left ventricular  relaxation (grade 1 diastolic dysfunction). The left atrium is Mildly dilated. There is a small anterior and posterior pericardial effusion with no  evidence of hemodynamic compromise. Signature    ----------------------------------------------------------------  Electronically signed by Nicky Nolasco MD (Interpreting  physician) on 09/03/2020 at 06:48 PM  ----------------------------------------------------------------    Findings    Mitral Valve  The mitral valve structure was normal with normal leaflet separation. DOPPLER: The transmitral velocity was within the normal range with no  evidence for mitral stenosis. There was no evidence of mitral  regurgitation. Aortic Valve  The aortic valve was trileaflet with normal thickness and cuspal  separation. DOPPLER: Transaortic velocity was within the normal range with  no evidence of aortic stenosis. There was no evidence of aortic  regurgitation. Tricuspid Valve  The tricuspid valve structure was normal with normal leaflet separation. DOPPLER: There was no evidence of tricuspid stenosis. Trivial tricuspid regurgitation visualized.     Pulmonic Valve  The pulmonic valve leaflets exhibited normal thickness, no calcification,  and normal cuspal separation. DOPPLER: The transpulmonic velocity was  within the normal range with no evidence for regurgitation. Left Atrium  The left atrium is Mildly dilated. Left Ventricle  Normal left ventricle size and systolic function. Ejection fraction was  estimated at 60 to 65 %. There were no regional left ventricular wall  motion abnormalities and wall thickness was within normal limits. Doppler parameters were consistent with abnormal left ventricular  relaxation (grade 1 diastolic dysfunction). Right Atrium  Right atrial size was normal.    Right Ventricle  The right ventricular size was normal with normal systolic function and  wall thickness. Pericardial Effusion  There is a small anterior and posterior pericardial effusion with no  evidence of hemodynamic compromise. Pleural Effusion  No evidence of pleural effusion. Aorta / Great Vessels  -Aortic root dimension within normal limits.  -The Pulmonary artery is within normal limits. -IVC size is within normal limits with normal respiratory phasic changes.     M-Mode/2D Measurements & Calculations    LV Diastolic   LV Systolic Dimension:    AV Cusp Separation: 1.8 cmLA  Dimension: 5.1 3.7 cm                    Dimension: 4.7 cmAO Root  cm             LV Volume Diastolic: 980  Dimension: 2.9 cmLA Area: 23.9  LV FS:27.5 %   ml                        cm^2  LV PW          LV Volume Systolic: 84.6  Diastolic: 1.3 ml  cm             LV EDV/LV EDV Index: 124  Septum         ml/58 m^2LV ESV/LV ESV    RV Diastolic Dimension: 3 cm  Diastolic: 1.3 Index: 66.1 ml/27 m^2  cm             EF Calculated: 53.2 %     LA/Aorta: 1.62    LA volume/Index: 80.9 ml /38m^2    Doppler Measurements & Calculations    MV Peak E-Wave: 66.4 cm/s   AV Peak Velocity: 151  LVOT Peak Velocity: 84  MV Peak A-Wave: 111 cm/s    cm/s                   cm/s  MV E/A Ratio: 0.6           AV Peak Gradient: 9.12 LVOT Peak Gradient: 3  MV Peak Gradient: 1.76 mmHg mmHg                   mmHg    MV Deceleration Time: 250                          TV Peak E-Wave: 57 cm/s  msec                                               TV Peak A-Wave: 80.1  MV P1/2t: 73 msec                                  cm/s  MVA by PHT:3.01 cm^2        IVRT: 81 msec  TV Peak Gradient: 1.3  MV E' Septal Velocity: 7.1                         mmHg  cm/s                        AV DVI (Vmax):0.56  MV A' Septal Velocity: 10                          PV Peak Velocity: 102  cm/s                                               cm/s  MV E' Lateral Velocity: 5.7                        PV Peak Gradient: 4.16  cm/s                                               mmHg  MV A' Lateral Velocity:  13.6 cm/s  E/E' septal: 9.35  E/E' lateral: 11.65    [email or web address] [id number]     Cath:2016  CORONARY ANGIOGRAPHY:       The RCA is a large vessel, dominant vessel.  Gives rise to circumflex and the  LAD.  It does have 20 percent diffuse disease but in the mid portion there is  a lesion of about 50-60 percent compared.     The left main is a large vessel.  Gives rise to circumflex and the LAD without  any critical lesion.     Circumflex is a large vessel, has about 20 percent lesion, proximal lesion,  and has mild luminal irregularity and it gives rise to OM-1 which is a large  caliber vessel and OM-2 which is a medium caliber vessel without any critical  lesion.       OM-1 is a large caliber vessel with mild luminal irregularity.     OM-2 is a moderate caliber vessel with mild luminal irregularity.       The LAD is a calcified vessel which has mild 10-20 percent lesion.     The diagonal is a large vessel which demonstrates, at the most, 10-20 percent  lesion.      CONCLUSION:    1.  The left main is a large vessel, widely patent.   2.  The circumflex has 20 percent lesion, large vessel, noncritical.  3.  OM-1 mild luminal irregularity. 4.  OM-2 mild luminal irregularity. 5.  The LAD is calcified with mild luminal irregularity at the most 10-20  percent lesion. 6.  The diagonal is a large vessel with mild luminal irregularity. 7.  LVEDP was 20 indicative of mild impaired relaxation of the ventricle. 8.  No complication occurred. 9.  Continue aggressive risk factor modification. 10.  The RCA, like I stated, has about mid portion, about 50-60 percent   case reviewed with dr Aneesh Hall. 11.  Again, no complication occurred. 12.  Good hemostasis obtained post procedure. 15.  Continue with aggressive risk factor modification and anti-ischemic  medication with lipid management. 14.  No driving or lifting for the next 3 days. AssessmentPlan:   Kalie Ramos is a pleasant 74year old male patient who  has a past medical history of Arthritis, CAD (coronary artery disease), CHF (congestive heart failure) ([address] Diabetes mellitus ([address] Diverticulosis of colon, History of chest pain, History of tobacco abuse, Hyperlipidemia, Hypertension, and Pneumonia. . He has h/o nonobstructive CAD ([facility] 2016). Stress test in 08/2019 was negative for ischemia. Previous Echo 8/2019 revealed an EF of 40-45%. A repeat Echo on 09/2020 revealed improvement in EF to 22-68% with diastolic dysfunction. The patient has followed at the CHF clinic. He has bilateral leg swelling. He reports weight gain. Patient denies chest pain, shortness of breath. He has dyspnea on exertion, gets tired and SOB just by walking short distances. 1. Leg swelling  2. Dyspnea on exertion  3. Fatigue   4. HTN   5. CHF, improved EF (EF 40-45% 08/2019, up to 60-65% 09/2020)  6. Nonobstructive CAD  7. DM  8. H/o tobacco abuse  9. Dyslipidemia   10. SRAVAN     Previous Echo 8/2019 revealed an EF of 40-45%.  A repeat Echo on 09/2020 revealed improvement in EF to 84-22% with diastolic dysfunction  · Cath in 2016 was c/w nonobstructive CAD   · Stress test 08/2019 was negative for ischemia  · CPAP for SRAVAN  · Has leg swelling, volume overload  ·  aldactone   · Increase lasix to 40 mg po BID  · Daily weight  · Eats chips daily, counseled. Limit Na  · Will need to investigate for underlying structural heart disease, will proceed with a transthoracic echocardiogram   · BMP, Mg in one week   · Based on patient's risk factors and clinical presentation, stress test is indicated to investigate for possible underlying ischemic heart disease. Patient  agrees with that work up and its risks and benefits and potential need for additional testing if stress test is abnormal such as cardiac catheterization  · Refer to CHF clinic     Above findings and plan of care were discussed with patient in details, patient's questions were answered.      Disposition:  RTC in 12 months             Electronically signed by Jacey Tran MD, [facility]    7/5/2022 at 2:08 PM EDT

## 2022-07-05 NOTE — TELEPHONE ENCOUNTER
PROCEDURE: stress test, and echo. DATE OF SERVICE: 07/18/2022    SERVICE LOCATION: [address] [place] [address]     CPT CODE: 66307, and [id number]    PHYSICIAN: Dr. Jimmy Bucio. DATE PRIOR AUTH SUBMITTED: 07/05/2022    STATUS: APPROVED.      CASE NUMBER: [id number]    AUTH NUMBER: [id number]    VALID: 07/05/2022-09/02/2022

## 2022-07-18 ENCOUNTER — HOSPITAL ENCOUNTER (OUTPATIENT)
Dept: NON INVASIVE DIAGNOSTICS | Age: 67
Discharge: HOME | End: 2022-07-18
Payer: MEDICARE

## 2022-07-18 ENCOUNTER — TELEPHONE (OUTPATIENT)
Dept: CARDIOLOGY CLINIC | Age: 67
End: 2022-07-18

## 2022-07-18 DIAGNOSIS — R60.0 LEG EDEMA: ICD-10-CM

## 2022-07-18 DIAGNOSIS — I25.10 CAD IN NATIVE ARTERY: ICD-10-CM

## 2022-07-18 DIAGNOSIS — R06.09 DOE (DYSPNEA ON EXERTION): ICD-10-CM

## 2022-07-18 DIAGNOSIS — I50.32 CHRONIC HEART FAILURE WITH PRESERVED EJECTION FRACTION (HCC): ICD-10-CM

## 2022-07-18 LAB
LV EF: 58 %
LVEF MODALITY: NORMAL

## 2022-07-18 PROCEDURE — 93306 TTE W/DOPPLER COMPLETE: CPT

## 2022-07-18 PROCEDURE — 6360000002 HC RX W HCPCS

## 2022-07-18 PROCEDURE — 78452 HT MUSCLE IMAGE SPECT MULT: CPT

## 2022-07-18 PROCEDURE — A9500 TC99M SESTAMIBI: HCPCS | Performed by: INTERNAL MEDICINE

## 2022-07-18 PROCEDURE — 3430000000 HC RX DIAGNOSTIC RADIOPHARMACEUTICAL: Performed by: INTERNAL MEDICINE

## 2022-07-18 PROCEDURE — 93017 CV STRESS TEST TRACING ONLY: CPT | Performed by: INTERNAL MEDICINE

## 2022-07-18 RX ADMIN — Medication 8.5 MILLICURIE: at 10:17

## 2022-07-18 RX ADMIN — Medication 31 MILLICURIE: at 11:10

## 2022-07-18 NOTE — TELEPHONE ENCOUNTER
Result note for Stress test Christi Prakash    ----- Message from Vitaliy Mccabe MD sent at 7/18/2022  1:24 PM EDT -----  Please inform patient that stress test is not suggestive for ischemia. If symptoms persist/recur, ex. Dyspnea, exertional symptoms, CP. ...patient should call office.

## 2022-07-25 NOTE — TELEPHONE ENCOUNTER
Lora Alvarez called requesting a refill on the following medications:  Requested Prescriptions     Pending Prescriptions Disp Refills    hydrALAZINE (APRESOLINE) 100 MG tablet 90 tablet 3     Sig: TAKE 1 TABLET BY MOUTH THREE TIMES DAILY     Pharmacy verified:  .pv  [address], [address] [phone] [place] [phone]    Date of last visit: 07/05/2022  Date of next visit (if applicable): 49/46/4931

## 2022-07-26 RX ORDER — HYDRALAZINE HYDROCHLORIDE 100 MG/1
TABLET, FILM COATED ORAL
Qty: 90 TABLET | Refills: 3 | Status: ON HOLD | OUTPATIENT
Start: 2022-07-26 | End: 2022-10-13 | Stop reason: HOSPADM

## 2022-08-19 LAB
ANION GAP SERPL CALCULATED.3IONS-SCNC: 8 MMOL/L (ref 4–12)
BUN BLDV-MCNC: 36 MG/DL (ref 7–20)
CALCIUM SERPL-MCNC: 9.3 MG/DL (ref 8.8–10.5)
CHLORIDE BLD-SCNC: 102 MEQ/L (ref 101–111)
CO2: 28 MEQ/L (ref 21–32)
CREAT SERPL-MCNC: 1.49 MG/DL (ref 0.6–1.3)
CREATININE CLEARANCE: 47
GLUCOSE: 172 MG/DL (ref 70–110)
MAGNESIUM: 1.5 MG/DL (ref 1.8–2.5)
POTASSIUM SERPL-SCNC: 4.4 MEQ/L (ref 3.6–5)
SODIUM BLD-SCNC: 138 MEQ/L (ref 135–145)

## 2022-10-03 ENCOUNTER — OFFICE VISIT (OUTPATIENT)
Dept: CARDIOLOGY CLINIC | Age: 67
End: 2022-10-03
Payer: MEDICARE

## 2022-10-03 VITALS
DIASTOLIC BLOOD PRESSURE: 62 MMHG | HEART RATE: 78 BPM | HEIGHT: 63 IN | OXYGEN SATURATION: 95 % | SYSTOLIC BLOOD PRESSURE: 98 MMHG | BODY MASS INDEX: 40.04 KG/M2 | WEIGHT: 226 LBS

## 2022-10-03 DIAGNOSIS — I50.32 CHRONIC DIASTOLIC CONGESTIVE HEART FAILURE, NYHA CLASS 2 (HCC): Primary | ICD-10-CM

## 2022-10-03 PROCEDURE — 99213 OFFICE O/P EST LOW 20 MIN: CPT | Performed by: NURSE PRACTITIONER

## 2022-10-03 PROCEDURE — 1123F ACP DISCUSS/DSCN MKR DOCD: CPT | Performed by: NURSE PRACTITIONER

## 2022-10-03 RX ORDER — LANOLIN ALCOHOL/MO/W.PET/CERES
CREAM (GRAM) TOPICAL
Status: ON HOLD | COMMUNITY
Start: 2022-09-22 | End: 2022-10-11

## 2022-10-03 RX ORDER — LOSARTAN POTASSIUM 100 MG/1
TABLET ORAL
COMMUNITY
Start: 2022-09-13

## 2022-10-03 RX ORDER — OFLOXACIN 3 MG/ML
SOLUTION/ DROPS OPHTHALMIC
COMMUNITY
End: 2022-10-03

## 2022-10-03 RX ORDER — METOLAZONE 2.5 MG/1
2.5 TABLET ORAL DAILY
Qty: 2 TABLET | Refills: 0 | Status: SHIPPED | OUTPATIENT
Start: 2022-10-03 | End: 2022-10-05

## 2022-10-03 RX ORDER — PREDNISOLONE ACETATE 10 MG/ML
SUSPENSION/ DROPS OPHTHALMIC
COMMUNITY
End: 2022-10-03

## 2022-10-03 RX ORDER — TRAMADOL HYDROCHLORIDE 50 MG/1
TABLET ORAL
COMMUNITY
Start: 2022-09-27

## 2022-10-03 RX ORDER — LORATADINE 10 MG/1
TABLET ORAL DAILY PRN
Status: ON HOLD | COMMUNITY
End: 2022-10-11

## 2022-10-03 ASSESSMENT — ENCOUNTER SYMPTOMS
COUGH: 0
VOMITING: 0
SHORTNESS OF BREATH: 0
ABDOMINAL DISTENTION: 0
NAUSEA: 0

## 2022-10-03 NOTE — PROGRESS NOTES
Heart Failure Clinic       Visit Date: 10/3/2022  Cardiologist:  Dr. Bessie Perez  Primary Care Physician: Dr. Kelley Guerra, APRN - CNP    Yaquelin Nayak is a 69 y.o. male who presents today for:  Chief Complaint   Patient presents with    Congestive Heart Failure       HPI:     TYPE HF: HFpEF (60-65%) (40-45% 2019) DD grade 1  Cause: nonischemic  Device: none  HX: CAD (nonobstructive), DM, HLD, HTN  Dry Wt:  7 Natali Rosado is a 69 y.o. male who presents to the office for a follow up patient visit in the heart failure clinic. Accompanied by self    Concerns today:pt here today for a 1 year f/u. Seems confused about his medications. Not sure of lasix and Kcl dose. Urinating well on his lasix but it has also been doubled since last year. He is very non-compliant w/ his diet.      Activity: ADLs performed, denies FOSTER  Diet: does not follow low sodium diet, does not pay attention to fluid but seems to think he stays under 64oz      Hospitalization:  > 6 months      Patient has:  Chest Pain: no  SOB: no  Orthopnea/PND: no  SRAVAN: no  Edema: no  Fatigue: no  Abdominal bloating: no  Cough: no  Appetite: good      Past Medical History:   Diagnosis Date    Arthritis     CAD (coronary artery disease) 04/05/2012    CHF (congestive heart failure) (HCC)     Diabetes mellitus (HCC)     Diverticulosis of colon     History of chest pain     History of tobacco abuse     Hyperlipidemia     Hypertension     Pneumonia      Past Surgical History:   Procedure Laterality Date    APPENDECTOMY      CARDIAC SURGERY      heart cath    COLECTOMY  2001    D/T DIVERTICULOSIS    COLONOSCOPY      DIAGNOSTIC CARDIAC CATH LAB PROCEDURE  09/17/2010    EF 60% C MILD DISEASE IN THE PROXIMAL  PORTION BEFORE THE BIFURCATION OF D1,MILD DISEASE IS NOTED IN THE RCA , DIFFUSE AT 10-20% RCA ALSO HAS 10-20% LESIONS    DILATATION, ESOPHAGUS      TRANSTHORACIC ECHOCARDIOGRAM  10/22/2010    EF 55-65% C MILD LFT ATRIAL DILATATION, GRADE 1 DIASOLIC DYSFUNCTION     Family History   Problem Relation Age of Onset    Heart Disease Mother     Hypertension Father     High Cholesterol Father     Diabetes Father      Social History     Tobacco Use    Smoking status: Former     Packs/day: 1.00     Years: 37.00     Pack years: 37.00     Types: Cigarettes     Quit date: 2013     Years since quittin.7    Smokeless tobacco: Never   Substance Use Topics    Alcohol use: No     Current Outpatient Medications   Medication Sig Dispense Refill    losartan (COZAAR) 100 MG tablet TAKE 1 TABLET BY MOUTH ONCE DAILY      magnesium oxide (MAG-OX) 400 (240 Mg) MG tablet TAKE 1 TABLET BY MOUTH ONCE DAILY WITH FOOD      prednisoLONE acetate (PRED FORTE) 1 % ophthalmic suspension 1 drop into affected eye Ophthalmic four times daily      traMADol (ULTRAM) 50 MG tablet TAKE 1 TO 2 TABLETS BY MOUTH EVERY 6 TO 8 HOURS AS NEEDED FOR PAIN FOR 7 DAYS MAX 6 PER DAY.  MUST LAST 7 DAYS      Glucosamine-Chondroitin (GLUCOSAMINE CHONDROITIN COMPLX) 500-250 MG CAPS 1 tab(s) Orally bid      loratadine (CLARITIN) 10 MG tablet 1 tablet Orally Once a day for 30 day(s)      metOLazone (ZAROXOLYN) 2.5 MG tablet Take 1 tablet by mouth daily for 2 days 2 tablet 0    hydrALAZINE (APRESOLINE) 100 MG tablet TAKE 1 TABLET BY MOUTH THREE TIMES DAILY 90 tablet 3    furosemide (LASIX) 40 MG tablet Take 1 tablet by mouth 2 times daily 90 tablet 1    amLODIPine (NORVASC) 10 MG tablet Take 1 tablet by mouth daily 30 tablet 5    carvedilol (COREG) 25 MG tablet Take 1 tablet by mouth 2 times daily 60 tablet 5    simvastatin (ZOCOR) 20 MG tablet Take 1 tablet by mouth nightly 30 tablet 5    spironolactone (ALDACTONE) 25 MG tablet Take 1 tablet by mouth 2 times daily 60 tablet 5    prednisoLONE sodium phosphate (INFLAMASE FORTE) 1 % ophthalmic solution 1 drop 4 times daily      albuterol sulfate  (90 Base) MCG/ACT inhaler Inhale 2 puffs into the lungs every 4 hours as needed for Wheezing 18 g 3 zinc sulfate (ZINCATE) 220 (50 Zn) MG capsule Take 1 capsule by mouth daily 30 capsule 3    pantoprazole (PROTONIX) 40 MG tablet Take 1 tablet by mouth every morning (before breakfast) 30 tablet 3    potassium chloride (KLOR-CON M) 20 MEQ extended release tablet Take 10 mEq by mouth daily      vitamin D 25 MCG (1000 UT) CAPS Take 3 capsules by mouth daily      allopurinol (ZYLOPRIM) 300 MG tablet Take 300 mg by mouth every morning (before breakfast)      terazosin (HYTRIN) 5 MG capsule Take 5 mg by mouth daily      acetaminophen (TYLENOL) 500 MG tablet Take 500 mg by mouth every 6 hours as needed for Pain      Misc Natural Products (GLUCOSAMINE CHOND COMPLEX/MSM PO) Take by mouth 2 times daily       sulfaSALAzine (AZULFIDINE) 500 MG tablet Take 500 mg by mouth 3 times daily      ferrous sulfate 325 (65 Fe) MG tablet One tab every 48 hr.  Take with vitamn C 500 mg (Patient taking differently: One tab every other day . Take with vitamn C 500 mg) 30 tablet 3    vitamin C (VITAMIN C) 500 MG tablet Take 1 tablet by mouth every 48 hours (Patient taking differently: Take 500 mg by mouth every other day) 30 tablet 3    aspirin 81 MG EC tablet Take 81 mg by mouth daily      Insulin Detemir (LEVEMIR SC) Inject 20 Units into the skin nightly       metFORMIN (GLUCOPHAGE) 500 MG tablet Take 1,000 mg by mouth 2 times daily (with meals)      gabapentin (NEURONTIN) 300 MG capsule Take 300 mg by mouth 2 times daily. Monster Cruz glimepiride (AMARYL) 2 MG tablet Take 2 mg by mouth 2 times daily        No current facility-administered medications for this visit. Allergies   Allergen Reactions    Lorazepam Other (See Comments)     Light headed, dizzy, blurred vision       SUBJECTIVE:   Review of Systems   Constitutional:  Negative for activity change, appetite change, diaphoresis and fatigue. Respiratory:  Negative for cough and shortness of breath. Cardiovascular:  Negative for chest pain, palpitations and leg swelling. Gastrointestinal:  Negative for abdominal distention, nausea and vomiting. Neurological:  Negative for weakness, light-headedness and headaches. Hematological:  Negative for adenopathy. Psychiatric/Behavioral:  Negative for sleep disturbance. OBJECTIVE:   Today's Vitals:  BP 98/62   Pulse 78   Ht 5' 3\" (1.6 m)   Wt 226 lb (102.5 kg)   SpO2 95%   BMI 40.03 kg/m²     Physical Exam  Vitals reviewed. Constitutional:       General: He is not in acute distress. Appearance: Normal appearance. He is well-developed. He is not diaphoretic. HENT:      Head: Normocephalic and atraumatic. Eyes:      Conjunctiva/sclera: Conjunctivae normal.   Cardiovascular:      Rate and Rhythm: Normal rate and regular rhythm. Heart sounds: Murmur heard. Pulmonary:      Effort: Pulmonary effort is normal. No respiratory distress. Breath sounds: Normal breath sounds. No wheezing or rales. Abdominal:      General: Bowel sounds are normal. There is no distension. Palpations: Abdomen is soft. Tenderness: There is no abdominal tenderness. Musculoskeletal:         General: Normal range of motion. Cervical back: Normal range of motion and neck supple. Right lower leg: Edema (+1) present. Left lower leg: Edema (+1) present. Skin:     General: Skin is warm and dry. Capillary Refill: Capillary refill takes less than 2 seconds. Neurological:      Mental Status: He is alert and oriented to person, place, and time. Coordination: Coordination normal.   Psychiatric:         Behavior: Behavior normal.       Wt Readings from Last 3 Encounters:   10/03/22 226 lb (102.5 kg)   07/05/22 229 lb (103.9 kg)   06/20/22 230 lb (104.3 kg)     BP Readings from Last 3 Encounters:   10/03/22 98/62   07/05/22 (!) 142/64   06/20/22 120/68     Pulse Readings from Last 3 Encounters:   10/03/22 78   07/05/22 72   06/20/22 70     Body mass index is 40.03 kg/m².     ECHO:      Summary   Normal left ventricle size and systolic function. Ejection fraction was   estimated at 55-60%. There were no regional left ventricular wall motion   abnormalities and wall thickness was within normal limits. Doppler parameters were consistent with abnormal left ventricular   relaxation (grade 1 diastolic dysfunction). Mildly dilated left atrium. Signature      ----------------------------------------------------------------   Electronically signed by Gabi Ontiveros MD (Interpreting   physician) on 07/18/2022 at 11:53 AM   ----------------------------------------------------------------        Conclusions     Summary  Normal left ventricle size and systolic function. Ejection fraction was  estimated at 60 to 65 %. There were no regional left ventricular wall  motion abnormalities and wall thickness was within normal limits. Doppler parameters were consistent with abnormal left ventricular  relaxation (grade 1 diastolic dysfunction). The left atrium is Mildly dilated. There is a small anterior and posterior pericardial effusion with no  evidence of hemodynamic compromise. Signature     ----------------------------------------------------------------  Electronically signed by Crystal Johnson MD (Interpreting  physician) on 09/03/2020 at 06:48 PM    CATH/STRESS:   2016: Nonobstructive.        Results reviewed:  BNP: No results found for: BNP  CBC:   Lab Results   Component Value Date/Time    WBC 5.5 10/17/2021 06:41 AM    RBC 3.78 10/17/2021 06:41 AM    RBC 3.92 01/09/2012 01:31 PM    HGB 10.7 10/17/2021 06:41 AM    HCT 35.1 10/17/2021 06:41 AM     10/17/2021 06:41 AM     CMP:    Lab Results   Component Value Date/Time     08/19/2022 03:19 PM    K 4.4 08/19/2022 03:19 PM    K 4.1 10/04/2021 04:43 AM     08/19/2022 03:19 PM    CO2 28 08/19/2022 03:19 PM    BUN 36 08/19/2022 03:19 PM    CREATININE 1.49 08/19/2022 03:19 PM    AGRATIO 1.5 07/21/2021 04:05 PM    LABGLOM 74 10/17/2021 06:41 AM    GLUCOSE 172 08/19/2022 03:19 PM    CALCIUM 9.30 08/19/2022 03:19 PM     Hepatic Function Panel:    Lab Results   Component Value Date/Time    ALKPHOS 52 10/17/2021 06:41 AM    ALT 63 10/17/2021 06:41 AM    AST 47 10/17/2021 06:41 AM    PROT 5.2 10/17/2021 06:41 AM    BILITOT 0.3 10/17/2021 06:41 AM    BILIDIR <0.2 10/17/2021 06:41 AM    LABALBU 3.5 10/17/2021 06:41 AM     Magnesium:    Lab Results   Component Value Date/Time    MG 1.5 08/19/2022 03:19 PM     PT/INR:  No results found for: PROTIME, INR  Lipids:    Lab Results   Component Value Date/Time    TRIG 100 08/03/2019 05:20 AM    HDL 41 08/03/2019 05:20 AM    LDLCALC 42 08/03/2019 05:20 AM    LDLDIRECT 57 02/13/2017 12:31 PM       ASSESSMENT AND PLAN:   The patient's condition/symptoms are Stable: No clinical evidence of fluid overload today. Continue current medical regimen without changes at present time. Diagnosis Orders   1. Chronic diastolic congestive heart failure, NYHA class 2 (HCC)  Basic Metabolic Panel        Continue:  GDMT:   ACE/ARB/ARNI - Losartan 100 daily   BB - Coreg 25 BID   Diuretic - Lasix 40 daily  AA - Aldactone 25 BID  SGLT2 -  none  Vasodilator - Hydralazine 100 TID  Other - Amlodipine 10 daily, ASA, KCl 10 daily, iron    Diastolic HF EF of 66-10%  Stable, pt doing well but has continued lower leg swelling. No worse. No weight gain. There is some confusion on what medications he is taking. He was on Lasix 20 daily and now he is taking 40 BID w/ good urination. Will do two days of metolazone    ECHO 7/2022: mild LA dilation. Grade 1 DD  Stress test 2022: negative  Cath 2016: nonobstructive. Lab reviewed - K 4.4 Cr 1.49 mag 1.5    Repeat blood work today    Ordering Metolazone 2.5mg x 2 day   Take an extra potassium today and tomorrow w/ the metolazone    Take blood pressure 1hr after medications, keep log.      Continue diet/fluid adherence  Start daily weights  F/U w/ Cardiology  F/U in clinic in 3 months      Tolerating above noted HF meds, no ill side effects noted. Will continue to monitor kidney function and electrolytes. Will optimize as tolerated. Pt is compliant w/ medications. Total visit time of 20 minutes has been spent with patient on education of symptoms, management, medication, and plan of care; as well as review of chart: labs, ECHO, radiology reports, etc.   I personally spent more then 50% of the appt time face to face with the patient. Daily weights  Fluid restriction of 2 Liters per day  Limit sodium in diet to around 4713-8042 mg/day  Monitor BP  Activity as tolerated       Patient was instructed to call the [facility] for any changes in symptoms as noted in AVS.      Return in about 3 months (around 1/3/2023). or sooner if needed     Patient given educational materials - see patient instructions. We discussed the importance of weighing oneself and recording daily. We also discussed the importance of a low sodium diet, higher sodium foods to avoid and better low sodium food options. Patient verbalizes understanding of plan of care using teach back method, and is agreeable to the treatment plan.        Electronically signed by FELI Carrero CNP on 10/3/2022 at 10:47 AM

## 2022-10-03 NOTE — PATIENT INSTRUCTIONS
You may receive a survey regarding the care you received during your visit. Your input is valuable to us. We encourage you to complete and return your survey. We hope you will choose us in the future for your healthcare needs. Continue:  Continue current medications  Daily weights and record  Fluid restriction of 2 Liters per day  Limit sodium in diet to around 0851-2406 mg/day  Monitor BP  Activity as tolerated     Call the Heart Failure Clinic for any of the following symptoms: [phone]  Weight gain of 2-3 pounds in 1 day or 5 pounds in 1 week  Increased shortness of breath  Shortness of breath while laying down  Cough  Chest pain  Swelling in feet, ankles or legs  Tenderness or bloating in the abdomen  Fatigue   Decreased appetite or nausea   Confusion        Repeat blood work today    Ordering Metolazone 2.5mg x 2 day   Take an extra potassium today and tomorrow w/ the metolazone    Take blood pressure 1hr after medications, keep log.      Continue diet/fluid adherence  Start daily weights  F/U w/ Cardiology  F/U in clinic in 3 months

## 2022-10-06 ENCOUNTER — APPOINTMENT (OUTPATIENT)
Dept: GENERAL RADIOLOGY | Age: 67
End: 2022-10-06
Payer: MEDICARE

## 2022-10-06 ENCOUNTER — HOSPITAL ENCOUNTER (EMERGENCY)
Age: 67
Discharge: HOME | End: 2022-10-06
Attending: EMERGENCY MEDICINE
Payer: MEDICARE

## 2022-10-06 ENCOUNTER — APPOINTMENT (OUTPATIENT)
Dept: CT IMAGING | Age: 67
End: 2022-10-06
Payer: MEDICARE

## 2022-10-06 VITALS
HEART RATE: 72 BPM | SYSTOLIC BLOOD PRESSURE: 105 MMHG | DIASTOLIC BLOOD PRESSURE: 60 MMHG | OXYGEN SATURATION: 94 % | TEMPERATURE: 98.3 F | RESPIRATION RATE: 22 BRPM
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
## 2022-10-06 DIAGNOSIS — T14.8XXA ABRASION: ICD-10-CM

## 2022-10-06 DIAGNOSIS — S92.414A CLOSED NONDISPLACED FRACTURE OF PROXIMAL PHALANX OF RIGHT GREAT TOE, INITIAL ENCOUNTER: Primary | ICD-10-CM

## 2022-10-06 PROCEDURE — 6370000000 HC RX 637 (ALT 250 FOR IP): Performed by: EMERGENCY MEDICINE

## 2022-10-06 PROCEDURE — 73564 X-RAY EXAM KNEE 4 OR MORE: CPT

## 2022-10-06 PROCEDURE — 72131 CT LUMBAR SPINE W/O DYE: CPT

## 2022-10-06 PROCEDURE — 70450 CT HEAD/BRAIN W/O DYE: CPT

## 2022-10-06 PROCEDURE — 6370000000 HC RX 637 (ALT 250 FOR IP): Performed by: STUDENT IN AN ORGANIZED HEALTH CARE EDUCATION/TRAINING PROGRAM

## 2022-10-06 PROCEDURE — 71046 X-RAY EXAM CHEST 2 VIEWS: CPT

## 2022-10-06 PROCEDURE — 73630 X-RAY EXAM OF FOOT: CPT

## 2022-10-06 PROCEDURE — 99284 EMERGENCY DEPT VISIT MOD MDM: CPT

## 2022-10-06 PROCEDURE — 72128 CT CHEST SPINE W/O DYE: CPT

## 2022-10-06 PROCEDURE — 72125 CT NECK SPINE W/O DYE: CPT

## 2022-10-06 RX ORDER — GINSENG 100 MG
CAPSULE ORAL ONCE
Status: COMPLETED | OUTPATIENT
Start: 2022-10-06 | End: 2022-10-06

## 2022-10-06 RX ORDER — LIDOCAINE 4 G/G
1 PATCH TOPICAL ONCE
Status: DISCONTINUED | OUTPATIENT
Start: 2022-10-06 | End: 2022-10-06 | Stop reason: HOSPADM

## 2022-10-06 RX ORDER — LIDOCAINE 50 MG/G
1 PATCH TOPICAL DAILY
Qty: 7 PATCH | Refills: 0 | Status: SHIPPED | OUTPATIENT
Start: 2022-10-06 | End: 2022-10-13

## 2022-10-06 RX ORDER — ACETAMINOPHEN 500 MG
1000 TABLET ORAL ONCE
Status: COMPLETED | OUTPATIENT
Start: 2022-10-06 | End: 2022-10-06

## 2022-10-06 RX ORDER — BACITRACIN, NEOMYCIN, POLYMYXIN B 400; 3.5; 5 [USP'U]/G; MG/G; [USP'U]/G
OINTMENT TOPICAL
Qty: 1 EACH | Refills: 0 | Status: SHIPPED | OUTPATIENT
Start: 2022-10-06

## 2022-10-06 RX ADMIN — ACETAMINOPHEN 1000 MG: 500 TABLET ORAL at 18:53

## 2022-10-06 RX ADMIN — BACITRACIN: 500 OINTMENT TOPICAL at 18:52

## 2022-10-06 ASSESSMENT — ENCOUNTER SYMPTOMS
NAUSEA: 0
ABDOMINAL PAIN: 0
SINUS PAIN: 0
BACK PAIN: 1
DIARRHEA: 0
CONSTIPATION: 0
SHORTNESS OF BREATH: 0
COLOR CHANGE: 1
EYE PAIN: 0
VOMITING: 0
COUGH: 0

## 2022-10-06 NOTE — DISCHARGE INSTRUCTIONS
Go to orthopedics ([facility]) tomorrow at 1220  Use your postop shoe to help get around until you see orthopedics  Use your walker when moving around  Be careful when you get up so you do not fall again  Use bacitracin or Neosporin on your cuts and scrapes  You can use Tylenol as needed for pain  You can use lidocaine patches to help with your back pain  Follow-up with your family doctor within a week

## 2022-10-06 NOTE — ED PROVIDER NOTES
[facility] ENCOUNTER          Pt Name: Smiley Trevino  MRN: [id number]  [place] 1955  Date of evaluation: 10/6/2022  Treating Resident Physician: Ozzy Arceo MD  Supervising Physician: Dr. Alethea Martinez, [address] [place] [address]       Chief Complaint   Patient presents with    Knee Pain     right     History obtained from the patient. HISTORY OF PRESENT ILLNESS    HPI  Smiley Trevino is a 69 y.o. male who presents to the emergency department for evaluation of fall. Patient states he has had 2 falls lately. States that on Sunday he fell out of his chair. He also states that last night he fell and tripped when he was walking. Denies any syncope. States that last night he hit his head on the carpet. Denies any loss of consciousness. Denies any blood thinners. States he also hit his right knee on the ground and on Sunday he got his right foot trapped underneath the chair. He states that he normally supposed to use a walker but has not been using it. States he is able to walk but has pain to his right knee as well as his right foot. Denies any headache fever chills cough chest pain shortness of breath abdominal pain nausea vomiting diarrhea constipation leg swelling. States he has chronic back pain and has not taking anything for it. Denies any bowel or bladder incontinence. Denies any saddle anesthesia. Denies any IV drug use. Denies any smoking. Denies any alcohol. Denies any previous back surgery. Patient denies any new Headache, Fever, Chills, Cough, Chest pain, Shortness of breath, Abdominal pain, Nausea, Vomiting, Diarrhea, Constipation, and Leg swelling. The patient has no other acute complaints at this time. REVIEW OF SYSTEMS   Review of Systems   Constitutional:  Negative for chills and fever. HENT:  Negative for ear pain and sinus pain. Eyes:  Negative for pain.    Respiratory:  Negative for cough and shortness of breath. Cardiovascular:  Negative for chest pain and leg swelling. Gastrointestinal:  Negative for abdominal pain, constipation, diarrhea, nausea and vomiting. Genitourinary:  Negative for dysuria and flank pain. Musculoskeletal:  Positive for back pain and gait problem. Negative for neck pain. Skin:  Positive for color change. Negative for wound. Neurological:  Negative for headaches. Psychiatric/Behavioral:  Negative for confusion. PAST MEDICAL AND SURGICAL HISTORY     Past Medical History:   Diagnosis Date    Arthritis     CAD (coronary artery disease) 04/05/2012    CHF (congestive heart failure) (HCC)     Diabetes mellitus (HCC)     Diverticulosis of colon     History of chest pain     History of tobacco abuse     Hyperlipidemia     Hypertension     Pneumonia      Past Surgical History:   Procedure Laterality Date    APPENDECTOMY      CARDIAC SURGERY      heart cath    COLECTOMY  2001    D/T DIVERTICULOSIS    COLONOSCOPY      DIAGNOSTIC CARDIAC CATH LAB PROCEDURE  09/17/2010    EF 60% C MILD DISEASE IN THE PROXIMAL  PORTION BEFORE THE BIFURCATION OF D1,MILD DISEASE IS NOTED IN THE RCA , DIFFUSE AT 10-20% RCA ALSO HAS 10-20% LESIONS    DILATATION, ESOPHAGUS      TRANSTHORACIC ECHOCARDIOGRAM  10/22/2010    EF 55-65% C MILD LFT ATRIAL DILATATION, GRADE 1 DIASOLIC DYSFUNCTION         MEDICATIONS   No current facility-administered medications for this encounter. Current Outpatient Medications:     neomycin-bacitracin-polymyxin (NEOSPORIN) 400-5-5000 ointment, Apply topically 2 times daily. , Disp: 1 each, Rfl: 0    lidocaine (LIDODERM) 5 %, Place 1 patch onto the skin daily for 7 days 12 hours on, 12 hours off., Disp: 7 patch, Rfl: 0    losartan (COZAAR) 100 MG tablet, TAKE 1 TABLET BY MOUTH ONCE DAILY, Disp: , Rfl:     magnesium oxide (MAG-OX) 400 (240 Mg) MG tablet, TAKE 1 TABLET BY MOUTH ONCE DAILY WITH FOOD, Disp: , Rfl:     traMADol (ULTRAM) 50 MG tablet, TAKE 1 TO 2 TABLETS BY MOUTH EVERY 6 TO 8 HOURS AS NEEDED FOR PAIN FOR 7 DAYS MAX 6 PER DAY.  MUST LAST 7 DAYS, Disp: , Rfl:     Glucosamine-Chondroitin (GLUCOSAMINE CHONDROITIN COMPLX) 500-250 MG CAPS, 1 tab(s) Orally bid, Disp: , Rfl:     loratadine (CLARITIN) 10 MG tablet, daily as needed, Disp: , Rfl:     metOLazone (ZAROXOLYN) 2.5 MG tablet, Take 1 tablet by mouth daily for 2 days, Disp: 2 tablet, Rfl: 0    hydrALAZINE (APRESOLINE) 100 MG tablet, TAKE 1 TABLET BY MOUTH THREE TIMES DAILY (Patient not taking: Reported on 10/3/2022), Disp: 90 tablet, Rfl: 3    furosemide (LASIX) 40 MG tablet, Take 1 tablet by mouth 2 times daily, Disp: 90 tablet, Rfl: 1    amLODIPine (NORVASC) 10 MG tablet, Take 1 tablet by mouth daily, Disp: 30 tablet, Rfl: 5    carvedilol (COREG) 25 MG tablet, Take 1 tablet by mouth 2 times daily, Disp: 60 tablet, Rfl: 5    simvastatin (ZOCOR) 20 MG tablet, Take 1 tablet by mouth nightly, Disp: 30 tablet, Rfl: 5    spironolactone (ALDACTONE) 25 MG tablet, Take 1 tablet by mouth 2 times daily, Disp: 60 tablet, Rfl: 5    zinc sulfate (ZINCATE) 220 (50 Zn) MG capsule, Take 1 capsule by mouth daily, Disp: 30 capsule, Rfl: 3    potassium chloride (KLOR-CON M) 20 MEQ extended release tablet, Take 20 mEq by mouth daily, Disp: , Rfl:     vitamin D 25 MCG (1000 UT) CAPS, Take 3 capsules by mouth daily, Disp: , Rfl:     allopurinol (ZYLOPRIM) 300 MG tablet, Take 300 mg by mouth every morning (before breakfast), Disp: , Rfl:     terazosin (HYTRIN) 5 MG capsule, Take 5 mg by mouth daily, Disp: , Rfl:     acetaminophen (TYLENOL) 500 MG tablet, Take 500 mg by mouth every 6 hours as needed for Pain, Disp: , Rfl:     Misc Natural Products (GLUCOSAMINE CHOND COMPLEX/MSM PO), Take by mouth 2 times daily , Disp: , Rfl:     sulfaSALAzine (AZULFIDINE) 500 MG tablet, Take 500 mg by mouth 3 times daily, Disp: , Rfl:     ferrous sulfate 325 (65 Fe) MG tablet, One tab every 48 hr.  Take with vitamn C 500 mg (Patient taking differently: One tab every other day . Take with vitamn C 500 mg), Disp: 30 tablet, Rfl: 3    vitamin C (VITAMIN C) 500 MG tablet, Take 1 tablet by mouth every 48 hours (Patient taking differently: Take 500 mg by mouth every other day), Disp: 30 tablet, Rfl: 3    aspirin 81 MG EC tablet, Take 81 mg by mouth daily, Disp: , Rfl:     Insulin Detemir (LEVEMIR SC), Inject 20 Units into the skin nightly , Disp: , Rfl:     metFORMIN (GLUCOPHAGE) 500 MG tablet, Take 1,000 mg by mouth 2 times daily (with meals), Disp: , Rfl:     gabapentin (NEURONTIN) 300 MG capsule, Take 300 mg by mouth 2 times daily. ., Disp: , Rfl:     glimepiride (AMARYL) 2 MG tablet, Take 2 mg by mouth 2 times daily , Disp: , Rfl:       SOCIAL HISTORY     Social History     Social History Narrative    Not on file     Social History     Tobacco Use    Smoking status: Former     Packs/day: 1.00     Years: 37.00     Pack years: 37.00     Types: Cigarettes     Quit date: 2013     Years since quittin.7    Smokeless tobacco: Never   Vaping Use    Vaping Use: Never used   Substance Use Topics    Alcohol use: No    Drug use: No         ALLERGIES     Allergies   Allergen Reactions    Lorazepam Other (See Comments)     Light headed, dizzy, blurred vision         FAMILY HISTORY     Family History   Problem Relation Age of Onset    Heart Disease Mother     Hypertension Father     High Cholesterol Father     Diabetes Father          PREVIOUS RECORDS   Previous records reviewed:  Patient was seen last on 10/3/2022 for cardiology office visit regarding CHF . PHYSICAL EXAM     ED Triage Vitals   BP Temp Temp src Heart Rate Resp SpO2 Height Weight   10/06/22 1555 10/06/22 1553 -- 10/06/22 1553 10/06/22 1553 10/06/22 1553 -- --   (!) 110/52 98.3 °F (36.8 °C)  93 22 94 %       Initial vital signs and nursing assessment reviewed and vitals are/show: Afebrile, Borderline hypotensive, Normocardic, and Borderline tachypneic .    Pulsoximetry is normal per my interpretation. Additional Vital Signs:  Vitals:    10/06/22 1853   BP: 105/60   Pulse: 72   Resp:    Temp:    SpO2: 94%       Physical Exam  Constitutional:       General: He is not in acute distress. Appearance: Normal appearance. He is obese. He is not ill-appearing, toxic-appearing or diaphoretic. HENT:      Head: Normocephalic. Comments: Abrasion to right anterior forehead  No palpable skull fracture or deformities  No hemotympanum no CSF rhinorrhea or otorrhea  No raccoon eyes no ingram sign       Right Ear: Tympanic membrane, ear canal and external ear normal.      Left Ear: Tympanic membrane, ear canal and external ear normal.   Eyes:      General: No scleral icterus. Right eye: No discharge. Left eye: No discharge. Extraocular Movements: Extraocular movements intact. Pupils: Pupils are equal, round, and reactive to light. Neck:      Comments: No midline cervical spine tenderness to palpation no step-offs or deformities  Cardiovascular:      Rate and Rhythm: Normal rate and regular rhythm. Pulmonary:      Effort: Pulmonary effort is normal. No respiratory distress. Breath sounds: Normal breath sounds. No stridor. No wheezing, rhonchi or rales. Chest:      Chest wall: No tenderness. Abdominal:      General: Abdomen is flat. There is no distension. Palpations: Abdomen is soft. Tenderness: There is no abdominal tenderness. There is no guarding or rebound. Musculoskeletal:      Cervical back: Normal range of motion and neck supple. No tenderness. Right lower leg: No edema. Left lower leg: No edema. Comments: No midline thoracic or lumbar spine tenderness to palpation no step-offs or deformity  Left sided lumbar paraspinal tenderness palpation  No lacerations  Bruising to the right anterior top of foot near first and second toe with tenderness palpation. No lacerations or abrasions. Mild swelling to right midfoot.   Pulse motor and sensation intact right foot. No tenderness palpation over the medial or lateral malleolus. No tenderness to palpation over the base of the fifth metatarsal.  No tenderness to palpation over the proximal second metatarsal.  No plantar ecchymoses. Tenderness palpation right knee, no bruising no lacerations or abrasions. There is some small mild swelling without erythema or signs of infection. Skin:     General: Skin is warm and dry. Neurological:      Mental Status: He is alert and oriented to person, place, and time. Mental status is at baseline. Psychiatric:         Mood and Affect: Mood normal.         Behavior: Behavior normal.         Thought Content: Thought content normal.         Judgment: Judgment normal.           MEDICAL DECISION MAKING   Initial Assessment:     73-year-old male presenting to the ED for fall    Differential diagnoses include but not limited to: Fracture dislocation laceration abrasion strain sprain intracranial bleed     Plan:         Imaging  Lidocaine patch  Tylenol  Bacitracin ointment  Postop shoe  Discharge          Patient is a 69 y.o. male who was seen and evaluated in the emergency department for falls. These seem more consistent with mechanical falls and not syncopal events. Patient was otherwise pretty well-appearing. Patient's vital signs are stable. We obtain images given his multiple falls and his complaints. He did have a phalanx fracture on the right. Otherwise his imaging was negative for acute significant serious pathology. He was able to ambulate. He was neurovascularly intact. We provided analgesia as well as a postop shoe. We also scheduled him orthopedic follow-up for his fracture as well as chronic back pain. Through shared decision-making, patient felt comfortable with discharge and following up with orthopedics. Patient verbalized understanding and agreement with the plan.                 ED RESULTS   Laboratory results:  Labs Reviewed - No data to display    Radiologic studies results:  CT Head WO Contrast   Final Result   Impression:   1. No acute intracranial finding. 2. Chronic findings as discussed above. This document has been electronically signed by: Jeyson Kam MD on 10/06/2022    06:16 PM      All CTs at this facility use dose modulation techniques and iterative    reconstructions, and/or weight-based dosing   when appropriate to reduce radiation to a low as reasonably achievable. CT Cervical Spine WO Contrast   Final Result   Impression:   1. No acute fracture or traumatic malalignment. 2. Multilevel grade 1 degenerative spondylolisthesis and moderate    degenerative spondylosis of cervical spine as discussed. This document has been electronically signed by: Jeyson Kam MD on 10/06/2022    06:31 PM      All CTs at this facility use dose modulation techniques and iterative    reconstructions, and/or weight-based dosing   when appropriate to reduce radiation to a low as reasonably achievable. CT Lumbar Spine WO Contrast   Final Result   Impression:   1. No acute fracture or traumatic malalignment. 2. Advanced multilevel degenerative spondylosis of the lumbar spine as    discussed. 3. Old fracture of the left posterior 9th and 10th ribs. This document has been electronically signed by: Jeyson Kam MD on 10/06/2022    06:23 PM      All CTs at this facility use dose modulation techniques and iterative    reconstructions, and/or weight-based dosing   when appropriate to reduce radiation to a low as reasonably achievable. CT THORACIC SPINE WO CONTRAST   Final Result   Impression:   1. No acute fracture or traumatic malalignment. 2. Unchanged Grade 1 degenerative spondylolisthesis C7-T1.   3. Mild degenerative spondylosis of the thoracic spine, no high-grade    central canal stenosis. 4. Multiple old rib fractures on the left. This document has been electronically signed by:  Jeyson Kam MD on 10/06/2022    06:26 PM All CTs at this facility use dose modulation techniques and iterative    reconstructions, and/or weight-based dosing   when appropriate to reduce radiation to a low as reasonably achievable. XR CHEST (2 VW)   Final Result   Impression:   1. No acute cardiopulmonary finding. 2. Chronic findings as discussed. This document has been electronically signed by: Veronica Holland MD on 10/06/2022    05:39 PM      XR KNEE RIGHT (MIN 4 VIEWS)   Final Result   Impression:   1. No acute fracture. 2. Advanced osteoarthritic changes of the right knee. 3. Moderate joint effusion. This document has been electronically signed by: Veronica Holland MD on 10/06/2022    05:37 PM      XR FOOT RIGHT (MIN 3 VIEWS)   Final Result   Impression:   1. Acute nondisplaced intra-articular fracture of the 1st proximal    phalangeal head. Soft tissue swelling of the dorsum foot. 3. Degenerative changes of the right foot, worst involving the 1st MTP. This document has been electronically signed by: Veronica Holland MD on 10/06/2022    05:42 PM          ED Medications administered this visit:   Medications   acetaminophen (TYLENOL) tablet 1,000 mg (1,000 mg Oral Given 10/6/22 1853)   bacitracin ointment ( Topical Given 10/6/22 1852)         ED COURSE     ED Course as of 10/06/22 2354   u Oct 06, 2022   [address] XR:IMPRESSION:  Impression:  1. No acute fracture. 2. Advanced osteoarthritic changes of the right knee. 3. Moderate joint effusion. [CR]   1821 XR:IMPRESSION:  Impression:  1. No acute cardiopulmonary finding. 2. Chronic findings as discussed. [CR]   9785 XR:Impression:  1. Acute nondisplaced intra-articular fracture of the 1st proximal   phalangeal head. Soft tissue swelling of the dorsum foot. 3. Degenerative changes of the right foot, worst involving the 1st MTP. [CR]   1828 CTH:IMPRESSION:  Impression:  1. No acute intracranial finding. 2. Chronic findings as discussed above. [CR]   1828 CT:Impression:  1.  No acute fracture or traumatic malalignment. 2. Advanced multilevel degenerative spondylosis of the lumbar spine as   discussed. 3. Old fracture of the left posterior 9th and 10th ribs. [CR]   1828 CT: IMPRESSION:  Impression:  1. No acute fracture or traumatic malalignment. 2. Unchanged Grade 1 degenerative spondylolisthesis C7-T1.  3. Mild degenerative spondylosis of the thoracic spine, no high-grade   central canal stenosis. 4. Multiple old rib fractures on the left. [CR]   1832 CT: IMPRESSION:  Impression:  1. No acute fracture or traumatic malalignment. 2. Multilevel grade 1 degenerative spondylolisthesis and moderate   degenerative spondylosis of cervical spine as discussed. [CR]      ED Course User Index  [CR] Hank Guerrier MD        Strict return precautions and follow up instructions were discussed with the patient prior to discharge, with which the patient agrees. MEDICATION CHANGES     Discharge Medication List as of 10/6/2022  6:45 PM        START taking these medications    Details   neomycin-bacitracin-polymyxin (NEOSPORIN) 400-5-5000 ointment Apply topically 2 times daily. , Disp-1 each, R-0, Normal      lidocaine (LIDODERM) 5 % Place 1 patch onto the skin daily for 7 days 12 hours on, 12 hours off., Disp-7 patch, R-0Normal               FINAL DISPOSITION     Final diagnoses:   Closed nondisplaced fracture of proximal phalanx of right great toe, initial encounter   Abrasion   Fall, initial encounter     Condition: condition: stable  Dispo: Discharge to home      This transcription was electronically signed. Parts of this transcriptions may have been dictated by use of voice recognition software and electronically transcribed, and parts may have been transcribed with the assistance of an ED scribe. The transcription may contain errors not detected in proofreading. Please refer to my supervising physician's documentation if my documentation differs.     Electronically Signed: Hank Guerrier MD, 10/06/22, 11:54 PM         Estella Garcia MD  Resident  10/06/22 6933

## 2022-10-10 ENCOUNTER — HOSPITAL ENCOUNTER (INPATIENT)
Age: 67
LOS: 2 days | Discharge: HOME | DRG: 683 | End: 2022-10-13
Attending: EMERGENCY MEDICINE | Admitting: STUDENT IN AN ORGANIZED HEALTH CARE EDUCATION/TRAINING PROGRAM
Payer: MEDICARE

## 2022-10-10 ENCOUNTER — APPOINTMENT (OUTPATIENT)
Dept: GENERAL RADIOLOGY | Age: 67
DRG: 683 | End: 2022-10-10
Payer: MEDICARE

## 2022-10-10 ENCOUNTER — TELEPHONE (OUTPATIENT)
Dept: CARDIOLOGY CLINIC | Age: 67
End: 2022-10-10

## 2022-10-10 DIAGNOSIS — S80.01XA CONTUSION OF RIGHT KNEE, INITIAL ENCOUNTER: ICD-10-CM

## 2022-10-10 DIAGNOSIS — N17.9 AKI (ACUTE KIDNEY INJURY) (HCC): Primary | ICD-10-CM

## 2022-10-10 DIAGNOSIS — S01.81XA FACIAL LACERATION, INITIAL ENCOUNTER: ICD-10-CM

## 2022-10-10 DIAGNOSIS — D64.9 ANEMIA, UNSPECIFIED TYPE: ICD-10-CM
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
## 2022-10-10 LAB
BASOPHILS # BLD: 0.2 %
BASOPHILS ABSOLUTE: 0 THOU/MM3 (ref 0–0.1)
EOSINOPHIL # BLD: 1.3 %
EOSINOPHILS ABSOLUTE: 0.1 THOU/MM3 (ref 0–0.4)
ERYTHROCYTE [DISTWIDTH] IN BLOOD BY AUTOMATED COUNT: 13.5 % (ref 11.5–14.5)
ERYTHROCYTE [DISTWIDTH] IN BLOOD BY AUTOMATED COUNT: 46.2 FL (ref 35–45)
HCT VFR BLD CALC: 32.8 % (ref 42–52)
HEMOGLOBIN: 10.9 GM/DL (ref 14–18)
IMMATURE GRANS (ABS): 0.1 THOU/MM3 (ref 0–0.07)
IMMATURE GRANULOCYTES: 1 %
LYMPHOCYTES # BLD: 12.6 %
LYMPHOCYTES ABSOLUTE: 1.3 THOU/MM3 (ref 1–4.8)
MCH RBC QN AUTO: 31.3 PG (ref 26–33)
MCHC RBC AUTO-ENTMCNC: 33.2 GM/DL (ref 32.2–35.5)
MCV RBC AUTO: 94.3 FL (ref 80–94)
MONOCYTES # BLD: 9.9 %
MONOCYTES ABSOLUTE: 1 THOU/MM3 (ref 0.4–1.3)
NUCLEATED RED BLOOD CELLS: 0 /100 WBC
PLATELET # BLD: 289 THOU/MM3 (ref 130–400)
PMV BLD AUTO: 10.7 FL (ref 9.4–12.4)
RBC # BLD: 3.48 MILL/MM3 (ref 4.7–6.1)
SEG NEUTROPHILS: 75 %
SEGMENTED NEUTROPHILS ABSOLUTE COUNT: 7.8 THOU/MM3 (ref 1.8–7.7)
WBC # BLD: 10.4 THOU/MM3 (ref 4.8–10.8)

## 2022-10-10 PROCEDURE — 36415 COLL VENOUS BLD VENIPUNCTURE: CPT

## 2022-10-10 PROCEDURE — 93005 ELECTROCARDIOGRAM TRACING: CPT

## 2022-10-10 PROCEDURE — 99285 EMERGENCY DEPT VISIT HI MDM: CPT

## 2022-10-10 PROCEDURE — 6370000000 HC RX 637 (ALT 250 FOR IP)

## 2022-10-10 PROCEDURE — 73564 X-RAY EXAM KNEE 4 OR MORE: CPT

## 2022-10-10 PROCEDURE — 12011 RPR F/E/E/N/L/M 2.5 CM/<: CPT

## 2022-10-10 PROCEDURE — 80053 COMPREHEN METABOLIC PANEL: CPT

## 2022-10-10 PROCEDURE — 84484 ASSAY OF TROPONIN QUANT: CPT

## 2022-10-10 PROCEDURE — 85025 COMPLETE CBC W/AUTO DIFF WBC: CPT

## 2022-10-10 RX ORDER — HYDROCODONE BITARTRATE AND ACETAMINOPHEN 5; 325 MG/1; MG/1
1 TABLET ORAL ONCE
Status: COMPLETED | OUTPATIENT
Start: 2022-10-10 | End: 2022-10-10

## 2022-10-10 RX ADMIN — HYDROCODONE BITARTRATE AND ACETAMINOPHEN 1 TABLET: 5; 325 TABLET ORAL at 23:58

## 2022-10-10 ASSESSMENT — PAIN SCALES - GENERAL
PAINLEVEL_OUTOF10: 6
PAINLEVEL_OUTOF10: 10

## 2022-10-10 ASSESSMENT — PAIN - FUNCTIONAL ASSESSMENT: PAIN_FUNCTIONAL_ASSESSMENT: 0-10

## 2022-10-10 NOTE — TELEPHONE ENCOUNTER
----- Message from FELI Cullen CNP sent at 10/3/2022 12:56 PM EDT -----  Regarding: BP log    Take blood pressure 1hr after medications, keep log.
BPs stable - continue meds same.
Called for bp's     145/68  138/61  137/64  145/71
[employer]
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
## 2022-10-11 PROBLEM — R79.89 ELEVATED TROPONIN: Status: ACTIVE | Noted: 2022-10-11

## 2022-10-11 PROBLEM — R77.8 ELEVATED TROPONIN: Status: ACTIVE | Noted: 2022-10-11

## 2022-10-11 PROBLEM — N17.9 AKI (ACUTE KIDNEY INJURY) (HCC): Status: ACTIVE | Noted: 2022-10-11

## 2022-10-11 PROBLEM — S80.01XA CONTUSION OF RIGHT KNEE: Status: ACTIVE | Noted: 2022-10-11

## 2022-10-11 PROBLEM — I50.32 CHRONIC DIASTOLIC HEART FAILURE (HCC): Status: ACTIVE | Noted: 2022-10-11

## 2022-10-11 LAB
ALBUMIN SERPL-MCNC: 3.7 G/DL (ref 3.5–5.1)
ALP BLD-CCNC: 88 U/L (ref 38–126)
ALT SERPL-CCNC: 19 U/L (ref 11–66)
ANION GAP SERPL CALCULATED.3IONS-SCNC: 14 MEQ/L (ref 8–16)
ANION GAP SERPL CALCULATED.3IONS-SCNC: 16 MEQ/L (ref 8–16)
AST SERPL-CCNC: 18 U/L (ref 5–40)
AVERAGE GLUCOSE: 147 MG/DL (ref 70–126)
BILIRUB SERPL-MCNC: 0.2 MG/DL (ref 0.3–1.2)
BILIRUBIN URINE: NEGATIVE
BLOOD, URINE: NEGATIVE
BUN BLDV-MCNC: 87 MG/DL (ref 7–22)
BUN BLDV-MCNC: 90 MG/DL (ref 7–22)
CALCIUM SERPL-MCNC: 9 MG/DL (ref 8.5–10.5)
CALCIUM SERPL-MCNC: 9.3 MG/DL (ref 8.5–10.5)
CHARACTER, URINE: CLEAR
CHLORIDE BLD-SCNC: 101 MEQ/L (ref 98–111)
CHLORIDE BLD-SCNC: 101 MEQ/L (ref 98–111)
CO2: 24 MEQ/L (ref 23–33)
CO2: 27 MEQ/L (ref 23–33)
COLOR: YELLOW
CREAT SERPL-MCNC: 2.3 MG/DL (ref 0.4–1.2)
CREAT SERPL-MCNC: 2.4 MG/DL (ref 0.4–1.2)
CREATININE URINE: 56.6 MG/DL
EKG ATRIAL RATE: 89 BPM
EKG P AXIS: 47 DEGREES
EKG P-R INTERVAL: 160 MS
EKG Q-T INTERVAL: 350 MS
EKG QRS DURATION: 74 MS
EKG QTC CALCULATION (BAZETT): 425 MS
EKG R AXIS: -12 DEGREES
EKG T AXIS: 52 DEGREES
EKG VENTRICULAR RATE: 89 BPM
GFR SERPL CREATININE-BSD FRML MDRD: 27 ML/MIN/1.73M2
GFR SERPL CREATININE-BSD FRML MDRD: 28 ML/MIN/1.73M2
GLUCOSE BLD-MCNC: 148 MG/DL (ref 70–108)
GLUCOSE BLD-MCNC: 168 MG/DL (ref 70–108)
GLUCOSE BLD-MCNC: 213 MG/DL (ref 70–108)
GLUCOSE BLD-MCNC: 241 MG/DL (ref 70–108)
GLUCOSE BLD-MCNC: 273 MG/DL (ref 70–108)
GLUCOSE BLD-MCNC: 298 MG/DL (ref 70–108)
GLUCOSE URINE: NEGATIVE MG/DL
HBA1C MFR BLD: 6.9 % (ref 4.4–6.4)
KETONES, URINE: NEGATIVE
LEUKOCYTE ESTERASE, URINE: NEGATIVE
NITRITE, URINE: NEGATIVE
OSMOLALITY URINE: 425 MOSMOL/KG (ref 250–750)
PH UA: 5 (ref 5–9)
POTASSIUM REFLEX MAGNESIUM: 4.6 MEQ/L (ref 3.5–5.2)
POTASSIUM SERPL-SCNC: 4.5 MEQ/L (ref 3.5–5.2)
PROTEIN UA: NEGATIVE
SODIUM BLD-SCNC: 141 MEQ/L (ref 135–145)
SODIUM BLD-SCNC: 142 MEQ/L (ref 135–145)
SODIUM URINE: 60 MEQ/L
SPECIFIC GRAVITY, URINE: 1.01 (ref 1–1.03)
TOTAL PROTEIN: 7.1 G/DL (ref 6.1–8)
TROPONIN T: 0.03 NG/ML
TROPONIN T: 0.04 NG/ML
TROPONIN T: 0.05 NG/ML
UREA NITROGEN, UR: 566 MG/DL
UROBILINOGEN, URINE: 0.2 EU/DL (ref 0–1)

## 2022-10-11 PROCEDURE — G0378 HOSPITAL OBSERVATION PER HR: HCPCS

## 2022-10-11 PROCEDURE — 36415 COLL VENOUS BLD VENIPUNCTURE: CPT

## 2022-10-11 PROCEDURE — 93010 ELECTROCARDIOGRAM REPORT: CPT | Performed by: INTERNAL MEDICINE

## 2022-10-11 PROCEDURE — 1200000003 HC TELEMETRY R&B

## 2022-10-11 PROCEDURE — 6370000000 HC RX 637 (ALT 250 FOR IP)

## 2022-10-11 PROCEDURE — 97110 THERAPEUTIC EXERCISES: CPT

## 2022-10-11 PROCEDURE — 84484 ASSAY OF TROPONIN QUANT: CPT

## 2022-10-11 PROCEDURE — 84300 ASSAY OF URINE SODIUM: CPT

## 2022-10-11 PROCEDURE — 82570 ASSAY OF URINE CREATININE: CPT

## 2022-10-11 PROCEDURE — 97535 SELF CARE MNGMENT TRAINING: CPT

## 2022-10-11 PROCEDURE — 83935 ASSAY OF URINE OSMOLALITY: CPT

## 2022-10-11 PROCEDURE — 84540 ASSAY OF URINE/UREA-N: CPT

## 2022-10-11 PROCEDURE — 81003 URINALYSIS AUTO W/O SCOPE: CPT

## 2022-10-11 PROCEDURE — 99222 1ST HOSP IP/OBS MODERATE 55: CPT

## 2022-10-11 PROCEDURE — 2580000003 HC RX 258

## 2022-10-11 PROCEDURE — 80048 BASIC METABOLIC PNL TOTAL CA: CPT

## 2022-10-11 PROCEDURE — 82948 REAGENT STRIP/BLOOD GLUCOSE: CPT

## 2022-10-11 PROCEDURE — 96360 HYDRATION IV INFUSION INIT: CPT

## 2022-10-11 PROCEDURE — 97166 OT EVAL MOD COMPLEX 45 MIN: CPT

## 2022-10-11 PROCEDURE — 97163 PT EVAL HIGH COMPLEX 45 MIN: CPT

## 2022-10-11 PROCEDURE — 96361 HYDRATE IV INFUSION ADD-ON: CPT

## 2022-10-11 PROCEDURE — 83036 HEMOGLOBIN GLYCOSYLATED A1C: CPT

## 2022-10-11 RX ORDER — FUROSEMIDE 40 MG/1
40 TABLET ORAL 2 TIMES DAILY
Status: DISCONTINUED | OUTPATIENT
Start: 2022-10-11 | End: 2022-10-13 | Stop reason: HOSPADM

## 2022-10-11 RX ORDER — ALLOPURINOL 100 MG/1
100 TABLET ORAL
Status: DISCONTINUED | OUTPATIENT
Start: 2022-10-11 | End: 2022-10-13 | Stop reason: HOSPADM

## 2022-10-11 RX ORDER — SODIUM CHLORIDE 0.9 % (FLUSH) 0.9 %
5-40 SYRINGE (ML) INJECTION EVERY 12 HOURS SCHEDULED
Status: DISCONTINUED | OUTPATIENT
Start: 2022-10-11 | End: 2022-10-13 | Stop reason: HOSPADM

## 2022-10-11 RX ORDER — INSULIN LISPRO 100 [IU]/ML
0-4 INJECTION, SOLUTION INTRAVENOUS; SUBCUTANEOUS NIGHTLY
Status: DISCONTINUED | OUTPATIENT
Start: 2022-10-11 | End: 2022-10-13 | Stop reason: HOSPADM

## 2022-10-11 RX ORDER — CARVEDILOL 25 MG/1
25 TABLET ORAL 2 TIMES DAILY
Status: DISCONTINUED | OUTPATIENT
Start: 2022-10-11 | End: 2022-10-13 | Stop reason: HOSPADM

## 2022-10-11 RX ORDER — ACETAMINOPHEN 650 MG/1
650 SUPPOSITORY RECTAL EVERY 6 HOURS PRN
Status: DISCONTINUED | OUTPATIENT
Start: 2022-10-11 | End: 2022-10-13 | Stop reason: HOSPADM

## 2022-10-11 RX ORDER — SODIUM CHLORIDE 0.9 % (FLUSH) 0.9 %
5-40 SYRINGE (ML) INJECTION PRN
Status: DISCONTINUED | OUTPATIENT
Start: 2022-10-11 | End: 2022-10-13 | Stop reason: HOSPADM

## 2022-10-11 RX ORDER — GABAPENTIN 300 MG/1
300 CAPSULE ORAL 2 TIMES DAILY
Status: DISCONTINUED | OUTPATIENT
Start: 2022-10-11 | End: 2022-10-13 | Stop reason: HOSPADM

## 2022-10-11 RX ORDER — ACETAMINOPHEN 325 MG/1
650 TABLET ORAL EVERY 6 HOURS PRN
Status: DISCONTINUED | OUTPATIENT
Start: 2022-10-11 | End: 2022-10-13 | Stop reason: HOSPADM

## 2022-10-11 RX ORDER — ONDANSETRON 4 MG/1
4 TABLET, ORALLY DISINTEGRATING ORAL EVERY 8 HOURS PRN
Status: DISCONTINUED | OUTPATIENT
Start: 2022-10-11 | End: 2022-10-13 | Stop reason: HOSPADM

## 2022-10-11 RX ORDER — SPIRONOLACTONE 25 MG/1
25 TABLET ORAL 2 TIMES DAILY
Status: DISCONTINUED | OUTPATIENT
Start: 2022-10-11 | End: 2022-10-13 | Stop reason: HOSPADM

## 2022-10-11 RX ORDER — SODIUM CHLORIDE 9 MG/ML
INJECTION, SOLUTION INTRAVENOUS PRN
Status: DISCONTINUED | OUTPATIENT
Start: 2022-10-11 | End: 2022-10-13 | Stop reason: HOSPADM

## 2022-10-11 RX ORDER — ASPIRIN 81 MG/1
81 TABLET ORAL DAILY
Status: DISCONTINUED | OUTPATIENT
Start: 2022-10-11 | End: 2022-10-13 | Stop reason: HOSPADM

## 2022-10-11 RX ORDER — 0.9 % SODIUM CHLORIDE 0.9 %
1000 INTRAVENOUS SOLUTION INTRAVENOUS ONCE
Status: DISCONTINUED | OUTPATIENT
Start: 2022-10-11 | End: 2022-10-11

## 2022-10-11 RX ORDER — INSULIN LISPRO 100 [IU]/ML
0-8 INJECTION, SOLUTION INTRAVENOUS; SUBCUTANEOUS
Status: DISCONTINUED | OUTPATIENT
Start: 2022-10-11 | End: 2022-10-13 | Stop reason: HOSPADM

## 2022-10-11 RX ORDER — ONDANSETRON 2 MG/ML
4 INJECTION INTRAMUSCULAR; INTRAVENOUS EVERY 6 HOURS PRN
Status: DISCONTINUED | OUTPATIENT
Start: 2022-10-11 | End: 2022-10-13 | Stop reason: HOSPADM

## 2022-10-11 RX ORDER — SULFASALAZINE 500 MG/1
500 TABLET ORAL 3 TIMES DAILY
Status: DISCONTINUED | OUTPATIENT
Start: 2022-10-11 | End: 2022-10-13 | Stop reason: HOSPADM

## 2022-10-11 RX ORDER — SODIUM CHLORIDE 9 MG/ML
INJECTION, SOLUTION INTRAVENOUS CONTINUOUS
Status: DISCONTINUED | OUTPATIENT
Start: 2022-10-11 | End: 2022-10-13 | Stop reason: HOSPADM

## 2022-10-11 RX ORDER — ATORVASTATIN CALCIUM 10 MG/1
10 TABLET, FILM COATED ORAL DAILY
Status: DISCONTINUED | OUTPATIENT
Start: 2022-10-11 | End: 2022-10-13 | Stop reason: HOSPADM

## 2022-10-11 RX ORDER — TRAMADOL HYDROCHLORIDE 50 MG/1
50 TABLET ORAL EVERY 6 HOURS PRN
Status: DISCONTINUED | OUTPATIENT
Start: 2022-10-11 | End: 2022-10-13 | Stop reason: HOSPADM

## 2022-10-11 RX ORDER — HYDROCODONE BITARTRATE AND ACETAMINOPHEN 5; 325 MG/1; MG/1
1 TABLET ORAL ONCE
Status: DISCONTINUED | OUTPATIENT
Start: 2022-10-11 | End: 2022-10-11

## 2022-10-11 RX ORDER — DEXTROSE MONOHYDRATE 100 MG/ML
INJECTION, SOLUTION INTRAVENOUS CONTINUOUS PRN
Status: DISCONTINUED | OUTPATIENT
Start: 2022-10-11 | End: 2022-10-13 | Stop reason: HOSPADM

## 2022-10-11 RX ORDER — AMLODIPINE BESYLATE 10 MG/1
10 TABLET ORAL DAILY
Status: DISCONTINUED | OUTPATIENT
Start: 2022-10-11 | End: 2022-10-13 | Stop reason: HOSPADM

## 2022-10-11 RX ORDER — DOXAZOSIN MESYLATE 4 MG/1
4 TABLET ORAL DAILY
Status: DISCONTINUED | OUTPATIENT
Start: 2022-10-11 | End: 2022-10-13 | Stop reason: HOSPADM

## 2022-10-11 RX ORDER — LOSARTAN POTASSIUM 100 MG/1
100 TABLET ORAL DAILY
Status: DISCONTINUED | OUTPATIENT
Start: 2022-10-11 | End: 2022-10-13 | Stop reason: HOSPADM

## 2022-10-11 RX ORDER — INSULIN GLARGINE 100 [IU]/ML
10 INJECTION, SOLUTION SUBCUTANEOUS NIGHTLY
Status: DISCONTINUED | OUTPATIENT
Start: 2022-10-11 | End: 2022-10-12

## 2022-10-11 RX ADMIN — ASPIRIN 81 MG: 81 TABLET, COATED ORAL at 09:26

## 2022-10-11 RX ADMIN — ATORVASTATIN CALCIUM 10 MG: 10 TABLET, FILM COATED ORAL at 09:26

## 2022-10-11 RX ADMIN — CARVEDILOL 25 MG: 25 TABLET, FILM COATED ORAL at 09:26

## 2022-10-11 RX ADMIN — GABAPENTIN 300 MG: 300 CAPSULE ORAL at 21:57

## 2022-10-11 RX ADMIN — INSULIN LISPRO 4 UNITS: 100 INJECTION, SOLUTION INTRAVENOUS; SUBCUTANEOUS at 11:29

## 2022-10-11 RX ADMIN — TRAMADOL HYDROCHLORIDE 50 MG: 50 TABLET, COATED ORAL at 04:03

## 2022-10-11 RX ADMIN — SODIUM CHLORIDE: 9 INJECTION, SOLUTION INTRAVENOUS at 03:54

## 2022-10-11 RX ADMIN — DOXAZOSIN 4 MG: 4 TABLET ORAL at 09:26

## 2022-10-11 RX ADMIN — TRAMADOL HYDROCHLORIDE 50 MG: 50 TABLET, COATED ORAL at 23:29

## 2022-10-11 RX ADMIN — AMLODIPINE BESYLATE 10 MG: 10 TABLET ORAL at 09:26

## 2022-10-11 RX ADMIN — TRAMADOL HYDROCHLORIDE 50 MG: 50 TABLET, COATED ORAL at 14:00

## 2022-10-11 RX ADMIN — INSULIN GLARGINE 10 UNITS: 100 INJECTION, SOLUTION SUBCUTANEOUS at 22:00

## 2022-10-11 RX ADMIN — SULFASALAZINE 500 MG: 500 TABLET ORAL at 09:26

## 2022-10-11 RX ADMIN — ALLOPURINOL 100 MG: 100 TABLET ORAL at 09:26

## 2022-10-11 RX ADMIN — SULFASALAZINE 500 MG: 500 TABLET ORAL at 21:57

## 2022-10-11 RX ADMIN — GABAPENTIN 300 MG: 300 CAPSULE ORAL at 09:25

## 2022-10-11 RX ADMIN — INSULIN LISPRO 4 UNITS: 100 INJECTION, SOLUTION INTRAVENOUS; SUBCUTANEOUS at 07:33

## 2022-10-11 RX ADMIN — CARVEDILOL 25 MG: 25 TABLET, FILM COATED ORAL at 21:57

## 2022-10-11 RX ADMIN — SULFASALAZINE 500 MG: 500 TABLET ORAL at 13:39

## 2022-10-11 ASSESSMENT — PAIN DESCRIPTION - LOCATION
LOCATION: LEG

## 2022-10-11 ASSESSMENT — PAIN DESCRIPTION - DIRECTION
RADIATING_TOWARDS: TO ANKLE
RADIATING_TOWARDS: TO ANKLE

## 2022-10-11 ASSESSMENT — PAIN DESCRIPTION - ONSET
ONSET: ON-GOING
ONSET: ON-GOING

## 2022-10-11 ASSESSMENT — ENCOUNTER SYMPTOMS
VOMITING: 0
BACK PAIN: 1
ABDOMINAL PAIN: 0
RHINORRHEA: 0
SHORTNESS OF BREATH: 0
EYE REDNESS: 0
COUGH: 0
DIARRHEA: 0
COLOR CHANGE: 1

## 2022-10-11 ASSESSMENT — PAIN DESCRIPTION - FREQUENCY
FREQUENCY: INTERMITTENT
FREQUENCY: INTERMITTENT

## 2022-10-11 ASSESSMENT — PAIN SCALES - GENERAL
PAINLEVEL_OUTOF10: 7
PAINLEVEL_OUTOF10: 0
PAINLEVEL_OUTOF10: 0
PAINLEVEL_OUTOF10: 8
PAINLEVEL_OUTOF10: 10
PAINLEVEL_OUTOF10: 0

## 2022-10-11 ASSESSMENT — PAIN DESCRIPTION - DESCRIPTORS
DESCRIPTORS: ACHING

## 2022-10-11 ASSESSMENT — PAIN DESCRIPTION - ORIENTATION
ORIENTATION: RIGHT

## 2022-10-11 ASSESSMENT — PAIN - FUNCTIONAL ASSESSMENT
PAIN_FUNCTIONAL_ASSESSMENT: PREVENTS OR INTERFERES SOME ACTIVE ACTIVITIES AND ADLS
PAIN_FUNCTIONAL_ASSESSMENT: PREVENTS OR INTERFERES SOME ACTIVE ACTIVITIES AND ADLS

## 2022-10-11 ASSESSMENT — PAIN DESCRIPTION - PAIN TYPE
TYPE: ACUTE PAIN
TYPE: ACUTE PAIN

## 2022-10-11 NOTE — PROGRESS NOTES
[address] [address]  INPATIENT PHYSICAL THERAPY  EVALUATION  [facility] ORTHOPEDICS 7K - 7K-05/005-A    Time In: 4274  Time Out: 1455  Timed Code Treatment Minutes: 8 Minutes  Minutes: 13          Date: 10/11/2022  Patient Name: Hammad Willoughby,  Gender:  male        MRN: [id number]  : 1955  (69 y.o.)      Referring Practitioner: Denisha Burciaga CNP  Diagnosis: HALEIGH  Additional Pertinent Hx: The patient is a 69 y.o. male who presents with complaints of right knee following a mechanical fall. Patient presents with his wife who reports that he has had at least 4 ground-level falls in the past 10 days. Patient reports he has had mechanical falls where he trips over rugs or loses his balance while attempting to use his walker on the carpet. He denies syncope, lightheadedness, vertigo, chest pain, shortness of breath prior to or since the fall. Patient primarily complaining of right knee pain. This was x-rayed in the ER and was negative for acute fracture. Patient with obvious swelling and ecchymosis of right knee. In the ER, patient found to have HALEIGH.      Restrictions/Precautions:  Restrictions/Precautions: Up as Tolerated, Fall Risk  Position Activity Restriction  Other position/activity restrictions: WBAT with surgical shoe for right hallux fx    Subjective:  Chart Reviewed: Yes  Patient assessed for rehabilitation services?: Yes  Subjective: pleasant and cooperative    General:        Hearing: Exceptions to [facility]       Pain: 5/10: right knee per pt    Vitals: Vitals not assessed per clinical judgement, see nursing flowsheet    Social/Functional History:    Lives With: Spouse  Type of Home: House  Home Layout: One level  Home Access: Stairs to enter with rails  Entrance Stairs - Number of Steps: 2 HELADIO  Entrance Stairs - Rails: Right  Home Equipment: Abram Crew, rolling, Lift chair     Bathroom Shower/Tub: Shower chair without back  Bathroom Toilet: Standard  Bathroom Equipment: Shower chair    Receives Help From: Family  ADL Assistance: Independent  Homemaking Assistance: Needs assistance  Homemaking Responsibilities: Yes  Ambulation Assistance: Independent (uses RW intermittently in the home and in community)  Transfer Assistance: Independent    Active : Yes  Mode of Transportation: Car  Occupation: Retired  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 pt completes his own laundry and is able to ambulate community distances typically without AD. Uses walker only \"when needed\"    OBJECTIVE:  Range of Motion:  Bilateral Lower Extremity: WFL    Strength:  Bilateral Lower Extremity: WFL    Balance:  Static Sitting Balance:  Supervision  Static Standing Balance: Stand By Assistance, with walker    Bed Mobility:  Not Tested    Transfers:  Sit to Stand: Contact Guard Assistance  Stand to Sit:Stand By Assistance    Ambulation:  Contact Guard Assistance, to SBA  Distance: 30'x1  Surface: Level Tile  Device:Rolling Walker  Gait Deviations: Forward Flexed Posture, Slow Tonya, Decreased Step Length Bilaterally, Wide Base of Support, and min unsteady, surgical shoe on RLE    Exercise:  Patient was guided in 1 set(s) 10 reps of exercise to both lower extremities. Glut sets, Seated marches, Seated heel/toe raises, Long arc quads, and Seated abduction/adduction. Exercises were completed for increased independence with functional mobility. Functional Outcome Measures: Completed  AM-PAC Inpatient Mobility Raw Score : 18  AM-PAC Inpatient T-Scale Score : 43.63    ASSESSMENT:  Activity Tolerance:  Patient tolerance of  treatment: good. Treatment Initiated: Treatment and education initiated within context of evaluation.   Evaluation time included review of current medical information, gathering information related to past medical, social and functional history, completion of standardized testing, formal and informal observation of tasks, assessment of data and development of plan of care and goals. Treatment time included skilled education and facilitation of tasks to increase safety and independence with functional mobility for improved independence and quality of life. Assessment: Body Structures, Functions, Activity Limitations Requiring Skilled Therapeutic Intervention: Decreased functional mobility , Decreased strength, Decreased endurance, Decreased tolerance to work activity, Decreased balance, Increased pain  Assessment: pt with pain in RLE, surgical shoe and WBAT, Adele Thorpe is a 69 y.o. male that presents with HALEIGH. he is ind prior to admission now requiring assist for basic mobility. Pt demonstrates a decrease in baseline by way of bed mobility, transfers and ambulation secondary to decreased activity tolerance, strength, fatigue, and balance deficits. Pt will benefit from skilled PT services throughout admission and beyond hospital discharge for improvements in functional mobility and in order to decrease fall risk and return pt to PLOF. Therapy Prognosis: Good    Requires PT Follow-Up: Yes    Discharge Recommendations:  Discharge Recommendations: Continue to assess pending progress, Outpatient PT    Patient Education:      .     Patient Education  Education Given To: Patient  Education Provided: Role of Therapy, Plan of Care, Home Exercise Program  Education Method: Verbal, Demonstration  Barriers to Learning: None  Education Outcome: Verbalized understanding, Demonstrated understanding       Equipment Recommendations:  Equipment Needed: No    Plan:  Specific Instructions for Next Treatment: therex and mobility  General Plan:  (5X GM)  Specific Instructions for Next Treatment: therex and mobility    Goals:  Patient Goals : go home  Short Term Goals  Time Frame for Short Term Goals: by discharge  Short Term Goal 1: bed mobility with MOD I to get in/out of bed  Short Term Goal 2: transfer with MOD I to ge tin/out of chairs  Short Term Goal 3: amb >75'x1 with surgical shoe RLE, walker, and MOD I to walk safely in home  Short Term Goal 4: negotiate 2 steps with HR and SBA to enter home safely  Long Term Goals  Time Frame for Long Term Goals : no LTGs set secondary to short ELOS    Following session, patient left in safe position with all fall risk precautions in place.

## 2022-10-11 NOTE — H&P
History & Physical    Patient:  Cyril Larios  YOB: 1955  Date of Service: 10/11/2022  MRN: [id number]   Acct:  [de-identified]   Primary Care Physician: Alphonzo Bernheim, APRN - CNP    Chief Complaint: Fall, right knee pain    Assessment and plan: HALEIGH on CKD II, likely prerenal secondary to decreased oral intake and diuretic use: Patient reports decreased oral intake, frequent GI losses secondary to Crohn's, continues to take spironolactone and Lasix. Was given 2 extra doses of Zaroxolyn last week due to lower extremity swelling. Creatinine on arrival 2.4. Baseline appears to be 1.5. We will start gentle IV fluids in light of patient's diastolic heart failure. Repeat BMP this morning. Will hold losartan, Lasix, spironolactone. Renally dose medications. Avoid nephrotoxic agents. Urine studies pending. Will check bladder scan for postvoid residual as possible source of HALEIGH. Frequent falls secondary to debility and generalized weakness: Patient with at least 4 falls in the last 10 days. Patient reports generalized weakness. States falls are secondary to losing his balance or tripping over rugs. X-ray of right knee negative for acute osseous abnormality. PT/OT consult for evaluation of need for rehab. Elevated troponin: Troponin 0.041 arrival.  Appears to be near baseline for patient with chronically elevated troponin. Denies chest pain. EKG showing sinus rhythm. We will repeat troponin. Telemetry monitoring. Chronic diastolic heart failure, compensated: Echocardiogram from July 2022 showing an EF of 55 to 60%. Grade 1 diastolic dysfunction. Patient does report some chronic shortness of breath with exertion but denies worsening of the symptoms. We will temporarily hold diuretics secondary to #1. Strict I&O and monitor daily weights. Continue beta-blocker. IDDMII: Glucose on arrival 148. Hemoglobin A1c 6.9. Will hold oral antidiabetic medications.   Sliding scale insulin, carb controlled diet, hypoglycemia treatment protocol ordered. We will continue Lantus at decreased dose as patient reports decreased oral intake and appetite. Monitor and increase to regular dosing based on patient glucose. Chronic conditions of hyperlipidemia, primary hypertension, neuropathy, Crohn's disease: Stable. Continue home medications. History of Present Illness:   History obtained from chart review and the patient. The patient is a 69 y.o. male who presents with complaints of right knee following a mechanical fall. Patient presents with his wife who reports that he has had at least 4 ground-level falls in the past 10 days. Patient reports he has had mechanical falls where he trips over rugs or loses his balance while attempting to use his walker on the carpet. He denies syncope, lightheadedness, vertigo, chest pain, shortness of breath prior to or since the fall. Patient primarily complaining of right knee pain. This was x-rayed in the ER and was negative for acute fracture. Patient with obvious swelling and ecchymosis of right knee. In the ER, patient found to have HALEIGH. On exam, patient denies chest pain or shortness of breath. No significant peripheral edema is noted.           Past Medical History:        Diagnosis Date    Arthritis     CAD (coronary artery disease) 04/05/2012    CHF (congestive heart failure) (HCC)     Diabetes mellitus (HCC)     Diverticulosis of colon     History of chest pain     History of tobacco abuse     Hyperlipidemia     Hypertension     Pneumonia        Past Surgical History:        Procedure Laterality Date    APPENDECTOMY      CARDIAC SURGERY      heart cath    COLECTOMY  2001    D/T DIVERTICULOSIS    COLONOSCOPY      DIAGNOSTIC CARDIAC CATH LAB PROCEDURE  09/17/2010    EF 60% C MILD DISEASE IN THE PROXIMAL  PORTION BEFORE THE BIFURCATION OF D1,MILD DISEASE IS NOTED IN THE RCA , DIFFUSE AT 10-20% RCA ALSO HAS 10-20% LESIONS    DILATATION, ESOPHAGUS      TRANSTHORACIC ECHOCARDIOGRAM  10/22/2010    EF 55-65% C MILD LFT ATRIAL DILATATION, GRADE 1 DIASOLIC DYSFUNCTION       Home Medications:   No current facility-administered medications on file prior to encounter. Current Outpatient Medications on File Prior to Encounter   Medication Sig Dispense Refill    neomycin-bacitracin-polymyxin (NEOSPORIN) 400-5-5000 ointment Apply topically 2 times daily. 1 each 0    lidocaine (LIDODERM) 5 % Place 1 patch onto the skin daily for 7 days 12 hours on, 12 hours off. 7 patch 0    losartan (COZAAR) 100 MG tablet TAKE 1 TABLET BY MOUTH ONCE DAILY      traMADol (ULTRAM) 50 MG tablet TAKE 1 TO 2 TABLETS BY MOUTH EVERY 6 TO 8 HOURS AS NEEDED FOR PAIN FOR 7 DAYS MAX 6 PER DAY.  MUST LAST 7 DAYS      Glucosamine-Chondroitin (GLUCOSAMINE CHONDROITIN COMPLX) 500-250 MG CAPS 1 tab(s) Orally bid      metOLazone (ZAROXOLYN) 2.5 MG tablet Take 1 tablet by mouth daily for 2 days 2 tablet 0    hydrALAZINE (APRESOLINE) 100 MG tablet TAKE 1 TABLET BY MOUTH THREE TIMES DAILY (Patient not taking: No sig reported) 90 tablet 3    furosemide (LASIX) 40 MG tablet Take 1 tablet by mouth 2 times daily 90 tablet 1    amLODIPine (NORVASC) 10 MG tablet Take 1 tablet by mouth daily 30 tablet 5    carvedilol (COREG) 25 MG tablet Take 1 tablet by mouth 2 times daily 60 tablet 5    simvastatin (ZOCOR) 20 MG tablet Take 1 tablet by mouth nightly 30 tablet 5    spironolactone (ALDACTONE) 25 MG tablet Take 1 tablet by mouth 2 times daily 60 tablet 5    zinc sulfate (ZINCATE) 220 (50 Zn) MG capsule Take 1 capsule by mouth daily 30 capsule 3    potassium chloride (KLOR-CON M) 20 MEQ extended release tablet Take 20 mEq by mouth daily      vitamin D 25 MCG (1000 UT) CAPS Take 3 capsules by mouth daily      allopurinol (ZYLOPRIM) 300 MG tablet Take 300 mg by mouth every morning (before breakfast)      terazosin (HYTRIN) 5 MG capsule Take 5 mg by mouth daily      acetaminophen (TYLENOL) 500 MG tablet Take 500 mg by mouth every 6 hours as needed for Pain      Misc Natural Products (GLUCOSAMINE CHOND COMPLEX/MSM PO) Take by mouth 2 times daily       ferrous sulfate 325 (65 Fe) MG tablet One tab every 48 hr.  Take with vitamn C 500 mg (Patient taking differently: One tab every other day . Take with vitamn C 500 mg) 30 tablet 3    vitamin C (VITAMIN C) 500 MG tablet Take 1 tablet by mouth every 48 hours (Patient taking differently: Take 500 mg by mouth every other day) 30 tablet 3    aspirin 81 MG EC tablet Take 81 mg by mouth daily      Insulin Detemir (LEVEMIR SC) Inject 20 Units into the skin nightly       metFORMIN (GLUCOPHAGE) 500 MG tablet Take 1,000 mg by mouth 2 times daily (with meals)      gabapentin (NEURONTIN) 300 MG capsule Take 300 mg by mouth 2 times daily. Janie Balling glimepiride (AMARYL) 2 MG tablet Take 2 mg by mouth 2 times daily          Allergies:  Lorazepam    Social History:    reports that he quit smoking about 9 years ago. His smoking use included cigarettes. He has a 37.00 pack-year smoking history. He has never used smokeless tobacco. He reports that he does not drink alcohol and does not use drugs. Family History:       Problem Relation Age of Onset    Heart Disease Mother     Hypertension Father     High Cholesterol Father     Diabetes Father        Review of systems:  Constitutional: no fever, no night sweats, no fatigue, decreased appetite and oral intake  Head: no headache, no head injury, no migranes. Eye: no blurring of vision, no double vision.   Ears: no hearing difficulty, no tinnitus  Mouth/throat: no ulceration, dental caries, dysphagia  Lungs: no cough, chronic shortness of breath at baseline, no wheeze  CVS: no palpitation, no chest pain  GI: no abdominal pain, no nausea , no vomiting, no constipation  VICKEY: no dysuria, frequency and urgency, no hematuria, no kidney stones  Musculoskeletal: Right knee pain, swelling, bruising  Endocrine: no polyuria, polydypsia, no cold or heat intolerence  Hematology: no anemia, no easy brusing or bleeding, no hx of clotting disorder  Dermatology: no skin rash, no eczema, no prurities,  Psychiatry: no depression, no anxiety,no panic attacks, no suicide ideation  Neurology: no syncope, no seizures, no numbness or tingling of hands, no numbness or tingling of feet, no paresis    10 point review of systems completed, all other than noted above are negative. Vitals:   Vitals:    10/11/22 0320   BP: (!) 145/79   Pulse: 90   Resp: 16   Temp: 97.9 °F (36.6 °C)   SpO2: 95%      BMI: Body mass index is 38.67 kg/m².                 Exam:  Physical Examination: General appearance - chronically ill appearing  Mental status - alert, oriented to person, place, and time  Neck - supple, no significant adenopathy, no JVD  Chest - clear to auscultation, no wheezes, rales or rhonchi, symmetric air entry  Heart - normal rate, regular rhythm, normal S1, S2, no murmurs, rubs, clicks or gallops  Abdomen - soft, nontender, nondistended, no masses or organomegaly  Neurological - alert, oriented, normal speech, no focal findings or movement disorder noted  Musculoskeletal -tenderness and swelling to right knee, difficulty ambulating  Extremities - peripheral pulses normal, no pedal edema, no clubbing or cyanosis  Skin - normal coloration and turgor, no rashes, no suspicious skin lesions noted      Review of Labs and Diagnostic Testing:    Recent Results (from the past 24 hour(s))   CBC with Auto Differential    Collection Time: 10/10/22 11:14 PM   Result Value Ref Range    WBC 10.4 4.8 - 10.8 thou/mm3    RBC 3.48 (L) 4.70 - 6.10 mill/mm3    Hemoglobin 10.9 (L) 14.0 - 18.0 gm/dl    Hematocrit 32.8 (L) 42.0 - 52.0 %    MCV 94.3 (H) 80.0 - 94.0 fL    MCH 31.3 26.0 - 33.0 pg    MCHC 33.2 32.2 - 35.5 gm/dl    RDW-CV 13.5 11.5 - 14.5 %    RDW-SD 46.2 (H) 35.0 - 45.0 fL    Platelets 038 977 - 023 thou/mm3    MPV 10.7 9.4 - 12.4 fL    Seg Neutrophils 75.0 %    Lymphocytes 12.6 % Monocytes 9.9 %    Eosinophils 1.3 %    Basophils 0.2 %    Immature Granulocytes 1.0 %    Segs Absolute 7.8 (H) 1.8 - 7.7 thou/mm3    Lymphocytes Absolute 1.3 1.0 - 4.8 thou/mm3    Monocytes Absolute 1.0 0.4 - 1.3 thou/mm3    Eosinophils Absolute 0.1 0.0 - 0.4 thou/mm3    Basophils Absolute 0.0 0.0 - 0.1 thou/mm3    Immature Grans (Abs) 0.10 (H) 0.00 - 0.07 thou/mm3    nRBC 0 /100 wbc   Comprehensive Metabolic Panel w/ Reflex to MG    Collection Time: 10/10/22 11:14 PM   Result Value Ref Range    Glucose 148 (H) 70 - 108 mg/dL    Creatinine 2.4 (H) 0.4 - 1.2 mg/dL    BUN 90 (H) 7 - 22 mg/dL    Sodium 141 135 - 145 meq/L    Potassium reflex Magnesium 4.6 3.5 - 5.2 meq/L    Chloride 101 98 - 111 meq/L    CO2 24 23 - 33 meq/L    Calcium 9.3 8.5 - 10.5 mg/dL    AST 18 5 - 40 U/L    Alkaline Phosphatase 88 38 - 126 U/L    Total Protein 7.1 6.1 - 8.0 g/dL    Albumin 3.7 3.5 - 5.1 g/dL    Total Bilirubin 0.2 (L) 0.3 - 1.2 mg/dL    ALT 19 11 - 66 U/L   Troponin    Collection Time: 10/10/22 11:14 PM   Result Value Ref Range    Troponin T 0.041 (A) ng/ml   Anion Gap    Collection Time: 10/10/22 11:14 PM   Result Value Ref Range    Anion Gap 16.0 8.0 - 16.0 meq/L   Glomerular Filtration Rate, Estimated    Collection Time: 10/10/22 11:14 PM   Result Value Ref Range    Est, Glom Filt Rate 27 (A) ml/min/1.73m2   EKG 12 Lead    Collection Time: 10/10/22 11:45 PM   Result Value Ref Range    Ventricular Rate 89 BPM    Atrial Rate 89 BPM    P-R Interval 160 ms    QRS Duration 74 ms    Q-T Interval 350 ms    QTc Calculation (Bazett) 425 ms    P Axis 47 degrees    R Axis -12 degrees    T Axis 52 degrees   Urinalysis with Reflex to Culture    Collection Time: 10/11/22  1:45 AM    Specimen: Urine   Result Value Ref Range    Glucose, Ur NEGATIVE NEGATIVE mg/dl    Bilirubin Urine NEGATIVE NEGATIVE    Ketones, Urine NEGATIVE NEGATIVE    Specific Gravity, Urine 1.009 1.002 - 1.030    Blood, Urine NEGATIVE NEGATIVE    pH, UA 5.0 5.0 - 9.0 Protein, UA NEGATIVE NEGATIVE    Urobilinogen, Urine 0.2 0.0 - 1.0 eu/dl    Nitrite, Urine NEGATIVE NEGATIVE    Leukocyte Esterase, Urine NEGATIVE NEGATIVE    Color, UA YELLOW STRAW-YELLOW    Character, Urine CLEAR CLEAR-SL CLOUD   Troponin    Collection Time: 10/11/22  3:32 AM   Result Value Ref Range    Troponin T 0.032 (A) ng/ml   Hemoglobin A1C    Collection Time: 10/11/22  3:32 AM   Result Value Ref Range    Hemoglobin A1C 6.9 (H) 4.4 - 6.4 %    AVERAGE GLUCOSE 147 (H) 70 - 126 mg/dL   Creatinine, urine, random - (Necessary for FENa calculation)    Collection Time: 10/11/22  4:09 AM   Result Value Ref Range    Creatinine, Urine 56.6 mg/dl   Sodium, urine, random - (Necessary for FENa calculation)    Collection Time: 10/11/22  4:09 AM   Result Value Ref Range    Sodium, Ur 60 meq/l   Urea nitrogen, urine    Collection Time: 10/11/22  4:09 AM   Result Value Ref Range    Urea Nitrogen, Ur 566 mg/dl   Osmolality, urine    Collection Time: 10/11/22  4:09 AM   Result Value Ref Range    Osmolality, Ur 425 250 - 750 mosmol/kg   POCT Glucose    Collection Time: 10/11/22  6:09 AM   Result Value Ref Range    POC Glucose 273 (H) 70 - 108 mg/dl       Radiology:     XR KNEE RIGHT (MIN 4 VIEWS)    Result Date: 10/11/2022  Right knee x-ray: 4 views. Indication: Fall. Ecchymosis. Comparison: CR/SR - XR KNEE RIGHT (MIN 4 VIEWS) - 10/06/2022 05:04 PM EDT Findings: Bony demineralization. Moderate suprapatellar joint effusion, similar to prior study. Prepatellar soft tissue swelling, new since the prior study. Tricompartmental osteoarthritis, most severe in the lateral compartment. No acute fracture or dislocation. Normal alignment of the patella. No subcutaneous emphysema or radiopaque foreign body in the soft tissue. Peripheral arterial vascular calcifications. Impression: 1. Negative for acute osseous abnormality. Prepatellar soft tissue swelling. Stable moderate joint effusion. 2. Severe tricompartmental osteoarthritis.  3. Peripheral arterial vascular disease.  This document has been electronically signed by: Jaspreet Barrios MD on 10/11/2022 12:01 AM        EKG: NSR    DVT prophylaxis: [] Lovenox                                 [x] SCDs                                 [] SQ Heparin                                 [] Encourage ambulation, low risk for DVT, no chemical or mechanical prophylaxis necessary              [] Already on Anticoagulation                Anticipated Disposition upon discharge: [x] Home                                                                         [] Home with Home Health                                                                         [] [facility]                                                                         [] [address] [place], [place] [address]          Electronically signed by FELI Oneill CNP on 10/11/2022 at 6:30 AM

## 2022-10-11 NOTE — ED PROVIDER NOTES
[place] ENCOUNTER          Pt Name: Jamshid Gomez  MRN: [id number]  [place] 1955  Date of evaluation: 10/10/2022  Treating Resident Physician: Nathan Frias MD  Supervising Physician: Pattie Freitas MD    History obtained from spouse, chart review, and the patient. CHIEF COMPLAINT       Chief Complaint   Patient presents with    Knee Injury    Fall     mechanical           HISTORY OF PRESENT ILLNESS    HPI  Jamshid Gomez is a 69 y.o. male who presents to the emergency department for evaluation of a ground-level fall with pain to right knee and facial laceration. Patient has had 4 ground-level falls in the past 10 days at home. Patient states the falls are mechanical where he is tripping. He denies syncope, loss of consciousness, lightheadedness, vertigo. Patient states he was walking in his house and his orthopedic boot tripped on carpet causing ground-level fall. Landed on carpet striking his right knee and right side of his face against the door frame. Patient suffered a small laceration of the skin anterior to his ear. Patient endorses right-sided head pain and right-sided knee pain. Patient was able to ambulate with use of walker with difficulty. Patient states that he has [facility] up [facility] [place] [facility] his previous injuries including fracture of first digit right foot. Patient states he is currently in 6 out of 10 pain worse in his knee. The patient has no other acute complaints at this time. REVIEW OF SYSTEMS   Review of Systems   Constitutional:  Negative for activity change, chills and fever. HENT:  Negative for congestion and rhinorrhea. Eyes:  Negative for redness. Respiratory:  Negative for cough and shortness of breath. Cardiovascular:  Negative for chest pain and leg swelling. Gastrointestinal:  Negative for abdominal pain, diarrhea and vomiting.    Genitourinary:  Negative for dysuria and hematuria. Musculoskeletal:  Positive for back pain and joint swelling (Right knee status post fall). Negative for neck pain and neck stiffness. Skin:  Positive for color change (Ecchymosis over medial aspect right knee) and wound (Laceration anterior of right ear). Negative for rash. Neurological:  Negative for weakness, numbness and headaches. Psychiatric/Behavioral:  Negative for agitation, behavioral problems and confusion. PAST MEDICAL AND SURGICAL HISTORY     Past Medical History:   Diagnosis Date    Arthritis     CAD (coronary artery disease) 04/05/2012    CHF (congestive heart failure) (HCC)     Diabetes mellitus (HCC)     Diverticulosis of colon     History of chest pain     History of tobacco abuse     Hyperlipidemia     Hypertension     Pneumonia      Past Surgical History:   Procedure Laterality Date    APPENDECTOMY      CARDIAC SURGERY      heart cath    COLECTOMY  2001    D/T DIVERTICULOSIS    COLONOSCOPY      DIAGNOSTIC CARDIAC CATH LAB PROCEDURE  09/17/2010    EF 60% C MILD DISEASE IN THE PROXIMAL  PORTION BEFORE THE BIFURCATION OF D1,MILD DISEASE IS NOTED IN THE RCA , DIFFUSE AT 10-20% RCA ALSO HAS 10-20% LESIONS    DILATATION, ESOPHAGUS      TRANSTHORACIC ECHOCARDIOGRAM  10/22/2010    EF 55-65% C MILD LFT ATRIAL DILATATION, GRADE 1 DIASOLIC DYSFUNCTION         MEDICATIONS     Current Facility-Administered Medications:     HYDROcodone-acetaminophen (NORCO) 5-325 MG per tablet 1 tablet, 1 tablet, Oral, Once, Sophia Lynn MD    0.9 % sodium chloride infusion, , IntraVENous, Continuous, FELI Correa - JOSE DANIEL    Current Outpatient Medications:     neomycin-bacitracin-polymyxin (NEOSPORIN) 400-5-5000 ointment, Apply topically 2 times daily. , Disp: 1 each, Rfl: 0    lidocaine (LIDODERM) 5 %, Place 1 patch onto the skin daily for 7 days 12 hours on, 12 hours off., Disp: 7 patch, Rfl: 0    losartan (COZAAR) 100 MG tablet, TAKE 1 TABLET BY MOUTH ONCE DAILY, Disp: , Rfl: magnesium oxide (MAG-OX) 400 (240 Mg) MG tablet, TAKE 1 TABLET BY MOUTH ONCE DAILY WITH FOOD, Disp: , Rfl:     traMADol (ULTRAM) 50 MG tablet, TAKE 1 TO 2 TABLETS BY MOUTH EVERY 6 TO 8 HOURS AS NEEDED FOR PAIN FOR 7 DAYS MAX 6 PER DAY.  MUST LAST 7 DAYS, Disp: , Rfl:     Glucosamine-Chondroitin (GLUCOSAMINE CHONDROITIN COMPLX) 500-250 MG CAPS, 1 tab(s) Orally bid, Disp: , Rfl:     loratadine (CLARITIN) 10 MG tablet, daily as needed, Disp: , Rfl:     metOLazone (ZAROXOLYN) 2.5 MG tablet, Take 1 tablet by mouth daily for 2 days, Disp: 2 tablet, Rfl: 0    hydrALAZINE (APRESOLINE) 100 MG tablet, TAKE 1 TABLET BY MOUTH THREE TIMES DAILY (Patient not taking: Reported on 10/3/2022), Disp: 90 tablet, Rfl: 3    furosemide (LASIX) 40 MG tablet, Take 1 tablet by mouth 2 times daily, Disp: 90 tablet, Rfl: 1    amLODIPine (NORVASC) 10 MG tablet, Take 1 tablet by mouth daily, Disp: 30 tablet, Rfl: 5    carvedilol (COREG) 25 MG tablet, Take 1 tablet by mouth 2 times daily, Disp: 60 tablet, Rfl: 5    simvastatin (ZOCOR) 20 MG tablet, Take 1 tablet by mouth nightly, Disp: 30 tablet, Rfl: 5    spironolactone (ALDACTONE) 25 MG tablet, Take 1 tablet by mouth 2 times daily, Disp: 60 tablet, Rfl: 5    zinc sulfate (ZINCATE) 220 (50 Zn) MG capsule, Take 1 capsule by mouth daily, Disp: 30 capsule, Rfl: 3    potassium chloride (KLOR-CON M) 20 MEQ extended release tablet, Take 20 mEq by mouth daily, Disp: , Rfl:     vitamin D 25 MCG (1000 UT) CAPS, Take 3 capsules by mouth daily, Disp: , Rfl:     allopurinol (ZYLOPRIM) 300 MG tablet, Take 300 mg by mouth every morning (before breakfast), Disp: , Rfl:     terazosin (HYTRIN) 5 MG capsule, Take 5 mg by mouth daily, Disp: , Rfl:     acetaminophen (TYLENOL) 500 MG tablet, Take 500 mg by mouth every 6 hours as needed for Pain, Disp: , Rfl:     Misc Natural Products (GLUCOSAMINE CHOND COMPLEX/MSM PO), Take by mouth 2 times daily , Disp: , Rfl:     sulfaSALAzine (AZULFIDINE) 500 MG tablet, Take 500 mg by mouth 3 times daily, Disp: , Rfl:     ferrous sulfate 325 (65 Fe) MG tablet, One tab every 48 hr.  Take with vitamn C 500 mg (Patient taking differently: One tab every other day . Take with vitamn C 500 mg), Disp: 30 tablet, Rfl: 3    vitamin C (VITAMIN C) 500 MG tablet, Take 1 tablet by mouth every 48 hours (Patient taking differently: Take 500 mg by mouth every other day), Disp: 30 tablet, Rfl: 3    aspirin 81 MG EC tablet, Take 81 mg by mouth daily, Disp: , Rfl:     Insulin Detemir (LEVEMIR SC), Inject 20 Units into the skin nightly , Disp: , Rfl:     metFORMIN (GLUCOPHAGE) 500 MG tablet, Take 1,000 mg by mouth 2 times daily (with meals), Disp: , Rfl:     gabapentin (NEURONTIN) 300 MG capsule, Take 300 mg by mouth 2 times daily. ., Disp: , Rfl:     glimepiride (AMARYL) 2 MG tablet, Take 2 mg by mouth 2 times daily , Disp: , Rfl:       SOCIAL HISTORY     Social History     Social History Narrative    Not on file     Social History     Tobacco Use    Smoking status: Former     Packs/day: 1.00     Years: 37.00     Pack years: 37.00     Types: Cigarettes     Quit date: 2013     Years since quittin.7    Smokeless tobacco: Never   Vaping Use    Vaping Use: Never used   Substance Use Topics    Alcohol use: No    Drug use: No         ALLERGIES     Allergies   Allergen Reactions    Lorazepam Other (See Comments)     Light headed, dizzy, blurred vision         FAMILY HISTORY     Family History   Problem Relation Age of Onset    Heart Disease Mother     Hypertension Father     High Cholesterol Father     Diabetes Father          PREVIOUS RECORDS   Previous records reviewed: Patient last seen emergency department 10/8/2022 for ground-level fall and found to have closed nondisplaced fracture of proximal phalanx of right great toe and abrasion.         PHYSICAL EXAM     ED Triage Vitals [10/10/22 2229]   BP Temp Temp Source Heart Rate Resp SpO2 Height Weight   121/67 98 °F (36.7 °C) Oral 93 17 98 % 5' 3\" (1.6 m) 220 lb (99.8 kg)     Initial vital signs and nursing assessment reviewed and normal. Body mass index is 38.97 kg/m². Pulsoximetry is normal per my interpretation. Additional Vital Signs:  Vitals:    10/10/22 2229   BP: 121/67   Pulse: 93   Resp: 17   Temp: 98 °F (36.7 °C)   SpO2: 98%       Physical Exam  Vitals and nursing note reviewed. Exam conducted with a chaperone present. Constitutional:       General: He is not in acute distress. Appearance: He is obese. He is not ill-appearing, toxic-appearing or diaphoretic. HENT:      Head: Normocephalic. Abrasion (Healing abrasion to patient's forehead from previous fall) and laceration present. No raccoon eyes, Marcus's sign, contusion or masses. Jaw: No trismus, tenderness, swelling, pain on movement or malocclusion. Comments: 1.5 cm shallow laceration with depth of 2 mm anterior to patient's right ear     Right Ear: External ear normal.      Left Ear: External ear normal.      Nose: Nose normal. No congestion or rhinorrhea. Mouth/Throat:      Mouth: Mucous membranes are moist.      Pharynx: Oropharynx is clear. Eyes:      General: No scleral icterus. Conjunctiva/sclera: Conjunctivae normal.   Cardiovascular:      Rate and Rhythm: Normal rate and regular rhythm. Pulses: Normal pulses. Heart sounds: Normal heart sounds. No murmur heard. Pulmonary:      Effort: Pulmonary effort is normal. No respiratory distress. Breath sounds: Normal breath sounds. No wheezing. Abdominal:      General: Abdomen is flat. There is no distension. Palpations: Abdomen is soft. Tenderness: There is no abdominal tenderness. Musculoskeletal:         General: Swelling, tenderness and signs of injury (Patient had ecchymosis with tenderness to palpation of medial aspect right knee with swelling) present. Normal range of motion. Cervical back: Normal range of motion and neck supple. No rigidity. Right lower leg: No edema. Left lower leg: No edema. Lymphadenopathy:      Cervical: No cervical adenopathy. Skin:     General: Skin is warm and dry. Capillary Refill: Capillary refill takes less than 2 seconds. Coloration: Skin is not jaundiced. Neurological:      General: No focal deficit present. Mental Status: He is alert and oriented to person, place, and time. Psychiatric:         Mood and Affect: Mood normal.           MEDICAL DECISION MAKING   Initial Assessment:   Patient is 59-year-old male presented to emergency department for ground-level fall. Patient has had 4 previous mechanical falls within the last 10 days. Patient states he tripped and fell today while ambulating in his house striking his right knee hitting his right side of his head against a door frame. Patient denies losing consciousness or any preceding events prior to the fall. Differential diagnosis includes but not limited to: ACS, electrolyte abnormality, right knee fracture, urinary tract infection,    Plan:   CBC, CMP, UA, troponin, EKG, x-ray right knee  CBC shows chronic anemia 10.9 unchanged over past year. Troponin elevated within patient's normal from last year, decision to perform delta troponin to assess for change. Patient is asymptomatic with no chest pain at this time. Pain control with Norco x2  Laceration repair with Dermabond. Patient tolerated well  CMP shows new HALEIGH creatinine of 2.4 up from 1.4 a month ago decision to admit patient for further evaluation and treatment of HALEIGH. Discussed findings with patient he was agreeable to plan for admission.         ED RESULTS   Laboratory results:  Labs Reviewed   CBC WITH AUTO DIFFERENTIAL - Abnormal; Notable for the following components:       Result Value    RBC 3.48 (*)     Hemoglobin 10.9 (*)     Hematocrit 32.8 (*)     MCV 94.3 (*)     RDW-SD 46.2 (*)     Segs Absolute 7.8 (*)     Immature Grans (Abs) 0.10 (*)     All other components within normal limits   COMPREHENSIVE METABOLIC PANEL W/ REFLEX TO MG FOR LOW K - Abnormal; Notable for the following components:    Glucose 148 (*)     Creatinine 2.4 (*)     BUN 90 (*)     Total Bilirubin 0.2 (*)     All other components within normal limits   TROPONIN - Abnormal; Notable for the following components:    Troponin T 0.041 (*)     All other components within normal limits   GLOMERULAR FILTRATION RATE, ESTIMATED - Abnormal; Notable for the following components:    Est, Glom Filt Rate 27 (*)     All other components within normal limits   URINALYSIS WITH REFLEX TO CULTURE   ANION GAP   TROPONIN       Radiologic studies results:  XR KNEE RIGHT (MIN 4 VIEWS)   Final Result   Impression:   1. Negative for acute osseous abnormality. Prepatellar soft tissue    swelling. Stable moderate joint effusion. 2. Severe tricompartmental osteoarthritis. 3. Peripheral arterial vascular disease. This document has been electronically signed by: Bobbi Koroma MD on    10/11/2022 12:01 AM          ED Medications administered this visit:   Medications   HYDROcodone-acetaminophen (NORCO) 5-325 MG per tablet 1 tablet (has no administration in time range)   0.9 % sodium chloride infusion (has no administration in time range)   HYDROcodone-acetaminophen (NORCO) 5-325 MG per tablet 1 tablet (1 tablet Oral Given 10/10/22 0812)         ED COURSE        Strict return precautions and follow up instructions were discussed with the patient prior to discharge, with which the patient agrees. MEDICATION CHANGES     New Prescriptions    No medications on file         FINAL DISPOSITION     Final diagnoses:   Fall, initial encounter   Facial laceration, initial encounter   Contusion of right knee, initial encounter   HALEIGH (acute kidney injury) ([facility] [address]   Anemia, unspecified type     Condition: condition: stable  Dispo: Admit to hospitalist.      This transcription was electronically signed.  Parts of this transcriptions may have been dictated by use of voice recognition software and electronically transcribed, and parts may have been transcribed with the assistance of an ED scribe. The transcription may contain errors not detected in proofreading. Please refer to my supervising physician's documentation if my documentation differs. Electronically Signed:  Destiney Gurrola MD, 10/11/22, 3:11 AM         Destiney Gurrola MD  Resident  10/11/22 3725

## 2022-10-11 NOTE — CARE COORDINATION
Case Management Assessment  Initial Evaluation    Date/Time of Evaluation: 10/11/2022 10:59 AM  Assessment Completed by: Tony Liu RN    If patient is discharged prior to next notation, then this note serves as note for discharge by case management. Patient Name: Kam Mccain                   YOB: 1955  Diagnosis: HALEIGH (acute kidney injury) ([facility] [address] [N17.9]  Contusion of right knee, initial encounter [S80.01XA]  Facial laceration, initial encounter Ivis Patton  Fall, initial encounter [W19. XXXA]  Anemia, unspecified type [D64.9]                   Date / Time: 10/10/2022 10:19 PM    Patient Admission Status: Inpatient     Current PCP: FELI Allen CNP  PCP verified by CM? Yes    Chart Reviewed: Yes      Patient Orientation: Alert and Oriented    Patient Cognition: Alert  History Provided by: Patient    Hospitalization in the last 30 days (Readmission):  No    If yes, Readmission Assessment in CM Navigator will be completed. Advance Directives:     Code Status: Full Code     Primary Decision Maker: Marcelo Gautam, [address] [facility] [phone]    Discharge Planning  Patient lives with: Spouse/Significant Other Type of Home: House   Primary Caregiver: Self  Patient Support Systems include: Spouse/Significant Other, Children, Family Members, Friends/Neighbors   Current services prior to admission: None  Type of Home Care services:  None    ADLS  Prior functional level: Independent in ADLs/IADLs  Current functional level: Assistance with the following:, Mobility      Family can provide assistance at DC: Yes  Would you like Case Management to discuss the discharge plan with any other family members/significant others, and if so, who? No  Plans to Return to Present Housing: Yes  Other Identified Issues/Barriers to RETURNING to current housing: na  Potential Assistance needed at discharge:  Outpatient PT/OT  Patient expects to discharge to: House  Plan for transportation at discharge: Financial  Payor: Mack Barber / Plan: Misty Gaston ESSENTIAL/PLUS / Product Type: *No Product type* /          Potential assistance Purchasing Medications: No  Meds-to-Beds request: Yes      [address]  [address] [place]  [place] [address]  Phone: [phone] Fax: [phone]      Factors facilitating achievement of predicted outcomes: Family support, Cooperative, Pleasant, and Good insight into deficits  Right knee xray:  Impression:   1. Negative for acute osseous abnormality. Prepatellar soft tissue   swelling. Stable moderate joint effusion. 2. Severe tricompartmental osteoarthritis. 3. Peripheral arterial vascular disease. Barriers to discharge: Decreased endurance and Impaired vision    Additional Case Management Notes: To ED for eval ground-level fall with pain to right knee and facial laceration. Patient has had 4 ground-level falls in the past 10 days at home. Patient states the falls are mechanical where he is tripping    Met with Nichole Monroe; he resides home with spouse Tonya Song. He plans home and is interested in OP PT at [facility]. See assessment for specifics. The Plan for Transition of Care is related to the following treatment goals of HALEIGH (acute kidney injury) ([facility] [address] [N17.9]  Contusion of right knee, initial encounter [S80.01XA]  Facial laceration, initial encounter Yared Franny  Fall, initial encounter [W19. XXXA]  Anemia, unspecified type [D64.9]         The Patient and/or Patient Representative Agree with the Discharge Plan?       Richard Jaffe RN  Case Management Department

## 2022-10-11 NOTE — PROGRESS NOTES
Patient admitted to [address] in a wheelchair and from ED  No complaints upon arrival to the room. IV site free of s/s of infection or infiltration. Vital signs obtained. Assessment and data collection initiated. Oriented to room. Policies and procedures for 7K explained All questions answered with no further questions at this time. Fall prevention and safety brochure discussed with patient. 2 person skin check completed with Javier Sinclair. Patient declines PCP notification. Patient declines family notification.

## 2022-10-11 NOTE — PROGRESS NOTES
[facility] [address]  INPATIENT OCCUPATIONAL THERAPY  [facility] ORTHOPEDICS 7K  EVALUATION    Time:   Time In: 813  Time Out: 9783  Timed Code Treatment Minutes: 30 Minutes  Minutes: 42          Date: 10/11/2022  Patient Name: Malik Adhikari,   Gender: male      MRN: [id number]  : 1955  (69 y.o.)  Referring Practitioner: FELI Marks CNP  Diagnosis: HALEIGH  Additional Pertinent Hx: The patient is a 69 y.o. male who presents with complaints of right knee following a mechanical fall. Patient presents with his wife who reports that he has had at least 4 ground-level falls in the past 10 days. Patient reports he has had mechanical falls where he trips over rugs or loses his balance while attempting to use his walker on the carpet. He denies syncope, lightheadedness, vertigo, chest pain, shortness of breath prior to or since the fall. Patient primarily complaining of right knee pain. This was x-rayed in the ER and was negative for acute fracture. Patient with obvious swelling and ecchymosis of right knee. In the ER, patient found to have HALEIGH. Restrictions/Precautions:  Restrictions/Precautions: Up as Tolerated, Fall Risk  Position Activity Restriction  Other position/activity restrictions: WBAT with surgical shoe for right hallux fx    Subjective: Nursing approved OT evaluation. Pt in recliner upon OT arrival reporting that he slept in the recliner last night because he could not fall asleep in the bed.  Pleasant and agreeable to Ot eval this AM.          Pain: /10: right knee, mild pain in right foot with weight bearing    Vitals: Vitals not assessed per clinical judgement, see nursing flowsheet    Social/Functional History:  Lives With: Spouse  Type of Home: House  Home Layout: One level  Home Access: Stairs to enter with rails  Entrance Stairs - Number of Steps: 2 HELADIO  Entrance Stairs - Rails: Right  Home Equipment: Walker, rolling, Lift chair   Bathroom Shower/Tub: Walk-in shower  Bathroom Equipment: Shower chair       ADL Assistance: Independent  Homemaking Assistance: Needs assistance  Homemaking Responsibilities: Yes  Ambulation Assistance: Independent (uses RW intermittently in the home and in community)  Transfer Assistance: Independent    Active : Yes  Occupation: Retired  Additional Comments: wife is retired and performs all cooking and cleaning. pt completes his own laundry and is able to ambulate community distances typically without AD. Uses walker only \"when needed\"    VISION:Corrected    HEARING:  WFL    COGNITION: Slow Processing, Decreased Problem Solving, and Decreased Safety Awareness    RANGE OF MOTION:  Right Upper Extremity: WFL  Left Upper Extremity:  WFL    STRENGTH:  Right Upper Extremity: Impaired - grossly deconditioned  Left Upper Extremity:  Impaired - grossly deconditioned L>R    SENSATION:   WFL    ADL:   Lower Extremity Dressing: Minimal Assistance. To tighten surgical shoe velcro  Toileting: Contact Guard Assistance. With RW and minimal cues for safety when turning  Toilet Transfer: [address] From standard toilet . BALANCE:  Standing Balance: Stand By Assistance, Contact Guard Assistance, X 1. SBA for static balance and RW, CGA for dynamic balance and single UE release    BED MOBILITY:  Not Tested    TRANSFERS:  Sit to Stand:  Stand By Assistance. From recliner    FUNCTIONAL MOBILITY:  Assistive Device: Rolling Walker  Assist Level:  Contact Guard Assistance and X 1. Distance: To and from bathroom       Exercise:  Pt. completed BUE strengthening exercises x10 reps x 1 set/s using mod resistance band in all joints/planes with visual hand out provided. Pt requires min A for visual technique from therapist.    Activity Tolerance:  Patient tolerance of  treatment: good. Assessment:  Assessment: Pt presents with above deficits s/p multiple falls at home.  Pt with recent right hallux fx with surgival shoe and WBAT with a day  Current Treatment Recommendations: Strengthening, ROM, Self-Care / ADL, Neuromuscular re-education, Endurance training, Functional mobility training, Balance training. See long-term goal time frame for expected duration of plan of care. If no long-term goals established, a short length of stay is anticipated. Goals:  Patient goals : to return home with wife  Short Term Goals  Time Frame for Short Term Goals: until discharge  Short Term Goal 1: Pt will safely navigate to/from bathroom with S utilizing RW with 0-1 cues for safety with maneuvering in small spaces and walker management skills. Short Term Goal 2: Pt will demonstrate independence with UB HEP to improve strength required for ADLs. Short Term Goal 3: Pt will perform UB/LB dressing/bathing with S.  Short Term Goal 4: Pt will tolerate x8-10 minute standing with single UE release at S in order to improve independence with item retrieval and preparation for I/ADLs. Following session, patient left in safe position with all fall risk precautions in place.

## 2022-10-11 NOTE — PLAN OF CARE
Problem: Discharge Planning  Goal: Discharge to home or other facility with appropriate resources  Outcome: Progressing  Flowsheets (Taken 10/11/2022 9203)  Discharge to home or other facility with appropriate resources:   Identify barriers to discharge with patient and caregiver   Arrange for needed discharge resources and transportation as appropriate   Identify discharge learning needs (meds, wound care, etc)   Refer to discharge planning if patient needs post-hospital services based on physician order or complex needs related to functional status, cognitive ability or social support system     Problem: Pain  Goal: Verbalizes/displays adequate comfort level or baseline comfort level  Outcome: Progressing  Flowsheets (Taken 10/11/2022 5627)  Verbalizes/displays adequate comfort level or baseline comfort level:   Encourage patient to monitor pain and request assistance   Assess pain using appropriate pain scale   Administer analgesics based on type and severity of pain and evaluate response   Implement non-pharmacological measures as appropriate and evaluate response   Consider cultural and social influences on pain and pain management     Problem: Safety - Adult  Goal: Free from fall injury  Outcome: Progressing  Flowsheets (Taken 10/11/2022 1571)  Free From Fall Injury:   Instruct family/caregiver on patient safety   Based on caregiver fall risk screen, instruct family/caregiver to ask for assistance with transferring infant if caregiver noted to have fall risk factors     Problem: ABCDS Injury Assessment  Goal: Absence of physical injury  Outcome: Progressing  Flowsheets (Taken 10/11/2022 3271)  Absence of Physical Injury: Implement safety measures based on patient assessment     Problem: Skin/Tissue Integrity - Adult  Goal: Skin integrity remains intact  Outcome: Progressing  Flowsheets (Taken 10/11/2022 4840)  Skin Integrity Remains Intact: Monitor for areas of redness and/or skin breakdown     Problem: Musculoskeletal - Adult  Goal: Return mobility to safest level of function  Outcome: Progressing  Flowsheets (Taken 10/11/2022 3000)  Return Mobility to Safest Level of Function:   Assess patient stability and activity tolerance for standing, transferring and ambulating with or without assistive devices   Assist with transfers and ambulation using safe patient handling equipment as needed   Obtain physical therapy/occupational therapy consults as needed   Instruct patient/family in ordered activity level     Problem: Metabolic/Fluid and Electrolytes - Adult  Goal: Hemodynamic stability and optimal renal function maintained  Outcome: Progressing  Flowsheets (Taken 10/11/2022 5377)  Hemodynamic stability and optimal renal function maintained:   Monitor labs and assess for signs and symptoms of volume excess or deficit   Monitor intake, output and patient weight   Monitor urine specific gravity, serum osmolarity and serum sodium as indicated or ordered   Monitor response to interventions for patient's volume status, including labs, urine output, blood pressure (other measures as available)   Encourage oral intake as appropriate   Instruct patient on fluid and nutrition restrictions as appropriate  Goal: Glucose maintained within prescribed range  Outcome: Progressing  Flowsheets (Taken 10/11/2022 6427)  Glucose maintained within prescribed range:   Monitor blood glucose as ordered   Assess for signs and symptoms of hyperglycemia and hypoglycemia   Administer ordered medications to maintain glucose within target range   Assess barriers to adequate nutritional intake and initiate nutrition consult as needed   Instruct patient on self management of diabetes and initiate consult as needed     Care plan reviewed with patient and family. Patient and family verbalize understanding of the plan of care and contribute to goal setting.

## 2022-10-11 NOTE — ED TRIAGE NOTES
Pt to the ED via lobby with right knee pain and a fall that happened tonight. Pt stated that he was walking from the bedroom and his boot that he has on his right foot for a broken toe got caught on the rug. Pt stated he then fell into the side of the door. Pt wife stated that this is the fourth time pt has fallen since Thursday. Pt denies LOC. Pt alert and oriented x3.

## 2022-10-11 NOTE — PROGRESS NOTES
Pt in for acute kidney injury related to falls , in the last 10 days 4 falls took place , laceration to the forehead occurred . Bandage in place . Vitals obtained 2x per shift current 98.2 t , 95 hr , 17 r, 109/56 ra, 95 o2 ,bs 298. Ls clear unlabored , hand grasp strong bilaterally , pedal push unequal right side weak d/t fall , left strong . IV in right hand , infuision running smoothly , site clean dry and intact . Pt is up with assist and walker , urinating regularly , no bm noted , encourgaed to ambualate to move haresh , bs active in all 4quads . Pt denies pain , edema is present in the right knee .  No concerns voiced at this time

## 2022-10-12 LAB
ANION GAP SERPL CALCULATED.3IONS-SCNC: 10 MEQ/L (ref 8–16)
BUN BLDV-MCNC: 56 MG/DL (ref 7–22)
CALCIUM SERPL-MCNC: 8.2 MG/DL (ref 8.5–10.5)
CHLORIDE BLD-SCNC: 104 MEQ/L (ref 98–111)
CO2: 26 MEQ/L (ref 23–33)
CREAT SERPL-MCNC: 1.7 MG/DL (ref 0.4–1.2)
ERYTHROCYTE [DISTWIDTH] IN BLOOD BY AUTOMATED COUNT: 13.6 % (ref 11.5–14.5)
ERYTHROCYTE [DISTWIDTH] IN BLOOD BY AUTOMATED COUNT: 46.7 FL (ref 35–45)
GFR SERPL CREATININE-BSD FRML MDRD: 40 ML/MIN/1.73M2
GLUCOSE BLD-MCNC: 177 MG/DL (ref 70–108)
GLUCOSE BLD-MCNC: 223 MG/DL (ref 70–108)
GLUCOSE BLD-MCNC: 224 MG/DL (ref 70–108)
GLUCOSE BLD-MCNC: 226 MG/DL (ref 70–108)
GLUCOSE BLD-MCNC: 246 MG/DL (ref 70–108)
HCT VFR BLD CALC: 29.8 % (ref 42–52)
HEMOGLOBIN: 9.6 GM/DL (ref 14–18)
MCH RBC QN AUTO: 30.9 PG (ref 26–33)
MCHC RBC AUTO-ENTMCNC: 32.2 GM/DL (ref 32.2–35.5)
MCV RBC AUTO: 95.8 FL (ref 80–94)
PLATELET # BLD: 221 THOU/MM3 (ref 130–400)
PMV BLD AUTO: 10.1 FL (ref 9.4–12.4)
POTASSIUM REFLEX MAGNESIUM: 4.3 MEQ/L (ref 3.5–5.2)
RBC # BLD: 3.11 MILL/MM3 (ref 4.7–6.1)
SODIUM BLD-SCNC: 140 MEQ/L (ref 135–145)
WBC # BLD: 9.5 THOU/MM3 (ref 4.8–10.8)

## 2022-10-12 PROCEDURE — 2580000003 HC RX 258

## 2022-10-12 PROCEDURE — 6370000000 HC RX 637 (ALT 250 FOR IP)

## 2022-10-12 PROCEDURE — G0378 HOSPITAL OBSERVATION PER HR: HCPCS

## 2022-10-12 PROCEDURE — 82948 REAGENT STRIP/BLOOD GLUCOSE: CPT

## 2022-10-12 PROCEDURE — 1200000003 HC TELEMETRY R&B

## 2022-10-12 PROCEDURE — 97110 THERAPEUTIC EXERCISES: CPT

## 2022-10-12 PROCEDURE — 85027 COMPLETE CBC AUTOMATED: CPT

## 2022-10-12 PROCEDURE — 36415 COLL VENOUS BLD VENIPUNCTURE: CPT

## 2022-10-12 PROCEDURE — 80048 BASIC METABOLIC PNL TOTAL CA: CPT

## 2022-10-12 PROCEDURE — 97535 SELF CARE MNGMENT TRAINING: CPT

## 2022-10-12 PROCEDURE — 96361 HYDRATE IV INFUSION ADD-ON: CPT

## 2022-10-12 PROCEDURE — 6370000000 HC RX 637 (ALT 250 FOR IP): Performed by: PHYSICIAN ASSISTANT

## 2022-10-12 PROCEDURE — 92523 SPEECH SOUND LANG COMPREHEN: CPT

## 2022-10-12 PROCEDURE — 99232 SBSQ HOSP IP/OBS MODERATE 35: CPT | Performed by: PHYSICIAN ASSISTANT

## 2022-10-12 RX ORDER — INSULIN GLARGINE 100 [IU]/ML
20 INJECTION, SOLUTION SUBCUTANEOUS NIGHTLY
Status: DISCONTINUED | OUTPATIENT
Start: 2022-10-12 | End: 2022-10-13 | Stop reason: HOSPADM

## 2022-10-12 RX ADMIN — CARVEDILOL 25 MG: 25 TABLET, FILM COATED ORAL at 09:15

## 2022-10-12 RX ADMIN — GABAPENTIN 300 MG: 300 CAPSULE ORAL at 10:55

## 2022-10-12 RX ADMIN — ASPIRIN 81 MG: 81 TABLET, COATED ORAL at 09:15

## 2022-10-12 RX ADMIN — SULFASALAZINE 500 MG: 500 TABLET ORAL at 20:15

## 2022-10-12 RX ADMIN — GABAPENTIN 300 MG: 300 CAPSULE ORAL at 20:14

## 2022-10-12 RX ADMIN — ATORVASTATIN CALCIUM 10 MG: 10 TABLET, FILM COATED ORAL at 09:15

## 2022-10-12 RX ADMIN — SULFASALAZINE 500 MG: 500 TABLET ORAL at 10:48

## 2022-10-12 RX ADMIN — INSULIN LISPRO 2 UNITS: 100 INJECTION, SOLUTION INTRAVENOUS; SUBCUTANEOUS at 17:17

## 2022-10-12 RX ADMIN — AMLODIPINE BESYLATE 10 MG: 10 TABLET ORAL at 09:15

## 2022-10-12 RX ADMIN — INSULIN LISPRO 2 UNITS: 100 INJECTION, SOLUTION INTRAVENOUS; SUBCUTANEOUS at 12:08

## 2022-10-12 RX ADMIN — INSULIN GLARGINE 20 UNITS: 100 INJECTION, SOLUTION SUBCUTANEOUS at 21:53

## 2022-10-12 RX ADMIN — SODIUM CHLORIDE: 9 INJECTION, SOLUTION INTRAVENOUS at 00:00

## 2022-10-12 RX ADMIN — TRAMADOL HYDROCHLORIDE 50 MG: 50 TABLET, COATED ORAL at 17:28

## 2022-10-12 RX ADMIN — SODIUM CHLORIDE: 9 INJECTION, SOLUTION INTRAVENOUS at 19:26

## 2022-10-12 RX ADMIN — SULFASALAZINE 500 MG: 500 TABLET ORAL at 13:41

## 2022-10-12 RX ADMIN — ALLOPURINOL 100 MG: 100 TABLET ORAL at 05:31

## 2022-10-12 RX ADMIN — CARVEDILOL 25 MG: 25 TABLET, FILM COATED ORAL at 20:15

## 2022-10-12 RX ADMIN — DOXAZOSIN 4 MG: 4 TABLET ORAL at 09:15

## 2022-10-12 ASSESSMENT — PAIN SCALES - GENERAL
PAINLEVEL_OUTOF10: 6
PAINLEVEL_OUTOF10: 7

## 2022-10-12 ASSESSMENT — PAIN DESCRIPTION - DESCRIPTORS: DESCRIPTORS: THROBBING

## 2022-10-12 ASSESSMENT — PAIN DESCRIPTION - LOCATION
LOCATION: KNEE
LOCATION: KNEE
LOCATION: KNEE;TOE (COMMENT WHICH ONE)

## 2022-10-12 ASSESSMENT — PAIN DESCRIPTION - ORIENTATION
ORIENTATION: RIGHT

## 2022-10-12 ASSESSMENT — PAIN DESCRIPTION - FREQUENCY: FREQUENCY: INTERMITTENT

## 2022-10-12 ASSESSMENT — PAIN DESCRIPTION - PAIN TYPE: TYPE: ACUTE PAIN

## 2022-10-12 ASSESSMENT — PAIN DESCRIPTION - ONSET: ONSET: ON-GOING

## 2022-10-12 NOTE — PLAN OF CARE
Problem: Discharge Planning  Goal: Discharge to home or other facility with appropriate resources  Flowsheets (Taken 10/12/2022 0002)  Discharge to home or other facility with appropriate resources:   Identify barriers to discharge with patient and caregiver   Arrange for needed discharge resources and transportation as appropriate   Identify discharge learning needs (meds, wound care, etc)  Note: CM: plans to go home with wife, declined [facility], plans PT/OT at [facility] in [place],      Problem: Pain  Goal: Verbalizes/displays adequate comfort level or baseline comfort level  Flowsheets (Taken 10/12/2022 0002)  Verbalizes/displays adequate comfort level or baseline comfort level:   Encourage patient to monitor pain and request assistance   Assess pain using appropriate pain scale   Administer analgesics based on type and severity of pain and evaluate response   Implement non-pharmacological measures as appropriate and evaluate response  Note: Pt report pain at 7 on scale. Pt states oral/iv/im medication helping to achieve pain goal of a 5 on scale. Problem: Safety - Adult  Goal: Free from fall injury  Flowsheets (Taken 10/12/2022 0002)  Free From Fall Injury: Instruct family/caregiver on patient safety  Note: Patient has remained free of physical injury during this shift. Safe environment provided, call light within reach, and hourly rounding completed. Problem: ABCDS Injury Assessment  Goal: Absence of physical injury  Flowsheets (Taken 10/12/2022 0002)  Absence of Physical Injury: Implement safety measures based on patient assessment  Note: Patient has remained free of physical injury during this shift. Safe environment provided, call light within reach, and hourly rounding completed.        Problem: Skin/Tissue Integrity - Adult  Goal: Skin integrity remains intact  Flowsheets (Taken 10/12/2022 0002)  Skin Integrity Remains Intact:   Monitor for areas of redness and/or skin breakdown   Assess vascular access sites hourly  Note: No new skin breakdown     Problem: Musculoskeletal - Adult  Goal: Return mobility to safest level of function  Flowsheets (Taken 10/12/2022 0002)  Return Mobility to Safest Level of Function:   Assess patient stability and activity tolerance for standing, transferring and ambulating with or without assistive devices   Assist with transfers and ambulation using safe patient handling equipment as needed   Ensure adequate protection for wounds/incisions during mobilization   Obtain physical therapy/occupational therapy consults as needed     Problem: Metabolic/Fluid and Electrolytes - Adult  Goal: Hemodynamic stability and optimal renal function maintained  Flowsheets (Taken 10/12/2022 0002)  Hemodynamic stability and optimal renal function maintained:   Monitor labs and assess for signs and symptoms of volume excess or deficit   Monitor intake, output and patient weight   Monitor urine specific gravity, serum osmolarity and serum sodium as indicated or ordered   Monitor response to interventions for patient's volume status, including labs, urine output, blood pressure (other measures as available)   Encourage oral intake as appropriate     Problem: Metabolic/Fluid and Electrolytes - Adult  Goal: Glucose maintained within prescribed range  Flowsheets (Taken 10/12/2022 0002)  Glucose maintained within prescribed range:   Monitor blood glucose as ordered   Assess for signs and symptoms of hyperglycemia and hypoglycemia   Administer ordered medications to maintain glucose within target range   Assess barriers to adequate nutritional intake and initiate nutrition consult as needed   Instruct patient on self management of diabetes and initiate consult as needed     Problem: Chronic Conditions and Co-morbidities  Goal: Patient's chronic conditions and co-morbidity symptoms are monitored and maintained or improved  Flowsheets (Taken 10/12/2022 0002)  Care Plan - Patient's Chronic Conditions and Co-Morbidity Symptoms are Monitored and Maintained or Improved: Monitor and assess patient's chronic conditions and comorbid symptoms for stability, deterioration, or improvement   Care plan reviewed with patient. Patient verbalizes understanding of the plan of care and contributes to goal setting.

## 2022-10-12 NOTE — PROGRESS NOTES
[facility]  Occupational Therapy  Daily Note  Time:   Time In: 1202  Time Out: 0820  Timed Code Treatment Minutes: 25 Minutes  Minutes: 25          Date: 10/12/2022  Patient Name: Cassidy Arevalo,   Gender: male      Room: [facility]005-A  MRN: [id number]  : 1955  (69 y.o.)  Referring Practitioner: FELI Calvo CNP  Diagnosis: HALEIGH  Additional Pertinent Hx: The patient is a 69 y.o. male who presents with complaints of right knee following a mechanical fall. Patient presents with his wife who reports that he has had at least 4 ground-level falls in the past 10 days. Patient reports he has had mechanical falls where he trips over rugs or loses his balance while attempting to use his walker on the carpet. He denies syncope, lightheadedness, vertigo, chest pain, shortness of breath prior to or since the fall. Patient primarily complaining of right knee pain. This was x-rayed in the ER and was negative for acute fracture. Patient with obvious swelling and ecchymosis of right knee. In the ER, patient found to have HALEIGH. Restrictions/Precautions:  Restrictions/Precautions: Up as Tolerated, Fall Risk  Position Activity Restriction  Other position/activity restrictions: WBAT with surgical shoe for right hallux fx     SUBJECTIVE: RN okmarianned OT treatment. Pt. In room seated in chair pleasant and agreeable to participate in OT session. PAIN: 6/10: R knee    Vitals: Vitals not assessed per clinical judgement, see nursing flowsheet    COGNITION: Slow Processing, Decreased Insight, Decreased Problem Solving, and Decreased Safety Awareness    ADL:   Grooming: Contact Guard Assistance. Completed while standing at sink . BALANCE:  Sitting Balance:  Supervision. Standing Balance: Contact Guard Assistance. BED MOBILITY:  Not Tested    TRANSFERS:  Sit to Stand:  Contact Guard Assistance. Stand to Sit: Contact Guard Assistance.       FUNCTIONAL MOBILITY:  Assistive Device: Rolling Walker  Assist Level:  Contact Guard Assistance. Distance: To and from bathroom  And within room, no LOB      ADDITIONAL ACTIVITIES:  Pt completed B UE exs with moderate resistance band x 12 reps, x 1 set, with good tolerance of exs, with brief RBs throughout, and visual and verbal cues for tech with resistance band to improve strength and overall activity tolerance to support basic ADLs. Educated Pt. On fall prevention techniques in the home. Pt. Voices an understanding. ASSESSMENT:     Activity Tolerance:  Patient tolerance of  treatment: fair. Discharge Recommendations: 24 hour assistance or supervision and Home with Outpatient OT  Equipment Recommendations: Equipment Needed: No  Plan: Times Per Week: 5x  Times Per Day: Once a day  Current Treatment Recommendations: Strengthening, ROM, Self-Care / ADL, Neuromuscular re-education, Endurance training, Functional mobility training, Balance training    Patient Education  Patient Education: ADL's, Home Exercise Program, and Home Safety    Goals  Short Term Goals  Time Frame for Short Term Goals: until discharge  Short Term Goal 1: Pt will safely navigate to/from bathroom with S utilizing RW with 0-1 cues for safety with maneuvering in small spaces and walker management skills. Short Term Goal 2: Pt will demonstrate independence with UB HEP to improve strength required for ADLs. Short Term Goal 3: Pt will perform UB/LB dressing/bathing with S.  Short Term Goal 4: Pt will tolerate x8-10 minute standing with single UE release at S in order to improve independence with item retrieval and preparation for I/ADLs. Following session, patient left in safe position with all fall risk precautions in place.

## 2022-10-12 NOTE — PROGRESS NOTES
Hospitalist Progress Note      Patient:  Guanaco Jefferson    Unit/Bed:7K-05/005-A  YOB: 1955  MRN: [id number]   Acct: [de-identified]   PCP: FELI Damon CNP  Date of Admission: 10/10/2022    Assessment/Plan:    HALEIGH on CKD: Likely pre-renal in conjunction with diuretic use. Creatinine 2.4 on arrival. After IVF, today 1.7. Baseline ~1.5. BMP in the AM. IVF continued for now. We will restart medications tomorrow if creatinine is at baseline. Frequent falls secondary to debility and generalized weakness: Patient with at least 4 falls in the last 10 days. Patient reports generalized weakness and states that the falls are 2/2 the new surgery shoe and rugs. X-ray of right knee negative for acute osseous abnormality. PT/OT recommending 24 hour care. Pt would like OP PT. Elevated troponin: Troponin 0.041 arrival.  Appears to be near baseline for patient with chronically elevated troponin. Denies chest pain. EKG showing sinus rhythm. Telemetry monitoring. Chronic diastolic heart failure, compensated: Echocardiogram from July 2022 showing an EF of 55 to 60%. Grade 1 diastolic dysfunction. Patient does report some chronic shortness of breath with exertion but denies worsening of the symptoms. Holding diuretics due to HALEIGH. Strict I&O and monitor daily weights. Continue beta-blocker. IDDMII: Glucose today 246. Hemoglobin A1c 6.9. Will hold oral antidiabetic medications. Sliding scale insulin, carb controlled diet, hypoglycemia treatment protocol ordered. Will reorder Lantus at home dose today. Chronic conditions of hyperlipidemia, primary hypertension, neuropathy, Crohn's disease: Stable. Continue home medications. Chief Complaint: Fall     Initial H and P:-    Initial H&P \"The patient is a 69 y.o. male who presents with complaints of right knee following a mechanical fall.   Patient presents with his wife who reports that he has had at least 4 ground-level falls in the past 10 days. Patient reports he has had mechanical falls where he trips over rugs or loses his balance while attempting to use his walker on the carpet. He denies syncope, lightheadedness, vertigo, chest pain, shortness of breath prior to or since the fall. Patient primarily complaining of right knee pain. This was x-rayed in the ER and was negative for acute fracture. Patient with obvious swelling and ecchymosis of right knee. In the ER, patient found to have HALEIGH. On exam, patient denies chest pain or shortness of breath. No significant peripheral edema is noted. \"     Subjective (past 24 hours):   No acute events overnight. Pt states that he has fallen 4 times in the last 2 weeks because of all of the rugs in his house and the surgery shoe he is wearing. Pt sitting in the chair with no issues at this time. Past medical history, family history, social history and allergies reviewed again and is unchanged since admission. ROS (All review of systems completed. Pertinent positives noted.  Otherwise All other systems reviewed and negative.)     Medications:  Reviewed    Infusion Medications    sodium chloride 100 mL/hr at 10/12/22 0000    sodium chloride      dextrose       Scheduled Medications    allopurinol  100 mg Oral QAM AC    aspirin  81 mg Oral Daily    amLODIPine  10 mg Oral Daily    carvedilol  25 mg Oral BID    [Held by provider] furosemide  40 mg Oral BID    gabapentin  300 mg Oral BID    [Held by provider] losartan  100 mg Oral Daily    atorvastatin  10 mg Oral Daily    [Held by provider] spironolactone  25 mg Oral BID    sulfaSALAzine  500 mg Oral TID    doxazosin  4 mg Oral Daily    sodium chloride flush  5-40 mL IntraVENous 2 times per day    insulin lispro  0-8 Units SubCUTAneous TID     insulin lispro  0-4 Units SubCUTAneous Nightly    insulin glargine  10 Units SubCUTAneous Nightly     PRN Meds: traMADol, sodium chloride flush, sodium chloride, ondansetron **OR** ondansetron, acetaminophen **OR** acetaminophen, glucose, dextrose bolus **OR** dextrose bolus, glucagon (rDNA), dextrose      Intake/Output Summary (Last 24 hours) at 10/12/2022 1439  Last data filed at 10/12/2022 1240  Gross per 24 hour   Intake 1040 ml   Output --   Net 1040 ml       Diet:  ADULT DIET; Regular; 4 carb choices (60 gm/meal)    Physical Exam:  /69   Pulse 77   Temp 97.7 °F (36.5 °C) (Oral)   Resp 16   Ht 5' 3\" (1.6 m)   Wt 218 lb 4.8 oz (99 kg)   SpO2 95%   BMI 38.67 kg/m²   General appearance: No apparent distress, appears stated age and cooperative. Chronically ill appearing white male   HEENT: Pupils equal, round, and reactive to light. Conjunctivae/corneas clear. Bandage on forehead. Neck: Supple, with full range of motion. No jugular venous distention. Trachea midline. Respiratory:  Normal respiratory effort. Clear to auscultation, bilaterally without Rales/Wheezes/Rhonchi. Cardiovascular: Regular rate and rhythm with normal S1/S2 without murmurs, rubs or gallops. Abdomen: Soft, non-tender, non-distended with normal bowel sounds. Musculoskeletal: passive and active ROM x 4 extremities. Skin: Skin color, texture, turgor normal.  No rashes or lesions. Neurologic:  Neurovascularly intact without any focal sensory/motor deficits.  Cranial nerves: II-XII intact, grossly non-focal.  Psychiatric: Alert and oriented, thought content appropriate, normal insight  Capillary Refill: Brisk,< 3 seconds   Peripheral Pulses: +2 palpable, equal bilaterally     Labs:   Recent Labs     10/10/22  2314 10/12/22  1017   WBC 10.4 9.5   HGB 10.9* 9.6*   HCT 32.8* 29.8*    221     Recent Labs     10/10/22  2314 10/11/22  0746 10/12/22  1017    142 140   K 4.6 4.5 4.3    101 104   CO2 24 27 26   BUN 90* 87* 56*   CREATININE 2.4* 2.3* 1.7*   CALCIUM 9.3 9.0 8.2*     Recent Labs     10/10/22  2314   AST 18   ALT 19   BILITOT 0.2*   ALKPHOS 88     No results for input(s): INR in the last 72 hours. No results for input(s): Kathrine Gaston in the last 72 hours. Microbiology:    Blood culture #1:   Lab Results   Component Value Date/Time    BC No growth-preliminary  No growth   01/30/2019 08:30 PM       Blood culture #2:No results found for: Gaston Bello    Organism:  Lab Results   Component Value Date/Time    ORG Mixed Growth 08/04/2019 01:30 PM         Lab Results   Component Value Date/Time    LABGRAM  02/02/2019 04:20 PM     Rare segmented neutrophils observed. No bacteria seen. MRSA culture only:No results found for: [facility]    Urine culture:   Lab Results   Component Value Date/Time    LABURIN  08/04/2019 01:30 PM     Mixed growth. The mixture of organisms present represents  both organisms that may cause urinary tract infections and  organisms that are not a common cause of urinary tract  infections and are possibly skin ky or distal urethral  ky. Respiratory culture: No results found for: CULTRESP    Aerobic and Anaerobic :  Lab Results   Component Value Date/Time    LABAERO No growth-preliminary  No growth   02/02/2019 04:20 PM     Lab Results   Component Value Date/Time    LABANAE No growth-preliminary  No growth   02/02/2019 04:20 PM       Urinalysis:      Lab Results   Component Value Date/Time    NITRU NEGATIVE 10/11/2022 01:45 AM    WBCUA 0-2 02/02/2019 11:07 AM    BACTERIA NONE 02/02/2019 11:07 AM    RBCUA 0-2 02/02/2019 11:07 AM    BLOODU NEGATIVE 10/11/2022 01:45 AM    SPECGRAV 1.015 08/04/2019 01:30 PM    GLUCOSEU NEGATIVE 10/11/2022 01:45 AM       Radiology:  XR KNEE RIGHT (MIN 4 VIEWS)   Final Result   Impression:   1. Negative for acute osseous abnormality. Prepatellar soft tissue    swelling. Stable moderate joint effusion. 2. Severe tricompartmental osteoarthritis. 3. Peripheral arterial vascular disease.       This document has been electronically signed by: Mitesh Ordaz MD on    10/11/2022 12:01 AM Electronically signed by DERICK Jessica on 10/12/2022 at 2:39 PM

## 2022-10-12 NOTE — PROGRESS NOTES
[address] [facility] ORTHOPEDICS 7K  Speech - Language - Cognitive Evaluation    SLP Individual Minutes  Time In:   Time Out: 4695  Minutes: 16  Timed Code Treatment Minutes: 0 Minutes       Date: 10/12/2022  Patient Name: Carmen Fonseca      CSN: [id number]   : 1955  (69 y.o.)  Gender: male   Referring Physician:  DERICK Low  Diagnosis: HALEIGH  Precautions: Fall Risk  History of Present Illness/Injury: Patient admitted with above diagnosis. Per chart review, \"The patient is a 69 y.o. male who presents with complaints of right knee following a mechanical fall. Patient presents with his wife who reports that he has had at least 4 ground-level falls in the past 10 days. Patient reports he has had mechanical falls where he trips over rugs or loses his balance while attempting to use his walker on the carpet. He denies syncope, lightheadedness, vertigo, chest pain, shortness of breath prior to or since the fall. Patient primarily complaining of right knee pain. This was x-rayed in the ER and was negative for acute fracture. Patient with obvious swelling and ecchymosis of right knee. In the ER, patient found to have HALEIGH. On exam, patient denies chest pain or shortness of breath. No significant peripheral edema is noted. \"     ST consulted to complete rlqwmt-wrmgwomg-yttrqcbjm evaluation to assess current cognitive-linguistic skills s/t 4 falls in the past week with patient reporting hitting head. Past Medical History:   Diagnosis Date    Arthritis     CAD (coronary artery disease) 2012    CHF (congestive heart failure) (HCC)     Diabetes mellitus ([facility] [address]     Diverticulosis of colon     History of chest pain     History of tobacco abuse     Hyperlipidemia     Hypertension     Pneumonia        Pain: No pain reported. Subjective:  DANE Maddox approved completion of kelytu-awjjbfvw-ekwpmynvg evaluation this date.  Patient awake in recliner with family at bedside upon ST arrival. Agreeable to evaluation. Pleasant and cooperative throughout. SOCIAL HISTORY:   Living Arrangements: Home with wife  Work History: Retired - used to work at [employer]   Education Level: 12th grade   Driving Status: Active   Finance Management: shared task with wife   Medication Management: Independent - does not utilize pill box   ADL's: Independent. Completes laundry   Hobbies: watching TV  Vision Status: Glasses- not present at time of evaluation   Hearing: WFL - no assistive hearing devices     Type of Home: House  Home Layout: One level  Home Access: Stairs to enter with rails  Entrance Stairs - Number of Steps: 2 HELADIO  Entrance Stairs - Rails: Right  Home Equipment: Walker, rolling, Lift chair    SPEECH / VOICE:  Speech and Voice appear to be grossly intact for basic and complex daily communication    LANGUAGE:  Receptive:  Receptive language skills appear to be grossly intact for basic and complex daily communication. Expressive:  Expressive language skills appear to be grossly intact for basic and complex daily communication. COGNITION:  Kraig Cognitive Assessment [facility]) version 7.3 completed. Pt scored 15/30*. Normal is greater than or equal to 26/30.   *Inclusion of +1 point given highest level of education achieved less than/equal to 12th grade or GED with limited-0 post-secondary schooling     Orientation: 6   Immediate Recall: Trial 1: 5, Trial 2: 5/5   Short-Term Recall: 4/5 independently, 1/5 given FO2  Divergent Namin word/minute (target = 11 words/minute)   Reasonin/2  Thought Organization: 0/1  Attention: 1/1  Math Computation: 0/3  Executive Functionin/5    SWALLOWING:  Current Diet: Regular and Thin liquids   WFL         RECOMMENDATIONS/ASSESSMENT:  DIAGNOSTIC IMPRESSIONS:  Patient presents with a moderate cognitive-linguistic impairment characterized by a score of 15/30 on the [facility] with deficits noted within the domains of executive functioning, verbal/visual reasoning, immediate recall, delayed recall, mental manipulation, math computation, thought organization, complex attention, and orientation. Patient's expressive and receptive language appear [place] for basic and complex daily communication. No dysarthria or dysphonia with speech intelligibility approximating 100% across all contexts at the conversational level. Patient would GREATLY benefit from continued skilled ST services to address aforementioned deficits given high level of independence prior to hospitalization. Without continued skilled ST services patient will require close supervision with management of finances and medications, and should refrain from driving at this time. Rehabilitation Potential: good  Discharge Recommendations: Home Health    EDUCATION:  Learner: Patient, Significant Other, and Family  Education:  Reviewed results and recommendations of this evaluation, Reviewed ST goals and Plan of Care, Reviewed recommendations for follow-up, Education Related to Potential Risks and Complications Due to Impairment/Illness/Injury, and Education Related to [employer] and Wellness  Evaluation of Education: Robbin Brantley understanding and Needs further instruction    PLAN:  Skilled SLP intervention on acute care 3-5 x per week or until goals met and/or pt plateaus in function. Specific interventions for next session may include: recall, executive functioning, attention . PATIENT GOAL:    Did not state. Will further assess during treatment. SHORT TERM GOALS:  Short Term Goals  Time Frame for Short Term Goals: 2 weeks  Goal 1: Patient will complete executive functioning and verbal/visual reasoning tasks (medications, finances, time) with 75% accuracy given mod cues to resume ADL/IADL completion with least amount of supervision/assistance.   Goal 2: Patient will complete recall tasks (immediate, delayed, working) with focus on memory strategies with 80% accuracy given mod cues to improve recall of novel information. Goal 3: Patient will complete thought organization tasks (divergent/convergent, sequencing, calendar use/planning) with 75% accuracy given mod cues ot improve processing speed and organization during ADL/IADL completion. Goal 4: Patient will complete moderately complex attention tasks (sustained, selective, alternating, divided) with no more than 5 errors/redirections to safely resume active driving. Goal 5: Patient will complete problem solving and safety tasks with 85% accuracy given min cues to improve safety and awareness of least restrictive environment.     LONG TERM GOALS:  No long term goals recommended d/t short DUNIA Menjivar ([facility] [address], [address]

## 2022-10-12 NOTE — PLAN OF CARE
Problem: Discharge Planning  Goal: Discharge to home or other facility with appropriate resources  Outcome: Progressing  Flowsheets (Taken 10/12/2022 1709)  Discharge to home or other facility with appropriate resources:   Identify barriers to discharge with patient and caregiver   Identify discharge learning needs (meds, wound care, etc)     Problem: Pain  Goal: Verbalizes/displays adequate comfort level or baseline comfort level  Outcome: Progressing  Flowsheets (Taken 10/12/2022 1709)  Verbalizes/displays adequate comfort level or baseline comfort level:   Assess pain using appropriate pain scale   Encourage patient to monitor pain and request assistance   Administer analgesics based on type and severity of pain and evaluate response     Problem: Safety - Adult  Goal: Free from fall injury  Outcome: Progressing  Flowsheets (Taken 10/12/2022 1709)  Free From Fall Injury: Instruct family/caregiver on patient safety     Problem: Skin/Tissue Integrity - Adult  Goal: Skin integrity remains intact  Outcome: Progressing  Flowsheets (Taken 10/12/2022 1709)  Skin Integrity Remains Intact: Monitor for areas of redness and/or skin breakdown     Problem: Musculoskeletal - Adult  Goal: Return mobility to safest level of function  Outcome: Progressing  Flowsheets (Taken 10/12/2022 1709)  Return Mobility to Safest Level of Function:   Assess patient stability and activity tolerance for standing, transferring and ambulating with or without assistive devices   Assist with transfers and ambulation using safe patient handling equipment as needed     Problem: Metabolic/Fluid and Electrolytes - Adult  Goal: Hemodynamic stability and optimal renal function maintained  Outcome: Progressing  Flowsheets (Taken 10/12/2022 1709)  Hemodynamic stability and optimal renal function maintained:   Monitor labs and assess for signs and symptoms of volume excess or deficit   Monitor intake, output and patient weight   Encourage oral intake as appropriate     Problem: Chronic Conditions and Co-morbidities  Goal: Patient's chronic conditions and co-morbidity symptoms are monitored and maintained or improved  Outcome: Progressing  Flowsheets (Taken 10/12/2022 1709)  Care Plan - Patient's Chronic Conditions and Co-Morbidity Symptoms are Monitored and Maintained or Improved: Monitor and assess patient's chronic conditions and comorbid symptoms for stability, deterioration, or improvement     Problem: Cardiovascular - Adult  Goal: Absence of cardiac dysrhythmias or at baseline  Outcome: Progressing  Flowsheets (Taken 10/12/2022 1709)  Absence of cardiac dysrhythmias or at baseline: Monitor cardiac rate and rhythm     Problem: Gastrointestinal - Adult  Goal: Maintains or returns to baseline bowel function  Outcome: Progressing  Flowsheets (Taken 10/12/2022 1709)  Maintains or returns to baseline bowel function:   Assess bowel function   Encourage oral fluids to ensure adequate hydration   Administer IV fluids as ordered to ensure adequate hydration     Problem: Genitourinary - Adult  Goal: Absence of urinary retention  Outcome: Progressing  Flowsheets (Taken 10/12/2022 1709)  Absence of urinary retention:   Assess patients ability to void and empty bladder   Monitor intake/output and perform bladder scan as needed     Care plan reviewed with patient. Patient verbalize understanding of the plan of care and contribute to goal setting.

## 2022-10-13 VITALS
HEART RATE: 79 BPM | SYSTOLIC BLOOD PRESSURE: 139 MMHG | DIASTOLIC BLOOD PRESSURE: 69 MMHG | BODY MASS INDEX: 38.68 KG/M2 | OXYGEN SATURATION: 100 % | TEMPERATURE: 97.9 F | HEIGHT: 63 IN | RESPIRATION RATE: 17 BRPM | WEIGHT: 218.3 LBS

## 2022-10-13 LAB
ANION GAP SERPL CALCULATED.3IONS-SCNC: 10 MEQ/L (ref 8–16)
BUN BLDV-MCNC: 38 MG/DL (ref 7–22)
CALCIUM SERPL-MCNC: 8.1 MG/DL (ref 8.5–10.5)
CHLORIDE BLD-SCNC: 107 MEQ/L (ref 98–111)
CO2: 25 MEQ/L (ref 23–33)
CREAT SERPL-MCNC: 1.4 MG/DL (ref 0.4–1.2)
ERYTHROCYTE [DISTWIDTH] IN BLOOD BY AUTOMATED COUNT: 13.6 % (ref 11.5–14.5)
ERYTHROCYTE [DISTWIDTH] IN BLOOD BY AUTOMATED COUNT: 47.8 FL (ref 35–45)
GFR SERPL CREATININE-BSD FRML MDRD: 50 ML/MIN/1.73M2
GLUCOSE BLD-MCNC: 150 MG/DL (ref 70–108)
GLUCOSE BLD-MCNC: 163 MG/DL (ref 70–108)
GLUCOSE BLD-MCNC: 232 MG/DL (ref 70–108)
HCT VFR BLD CALC: 29.4 % (ref 42–52)
HEMOGLOBIN: 9.4 GM/DL (ref 14–18)
MCH RBC QN AUTO: 31 PG (ref 26–33)
MCHC RBC AUTO-ENTMCNC: 32 GM/DL (ref 32.2–35.5)
MCV RBC AUTO: 97 FL (ref 80–94)
PLATELET # BLD: 227 THOU/MM3 (ref 130–400)
PMV BLD AUTO: 10.6 FL (ref 9.4–12.4)
POTASSIUM REFLEX MAGNESIUM: 3.8 MEQ/L (ref 3.5–5.2)
RBC # BLD: 3.03 MILL/MM3 (ref 4.7–6.1)
SODIUM BLD-SCNC: 142 MEQ/L (ref 135–145)
WBC # BLD: 8.8 THOU/MM3 (ref 4.8–10.8)

## 2022-10-13 PROCEDURE — 36415 COLL VENOUS BLD VENIPUNCTURE: CPT

## 2022-10-13 PROCEDURE — G0378 HOSPITAL OBSERVATION PER HR: HCPCS

## 2022-10-13 PROCEDURE — 85027 COMPLETE CBC AUTOMATED: CPT

## 2022-10-13 PROCEDURE — 6370000000 HC RX 637 (ALT 250 FOR IP)

## 2022-10-13 PROCEDURE — 2580000003 HC RX 258

## 2022-10-13 PROCEDURE — 82948 REAGENT STRIP/BLOOD GLUCOSE: CPT

## 2022-10-13 PROCEDURE — 80048 BASIC METABOLIC PNL TOTAL CA: CPT

## 2022-10-13 PROCEDURE — 96361 HYDRATE IV INFUSION ADD-ON: CPT

## 2022-10-13 PROCEDURE — 99239 HOSP IP/OBS DSCHRG MGMT >30: CPT | Performed by: PHYSICIAN ASSISTANT

## 2022-10-13 PROCEDURE — 51798 US URINE CAPACITY MEASURE: CPT

## 2022-10-13 RX ADMIN — ATORVASTATIN CALCIUM 10 MG: 10 TABLET, FILM COATED ORAL at 10:49

## 2022-10-13 RX ADMIN — AMLODIPINE BESYLATE 10 MG: 10 TABLET ORAL at 10:49

## 2022-10-13 RX ADMIN — ALLOPURINOL 100 MG: 100 TABLET ORAL at 05:24

## 2022-10-13 RX ADMIN — GABAPENTIN 300 MG: 300 CAPSULE ORAL at 10:49

## 2022-10-13 RX ADMIN — CARVEDILOL 25 MG: 25 TABLET, FILM COATED ORAL at 10:49

## 2022-10-13 RX ADMIN — SULFASALAZINE 500 MG: 500 TABLET ORAL at 10:49

## 2022-10-13 RX ADMIN — INSULIN LISPRO 2 UNITS: 100 INJECTION, SOLUTION INTRAVENOUS; SUBCUTANEOUS at 11:42

## 2022-10-13 RX ADMIN — SODIUM CHLORIDE: 9 INJECTION, SOLUTION INTRAVENOUS at 05:27

## 2022-10-13 RX ADMIN — SODIUM CHLORIDE, PRESERVATIVE FREE 10 ML: 5 INJECTION INTRAVENOUS at 10:49

## 2022-10-13 RX ADMIN — DOXAZOSIN 4 MG: 4 TABLET ORAL at 10:49

## 2022-10-13 RX ADMIN — ASPIRIN 81 MG: 81 TABLET, COATED ORAL at 10:49

## 2022-10-13 NOTE — PROGRESS NOTES
Discharge instructions given to patient and family, verbalizes understanding. Denies any concerns, transferred to discharge lobby by transport by wheelchair with personal belongings.

## 2022-10-13 NOTE — CARE COORDINATION
10/13/22, 10:46 AM EDT    Home with wife; has needed DME. SLP lakshmi completed. OP PT order faxed to [facility]; copy of order handed to patient also. Declined HH at this time. Lives With: Spouse  Type of Home: House  Home Layout: One level  Home Access: Stairs to enter with rails  Entrance Stairs - Number of Steps: 2 HELADIO  Entrance Stairs - Rails: Right  Home Equipment: Joseph Sames, rolling, Lift chair    Bathroom Shower/Tub: Shower chair without back  Bathroom Toilet: Standard  Bathroom Equipment: Shower chair        Patient goals/plan/ treatment preferences discussed by  and . Patient goals/plan/ treatment preferences reviewed with patient/ family. Patient/ family verbalize understanding of discharge plan and are in agreement with goal/plan/treatment preferences. Understanding was demonstrated using the teach back method. AVS provided by RN at time of discharge, which includes all necessary medical information pertaining to the patients current course of illness, treatment, post-discharge goals of care, and treatment preferences. Services At/After Discharge:  Outpatient and PT       IMM Letter  IMM Letter given to Patient/Family/Significant other/Guardian/POA/by[de-identified] pt access  IMM Letter date given[de-identified] 10/11/22  IMM Letter time given[de-identified] 2612

## 2022-10-13 NOTE — PLAN OF CARE
Problem: Discharge Planning  Goal: Discharge to home or other facility with appropriate resources  10/13/2022 0131 by Lucero Siddiqi RN  Outcome: Progressing  Flowsheets (Taken 10/13/2022 0131)  Discharge to home or other facility with appropriate resources:   Identify barriers to discharge with patient and caregiver   Refer to discharge planning if patient needs post-hospital services based on physician order or complex needs related to functional status, cognitive ability or social support system  10/12/2022 1709 by Ana Toth LPN  Outcome: Progressing  Flowsheets (Taken 10/12/2022 1709)  Discharge to home or other facility with appropriate resources:   Identify barriers to discharge with patient and caregiver   Identify discharge learning needs (meds, wound care, etc)     Problem: Pain  Goal: Verbalizes/displays adequate comfort level or baseline comfort level  10/13/2022 0131 by Lucero Siddiqi RN  Outcome: Progressing  Flowsheets (Taken 10/13/2022 0131)  Verbalizes/displays adequate comfort level or baseline comfort level:   Assess pain using appropriate pain scale   Administer analgesics based on type and severity of pain and evaluate response  10/12/2022 1709 by Ana Toth LPN  Outcome: Progressing  Flowsheets (Taken 10/12/2022 1709)  Verbalizes/displays adequate comfort level or baseline comfort level:   Assess pain using appropriate pain scale   Encourage patient to monitor pain and request assistance   Administer analgesics based on type and severity of pain and evaluate response     Problem: Safety - Adult  Goal: Free from fall injury  10/13/2022 0131 by Lucero Siddiqi RN  Outcome: Progressing  Flowsheets (Taken 10/12/2022 1709 by Ana Toth LPN)  Free From Fall Injury: Instruct family/caregiver on patient safety  10/12/2022 1709 by Ana Toth LPN  Outcome: Progressing  Flowsheets (Taken 10/12/2022 1709)  Free From Fall Injury: Instruct family/caregiver on patient safety     Problem: Skin/Tissue Integrity - Adult  Goal: Skin integrity remains intact  10/13/2022 0131 by Kenia Lee RN  Outcome: Progressing  Flowsheets (Taken 10/12/2022 1709 by Michael Espino LPN)  Skin Integrity Remains Intact: Monitor for areas of redness and/or skin breakdown  10/12/2022 1709 by Michael Espino LPN  Outcome: Progressing  Flowsheets (Taken 10/12/2022 1709)  Skin Integrity Remains Intact: Monitor for areas of redness and/or skin breakdown     Problem: Musculoskeletal - Adult  Goal: Return mobility to safest level of function  10/13/2022 0131 by Kenia Lee RN  Outcome: Progressing  Flowsheets (Taken 10/12/2022 1709 by Michael Espino LPN)  Return Mobility to Safest Level of Function:   Assess patient stability and activity tolerance for standing, transferring and ambulating with or without assistive devices   Assist with transfers and ambulation using safe patient handling equipment as needed  10/12/2022 1709 by Michael Espino LPN  Outcome: Progressing  Flowsheets (Taken 10/12/2022 1709)  Return Mobility to Safest Level of Function:   Assess patient stability and activity tolerance for standing, transferring and ambulating with or without assistive devices   Assist with transfers and ambulation using safe patient handling equipment as needed     Problem: Metabolic/Fluid and Electrolytes - Adult  Goal: Hemodynamic stability and optimal renal function maintained  10/13/2022 0131 by Kenia Lee RN  Outcome: Progressing  Flowsheets (Taken 10/12/2022 1709 by Michael Espino LPN)  Hemodynamic stability and optimal renal function maintained:   Monitor labs and assess for signs and symptoms of volume excess or deficit   Monitor intake, output and patient weight   Encourage oral intake as appropriate  10/12/2022 1709 by Michael Espino LPN  Outcome: Progressing  Flowsheets (Taken 10/12/2022 1709)  Hemodynamic stability and optimal renal function maintained:   Monitor labs and assess for signs and symptoms of volume excess or deficit   Monitor intake, output and patient weight   Encourage oral intake as appropriate  Goal: Glucose maintained within prescribed range  Recent Flowsheet Documentation  Taken 10/13/2022 0131 by Sravan Clayton RN  Glucose maintained within prescribed range: Monitor blood glucose as ordered  10/12/2022 1709 by Gail Kaplan LPN  Outcome: Completed  Goal: Glucose maintained within prescribed range  Outcome: Progressing  Flowsheets (Taken 10/13/2022 0131)  Glucose maintained within prescribed range: Monitor blood glucose as ordered     Problem: Cardiovascular - Adult  Goal: Absence of cardiac dysrhythmias or at baseline  10/13/2022 0131 by Sravan Clayton RN  Outcome: Progressing  Flowsheets (Taken 10/12/2022 1709 by Gail Kaplan LPN)  Absence of cardiac dysrhythmias or at baseline: Monitor cardiac rate and rhythm  10/12/2022 1709 by Gail Kaplan LPN  Outcome: Progressing  Flowsheets (Taken 10/12/2022 1709)  Absence of cardiac dysrhythmias or at baseline: Monitor cardiac rate and rhythm     Problem: Gastrointestinal - Adult  Goal: Maintains or returns to baseline bowel function  10/13/2022 0131 by Sravan Clayton RN  Outcome: Progressing  Flowsheets (Taken 10/12/2022 1709 by Gail Kaplan LPN)  Maintains or returns to baseline bowel function:   Assess bowel function   Encourage oral fluids to ensure adequate hydration   Administer IV fluids as ordered to ensure adequate hydration  10/12/2022 1709 by Gail Kaplan LPN  Outcome: Progressing  Flowsheets (Taken 10/12/2022 1709)  Maintains or returns to baseline bowel function:   Assess bowel function   Encourage oral fluids to ensure adequate hydration   Administer IV fluids as ordered to ensure adequate hydration     Problem: Genitourinary - Adult  Goal: Absence of urinary retention  10/13/2022 0131 by Sravan Clayton RN  Outcome: Progressing  Flowsheets (Taken 10/12/2022 1709 by Gail Kaplan LPN)  Absence of urinary retention:   Assess patients ability to void and empty bladder   Monitor intake/output and perform bladder scan as needed  10/12/2022 1709 by Jason Martell LPN  Outcome: Progressing  Flowsheets (Taken 10/12/2022 1709)  Absence of urinary retention:   Assess patients ability to void and empty bladder   Monitor intake/output and perform bladder scan as needed     Problem: Chronic Conditions and Co-morbidities  Goal: Patient's chronic conditions and co-morbidity symptoms are monitored and maintained or improved  10/13/2022 0131 by Kelton Samuels RN  Outcome: Progressing  Flowsheets (Taken 10/12/2022 1709 by Jason Martell LPN)  Care Plan - Patient's Chronic Conditions and Co-Morbidity Symptoms are Monitored and Maintained or Improved: Monitor and assess patient's chronic conditions and comorbid symptoms for stability, deterioration, or improvement  10/12/2022 1709 by Jason Martell LPN  Outcome: Progressing  Flowsheets (Taken 10/12/2022 1709)  Care Plan - Patient's Chronic Conditions and Co-Morbidity Symptoms are Monitored and Maintained or Improved: Monitor and assess patient's chronic conditions and comorbid symptoms for stability, deterioration, or improvement   Care plan reviewed with patient. Patient verbalizes understanding of the plan of care and contributes to goal setting.

## 2022-10-13 NOTE — PROCEDURES
A Bladder scan was performed at 0205 . The patient's last void was at patient voids every hour . The residual amount was measured to be 139 ML. Report of results was given to [facility] .

## 2022-10-14 NOTE — DISCHARGE SUMMARY
Hospitalist Discharge Summary        Patient: Victoria Arthur  YOB: 1955  MRN: [id number]   Acct: [de-identified]    Primary Care Physician: FELI Desouza CNP    Admit date  10/10/2022    Discharge date: 10/13/2022 12:57 PM    Chief Complaint on presentation :-  Fall     Discharge Assessment and Plan:-   HALEIGH on CKD: Likely pre-renal in conjunction with diuretic use. Creatinine 2.4 on arrival. After IVF, today on discharge 1.4. Baseline ~1.5. Restarted home medications on DC. Frequent falls secondary to debility and generalized weakness: Patient with at least 4 falls in the last 10 days. Patient reports generalized weakness and states that the falls are 2/2 the new surgery shoe and rugs. X-ray of right knee negative for acute osseous abnormality. PT/OT recommending 24 hour care. Pt would like OP PT. Elevated troponin: Troponin 0.041 arrival.  Appears to be near baseline for patient with chronically elevated troponin. Denies chest pain. EKG showing sinus rhythm. Telemetry monitoring did not show acute issues. Chronic diastolic heart failure, compensated: Echocardiogram from July 2022 showing an EF of 55 to 60%. Grade 1 diastolic dysfunction. Patient does report some chronic shortness of breath with exertion but denies worsening of the symptoms. Resume   IDDMII: Hemoglobin A1c 6.9. Continue home medications on DC. Chronic conditions of hyperlipidemia, primary hypertension, neuropathy, Crohn's disease: Stable. Continue home medications. Initial H and P and Hospital course:-  Initial H&P \"The patient is a 69 y.o. male who presents with complaints of right knee following a mechanical fall. Patient presents with his wife who reports that he has had at least 4 ground-level falls in the past 10 days. Patient reports he has had mechanical falls where he trips over rugs or loses his balance while attempting to use his walker on the carpet.   He denies syncope, lightheadedness, vertigo, chest pain, shortness of breath prior to or since the fall. Patient primarily complaining of right knee pain. This was x-rayed in the ER and was negative for acute fracture. Patient with obvious swelling and ecchymosis of right knee. In the ER, patient found to have HALEIGH. On exam, patient denies chest pain or shortness of breath. No significant peripheral edema is noted. \"     10/12: No acute events overnight. Pt states that he has fallen 4 times in the last 2 weeks because of all of the rugs in his house and the surgery shoe he is wearing. Pt sitting in the chair with no issues at this time. On the day of discharge, it was explained to the patient that it was very important to follow up with his PCP to have continued care. Appointments were made and information was given. Physical Exam:-  General appearance: No apparent distress, appears stated age and cooperative. Chronically ill appearing white male   HEENT: Pupils equal, round, and reactive to light. Conjunctivae/corneas clear. Bandage on forehead. Neck: Supple, with full range of motion. No jugular venous distention. Trachea midline. Respiratory:  Normal respiratory effort. Clear to auscultation, bilaterally without Rales/Wheezes/Rhonchi. Cardiovascular: Regular rate and rhythm with normal S1/S2 without murmurs, rubs or gallops. Abdomen: Soft, non-tender, non-distended with normal bowel sounds. Musculoskeletal: passive and active ROM x 4 extremities. Surgical shoe on left foot. Skin: Skin color, texture, turgor normal.  No rashes or lesions. Neurologic:  Neurovascularly intact without any focal sensory/motor deficits. Cranial nerves: II-XII intact, grossly non-focal.  Psychiatric: Alert and oriented, thought content appropriate, normal insight  Capillary Refill: Brisk,< 3 seconds   Peripheral Pulses: +2 palpable, equal bilaterally     Vitals:   No data found.   Weight:   Weight: 218 lb 4.8 oz (99 kg)   24 hour intake/output:   No tablet  Commonly known as: APRESOLINE            ASK your doctor about these medications      ascorbic acid 500 MG tablet  Commonly known as: VITAMIN C  Take 1 tablet by mouth every 48 hours     lidocaine 5 %  Commonly known as: Lidoderm  Place 1 patch onto the skin daily for 7 days 12 hours on, 12 hours off. Ask about: Should I take this medication?                Labs :-  Recent Results (from the past 72 hour(s))   POCT Glucose    Collection Time: 10/11/22  8:45 PM   Result Value Ref Range    POC Glucose 213 (H) 70 - 108 mg/dl   POCT Glucose    Collection Time: 10/12/22  6:14 AM   Result Value Ref Range    POC Glucose 177 (H) 70 - 108 mg/dl   Basic Metabolic Panel w/ Reflex to MG    Collection Time: 10/12/22 10:17 AM   Result Value Ref Range    Sodium 140 135 - 145 meq/L    Potassium reflex Magnesium 4.3 3.5 - 5.2 meq/L    Chloride 104 98 - 111 meq/L    CO2 26 23 - 33 meq/L    Glucose 246 (H) 70 - 108 mg/dL    BUN 56 (H) 7 - 22 mg/dL    Creatinine 1.7 (H) 0.4 - 1.2 mg/dL    Calcium 8.2 (L) 8.5 - 10.5 mg/dL   CBC    Collection Time: 10/12/22 10:17 AM   Result Value Ref Range    WBC 9.5 4.8 - 10.8 thou/mm3    RBC 3.11 (L) 4.70 - 6.10 mill/mm3    Hemoglobin 9.6 (L) 14.0 - 18.0 gm/dl    Hematocrit 29.8 (L) 42.0 - 52.0 %    MCV 95.8 (H) 80.0 - 94.0 fL    MCH 30.9 26.0 - 33.0 pg    MCHC 32.2 32.2 - 35.5 gm/dl    RDW-CV 13.6 11.5 - 14.5 %    RDW-SD 46.7 (H) 35.0 - 45.0 fL    Platelets 291 447 - 614 thou/mm3    MPV 10.1 9.4 - 12.4 fL   Anion Gap    Collection Time: 10/12/22 10:17 AM   Result Value Ref Range    Anion Gap 10.0 8.0 - 16.0 meq/L   Glomerular Filtration Rate, Estimated    Collection Time: 10/12/22 10:17 AM   Result Value Ref Range    Est, Glom Filt Rate 40 (A) ml/min/1.73m2   POCT Glucose    Collection Time: 10/12/22 11:00 AM   Result Value Ref Range    POC Glucose 226 (H) 70 - 108 mg/dl   POCT Glucose    Collection Time: 10/12/22  3:36 PM   Result Value Ref Range    POC Glucose 224 (H) 70 - 108 mg/dl   POCT glucose    Collection Time: 10/12/22  8:03 PM   Result Value Ref Range    POC Glucose 223 (H) 70 - 108 mg/dl   Basic Metabolic Panel w/ Reflex to MG    Collection Time: 10/13/22  5:09 AM   Result Value Ref Range    Sodium 142 135 - 145 meq/L    Potassium reflex Magnesium 3.8 3.5 - 5.2 meq/L    Chloride 107 98 - 111 meq/L    CO2 25 23 - 33 meq/L    Glucose 150 (H) 70 - 108 mg/dL    BUN 38 (H) 7 - 22 mg/dL    Creatinine 1.4 (H) 0.4 - 1.2 mg/dL    Calcium 8.1 (L) 8.5 - 10.5 mg/dL   CBC    Collection Time: 10/13/22  5:09 AM   Result Value Ref Range    WBC 8.8 4.8 - 10.8 thou/mm3    RBC 3.03 (L) 4.70 - 6.10 mill/mm3    Hemoglobin 9.4 (L) 14.0 - 18.0 gm/dl    Hematocrit 29.4 (L) 42.0 - 52.0 %    MCV 97.0 (H) 80.0 - 94.0 fL    MCH 31.0 26.0 - 33.0 pg    MCHC 32.0 (L) 32.2 - 35.5 gm/dl    RDW-CV 13.6 11.5 - 14.5 %    RDW-SD 47.8 (H) 35.0 - 45.0 fL    Platelets 148 341 - 893 thou/mm3    MPV 10.6 9.4 - 12.4 fL   Anion Gap    Collection Time: 10/13/22  5:09 AM   Result Value Ref Range    Anion Gap 10.0 8.0 - 16.0 meq/L   Glomerular Filtration Rate, Estimated    Collection Time: 10/13/22  5:09 AM   Result Value Ref Range    Est, Glom Filt Rate 50 (A) ml/min/1.73m2   POCT glucose    Collection Time: 10/13/22  5:19 AM   Result Value Ref Range    POC Glucose 163 (H) 70 - 108 mg/dl   POCT glucose    Collection Time: 10/13/22 10:13 AM   Result Value Ref Range    POC Glucose 232 (H) 70 - 108 mg/dl        Microbiology:    Blood culture #1:   Lab Results   Component Value Date/Time    BC No growth-preliminary  No growth   01/30/2019 08:30 PM       Blood culture #2:No results found for: BLOODCULT2    Organism:    Lab Results   Component Value Date/Time    LABGRAM  02/02/2019 04:20 PM     Rare segmented neutrophils observed. No bacteria seen. MRSA culture only:No results found for: [facility]    Urine culture:   Lab Results   Component Value Date/Time    LABURIN  08/04/2019 01:30 PM     Mixed growth.   The mixture of organisms present represents  both organisms that may cause urinary tract infections and  organisms that are not a common cause of urinary tract  infections and are possibly skin ky or distal urethral  ky. Lab Results   Component Value Date/Time    ORG Mixed Growth 08/04/2019 01:30 PM        Respiratory culture: No results found for: CULTRESP    Aerobic and Anaerobic :  Lab Results   Component Value Date/Time    LABAERO No growth-preliminary  No growth   02/02/2019 04:20 PM     Lab Results   Component Value Date/Time    LABANAE No growth-preliminary  No growth   02/02/2019 04:20 PM       Urinalysis:      Lab Results   Component Value Date/Time    NITRU NEGATIVE 10/11/2022 01:45 AM    WBCUA 0-2 02/02/2019 11:07 AM    BACTERIA NONE 02/02/2019 11:07 AM    RBCUA 0-2 02/02/2019 11:07 AM    BLOODU NEGATIVE 10/11/2022 01:45 AM    SPECGRAV 1.015 08/04/2019 01:30 PM    GLUCOSEU NEGATIVE 10/11/2022 01:45 AM       Radiology:-  XR KNEE RIGHT (MIN 4 VIEWS)    Result Date: 10/11/2022  Right knee x-ray: 4 views. Indication: Fall. Ecchymosis. Comparison: CR/SR - XR KNEE RIGHT (MIN 4 VIEWS) - 10/06/2022 05:04 PM EDT Findings: Bony demineralization. Moderate suprapatellar joint effusion, similar to prior study. Prepatellar soft tissue swelling, new since the prior study. Tricompartmental osteoarthritis, most severe in the lateral compartment. No acute fracture or dislocation. Normal alignment of the patella. No subcutaneous emphysema or radiopaque foreign body in the soft tissue. Peripheral arterial vascular calcifications. Impression: 1. Negative for acute osseous abnormality. Prepatellar soft tissue swelling. Stable moderate joint effusion. 2. Severe tricompartmental osteoarthritis. 3. Peripheral arterial vascular disease.  This document has been electronically signed by: Mane Barajas MD on 10/11/2022 12:01 AM       Follow-up scheduled after discharge :-    in the next few days with FELI Pineda - CNP  in the next few days with OP PT     Consultations during this hospital stay:-  [] NONE [] Cardiology  [] Nephrology  [] Hemo onco   [] GI   [] ID  [] Endocrine  [] Pulm    [] Neuro    [] Psych   [] Urology  [] ENT   [] G SURGERY   []Ortho    []CV surg    [] Palliative  [] Hospice [] Pain management   []    []TCU   [x] PT/OT  OTHERS:-    Disposition: home  Condition at Discharge: Stable    Time Spent:- 40 minutes    Electronically signed by DERICK Yo on 10/14/22 at 5:26 PM EDT   Discharging Hospitalist

## 2022-10-17 ENCOUNTER — HOSPITAL ENCOUNTER (OUTPATIENT)
Dept: PHYSICAL THERAPY | Age: 67
Setting detail: THERAPIES SERIES
Discharge: HOME | End: 2022-10-17
Payer: MEDICARE

## 2022-10-17 PROCEDURE — 97162 PT EVAL MOD COMPLEX 30 MIN: CPT

## 2022-10-17 NOTE — PROGRESS NOTES
** PLEASE SIGN, DATE AND TIME CERTIFICATION BELOW AND RETURN TO [facility] OUTPATIENT REHABILITATION (FAX #: [phone]). ATTEST/CO-SIGN IF ACCESSING VIA [employer]. THANK YOU.**    I certify that I have examined the patient below and determined that Physical Medicine and Rehabilitation service is necessary and that I approve the established plan of care for up to 90 days or as specifically noted. Attestation, signature or co-signature of physician indicates approval of certification requirements.    ________________________ ____________ __________  Physician Signature   Date   Time  [address] [facility]  PHYSICAL THERAPY  [x] EVALUATION  [] DAILY NOTE (LAND) [] DAILY NOTE (AQUATIC ) [] PROGRESS NOTE [] DISCHARGE NOTE    [] [facility]   [] [facility]    [] [address] [facility]   [x] Anand Duel    Date: 10/17/2022  Patient Name:  Drea Burns  : 1955  MRN: [id number]  CSN: [id number]    Referring Practitioner Fritz Garcia MD   Diagnosis Other low back pain [M54.59]    Treatment Diagnosis Difficulty walking; frequent falls; back pain   Date of Evaluation 10/17/22    Additional Pertinent History HTN; DM; obesity; OA; osteoporosis       Functional Outcome Measure Used Modified Oswestry; 5x sit-stand   Functional Outcome Score 26/50 or 52% disability; 16 sec with definite use of hands  (10/17/22)       Insurance: Primary: Payor: Yasmin Rhodes /  /  / ,   Secondary:    Authorization Information: Pre-certification is needed after eval   Visit # 1, 1/10 for progress note   Visits Allowed: 1   Recertification Date:    Physician Follow-Up:    Physician Orders:    History of Present Illness: Brandyn Aden was initially referred to PT to address low back pain and to qualify for an MRI. Unfortunately, he has fallen 4 times in 10 days and he was hospitalized for 3 days before being discharged home.  His wife first took him to the hospital 10 days ago to address right foot fracture secondary to a fall. He continued to fall and his wife took him back to the hospital 6 days later and it was determined that his kidney levels were off. He fell directly on his right knee prior to second trip to the hospital; x-rays were negative for fracture, however he is bone on bone. According to his wife he has been sleeping and falling out of his lift chair. He does not lift the chair before trying to get out of it. He reports tingling in both feet; denies diabetic neuropathy. SUBJECTIVE: Aggravating factors include standing, walking. Alleviating factors include walker; ice. He has been given tramadol in the past, however according to his wife it did not touch his pain. He attributes his most recent fall occurred when he caught the toe of his shoe boot on the doorway. Social/Functional History and Current Status:  Medications and Allergies have been reviewed and are listed on Medical History Questionnaire. Cyril Larios lives with spouse and with family in a single story home with stairs and a handrail to enter.     Task Previous Current   ADLs  Independent Assistance Required   IADL's Independent Independent   Ambulation Independent Assistance Required   Transfers Independent Independent   Recreation Dependent/Unable Dependent/Unable   Community Integration Independent Independent   Driving Active  Active    Work Retired  Retired     OBJECTIVE:    Pain: 6/10 in the knee   Palpation    Observation Wearing a surgical shoe on right side due to fractured great toe; bruising and a skin tear throughout right medial knee   Posture        Range of Motion Right knee: +10-95 degrees    Strength LE strength is WFL bilaterally    Coordination    Sensation    Bed Mobility Struggled with log roll; required assistance to transfer from supine to sit   Transfers    Ambulation Step to gait with FWW leading with left leg;   Stairs Ascend with left; descend with right    Balance Standing balance with eyes closed is fair   Special Tests SLR is limited by knee pain and hamstring stretches          TREATMENT   Precautions:    Pain:     \"X in shaded column indicates activity completed today    *\" next to exercise/intervention indicates progression   Modalities Parameters/  Location  Notes                     Manual Therapy Time/Technique  Notes                     Exercise/  Intervention   Notes   Reviewed HEP that included glute squeezes, quad sets, heel slides, ankle pumps   x                                                                            Specific Interventions Next Treatment: NuStep; perform DLS seated     Activity/Treatment Tolerance:  [x]  Patient tolerated treatment well  []  Patient limited by fatigue  []  Patient limited by pain   []  Patient limited by medical complications  []  Other:     Assessment: Yany Ramachandran is a 69 yr old gentleman referred to PT initially to address low back pain, however from when the appt was first scheduled he has been hospitalized for multiple falls. During his most recent fall he landed directly on his right knee which is bruised and causing him more pain today. Today's evaluation indicated deficits in muscle endurance, pain and poor mobility. He will benefit from PT to work to control pain and inflammation while helping to improve core strength, balance and gait to minimize fall risk. Body Structures/Functions/Activity Limitations: edema, impaired activity tolerance, impaired balance, impaired endurance, impaired ROM, impaired sensation, impaired strength, pain, and abnormal gait  Prognosis: good    GOALS:  Patient Goal: Get an MRI of his back    Short Term Goals:  Time Frame: 4 weeks  Yany Ramachandran will demonstrate good abdominal stabilization while performing all exercises, transfers and mobility to provide greater internal support to his back.   Yany Ramachandran will demonstrate step through gait with knee flexion to minimize added stress on his right hip and lumbar spine. Brandon's right knee ROM will improve to 0-110 degrees allowing reciprocal gait with descending stairs. Brandon's Modified Oswestry will improve to <15 indicating moderate disability related to low back pain. Long Term Goals:  Time Frame: 8 weeks  Rafael Crowley will be discharged from PT with independent HEP to maintain all strength and ROM gains achieved in clinic. Brandon's 5x sit to stand will improve to <12 sec indicating no fall risk. Brandon's Modified Oswestry will improve to <5 indicating minimal to no disability related to his back pain. Patient Education:   [x]  HEP/Education Completed: Plan of Care, Goals,   Medbridge Access Code:  []  No new Education completed  []  Reviewed Prior HEP      [x]  Patient verbalized and/or demonstrated understanding of education provided. []  Patient unable to verbalize and/or demonstrate understanding of education provided. Will continue education. [x]  Barriers to learning: n/a    PLAN:  Treatment Recommendations: Strengthening, Range of Motion, Balance Training, Transfer Training, Endurance Training, Gait Training, Stair Training, Neuromuscular Re-education, Manual Therapy - Soft Tissue Mobilization, Manual Therapy - Joint Manipulation, Pain Management, Home Exercise Program, Patient Education, Aquatics, and Modalities    [x]  Plan of care initiated. Plan to see patient 2 times per week for 6 weeks to address the treatment planned outlined above.   []  Continue with current plan of care  []  Modify plan of care as follows:    []  Hold pending physician visit  []  Discharge    Time In 1000   Time Out 1045   Timed Code Minutes: 0 min   Total Treatment Time: 45 min       Electronically Signed by: PILO Hernandez [id number]"Sunny Ferris DPT  [id number]

## 2022-11-04 ENCOUNTER — HOSPITAL ENCOUNTER (OUTPATIENT)
Dept: PHYSICAL THERAPY | Age: 67
Setting detail: THERAPIES SERIES
Discharge: HOME | End: 2022-11-04
Payer: MEDICARE

## 2022-11-04 PROCEDURE — 97110 THERAPEUTIC EXERCISES: CPT

## 2022-11-04 PROCEDURE — 97530 THERAPEUTIC ACTIVITIES: CPT

## 2022-11-04 NOTE — PROGRESS NOTES
[address] [facility]  PHYSICAL THERAPY  [] EVALUATION  [x] DAILY NOTE (LAND) [] DAILY NOTE (AQUATIC ) [] PROGRESS NOTE [] DISCHARGE NOTE    [] [facility]   [] [address]    [] [facility]   [x] Gardenia Soriano    Date: 2022  Patient Name:  Debra Stroud  : 1955  MRN: [id number]  CSN: [id number]    Referring Practitioner Rhys Penny MD   Diagnosis Other low back pain [M54.59]    Treatment Diagnosis Difficulty walking; frequent falls; back pain   Date of Evaluation 10/17/22    Additional Pertinent History HTN; DM; obesity; OA; osteoporosis       Functional Outcome Measure Used Modified Oswestry; 5x sit-stand   Functional Outcome Score 26/50 or 52% disability; 16 sec with definite use of hands  (10/17/22)       Insurance: Primary: Payor: Rios Arshad /  /  / ,   Secondary:    Authorization Information: Pre-certification is needed after eval   Visit # 2, 2/10 for progress note   Visits Allowed: 1   Recertification Date:    Physician Follow-Up:    Physician Orders:    History of Present Illness: Ramiro Eye was initially referred to PT to address low back pain and to qualify for an MRI. Unfortunately, he has fallen 4 times in 10 days and he was hospitalized for 3 days before being discharged home. His wife first took him to the hospital 10 days ago to address right foot fracture secondary to a fall. He continued to fall and his wife took him back to the hospital 6 days later and it was determined that his kidney levels were off. He fell directly on his right knee prior to second trip to the hospital; x-rays were negative for fracture, however he is bone on bone. According to his wife he has been sleeping and falling out of his lift chair. He does not lift the chair before trying to get out of it. He reports tingling in both feet; denies diabetic neuropathy. SUBJECTIVE: Rep[orts no falls lately.  States he is using his RW. TREATMENT   Precautions:    Pain:0/10     \"X in shaded column indicates activity completed today    *\" next to exercise/intervention indicates progression   Modalities Parameters/  Location  Notes                     Manual Therapy Time/Technique  Notes                     Exercise/  Intervention   Notes   Nustep X5 min  x Seat 7 arms 8, load 5          Quad sets, glute sets, abdominal sets x10  x 5 sec   SLR x10  x    Supine marches x10  x    SAQs x10  x    ankle pumps x10  x    Pelvic tilts x10  x    bridges x10  x    LTR x10  x                                              Ambulates through clinic with RW   x      Specific Interventions Next Treatment: NuStep; perform DLS seated     Activity/Treatment Tolerance:  [x]  Patient tolerated treatment well  []  Patient limited by fatigue  []  Patient limited by pain   []  Patient limited by medical complications  []  Other:     Assessment: Required multiple VCs to understand each exercise. Body Structures/Functions/Activity Limitations: edema, impaired activity tolerance, impaired balance, impaired endurance, impaired ROM, impaired sensation, impaired strength, pain, and abnormal gait  Prognosis: good    GOALS:  Patient Goal: Get an MRI of his back    Short Term Goals:  Time Frame: 4 weeks  Abi Doran will demonstrate good abdominal stabilization while performing all exercises, transfers and mobility to provide greater internal support to his back. Abi Doran will demonstrate step through gait with knee flexion to minimize added stress on his right hip and lumbar spine. Brandon's right knee ROM will improve to 0-110 degrees allowing reciprocal gait with descending stairs. Brandon's Modified Oswestry will improve to <15 indicating moderate disability related to low back pain. Long Term Goals:  Time Frame: 8 weeks  Abi Doran will be discharged from PT with independent HEP to maintain all strength and ROM gains achieved in clinic.   Brandon's 5x sit to stand will improve to <12 sec indicating no fall risk. Brandon's Modified Oswestry will improve to <5 indicating minimal to no disability related to his back pain. Patient Education:   [x]  HEP/Education Completed: Plan of Care, Goals,   Medbridge Access Code:  []  No new Education completed  []  Reviewed Prior HEP      [x]  Patient verbalized and/or demonstrated understanding of education provided. []  Patient unable to verbalize and/or demonstrate understanding of education provided. Will continue education. [x]  Barriers to learning: n/a    PLAN:  Treatment Recommendations: Strengthening, Range of Motion, Balance Training, Transfer Training, Endurance Training, Gait Training, Stair Training, Neuromuscular Re-education, Manual Therapy - Soft Tissue Mobilization, Manual Therapy - Joint Manipulation, Pain Management, Home Exercise Program, Patient Education, Aquatics, and Modalities    [x]  Plan of care initiated. Plan to see patient 2 times per week for 6 weeks to address the treatment planned outlined above.   []  Continue with current plan of care  []  Modify plan of care as follows:    []  Hold pending physician visit  []  Discharge    Time In 1427   Time Out 1457   Timed Code Minutes: 30 min   Total Treatment Time: 30 min       Electronically Signed by: Nelli Bustillos PTA [id number]

## 2022-11-09 ENCOUNTER — HOSPITAL ENCOUNTER (OUTPATIENT)
Dept: PHYSICAL THERAPY | Age: 67
Setting detail: THERAPIES SERIES
Discharge: HOME | End: 2022-11-09
Payer: MEDICARE

## 2022-11-09 PROCEDURE — 97110 THERAPEUTIC EXERCISES: CPT

## 2022-11-09 NOTE — PROGRESS NOTES
[address] [facility]  PHYSICAL THERAPY  [] EVALUATION  [x] DAILY NOTE (LAND) [] DAILY NOTE (AQUATIC ) [] PROGRESS NOTE [] DISCHARGE NOTE    [] [facility]   [] John     [] [address] [facility]   [x] Sergo Oliver    Date: 2022  Patient Name:  Karl Turner  : 1955  MRN: [id number]  CSN: [id number]    Referring Practitioner Juan Diego Robertson MD   Diagnosis Other low back pain [M54.59]    Treatment Diagnosis Difficulty walking; frequent falls; back pain   Date of Evaluation 10/17/22    Additional Pertinent History HTN; DM; obesity; OA; osteoporosis       Functional Outcome Measure Used Modified Oswestry; 5x sit-stand   Functional Outcome Score 26/50 or 52% disability; 16 sec with definite use of hands  (10/17/22)       Insurance: Primary: Payor: Emerson Freitas /  /  / ,   Secondary:    Authorization Information: Pre-certification is needed after eval   Visit # 3, 3/10 for progress note   Visits Allowed: RECEIVED AUTH FOR PT  TOTAL OF 18 VISITS  FROM 10/17/22 TO 01/15/23  CPT CODES:  88367, [id number], 43039, 42787, 84711, Kayli Schreibershaylahenry 894 # [id number]   Recertification Date:    Physician Follow-Up:    Physician Orders:    History of Present Illness: Priscilla Cooper was initially referred to PT to address low back pain and to qualify for an MRI. Unfortunately, he has fallen 4 times in 10 days and he was hospitalized for 3 days before being discharged home. His wife first took him to the hospital 10 days ago to address right foot fracture secondary to a fall. He continued to fall and his wife took him back to the hospital 6 days later and it was determined that his kidney levels were off. He fell directly on his right knee prior to second trip to the hospital; x-rays were negative for fracture, however he is bone on bone. According to his wife he has been sleeping and falling out of his lift chair.  He does not lift the chair before trying to get out of it. He reports tingling in both feet; denies diabetic neuropathy. SUBJECTIVE: Pt stated R knee continues to be painful. Bruising and swelling at medial knee. Pt stated no increase pain after last session. Pain started in low back but now R knee hurts worse. TREATMENT   Precautions:    Pain: 10/10 R knee,  denies back pain today. \"X in shaded column indicates activity completed today    *\" next to exercise/intervention indicates progression   Modalities Parameters/  Location  Notes                     Manual Therapy Time/Technique  Notes                     Exercise/  Intervention   Notes   Nustep X5 min  x Seat 7 arms 8, load 5          Quad sets, glute sets, abdominal sets x10  x 5 sec   SLR x10  x    Heel slides with bracing x10  x    Supine marches x10  x    SAQs x10  x    ankle pumps x10  x    Pelvic tilts x10  x    bridges x10  x    LTR x10  x           Sit to stands from mat table x10  x                                Ambulates through clinic with  ft  x Working on heel toe pattern with increased step length on R     Specific Interventions Next Treatment: NuStep; perform DLS seated     Activity/Treatment Tolerance:  [x]  Patient tolerated treatment well  []  Patient limited by fatigue  []  Patient limited by pain   []  Patient limited by medical complications  []  Other:     Assessment: Pt not having any back pain this date but R knee is bruised and swelled due to previous fall. Pt working on dynamic lumbar stabilization exercises with good core engagement. Pt cued on correct gait pattern when ambulating to stay away from step to gait pattern.            Body Structures/Functions/Activity Limitations: edema, impaired activity tolerance, impaired balance, impaired endurance, impaired ROM, impaired sensation, impaired strength, pain, and abnormal gait  Prognosis: good    GOALS:  Patient Goal: Get an MRI of his back    Short Term Goals:  Time Frame: 4 weeks  Viki Legacy will demonstrate good abdominal stabilization while performing all exercises, transfers and mobility to provide greater internal support to his back. Abi Doran will demonstrate step through gait with knee flexion to minimize added stress on his right hip and lumbar spine. Lamonts right knee ROM will improve to 0-110 degrees allowing reciprocal gait with descending stairs. Lamonts Modified Oswestry will improve to <15 indicating moderate disability related to low back pain. Long Term Goals:  Time Frame: 8 weeks  Abi Doran will be discharged from PT with independent HEP to maintain all strength and ROM gains achieved in clinic. Brandon's 5x sit to stand will improve to <12 sec indicating no fall risk. Brandon's Modified Oswestry will improve to <5 indicating minimal to no disability related to his back pain. Patient Education:   [x]  HEP/Education Completed: Plan of Care, Goals, LTR, Abdominal bracing, supine marching, PPT, bridges  Medbridge Access Code: Rita Siddiqi  []  No new Education completed  []  Reviewed Prior HEP      [x]  Patient verbalized and/or demonstrated understanding of education provided. []  Patient unable to verbalize and/or demonstrate understanding of education provided. Will continue education. [x]  Barriers to learning: n/a    PLAN:  Treatment Recommendations: Strengthening, Range of Motion, Balance Training, Transfer Training, Endurance Training, Gait Training, Stair Training, Neuromuscular Re-education, Manual Therapy - Soft Tissue Mobilization, Manual Therapy - Joint Manipulation, Pain Management, Home Exercise Program, Patient Education, Aquatics, and Modalities    []  Plan of care initiated. Plan to see patient 2 times per week for 6 weeks to address the treatment planned outlined above.   [x]  Continue with current plan of care  []  Modify plan of care as follows:    []  Hold pending physician visit  []  Discharge    Time In 1100   Time Out 1132   Timed Code Minutes: 32 min Total Treatment Time: 32 min       Electronically Signed by: Layo Galaviz PTA

## 2022-11-10 PROBLEM — R79.89 ELEVATED TROPONIN: Status: RESOLVED | Noted: 2022-10-11 | Resolved: 2022-11-10
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
## 2022-11-10 PROBLEM — R77.8 ELEVATED TROPONIN: Status: RESOLVED | Noted: 2022-10-11 | Resolved: 2022-11-10

## 2022-11-11 ENCOUNTER — HOSPITAL ENCOUNTER (OUTPATIENT)
Dept: PHYSICAL THERAPY | Age: 67
Setting detail: THERAPIES SERIES
Discharge: HOME | End: 2022-11-11
Payer: MEDICARE

## 2022-11-11 PROCEDURE — 97110 THERAPEUTIC EXERCISES: CPT

## 2022-11-11 NOTE — PROGRESS NOTES
[address] [facility]  PHYSICAL THERAPY  [] EVALUATION  [x] DAILY NOTE (LAND) [] DAILY NOTE (AQUATIC ) [] PROGRESS NOTE [] DISCHARGE NOTE    [] [facility]   [] John [address]    [] [address] [facility]   [x] Norah New    Date: 2022  Patient Name:  Adele Thorpe  : 1955  MRN: [id number]  CSN: [id number]    Referring Practitioner Real Manriquez MD   Diagnosis Other low back pain [M54.59]    Treatment Diagnosis Difficulty walking; frequent falls; back pain   Date of Evaluation 10/17/22    Additional Pertinent History HTN; DM; obesity; OA; osteoporosis       Functional Outcome Measure Used Modified Oswestry; 5x sit-stand   Functional Outcome Score 26/50 or 52% disability; 16 sec with definite use of hands  (10/17/22)       Insurance: Primary: Payor: [place] /  /  / ,   Secondary:    Authorization Information: Pre-certification is needed after eval   Visit # 4, 4/10 for progress note   Visits Allowed: RECEIVED AUTH FOR PT  TOTAL OF 18 VISITS  FROM 10/17/22 TO 01/15/23  CPT CODES:  82845, [id number], 99745, 67281, 91099, 50894  ORDER # [id number]   Recertification Date:    Physician Follow-Up:    Physician Orders:    History of Present Illness: Marilin Ibrahim was initially referred to PT to address low back pain and to qualify for an MRI. Unfortunately, he has fallen 4 times in 10 days and he was hospitalized for 3 days before being discharged home. His wife first took him to the hospital 10 days ago to address right foot fracture secondary to a fall. He continued to fall and his wife took him back to the hospital 6 days later and it was determined that his kidney levels were off. He fell directly on his right knee prior to second trip to the hospital; x-rays were negative for fracture, however he is bone on bone. According to his wife he has been sleeping and falling out of his lift chair.  He does not lift the chair before trying Frame: 4 weeks  Nichole Monroe will demonstrate good abdominal stabilization while performing all exercises, transfers and mobility to provide greater internal support to his back. Nichole Monroe will demonstrate step through gait with knee flexion to minimize added stress on his right hip and lumbar spine. Lamonts right knee ROM will improve to 0-110 degrees allowing reciprocal gait with descending stairs. Lamonts Modified Oswestry will improve to <15 indicating moderate disability related to low back pain. Long Term Goals:  Time Frame: 8 weeks  Nichole Monroe will be discharged from PT with independent HEP to maintain all strength and ROM gains achieved in clinic. Brandon's 5x sit to stand will improve to <12 sec indicating no fall risk. Lamonts Modified Oswestry will improve to <5 indicating minimal to no disability related to his back pain. Patient Education:   [x]  HEP/Education Completed: Plan of Care, Goals, LTR, Abdominal bracing, supine marching, PPT, bridges  Medbridge Access Code: Kaela Muncy  []  No new Education completed  []  Reviewed Prior HEP      [x]  Patient verbalized and/or demonstrated understanding of education provided. []  Patient unable to verbalize and/or demonstrate understanding of education provided. Will continue education. [x]  Barriers to learning: n/a    PLAN:  Treatment Recommendations: Strengthening, Range of Motion, Balance Training, Transfer Training, Endurance Training, Gait Training, Stair Training, Neuromuscular Re-education, Manual Therapy - Soft Tissue Mobilization, Manual Therapy - Joint Manipulation, Pain Management, Home Exercise Program, Patient Education, Aquatics, and Modalities    []  Plan of care initiated. Plan to see patient 2 times per week for 6 weeks to address the treatment planned outlined above.   [x]  Continue with current plan of care  []  Modify plan of care as follows:    []  Hold pending physician visit  []  Discharge    Time In 1600   Time Out 1625 Timed Code Minutes: 25 min   Total Treatment Time: 25 min       Electronically Signed by: Claudia Roy PTA

## 2022-11-14 ENCOUNTER — APPOINTMENT (OUTPATIENT)
Dept: PHYSICAL THERAPY | Age: 67
End: 2022-11-14
Payer: MEDICARE

## 2022-11-17 ENCOUNTER — HOSPITAL ENCOUNTER (OUTPATIENT)
Dept: PHYSICAL THERAPY | Age: 67
Setting detail: THERAPIES SERIES
Discharge: HOME | End: 2022-11-17
Payer: MEDICARE

## 2022-11-17 PROCEDURE — 97110 THERAPEUTIC EXERCISES: CPT

## 2022-11-17 NOTE — PROGRESS NOTES
[address] [facility]  PHYSICAL THERAPY  [] EVALUATION  [x] DAILY NOTE (LAND) [] DAILY NOTE (AQUATIC ) [] PROGRESS NOTE [] DISCHARGE NOTE    [] [facility]   [] John 90    [] [address] [facility]   [x] Chaitanya Mccullough    Date: 2022  Patient Name:  Kermit Luo  : 1955  MRN: [id number]  CSN: [id number]    Referring Practitioner Maya Barlow MD   Diagnosis Other low back pain [M54.59]    Treatment Diagnosis Difficulty walking; frequent falls; back pain   Date of Evaluation 10/17/22    Additional Pertinent History HTN; DM; obesity; OA; osteoporosis       Functional Outcome Measure Used Modified Oswestry; 5x sit-stand   Functional Outcome Score 26/50 or 52% disability; 16 sec with definite use of hands  (10/17/22)       Insurance: Primary: Payor: Kady Garcia /  /  / ,   Secondary:    Authorization Information: Pre-certification is needed after eval   Visit # 5, 5/10 for progress note   Visits Allowed: RECEIVED AUTH FOR PT  TOTAL OF 18 VISITS  FROM 10/17/22 TO 01/15/23  CPT CODES:  15821, [id number], 52258, 23906, 52519, 80364  ORDER # [id number]   Recertification Date:    Physician Follow-Up:    Physician Orders:    History of Present Illness: Greg Zaidi was initially referred to PT to address low back pain and to qualify for an MRI. Unfortunately, he has fallen 4 times in 10 days and he was hospitalized for 3 days before being discharged home. His wife first took him to the hospital 10 days ago to address right foot fracture secondary to a fall. He continued to fall and his wife took him back to the hospital 6 days later and it was determined that his kidney levels were off. He fell directly on his right knee prior to second trip to the hospital; x-rays were negative for fracture, however he is bone on bone. According to his wife he has been sleeping and falling out of his lift chair.  He does not lift the chair before trying to get out of it. He reports tingling in both feet; denies diabetic neuropathy. SUBJECTIVE: Pt stated he hasn't done HEP regularly. Pt continues to have R knee pain. Pt using RW for ambulation. TREATMENT   Precautions: Supine propped on wedge   Pain: 5/10 R knee,  denies back pain today. \"X in shaded column indicates activity completed today    *\" next to exercise/intervention indicates progression   Modalities Parameters/  Location  Notes                     Manual Therapy Time/Technique  Notes                     Exercise/  Intervention   Notes   Nustep X5 min  x Seat 7 arms 8, load 5          Quad sets, glute sets, abdominal sets x10  x 5 sec   SLR x15  x    Heel slides with bracing x15  x    Supine marches x15  x    SAQs x15  x    ankle pumps x15  x    Hip abduction x15  x    Adductor squeeze x15 5sec x    Pelvic tilts x15  x    bridges x15  x    LTR x15  x           Sit to stands from mat table x10  x    Sitting: LAQ 15x  x    Standing in // bar: heel toe raises, marching, mini squats,HS curls 10x  x Audible \"clunk\" with mini squats   3 way hip 10x  x    stairs 1x  x L lead with step to gait pattern    Ambulates through clinic with  ft  x Working on heel toe pattern with increased step length on R, decreased R knee flexion during swing phase. Externally rotated at R hip      Specific Interventions Next Treatment: NuStep; perform DLS seated     Activity/Treatment Tolerance:  [x]  Patient tolerated treatment well  []  Patient limited by fatigue  []  Patient limited by pain   []  Patient limited by medical complications  []  Other:     Assessment: R knee continues to be very painful with crepitus felt with weight bearing R knee flexion. Discussed with PT discharging pt at this time so he can return to an orthopedic Dr to have further testing. Pt does have golf ball sized knot on R knee. Pt is unable to flex knee during ambulation due to pain and walks with ft out to side.            Body Structures/Functions/Activity Limitations: edema, impaired activity tolerance, impaired balance, impaired endurance, impaired ROM, impaired sensation, impaired strength, pain, and abnormal gait  Prognosis: good    GOALS:  Patient Goal: Get an MRI of his back    Short Term Goals:  Time Frame: 4 weeks  Nitish Lozada will demonstrate good abdominal stabilization while performing all exercises, transfers and mobility to provide greater internal support to his back. Nitish Lozada will demonstrate step through gait with knee flexion to minimize added stress on his right hip and lumbar spine. Lamonts right knee ROM will improve to 0-110 degrees allowing reciprocal gait with descending stairs. Lamonts Modified Oswestry will improve to <15 indicating moderate disability related to low back pain. Long Term Goals:  Time Frame: 8 weeks  Nitish Lozada will be discharged from PT with independent HEP to maintain all strength and ROM gains achieved in clinic. Lamonts 5x sit to stand will improve to <12 sec indicating no fall risk. Lamonts Modified Oswestry will improve to <5 indicating minimal to no disability related to his back pain. Patient Education:   [x]  HEP/Education Completed: Plan of Care, Goals, LTR, Abdominal bracing, supine marching, PPT, bridges  Medbridge Access Code: Kennth Senna  []  No new Education completed  []  Reviewed Prior HEP      [x]  Patient verbalized and/or demonstrated understanding of education provided. []  Patient unable to verbalize and/or demonstrate understanding of education provided. Will continue education.   [x]  Barriers to learning: n/a    PLAN:  Treatment Recommendations: Strengthening, Range of Motion, Balance Training, Transfer Training, Endurance Training, Gait Training, Stair Training, Neuromuscular Re-education, Manual Therapy - Soft Tissue Mobilization, Manual Therapy - Joint Manipulation, Pain Management, Home Exercise Program, Patient Education, Aquatics, and Modalities    [] Plan of care initiated. Plan to see patient 2 times per week for 6 weeks to address the treatment planned outlined above.   [x]  Continue with current plan of care  []  Modify plan of care as follows:    []  Hold pending physician visit  []  Discharge    Time In 1330   Time Out 1405   Timed Code Minutes: 35 min   Total Treatment Time: 35 min       Electronically Signed by: Anneliese Elam PTA

## 2022-12-01 NOTE — DISCHARGE SUMMARY
Harpreet Cardenas NOTE  OUTPATIENT  [address] [facility]    Patient Name: Lorraine Jameson        CSN: [id number]   YOB: 1955  Gender: male  Melissa Sue MD,    Other low back pain [M54.59] ,      Patient is discharged from Physical Therapy services at this time. See last note for details related to results of therapy and goal achievement. Reason for discharge: Requires further testing. Abi Doran was becoming more limited by his knee pain and was not tolerating therapy.  He was encouraged to return to his MD.      Sandip Chen, DPT  [id number]

## 2022-12-14 RX ORDER — FUROSEMIDE 40 MG/1
40 TABLET ORAL 2 TIMES DAILY
Qty: 90 TABLET | Refills: 2 | Status: SHIPPED | OUTPATIENT
Start: 2022-12-14

## 2022-12-14 NOTE — TELEPHONE ENCOUNTER
Elie Hermosillo called requesting a refill on the following medications:  Requested Prescriptions     Pending Prescriptions Disp Refills    furosemide (LASIX) 40 MG tablet 90 tablet 1     Sig: Take 1 tablet by mouth 2 times daily     Pharmacy verified: [facility] [facility] in [facility]  .       Date of last visit: 7/05/2022  Date of next visit (if applicable): 2/88/4517

## 2022-12-19 NOTE — PROGRESS NOTES
[facility]      Smiley Trevino         [id number]  12/20/2022   Chief Complaint   Patient presents with    Follow-up     6mo SRAVAN f/u w/[facility] download. Doing ok with machine but he is sleeping in his recliner d/t recent injuries. Pt of Dr. Walker Blake     PAP Download:   Original or initial AHI: 66.7     Date of initial study: 3/1/22        Compliant  93%     Noncompliant 7 %     PAP Type BiPAP    Level  18/81joJ8V   Avg Hrs/Day 5hrs 12mins  AHI: 0.7   Recorded compliance dates , 11/15/22  to 12/14/22   Machine/Mfg:   [x] ResMed    [] Respironics/Dreamstation   Interface:   [] Nasal    [] Nasal pillows   [] FFM      Provider:      [x] [facility]     []Chanelle     [] Jackelyn    [] Artist Artie    [] Penny               [] [facility]      [] Adaptive    [] [address]      [] Other    Neck Size: 20  Mallampati 4  ESS:  2  SAQLI: 67    Here is a scan of the most recent download:                Presentation:   Dmitry Lim presents for sleep medicine follow up for obstructive sleep apnea  Since the last visit, Dmitry Lim is doing well with PAP. His BP is low today. He denies being dizzy or lightheaded. He is sleeping well. He still gets tired during the day. Equipment issues: The pressure is  acceptable, the mask is acceptable     Sleep issues:  Do you feel better? Yes and No  More rested? Sometimes   Better concentration? yes    Progress History:   Since last visit any new medical issues? Yes [place]  New ER or hospital visits? Yes [place]  Any new or changes in medicines? No  Any new sleep medicines? No    Review of Systems -   Review of Systems   Constitutional:  Negative for activity change, appetite change, chills and fever. HENT:  Negative for congestion and postnasal drip. Eyes: Negative. Respiratory:  Negative for cough, chest tightness, shortness of breath, wheezing and stridor. Cardiovascular:  Negative for chest pain and leg swelling.    Gastrointestinal: Negative for diarrhea and nausea. Endocrine: Negative. Genitourinary: Negative. Musculoskeletal: Negative. Negative for arthralgias and back pain. Skin: Negative. Allergic/Immunologic: Negative. Neurological: Negative. Negative for dizziness and light-headedness. Psychiatric/Behavioral: Negative. All other systems reviewed and are negative. Physical Exam:    BMI:  Body mass index is 41.27 kg/m². Wt Readings from Last 3 Encounters:   12/20/22 233 lb (105.7 kg)   10/11/22 218 lb 4.8 oz (99 kg)   10/03/22 226 lb (102.5 kg)     Weight stable / unchanged  Vitals: BP (!) 90/54 (Site: Left Upper Arm, Position: Sitting, Cuff Size: Large Adult)   Pulse 85   Temp 98.3 °F (36.8 °C) (Oral)   Ht 5' 3\" (1.6 m)   Wt 233 lb (105.7 kg)   SpO2 92% Comment: r/a  BMI 41.27 kg/m²       Physical Exam  Constitutional:       Appearance: Normal appearance. He is normal weight. HENT:      Head: Normocephalic and atraumatic. Right Ear: External ear normal.      Left Ear: External ear normal.      Nose: Nose normal.   Eyes:      Extraocular Movements: Extraocular movements intact. Conjunctiva/sclera: Conjunctivae normal.      Pupils: Pupils are equal, round, and reactive to light. Pulmonary:      Effort: Pulmonary effort is normal.   Musculoskeletal:      Cervical back: Normal range of motion and neck supple. Neurological:      General: No focal deficit present. Mental Status: He is alert and oriented to person, place, and time. Psychiatric:         Attention and Perception: Attention and perception normal.         Mood and Affect: Mood and affect normal.         Speech: Speech normal.         Behavior: Behavior normal. Behavior is cooperative. Thought Content: Thought content normal.         Cognition and Memory: Cognition normal.         Judgment: Judgment normal.         ASSESSMENT/DIAGNOSIS     Diagnosis Orders   1. SRAVAN treated with BiPAP        2.  Chronic diastolic congestive heart failure ([facility] [address]        3. Hypotension, unspecified hypotension type                 Plan   Do you need any equipment today? Yes  - instructed to go to ED if becomes symptomatic of low BP  - Check BP everyday and notify PCP/cardiology if remains low  - Download reviewed and discussed with patient  - He  was advised to continue current positive airway pressure therapy with above described pressure. - He  advised to keep good compliance with current recommended pressure to get optimal results and clinical improvement  - Recommend 7-9 hours of sleep with PAP  - He was advised to call [employer] regarding supplies if needed.   -He call my office for earlier appointment if needed for worsening of sleep symptoms.   - He was instructed on weight loss  - Vikinathaniel Lopez was educated about my impression and plan. Patient verbalizesunderstanding.   We will see Cecy Bradford back in: 1 year with download    Information added by my medical assistant/LPN was reviewed today       Francesco Osborn, [facility]  12/20/2022

## 2022-12-20 ENCOUNTER — OFFICE VISIT (OUTPATIENT)
Dept: PULMONOLOGY | Age: 67
End: 2022-12-20
Payer: MEDICARE

## 2022-12-20 VITALS
WEIGHT: 233 LBS | SYSTOLIC BLOOD PRESSURE: 90 MMHG | HEART RATE: 85 BPM | TEMPERATURE: 98.3 F | DIASTOLIC BLOOD PRESSURE: 54 MMHG | HEIGHT: 63 IN | OXYGEN SATURATION: 92 % | BODY MASS INDEX: 41.29 KG/M2

## 2022-12-20 DIAGNOSIS — I95.9 HYPOTENSION, UNSPECIFIED HYPOTENSION TYPE: ICD-10-CM

## 2022-12-20 DIAGNOSIS — G47.33 OSA TREATED WITH BIPAP: Primary | ICD-10-CM

## 2022-12-20 DIAGNOSIS — I50.32 CHRONIC DIASTOLIC CONGESTIVE HEART FAILURE (HCC): ICD-10-CM

## 2022-12-20 PROCEDURE — 1123F ACP DISCUSS/DSCN MKR DOCD: CPT | Performed by: PHYSICIAN ASSISTANT

## 2022-12-20 PROCEDURE — 3074F SYST BP LT 130 MM HG: CPT | Performed by: PHYSICIAN ASSISTANT

## 2022-12-20 PROCEDURE — 3078F DIAST BP <80 MM HG: CPT | Performed by: PHYSICIAN ASSISTANT

## 2022-12-20 PROCEDURE — 99214 OFFICE O/P EST MOD 30 MIN: CPT | Performed by: PHYSICIAN ASSISTANT

## 2022-12-20 ASSESSMENT — ENCOUNTER SYMPTOMS
CHEST TIGHTNESS: 0
WHEEZING: 0
DIARRHEA: 0
EYES NEGATIVE: 1
ALLERGIC/IMMUNOLOGIC NEGATIVE: 1
STRIDOR: 0
COUGH: 0
SHORTNESS OF BREATH: 0
NAUSEA: 0
BACK PAIN: 0

## 2023-01-11 ENCOUNTER — TELEPHONE (OUTPATIENT)
Dept: CARDIOLOGY CLINIC | Age: 68
End: 2023-01-11

## 2023-01-11 ENCOUNTER — OFFICE VISIT (OUTPATIENT)
Dept: CARDIOLOGY CLINIC | Age: 68
End: 2023-01-11
Payer: MEDICARE

## 2023-01-11 VITALS
SYSTOLIC BLOOD PRESSURE: 104 MMHG | OXYGEN SATURATION: 95 % | WEIGHT: 230 LBS | HEART RATE: 77 BPM | BODY MASS INDEX: 40.74 KG/M2 | DIASTOLIC BLOOD PRESSURE: 60 MMHG

## 2023-01-11 DIAGNOSIS — I50.32 CHRONIC DIASTOLIC CONGESTIVE HEART FAILURE, NYHA CLASS 2 (HCC): Primary | ICD-10-CM

## 2023-01-11 DIAGNOSIS — R60.0 EDEMA OF BOTH LOWER LEGS: ICD-10-CM

## 2023-01-11 LAB
ANION GAP SERPL CALCULATED.3IONS-SCNC: 13 MEQ/L (ref 8–16)
BUN BLDV-MCNC: 74 MG/DL (ref 7–22)
CALCIUM SERPL-MCNC: 8.8 MG/DL (ref 8.5–10.5)
CHLORIDE BLD-SCNC: 100 MEQ/L (ref 98–111)
CO2: 24 MEQ/L (ref 23–33)
CREAT SERPL-MCNC: 2.6 MG/DL (ref 0.4–1.2)
GFR SERPL CREATININE-BSD FRML MDRD: 26 ML/MIN/1.73M2
GLUCOSE BLD-MCNC: 99 MG/DL (ref 70–108)
MAGNESIUM: 2.3 MG/DL (ref 1.6–2.4)
POTASSIUM SERPL-SCNC: 5.2 MEQ/L (ref 3.5–5.2)
PRO-BNP: 63 PG/ML (ref 0–124)
SODIUM BLD-SCNC: 137 MEQ/L (ref 135–145)

## 2023-01-11 PROCEDURE — 99215 OFFICE O/P EST HI 40 MIN: CPT | Performed by: NURSE PRACTITIONER

## 2023-01-11 PROCEDURE — 36415 COLL VENOUS BLD VENIPUNCTURE: CPT | Performed by: NURSE PRACTITIONER

## 2023-01-11 PROCEDURE — 96374 THER/PROPH/DIAG INJ IV PUSH: CPT | Performed by: NURSE PRACTITIONER

## 2023-01-11 PROCEDURE — 3078F DIAST BP <80 MM HG: CPT | Performed by: NURSE PRACTITIONER

## 2023-01-11 PROCEDURE — 1123F ACP DISCUSS/DSCN MKR DOCD: CPT | Performed by: NURSE PRACTITIONER

## 2023-01-11 PROCEDURE — 3074F SYST BP LT 130 MM HG: CPT | Performed by: NURSE PRACTITIONER

## 2023-01-11 RX ORDER — LOSARTAN POTASSIUM 100 MG/1
50 TABLET ORAL DAILY
Qty: 45 TABLET | Refills: 3
Start: 2023-01-11

## 2023-01-11 RX ORDER — POTASSIUM CHLORIDE 20 MEQ/1
40 TABLET, EXTENDED RELEASE ORAL ONCE
Status: COMPLETED | OUTPATIENT
Start: 2023-01-11 | End: 2023-01-11

## 2023-01-11 RX ORDER — MELOXICAM 15 MG/1
TABLET ORAL
COMMUNITY
Start: 2022-12-05

## 2023-01-11 RX ORDER — FUROSEMIDE 10 MG/ML
60 INJECTION INTRAMUSCULAR; INTRAVENOUS ONCE
Status: COMPLETED | OUTPATIENT
Start: 2023-01-11 | End: 2023-01-11

## 2023-01-11 RX ORDER — LANOLIN ALCOHOL/MO/W.PET/CERES
CREAM (GRAM) TOPICAL
COMMUNITY
Start: 2022-12-24

## 2023-01-11 RX ADMIN — POTASSIUM CHLORIDE 40 MEQ: 20 TABLET, EXTENDED RELEASE ORAL at 15:24

## 2023-01-11 RX ADMIN — FUROSEMIDE 60 MG: 10 INJECTION INTRAMUSCULAR; INTRAVENOUS at 15:23

## 2023-01-11 ASSESSMENT — ENCOUNTER SYMPTOMS
SHORTNESS OF BREATH: 0
VOMITING: 0
ABDOMINAL DISTENTION: 0
NAUSEA: 0
COUGH: 0

## 2023-01-11 NOTE — TELEPHONE ENCOUNTER
----- Message from FELI Kincaid CNP sent at 1/11/2023  3:58 PM EST -----  Cr is notably up from baseline. We need to hold his Lasix/potassium for 3 days as long as he does not experience significant weight gain. He needs to drink at least 1500cc of water a day. Repeat blood work Friday. Call with notable weight gain or symptoms. I called pt. Cell not working, unable to leave voicemail on other.

## 2023-01-11 NOTE — PROGRESS NOTES
Heart Failure Clinic       Visit Date: 1/11/2023  Cardiologist:  Dr. Sheriff   Primary Care Physician: Dr. Jamie Bronson, APRN - CNP    Debra Stroud is a 69 y.o. male who presents today for:  Chief Complaint   Patient presents with    Congestive Heart Failure       HPI:     TYPE HF: HFpEF (60-65%) (40-45% 2019) DD grade 1  Cause: nonischemic  Device: none  HX: CAD (nonobstructive), DM, HLD, HTN  Dry Wt:  (226 on 10/3/22) (230 on 1/11/23)    Debra Stroud is a 69 y.o. male who presents to the office for a follow up patient visit in the heart failure clinic. Concerns today: here today for his 3 month f/u. He was admitted to the hospital last October after our visit for [facility], it was documented that his HF was compensated. He had been on hydralazine 100mg TID, this was stopped at discharge. He is urinating well on his lasix. He is eating high Na meals daily. Visit on 10/3/22: pt here today for a 1 year f/u. Seems confused about his medications. Not sure of lasix and Kcl dose. Urinating well on his lasix but it has also been doubled since last year. He is very non-compliant w/ his diet.      Activity: ADLs performed, denies FOSTRE  Diet: does not follow low sodium diet, does not pay attention to fluid but seems to think he stays under 64oz      Hospitalization:  > 6 months      Patient has:  Chest Pain: no  SOB: no  Orthopnea/PND: no  SRAVAN: no  Edema: no  Fatigue: no  Abdominal bloating: no  Cough: no  Appetite: good      Past Medical History:   Diagnosis Date    Arthritis     CAD (coronary artery disease) 04/05/2012    CHF (congestive heart failure) (HCC)     Diabetes mellitus ([facility] [address]     Diverticulosis of colon     History of chest pain     History of tobacco abuse     Hyperlipidemia     Hypertension     Pneumonia      Past Surgical History:   Procedure Laterality Date    APPENDECTOMY      CARDIAC SURGERY      heart cath    COLECTOMY  2001    D/T DIVERTICULOSIS    COLONOSCOPY      DIAGNOSTIC CARDIAC CATH LAB PROCEDURE  09/17/2010    EF 60% C MILD DISEASE IN THE PROXIMAL  PORTION BEFORE THE BIFURCATION OF D1,MILD DISEASE IS NOTED IN THE RCA , DIFFUSE AT 10-20% RCA ALSO HAS 10-20% LESIONS    DILATATION, ESOPHAGUS      TRANSTHORACIC ECHOCARDIOGRAM  10/22/2010    EF 55-65% C MILD LFT ATRIAL DILATATION, GRADE 1 DIASOLIC DYSFUNCTION     Family History   Problem Relation Age of Onset    Heart Disease Mother     Hypertension Father     High Cholesterol Father     Diabetes Father      Social History     Tobacco Use    Smoking status: Former     Packs/day: 1.00     Years: 37.00     Pack years: 37.00     Types: Cigarettes     Quit date: 1/1/2013     Years since quitting: 10.0    Smokeless tobacco: Never    Tobacco comments:     Quit smoking 2013, never chew user   Substance Use Topics    Alcohol use: No     Current Outpatient Medications   Medication Sig Dispense Refill    losartan (COZAAR) 100 MG tablet Take 0.5 tablets by mouth daily 45 tablet 3    furosemide (LASIX) 40 MG tablet Take 1 tablet by mouth 2 times daily 90 tablet 2    traMADol (ULTRAM) 50 MG tablet TAKE 1 TO 2 TABLETS BY MOUTH EVERY 6 TO 8 HOURS AS NEEDED FOR PAIN FOR 7 DAYS MAX 6 PER DAY.  MUST LAST 7 DAYS      Glucosamine-Chondroitin (GLUCOSAMINE CHONDROITIN COMPLX) 500-250 MG CAPS 1 tab(s) Orally bid      amLODIPine (NORVASC) 10 MG tablet Take 1 tablet by mouth daily 30 tablet 5    carvedilol (COREG) 25 MG tablet Take 1 tablet by mouth 2 times daily 60 tablet 5    simvastatin (ZOCOR) 20 MG tablet Take 1 tablet by mouth nightly 30 tablet 5    spironolactone (ALDACTONE) 25 MG tablet Take 1 tablet by mouth 2 times daily 60 tablet 5    zinc sulfate (ZINCATE) 220 (50 Zn) MG capsule Take 1 capsule by mouth daily 30 capsule 3    potassium chloride (KLOR-CON M) 20 MEQ extended release tablet Take 20 mEq by mouth daily      vitamin D 25 MCG (1000 UT) CAPS Take 3 capsules by mouth daily      allopurinol (ZYLOPRIM) 300 MG tablet Take 300 mg by mouth every morning (before breakfast)      terazosin (HYTRIN) 5 MG capsule Take 5 mg by mouth daily      ferrous sulfate 325 (65 Fe) MG tablet One tab every 48 hr.  Take with vitamn C 500 mg (Patient taking differently: One tab every other day . Take with vitamn C 500 mg) 30 tablet 3    vitamin C (VITAMIN C) 500 MG tablet Take 1 tablet by mouth every 48 hours (Patient taking differently: Take 500 mg by mouth every other day) 30 tablet 3    aspirin 81 MG EC tablet Take 81 mg by mouth daily      Insulin Detemir (LEVEMIR SC) Inject 20 Units into the skin nightly       metFORMIN (GLUCOPHAGE) 500 MG tablet Take 1,000 mg by mouth 2 times daily (with meals)      gabapentin (NEURONTIN) 300 MG capsule Take 300 mg by mouth 2 times daily. Loreatha Press glimepiride (AMARYL) 2 MG tablet Take 2 mg by mouth 2 times daily       magnesium oxide (MAG-OX) 400 (240 Mg) MG tablet TAKE 1 TABLET BY MOUTH ONCE DAILY WITH FOOD      meloxicam (MOBIC) 15 MG tablet TAKE 1 TABLET BY MOUTH ONCE DAILY      neomycin-bacitracin-polymyxin (NEOSPORIN) 400-5-5000 ointment Apply topically 2 times daily. 1 each 0    acetaminophen (TYLENOL) 500 MG tablet Take 500 mg by mouth every 6 hours as needed for Pain      Misc Natural Products (GLUCOSAMINE CHOND COMPLEX/MSM PO) Take by mouth 2 times daily        Current Facility-Administered Medications   Medication Dose Route Frequency Provider Last Rate Last Admin    furosemide (LASIX) injection 60 mg  60 mg IntraVENous Once Francisca Countess, APRN - CNP        potassium chloride (KLOR-CON M) extended release tablet 40 mEq  40 mEq Oral Once Francisca Countess, APRN - CNP         Allergies   Allergen Reactions    Lorazepam Other (See Comments)     Light headed, dizzy, blurred vision       SUBJECTIVE:   Review of Systems   Constitutional:  Negative for activity change, appetite change, diaphoresis and fatigue. Respiratory:  Negative for cough and shortness of breath.     Cardiovascular:  Negative for chest pain, palpitations and leg swelling. Gastrointestinal:  Negative for abdominal distention, nausea and vomiting. Neurological:  Negative for weakness, light-headedness and headaches. Hematological:  Negative for adenopathy. Psychiatric/Behavioral:  Negative for sleep disturbance. OBJECTIVE:   Today's Vitals:  /60   Pulse 77   Wt 230 lb (104.3 kg)   SpO2 95%   BMI 40.74 kg/m²     Physical Exam  Vitals reviewed. Constitutional:       General: He is not in acute distress. Appearance: Normal appearance. He is well-developed. He is not diaphoretic. HENT:      Head: Normocephalic and atraumatic. Eyes:      Conjunctiva/sclera: Conjunctivae normal.   Cardiovascular:      Rate and Rhythm: Normal rate and regular rhythm. Heart sounds: Murmur heard. Pulmonary:      Effort: Pulmonary effort is normal. No respiratory distress. Breath sounds: Normal breath sounds. No wheezing or rales. Abdominal:      General: Bowel sounds are normal. There is no distension. Palpations: Abdomen is soft. Tenderness: There is no abdominal tenderness. Musculoskeletal:         General: Normal range of motion. Cervical back: Normal range of motion and neck supple. Right lower leg: Edema (3+ jim size water blister) present. Left lower leg: Edema (3+) present. Skin:     General: Skin is warm and dry. Capillary Refill: Capillary refill takes less than 2 seconds. Neurological:      Mental Status: He is alert and oriented to person, place, and time. Coordination: Coordination normal.   Psychiatric:         Behavior: Behavior normal.       Wt Readings from Last 3 Encounters:   01/11/23 230 lb (104.3 kg)   12/20/22 233 lb (105.7 kg)   10/11/22 218 lb 4.8 oz (99 kg)     BP Readings from Last 3 Encounters:   01/11/23 104/60   12/20/22 (!) 90/54   10/13/22 139/69     Pulse Readings from Last 3 Encounters:   01/11/23 77   12/20/22 85   10/13/22 79     Body mass index is 40.74 kg/m².     ECHO: Summary   Normal left ventricle size and systolic function. Ejection fraction was   estimated at 55-60%. There were no regional left ventricular wall motion   abnormalities and wall thickness was within normal limits. Doppler parameters were consistent with abnormal left ventricular   relaxation (grade 1 diastolic dysfunction). Mildly dilated left atrium. Signature      ----------------------------------------------------------------   Electronically signed by Felix Delgado MD (Interpreting   physician) on 07/18/2022 at 11:53 AM   ----------------------------------------------------------------        Conclusions     Summary  Normal left ventricle size and systolic function. Ejection fraction was  estimated at 60 to 65 %. There were no regional left ventricular wall  motion abnormalities and wall thickness was within normal limits. Doppler parameters were consistent with abnormal left ventricular  relaxation (grade 1 diastolic dysfunction). The left atrium is Mildly dilated. There is a small anterior and posterior pericardial effusion with no  evidence of hemodynamic compromise. Signature     ----------------------------------------------------------------  Electronically signed by Christiane Becerra MD (Interpreting  physician) on 09/03/2020 at 06:48 PM    CATH/STRESS:   2016: Nonobstructive.        Results reviewed:  BNP: No results found for: BNP  CBC:   Lab Results   Component Value Date/Time    WBC 8.8 10/13/2022 05:09 AM    RBC 3.03 10/13/2022 05:09 AM    RBC 3.92 01/09/2012 01:31 PM    HGB 9.4 10/13/2022 05:09 AM    HCT 29.4 10/13/2022 05:09 AM     10/13/2022 05:09 AM     CMP:    Lab Results   Component Value Date/Time     10/13/2022 05:09 AM    K 3.8 10/13/2022 05:09 AM     10/13/2022 05:09 AM    CO2 25 10/13/2022 05:09 AM    BUN 38 10/13/2022 05:09 AM    CREATININE 1.4 10/13/2022 05:09 AM    AGRATIO 1.5 07/21/2021 04:05 PM    LABGLOM 50 10/13/2022 05:09 AM    GLUCOSE 150 10/13/2022 05:09 AM    GLUCOSE 172 08/19/2022 03:19 PM    CALCIUM 8.1 10/13/2022 05:09 AM     Hepatic Function Panel:    Lab Results   Component Value Date/Time    ALKPHOS 88 10/10/2022 11:14 PM    ALT 19 10/10/2022 11:14 PM    AST 18 10/10/2022 11:14 PM    PROT 7.1 10/10/2022 11:14 PM    BILITOT 0.2 10/10/2022 11:14 PM    BILIDIR <0.2 10/17/2021 06:41 AM    LABALBU 3.7 10/10/2022 11:14 PM     Magnesium:    Lab Results   Component Value Date/Time    MG 1.5 08/19/2022 03:19 PM     PT/INR:  No results found for: PROTIME, INR  Lipids:    Lab Results   Component Value Date/Time    TRIG 100 08/03/2019 05:20 AM    HDL 41 08/03/2019 05:20 AM    LDLCALC 42 08/03/2019 05:20 AM    LDLDIRECT 57 02/13/2017 12:31 PM       ASSESSMENT AND PLAN:   The patient's condition/symptoms are Stable: No clinical evidence of fluid overload today. Continue current medical regimen without changes at present time. Diagnosis Orders   1. Chronic diastolic congestive heart failure, NYHA class 2 (HCC)  Basic Metabolic Panel    Magnesium    Brain Natriuretic Peptide      2. Edema of both lower legs  Basic Metabolic Panel    Magnesium    Brain Natriuretic Peptide        Continue:  GDMT:   ACE/ARB/ARNI - Losartan 50 daily   BB - Coreg 25 BID   Diuretic - Lasix 40 BID  AA - Aldactone 25 BID  SGLT2 -  none  Vasodilator -   Other - Amlodipine 10 daily, ASA, KCl 10 daily, iron, mag 142    Diastolic HF EF of 82-47%  Stable, significant worsening of lower leg swelling. He denies other symptoms. Chronic FOSTER but he is not doing any activity during the day. He is not elevating his legs or wearing his compression socks. He is eating a high Na diet. ECHO 7/2022: mild LA dilation. Grade 1 DD  Stress test 2022: negative  Cath 2016: nonobstructive. Lab reviewed - K 3.8 Cr 1.4 mag 1.5    Repeat blood work today  Lasix 60 mg IV   40 meq of potassium    Instructed not to take is oral dose of lasix tonight    Based on lab work I will do some metolazone. Compression socks daily. Elevate legs    Cut your losartan in half, take daily. Take blood pressure 1hr after medications, keep log. Continue diet/fluid adherence  Start daily weights  F/U w/ Cardiology  F/U in clinic in 6 weeks      Tolerating above noted HF meds, no ill side effects noted. Will continue to monitor kidney function and electrolytes. Will optimize as tolerated. Pt is compliant w/ medications. Total visit time of 45 minutes has been spent with patient on education of symptoms, management, medication, and plan of care; as well as review of chart: labs, ECHO, radiology reports, etc.   I personally spent more then 50% of the appt time face to face with the patient. Daily weights  Fluid restriction of 2 Liters per day  Limit sodium in diet to around 6400-0456 mg/day  Monitor BP  Activity as tolerated       Patient was instructed to call the [facility] for any changes in symptoms as noted in AVS.      Return in about 6 weeks (around 2/22/2023). or sooner if needed     Patient given educational materials - see patient instructions. We discussed the importance of weighing oneself and recording daily. We also discussed the importance of a low sodium diet, higher sodium foods to avoid and better low sodium food options. Patient verbalizes understanding of plan of care using teach back method, and is agreeable to the treatment plan.        Electronically signed by FELI Spann CNP on 1/11/2023 at 3:14 PM

## 2023-01-11 NOTE — PATIENT INSTRUCTIONS
You may receive a survey regarding the care you received during your visit. Your input is valuable to us. We encourage you to complete and return your survey. We hope you will choose us in the future for your healthcare needs. Continue:  Continue current medications  Daily weights and record  Fluid restriction of 2 Liters per day  Limit sodium in diet to around 9265-6789 mg/day  Monitor BP  Activity as tolerated     Call the Heart Failure Clinic for any of the following symptoms: [phone]  Weight gain of 2-3 pounds in 1 day or 5 pounds in 1 week  Increased shortness of breath  Shortness of breath while laying down  Cough  Chest pain  Swelling in feet, ankles or legs  Tenderness or bloating in the abdomen  Fatigue   Decreased appetite or nausea   Confusion      Repeat blood work today  Lasix 60 mg IV   40 meq of potassium    Based on lab work I will do some metolazone. Compression socks daily. Elevate legs    Cut your losartan in half, take daily. Take blood pressure 1hr after medications, keep log.      Continue diet/fluid adherence  Start daily weights  F/U w/ Cardiology  F/U in clinic in 6 weeks

## 2023-01-11 NOTE — PROGRESS NOTES
Venipuncture obtained from [facility].  Labs obtained   IV lasix and po potassium given per order   Patient tolerated procedure without complications or complaints.

## 2023-01-16 NOTE — TELEPHONE ENCOUNTER
Spoke with wife   Patient saw PCP office on Friday   They had him hold Amlodipine for 3 days (Sat, Sun, Mon)   Take whole tab of losartan   Follow up with them on Tues  Bp's  1/12 95/47  120/59  1/13 97/46  121/54  1/14 120/61  No amlodipine  1/15 121/62  No Amlodipine    Will get labs done today

## 2023-01-17 NOTE — TELEPHONE ENCOUNTER
Message  Received: Today  FELI Shultz CNP  [facility] Clinical Staff  Labs reviewed creatinine back down to 1.8. Continue medication’s as ordered. How is leg swelling?

## 2023-01-17 NOTE — TELEPHONE ENCOUNTER
Patient wife notified. Legs still a little swollen but much better.  Going back to PCP office today for BP check

## 2023-03-08 ENCOUNTER — OFFICE VISIT (OUTPATIENT)
Dept: CARDIOLOGY CLINIC | Age: 68
End: 2023-03-08
Payer: MEDICARE

## 2023-03-08 VITALS
BODY MASS INDEX: 40.39 KG/M2 | HEART RATE: 68 BPM | DIASTOLIC BLOOD PRESSURE: 60 MMHG | WEIGHT: 228 LBS | SYSTOLIC BLOOD PRESSURE: 108 MMHG | OXYGEN SATURATION: 94 %

## 2023-03-08 DIAGNOSIS — I51.89 GRADE I DIASTOLIC DYSFUNCTION: ICD-10-CM

## 2023-03-08 DIAGNOSIS — R60.0 EDEMA OF BOTH LOWER LEGS: ICD-10-CM

## 2023-03-08 DIAGNOSIS — I50.32 CHRONIC DIASTOLIC CONGESTIVE HEART FAILURE, NYHA CLASS 2 (HCC): Primary | ICD-10-CM

## 2023-03-08 PROCEDURE — 3078F DIAST BP <80 MM HG: CPT | Performed by: NURSE PRACTITIONER

## 2023-03-08 PROCEDURE — 1123F ACP DISCUSS/DSCN MKR DOCD: CPT | Performed by: NURSE PRACTITIONER

## 2023-03-08 PROCEDURE — 3074F SYST BP LT 130 MM HG: CPT | Performed by: NURSE PRACTITIONER

## 2023-03-08 PROCEDURE — 99214 OFFICE O/P EST MOD 30 MIN: CPT | Performed by: NURSE PRACTITIONER

## 2023-03-08 RX ORDER — POTASSIUM CHLORIDE 20 MEQ/1
20 TABLET, EXTENDED RELEASE ORAL 2 TIMES DAILY
Qty: 180 TABLET | Refills: 3 | Status: SHIPPED | OUTPATIENT
Start: 2023-03-08

## 2023-03-08 RX ORDER — METOLAZONE 5 MG/1
5 TABLET ORAL DAILY
Qty: 3 TABLET | Refills: 0 | Status: SHIPPED | OUTPATIENT
Start: 2023-03-08 | End: 2023-03-11

## 2023-03-08 RX ORDER — FUROSEMIDE 40 MG/1
TABLET ORAL
Qty: 225 TABLET | Refills: 3 | Status: SHIPPED | OUTPATIENT
Start: 2023-03-08

## 2023-03-08 ASSESSMENT — ENCOUNTER SYMPTOMS
COUGH: 0
NAUSEA: 0
VOMITING: 0
SHORTNESS OF BREATH: 0
ABDOMINAL DISTENTION: 0

## 2023-03-08 NOTE — PATIENT INSTRUCTIONS
You may receive a survey regarding the care you received during your visit. Your input is valuable to us. We encourage you to complete and return your survey. We hope you will choose us in the future for your healthcare needs.     Continue:  Continue current medications  Daily weights and record  Fluid restriction of 2 Liters per day  Limit sodium in diet to around 1008-7202 mg/day  Monitor BP  Activity as tolerated     Call the Heart Failure Clinic for any of the following symptoms: [phone]  Weight gain of 2-3 pounds in 1 day or 5 pounds in 1 week  Increased shortness of breath  Shortness of breath while laying down  Cough  Chest pain  Swelling in feet, ankles or legs  Tenderness or bloating in the abdomen  Fatigue   Decreased appetite or nausea   Confusion          Metolazone 5mg for 3 days 1 hr before morning dose of lasix  Start taking potassium 20mg twice a day    After metolazone is gone: increase the lasix to 1.5 pills in the morning (60mg) and continue the 1 pill (40mg) in the evening     Blood work in two weeks    Continue diet/fluid adherence  Start daily weights  F/U w/ Cardiology  F/U in clinic in 6 weeks 99

## 2023-03-08 NOTE — PROGRESS NOTES
Heart Failure Clinic       Visit Date: 3/8/2023  Cardiologist:  Dr. Clifford Castillo  Primary Care Physician: Dr. Nia Han, APRN - CNP    Luiz Barton is a 69 y.o. male who presents today for:  Chief Complaint   Patient presents with    Congestive Heart Failure       HPI:     TYPE HF: HFpEF (60-65%) (40-45% 2019) DD grade 1  Cause: nonischemic  Device: none  HX: CAD (nonobstructive), DM, HLD, HTN  Dry Wt:  (226 on 10/3/22) (230 on 1/11/23) (228 on 3/8/23)    Luiz Barton is a 69 y.o. male who presents to the office for a follow up patient visit in the heart failure clinic. Concerns today: here today for his 6 week f/u. Last visit we did IV lasix. Weight is really unchanged on home scale, w/ some fluctuations. Cr did jump up to 2.6 (prior to IV) but is at 1.8 (baseline). Pt notes that his swelling and SOB are unchanged. He states he has not tried to change his diet. Still eating a lot of chips and chocolate. Still urinating well on his lasix. Visit on 1/11/23: here today for his 3 month f/u. He was admitted to the hospital last October after our visit for [place], it was documented that his HF was compensated. He had been on hydralazine 100mg TID, this was stopped at discharge. He is urinating well on his lasix. He is eating high Na meals daily. Visit on 10/3/22: pt here today for a 1 year f/u. Seems confused about his medications. Not sure of lasix and Kcl dose. Urinating well on his lasix but it has also been doubled since last year. He is very non-compliant w/ his diet.      Activity: ADLs performed, FOSTER w/ long distances  Diet: does not follow low sodium diet, does not pay attention to fluid but seems to think he stays under 64oz      Hospitalization:  > 6 months      Patient has:  Chest Pain: no  SOB: yes w/ long distances   Orthopnea/PND: no  SRAVAN: no  Edema: no  Fatigue: no  Abdominal bloating: no  Cough: no  Appetite: good      Past Medical History:   Diagnosis Date    Arthritis CAD (coronary artery disease) 04/05/2012    CHF (congestive heart failure) (HCC)     Diabetes mellitus (HCC)     Diverticulosis of colon     History of chest pain     History of tobacco abuse     Hyperlipidemia     Hypertension     Pneumonia      Past Surgical History:   Procedure Laterality Date    APPENDECTOMY      CARDIAC SURGERY      heart cath    COLECTOMY  2001    D/T DIVERTICULOSIS    COLONOSCOPY      DIAGNOSTIC CARDIAC CATH LAB PROCEDURE  09/17/2010    EF 60% C MILD DISEASE IN THE PROXIMAL  PORTION BEFORE THE BIFURCATION OF D1,MILD DISEASE IS NOTED IN THE RCA , DIFFUSE AT 10-20% RCA ALSO HAS 10-20% LESIONS    DILATATION, ESOPHAGUS      TRANSTHORACIC ECHOCARDIOGRAM  10/22/2010    EF 55-65% C MILD LFT ATRIAL DILATATION, GRADE 1 DIASOLIC DYSFUNCTION     Family History   Problem Relation Age of Onset    Heart Disease Mother     Hypertension Father     High Cholesterol Father     Diabetes Father      Social History     Tobacco Use    Smoking status: Former     Packs/day: 1.00     Years: 37.00     Pack years: 37.00     Types: Cigarettes     Quit date: 1/1/2013     Years since quitting: 10.1    Smokeless tobacco: Never    Tobacco comments:     Quit smoking 2013, never chew user   Substance Use Topics    Alcohol use: No     Current Outpatient Medications   Medication Sig Dispense Refill    potassium chloride (KLOR-CON M) 20 MEQ extended release tablet Take 1 tablet by mouth 2 times daily 180 tablet 3    furosemide (LASIX) 40 MG tablet Take 1.5 tablets by mouth every morning AND 1 tablet every evening.  225 tablet 3    metOLazone (ZAROXOLYN) 5 MG tablet Take 1 tablet by mouth daily for 3 days 3 tablet 0    magnesium oxide (MAG-OX) 400 (240 Mg) MG tablet TAKE 1 TABLET BY MOUTH ONCE DAILY WITH FOOD      losartan (COZAAR) 100 MG tablet Take 0.5 tablets by mouth daily (Patient taking differently: Take 100 mg by mouth daily) 45 tablet 3    Glucosamine-Chondroitin (GLUCOSAMINE CHONDROITIN COMPLX) 500-250 MG CAPS 1 tab(s) Orally bid      amLODIPine (NORVASC) 10 MG tablet Take 1 tablet by mouth daily 30 tablet 5    carvedilol (COREG) 25 MG tablet Take 1 tablet by mouth 2 times daily 60 tablet 5    simvastatin (ZOCOR) 20 MG tablet Take 1 tablet by mouth nightly 30 tablet 5    spironolactone (ALDACTONE) 25 MG tablet Take 1 tablet by mouth 2 times daily 60 tablet 5    zinc sulfate (ZINCATE) 220 (50 Zn) MG capsule Take 1 capsule by mouth daily 30 capsule 3    vitamin D 25 MCG (1000 UT) CAPS Take 3 capsules by mouth daily      allopurinol (ZYLOPRIM) 300 MG tablet Take 300 mg by mouth every morning (before breakfast)      terazosin (HYTRIN) 5 MG capsule Take 5 mg by mouth daily      acetaminophen (TYLENOL) 500 MG tablet Take 500 mg by mouth every 6 hours as needed for Pain      Misc Natural Products (GLUCOSAMINE CHOND COMPLEX/MSM PO) Take by mouth 2 times daily       ferrous sulfate 325 (65 Fe) MG tablet One tab every 48 hr.  Take with vitamn C 500 mg (Patient taking differently: One tab every 48 hr.  Take with vitamn C 500 mg) 30 tablet 3    vitamin C (VITAMIN C) 500 MG tablet Take 1 tablet by mouth every 48 hours 30 tablet 3    aspirin 81 MG EC tablet Take 81 mg by mouth daily      Insulin Detemir (LEVEMIR SC) Inject 20 Units into the skin nightly       metFORMIN (GLUCOPHAGE) 500 MG tablet Take 1,000 mg by mouth 2 times daily (with meals)      gabapentin (NEURONTIN) 300 MG capsule Take 300 mg by mouth 2 times daily. Marc Chime glimepiride (AMARYL) 2 MG tablet Take 2 mg by mouth 2 times daily       neomycin-bacitracin-polymyxin (NEOSPORIN) 400-5-5000 ointment Apply topically 2 times daily. 1 each 0     No current facility-administered medications for this visit. Allergies   Allergen Reactions    Lorazepam Other (See Comments)     Light headed, dizzy, blurred vision       SUBJECTIVE:   Review of Systems   Constitutional:  Negative for activity change, appetite change, diaphoresis and fatigue.    Respiratory:  Negative for cough and shortness of breath. Cardiovascular:  Negative for chest pain, palpitations and leg swelling. Gastrointestinal:  Negative for abdominal distention, nausea and vomiting. Neurological:  Negative for weakness, light-headedness and headaches. Hematological:  Negative for adenopathy. Psychiatric/Behavioral:  Negative for sleep disturbance. OBJECTIVE:   Today's Vitals:  /60   Pulse 68   Wt 228 lb (103.4 kg)   SpO2 94%   BMI 40.39 kg/m²     Physical Exam  Vitals reviewed. Constitutional:       General: He is not in acute distress. Appearance: Normal appearance. He is well-developed. He is not diaphoretic. HENT:      Head: Normocephalic and atraumatic. Eyes:      Conjunctiva/sclera: Conjunctivae normal.   Cardiovascular:      Rate and Rhythm: Normal rate and regular rhythm. Heart sounds: Murmur heard. Pulmonary:      Effort: Pulmonary effort is normal. No respiratory distress. Breath sounds: Normal breath sounds. No wheezing or rales. Abdominal:      General: Bowel sounds are normal. There is no distension. Palpations: Abdomen is soft. Tenderness: There is no abdominal tenderness. Musculoskeletal:         General: Normal range of motion. Cervical back: Normal range of motion and neck supple. Right lower leg: Edema (3+ jim size water blister) present. Left lower leg: Edema (3+) present. Skin:     General: Skin is warm and dry. Capillary Refill: Capillary refill takes less than 2 seconds. Neurological:      Mental Status: He is alert and oriented to person, place, and time.       Coordination: Coordination normal.   Psychiatric:         Behavior: Behavior normal.       Wt Readings from Last 3 Encounters:   03/08/23 228 lb (103.4 kg)   01/11/23 230 lb (104.3 kg)   12/20/22 233 lb (105.7 kg)     BP Readings from Last 3 Encounters:   03/08/23 108/60   01/11/23 104/60   12/20/22 (!) 90/54     Pulse Readings from Last 3 Encounters:   03/08/23 68 01/11/23 77   12/20/22 85     Body mass index is 40.39 kg/m². ECHO:      Summary   Normal left ventricle size and systolic function. Ejection fraction was   estimated at 55-60%. There were no regional left ventricular wall motion   abnormalities and wall thickness was within normal limits. Doppler parameters were consistent with abnormal left ventricular   relaxation (grade 1 diastolic dysfunction). Mildly dilated left atrium. Signature      ----------------------------------------------------------------   Electronically signed by Bk Waterman MD (Interpreting   physician) on 07/18/2022 at 11:53 AM   ----------------------------------------------------------------        Conclusions     Summary  Normal left ventricle size and systolic function. Ejection fraction was  estimated at 60 to 65 %. There were no regional left ventricular wall  motion abnormalities and wall thickness was within normal limits. Doppler parameters were consistent with abnormal left ventricular  relaxation (grade 1 diastolic dysfunction). The left atrium is Mildly dilated. There is a small anterior and posterior pericardial effusion with no  evidence of hemodynamic compromise. Signature     ----------------------------------------------------------------  Electronically signed by Natalya Tsai MD (Interpreting  physician) on 09/03/2020 at 06:48 PM    CATH/STRESS:   2016: Nonobstructive.        Results reviewed:  BNP: No results found for: BNP  CBC:   Lab Results   Component Value Date/Time    WBC 8.8 10/13/2022 05:09 AM    RBC 3.03 10/13/2022 05:09 AM    RBC 3.92 01/09/2012 01:31 PM    HGB 9.4 10/13/2022 05:09 AM    HCT 29.4 10/13/2022 05:09 AM     10/13/2022 05:09 AM     CMP:    Lab Results   Component Value Date/Time     01/16/2023 03:09 PM    K 4.7 01/16/2023 03:09 PM    K 3.8 10/13/2022 05:09 AM     01/16/2023 03:09 PM    CO2 26 01/16/2023 03:09 PM    BUN 45 01/16/2023 03:09 PM    CREATININE 1.80 01/16/2023 03:09 PM    AGRATIO 1.5 07/21/2021 04:05 PM    LABGLOM 26 01/11/2023 03:21 PM    GLUCOSE 120 01/16/2023 03:09 PM    CALCIUM 9.00 01/16/2023 03:09 PM     Hepatic Function Panel:    Lab Results   Component Value Date/Time    ALKPHOS 88 10/10/2022 11:14 PM    ALT 19 10/10/2022 11:14 PM    AST 18 10/10/2022 11:14 PM    PROT 7.1 10/10/2022 11:14 PM    BILITOT 0.2 10/10/2022 11:14 PM    BILIDIR <0.2 10/17/2021 06:41 AM    LABALBU 3.7 10/10/2022 11:14 PM     Magnesium:    Lab Results   Component Value Date/Time    MG 2.3 01/11/2023 03:21 PM     PT/INR:  No results found for: PROTIME, INR  Lipids:    Lab Results   Component Value Date/Time    TRIG 100 08/03/2019 05:20 AM    HDL 41 08/03/2019 05:20 AM    LDLCALC 42 08/03/2019 05:20 AM    LDLDIRECT 57 02/13/2017 12:31 PM       ASSESSMENT AND PLAN:   The patient's condition/symptoms are Stable: No clinical evidence of fluid overload today. Continue current medical regimen without changes at present time. Diagnosis Orders   1. Chronic diastolic congestive heart failure, NYHA class 2 (HCC)  potassium chloride (KLOR-CON M) 20 MEQ extended release tablet    furosemide (LASIX) 40 MG tablet    Basic Metabolic Panel      2. Edema of both lower legs        3. Grade I diastolic dysfunction          Continue:  GDMT:   ACE/ARB/ARNI - Losartan 100 daily   BB - Coreg 25 BID   Diuretic - Lasix 40 BID  AA - Aldactone 25 BID  SGLT2 -  none  Vasodilator -   Other - Amlodipine 10 daily, ASA, KCl 20 daily, iron, mag 565    Diastolic HF EF of 49-07%  Mild LA dilation   Stable, continued lower leg swelling w/ good urination on lasix. Pt continued to eat high Na diet. We will do 3 days of metolazone then increase his lasix to 60mg in the am and continue the 40 in the late afternoon. Recommending compression socks as well but he also will not wear them consistently. ECHO 7/2022: mild LA dilation. Grade 1 DD  Stress test 2022: negative  Cath 2016: nonobstructive.      Lab reviewed - K 3.8 Cr 1.4 mag 1.5    Metolazone 5mg for 3 days 1 hr before morning dose of lasix  Start taking potassium 20mg twice a day    After metolazone is gone: increase the lasix to 1.5 pills in the morning (60mg) and continue the 1 pill (40mg) in the evening     Blood work in two weeks    Continue diet/fluid adherence  Start daily weights  F/U w/ Cardiology  F/U in clinic in 6 weeks      Tolerating above noted HF meds, no ill side effects noted. Will continue to monitor kidney function and electrolytes. Will optimize as tolerated. Pt is compliant w/ medications. Total visit time of 25 minutes has been spent with patient on education of symptoms, management, medication, and plan of care; as well as review of chart: labs, ECHO, radiology reports, etc.   I personally spent more then 50% of the appt time face to face with the patient. Daily weights  Fluid restriction of 2 Liters per day  Limit sodium in diet to around 3613-5324 mg/day  Monitor BP  Activity as tolerated       Patient was instructed to call the [facility] for any changes in symptoms as noted in AVS.      Return in about 6 weeks (around 4/19/2023). or sooner if needed     Patient given educational materials - see patient instructions. We discussed the importance of weighing oneself and recording daily. We also discussed the importance of a low sodium diet, higher sodium foods to avoid and better low sodium food options. Patient verbalizes understanding of plan of care using teach back method, and is agreeable to the treatment plan.        Electronically signed by FELI Marley CNP on 3/8/2023 at 3:05 PM

## 2023-03-24 ENCOUNTER — OFFICE VISIT (OUTPATIENT)
Dept: CARDIOLOGY CLINIC | Age: 68
End: 2023-03-24
Payer: MEDICARE

## 2023-03-24 VITALS
WEIGHT: 224 LBS | BODY MASS INDEX: 39.69 KG/M2 | SYSTOLIC BLOOD PRESSURE: 99 MMHG | HEART RATE: 81 BPM | HEIGHT: 63 IN | DIASTOLIC BLOOD PRESSURE: 55 MMHG

## 2023-03-24 DIAGNOSIS — M79.605 PAIN IN BOTH LOWER EXTREMITIES: ICD-10-CM

## 2023-03-24 DIAGNOSIS — I87.8 CLAUDICATIO VENOSA INTERMITTENS: ICD-10-CM

## 2023-03-24 DIAGNOSIS — I50.32 CHRONIC DIASTOLIC CONGESTIVE HEART FAILURE, NYHA CLASS 2 (HCC): Primary | ICD-10-CM

## 2023-03-24 DIAGNOSIS — R60.0 EDEMA OF BOTH LOWER LEGS: ICD-10-CM

## 2023-03-24 DIAGNOSIS — I73.9 CLAUDICATION (HCC): ICD-10-CM

## 2023-03-24 DIAGNOSIS — M79.604 PAIN IN BOTH LOWER EXTREMITIES: ICD-10-CM

## 2023-03-24 DIAGNOSIS — I25.10 CAD IN NATIVE ARTERY: ICD-10-CM

## 2023-03-24 PROCEDURE — 3074F SYST BP LT 130 MM HG: CPT | Performed by: INTERNAL MEDICINE

## 2023-03-24 PROCEDURE — 3078F DIAST BP <80 MM HG: CPT | Performed by: INTERNAL MEDICINE

## 2023-03-24 PROCEDURE — 1123F ACP DISCUSS/DSCN MKR DOCD: CPT | Performed by: INTERNAL MEDICINE

## 2023-03-24 PROCEDURE — 93000 ELECTROCARDIOGRAM COMPLETE: CPT | Performed by: INTERNAL MEDICINE

## 2023-03-24 PROCEDURE — 99214 OFFICE O/P EST MOD 30 MIN: CPT | Performed by: INTERNAL MEDICINE

## 2023-03-24 NOTE — PROGRESS NOTES
main is a large vessel, widely patent. 2.  The circumflex has 20 percent lesion, large vessel, noncritical.  3.  OM-1 mild luminal irregularity. 4.  OM-2 mild luminal irregularity. 5.  The LAD is calcified with mild luminal irregularity at the most 10-20  percent lesion. 6.  The diagonal is a large vessel with mild luminal irregularity. 7.  LVEDP was 20 indicative of mild impaired relaxation of the ventricle. 8.  No complication occurred. 9.  Continue aggressive risk factor modification. 10. The RCA, like I stated, has about mid portion, about 50-60 percent   case reviewed with dr Nikole Myles. 11.  Again, no complication occurred. 12.  Good hemostasis obtained post procedure. 13.  Continue with aggressive risk factor modification and anti-ischemic  medication with lipid management. 14.  No driving or lifting for the next 3 days. Stress Test:7/2022  Imaging Results:Calculated gated LVEF 63 %. The T.I.D. ratio was .97 . There was a moderate sized, moderate in intensity, fixed myocardial  perfusion defect of the inferior wall. This is probably secondary to a  moderate attenuation artifact in the inferior which seems to be related to  bowel artifact. Myocardial perfusion imaging is not suggestive for myocardial ischemia. Conclusions      Summary   There was a moderate sized, moderate in intensity, fixed myocardial   perfusion defect of the inferior wall. This is probably secondary to a   moderate attenuation artifact in the inferior which seems to be related to   bowel artifact. Myocardial perfusion imaging is not suggestive for myocardial ischemia. Recommendation   Clinical correlation is recommended.       Signatures      ----------------------------------------------------------------   Electronically signed by Kirk Avery MD (Interpreting   Cardiologist) on 07/18/2022 at 12:51   ----------------------------------------------------------------    AssessmentPlan:     Preoperative cardiac

## 2023-03-28 ENCOUNTER — HOSPITAL ENCOUNTER (OUTPATIENT)
Dept: INTERVENTIONAL RADIOLOGY/VASCULAR | Age: 68
Discharge: HOME | End: 2023-03-28
Payer: MEDICARE

## 2023-03-28 ENCOUNTER — TELEPHONE (OUTPATIENT)
Dept: CARDIOLOGY CLINIC | Age: 68
End: 2023-03-28

## 2023-03-28 DIAGNOSIS — R60.0 EDEMA OF BOTH LOWER LEGS: ICD-10-CM

## 2023-03-28 DIAGNOSIS — I50.32 CHRONIC DIASTOLIC CONGESTIVE HEART FAILURE, NYHA CLASS 2 (HCC): ICD-10-CM

## 2023-03-28 DIAGNOSIS — I25.10 CAD IN NATIVE ARTERY: ICD-10-CM

## 2023-03-28 DIAGNOSIS — M79.605 PAIN IN BOTH LOWER EXTREMITIES: ICD-10-CM

## 2023-03-28 DIAGNOSIS — R06.02 SHORTNESS OF BREATH: Primary | ICD-10-CM

## 2023-03-28 DIAGNOSIS — I87.8 CLAUDICATIO VENOSA INTERMITTENS: ICD-10-CM

## 2023-03-28 DIAGNOSIS — M79.604 PAIN IN BOTH LOWER EXTREMITIES: ICD-10-CM

## 2023-03-28 DIAGNOSIS — I73.9 CLAUDICATION (HCC): ICD-10-CM

## 2023-03-28 PROCEDURE — 93925 LOWER EXTREMITY STUDY: CPT

## 2023-03-28 PROCEDURE — 93970 EXTREMITY STUDY: CPT

## 2023-03-28 NOTE — TELEPHONE ENCOUNTER
Spoke to pts wife. Notified. She is concerned about pt's SOB. States she did talk to Dr Andriy Carter at the visit about this as well, but at times he doubles over on his walker d/t the SOB. Couldn't make it from the house to the car recently, had to turn back and go inside. Asking if any further recommendations?      F/u with CHF 4/20    ----- Message from Twan Zaragoza MD sent at 3/28/2023  2:42 PM EDT -----  No arterial stenosis \"not suggestive of blockages\"  No DVT \"Blood clot\"   Plz inform patient   If he has increased leg pain, patient should inform office

## 2023-03-28 NOTE — TELEPHONE ENCOUNTER
Known h/o CHF with recovered EF  Concern for volume overload, need for increased dose diuretics? Also, has h/o CKD    Lasix dose listed in the chart as \"multiple doses\", please clarify how much lasix is he currently taking? Also, clarify the dose of Metolazone and Aldactone patient currently takes? Any other symptoms. Ex. Chest pain, weight gain, fever, cough. .. ?     Will adjust diuretics after above is clarified  Instruct patient to check his weight everyday   Minimize Na intake  Obtain a full echocardiogram   Can we get him an earlier appointment at the CHF clinic  Knee surgery was planned, hold off until breathing is better, seen at CHF clinic and Echo is available

## 2023-03-28 NOTE — TELEPHONE ENCOUNTER
Stop aldactone  Change lasix to 80 mg po BID for now  BMP and Mg in one week  Patient should call for persistent/worsening symptoms  Provide ER warning signs

## 2023-03-28 NOTE — TELEPHONE ENCOUNTER
Spoke to patients wife.   -Pt denies additional symptoms. She states his weight is actually going down, on 3/19 it was 226 lb and today 220 lb.  -He takes the Lasix 60 in the am 40 in the pm.  -Not currently taking Metolazone, was daily x 3 days-has been completed  -Spironolactone is 25 mg BID  -They are watching weight and sodium  -Echo ordered and brought to scheduling.  -Message left with Dr Renetta Dunn at [facility] to hold on surgery       CHF can we get sooner appointment?

## 2023-03-29 NOTE — TELEPHONE ENCOUNTER
Patient's wife notified and voiced understanding. Orders printed and mailed to patient. Watch for labs.

## 2023-03-30 ENCOUNTER — HOSPITAL ENCOUNTER (OUTPATIENT)
Dept: NON INVASIVE DIAGNOSTICS | Age: 68
Discharge: HOME | End: 2023-03-30
Payer: MEDICARE

## 2023-03-30 DIAGNOSIS — R06.02 SHORTNESS OF BREATH: ICD-10-CM

## 2023-03-30 LAB
LV EF: 58 %
LVEF MODALITY: NORMAL

## 2023-03-30 PROCEDURE — 93306 TTE W/DOPPLER COMPLETE: CPT

## 2023-03-31 ENCOUNTER — HOSPITAL ENCOUNTER (OUTPATIENT)
Age: 68
Discharge: HOME | End: 2023-03-31
Payer: MEDICARE

## 2023-03-31 ENCOUNTER — HOSPITAL ENCOUNTER (OUTPATIENT)
Dept: GENERAL RADIOLOGY | Age: 68
Discharge: HOME | End: 2023-03-31
Payer: MEDICARE

## 2023-03-31 ENCOUNTER — OFFICE VISIT (OUTPATIENT)
Dept: CARDIOLOGY CLINIC | Age: 68
End: 2023-03-31
Payer: MEDICARE

## 2023-03-31 VITALS
OXYGEN SATURATION: 97 % | SYSTOLIC BLOOD PRESSURE: 100 MMHG | HEIGHT: 63 IN | DIASTOLIC BLOOD PRESSURE: 50 MMHG | HEART RATE: 72 BPM | BODY MASS INDEX: 39.83 KG/M2 | WEIGHT: 224.8 LBS

## 2023-03-31 DIAGNOSIS — I50.32 CHRONIC DIASTOLIC CONGESTIVE HEART FAILURE, NYHA CLASS 3 (HCC): ICD-10-CM

## 2023-03-31 DIAGNOSIS — I50.32 CHRONIC DIASTOLIC CONGESTIVE HEART FAILURE, NYHA CLASS 2 (HCC): ICD-10-CM

## 2023-03-31 DIAGNOSIS — I50.32 CHRONIC DIASTOLIC CONGESTIVE HEART FAILURE, NYHA CLASS 3 (HCC): Primary | ICD-10-CM

## 2023-03-31 DIAGNOSIS — R06.09 DOE (DYSPNEA ON EXERTION): ICD-10-CM

## 2023-03-31 DIAGNOSIS — R60.0 EDEMA OF BOTH LOWER LEGS: ICD-10-CM

## 2023-03-31 PROCEDURE — 99214 OFFICE O/P EST MOD 30 MIN: CPT | Performed by: NURSE PRACTITIONER

## 2023-03-31 PROCEDURE — 3078F DIAST BP <80 MM HG: CPT | Performed by: NURSE PRACTITIONER

## 2023-03-31 PROCEDURE — 71046 X-RAY EXAM CHEST 2 VIEWS: CPT

## 2023-03-31 PROCEDURE — 3074F SYST BP LT 130 MM HG: CPT | Performed by: NURSE PRACTITIONER

## 2023-03-31 PROCEDURE — 1123F ACP DISCUSS/DSCN MKR DOCD: CPT | Performed by: NURSE PRACTITIONER

## 2023-03-31 RX ORDER — FUROSEMIDE 40 MG/1
TABLET ORAL
Qty: 225 TABLET | Refills: 3 | Status: SHIPPED | OUTPATIENT
Start: 2023-03-31

## 2023-03-31 RX ORDER — METOLAZONE 5 MG/1
5 TABLET ORAL DAILY
Qty: 3 TABLET | Refills: 0 | Status: SHIPPED | OUTPATIENT
Start: 2023-03-31 | End: 2023-04-03

## 2023-03-31 ASSESSMENT — ENCOUNTER SYMPTOMS
VOMITING: 0
COUGH: 0
SHORTNESS OF BREATH: 0
ABDOMINAL DISTENTION: 0
NAUSEA: 0

## 2023-03-31 NOTE — PATIENT INSTRUCTIONS
You may receive a survey regarding the care you received during your visit. Your input is valuable to us. We encourage you to complete and return your survey. We hope you will choose us in the future for your healthcare needs. Continue:  Continue current medications  Daily weights and record  Fluid restriction of 2 Liters per day  Limit sodium in diet to around 1327-0604 mg/day  Monitor BP  Activity as tolerated     Call the Heart Failure Clinic for any of the following symptoms: [phone]  Weight gain of 2-3 pounds in 1 day or 5 pounds in 1 week  Increased shortness of breath  Shortness of breath while laying down  Cough  Chest pain  Swelling in feet, ankles or legs  Tenderness or bloating in the abdomen  Fatigue   Decreased appetite or nausea   Confusion        Metolazone 5mg for 3 days 1 hr before morning dose of lasix  Take an additional half tab of potassium with it. Chest xray today    PFT ordered (hx of smoking as well as second hand smoke)    Start following low Na diet.      Continue diet/fluid adherence  Start daily weights  F/U w/ Cardiology  F/U in clinic in April

## 2023-03-31 NOTE — PROGRESS NOTES
C/o sob when walking and SEJAL.
for any changes in symptoms as noted in AVS.      No follow-ups on file. or sooner if needed     Patient given educational materials - see patient instructions. We discussed the importance of weighing oneself and recording daily. We also discussed the importance of a low sodium diet, higher sodium foods to avoid and better low sodium food options. Patient verbalizes understanding of plan of care using teach back method, and is agreeable to the treatment plan.        Electronically signed by FELI Robbins CNP on 3/31/2023 at 11:38 AM

## 2023-04-03 ENCOUNTER — HOSPITAL ENCOUNTER (INPATIENT)
Age: 68
LOS: 3 days | Discharge: HOME | End: 2023-04-06
Attending: EMERGENCY MEDICINE | Admitting: PHYSICIAN ASSISTANT
Payer: MEDICARE

## 2023-04-03 ENCOUNTER — APPOINTMENT (OUTPATIENT)
Dept: GENERAL RADIOLOGY | Age: 68
End: 2023-04-03
Payer: MEDICARE

## 2023-04-03 ENCOUNTER — TELEPHONE (OUTPATIENT)
Dept: CARDIOLOGY CLINIC | Age: 68
End: 2023-04-03

## 2023-04-03 DIAGNOSIS — D64.9 ANEMIA, UNSPECIFIED TYPE: ICD-10-CM

## 2023-04-03 DIAGNOSIS — I50.32 CHRONIC DIASTOLIC HEART FAILURE (HCC): Primary | ICD-10-CM

## 2023-04-03 DIAGNOSIS — N17.9 AKI (ACUTE KIDNEY INJURY) (HCC): ICD-10-CM

## 2023-04-03 LAB
ABO: NORMAL
ALBUMIN SERPL BCG-MCNC: 3.8 G/DL (ref 3.5–5.1)
ALP SERPL-CCNC: 81 U/L (ref 38–126)
ALT SERPL W/O P-5'-P-CCNC: 17 U/L (ref 11–66)
ANION GAP SERPL CALC-SCNC: 15 MEQ/L (ref 8–16)
ANION GAP SERPL CALC-SCNC: 19 MEQ/L (ref 8–16)
ANTIBODY SCREEN: NORMAL
AST SERPL-CCNC: 19 U/L (ref 5–40)
BACTERIA: NORMAL
BASE EXCESS BLDA CALC-SCNC: -9.7 MMOL/L (ref -2–3)
BASOPHILS ABSOLUTE: 0 THOU/MM3 (ref 0–0.1)
BASOPHILS NFR BLD AUTO: 0.4 %
BILIRUB SERPL-MCNC: 0.3 MG/DL (ref 0.3–1.2)
BILIRUB UR QL STRIP: NEGATIVE
BUN SERPL-MCNC: 158 MG/DL (ref 7–22)
BUN SERPL-MCNC: 165 MG/DL (ref 7–22)
CALCIUM SERPL-MCNC: 8.9 MG/DL (ref 8.5–10.5)
CALCIUM SERPL-MCNC: 8.9 MG/DL (ref 8.5–10.5)
CASTS #/AREA URNS LPF: NORMAL /LPF
CASTS #/AREA URNS LPF: NORMAL /LPF
CHARACTER UR: CLEAR
CHARCOAL URNS QL MICRO: NORMAL
CHLORIDE 24H UR-SRATE: 76 MEQ/L
CHLORIDE SERPL-SCNC: 105 MEQ/L (ref 98–111)
CHLORIDE SERPL-SCNC: 107 MEQ/L (ref 98–111)
CO2 SERPL-SCNC: 14 MEQ/L (ref 23–33)
CO2 SERPL-SCNC: 15 MEQ/L (ref 23–33)
COLLECTED BY:: ABNORMAL
COLOR UR: YELLOW
CREAT SERPL-MCNC: 3.8 MG/DL (ref 0.4–1.2)
CREAT SERPL-MCNC: 4 MG/DL (ref 0.4–1.2)
CRYSTALS URNS QL MICRO: NORMAL
DEPRECATED RDW RBC AUTO: 53.1 FL (ref 35–45)
EOSINOPHIL NFR BLD AUTO: 1.3 %
EOSINOPHILS ABSOLUTE: 0.1 THOU/MM3 (ref 0–0.4)
EPITHELIAL CELLS, UA: NORMAL /HPF
ERYTHROCYTE [DISTWIDTH] IN BLOOD BY AUTOMATED COUNT: 15.5 % (ref 11.5–14.5)
FERRITIN SERPL IA-MCNC: 50 NG/ML (ref 22–322)
GFR SERPL CREATININE-BSD FRML MDRD: 15 ML/MIN/1.73M2
GFR SERPL CREATININE-BSD FRML MDRD: 16 ML/MIN/1.73M2
GLUCOSE BLD STRIP.AUTO-MCNC: 99 MG/DL (ref 70–108)
GLUCOSE SERPL-MCNC: 204 MG/DL (ref 70–108)
GLUCOSE SERPL-MCNC: 84 MG/DL (ref 70–108)
GLUCOSE UR QL STRIP.AUTO: NEGATIVE MG/DL
HCO3 BLDA-SCNC: 16 MMOL/L (ref 23–28)
HCT VFR BLD AUTO: 22 % (ref 42–52)
HCT VFR BLD AUTO: 23.8 % (ref 42–52)
HEMOCCULT STL QL: POSITIVE
HGB BLD-MCNC: 6.8 GM/DL (ref 14–18)
HGB BLD-MCNC: 7.4 GM/DL (ref 14–18)
HGB UR QL STRIP.AUTO: NEGATIVE
IMM GRANULOCYTES # BLD AUTO: 0.05 THOU/MM3 (ref 0–0.07)
IMM GRANULOCYTES NFR BLD AUTO: 0.5 %
IRON SATN MFR SERPL: 8 % (ref 20–50)
IRON SERPL-MCNC: 21 UG/DL (ref 65–195)
KETONES UR QL STRIP.AUTO: NEGATIVE
LACTATE SERPL-SCNC: 1.8 MMOL/L (ref 0.5–2)
LACTATE SERPL-SCNC: 2.8 MMOL/L (ref 0.5–2)
LEUKOCYTE ESTERASE UR QL STRIP.AUTO: NEGATIVE
LYMPHOCYTES ABSOLUTE: 1 THOU/MM3 (ref 1–4.8)
LYMPHOCYTES NFR BLD AUTO: 10.4 %
MAGNESIUM SERPL-MCNC: 2 MG/DL (ref 1.6–2.4)
MCH RBC QN AUTO: 29.6 PG (ref 26–33)
MCHC RBC AUTO-ENTMCNC: 31.1 GM/DL (ref 32.2–35.5)
MCV RBC AUTO: 95.2 FL (ref 80–94)
MONOCYTES ABSOLUTE: 0.9 THOU/MM3 (ref 0.4–1.3)
MONOCYTES NFR BLD AUTO: 8.8 %
NEUTROPHILS NFR BLD AUTO: 78.6 %
NITRITE UR QL STRIP.AUTO: NEGATIVE
NRBC BLD AUTO-RTO: 0 /100 WBC
NT-PROBNP SERPL IA-MCNC: 277.7 PG/ML (ref 0–124)
OSMOLALITY SERPL CALC.SUM OF ELEC: 333.4 MOSMOL/KG (ref 275–300)
PCO2 TEMP ADJ BLDMV: 32 MMHG (ref 41–51)
PH BLDMV: 7.3 [PH] (ref 7.31–7.41)
PH UR STRIP.AUTO: 5 [PH] (ref 5–9)
PLATELET # BLD AUTO: 260 THOU/MM3 (ref 130–400)
PMV BLD AUTO: 11.5 FL (ref 9.4–12.4)
PO2 BLDMV: 37 MMHG (ref 25–40)
POTASSIUM BLD-SCNC: 5.5 MEQ/L (ref 3.5–4.9)
POTASSIUM SERPL-SCNC: 5.3 MEQ/L (ref 3.5–5.2)
POTASSIUM SERPL-SCNC: 5.7 MEQ/L (ref 3.5–5.2)
POTASSIUM UR-SCNC: 43.9 MEQ/L
PROCALCITONIN SERPL IA-MCNC: 0.28 NG/ML (ref 0.01–0.09)
PROT SERPL-MCNC: 7.2 G/DL (ref 6.1–8)
PROT UR STRIP.AUTO-MCNC: NEGATIVE MG/DL
RBC # BLD AUTO: 2.5 MILL/MM3 (ref 4.7–6.1)
RBC #/AREA URNS HPF: NORMAL /HPF
RENAL EPI CELLS #/AREA URNS HPF: NORMAL /[HPF]
RH FACTOR: NORMAL
SAO2 % BLDMV: 65 %
SEGMENTED NEUTROPHILS ABSOLUTE COUNT: 7.9 THOU/MM3 (ref 1.8–7.7)
SODIUM SERPL-SCNC: 137 MEQ/L (ref 135–145)
SODIUM SERPL-SCNC: 138 MEQ/L (ref 135–145)
SODIUM UR-SCNC: 55 MEQ/L
SPECIFIC GRAVITY UA: 1.01 (ref 1–1.03)
TIBC SERPL-MCNC: 275 UG/DL (ref 171–450)
TROPONIN T: 0.23 NG/ML
TROPONIN T: 0.23 NG/ML
UROBILINOGEN, URINE: 0.2 EU/DL (ref 0–1)
WBC # BLD AUTO: 10 THOU/MM3 (ref 4.8–10.8)
WBC #/AREA URNS HPF: NORMAL /HPF
YEAST LIKE FUNGI URNS QL MICRO: NORMAL

## 2023-04-03 PROCEDURE — P9016 RBC LEUKOCYTES REDUCED: HCPCS

## 2023-04-03 PROCEDURE — 82436 ASSAY OF URINE CHLORIDE: CPT

## 2023-04-03 PROCEDURE — 93005 ELECTROCARDIOGRAM TRACING: CPT | Performed by: EMERGENCY MEDICINE

## 2023-04-03 PROCEDURE — 36430 TRANSFUSION BLD/BLD COMPNT: CPT

## 2023-04-03 PROCEDURE — 86850 RBC ANTIBODY SCREEN: CPT

## 2023-04-03 PROCEDURE — 99285 EMERGENCY DEPT VISIT HI MDM: CPT

## 2023-04-03 PROCEDURE — 85014 HEMATOCRIT: CPT

## 2023-04-03 PROCEDURE — 83540 ASSAY OF IRON: CPT

## 2023-04-03 PROCEDURE — 93010 ELECTROCARDIOGRAM REPORT: CPT | Performed by: NUCLEAR MEDICINE

## 2023-04-03 PROCEDURE — 83880 ASSAY OF NATRIURETIC PEPTIDE: CPT

## 2023-04-03 PROCEDURE — 94761 N-INVAS EAR/PLS OXIMETRY MLT: CPT

## 2023-04-03 PROCEDURE — 82803 BLOOD GASES ANY COMBINATION: CPT

## 2023-04-03 PROCEDURE — 6360000002 HC RX W HCPCS

## 2023-04-03 PROCEDURE — 83735 ASSAY OF MAGNESIUM: CPT

## 2023-04-03 PROCEDURE — 83605 ASSAY OF LACTIC ACID: CPT

## 2023-04-03 PROCEDURE — 81001 URINALYSIS AUTO W/SCOPE: CPT

## 2023-04-03 PROCEDURE — 80053 COMPREHEN METABOLIC PANEL: CPT

## 2023-04-03 PROCEDURE — 96360 HYDRATION IV INFUSION INIT: CPT

## 2023-04-03 PROCEDURE — 36415 COLL VENOUS BLD VENIPUNCTURE: CPT

## 2023-04-03 PROCEDURE — 86900 BLOOD TYPING SEROLOGIC ABO: CPT

## 2023-04-03 PROCEDURE — 2580000003 HC RX 258: Performed by: EMERGENCY MEDICINE

## 2023-04-03 PROCEDURE — 84133 ASSAY OF URINE POTASSIUM: CPT

## 2023-04-03 PROCEDURE — 99223 1ST HOSP IP/OBS HIGH 75: CPT

## 2023-04-03 PROCEDURE — 84300 ASSAY OF URINE SODIUM: CPT

## 2023-04-03 PROCEDURE — 86901 BLOOD TYPING SEROLOGIC RH(D): CPT

## 2023-04-03 PROCEDURE — 71046 X-RAY EXAM CHEST 2 VIEWS: CPT

## 2023-04-03 PROCEDURE — 84145 PROCALCITONIN (PCT): CPT

## 2023-04-03 PROCEDURE — 82728 ASSAY OF FERRITIN: CPT

## 2023-04-03 PROCEDURE — 1200000000 HC SEMI PRIVATE

## 2023-04-03 PROCEDURE — 86923 COMPATIBILITY TEST ELECTRIC: CPT

## 2023-04-03 PROCEDURE — 85025 COMPLETE CBC W/AUTO DIFF WBC: CPT

## 2023-04-03 PROCEDURE — 84484 ASSAY OF TROPONIN QUANT: CPT

## 2023-04-03 PROCEDURE — 82272 OCCULT BLD FECES 1-3 TESTS: CPT

## 2023-04-03 PROCEDURE — 85018 HEMOGLOBIN: CPT

## 2023-04-03 PROCEDURE — C9113 INJ PANTOPRAZOLE SODIUM, VIA: HCPCS

## 2023-04-03 PROCEDURE — 1200000003 HC TELEMETRY R&B

## 2023-04-03 PROCEDURE — 83550 IRON BINDING TEST: CPT

## 2023-04-03 PROCEDURE — 82948 REAGENT STRIP/BLOOD GLUCOSE: CPT

## 2023-04-03 PROCEDURE — 84132 ASSAY OF SERUM POTASSIUM: CPT

## 2023-04-03 RX ORDER — SODIUM CHLORIDE 0.9 % (FLUSH) 0.9 %
10 SYRINGE (ML) INJECTION EVERY 12 HOURS SCHEDULED
Status: DISCONTINUED | OUTPATIENT
Start: 2023-04-03 | End: 2023-04-06 | Stop reason: HOSPADM

## 2023-04-03 RX ORDER — SODIUM CHLORIDE, SODIUM LACTATE, POTASSIUM CHLORIDE, CALCIUM CHLORIDE 600; 310; 30; 20 MG/100ML; MG/100ML; MG/100ML; MG/100ML
INJECTION, SOLUTION INTRAVENOUS CONTINUOUS
Status: ACTIVE | OUTPATIENT
Start: 2023-04-03 | End: 2023-04-04

## 2023-04-03 RX ORDER — ONDANSETRON 2 MG/ML
4 INJECTION INTRAMUSCULAR; INTRAVENOUS EVERY 6 HOURS PRN
Status: DISCONTINUED | OUTPATIENT
Start: 2023-04-03 | End: 2023-04-06 | Stop reason: HOSPADM

## 2023-04-03 RX ORDER — POLYETHYLENE GLYCOL 3350 17 G/17G
17 POWDER, FOR SOLUTION ORAL DAILY PRN
Status: DISCONTINUED | OUTPATIENT
Start: 2023-04-03 | End: 2023-04-06 | Stop reason: HOSPADM

## 2023-04-03 RX ORDER — ACETAMINOPHEN 325 MG/1
650 TABLET ORAL EVERY 6 HOURS PRN
Status: DISCONTINUED | OUTPATIENT
Start: 2023-04-03 | End: 2023-04-06 | Stop reason: HOSPADM

## 2023-04-03 RX ORDER — PANTOPRAZOLE SODIUM 40 MG/10ML
40 INJECTION, POWDER, LYOPHILIZED, FOR SOLUTION INTRAVENOUS 2 TIMES DAILY
Status: DISCONTINUED | OUTPATIENT
Start: 2023-04-03 | End: 2023-04-04

## 2023-04-03 RX ORDER — DEXTROSE MONOHYDRATE 100 MG/ML
INJECTION, SOLUTION INTRAVENOUS CONTINUOUS PRN
Status: DISCONTINUED | OUTPATIENT
Start: 2023-04-03 | End: 2023-04-06 | Stop reason: HOSPADM

## 2023-04-03 RX ORDER — ACETAMINOPHEN 650 MG/1
650 SUPPOSITORY RECTAL EVERY 6 HOURS PRN
Status: DISCONTINUED | OUTPATIENT
Start: 2023-04-03 | End: 2023-04-06 | Stop reason: HOSPADM

## 2023-04-03 RX ORDER — VITAMIN B COMPLEX
3000 TABLET ORAL DAILY
Status: DISCONTINUED | OUTPATIENT
Start: 2023-04-04 | End: 2023-04-06 | Stop reason: HOSPADM

## 2023-04-03 RX ORDER — ALLOPURINOL 300 MG/1
300 TABLET ORAL
Status: DISCONTINUED | OUTPATIENT
Start: 2023-04-04 | End: 2023-04-06 | Stop reason: HOSPADM

## 2023-04-03 RX ORDER — ASCORBIC ACID 500 MG
500 TABLET ORAL
Status: DISCONTINUED | OUTPATIENT
Start: 2023-04-05 | End: 2023-04-06 | Stop reason: HOSPADM

## 2023-04-03 RX ORDER — DOXAZOSIN MESYLATE 4 MG/1
4 TABLET ORAL DAILY
Status: DISCONTINUED | OUTPATIENT
Start: 2023-04-04 | End: 2023-04-06 | Stop reason: HOSPADM

## 2023-04-03 RX ORDER — ONDANSETRON 4 MG/1
4 TABLET, ORALLY DISINTEGRATING ORAL EVERY 8 HOURS PRN
Status: DISCONTINUED | OUTPATIENT
Start: 2023-04-03 | End: 2023-04-06 | Stop reason: HOSPADM

## 2023-04-03 RX ORDER — SODIUM CHLORIDE 0.9 % (FLUSH) 0.9 %
10 SYRINGE (ML) INJECTION PRN
Status: DISCONTINUED | OUTPATIENT
Start: 2023-04-03 | End: 2023-04-06 | Stop reason: HOSPADM

## 2023-04-03 RX ORDER — GABAPENTIN 300 MG/1
300 CAPSULE ORAL 2 TIMES DAILY
Status: DISCONTINUED | OUTPATIENT
Start: 2023-04-03 | End: 2023-04-06 | Stop reason: HOSPADM

## 2023-04-03 RX ORDER — FUROSEMIDE 40 MG/1
40 TABLET ORAL DAILY
Status: DISCONTINUED | OUTPATIENT
Start: 2023-04-04 | End: 2023-04-06 | Stop reason: HOSPADM

## 2023-04-03 RX ORDER — AMLODIPINE BESYLATE 10 MG/1
10 TABLET ORAL DAILY
Status: DISCONTINUED | OUTPATIENT
Start: 2023-04-04 | End: 2023-04-06 | Stop reason: HOSPADM

## 2023-04-03 RX ORDER — 0.9 % SODIUM CHLORIDE 0.9 %
500 INTRAVENOUS SOLUTION INTRAVENOUS ONCE
Status: DISCONTINUED | OUTPATIENT
Start: 2023-04-03 | End: 2023-04-03

## 2023-04-03 RX ORDER — FERROUS SULFATE 325(65) MG
325 TABLET ORAL
Status: DISCONTINUED | OUTPATIENT
Start: 2023-04-04 | End: 2023-04-06 | Stop reason: HOSPADM

## 2023-04-03 RX ORDER — METOLAZONE 5 MG/1
5 TABLET ORAL DAILY
Status: DISCONTINUED | OUTPATIENT
Start: 2023-04-04 | End: 2023-04-06 | Stop reason: HOSPADM

## 2023-04-03 RX ORDER — ATORVASTATIN CALCIUM 10 MG/1
10 TABLET, FILM COATED ORAL DAILY
Status: DISCONTINUED | OUTPATIENT
Start: 2023-04-04 | End: 2023-04-06 | Stop reason: HOSPADM

## 2023-04-03 RX ORDER — CARVEDILOL 25 MG/1
25 TABLET ORAL 2 TIMES DAILY
Status: DISCONTINUED | OUTPATIENT
Start: 2023-04-03 | End: 2023-04-06 | Stop reason: HOSPADM

## 2023-04-03 RX ORDER — SODIUM CHLORIDE 9 MG/ML
INJECTION, SOLUTION INTRAVENOUS PRN
Status: DISCONTINUED | OUTPATIENT
Start: 2023-04-03 | End: 2023-04-06 | Stop reason: HOSPADM

## 2023-04-03 RX ORDER — ASPIRIN 81 MG/1
81 TABLET ORAL DAILY
Status: DISCONTINUED | OUTPATIENT
Start: 2023-04-04 | End: 2023-04-06 | Stop reason: HOSPADM

## 2023-04-03 RX ORDER — INSULIN LISPRO 100 [IU]/ML
0-4 INJECTION, SOLUTION INTRAVENOUS; SUBCUTANEOUS NIGHTLY
Status: DISCONTINUED | OUTPATIENT
Start: 2023-04-03 | End: 2023-04-06 | Stop reason: HOSPADM

## 2023-04-03 RX ORDER — 0.9 % SODIUM CHLORIDE 0.9 %
250 INTRAVENOUS SOLUTION INTRAVENOUS ONCE
Status: COMPLETED | OUTPATIENT
Start: 2023-04-03 | End: 2023-04-03

## 2023-04-03 RX ORDER — INSULIN GLARGINE 100 [IU]/ML
20 INJECTION, SOLUTION SUBCUTANEOUS NIGHTLY
Status: DISCONTINUED | OUTPATIENT
Start: 2023-04-03 | End: 2023-04-06 | Stop reason: HOSPADM

## 2023-04-03 RX ORDER — LOSARTAN POTASSIUM 100 MG/1
100 TABLET ORAL DAILY
Status: DISCONTINUED | OUTPATIENT
Start: 2023-04-04 | End: 2023-04-06 | Stop reason: HOSPADM

## 2023-04-03 RX ORDER — INSULIN LISPRO 100 [IU]/ML
0-8 INJECTION, SOLUTION INTRAVENOUS; SUBCUTANEOUS
Status: DISCONTINUED | OUTPATIENT
Start: 2023-04-04 | End: 2023-04-06 | Stop reason: HOSPADM

## 2023-04-03 RX ADMIN — PANTOPRAZOLE SODIUM 40 MG: 40 INJECTION, POWDER, LYOPHILIZED, FOR SOLUTION INTRAVENOUS at 23:04

## 2023-04-03 RX ADMIN — SODIUM CHLORIDE 250 ML: 9 INJECTION, SOLUTION INTRAVENOUS at 16:58

## 2023-04-03 ASSESSMENT — ENCOUNTER SYMPTOMS
NAUSEA: 0
SORE THROAT: 0
ABDOMINAL PAIN: 0
ABDOMINAL DISTENTION: 0
COUGH: 1
VOMITING: 0
DIARRHEA: 0
CONSTIPATION: 0
SHORTNESS OF BREATH: 1
RHINORRHEA: 0
CHEST TIGHTNESS: 0
COLOR CHANGE: 0

## 2023-04-03 ASSESSMENT — PAIN - FUNCTIONAL ASSESSMENT: PAIN_FUNCTIONAL_ASSESSMENT: NONE - DENIES PAIN

## 2023-04-03 NOTE — ED TRIAGE NOTES
Presents to ED with c/o sob that started 2 weeks ago. Reports sob on minimal exertion. Alert and oriented. Respirations easy and unlaboredc.

## 2023-04-03 NOTE — ED NOTES
Patient resting in bed. Respirations easy and unlabored. No distress noted. Call light within reach.        Naveed Link RN  04/03/23 0874

## 2023-04-03 NOTE — H&P
History Of Present Illness:    Sonja Nugent is a 70 y/o  male with a PMHs of CAD, CHF, HTN, SRAVAN who presents to [facility] ED today for the evaluation of worsening shortness of breath. Pt and pt's wife reports worsening shortness of breath since December 2022 that has been gradually worsening, however has since worsening in the last 1-2 weeks. Pt reports he is having shortness of breath at rest and with minimal exertion and laying down flat. He states he has an associated dry cough. Denies any unexpected weight gain, new or worsening swelling in his legs, abdominal distention, chest pain, lightheadedness, or dizziness. Pt otherwise reports on overall decreased appetite, generalized fatigue. He denies any changes in bowel habits or blood in his stool, N/V, fever or chills. He reports a distant past of GI bleed from gastric ulcers. Past Medical History:        Diagnosis Date    Arthritis     CAD (coronary artery disease) 04/05/2012    CHF (congestive heart failure) (HCC)     Diabetes mellitus (HCC)     Diverticulosis of colon     History of chest pain     History of tobacco abuse     Hyperlipidemia     Hypertension     Pneumonia        Past Surgical History:        Procedure Laterality Date    APPENDECTOMY      CARDIAC SURGERY      heart cath    COLECTOMY  2001    D/T DIVERTICULOSIS    COLONOSCOPY      DIAGNOSTIC CARDIAC CATH LAB PROCEDURE  09/17/2010    EF 60% C MILD DISEASE IN THE PROXIMAL  PORTION BEFORE THE BIFURCATION OF D1,MILD DISEASE IS NOTED IN THE RCA , DIFFUSE AT 10-20% RCA ALSO HAS 10-20% LESIONS    DILATATION, ESOPHAGUS      TRANSTHORACIC ECHOCARDIOGRAM  10/22/2010    EF 55-65% C MILD LFT ATRIAL DILATATION, GRADE 1 DIASOLIC DYSFUNCTION       Home Medications:   No current facility-administered medications on file prior to encounter.      Current Outpatient Medications on File Prior to Encounter   Medication Sig Dispense Refill    metOLazone (ZAROXOLYN) 5 MG tablet Take 1 tablet by mouth daily

## 2023-04-03 NOTE — ED NOTES
Patient resting in bed. Respirations easy and unlabored. No distress noted. Call light within reach.        Jeff Evangelista RN  04/03/23 [id number]

## 2023-04-03 NOTE — ED NOTES
Patient resting in bed. Respirations easy and unlabored. No distress noted. Call light within reach.        Prashant Munoz RN  04/03/23 0406

## 2023-04-04 ENCOUNTER — APPOINTMENT (OUTPATIENT)
Dept: ULTRASOUND IMAGING | Age: 68
End: 2023-04-04
Payer: MEDICARE

## 2023-04-04 LAB
ALBUMIN SERPL BCG-MCNC: 3.9 G/DL (ref 3.5–5.1)
ALP SERPL-CCNC: 69 U/L (ref 38–126)
ALT SERPL W/O P-5'-P-CCNC: 16 U/L (ref 11–66)
ANION GAP SERPL CALC-SCNC: 14 MEQ/L (ref 8–16)
AST SERPL-CCNC: 16 U/L (ref 5–40)
BASOPHILS ABSOLUTE: 0 THOU/MM3 (ref 0–0.1)
BASOPHILS NFR BLD AUTO: 0.3 %
BILIRUB SERPL-MCNC: 0.3 MG/DL (ref 0.3–1.2)
BUN SERPL-MCNC: 158 MG/DL (ref 7–22)
CALCIUM SERPL-MCNC: 8.8 MG/DL (ref 8.5–10.5)
CHLORIDE SERPL-SCNC: 108 MEQ/L (ref 98–111)
CO2 SERPL-SCNC: 18 MEQ/L (ref 23–33)
CREAT SERPL-MCNC: 3.5 MG/DL (ref 0.4–1.2)
DEPRECATED RDW RBC AUTO: 51.5 FL (ref 35–45)
EOSINOPHIL NFR BLD AUTO: 2.7 %
EOSINOPHILS ABSOLUTE: 0.2 THOU/MM3 (ref 0–0.4)
ERYTHROCYTE [DISTWIDTH] IN BLOOD BY AUTOMATED COUNT: 15.2 % (ref 11.5–14.5)
GFR SERPL CREATININE-BSD FRML MDRD: 18 ML/MIN/1.73M2
GLUCOSE BLD STRIP.AUTO-MCNC: 126 MG/DL (ref 70–108)
GLUCOSE BLD STRIP.AUTO-MCNC: 151 MG/DL (ref 70–108)
GLUCOSE BLD STRIP.AUTO-MCNC: 207 MG/DL (ref 70–108)
GLUCOSE BLD STRIP.AUTO-MCNC: 223 MG/DL (ref 70–108)
GLUCOSE SERPL-MCNC: 75 MG/DL (ref 70–108)
HCT VFR BLD AUTO: 27.3 % (ref 42–52)
HCT VFR BLD AUTO: 27.9 % (ref 42–52)
HGB BLD-MCNC: 8.6 GM/DL (ref 14–18)
HGB BLD-MCNC: 8.7 GM/DL (ref 14–18)
IMM GRANULOCYTES # BLD AUTO: 0.04 THOU/MM3 (ref 0–0.07)
IMM GRANULOCYTES NFR BLD AUTO: 0.4 %
LYMPHOCYTES ABSOLUTE: 1.4 THOU/MM3 (ref 1–4.8)
LYMPHOCYTES NFR BLD AUTO: 15 %
MCH RBC QN AUTO: 29.4 PG (ref 26–33)
MCHC RBC AUTO-ENTMCNC: 31.5 GM/DL (ref 32.2–35.5)
MCV RBC AUTO: 93.2 FL (ref 80–94)
MONOCYTES ABSOLUTE: 1 THOU/MM3 (ref 0.4–1.3)
MONOCYTES NFR BLD AUTO: 10.9 %
NEUTROPHILS NFR BLD AUTO: 70.7 %
NRBC BLD AUTO-RTO: 0 /100 WBC
PLATELET # BLD AUTO: 245 THOU/MM3 (ref 130–400)
PMV BLD AUTO: 11.1 FL (ref 9.4–12.4)
POTASSIUM SERPL-SCNC: 5.1 MEQ/L (ref 3.5–5.2)
PROT SERPL-MCNC: 6.7 G/DL (ref 6.1–8)
RBC # BLD AUTO: 2.93 MILL/MM3 (ref 4.7–6.1)
SEGMENTED NEUTROPHILS ABSOLUTE COUNT: 6.4 THOU/MM3 (ref 1.8–7.7)
SODIUM SERPL-SCNC: 140 MEQ/L (ref 135–145)
WBC # BLD AUTO: 9.1 THOU/MM3 (ref 4.8–10.8)

## 2023-04-04 PROCEDURE — 1200000000 HC SEMI PRIVATE

## 2023-04-04 PROCEDURE — 51798 US URINE CAPACITY MEASURE: CPT

## 2023-04-04 PROCEDURE — 6370000000 HC RX 637 (ALT 250 FOR IP)

## 2023-04-04 PROCEDURE — 6360000002 HC RX W HCPCS

## 2023-04-04 PROCEDURE — 6360000002 HC RX W HCPCS: Performed by: NURSE PRACTITIONER

## 2023-04-04 PROCEDURE — 85018 HEMOGLOBIN: CPT

## 2023-04-04 PROCEDURE — C9113 INJ PANTOPRAZOLE SODIUM, VIA: HCPCS

## 2023-04-04 PROCEDURE — 80053 COMPREHEN METABOLIC PANEL: CPT

## 2023-04-04 PROCEDURE — 85014 HEMATOCRIT: CPT

## 2023-04-04 PROCEDURE — 1200000003 HC TELEMETRY R&B

## 2023-04-04 PROCEDURE — 99232 SBSQ HOSP IP/OBS MODERATE 35: CPT | Performed by: PHYSICIAN ASSISTANT

## 2023-04-04 PROCEDURE — 36415 COLL VENOUS BLD VENIPUNCTURE: CPT

## 2023-04-04 PROCEDURE — 2580000003 HC RX 258

## 2023-04-04 PROCEDURE — 2580000003 HC RX 258: Performed by: NURSE PRACTITIONER

## 2023-04-04 PROCEDURE — 85025 COMPLETE CBC W/AUTO DIFF WBC: CPT

## 2023-04-04 PROCEDURE — 82948 REAGENT STRIP/BLOOD GLUCOSE: CPT

## 2023-04-04 PROCEDURE — 93010 ELECTROCARDIOGRAM REPORT: CPT | Performed by: NUCLEAR MEDICINE

## 2023-04-04 PROCEDURE — 76770 US EXAM ABDO BACK WALL COMP: CPT

## 2023-04-04 RX ORDER — PANTOPRAZOLE SODIUM 40 MG/1
40 TABLET, DELAYED RELEASE ORAL
Status: DISCONTINUED | OUTPATIENT
Start: 2023-04-05 | End: 2023-04-06 | Stop reason: HOSPADM

## 2023-04-04 RX ADMIN — IRON SUCROSE 200 MG: 20 INJECTION, SOLUTION INTRAVENOUS at 14:32

## 2023-04-04 RX ADMIN — ALLOPURINOL 300 MG: 300 TABLET ORAL at 05:28

## 2023-04-04 RX ADMIN — ACETAMINOPHEN 650 MG: 325 TABLET ORAL at 11:00

## 2023-04-04 RX ADMIN — FERROUS SULFATE TAB 325 MG (65 MG ELEMENTAL FE) 325 MG: 325 (65 FE) TAB at 08:41

## 2023-04-04 RX ADMIN — INSULIN LISPRO 2 UNITS: 100 INJECTION, SOLUTION INTRAVENOUS; SUBCUTANEOUS at 16:27

## 2023-04-04 RX ADMIN — GABAPENTIN 300 MG: 300 CAPSULE ORAL at 21:43

## 2023-04-04 RX ADMIN — INSULIN GLARGINE 20 UNITS: 100 INJECTION, SOLUTION SUBCUTANEOUS at 21:43

## 2023-04-04 RX ADMIN — ATORVASTATIN CALCIUM 10 MG: 10 TABLET, FILM COATED ORAL at 08:42

## 2023-04-04 RX ADMIN — GABAPENTIN 300 MG: 300 CAPSULE ORAL at 08:41

## 2023-04-04 RX ADMIN — SODIUM CHLORIDE, POTASSIUM CHLORIDE, SODIUM LACTATE AND CALCIUM CHLORIDE: 600; 310; 30; 20 INJECTION, SOLUTION INTRAVENOUS at 02:01

## 2023-04-04 RX ADMIN — Medication 3000 UNITS: at 08:41

## 2023-04-04 RX ADMIN — PANTOPRAZOLE SODIUM 40 MG: 40 INJECTION, POWDER, LYOPHILIZED, FOR SOLUTION INTRAVENOUS at 08:41

## 2023-04-04 ASSESSMENT — PAIN DESCRIPTION - DESCRIPTORS: DESCRIPTORS: ACHING

## 2023-04-04 ASSESSMENT — PAIN SCALES - GENERAL
PAINLEVEL_OUTOF10: 2
PAINLEVEL_OUTOF10: 0

## 2023-04-04 ASSESSMENT — PAIN DESCRIPTION - ORIENTATION: ORIENTATION: MID

## 2023-04-04 ASSESSMENT — PAIN DESCRIPTION - LOCATION: LOCATION: HEAD

## 2023-04-04 NOTE — CONSENT
Informed Consent for Blood Component Transfusion Note    I have discussed with the patient the rationale for blood component transfusion; its benefits in treating or preventing fatigue, organ damage, or death; and its risk which includes mild transfusion reactions, rare risk of blood borne infection, or more serious but rare reactions. I have discussed the alternatives to transfusion, including the risk and consequences of not receiving transfusion. The patient had an opportunity to ask questions and had agreed to proceed with transfusion of blood components.     Electronically signed by Marycruz Mcintosh PA-C on 4/3/23 at 8:04 PM EDT

## 2023-04-04 NOTE — ED NOTES
Spoke to [address] [place] [address] to approve pt transport to [phone] in stable condition.      Arian Healy LPN  40/17/80 [id number]

## 2023-04-04 NOTE — PROCEDURES
Bladder scan was completed by Paradise Marquez at [id number].  The residual amount of 389ml  was resulted to [employer]

## 2023-04-05 LAB
ALBUMIN SERPL BCG-MCNC: 3.6 G/DL (ref 3.5–5.1)
ALP SERPL-CCNC: 65 U/L (ref 38–126)
ALT SERPL W/O P-5'-P-CCNC: 13 U/L (ref 11–66)
ANION GAP SERPL CALC-SCNC: 13 MEQ/L (ref 8–16)
AST SERPL-CCNC: 15 U/L (ref 5–40)
BASOPHILS ABSOLUTE: 0.1 THOU/MM3 (ref 0–0.1)
BASOPHILS NFR BLD AUTO: 0.5 %
BILIRUB SERPL-MCNC: 0.3 MG/DL (ref 0.3–1.2)
BUN SERPL-MCNC: 110 MG/DL (ref 7–22)
CALCIUM SERPL-MCNC: 9.4 MG/DL (ref 8.5–10.5)
CHLORIDE SERPL-SCNC: 109 MEQ/L (ref 98–111)
CO2 SERPL-SCNC: 19 MEQ/L (ref 23–33)
CREAT SERPL-MCNC: 2.6 MG/DL (ref 0.4–1.2)
DEPRECATED RDW RBC AUTO: 52.1 FL (ref 35–45)
EOSINOPHIL NFR BLD AUTO: 3.6 %
EOSINOPHILS ABSOLUTE: 0.4 THOU/MM3 (ref 0–0.4)
ERYTHROCYTE [DISTWIDTH] IN BLOOD BY AUTOMATED COUNT: 15.3 % (ref 11.5–14.5)
GFR SERPL CREATININE-BSD FRML MDRD: 26 ML/MIN/1.73M2
GLUCOSE BLD STRIP.AUTO-MCNC: 140 MG/DL (ref 70–108)
GLUCOSE BLD STRIP.AUTO-MCNC: 196 MG/DL (ref 70–108)
GLUCOSE BLD STRIP.AUTO-MCNC: 206 MG/DL (ref 70–108)
GLUCOSE BLD STRIP.AUTO-MCNC: 219 MG/DL (ref 70–108)
GLUCOSE SERPL-MCNC: 118 MG/DL (ref 70–108)
HCT VFR BLD AUTO: 27.8 % (ref 42–52)
HGB BLD-MCNC: 8.8 GM/DL (ref 14–18)
IMM GRANULOCYTES # BLD AUTO: 0.03 THOU/MM3 (ref 0–0.07)
IMM GRANULOCYTES NFR BLD AUTO: 0.3 %
LYMPHOCYTES ABSOLUTE: 1.1 THOU/MM3 (ref 1–4.8)
LYMPHOCYTES NFR BLD AUTO: 10.7 %
MCH RBC QN AUTO: 29.8 PG (ref 26–33)
MCHC RBC AUTO-ENTMCNC: 31.7 GM/DL (ref 32.2–35.5)
MCV RBC AUTO: 94.2 FL (ref 80–94)
MONOCYTES ABSOLUTE: 1.1 THOU/MM3 (ref 0.4–1.3)
MONOCYTES NFR BLD AUTO: 10.5 %
NEUTROPHILS NFR BLD AUTO: 74.4 %
NRBC BLD AUTO-RTO: 0 /100 WBC
PLATELET # BLD AUTO: 271 THOU/MM3 (ref 130–400)
PMV BLD AUTO: 11.3 FL (ref 9.4–12.4)
POTASSIUM SERPL-SCNC: 4.7 MEQ/L (ref 3.5–5.2)
PROT SERPL-MCNC: 7.1 G/DL (ref 6.1–8)
RBC # BLD AUTO: 2.95 MILL/MM3 (ref 4.7–6.1)
SEGMENTED NEUTROPHILS ABSOLUTE COUNT: 7.4 THOU/MM3 (ref 1.8–7.7)
SODIUM SERPL-SCNC: 141 MEQ/L (ref 135–145)
WBC # BLD AUTO: 10 THOU/MM3 (ref 4.8–10.8)

## 2023-04-05 PROCEDURE — 2580000003 HC RX 258: Performed by: NURSE PRACTITIONER

## 2023-04-05 PROCEDURE — 2580000003 HC RX 258

## 2023-04-05 PROCEDURE — 1200000000 HC SEMI PRIVATE

## 2023-04-05 PROCEDURE — 80053 COMPREHEN METABOLIC PANEL: CPT

## 2023-04-05 PROCEDURE — 2580000003 HC RX 258: Performed by: PHYSICIAN ASSISTANT

## 2023-04-05 PROCEDURE — 6370000000 HC RX 637 (ALT 250 FOR IP): Performed by: NURSE PRACTITIONER

## 2023-04-05 PROCEDURE — 85025 COMPLETE CBC W/AUTO DIFF WBC: CPT

## 2023-04-05 PROCEDURE — 99232 SBSQ HOSP IP/OBS MODERATE 35: CPT | Performed by: PHYSICIAN ASSISTANT

## 2023-04-05 PROCEDURE — 6370000000 HC RX 637 (ALT 250 FOR IP)

## 2023-04-05 PROCEDURE — 36415 COLL VENOUS BLD VENIPUNCTURE: CPT

## 2023-04-05 PROCEDURE — 82948 REAGENT STRIP/BLOOD GLUCOSE: CPT

## 2023-04-05 PROCEDURE — 6360000002 HC RX W HCPCS: Performed by: NURSE PRACTITIONER

## 2023-04-05 RX ORDER — SODIUM CHLORIDE, SODIUM LACTATE, POTASSIUM CHLORIDE, CALCIUM CHLORIDE 600; 310; 30; 20 MG/100ML; MG/100ML; MG/100ML; MG/100ML
INJECTION, SOLUTION INTRAVENOUS CONTINUOUS
Status: DISCONTINUED | OUTPATIENT
Start: 2023-04-05 | End: 2023-04-06 | Stop reason: HOSPADM

## 2023-04-05 RX ORDER — PANTOPRAZOLE SODIUM 40 MG/1
40 TABLET, DELAYED RELEASE ORAL
Qty: 30 TABLET | Refills: 1 | Status: SHIPPED | OUTPATIENT
Start: 2023-04-06

## 2023-04-05 RX ADMIN — GABAPENTIN 300 MG: 300 CAPSULE ORAL at 20:36

## 2023-04-05 RX ADMIN — INSULIN LISPRO 2 UNITS: 100 INJECTION, SOLUTION INTRAVENOUS; SUBCUTANEOUS at 15:43

## 2023-04-05 RX ADMIN — INSULIN GLARGINE 20 UNITS: 100 INJECTION, SOLUTION SUBCUTANEOUS at 20:36

## 2023-04-05 RX ADMIN — PANTOPRAZOLE SODIUM 40 MG: 40 TABLET, DELAYED RELEASE ORAL at 06:18

## 2023-04-05 RX ADMIN — CARVEDILOL 25 MG: 25 TABLET, FILM COATED ORAL at 20:36

## 2023-04-05 RX ADMIN — OXYCODONE HYDROCHLORIDE AND ACETAMINOPHEN 500 MG: 500 TABLET ORAL at 08:33

## 2023-04-05 RX ADMIN — ALLOPURINOL 300 MG: 300 TABLET ORAL at 06:18

## 2023-04-05 RX ADMIN — Medication 3000 UNITS: at 08:33

## 2023-04-05 RX ADMIN — IRON SUCROSE 200 MG: 20 INJECTION, SOLUTION INTRAVENOUS at 12:43

## 2023-04-05 RX ADMIN — SODIUM CHLORIDE, POTASSIUM CHLORIDE, SODIUM LACTATE AND CALCIUM CHLORIDE: 600; 310; 30; 20 INJECTION, SOLUTION INTRAVENOUS at 15:42

## 2023-04-05 RX ADMIN — SODIUM CHLORIDE, PRESERVATIVE FREE 10 ML: 5 INJECTION INTRAVENOUS at 08:33

## 2023-04-05 RX ADMIN — ATORVASTATIN CALCIUM 10 MG: 10 TABLET, FILM COATED ORAL at 08:33

## 2023-04-05 RX ADMIN — GABAPENTIN 300 MG: 300 CAPSULE ORAL at 08:33

## 2023-04-05 ASSESSMENT — PAIN SCALES - GENERAL
PAINLEVEL_OUTOF10: 0

## 2023-04-05 NOTE — ED PROVIDER NOTES
injury) ([facility] [address]     Condition: stable  Dispo: Admit to med/surg floor    PATIENT REFERRED TO:  No follow-up provider specified. The results of pertinent diagnostic studies and exam findings were discussed. The patients provisional diagnosis and plan of care were discussed with the patient who expressed understanding. Strict verbal and written return precautions, instructions and appropriate follow-up provided to  the patient    Critical Care Time   CRITICAL CARE:  None    PROCEDURES: (None if blank)  Procedures: This transcription was electronically signed. Parts of this transcriptions may have been dictated by use of voice recognition software and electronically transcribed, and parts may have been transcribed with the assistance of an ED scribe. The transcription may contain errors not detected in proofreading.     Electronically Signed: Love Cali MD, 04/04/23, 10:11 PM        Love Cali MD  Resident  04/04/23 1650

## 2023-04-05 NOTE — FLOWSHEET NOTE
04/05/23 1022   Safe Environment   Safety Measures Other (comment)  (VN safety round)     VN called into patients room and introduced myself and role. Patient did not answer. Video activated for safety check. . Patient up in chair. . Patient has call light within reach. No obvious signs of distress noted at this time.

## 2023-04-06 VITALS
OXYGEN SATURATION: 98 % | SYSTOLIC BLOOD PRESSURE: 116 MMHG | WEIGHT: 224.43 LBS | HEIGHT: 63 IN | DIASTOLIC BLOOD PRESSURE: 91 MMHG | HEART RATE: 70 BPM | BODY MASS INDEX: 39.77 KG/M2 | RESPIRATION RATE: 18 BRPM | TEMPERATURE: 97.6 F

## 2023-04-06 LAB
ALBUMIN SERPL BCG-MCNC: 3.5 G/DL (ref 3.5–5.1)
ALP SERPL-CCNC: 73 U/L (ref 38–126)
ALT SERPL W/O P-5'-P-CCNC: 13 U/L (ref 11–66)
ANION GAP SERPL CALC-SCNC: 14 MEQ/L (ref 8–16)
AST SERPL-CCNC: 16 U/L (ref 5–40)
BASOPHILS ABSOLUTE: 0 THOU/MM3 (ref 0–0.1)
BASOPHILS NFR BLD AUTO: 0.4 %
BILIRUB SERPL-MCNC: 0.2 MG/DL (ref 0.3–1.2)
BUN SERPL-MCNC: 85 MG/DL (ref 7–22)
CALCIUM SERPL-MCNC: 9.1 MG/DL (ref 8.5–10.5)
CHLORIDE SERPL-SCNC: 107 MEQ/L (ref 98–111)
CO2 SERPL-SCNC: 19 MEQ/L (ref 23–33)
CREAT SERPL-MCNC: 2.3 MG/DL (ref 0.4–1.2)
DEPRECATED RDW RBC AUTO: 51.2 FL (ref 35–45)
EKG ATRIAL RATE: 115 BPM
EKG ATRIAL RATE: 98 BPM
EKG P AXIS: 36 DEGREES
EKG P AXIS: 58 DEGREES
EKG P-R INTERVAL: 166 MS
EKG P-R INTERVAL: 170 MS
EKG Q-T INTERVAL: 302 MS
EKG Q-T INTERVAL: 322 MS
EKG QRS DURATION: 74 MS
EKG QRS DURATION: 80 MS
EKG QTC CALCULATION (BAZETT): 411 MS
EKG QTC CALCULATION (BAZETT): 417 MS
EKG R AXIS: 15 DEGREES
EKG R AXIS: 20 DEGREES
EKG T AXIS: 50 DEGREES
EKG T AXIS: 74 DEGREES
EKG VENTRICULAR RATE: 115 BPM
EKG VENTRICULAR RATE: 98 BPM
EOSINOPHIL NFR BLD AUTO: 3.9 %
EOSINOPHILS ABSOLUTE: 0.4 THOU/MM3 (ref 0–0.4)
ERYTHROCYTE [DISTWIDTH] IN BLOOD BY AUTOMATED COUNT: 15.4 % (ref 11.5–14.5)
GFR SERPL CREATININE-BSD FRML MDRD: 30 ML/MIN/1.73M2
GLUCOSE BLD STRIP.AUTO-MCNC: 125 MG/DL (ref 70–108)
GLUCOSE BLD STRIP.AUTO-MCNC: 183 MG/DL (ref 70–108)
GLUCOSE SERPL-MCNC: 108 MG/DL (ref 70–108)
HCT VFR BLD AUTO: 29.6 % (ref 42–52)
HGB BLD-MCNC: 9.5 GM/DL (ref 14–18)
IMM GRANULOCYTES # BLD AUTO: 0.05 THOU/MM3 (ref 0–0.07)
IMM GRANULOCYTES NFR BLD AUTO: 0.5 %
LYMPHOCYTES ABSOLUTE: 1.7 THOU/MM3 (ref 1–4.8)
LYMPHOCYTES NFR BLD AUTO: 15.2 %
MCH RBC QN AUTO: 29.8 PG (ref 26–33)
MCHC RBC AUTO-ENTMCNC: 32.1 GM/DL (ref 32.2–35.5)
MCV RBC AUTO: 92.8 FL (ref 80–94)
MONOCYTES ABSOLUTE: 1.3 THOU/MM3 (ref 0.4–1.3)
MONOCYTES NFR BLD AUTO: 12.3 %
NEUTROPHILS NFR BLD AUTO: 67.7 %
NRBC BLD AUTO-RTO: 0 /100 WBC
PLATELET # BLD AUTO: 268 THOU/MM3 (ref 130–400)
PMV BLD AUTO: 11.1 FL (ref 9.4–12.4)
POTASSIUM SERPL-SCNC: 4.2 MEQ/L (ref 3.5–5.2)
PROT SERPL-MCNC: 7.1 G/DL (ref 6.1–8)
RBC # BLD AUTO: 3.19 MILL/MM3 (ref 4.7–6.1)
SEGMENTED NEUTROPHILS ABSOLUTE COUNT: 7.4 THOU/MM3 (ref 1.8–7.7)
SODIUM SERPL-SCNC: 140 MEQ/L (ref 135–145)
WBC # BLD AUTO: 10.9 THOU/MM3 (ref 4.8–10.8)

## 2023-04-06 PROCEDURE — 82948 REAGENT STRIP/BLOOD GLUCOSE: CPT

## 2023-04-06 PROCEDURE — 6370000000 HC RX 637 (ALT 250 FOR IP): Performed by: NURSE PRACTITIONER

## 2023-04-06 PROCEDURE — 36415 COLL VENOUS BLD VENIPUNCTURE: CPT

## 2023-04-06 PROCEDURE — 80053 COMPREHEN METABOLIC PANEL: CPT

## 2023-04-06 PROCEDURE — 85025 COMPLETE CBC W/AUTO DIFF WBC: CPT

## 2023-04-06 PROCEDURE — 6370000000 HC RX 637 (ALT 250 FOR IP)

## 2023-04-06 PROCEDURE — 99239 HOSP IP/OBS DSCHRG MGMT >30: CPT | Performed by: PHYSICIAN ASSISTANT

## 2023-04-06 RX ADMIN — ATORVASTATIN CALCIUM 10 MG: 10 TABLET, FILM COATED ORAL at 08:14

## 2023-04-06 RX ADMIN — Medication 3000 UNITS: at 08:14

## 2023-04-06 RX ADMIN — ALLOPURINOL 300 MG: 300 TABLET ORAL at 06:10

## 2023-04-06 RX ADMIN — CARVEDILOL 25 MG: 25 TABLET, FILM COATED ORAL at 08:14

## 2023-04-06 RX ADMIN — GABAPENTIN 300 MG: 300 CAPSULE ORAL at 08:14

## 2023-04-06 RX ADMIN — PANTOPRAZOLE SODIUM 40 MG: 40 TABLET, DELAYED RELEASE ORAL at 06:10

## 2023-04-06 NOTE — PLAN OF CARE
Problem: Discharge Planning  Goal: Discharge to home or other facility with appropriate resources  4/4/2023 1439 by Rian Bar RN  Outcome: Progressing  Flowsheets (Taken 4/4/2023 1439)  Discharge to home or other facility with appropriate resources:   Identify barriers to discharge with patient and caregiver   Arrange for needed discharge resources and transportation as appropriate     Problem: Safety - Adult  Goal: Free from fall injury  4/4/2023 1439 by Rian Bar RN  Outcome: Progressing  Flowsheets (Taken 4/4/2023 0150 by Billy Corrales RN)  Free From Fall Injury: Instruct family/caregiver on patient safety     Problem: Pain  Goal: Verbalizes/displays adequate comfort level or baseline comfort level  4/4/2023 1439 by Rian Bar RN  Outcome: Progressing  Flowsheets (Taken 4/4/2023 1439)  Verbalizes/displays adequate comfort level or baseline comfort level:   Encourage patient to monitor pain and request assistance   Assess pain using appropriate pain scale
Problem: Discharge Planning  Goal: Discharge to home or other facility with appropriate resources  Outcome: Progressing  Flowsheets (Taken 4/5/2023 1402)  Discharge to home or other facility with appropriate resources:   Identify barriers to discharge with patient and caregiver   Arrange for needed discharge resources and transportation as appropriate     Problem: Safety - Adult  Goal: Free from fall injury  4/5/2023 1402 by Garret Nogueira RN  Outcome: Progressing  Flowsheets (Taken 4/5/2023 1402)  Free From Fall Injury:   Instruct family/caregiver on patient safety   Based on caregiver fall risk screen, instruct family/caregiver to ask for assistance with transferring infant if caregiver noted to have fall risk factors     Problem: Skin/Tissue Integrity  Goal: Absence of new skin breakdown  Description: 1. Monitor for areas of redness and/or skin breakdown  2. Assess vascular access sites hourly  3. Every 4-6 hours minimum:  Change oxygen saturation probe site  4. Every 4-6 hours:  If on nasal continuous positive airway pressure, respiratory therapy assess nares and determine need for appliance change or resting period.   4/5/2023 1402 by Garret Nogueira RN  Outcome: Progressing
Problem: Pain  Goal: Verbalizes/displays adequate comfort level or baseline comfort level  Flowsheets (Taken 4/6/2023 0443)  Verbalizes/displays adequate comfort level or baseline comfort level:   Encourage patient to monitor pain and request assistance   Assess pain using appropriate pain scale     Problem: Genitourinary - Adult  Goal: Absence of urinary retention  Flowsheets (Taken 4/6/2023 0443)  Absence of urinary retention: Assess patients ability to void and empty bladder     Problem: Metabolic/Fluid and Electrolytes - Adult  Goal: Electrolytes maintained within normal limits  Flowsheets (Taken 4/6/2023 0443)  Electrolytes maintained within normal limits: Monitor labs and assess patient for signs and symptoms of electrolyte imbalances
Problem: Safety - Adult  Goal: Free from fall injury  Flowsheets (Taken 4/5/2023 0504)  Free From Fall Injury: Instruct family/caregiver on patient safety     Problem: Skin/Tissue Integrity  Goal: Absence of new skin breakdown  Description: 1. Monitor for areas of redness and/or skin breakdown  2. Assess vascular access sites hourly  3. Every 4-6 hours minimum:  Change oxygen saturation probe site  4. Every 4-6 hours:  If on nasal continuous positive airway pressure, respiratory therapy assess nares and determine need for appliance change or resting period.   Note: Patient will remain free from new skin breakdown     Problem: Pain  Goal: Verbalizes/displays adequate comfort level or baseline comfort level  Flowsheets (Taken 4/5/2023 0504)  Verbalizes/displays adequate comfort level or baseline comfort level: Encourage patient to monitor pain and request assistance
limits:   Monitor labs and assess patient for signs and symptoms of electrolyte imbalances   Administer electrolyte replacement as ordered  Goal: Hemodynamic stability and optimal renal function maintained  Outcome: Progressing     Problem: Chronic Conditions and Co-morbidities  Goal: Patient's chronic conditions and co-morbidity symptoms are monitored and maintained or improved  Outcome: Progressing  Flowsheets (Taken 4/4/2023 0150)  Care Plan - Patient's Chronic Conditions and Co-Morbidity Symptoms are Monitored and Maintained or Improved:   Monitor and assess patient's chronic conditions and comorbid symptoms for stability, deterioration, or improvement   Collaborate with multidisciplinary team to address chronic and comorbid conditions and prevent exacerbation or deterioration     Care plan reviewed with patient. Patient verbalizes understanding of plan of care and contributes to goal setting.

## 2023-04-06 NOTE — DISCHARGE SUMMARY
- 33 meq/L    Calcium 8.9 8.5 - 10.5 mg/dL    AST 19 5 - 40 U/L    Alkaline Phosphatase 81 38 - 126 U/L    Total Protein 7.2 6.1 - 8.0 g/dL    Albumin 3.8 3.5 - 5.1 g/dL    Total Bilirubin 0.3 0.3 - 1.2 mg/dL    ALT 17 11 - 66 U/L   Anion Gap    Collection Time: 04/03/23  3:55 PM   Result Value Ref Range    Anion Gap 19.0 (H) 8.0 - 16.0 meq/L   Glomerular Filtration Rate, Estimated    Collection Time: 04/03/23  3:55 PM   Result Value Ref Range    Est, Glom Filt Rate 15 (A) >60 ml/min/1.73m2   Osmolality    Collection Time: 04/03/23  3:55 PM   Result Value Ref Range    Osmolality Calc 333.4 (H) 275.0 - 300.0 mOsmol/kg   Blood gas, venous    Collection Time: 04/03/23  4:37 PM   Result Value Ref Range    PH MIXED 7.30 (L) 7.31 - 7.41    PCO2, MIXED VENOUS 32 (L) 41 - 51 mmhg    PO2, Mixed 37 25 - 40 mmhg    HCO3, Mixed 16 (L) 23 - 28 mmol/l    Base Exc, Mixed -9.7 (L) -2.0 - 3.0 mmol/l    O2 Sat, Mixed 65 %    COLLECTED BY: [id number]    Potassium, Whole Blood    Collection Time: 04/03/23  4:37 PM   Result Value Ref Range    Potassium, Whole Blood 5.5 (H) 3.5 - 4.9 meq/l   Troponin    Collection Time: 04/03/23  5:00 PM   Result Value Ref Range    Troponin T 0.225 (A) ng/ml   Procalcitonin    Collection Time: 04/03/23  5:00 PM   Result Value Ref Range    Procalcitonin 0.28 (H) 0.01 - 0.09 ng/mL   Iron binding capacity    Collection Time: 04/03/23  5:00 PM   Result Value Ref Range    TIBC 275 171 - 450 ug/dL   Iron    Collection Time: 04/03/23  5:00 PM   Result Value Ref Range    Iron 21 (L) 65 - 195 ug/dL   IRON SATURATION    Collection Time: 04/03/23  5:00 PM   Result Value Ref Range    Iron Saturation 8 (L) 20 - 50 %   Ferritin    Collection Time: 04/03/23  5:00 PM   Result Value Ref Range    Ferritin 50 22 - 322 ng/mL   Lactic Acid    Collection Time: 04/03/23  5:04 PM   Result Value Ref Range    Lactic Acid 2.8 (H) 0.5 - 2.0 mmol/L   Brain Natriuretic Peptide    Collection Time: 04/03/23  5:04 PM   Result Value Ref

## 2023-04-06 NOTE — PROGRESS NOTES
Discharge teaching and instructions for diagnosis/procedure of HALEIGH completed with patient using teachback method. AVS reviewed. Printed prescriptions given to patient. Patient voiced understanding regarding prescriptions, follow up appointments, and care of self at home.  Discharged in a wheelchair to  home with support per family
Informed Judi Huggins of patient run of 14 beats of V-tach, patient asymptomatic
Pt was sitting on the chair beside his bed and alone at the time of the visit. He was dealing with acute kidney injury. He was hopeful and wanted prayer to cope and heal. Prayer was appreciated.     04/05/23 1730   Encounter Summary   Encounter Overview/Reason  Initial Encounter   Service Provided For: Patient   Referral/Consult From: [facility]   Support System Spouse   Last Encounter  04/05/23   Complexity of Encounter Low   Begin Time 1620   End Time  1627   Total Time Calculated 7 min   Spiritual/Emotional needs   Type Spiritual Support   Assessment/Intervention/Outcome   Assessment Hopeful   Intervention Empowerment
VN completed 9pm rounds. Pt responds to voice and permits camera. Pt sitting up in chair with call light in reach. No voiced questions or concners.
VN completed admission questions and home medication list with pt at this time. Pt resting in bed with call light in reach. Pt referred to page 13 in hospital hanbook for safety and fall precautions. Understanding verbalized. No voiced questions or concerns.
(60 gm/meal); No red dye    Physical Exam:  /73   Pulse 95   Temp 97.5 °F (36.4 °C) (Oral)   Resp 18   Ht 5' 3\" (1.6 m)   Wt 222 lb 3.6 oz (100.8 kg)   SpO2 98%   BMI 39.37 kg/m²   General appearance: No apparent distress, appears stated age and cooperative. HEENT: Pupils equal, round, and reactive to light. Conjunctivae/corneas clear. Neck: Supple, with full range of motion. No jugular venous distention. Trachea midline. Respiratory:  Normal respiratory effort. Clear to auscultation, bilaterally without Rales/Wheezes/Rhonchi. Cardiovascular: Regular rate and rhythm with normal S1/S2 without murmurs, rubs or gallops. Abdomen: Soft, non-tender, non-distended with normal bowel sounds. Musculoskeletal: passive and active ROM x 4 extremities. Skin: Skin color, texture, turgor normal.  No rashes or lesions. Neurologic:  Neurovascularly intact without any focal sensory/motor deficits. Cranial nerves: II-XII intact, grossly non-focal.  Psychiatric: Alert and oriented, thought content appropriate, normal insight  Capillary Refill: Brisk,< 3 seconds   Peripheral Pulses: +2 palpable, equal bilaterally     Labs:   Recent Labs     04/03/23  1555 04/03/23 2009 04/04/23  0238 04/04/23  0847   WBC 10.0  --  9.1  --    HGB 7.4* 6.8* 8.6* 8.7*   HCT 23.8* 22.0* 27.3* 27.9*     --  245  --      Recent Labs     04/03/23  1555 04/03/23  1637 04/03/23  2127 04/04/23  0237     --  137 140   K 5.7* 5.5* 5.3* 5.1     --  107 108   CO2 14*  --  15* 18*   *  --  165* 158*   CREATININE 4.0*  --  3.8* 3.5*   CALCIUM 8.9  --  8.9 8.8     Recent Labs     04/03/23  1555 04/04/23  0237   AST 19 16   ALT 17 16   BILITOT 0.3 0.3   ALKPHOS 81 69     No results for input(s): INR in the last 72 hours. No results for input(s): Jeanette Lowe in the last 72 hours.     Microbiology:    Blood culture #1:   Lab Results   Component Value Date/Time    BC No growth-preliminary  No growth   01/30/2019 08:30 PM
with the patient & his wife regarding the importance of treatment for Crohn's disease. Patient's wife states the insurance did not cover the medication, however documentation in his chart demonstrates the Entyvio infusions were approved 04/2021-07/2022. Patient states they said \"I owe $29,000. \" He is unable to say who \"they\" are.  RN updated  Supportive care per primary team  Will need a 3-4 week follow-up with Vitaly EDMOND  GI signing off    Case reviewed and impression/plan reviewed in collaboration with Dr. Zenia Chacon  Electronically signed by FELI Shepherd - CNP on 4/5/2023 at 12:17 PM    [facility]
Electronically signed by DERICK Williamson on 4/5/2023 at 2:39 PM

## 2023-04-06 NOTE — CARE COORDINATION
4/5/23, 11:54 AM EDT    DISCHARGE ON GOING EVALUATION    Cassie Bachelor day: 2  Location: [facility]17/017-A Reason for admit: [facility] (acute kidney [facility] ([facility] [N17.9]   Barriers to Discharge: creat 2.6. GI plans EGD as OP. IV venofer daily. Discharge tomorrow if renal fx is improved. PCP: FELI Marcos CNP  Readmission Risk Score: 17.3%  Patient Goals/Plan/Treatment Preferences: Home with wife. Has walker and CPAP. Denies needs.
4/6/23, 9:12 AM EDT    DISCHARGE ON GOING EVALUATION    Sanchez Boykin day: 3  Location: [facility]17/017- Reason for admit: HALEIGH (acute kidney injury) ([facility] [N17.9]   Procedure: none  Barriers to Discharge: creat 2.3. 14 beats of Vtach documented this a.m. GI signed off. Possible discharge today. PCP: FELI Cerna CNP  Readmission Risk Score: 14.9%  Patient Goals/Plan/Treatment Preferences: home with wife. Denies needs. 4/6/23, 11:26 AM EDT    Katrina Arriaza plans home with his wife. He continues to deny all needs. Patient goals/plan/ treatment preferences discussed by  and . Patient goals/plan/ treatment preferences reviewed with patient/ family. Patient/ family verbalize understanding of discharge plan and are in agreement with goal/plan/treatment preferences. Understanding was demonstrated using the teach back method. AVS provided by RN at time of discharge, which includes all necessary medical information pertaining to the patients current course of illness, treatment, post-discharge goals of care, and treatment preferences. Pt verbalized understanding and gave permission for possible discharge within 4 hours of receiving IMM.      Services At/After Discharge: None       IMM Letter  IMM Letter given to Patient/Family/Significant other/Guardian/POA/by[de-identified] Carla Mcadams CM  IMM Letter date given[de-identified] 04/06/23  IMM Letter time given[de-identified] 7932
discharge to: [facility] for transportation at discharge: Family    Financial    Payor: [place] / Plan: Alicia [employer]oo ESSENTIAL/PLUS / Product Type: *No Product type* /     Does insurance require precert for SNF: Yes    Potential assistance Purchasing Medications: No  Meds-to-Beds request: Yes      [facility] [address]  [address]  [place] [address]  Phone: [phone] Fax: [phone]      Notes:    Factors facilitating achievement of predicted outcomes: Family support, Motivated, Cooperative, Pleasant, Sense of humor, Good insight into deficits, and Has needed Durable Medical Equipment at home    Barriers to discharge: n/a    Additional Case Management Notes: Presented to ED with sob. Hospitalist following. Hgb 6.8 upon arrival, PRBC ordered, now up to 8.6. Creatinine 3.5. GI consult. NPO. Daily wts. I/O. LR @ 100ml/hr. Iron. IV protonix. SSI. Lantus. Procedure:   4/3 CXR: Stable radiographic appearance of the chest. No evidence of an acute process       The Plan for Transition of Care is related to the following treatment goals of HALEIGH (acute kidney injury) ([facility] [N17.9]    Patient Goals/Plan/Treatment Preferences: Spoke with patient, he plans home with wife. Uses a walker and home cpap. He does not feel HH or further dme is needed. Denies needs. Transportation/Food Security/Housekeeping Addressed: No issues identified.      En Ordonez RN  Case Management Department

## 2023-04-10 ENCOUNTER — HOSPITAL ENCOUNTER (OUTPATIENT)
Dept: INFUSION THERAPY | Age: 68
Discharge: HOME | End: 2023-04-10
Payer: MEDICARE

## 2023-04-10 VITALS
DIASTOLIC BLOOD PRESSURE: 60 MMHG | HEART RATE: 101 BPM | SYSTOLIC BLOOD PRESSURE: 122 MMHG | OXYGEN SATURATION: 98 % | TEMPERATURE: 98.2 F

## 2023-04-10 DIAGNOSIS — K52.9 INFLAMMATORY BOWEL DISEASE: ICD-10-CM

## 2023-04-10 DIAGNOSIS — D50.0 IRON DEFICIENCY ANEMIA DUE TO CHRONIC BLOOD LOSS: ICD-10-CM

## 2023-04-10 DIAGNOSIS — D64.9 NORMOCYTIC ANEMIA: ICD-10-CM

## 2023-04-10 LAB
BASOPHILS ABSOLUTE: 0.1 THOU/MM3 (ref 0–0.1)
BASOPHILS NFR BLD AUTO: 0.5 %
DEPRECATED RDW RBC AUTO: 54.5 FL (ref 35–45)
EOSINOPHIL NFR BLD AUTO: 3.7 %
EOSINOPHILS ABSOLUTE: 0.4 THOU/MM3 (ref 0–0.4)
ERYTHROCYTE [DISTWIDTH] IN BLOOD BY AUTOMATED COUNT: 16 % (ref 11.5–14.5)
FERRITIN SERPL IA-MCNC: 404 NG/ML (ref 22–322)
FOLATE SERPL-MCNC: 12 NG/ML (ref 4.8–24.2)
HCT VFR BLD AUTO: 28.2 % (ref 42–52)
HGB BLD-MCNC: 8.8 GM/DL (ref 14–18)
IMM GRANULOCYTES # BLD AUTO: 0.09 THOU/MM3 (ref 0–0.07)
IMM GRANULOCYTES NFR BLD AUTO: 0.8 %
IRON SERPL-MCNC: 27 UG/DL (ref 65–195)
LYMPHOCYTES ABSOLUTE: 1.3 THOU/MM3 (ref 1–4.8)
LYMPHOCYTES NFR BLD AUTO: 11.5 %
MCH RBC QN AUTO: 29.7 PG (ref 26–33)
MCHC RBC AUTO-ENTMCNC: 31.2 GM/DL (ref 32.2–35.5)
MCV RBC AUTO: 95.3 FL (ref 80–94)
MONOCYTES ABSOLUTE: 1.1 THOU/MM3 (ref 0.4–1.3)
MONOCYTES NFR BLD AUTO: 10.3 %
NEUTROPHILS NFR BLD AUTO: 73.2 %
NRBC BLD AUTO-RTO: 0 /100 WBC
PLATELET # BLD AUTO: 272 THOU/MM3 (ref 130–400)
PMV BLD AUTO: 11.5 FL (ref 9.4–12.4)
RBC # BLD AUTO: 2.96 MILL/MM3 (ref 4.7–6.1)
SEGMENTED NEUTROPHILS ABSOLUTE COUNT: 8.1 THOU/MM3 (ref 1.8–7.7)
TIBC SERPL-MCNC: 226 UG/DL (ref 171–450)
VIT B12 SERPL-MCNC: 197 PG/ML (ref 211–911)
WBC # BLD AUTO: 11 THOU/MM3 (ref 4.8–10.8)

## 2023-04-10 PROCEDURE — 82746 ASSAY OF FOLIC ACID SERUM: CPT

## 2023-04-10 PROCEDURE — 83540 ASSAY OF IRON: CPT

## 2023-04-10 PROCEDURE — 82607 VITAMIN B-12: CPT

## 2023-04-10 PROCEDURE — 99211 OFF/OP EST MAY X REQ PHY/QHP: CPT

## 2023-04-10 PROCEDURE — 85025 COMPLETE CBC W/AUTO DIFF WBC: CPT

## 2023-04-10 PROCEDURE — 83550 IRON BINDING TEST: CPT

## 2023-04-10 PROCEDURE — 82728 ASSAY OF FERRITIN: CPT

## 2023-04-10 PROCEDURE — 36415 COLL VENOUS BLD VENIPUNCTURE: CPT

## 2023-04-23 LAB
EKG ATRIAL RATE: 115 BPM
EKG ATRIAL RATE: 98 BPM
EKG P AXIS: 36 DEGREES
EKG P AXIS: 58 DEGREES
EKG P-R INTERVAL: 166 MS
EKG P-R INTERVAL: 170 MS
EKG Q-T INTERVAL: 302 MS
EKG Q-T INTERVAL: 322 MS
EKG QRS DURATION: 74 MS
EKG QRS DURATION: 80 MS
EKG QTC CALCULATION (BAZETT): 411 MS
EKG QTC CALCULATION (BAZETT): 417 MS
EKG R AXIS: 15 DEGREES
EKG R AXIS: 20 DEGREES
EKG T AXIS: 50 DEGREES
EKG T AXIS: 74 DEGREES
EKG VENTRICULAR RATE: 115 BPM
EKG VENTRICULAR RATE: 98 BPM

## 2023-04-24 ENCOUNTER — OFFICE VISIT (OUTPATIENT)
Dept: NEPHROLOGY | Age: 68
End: 2023-04-24
Payer: MEDICARE

## 2023-04-24 VITALS
BODY MASS INDEX: 39.87 KG/M2 | OXYGEN SATURATION: 97 % | HEART RATE: 88 BPM | DIASTOLIC BLOOD PRESSURE: 67 MMHG | SYSTOLIC BLOOD PRESSURE: 116 MMHG | WEIGHT: 225 LBS | HEIGHT: 63 IN

## 2023-04-24 DIAGNOSIS — E87.20 METABOLIC ACIDOSIS: ICD-10-CM

## 2023-04-24 DIAGNOSIS — E11.21 DIABETIC NEPHROPATHY ASSOCIATED WITH TYPE 2 DIABETES MELLITUS (HCC): ICD-10-CM

## 2023-04-24 DIAGNOSIS — I10 PRIMARY HYPERTENSION: ICD-10-CM

## 2023-04-24 DIAGNOSIS — N18.32 STAGE 3B CHRONIC KIDNEY DISEASE (HCC): Primary | ICD-10-CM

## 2023-04-24 DIAGNOSIS — M10.00 IDIOPATHIC GOUT, UNSPECIFIED CHRONICITY, UNSPECIFIED SITE: ICD-10-CM

## 2023-04-24 PROCEDURE — 99204 OFFICE O/P NEW MOD 45 MIN: CPT | Performed by: INTERNAL MEDICINE

## 2023-04-24 PROCEDURE — 3074F SYST BP LT 130 MM HG: CPT | Performed by: INTERNAL MEDICINE

## 2023-04-24 PROCEDURE — 1123F ACP DISCUSS/DSCN MKR DOCD: CPT | Performed by: INTERNAL MEDICINE

## 2023-04-24 PROCEDURE — 3078F DIAST BP <80 MM HG: CPT | Performed by: INTERNAL MEDICINE

## 2023-04-24 RX ORDER — INSULIN GLARGINE 100 [IU]/ML
INJECTION, SOLUTION SUBCUTANEOUS
COMMUNITY
Start: 2023-04-11

## 2023-04-24 RX ORDER — ALLOPURINOL 100 MG/1
100 TABLET ORAL DAILY
Qty: 90 TABLET | Refills: 1 | Status: SHIPPED | OUTPATIENT
Start: 2023-04-24

## 2023-04-24 NOTE — PATIENT INSTRUCTIONS
under the \"Nutrition Facts\" title. The amount of sodium is given in the list under the title. It is given in milligrams (mg). Check the serving size carefully. A single serving is often very small, and you may eat more than one serving. If this is the case, you will eat more sodium than listed on the label. For example, if the serving size for a canned soup is 1 cup and the sodium amount is 470 mg, if you have 2 cups you will eat 940 mg of sodium. The nutrition facts for fresh fruits and vegetables are not listed on the food. They may be listed somewhere in the store. These foods usually have no sodium or low sodium. The Nutrition Facts label also gives you the Percent Daily Value for sodium. This is how much of the recommended amount of sodium a serving contains. The daily value for sodium is less than 2,300 mg. So if the Percent Daily Value says 50%, this means one serving is giving you half of this, or 1,150 mg.

## 2023-04-24 NOTE — PROGRESS NOTES
Nephrology Consult Note  Patient's Name: Paul Torres  9:07 AM  4/24/2023    Nephrologist: Suzy Mcadams    Reason for Consult:    Requesting Physician:  No att. providers found  PCP: FELI Quan Sa, CNP    Chief Complaint: None  Assessment    ICD-10-CM    1. Stage 3b chronic kidney disease ([facility]  U00.53 Basic Metabolic Panel     Vitamin D 25 Hydroxy     PTH, Intact     Kappa/Lambda Quantitative Free Light Chains, Serum      2. Diabetic nephropathy associated with type 2 diabetes mellitus (HCC)  E11.21 Protein / creatinine ratio, urine      3. Primary hypertension  I10       4. Idiopathic gout, unspecified chronicity, unspecified site  M10.00       5. Metabolic acidosis  K54.37             Plan    I discussed my thoughts at length with the patient and spouse at length  They understood well  I addressed their questions  Previous  lab results reviewed with them together in epic  Recent kidney ultrasound report was reviewed with them as well. It is normal except for simple cysts. No hydronephrosis. His chronic kidney disease is likely due to combination of diabetic nephropathy and hypertensive nephrosclerosis which may or may not be exacerbated by loop diuretic. He is however on pantoprazole which has been said to possibly cause acute kidney injury in the renal literature. However I do not believe this is a factor. Medications reviewed  Continue pantoprazole for now  No nonsteroidal anti-inflammatory drugs  Compressive list provided to the patient   from the office  Low-salt diet  Low-protein diet  Return visit in 4 weeks with labs      History Obtained From: Patient and electronic medical record. History of Present Ilness:    Paul Torres is a 69 y.o. male with history of diabetes mellitus, hypertension, diabetic peripheral polyneuropathy, dyslipidemia, congestive heart failure among other multiple medical entities referred to our office due to elevated serum creatinine level.   Review of his record

## 2023-04-28 ENCOUNTER — HOSPITAL ENCOUNTER (OUTPATIENT)
Dept: INFUSION THERAPY | Age: 68
Discharge: HOME | End: 2023-04-28
Payer: MEDICARE

## 2023-04-28 VITALS
RESPIRATION RATE: 16 BRPM | TEMPERATURE: 97.6 F | DIASTOLIC BLOOD PRESSURE: 57 MMHG | HEART RATE: 90 BPM | WEIGHT: 222.4 LBS | OXYGEN SATURATION: 95 % | HEIGHT: 63 IN | SYSTOLIC BLOOD PRESSURE: 118 MMHG | BODY MASS INDEX: 39.41 KG/M2

## 2023-04-28 DIAGNOSIS — K52.9 INFLAMMATORY BOWEL DISEASE: ICD-10-CM

## 2023-04-28 DIAGNOSIS — D50.0 IRON DEFICIENCY ANEMIA DUE TO CHRONIC BLOOD LOSS: ICD-10-CM

## 2023-04-28 DIAGNOSIS — D64.9 NORMOCYTIC ANEMIA: Primary | ICD-10-CM

## 2023-04-28 PROCEDURE — 96365 THER/PROPH/DIAG IV INF INIT: CPT

## 2023-04-28 PROCEDURE — 6360000002 HC RX W HCPCS: Performed by: PHYSICIAN ASSISTANT

## 2023-04-28 PROCEDURE — 96366 THER/PROPH/DIAG IV INF ADDON: CPT

## 2023-04-28 PROCEDURE — 2580000003 HC RX 258: Performed by: PHYSICIAN ASSISTANT

## 2023-04-28 RX ORDER — ACETAMINOPHEN 325 MG/1
650 TABLET ORAL
Status: CANCELLED | OUTPATIENT
Start: 2023-05-05

## 2023-04-28 RX ORDER — ONDANSETRON 2 MG/ML
8 INJECTION INTRAMUSCULAR; INTRAVENOUS
Status: CANCELLED | OUTPATIENT
Start: 2023-05-05

## 2023-04-28 RX ORDER — SODIUM CHLORIDE 9 MG/ML
5-250 INJECTION, SOLUTION INTRAVENOUS PRN
Status: CANCELLED | OUTPATIENT
Start: 2023-05-05

## 2023-04-28 RX ORDER — SODIUM CHLORIDE 0.9 % (FLUSH) 0.9 %
5-40 SYRINGE (ML) INJECTION PRN
Status: CANCELLED | OUTPATIENT
Start: 2023-05-05

## 2023-04-28 RX ORDER — HEPARIN SODIUM (PORCINE) LOCK FLUSH IV SOLN 100 UNIT/ML 100 UNIT/ML
500 SOLUTION INTRAVENOUS PRN
Status: CANCELLED | OUTPATIENT
Start: 2023-05-05

## 2023-04-28 RX ORDER — SODIUM CHLORIDE 9 MG/ML
5-250 INJECTION, SOLUTION INTRAVENOUS PRN
Status: DISCONTINUED | OUTPATIENT
Start: 2023-04-28 | End: 2023-04-29 | Stop reason: HOSPADM

## 2023-04-28 RX ORDER — ALBUTEROL SULFATE 90 UG/1
4 AEROSOL, METERED RESPIRATORY (INHALATION) PRN
Status: CANCELLED | OUTPATIENT
Start: 2023-05-05

## 2023-04-28 RX ORDER — SODIUM CHLORIDE 9 MG/ML
INJECTION, SOLUTION INTRAVENOUS CONTINUOUS
Status: CANCELLED | OUTPATIENT
Start: 2023-05-05

## 2023-04-28 RX ORDER — DIPHENHYDRAMINE HYDROCHLORIDE 50 MG/ML
50 INJECTION INTRAMUSCULAR; INTRAVENOUS
Status: CANCELLED | OUTPATIENT
Start: 2023-05-05

## 2023-04-28 RX ORDER — EPINEPHRINE 1 MG/ML
0.3 INJECTION, SOLUTION INTRAMUSCULAR; SUBCUTANEOUS PRN
Status: CANCELLED | OUTPATIENT
Start: 2023-05-05

## 2023-04-28 RX ADMIN — IRON SUCROSE 300 MG: 20 INJECTION, SOLUTION INTRAVENOUS at 10:04

## 2023-04-28 RX ADMIN — SODIUM CHLORIDE 25 ML/HR: 9 INJECTION, SOLUTION INTRAVENOUS at 10:03

## 2023-04-28 NOTE — PROGRESS NOTES
Patient assessed for the following post iron:    Dizziness   No  Lightheadedness  No      Acute nausea/vomiting No  Headache   No  Chest pain/pressure  No  Rash/itching   No  Shortness of breath  No    Patient kept for 20 minutes observation post infusion iron. Patient tolerated iron treatment Venofer without any complications. Last vital signs:   BP (!) 118/57   Pulse 90   Temp 97.6 °F (36.4 °C) (Oral)   Resp 16   Ht 5' 3\" (1.6 m)   Wt 222 lb 6.4 oz (100.9 kg)   SpO2 95%   BMI 39.40 kg/m²     Patient instructed if experience any of the above symptoms following today's infusion, he is to notify MD immediately or go to the emergency department. Discharge instructions given to patient. Verbalizes understanding. Ambulated off unit per, accompanied by spouse, with belongings.

## 2023-04-28 NOTE — PLAN OF CARE
Problem: Chronic Conditions and Co-morbidities  Goal: Patient's chronic conditions and co-morbidity symptoms are monitored and maintained or improved  Outcome: Adequate for Discharge  Flowsheets (Taken 4/28/2023 1007)  Care Plan - Patient's Chronic Conditions and Co-Morbidity Symptoms are Monitored and Maintained or Improved: Collaborate with multidisciplinary team to address chronic and comorbid conditions and prevent exacerbation or deterioration  Note: Venofer reviewed, patient verbalizes understanding of medication being administered and potential side effects. Problem: Safety - Adult  Goal: Free from fall injury  Outcome: Adequate for Discharge  Flowsheets (Taken 4/28/2023 1007)  Free From Fall Injury: Instruct family/caregiver on patient safety     Problem: Discharge Planning  Goal: Discharge to home or other facility with appropriate resources  Outcome: Adequate for Discharge  Flowsheets (Taken 4/28/2023 1007)  Discharge to home or other facility with appropriate resources: Identify barriers to discharge with patient and caregiver     Care plan reviewed with patient and spouse. Patient and spouse verbalize understanding of the plan of care and contribute to goal setting.

## 2023-04-28 NOTE — DISCHARGE INSTRUCTIONS
Please contact your Oncologist if you have any questions regarding the iron venofer that you received today. Patient instructed if experience any of the symptoms following today's iron treatment to notify MD immediately or go to emergency department.     * dizziness/lightheadedness  *acute nausea/vomiting - not relieved with medication  *headache - not relieved from Tylenol/pain medication  *chest pain/pressure  *rash/itching  *shortness of breath        Drink fluids - 48oz fluids daily  Call if develop fever/ chills/ signs or symptoms of infection

## 2023-05-02 ENCOUNTER — TELEPHONE (OUTPATIENT)
Dept: CARDIOLOGY CLINIC | Age: 68
End: 2023-05-02

## 2023-05-02 ENCOUNTER — OFFICE VISIT (OUTPATIENT)
Dept: CARDIOLOGY CLINIC | Age: 68
End: 2023-05-02
Payer: MEDICARE

## 2023-05-02 ENCOUNTER — HOSPITAL ENCOUNTER (OUTPATIENT)
Age: 68
Discharge: HOME | End: 2023-05-02
Payer: MEDICARE

## 2023-05-02 VITALS
DIASTOLIC BLOOD PRESSURE: 60 MMHG | WEIGHT: 223.6 LBS | HEIGHT: 63 IN | SYSTOLIC BLOOD PRESSURE: 124 MMHG | OXYGEN SATURATION: 95 % | HEART RATE: 77 BPM | BODY MASS INDEX: 39.62 KG/M2

## 2023-05-02 DIAGNOSIS — Z99.89 OSA ON CPAP: ICD-10-CM

## 2023-05-02 DIAGNOSIS — I50.32 CHRONIC DIASTOLIC CONGESTIVE HEART FAILURE, NYHA CLASS 3 (HCC): Primary | ICD-10-CM

## 2023-05-02 DIAGNOSIS — I50.32 CHRONIC DIASTOLIC CONGESTIVE HEART FAILURE, NYHA CLASS 3 (HCC): ICD-10-CM

## 2023-05-02 DIAGNOSIS — G47.33 OSA ON CPAP: ICD-10-CM

## 2023-05-02 DIAGNOSIS — I51.89 GRADE I DIASTOLIC DYSFUNCTION: ICD-10-CM

## 2023-05-02 DIAGNOSIS — E87.6 HYPOKALEMIA: Primary | ICD-10-CM

## 2023-05-02 DIAGNOSIS — R60.0 EDEMA OF BOTH LOWER LEGS: ICD-10-CM

## 2023-05-02 DIAGNOSIS — D50.0 IRON DEFICIENCY ANEMIA DUE TO CHRONIC BLOOD LOSS: ICD-10-CM

## 2023-05-02 LAB
ANION GAP SERPL CALC-SCNC: 13 MEQ/L (ref 8–16)
BUN SERPL-MCNC: 34 MG/DL (ref 7–22)
CALCIUM SERPL-MCNC: 8.1 MG/DL (ref 8.5–10.5)
CHLORIDE SERPL-SCNC: 102 MEQ/L (ref 98–111)
CO2 SERPL-SCNC: 34 MEQ/L (ref 23–33)
CREAT SERPL-MCNC: 1.9 MG/DL (ref 0.4–1.2)
GFR SERPL CREATININE-BSD FRML MDRD: 38 ML/MIN/1.73M2
GLUCOSE SERPL-MCNC: 125 MG/DL (ref 70–108)
HCT VFR BLD AUTO: 34.1 % (ref 42–52)
HGB BLD-MCNC: 10.3 GM/DL (ref 14–18)
POTASSIUM SERPL-SCNC: 2.9 MEQ/L (ref 3.5–5.2)
SODIUM SERPL-SCNC: 149 MEQ/L (ref 135–145)

## 2023-05-02 PROCEDURE — 85014 HEMATOCRIT: CPT

## 2023-05-02 PROCEDURE — 1123F ACP DISCUSS/DSCN MKR DOCD: CPT | Performed by: NURSE PRACTITIONER

## 2023-05-02 PROCEDURE — 3074F SYST BP LT 130 MM HG: CPT | Performed by: NURSE PRACTITIONER

## 2023-05-02 PROCEDURE — 99214 OFFICE O/P EST MOD 30 MIN: CPT | Performed by: NURSE PRACTITIONER

## 2023-05-02 PROCEDURE — 3078F DIAST BP <80 MM HG: CPT | Performed by: NURSE PRACTITIONER

## 2023-05-02 PROCEDURE — 36415 COLL VENOUS BLD VENIPUNCTURE: CPT

## 2023-05-02 PROCEDURE — 85018 HEMOGLOBIN: CPT

## 2023-05-02 PROCEDURE — 80048 BASIC METABOLIC PNL TOTAL CA: CPT

## 2023-05-02 RX ORDER — POTASSIUM CHLORIDE 20 MEQ/1
30 TABLET, EXTENDED RELEASE ORAL DAILY
Qty: 180 TABLET | Refills: 3
Start: 2023-05-02 | End: 2023-05-05

## 2023-05-02 ASSESSMENT — ENCOUNTER SYMPTOMS
SHORTNESS OF BREATH: 1
COUGH: 0
NAUSEA: 0
VOMITING: 0
ABDOMINAL DISTENTION: 0

## 2023-05-02 NOTE — PROGRESS NOTES
Heart Failure Clinic       Visit Date: 5/2/2023  Cardiologist:  Dr. Froy Ybarra  Primary Care Physician: Dr. Leanna Davis, APRN - CNP    Mary Kay Stapleton is a 69 y.o. male who presents today for:  Chief Complaint   Patient presents with    Congestive Heart Failure       HPI:     TYPE HF: HFpEF (55-60% 2023) (40-45% 2019) DD grade 1  Cause: nonischemic  Device: none  HX: CAD (nonobstructive), DM, HLD, HTN  Dry Wt:  (226 on 10/3/22) (230 on 1/11/23) (228 on 3/8/23) ) (224 on 3/31/23) (223 on 5/2/23)    Mary Kay Stapleton is a 69 y.o. male who presents to the office for a follow up patient visit in the heart failure clinic. Concerns today: here today for his 6 week f/u. Last visit we diuresed with metolazone and caused HALEIGH with hospital admission. He notes that he did urinate more with it but he continued with notable SOB. He also had a positive chest xray as well, it was negative at discharge. He is not following Nephrology and hematology for IV iron. On admission he was found to have a Hgb of 6.8, now at 8.58, and was positive for occult blood. He is scheduled for an upper and lower scope in June. Today he notes notable improvement of his SOB. He is walking without needing frequent breaks. He does note his leg swelling is returning with blisters. Not putting compression socks on until later in the day. He has improved some on his snacking. Still eating a lot of ham and cheese and hot dogs. Visit on 3/31/23: here today as an add on pt d/t recent phone call for worsening of SOB. Dr. Froy Ybarra order an ECHO, stopped aldactone and changed lasix to 80mg BID from 60/40. Weight is down 4lbs since last visit. He notes worsening of his appetite but when he does eat he is not watching his Na intake. He is still snacking on chips, cookies, chocolate. Leg swelling has improved some. Visit on 3/8/23: here today for his 6 week f/u. Last visit we did IV lasix.  Weight is really unchanged on home scale, w/ some

## 2023-05-02 NOTE — PATIENT INSTRUCTIONS
You may receive a survey regarding the care you received during your visit. Your input is valuable to us. We encourage you to complete and return your survey. We hope you will choose us in the future for your healthcare needs. Continue:  Continue current medications  Daily weights and record  Fluid restriction of 2 Liters per day  Limit sodium in diet to around 9375-2324 mg/day  Monitor BP  Activity as tolerated     Call the Heart Failure Clinic for any of the following symptoms: [phone]  Weight gain of 2-3 pounds in 1 day or 5 pounds in 1 week  Increased shortness of breath  Shortness of breath while laying down  Cough  Chest pain  Swelling in feet, ankles or legs  Tenderness or bloating in the abdomen  Fatigue   Decreased appetite or nausea   Confusion          Blood work today for more guidance on diuresis. (BMP and H+H)    Compression socks on first thing in the morning.      Continue diet/fluid adherence  Start daily weights  F/U w/ Cardiology  F/U in clinic in 6 weeks

## 2023-05-04 LAB
POTASSIUM (K+): 3.1
POTASSIUM SERPL-SCNC: 3.1 MEQ/L (ref 3.6–5)

## 2023-05-05 ENCOUNTER — HOSPITAL ENCOUNTER (OUTPATIENT)
Dept: INFUSION THERAPY | Age: 68
Discharge: HOME | End: 2023-05-05
Payer: MEDICARE

## 2023-05-05 VITALS
TEMPERATURE: 98 F | HEART RATE: 79 BPM | OXYGEN SATURATION: 98 % | HEIGHT: 63 IN | WEIGHT: 223 LBS | SYSTOLIC BLOOD PRESSURE: 129 MMHG | BODY MASS INDEX: 39.51 KG/M2 | RESPIRATION RATE: 16 BRPM | DIASTOLIC BLOOD PRESSURE: 61 MMHG

## 2023-05-05 DIAGNOSIS — D50.0 IRON DEFICIENCY ANEMIA DUE TO CHRONIC BLOOD LOSS: ICD-10-CM

## 2023-05-05 DIAGNOSIS — K52.9 INFLAMMATORY BOWEL DISEASE: ICD-10-CM

## 2023-05-05 DIAGNOSIS — D64.9 NORMOCYTIC ANEMIA: Primary | ICD-10-CM

## 2023-05-05 PROCEDURE — 2580000003 HC RX 258: Performed by: PHYSICIAN ASSISTANT

## 2023-05-05 PROCEDURE — 96365 THER/PROPH/DIAG IV INF INIT: CPT

## 2023-05-05 PROCEDURE — 6360000002 HC RX W HCPCS: Performed by: PHYSICIAN ASSISTANT

## 2023-05-05 PROCEDURE — 96366 THER/PROPH/DIAG IV INF ADDON: CPT

## 2023-05-05 RX ORDER — SODIUM CHLORIDE 9 MG/ML
5-250 INJECTION, SOLUTION INTRAVENOUS PRN
OUTPATIENT
Start: 2023-05-05

## 2023-05-05 RX ORDER — ONDANSETRON 2 MG/ML
8 INJECTION INTRAMUSCULAR; INTRAVENOUS
OUTPATIENT
Start: 2023-05-05

## 2023-05-05 RX ORDER — POTASSIUM CHLORIDE 20 MEQ/1
30 TABLET, EXTENDED RELEASE ORAL 2 TIMES DAILY
Qty: 180 TABLET | Refills: 5 | Status: SHIPPED | OUTPATIENT
Start: 2023-05-05 | End: 2023-05-12

## 2023-05-05 RX ORDER — EPINEPHRINE 1 MG/ML
0.3 INJECTION, SOLUTION INTRAMUSCULAR; SUBCUTANEOUS PRN
OUTPATIENT
Start: 2023-05-05

## 2023-05-05 RX ORDER — ALBUTEROL SULFATE 90 UG/1
4 AEROSOL, METERED RESPIRATORY (INHALATION) PRN
OUTPATIENT
Start: 2023-05-05

## 2023-05-05 RX ORDER — ACETAMINOPHEN 325 MG/1
650 TABLET ORAL
OUTPATIENT
Start: 2023-05-05

## 2023-05-05 RX ORDER — SODIUM CHLORIDE 9 MG/ML
5-250 INJECTION, SOLUTION INTRAVENOUS PRN
Status: DISCONTINUED | OUTPATIENT
Start: 2023-05-05 | End: 2023-05-06 | Stop reason: HOSPADM

## 2023-05-05 RX ORDER — SODIUM CHLORIDE 9 MG/ML
INJECTION, SOLUTION INTRAVENOUS CONTINUOUS
OUTPATIENT
Start: 2023-05-05

## 2023-05-05 RX ORDER — SODIUM CHLORIDE 0.9 % (FLUSH) 0.9 %
5-40 SYRINGE (ML) INJECTION PRN
OUTPATIENT
Start: 2023-05-05

## 2023-05-05 RX ORDER — DIPHENHYDRAMINE HYDROCHLORIDE 50 MG/ML
50 INJECTION INTRAMUSCULAR; INTRAVENOUS
OUTPATIENT
Start: 2023-05-05

## 2023-05-05 RX ORDER — HEPARIN SODIUM (PORCINE) LOCK FLUSH IV SOLN 100 UNIT/ML 100 UNIT/ML
500 SOLUTION INTRAVENOUS PRN
OUTPATIENT
Start: 2023-05-05

## 2023-05-05 RX ADMIN — SODIUM CHLORIDE 20 ML/HR: 9 INJECTION, SOLUTION INTRAVENOUS at 09:56

## 2023-05-05 RX ADMIN — IRON SUCROSE 300 MG: 20 INJECTION, SOLUTION INTRAVENOUS at 09:57

## 2023-05-05 NOTE — PROGRESS NOTES
Patient assessed for the following post venofer  Dizziness   No  Lightheadedness  No      Acute nausea/vomiting No  Headache   No  Chest pain/pressure  No  Rash/itching   No  Shortness of breath  No    Patient kept for 20 minutes observation post infusion  Patient tolerated  treatment venofer without any complications. Last vital signs:   BP (!) 129/58   Pulse 78   Temp 98.2 °F (36.8 °C) (Oral)   Resp 16   Ht 5' 3\" (1.6 m)   Wt 223 lb (101.2 kg)   SpO2 96%   BMI 39.50 kg/m²       Patient instructed if experience any of the above symptoms following today's infusion,he/she is to notify MD immediately or go to the emergency department. Discharge instructions given to patient. Verbalizes understanding. Ambulated off unit per self with belongings.

## 2023-05-05 NOTE — PLAN OF CARE
Problem: Chronic Conditions and Co-morbidities  Goal: Patient's chronic conditions and co-morbidity symptoms are monitored and maintained or improved  Outcome: Progressing  Flowsheets (Taken 5/5/2023 1115)  Care Plan - Patient's Chronic Conditions and Co-Morbidity Symptoms are Monitored and Maintained or Improved: Monitor and assess patient's chronic conditions and comorbid symptoms for stability, deterioration, or improvement  Note: Discussed indications for venofer infusion     Problem: Discharge Planning  Goal: Discharge to home or other facility with appropriate resources  Outcome: Progressing  Flowsheets (Taken 5/5/2023 1115)  Discharge to home or other facility with appropriate resources: Identify barriers to discharge with patient and caregiver  Note: Verbalize understanding of discharge instructions, follow up appointments, and when to call Physician. Problem: Discharge Planning  Goal: Discharge to home or other facility with appropriate resources  Recent Flowsheet Documentation  Taken 5/5/2023 1115 by Maile Nova RN  Discharge to home or other facility with appropriate resources: Identify barriers to discharge with patient and caregiver   Care plan reviewed with patient. Patient verbalizes understanding of the plan of care and contributes to goal setting.

## 2023-05-11 LAB — POTASSIUM SERPL-SCNC: 3.4 MEQ/L (ref 3.6–5)

## 2023-05-12 RX ORDER — POTASSIUM CHLORIDE 20 MEQ/1
40 TABLET, EXTENDED RELEASE ORAL 2 TIMES DAILY
Qty: 240 TABLET | Refills: 5 | Status: SHIPPED | OUTPATIENT
Start: 2023-05-12

## 2023-05-12 NOTE — TELEPHONE ENCOUNTER
K improved to 3.4. Needs to take a 40 TID for the next 3 days. Then start 40 BID.  K level around two weeks (can get done with his INR)

## 2023-05-15 LAB
ANION GAP SERPL CALCULATED.3IONS-SCNC: 9 MMOL/L (ref 4–12)
BUN BLDV-MCNC: 24 MG/DL (ref 7–20)
CALCIUM SERPL-MCNC: 7.8 MG/DL (ref 8.8–10.5)
CHLORIDE BLD-SCNC: 101 MEQ/L (ref 101–111)
CO2: 31 MEQ/L (ref 21–32)
CREAT SERPL-MCNC: 1.76 MG/DL (ref 0.6–1.3)
CREATININE CLEARANCE: 39
CREATININE, RANDOM URINE: 31.3 MG/DL
GLUCOSE: 109 MG/DL (ref 70–110)
PARATHYROID HORMONE INTACT: 64.9 U/ML (ref 12–88)
POTASSIUM SERPL-SCNC: 3.6 MEQ/L (ref 3.6–5)
PROTEIN, URINE, RANDOM: 9.5 MG/DL
PROTEIN/CREAT RATIO: 0.3 G/1.73M2
SODIUM BLD-SCNC: 141 MEQ/L (ref 135–145)
VITAMIN D 25-HYDROXY: 48.4 NG/ML (ref 30–100)

## 2023-05-17 LAB — FREE LIGHT CHAINS,SERUM: ABNORMAL

## 2023-05-22 ENCOUNTER — OFFICE VISIT (OUTPATIENT)
Dept: NEPHROLOGY | Age: 68
End: 2023-05-22
Payer: MEDICARE

## 2023-05-22 VITALS
WEIGHT: 223 LBS | HEIGHT: 63 IN | DIASTOLIC BLOOD PRESSURE: 69 MMHG | OXYGEN SATURATION: 96 % | SYSTOLIC BLOOD PRESSURE: 133 MMHG | HEART RATE: 76 BPM | BODY MASS INDEX: 39.51 KG/M2

## 2023-05-22 DIAGNOSIS — I10 PRIMARY HYPERTENSION: ICD-10-CM

## 2023-05-22 DIAGNOSIS — R80.9 PROTEINURIA, UNSPECIFIED TYPE: ICD-10-CM

## 2023-05-22 DIAGNOSIS — E83.51 HYPOCALCEMIA: ICD-10-CM

## 2023-05-22 DIAGNOSIS — E11.21 DIABETIC NEPHROPATHY ASSOCIATED WITH TYPE 2 DIABETES MELLITUS (HCC): ICD-10-CM

## 2023-05-22 DIAGNOSIS — N18.32 STAGE 3B CHRONIC KIDNEY DISEASE (HCC): Primary | ICD-10-CM

## 2023-05-22 PROCEDURE — 3078F DIAST BP <80 MM HG: CPT | Performed by: INTERNAL MEDICINE

## 2023-05-22 PROCEDURE — 1123F ACP DISCUSS/DSCN MKR DOCD: CPT | Performed by: INTERNAL MEDICINE

## 2023-05-22 PROCEDURE — 3075F SYST BP GE 130 - 139MM HG: CPT | Performed by: INTERNAL MEDICINE

## 2023-05-22 PROCEDURE — 99214 OFFICE O/P EST MOD 30 MIN: CPT | Performed by: INTERNAL MEDICINE

## 2023-05-22 RX ORDER — METOPROLOL SUCCINATE 25 MG/1
25 TABLET, EXTENDED RELEASE ORAL DAILY
COMMUNITY

## 2023-05-22 NOTE — PROGRESS NOTES
Renal Progress Note    Assessment and Plan:      Diagnosis Orders   1. Stage 3b chronic kidney disease ([facility]        2. Primary hypertension        3. Diabetic nephropathy associated with type 2 diabetes mellitus ([facility]        4. Proteinuria, unspecified type        5. Hypocalcemia                  PLAN:  I discussed my thoughts at length with the patient and spouse. They understood very well. Their questions were addressed to their  satisfaction. Lab result reviewed with them in epic. Serum creatinine is slightly improved to 1.76 mg/dL from 1.9 mg/dL. Serum potassium level was  recently low at 3.4 mEq/L but is back to normal now  Urine protein creatinine ratio is 0.30 g. Serum calcium is low at 7.80 mg/dL. Vitamin D level is normal at 48.4. PTH is good at 64.9. Kidney ultrasound report previously reviewed with the patient. Medications reviewed   Calcium supplement with Os-Jeffrey 500 mg twice a day for low calcium level  Printed instruction provided to them  I also discussed calcium effect on the bone.   Continue to avoid nonsteroidal anti-inflammatory drugs  New list provided to them  Return visit in 3 months with labs          Patient Active Problem List   Diagnosis    Hyperlipidemia    Diabetes mellitus ([facility]    History of tobacco abuse    History of chest pain    Diverticulosis of colon    Arthritis    CAD (coronary artery disease)    SIRS (systemic inflammatory response syndrome) ([facility]    Essential hypertension    Coronary artery disease involving native coronary artery of native heart without angina pectoris    Cellulitis of left upper extremity    Acute combined systolic and diastolic CHF, NYHA class 4 (HCC)    Congestive heart failure ([facility]    COVID-19    HALEIGH (acute kidney injury) (HCC)    Contusion of right knee    Chronic diastolic heart failure (HCC)    Normocytic anemia    Inflammatory bowel disease    Iron deficiency anemia due to chronic blood loss           Subjective:   Chief complaint:  Chief

## 2023-05-23 ENCOUNTER — TELEPHONE (OUTPATIENT)
Dept: NEPHROLOGY | Age: 68
End: 2023-05-23

## 2023-05-23 NOTE — TELEPHONE ENCOUNTER
[facility] does not sell Oscal anymore. Juventino Porter takes vit. d 3000 and [facility] only has Oscal with vit d 500 mg. [facility] has calcium 600 mg and wife said you want him to have Oscal with 500 mg calcium.

## 2023-05-24 NOTE — TELEPHONE ENCOUNTER
I called and spoke to [facility] about this. She said but he is suppose to take 3000 of the Vit. D and he has 1000 gel tabs I suggested she finds a 500 tab.

## 2023-05-25 LAB — POTASSIUM SERPL-SCNC: 3.3 MEQ/L (ref 3.6–5)

## 2023-05-31 ENCOUNTER — HOSPITAL ENCOUNTER (OUTPATIENT)
Dept: PULMONOLOGY | Age: 68
Discharge: HOME | End: 2023-05-31
Payer: MEDICARE

## 2023-05-31 DIAGNOSIS — R06.09 DOE (DYSPNEA ON EXERTION): ICD-10-CM

## 2023-05-31 PROCEDURE — 94729 DIFFUSING CAPACITY: CPT

## 2023-05-31 PROCEDURE — 94726 PLETHYSMOGRAPHY LUNG VOLUMES: CPT

## 2023-05-31 PROCEDURE — 94010 BREATHING CAPACITY TEST: CPT

## 2023-06-01 ENCOUNTER — TELEPHONE (OUTPATIENT)
Dept: CARDIOLOGY CLINIC | Age: 68
End: 2023-06-01

## 2023-06-01 DIAGNOSIS — R94.2 ABNORMAL PFT: Primary | ICD-10-CM

## 2023-06-01 NOTE — TELEPHONE ENCOUNTER
----- Message from FELI Norton CNP sent at 6/1/2023  3:55 PM EDT -----  PFT abnormal - ordering a pulmonary referral

## 2023-06-02 DIAGNOSIS — D64.9 NORMOCYTIC ANEMIA: Primary | ICD-10-CM

## 2023-06-02 DIAGNOSIS — D50.0 IRON DEFICIENCY ANEMIA DUE TO CHRONIC BLOOD LOSS: ICD-10-CM

## 2023-06-02 DIAGNOSIS — K52.9 INFLAMMATORY BOWEL DISEASE: ICD-10-CM

## 2023-06-05 ENCOUNTER — OFFICE VISIT (OUTPATIENT)
Dept: ONCOLOGY | Age: 68
End: 2023-06-05
Payer: MEDICARE

## 2023-06-05 ENCOUNTER — HOSPITAL ENCOUNTER (OUTPATIENT)
Dept: INFUSION THERAPY | Age: 68
Discharge: HOME | End: 2023-06-05
Payer: MEDICARE

## 2023-06-05 VITALS
TEMPERATURE: 97.9 F | HEART RATE: 79 BPM | DIASTOLIC BLOOD PRESSURE: 64 MMHG | OXYGEN SATURATION: 94 % | HEIGHT: 63 IN | RESPIRATION RATE: 18 BRPM | BODY MASS INDEX: 39.58 KG/M2 | SYSTOLIC BLOOD PRESSURE: 138 MMHG | WEIGHT: 223.4 LBS

## 2023-06-05 VITALS
TEMPERATURE: 97.9 F | HEART RATE: 79 BPM | RESPIRATION RATE: 18 BRPM | SYSTOLIC BLOOD PRESSURE: 138 MMHG | DIASTOLIC BLOOD PRESSURE: 64 MMHG | OXYGEN SATURATION: 94 %

## 2023-06-05 DIAGNOSIS — K52.9 INFLAMMATORY BOWEL DISEASE: ICD-10-CM

## 2023-06-05 DIAGNOSIS — D64.9 NORMOCYTIC ANEMIA: Primary | ICD-10-CM

## 2023-06-05 DIAGNOSIS — E53.8 VITAMIN B12 DEFICIENCY: ICD-10-CM

## 2023-06-05 DIAGNOSIS — D50.0 IRON DEFICIENCY ANEMIA DUE TO CHRONIC BLOOD LOSS: ICD-10-CM

## 2023-06-05 DIAGNOSIS — D64.9 NORMOCYTIC ANEMIA: ICD-10-CM

## 2023-06-05 LAB
ABSOLUTE IMMATURE GRANULOCYTE: 0.02 THOU/MM3 (ref 0–0.07)
BASOPHILS ABSOLUTE: 0 THOU/MM3 (ref 0–0.1)
BASOPHILS NFR BLD AUTO: 0 % (ref 0–3)
EOSINOPHIL NFR BLD AUTO: 4 % (ref 0–4)
EOSINOPHILS ABSOLUTE: 0.4 THOU/MM3 (ref 0–0.4)
ERYTHROCYTE [DISTWIDTH] IN BLOOD BY AUTOMATED COUNT: 14.5 % (ref 11.5–14.5)
FERRITIN SERPL IA-MCNC: 129 NG/ML (ref 22–322)
FOLATE SERPL-MCNC: 8.3 NG/ML (ref 4.8–24.2)
HCT VFR BLD AUTO: 33.7 % (ref 42–52)
HGB BLD-MCNC: 10.5 GM/DL (ref 14–18)
IMMATURE GRANULOCYTES: 0 %
IRON SERPL-MCNC: 38 UG/DL (ref 65–195)
LYMPHOCYTES ABSOLUTE: 1.3 THOU/MM3 (ref 1–4.8)
LYMPHOCYTES NFR BLD AUTO: 14 % (ref 15–47)
MCH RBC QN AUTO: 29.1 PG (ref 26–33)
MCHC RBC AUTO-ENTMCNC: 31.2 GM/DL (ref 32.2–35.5)
MCV RBC AUTO: 93 FL (ref 80–94)
MONOCYTES ABSOLUTE: 0.8 THOU/MM3 (ref 0.4–1.3)
MONOCYTES NFR BLD AUTO: 9 % (ref 0–12)
NEUTROPHILS NFR BLD AUTO: 73 % (ref 43–75)
PLATELET # BLD AUTO: 247 THOU/MM3 (ref 130–400)
PMV BLD AUTO: 9.5 FL (ref 9.4–12.4)
RBC # BLD AUTO: 3.61 MILL/MM3 (ref 4.7–6.1)
SEGMENTED NEUTROPHILS ABSOLUTE COUNT: 6.6 THOU/MM3 (ref 1.8–7.7)
TIBC SERPL-MCNC: 226 UG/DL (ref 171–450)
VIT B12 SERPL-MCNC: 470 PG/ML (ref 211–911)
WBC # BLD AUTO: 9.1 THOU/MM3 (ref 4.8–10.8)

## 2023-06-05 PROCEDURE — 82746 ASSAY OF FOLIC ACID SERUM: CPT

## 2023-06-05 PROCEDURE — 82607 VITAMIN B-12: CPT

## 2023-06-05 PROCEDURE — 3078F DIAST BP <80 MM HG: CPT | Performed by: PHYSICIAN ASSISTANT

## 2023-06-05 PROCEDURE — 3074F SYST BP LT 130 MM HG: CPT | Performed by: PHYSICIAN ASSISTANT

## 2023-06-05 PROCEDURE — 83540 ASSAY OF IRON: CPT

## 2023-06-05 PROCEDURE — 1123F ACP DISCUSS/DSCN MKR DOCD: CPT | Performed by: PHYSICIAN ASSISTANT

## 2023-06-05 PROCEDURE — 36415 COLL VENOUS BLD VENIPUNCTURE: CPT

## 2023-06-05 PROCEDURE — 82728 ASSAY OF FERRITIN: CPT

## 2023-06-05 PROCEDURE — 83550 IRON BINDING TEST: CPT

## 2023-06-05 PROCEDURE — 99211 OFF/OP EST MAY X REQ PHY/QHP: CPT

## 2023-06-05 PROCEDURE — 99214 OFFICE O/P EST MOD 30 MIN: CPT | Performed by: PHYSICIAN ASSISTANT

## 2023-06-05 PROCEDURE — 85025 COMPLETE CBC W/AUTO DIFF WBC: CPT

## 2023-06-05 RX ORDER — SODIUM CHLORIDE 9 MG/ML
5-250 INJECTION, SOLUTION INTRAVENOUS PRN
OUTPATIENT
Start: 2023-06-05

## 2023-06-05 RX ORDER — ONDANSETRON 2 MG/ML
8 INJECTION INTRAMUSCULAR; INTRAVENOUS
OUTPATIENT
Start: 2023-06-05

## 2023-06-05 RX ORDER — SODIUM CHLORIDE 0.9 % (FLUSH) 0.9 %
5-40 SYRINGE (ML) INJECTION PRN
OUTPATIENT
Start: 2023-06-05

## 2023-06-05 RX ORDER — CALCIUM CARBONATE 500(1250)
500 TABLET ORAL 2 TIMES DAILY
COMMUNITY

## 2023-06-05 RX ORDER — DIPHENHYDRAMINE HYDROCHLORIDE 50 MG/ML
50 INJECTION INTRAMUSCULAR; INTRAVENOUS
OUTPATIENT
Start: 2023-06-05

## 2023-06-05 RX ORDER — SODIUM CHLORIDE 9 MG/ML
INJECTION, SOLUTION INTRAVENOUS CONTINUOUS
OUTPATIENT
Start: 2023-06-05

## 2023-06-05 RX ORDER — ALBUTEROL SULFATE 90 UG/1
4 AEROSOL, METERED RESPIRATORY (INHALATION) PRN
OUTPATIENT
Start: 2023-06-05

## 2023-06-05 RX ORDER — FAMOTIDINE 10 MG/ML
20 INJECTION, SOLUTION INTRAVENOUS
OUTPATIENT
Start: 2023-06-05

## 2023-06-05 RX ORDER — ACETAMINOPHEN 325 MG/1
650 TABLET ORAL
OUTPATIENT
Start: 2023-06-05

## 2023-06-05 RX ORDER — HEPARIN SODIUM (PORCINE) LOCK FLUSH IV SOLN 100 UNIT/ML 100 UNIT/ML
500 SOLUTION INTRAVENOUS PRN
Status: CANCELLED | OUTPATIENT
Start: 2023-06-05

## 2023-06-05 RX ORDER — EPINEPHRINE 1 MG/ML
0.3 INJECTION, SOLUTION, CONCENTRATE INTRAVENOUS PRN
OUTPATIENT
Start: 2023-06-05

## 2023-06-05 NOTE — PATIENT INSTRUCTIONS
Return to clinic in 8 weeks   Labs on RTC  Please call for questions or concerns. Update: Will schedule IV Venofer, 3 infusions one week apart. - patient called and agreeable to IV iron.

## 2023-06-05 NOTE — PROGRESS NOTES
[facility], [address] [facility]  [address] [address]  Dept: [phone]  Dept Fax: [phone]: [phone]      Visit Date:6/5/2023     Victoria Arthur is a 69 y.o. male who presents today for:   Chief Complaint   Patient presents with    Follow-up     Normocytic anemia        HPI:   Victoria Arthur is a 69 y.o. male referred to Hematology/Oncology clinic for evaluation of anemia per [place], [place]. He was seen as a new patient on 4/10/2023. The patient was hospitalized at [address] [place] [address] 49 4/3/23 to 4/6/23. He presented to the ED with worsening shortness of breath. He was noted to have a hemoglobin 7.4 and on recheck 6.8 on 4/3/2023. He received 1 unit of packed red blood cells and 2 doses of IV Venofer during hospitalization. He was also treated for acute on chronic HALEIGH, metabolic acidosis. On 4/6/2023 hemoglobin 9.5, hematocrit 29.6, MCV 92.8. Per chart review of EMR, the patient has had chronic anemia since at least 2012. Most recent baseline hemoglobin 9-10's since 2021. Last CBC prior to hospitalization hemoglobin 9.4 on 10/13/2022. Iron studies during hospitalization showed ferritin 50, iron 21, TIBC 275. The patient was evaluated by GI during hospitalization. He had positive occult blood. He does have known history of Crohn's disease and is status post partial colectomy. He was previously treated with Entyvio with last infusion in September 2021. He was lost to follow-up. He has follow-up with GI in 3 weeks and was recommended outpatient EGD. The patient is here with his wife today who helps provide history. The patient affirms prior history of iron deficiency anemia and previously followed with Dr. Angelita Torres in early 2000's. He states he received IV iron infusions in vitamin B12 injections at that time. He denies any abnormal bleeding. Denies epistaxis, hemoptysis, hematemesis, melena, hematochezia, hematuria.   He reports

## 2023-06-06 ENCOUNTER — ANESTHESIA EVENT (OUTPATIENT)
Dept: ENDOSCOPY | Age: 68
End: 2023-06-06
Payer: MEDICARE

## 2023-06-07 ENCOUNTER — ANESTHESIA (OUTPATIENT)
Dept: ENDOSCOPY | Age: 68
End: 2023-06-07
Payer: MEDICARE

## 2023-06-07 ENCOUNTER — HOSPITAL ENCOUNTER (OUTPATIENT)
Age: 68
Setting detail: OUTPATIENT SURGERY
Discharge: HOME | End: 2023-06-07
Attending: INTERNAL MEDICINE | Admitting: INTERNAL MEDICINE
Payer: MEDICARE

## 2023-06-07 VITALS
HEART RATE: 68 BPM | SYSTOLIC BLOOD PRESSURE: 119 MMHG | TEMPERATURE: 97 F | HEIGHT: 63 IN | RESPIRATION RATE: 17 BRPM | DIASTOLIC BLOOD PRESSURE: 59 MMHG | OXYGEN SATURATION: 94 % | WEIGHT: 223 LBS | BODY MASS INDEX: 39.51 KG/M2

## 2023-06-07 LAB — GLUCOSE BLD STRIP.AUTO-MCNC: 118 MG/DL (ref 70–108)

## 2023-06-07 PROCEDURE — 3700000000 HC ANESTHESIA ATTENDED CARE: Performed by: INTERNAL MEDICINE

## 2023-06-07 PROCEDURE — 6360000002 HC RX W HCPCS

## 2023-06-07 PROCEDURE — 7100000010 HC PHASE II RECOVERY - FIRST 15 MIN: Performed by: INTERNAL MEDICINE

## 2023-06-07 PROCEDURE — 2580000003 HC RX 258

## 2023-06-07 PROCEDURE — 3609012400 HC EGD TRANSORAL BIOPSY SINGLE/MULTIPLE: Performed by: INTERNAL MEDICINE

## 2023-06-07 PROCEDURE — 82948 REAGENT STRIP/BLOOD GLUCOSE: CPT

## 2023-06-07 PROCEDURE — 88305 TISSUE EXAM BY PATHOLOGIST: CPT

## 2023-06-07 PROCEDURE — 2580000003 HC RX 258: Performed by: INTERNAL MEDICINE

## 2023-06-07 PROCEDURE — 7100000011 HC PHASE II RECOVERY - ADDTL 15 MIN: Performed by: INTERNAL MEDICINE

## 2023-06-07 PROCEDURE — 3609010600 HC COLONOSCOPY POLYPECTOMY SNARE/COLD BIOPSY: Performed by: INTERNAL MEDICINE

## 2023-06-07 PROCEDURE — 3700000001 HC ADD 15 MINUTES (ANESTHESIA): Performed by: INTERNAL MEDICINE

## 2023-06-07 RX ORDER — SODIUM CHLORIDE 9 MG/ML
25 INJECTION, SOLUTION INTRAVENOUS PRN
Status: DISCONTINUED | OUTPATIENT
Start: 2023-06-07 | End: 2023-06-07 | Stop reason: HOSPADM

## 2023-06-07 RX ORDER — SODIUM CHLORIDE 0.9 % (FLUSH) 0.9 %
5-40 SYRINGE (ML) INJECTION PRN
Status: DISCONTINUED | OUTPATIENT
Start: 2023-06-07 | End: 2023-06-07 | Stop reason: HOSPADM

## 2023-06-07 RX ORDER — SODIUM CHLORIDE 0.9 % (FLUSH) 0.9 %
5-40 SYRINGE (ML) INJECTION EVERY 12 HOURS SCHEDULED
Status: DISCONTINUED | OUTPATIENT
Start: 2023-06-07 | End: 2023-06-07 | Stop reason: HOSPADM

## 2023-06-07 RX ORDER — SODIUM CHLORIDE 450 MG/100ML
INJECTION, SOLUTION INTRAVENOUS CONTINUOUS
Status: DISCONTINUED | OUTPATIENT
Start: 2023-06-07 | End: 2023-06-07 | Stop reason: HOSPADM

## 2023-06-07 RX ORDER — SODIUM CHLORIDE 9 MG/ML
INJECTION, SOLUTION INTRAVENOUS CONTINUOUS PRN
Status: DISCONTINUED | OUTPATIENT
Start: 2023-06-07 | End: 2023-06-07 | Stop reason: SDUPTHER

## 2023-06-07 RX ADMIN — PROPOFOL 60 MG: 10 INJECTION, EMULSION INTRAVENOUS at 13:22

## 2023-06-07 RX ADMIN — PROPOFOL 20 MG: 10 INJECTION, EMULSION INTRAVENOUS at 13:29

## 2023-06-07 RX ADMIN — PROPOFOL 20 MG: 10 INJECTION, EMULSION INTRAVENOUS at 13:24

## 2023-06-07 RX ADMIN — PROPOFOL 30 MG: 10 INJECTION, EMULSION INTRAVENOUS at 13:39

## 2023-06-07 RX ADMIN — SODIUM CHLORIDE: 9 INJECTION, SOLUTION INTRAVENOUS at 13:07

## 2023-06-07 RX ADMIN — PROPOFOL 50 MG: 10 INJECTION, EMULSION INTRAVENOUS at 13:28

## 2023-06-07 RX ADMIN — SODIUM CHLORIDE: 4.5 INJECTION, SOLUTION INTRAVENOUS at 12:30

## 2023-06-07 RX ADMIN — PROPOFOL 50 MG: 10 INJECTION, EMULSION INTRAVENOUS at 13:34

## 2023-06-07 ASSESSMENT — PAIN - FUNCTIONAL ASSESSMENT: PAIN_FUNCTIONAL_ASSESSMENT: 0-10

## 2023-06-07 NOTE — H&P
carvedilol (COREG) 25 MG tablet Take 1 tablet by mouth 2 times daily 7/5/22   Jo-Ann Gray MD   simvastatin (ZOCOR) 20 MG tablet Take 1 tablet by mouth nightly 7/5/22   Jo-Ann Gray MD   zinc sulfate (ZINCATE) 220 (50 Zn) MG capsule Take 1 capsule by mouth daily 10/19/21   Phan Ireland DO   vitamin D 25 MCG (1000 UT) CAPS Take 3 capsules by mouth daily    Historical Provider, MD   terazosin (HYTRIN) 5 MG capsule Take 1 capsule by mouth daily 12/9/20   Historical Provider, MD   acetaminophen (TYLENOL) 500 MG tablet Take 1 tablet by mouth every 6 hours as needed for Pain    Historical Provider, MD   vitamin C (VITAMIN C) 500 MG tablet Take 1 tablet by mouth every 48 hours 2/4/19   Marcus Herr MD   aspirin 81 MG EC tablet Take 1 tablet by mouth daily    Historical Provider, MD   Insulin Detemir (LEVEMIR SC) Inject 20 Units into the skin nightly  Patient not taking: Reported on 5/22/2023    Historical Provider, MD   metFORMIN (GLUCOPHAGE) 500 MG tablet Take 2 tablets by mouth 2 times daily (with meals)    Historical Provider, MD   gabapentin (NEURONTIN) 300 MG capsule Take 1 capsule by mouth 2 times daily. Historical Provider, MD   glimepiride (AMARYL) 2 MG tablet Take 1 tablet by mouth 2 times daily    Historical Provider, MD     Additional information:       PHYSICAL:   Heart:  [x]Regular rate and rhythm  []Other:    Lungs:  [x]Clear    []Other:    Abdomen: [x]Soft    []Other:    Mental Status: [x]Alert & Oriented  []Other:        PLANNED PROCEDURE   [x]EGD  [x]Colonoscopy []Flex Sigmoid     Consent: I have discussed with the patient and/or the patient representative the indication, alternatives, and the possible risks and/or complications of the planned procedure and the anesthesia methods. The patient and/or patient representative appear to understand and agree to proceed. SEDATION  Please see anesthesia note.  The medication Planned :  Planned

## 2023-06-07 NOTE — PROGRESS NOTES
EGD completed, tolerated well. Biopsies taken, removed with cold forceps. 2 jars labeled and sent to lab. Photos taken. Scope  used. Colonoscopy completed, tolerated well. No polyps removed Biopsies removed with cold forceps, 5 jars labeled and sent to lab. Photos taken. Scope  used.

## 2023-06-07 NOTE — PROGRESS NOTES
Recovery mode, pt denies discomfort. Passing gas, taking in fluids. Dr. Nicole Palmer discussed findings with pt and responsible party. Discharge instructions provided and understanding verbalized.

## 2023-06-07 NOTE — BRIEF OP NOTE
Brief Postoperative Note      Patient: Carmen Fonseca  YOB: 1955  MRN: [id number]    Date of Procedure: 6/7/2023    Pre-Op Diagnosis Codes:     * Diarrhea, unspecified type [R19.7]     * Iron deficiency anemia, unspecified iron deficiency anemia type [D50.9]    Post-Op Diagnosis: Gastritis and duodenitis, area typical of chronic disease seen in the ascending transverse polyp of the descending biopsy performed to evaluate terminal ileum appeared to be scarred biopsy due to superficially good could not go deep       Procedure(s):  EGD BIOPSY  COLONOSCOPY POLYPECTOMY SNARE/COLD BIOPSY    Surgeon(s):  Merry Estrada MD    Assistant:  * No surgical staff found *    Anesthesia: Monitor Anesthesia Care    Estimated Blood Loss (mL): none    Complications: None    Specimens:   ID Type Source Tests Collected by Time Destination   A : duodenal biopsy -history of crohns Tissue Duodenum SURGICAL PATHOLOGY Merry Estrada MD 6/7/2023 1323    B : Body of antrum-gastritis Tissue Stomach SURGICAL PATHOLOGY Merry Estrada MD 6/7/2023 1325    C : ileocecal cecal valve surface Tissue Colon SURGICAL PATHOLOGY Merry Estrada MD 6/7/2023 1333    D : ascending biopsy Tissue Colon SURGICAL PATHOLOGY Merry Estrada MD 6/7/2023 1337    E : Transverse biopsy Tissue Colon SURGICAL PATHOLOGY Merry Estrada MD 6/7/2023 1340    F : descending biopsy Tissue Colon SURGICAL PATHOLOGY Merry Estrada MD 6/7/2023 1341    G : sigmoid biopsy Tissue Colon SURGICAL PATHOLOGY Merry Estrada MD 6/7/2023 1343        Implants:  * No implants in log *      Drains: * No LDAs found *    Findings:  Gastritis and duodenitis, area typical of chronic disease seen in the ascending transverse polyp of the descending biopsy performed to evaluate terminal ileum appeared to be scarred biopsy due to superficially good could not go deep      Electronically signed by Merry Estrada MD on 6/7/2023 at 1:52 PM

## 2023-06-07 NOTE — DISCHARGE INSTRUCTIONS
Follow up in GI clinic in 2-3 weeks  Dr. Kee Guerrier. Zane Worthington.   RENARD ABREU II.KAT, [address] [facility]  [phone]

## 2023-06-07 NOTE — ANESTHESIA POSTPROCEDURE EVALUATION
Department of Anesthesiology  Postprocedure Note    Patient: Malorie Mitchell  MRN: [id number]  [place]: 1955  Date of evaluation: 6/7/2023      Procedure Summary     Date: 06/07/23 Room / Location: [address] [place] [address] / [address] [place] [address]    Anesthesia Start: 0588 Anesthesia Stop: 5482    Procedures:       EGD BIOPSY (Left: Esophagus)      COLONOSCOPY POLYPECTOMY SNARE/COLD BIOPSY Diagnosis:       Diarrhea, unspecified type      Iron deficiency anemia, unspecified iron deficiency anemia type      (Diarrhea, unspecified type [R19.7])      (Iron deficiency anemia, unspecified iron deficiency anemia type [D50.9])    Surgeons: Charles Calixto MD Responsible Provider: Keyur Haley DO    Anesthesia Type: MAC ASA Status: 3          Anesthesia Type: No value filed.     Amaya Phase I: Amaya Score: 10    Amaya Phase II:        Anesthesia Post Evaluation    Patient location during evaluation: bedside  Patient participation: complete - patient participated  Level of consciousness: awake  Pain score: 0  Airway patency: patent  Nausea & Vomiting: no nausea and no vomiting  Complications: no  Cardiovascular status: hemodynamically stable and blood pressure returned to baseline  Respiratory status: room air, nonlabored ventilation and spontaneous ventilation  Hydration status: stable

## 2023-06-07 NOTE — ANESTHESIA PRE PROCEDURE
History     Tobacco Use    Smoking status: Former     Packs/day: 1.00     Years: 37.00     Pack years: 37.00     Types: Cigarettes     Quit date: 1/1/2013     Years since quitting: 10.4     Passive exposure: Current    Smokeless tobacco: Never    Tobacco comments:     Quit smoking 2013, never chew user   Substance Use Topics    Alcohol use: No                                Counseling given: Not Answered  Tobacco comments: Quit smoking 2013, never chew user      Vital Signs (Current):   Vitals:    06/07/23 1223   BP: (!) 143/75   Pulse: 72   Resp: 18   Temp: 36.1 °C (97 °F)   TempSrc: Temporal   SpO2: 96%   Weight: 223 lb (101.2 kg)   Height: 5' 3\" (1.6 m)                                              BP Readings from Last 3 Encounters:   06/07/23 (!) 143/75   06/05/23 138/64   06/05/23 138/64       NPO Status: Time of last liquid consumption: 0815                        Time of last solid consumption: 2300                        Date of last liquid consumption: 06/07/23                        Date of last solid food consumption: 06/06/23    BMI:   Wt Readings from Last 3 Encounters:   06/07/23 223 lb (101.2 kg)   06/05/23 223 lb 6.4 oz (101.3 kg)   05/22/23 223 lb (101.2 kg)     Body mass index is 39.5 kg/m².     CBC:   Lab Results   Component Value Date/Time    WBC 9.1 06/05/2023 08:58 AM    RBC 3.61 06/05/2023 08:58 AM    RBC 3.92 01/09/2012 01:31 PM    HGB 10.5 06/05/2023 08:58 AM    HCT 33.7 06/05/2023 08:58 AM    MCV 93 06/05/2023 08:58 AM    RDW 14.5 06/05/2023 08:58 AM     06/05/2023 08:58 AM       CMP:   Lab Results   Component Value Date/Time     05/15/2023 03:22 PM    K 3.3 05/25/2023 04:10 PM    K 4.2 04/06/2023 05:22 AM     05/15/2023 03:22 PM    CO2 31 05/15/2023 03:22 PM    BUN 24 05/15/2023 03:22 PM    CREATININE 1.76 05/15/2023 03:22 PM    AGRATIO 1.5 07/21/2021 04:05 PM    LABGLOM 38 05/02/2023 09:43 AM    GLUCOSE 109 05/15/2023 03:22 PM    PROT 7.1 04/06/2023 05:22 AM

## 2023-06-08 LAB — POTASSIUM SERPL-SCNC: 3.6 MEQ/L (ref 3.6–5)

## 2023-06-08 NOTE — OP NOTE
reviewing the biopsy result. PROCEDURE PERFORMED #2:  Colonoscopy with biopsy. Digital examination revealed normal rectum. Standard colonoscope was  advanced under direct vision from the rectum up to the cecum. Prep was  good and the patient tolerated the procedure well. Cecum intubation  confirmed by appendiceal orifice. Scope withdrawn. Crohn's disease. Attempt to intubate the terminal ileum failed. The ileocecal valve  noted to be scarred, biopsy obtained superficially, not deep; with known  history of Crohn's and diarrhea, ascending was next jar, transverse  next, descending next, sigmoid was last.  In the area, seen areas of  multiple superficial ulcers, typical of Crohn's disease, aphthous also  likely; biopsies obtained from those locations and randomly from the  area adjacent to it. IMPRESSION:  1. Terminal ileum noted to be scarred, could not go deep to intubate;  biopsy obtained superficially from ileocecal valve. 2.  Area of colitis seen in ascending, transverse, descending; biopsies  obtained each of the location. 3.  Biopsy obtained from different locations to evaluate for Crohn's  disease. PLAN:  Follow up with the biopsy results at the GI clinic for  evaluation. More recommendation after reviewing the biopsy results.         Eva Partida M.D.    D: 06/07/2023 14:08:11       T: 06/07/2023 23:36:23     [email or web address]  Job#: [id number]     Doc#: [id number]    CC:  Gustavo Rudolph CNP

## 2023-06-14 ENCOUNTER — HOSPITAL ENCOUNTER (OUTPATIENT)
Dept: INFUSION THERAPY | Age: 68
Discharge: HOME | End: 2023-06-14
Payer: MEDICARE

## 2023-06-14 VITALS
SYSTOLIC BLOOD PRESSURE: 131 MMHG | WEIGHT: 222.2 LBS | TEMPERATURE: 97.5 F | OXYGEN SATURATION: 94 % | BODY MASS INDEX: 39.37 KG/M2 | HEIGHT: 63 IN | RESPIRATION RATE: 18 BRPM | DIASTOLIC BLOOD PRESSURE: 62 MMHG | HEART RATE: 81 BPM

## 2023-06-14 DIAGNOSIS — D64.9 NORMOCYTIC ANEMIA: Primary | ICD-10-CM

## 2023-06-14 DIAGNOSIS — K52.9 INFLAMMATORY BOWEL DISEASE: ICD-10-CM

## 2023-06-14 DIAGNOSIS — D50.0 IRON DEFICIENCY ANEMIA DUE TO CHRONIC BLOOD LOSS: ICD-10-CM

## 2023-06-14 PROCEDURE — 96365 THER/PROPH/DIAG IV INF INIT: CPT

## 2023-06-14 PROCEDURE — 6360000002 HC RX W HCPCS: Performed by: PHYSICIAN ASSISTANT

## 2023-06-14 PROCEDURE — 96366 THER/PROPH/DIAG IV INF ADDON: CPT

## 2023-06-14 PROCEDURE — 2580000003 HC RX 258: Performed by: PHYSICIAN ASSISTANT

## 2023-06-14 RX ORDER — SODIUM CHLORIDE 0.9 % (FLUSH) 0.9 %
5-40 SYRINGE (ML) INJECTION PRN
Status: CANCELLED | OUTPATIENT
Start: 2023-06-21

## 2023-06-14 RX ORDER — SODIUM CHLORIDE 9 MG/ML
5-250 INJECTION, SOLUTION INTRAVENOUS PRN
Status: CANCELLED | OUTPATIENT
Start: 2023-06-21

## 2023-06-14 RX ORDER — EPINEPHRINE 1 MG/ML
0.3 INJECTION, SOLUTION INTRAMUSCULAR; SUBCUTANEOUS PRN
Status: CANCELLED | OUTPATIENT
Start: 2023-06-21

## 2023-06-14 RX ORDER — SODIUM CHLORIDE 9 MG/ML
5-250 INJECTION, SOLUTION INTRAVENOUS PRN
Status: DISCONTINUED | OUTPATIENT
Start: 2023-06-14 | End: 2023-06-15 | Stop reason: HOSPADM

## 2023-06-14 RX ORDER — HEPARIN SODIUM 100 [USP'U]/ML
500 INJECTION, SOLUTION INTRAVENOUS PRN
Status: CANCELLED | OUTPATIENT
Start: 2023-06-21

## 2023-06-14 RX ORDER — SODIUM CHLORIDE 9 MG/ML
INJECTION, SOLUTION INTRAVENOUS CONTINUOUS
Status: CANCELLED | OUTPATIENT
Start: 2023-06-21

## 2023-06-14 RX ORDER — DIPHENHYDRAMINE HYDROCHLORIDE 50 MG/ML
50 INJECTION INTRAMUSCULAR; INTRAVENOUS
Status: CANCELLED | OUTPATIENT
Start: 2023-06-21

## 2023-06-14 RX ORDER — ALBUTEROL SULFATE 90 UG/1
4 AEROSOL, METERED RESPIRATORY (INHALATION) PRN
Status: CANCELLED | OUTPATIENT
Start: 2023-06-21

## 2023-06-14 RX ORDER — ACETAMINOPHEN 325 MG/1
650 TABLET ORAL
Status: CANCELLED | OUTPATIENT
Start: 2023-06-21

## 2023-06-14 RX ORDER — ONDANSETRON 2 MG/ML
8 INJECTION INTRAMUSCULAR; INTRAVENOUS
Status: CANCELLED | OUTPATIENT
Start: 2023-06-21

## 2023-06-14 RX ADMIN — IRON SUCROSE 300 MG: 20 INJECTION, SOLUTION INTRAVENOUS at 10:44

## 2023-06-14 RX ADMIN — SODIUM CHLORIDE 20 ML/HR: 9 INJECTION, SOLUTION INTRAVENOUS at 10:39

## 2023-06-14 NOTE — PLAN OF CARE
Problem: Chronic Conditions and Co-morbidities  Goal: Patient's chronic conditions and co-morbidity symptoms are monitored and maintained or improved  Outcome: Adequate for Discharge  Flowsheets (Taken 6/14/2023 1118)  Care Plan - Patient's Chronic Conditions and Co-Morbidity Symptoms are Monitored and Maintained or Improved: Monitor and assess patient's chronic conditions and comorbid symptoms for stability, deterioration, or improvement  Note: venofer reviewed, patient verbalizes understanding of medication being administered and potential side effects. Problem: Safety - Adult  Goal: Free from fall injury  Outcome: Adequate for Discharge  Flowsheets (Taken 6/14/2023 1118)  Free From Fall Injury: Instruct family/caregiver on patient safety  Note: No falls occurred with visit today. Verbalized understanding of fall prevention to ask for assistance with ambulation. Call light within reach. Problem: Discharge Planning  Goal: Discharge to home or other facility with appropriate resources  Outcome: Adequate for Discharge  Flowsheets (Taken 6/14/2023 1118)  Discharge to home or other facility with appropriate resources: Identify barriers to discharge with patient and caregiver  Note: Discuss understanding of discharge instructions,follow-up appointments, and when to call the physician. Care plan reviewed with patient and family. Patient and family verbalize understanding of the plan of care and contribute to goal setting.
(2) more than 100 beats/min

## 2023-06-14 NOTE — PROGRESS NOTES
Patient assessed for the following post venofer infusion:    Dizziness   No  Lightheadedness  No      Acute nausea/vomiting No  Headache   No  Chest pain/pressure  No  Rash/itching   No  Shortness of breath  No    Patient kept for 20 minutes observation post infusion venofer  Patient tolerated venofer infussion without any complications. Last vital signs:   /62   Pulse 81   Temp 97.5 °F (36.4 °C) (Oral)   Resp 18   Ht 5' 3\" (1.6 m)   Wt 222 lb 3.2 oz (100.8 kg)   SpO2 94%   BMI 39.36 kg/m²         Patient instructed if experience any of the above symptoms following today's infusion,he/she is to notify MD immediately or go to the emergency department. Discharge instructions given to patient. Verbalizes understanding. Ambulated off unit per self with belongings.

## 2023-06-14 NOTE — DISCHARGE INSTRUCTIONS
You received venofer infusion while in outpatient oncology clinic. Call if any uncontrolled nausea/vomiting  Call if any fevers/chills/ problems or concerns  Call if any bleeding  Call if any chest pain/pressure  Call if any severe shortness of breath  Drink at least (6) 8oz glasses of fluids daily     If develop any of above condition, please call your MD or go to Emergency Department.

## 2023-06-21 ENCOUNTER — HOSPITAL ENCOUNTER (OUTPATIENT)
Dept: INFUSION THERAPY | Age: 68
Discharge: HOME | End: 2023-06-21
Payer: MEDICARE

## 2023-06-21 VITALS
DIASTOLIC BLOOD PRESSURE: 69 MMHG | RESPIRATION RATE: 16 BRPM | HEIGHT: 63 IN | OXYGEN SATURATION: 95 % | TEMPERATURE: 98.5 F | WEIGHT: 222 LBS | BODY MASS INDEX: 39.34 KG/M2 | SYSTOLIC BLOOD PRESSURE: 148 MMHG | HEART RATE: 76 BPM

## 2023-06-21 DIAGNOSIS — D50.0 IRON DEFICIENCY ANEMIA DUE TO CHRONIC BLOOD LOSS: ICD-10-CM

## 2023-06-21 DIAGNOSIS — D64.9 NORMOCYTIC ANEMIA: Primary | ICD-10-CM

## 2023-06-21 DIAGNOSIS — K52.9 INFLAMMATORY BOWEL DISEASE: ICD-10-CM

## 2023-06-21 PROCEDURE — 6360000002 HC RX W HCPCS: Performed by: PHYSICIAN ASSISTANT

## 2023-06-21 PROCEDURE — 96365 THER/PROPH/DIAG IV INF INIT: CPT

## 2023-06-21 PROCEDURE — 2580000003 HC RX 258: Performed by: PHYSICIAN ASSISTANT

## 2023-06-21 PROCEDURE — 96366 THER/PROPH/DIAG IV INF ADDON: CPT

## 2023-06-21 RX ORDER — ALBUTEROL SULFATE 90 UG/1
4 AEROSOL, METERED RESPIRATORY (INHALATION) PRN
Status: CANCELLED | OUTPATIENT
Start: 2023-06-28

## 2023-06-21 RX ORDER — DIPHENHYDRAMINE HYDROCHLORIDE 50 MG/ML
50 INJECTION INTRAMUSCULAR; INTRAVENOUS
Status: CANCELLED | OUTPATIENT
Start: 2023-06-28

## 2023-06-21 RX ORDER — SODIUM CHLORIDE 9 MG/ML
5-250 INJECTION, SOLUTION INTRAVENOUS PRN
Status: CANCELLED | OUTPATIENT
Start: 2023-06-28

## 2023-06-21 RX ORDER — SODIUM CHLORIDE 0.9 % (FLUSH) 0.9 %
5-40 SYRINGE (ML) INJECTION PRN
Status: CANCELLED | OUTPATIENT
Start: 2023-06-28

## 2023-06-21 RX ORDER — HEPARIN SODIUM 100 [USP'U]/ML
500 INJECTION, SOLUTION INTRAVENOUS PRN
Status: CANCELLED | OUTPATIENT
Start: 2023-06-28

## 2023-06-21 RX ORDER — SODIUM CHLORIDE 9 MG/ML
INJECTION, SOLUTION INTRAVENOUS CONTINUOUS
Status: CANCELLED | OUTPATIENT
Start: 2023-06-28

## 2023-06-21 RX ORDER — ONDANSETRON 2 MG/ML
8 INJECTION INTRAMUSCULAR; INTRAVENOUS
Status: CANCELLED | OUTPATIENT
Start: 2023-06-28

## 2023-06-21 RX ORDER — EPINEPHRINE 1 MG/ML
0.3 INJECTION, SOLUTION INTRAMUSCULAR; SUBCUTANEOUS PRN
Status: CANCELLED | OUTPATIENT
Start: 2023-06-28

## 2023-06-21 RX ORDER — SODIUM CHLORIDE 9 MG/ML
5-250 INJECTION, SOLUTION INTRAVENOUS PRN
Status: DISCONTINUED | OUTPATIENT
Start: 2023-06-21 | End: 2023-06-22 | Stop reason: HOSPADM

## 2023-06-21 RX ORDER — ACETAMINOPHEN 325 MG/1
650 TABLET ORAL
Status: CANCELLED | OUTPATIENT
Start: 2023-06-28

## 2023-06-21 RX ADMIN — IRON SUCROSE 300 MG: 20 INJECTION, SOLUTION INTRAVENOUS at 10:53

## 2023-06-21 RX ADMIN — SODIUM CHLORIDE 25 ML/HR: 9 INJECTION, SOLUTION INTRAVENOUS at 10:45

## 2023-06-21 NOTE — PROGRESS NOTES
Patient assessed for the following post venofer    Dizziness   No  Lightheadedness  No      Acute nausea/vomiting No  Headache   No  Chest pain/pressure  No  Rash/itching   No  Shortness of breath  No    Patient declined for 20 minutes observation post infusion venofer  Patient tolerated venofer without any complications. Last vital signs:   BP (!) 148/69   Pulse 76   Temp 98.5 °F (36.9 °C) (Oral)   Resp 16   Ht 5' 3\" (1.6 m)   Wt 222 lb (100.7 kg)   SpO2 95%   BMI 39.33 kg/m²         Patient instructed if experience any of the above symptoms following today's infusion,he/she is to notify MD immediately or go to the emergency department. Discharge instructions given to patient. Verbalizes understanding. Ambulated off unit per self with belongings.

## 2023-06-21 NOTE — PLAN OF CARE
Problem: Chronic Conditions and Co-morbidities  Goal: Patient's chronic conditions and co-morbidity symptoms are monitored and maintained or improved  Outcome: Adequate for Discharge  Flowsheets (Taken 6/21/2023 1524)  Care Plan - Patient's Chronic Conditions and Co-Morbidity Symptoms are Monitored and Maintained or Improved:   Monitor and assess patient's chronic conditions and comorbid symptoms for stability, deterioration, or improvement   Collaborate with multidisciplinary team to address chronic and comorbid conditions and prevent exacerbation or deterioration  Note: Patient verbalizes understanding to verbal information given on venofer infusion, including action and possible side effects. Aware to call MD if develop complications. Problem: Safety - Adult  Goal: Free from fall injury  Outcome: Adequate for Discharge  Flowsheets (Taken 6/21/2023 1524)  Free From Fall Injury: Instruct family/caregiver on patient safety  Note: Patient free of falls this visit. Fall risks assessed. Precautions discussed. Problem: Discharge Planning  Goal: Discharge to home or other facility with appropriate resources  Outcome: Adequate for Discharge  Flowsheets (Taken 6/21/2023 1524)  Discharge to home or other facility with appropriate resources:   Identify barriers to discharge with patient and caregiver   Arrange for needed discharge resources and transportation as appropriate  Note: Patient verbalizes understanding of discharge instructions, follow up appointment, and when to call physician if needed      Care plan reviewed with patient. Patient verbalizes understanding of the plan of care and contributes to goal setting.

## 2023-06-21 NOTE — DISCHARGE INSTRUCTIONS
You received venofer while in outpatient oncology clinic. Call if any uncontrolled nausea/vomiting  Call if any fevers/chills/ problems or concerns  Call if any bleeding  Call if any chest pain/pressure  Call if any severe shortness of breath  Drink at least (6) 8oz glasses of fluids daily     If develop any of above condition, please call your MD or go to Emergency Department.

## 2023-06-26 ENCOUNTER — OFFICE VISIT (OUTPATIENT)
Dept: PULMONOLOGY | Age: 68
End: 2023-06-26
Payer: MEDICARE

## 2023-06-26 VITALS
WEIGHT: 217 LBS | BODY MASS INDEX: 38.45 KG/M2 | HEART RATE: 82 BPM | OXYGEN SATURATION: 98 % | HEIGHT: 63 IN | SYSTOLIC BLOOD PRESSURE: 122 MMHG | DIASTOLIC BLOOD PRESSURE: 68 MMHG | TEMPERATURE: 97.7 F

## 2023-06-26 DIAGNOSIS — Z87.891 PERSONAL HISTORY OF TOBACCO USE: ICD-10-CM

## 2023-06-26 DIAGNOSIS — J44.9 CHRONIC OBSTRUCTIVE PULMONARY DISEASE, UNSPECIFIED COPD TYPE (HCC): Primary | ICD-10-CM

## 2023-06-26 PROCEDURE — 3074F SYST BP LT 130 MM HG: CPT | Performed by: INTERNAL MEDICINE

## 2023-06-26 PROCEDURE — 1123F ACP DISCUSS/DSCN MKR DOCD: CPT | Performed by: INTERNAL MEDICINE

## 2023-06-26 PROCEDURE — 3078F DIAST BP <80 MM HG: CPT | Performed by: INTERNAL MEDICINE

## 2023-06-26 PROCEDURE — 99214 OFFICE O/P EST MOD 30 MIN: CPT | Performed by: INTERNAL MEDICINE

## 2023-06-26 PROCEDURE — G0296 VISIT TO DETERM LDCT ELIG: HCPCS | Performed by: INTERNAL MEDICINE

## 2023-06-26 RX ORDER — ALBUTEROL SULFATE 90 UG/1
2 AEROSOL, METERED RESPIRATORY (INHALATION) 4 TIMES DAILY PRN
Qty: 54 G | Refills: 1 | Status: SHIPPED | OUTPATIENT
Start: 2023-06-26

## 2023-06-28 ENCOUNTER — HOSPITAL ENCOUNTER (OUTPATIENT)
Dept: INFUSION THERAPY | Age: 68
Discharge: HOME | End: 2023-06-28
Payer: MEDICARE

## 2023-06-28 VITALS
RESPIRATION RATE: 16 BRPM | TEMPERATURE: 98 F | HEIGHT: 63 IN | BODY MASS INDEX: 38.45 KG/M2 | SYSTOLIC BLOOD PRESSURE: 133 MMHG | WEIGHT: 217 LBS | OXYGEN SATURATION: 98 % | DIASTOLIC BLOOD PRESSURE: 61 MMHG | HEART RATE: 82 BPM

## 2023-06-28 DIAGNOSIS — D50.0 IRON DEFICIENCY ANEMIA DUE TO CHRONIC BLOOD LOSS: ICD-10-CM

## 2023-06-28 DIAGNOSIS — K52.9 INFLAMMATORY BOWEL DISEASE: ICD-10-CM

## 2023-06-28 DIAGNOSIS — D64.9 NORMOCYTIC ANEMIA: Primary | ICD-10-CM

## 2023-06-28 PROCEDURE — 2580000003 HC RX 258: Performed by: PHYSICIAN ASSISTANT

## 2023-06-28 PROCEDURE — 6360000002 HC RX W HCPCS: Performed by: PHYSICIAN ASSISTANT

## 2023-06-28 PROCEDURE — 96366 THER/PROPH/DIAG IV INF ADDON: CPT

## 2023-06-28 PROCEDURE — 96365 THER/PROPH/DIAG IV INF INIT: CPT

## 2023-06-28 RX ORDER — ONDANSETRON 2 MG/ML
8 INJECTION INTRAMUSCULAR; INTRAVENOUS
OUTPATIENT
Start: 2023-06-28

## 2023-06-28 RX ORDER — SODIUM CHLORIDE 9 MG/ML
5-250 INJECTION, SOLUTION INTRAVENOUS PRN
Status: DISCONTINUED | OUTPATIENT
Start: 2023-06-28 | End: 2023-06-29 | Stop reason: HOSPADM

## 2023-06-28 RX ORDER — DIPHENHYDRAMINE HYDROCHLORIDE 50 MG/ML
50 INJECTION INTRAMUSCULAR; INTRAVENOUS
OUTPATIENT
Start: 2023-06-28

## 2023-06-28 RX ORDER — SODIUM CHLORIDE 0.9 % (FLUSH) 0.9 %
5-40 SYRINGE (ML) INJECTION PRN
OUTPATIENT
Start: 2023-06-28

## 2023-06-28 RX ORDER — EPINEPHRINE 1 MG/ML
0.3 INJECTION, SOLUTION INTRAMUSCULAR; SUBCUTANEOUS PRN
OUTPATIENT
Start: 2023-06-28

## 2023-06-28 RX ORDER — ACETAMINOPHEN 325 MG/1
650 TABLET ORAL
OUTPATIENT
Start: 2023-06-28

## 2023-06-28 RX ORDER — SODIUM CHLORIDE 9 MG/ML
5-250 INJECTION, SOLUTION INTRAVENOUS PRN
OUTPATIENT
Start: 2023-06-28

## 2023-06-28 RX ORDER — HEPARIN SODIUM 100 [USP'U]/ML
500 INJECTION, SOLUTION INTRAVENOUS PRN
OUTPATIENT
Start: 2023-06-28

## 2023-06-28 RX ORDER — ALBUTEROL SULFATE 90 UG/1
4 AEROSOL, METERED RESPIRATORY (INHALATION) PRN
OUTPATIENT
Start: 2023-06-28

## 2023-06-28 RX ORDER — SODIUM CHLORIDE 9 MG/ML
INJECTION, SOLUTION INTRAVENOUS CONTINUOUS
OUTPATIENT
Start: 2023-06-28

## 2023-06-28 RX ADMIN — SODIUM CHLORIDE 20 ML/HR: 9 INJECTION, SOLUTION INTRAVENOUS at 10:53

## 2023-06-28 RX ADMIN — IRON SUCROSE 300 MG: 20 INJECTION, SOLUTION INTRAVENOUS at 11:04

## 2023-07-11 ENCOUNTER — FOLLOWUP TELEPHONE ENCOUNTER (OUTPATIENT)
Dept: CARDIAC REHAB | Age: 68
End: 2023-07-11

## 2023-07-11 NOTE — TELEPHONE ENCOUNTER
PULMONARY REHABILITATION REFERRAL  COPD Exacerbation    Pulmonary Rehab Evaluation order received. Spoke with patient's wife who is listed as a contact about the benefits of supervised comfortable, progressive exercise with oxygen saturation monitoring and pulmonary education. Pts spouse states the pt is sleeping right now so she will have to talk to him about this. The spouse thinks the pt will not be interested but I told her to have him call us back if interested. I also told pts spouse that we will call back in a 1-2 weeks just to check with pt and she was OK with that.

## 2023-07-17 DIAGNOSIS — I50.32 CHRONIC DIASTOLIC CONGESTIVE HEART FAILURE, NYHA CLASS 2 (HCC): ICD-10-CM

## 2023-07-17 RX ORDER — FUROSEMIDE 40 MG/1
TABLET ORAL
Qty: 360 TABLET | Refills: 3 | Status: SHIPPED | OUTPATIENT
Start: 2023-07-17

## 2023-07-19 ENCOUNTER — TELEPHONE (OUTPATIENT)
Dept: CARDIOLOGY CLINIC | Age: 68
End: 2023-07-19

## 2023-07-19 DIAGNOSIS — K50.118 CROHN'S DISEASE OF LARGE INTESTINE WITH OTHER COMPLICATION (HCC): Primary | ICD-10-CM

## 2023-07-19 NOTE — TELEPHONE ENCOUNTER
Nir called from health department from [phone] regarding a positive Hepatitis A result. She states Domenica's name is on the order to contact. Looks like Arden Zacarias CNP ordered and advised them to call her office.

## 2023-07-24 ENCOUNTER — TELEPHONE (OUTPATIENT)
Dept: CARDIOLOGY CLINIC | Age: 68
End: 2023-07-24

## 2023-07-24 NOTE — TELEPHONE ENCOUNTER
BMP reviewed - K at 3.5 - take additional 20meq for the next 3 days - should have BMP order for me to get done on 7/31 - to get done the week of 8/7

## 2023-07-31 ENCOUNTER — HOSPITAL ENCOUNTER (OUTPATIENT)
Dept: INFUSION THERAPY | Age: 68
Discharge: HOME | End: 2023-07-31
Payer: MEDICARE

## 2023-07-31 ENCOUNTER — OFFICE VISIT (OUTPATIENT)
Dept: ONCOLOGY | Age: 68
End: 2023-07-31
Payer: MEDICARE

## 2023-07-31 VITALS
SYSTOLIC BLOOD PRESSURE: 153 MMHG | DIASTOLIC BLOOD PRESSURE: 72 MMHG | OXYGEN SATURATION: 96 % | RESPIRATION RATE: 16 BRPM | TEMPERATURE: 97.7 F | HEART RATE: 72 BPM

## 2023-07-31 VITALS
DIASTOLIC BLOOD PRESSURE: 72 MMHG | SYSTOLIC BLOOD PRESSURE: 153 MMHG | TEMPERATURE: 97.7 F | RESPIRATION RATE: 16 BRPM | HEART RATE: 72 BPM | OXYGEN SATURATION: 96 % | HEIGHT: 63 IN | BODY MASS INDEX: 38.8 KG/M2 | WEIGHT: 219 LBS

## 2023-07-31 DIAGNOSIS — D50.0 IRON DEFICIENCY ANEMIA DUE TO CHRONIC BLOOD LOSS: ICD-10-CM

## 2023-07-31 DIAGNOSIS — D64.9 NORMOCYTIC ANEMIA: Primary | ICD-10-CM

## 2023-07-31 DIAGNOSIS — E53.8 VITAMIN B12 DEFICIENCY: ICD-10-CM

## 2023-07-31 DIAGNOSIS — K52.9 INFLAMMATORY BOWEL DISEASE: ICD-10-CM

## 2023-07-31 DIAGNOSIS — D64.9 NORMOCYTIC ANEMIA: ICD-10-CM

## 2023-07-31 LAB
ABSOLUTE IMMATURE GRANULOCYTE: 0.06 THOU/MM3 (ref 0–0.07)
BASOPHILS ABSOLUTE: 0 THOU/MM3 (ref 0–0.1)
BASOPHILS NFR BLD AUTO: 0 % (ref 0–3)
EOSINOPHIL NFR BLD AUTO: 0 % (ref 0–4)
EOSINOPHILS ABSOLUTE: 0 THOU/MM3 (ref 0–0.4)
ERYTHROCYTE [DISTWIDTH] IN BLOOD BY AUTOMATED COUNT: 14.8 % (ref 11.5–14.5)
FERRITIN SERPL IA-MCNC: 288 NG/ML (ref 22–322)
FOLATE SERPL-MCNC: 8.1 NG/ML (ref 4.8–24.2)
HCT VFR BLD AUTO: 36.9 % (ref 42–52)
HGB BLD-MCNC: 11.8 GM/DL (ref 14–18)
IMMATURE GRANULOCYTES: 1 %
IRON SERPL-MCNC: 65 UG/DL (ref 65–195)
LYMPHOCYTES ABSOLUTE: 1.1 THOU/MM3 (ref 1–4.8)
LYMPHOCYTES NFR BLD AUTO: 15 % (ref 15–47)
MCH RBC QN AUTO: 30 PG (ref 26–33)
MCHC RBC AUTO-ENTMCNC: 32 GM/DL (ref 32.2–35.5)
MCV RBC AUTO: 94 FL (ref 80–94)
MONOCYTES ABSOLUTE: 0.5 THOU/MM3 (ref 0.4–1.3)
MONOCYTES NFR BLD AUTO: 7 % (ref 0–12)
NEUTROPHILS NFR BLD AUTO: 77 % (ref 43–75)
PLATELET # BLD AUTO: 179 THOU/MM3 (ref 130–400)
PMV BLD AUTO: 9.4 FL (ref 9.4–12.4)
RBC # BLD AUTO: 3.93 MILL/MM3 (ref 4.7–6.1)
SEGMENTED NEUTROPHILS ABSOLUTE COUNT: 5.5 THOU/MM3 (ref 1.8–7.7)
TIBC SERPL-MCNC: 228 UG/DL (ref 171–450)
VIT B12 SERPL-MCNC: 369 PG/ML (ref 211–911)
WBC # BLD AUTO: 7.1 THOU/MM3 (ref 4.8–10.8)

## 2023-07-31 PROCEDURE — 82607 VITAMIN B-12: CPT

## 2023-07-31 PROCEDURE — 82746 ASSAY OF FOLIC ACID SERUM: CPT

## 2023-07-31 PROCEDURE — 83540 ASSAY OF IRON: CPT

## 2023-07-31 PROCEDURE — 3077F SYST BP >= 140 MM HG: CPT | Performed by: PHYSICIAN ASSISTANT

## 2023-07-31 PROCEDURE — 82728 ASSAY OF FERRITIN: CPT

## 2023-07-31 PROCEDURE — 3078F DIAST BP <80 MM HG: CPT | Performed by: PHYSICIAN ASSISTANT

## 2023-07-31 PROCEDURE — 99213 OFFICE O/P EST LOW 20 MIN: CPT | Performed by: PHYSICIAN ASSISTANT

## 2023-07-31 PROCEDURE — 83550 IRON BINDING TEST: CPT

## 2023-07-31 PROCEDURE — 1123F ACP DISCUSS/DSCN MKR DOCD: CPT | Performed by: PHYSICIAN ASSISTANT

## 2023-07-31 PROCEDURE — 85025 COMPLETE CBC W/AUTO DIFF WBC: CPT

## 2023-07-31 PROCEDURE — 99211 OFF/OP EST MAY X REQ PHY/QHP: CPT

## 2023-07-31 PROCEDURE — 36415 COLL VENOUS BLD VENIPUNCTURE: CPT

## 2023-07-31 RX ORDER — FERROUS SULFATE 325(65) MG
325 TABLET ORAL
Qty: 30 TABLET | Refills: 0 | Status: SHIPPED | OUTPATIENT
Start: 2023-07-31

## 2023-07-31 NOTE — PROGRESS NOTES
EMR; pt reports hx of anemia for approximately 20 years. Baseline Hgb 9-10's. Recently hospitalized 4/3/23 to 4/6/23 due to acute on chronic anemia. Hgb 6.8 on 4/3/23 and received 1 unit of PRBCs. He received IV Venofer 200 mg on 4/5/23 during hospital course. Anemia likely multifactorial from iron deficiency anemia from chronic blood loss/poor absorption related to Crohn's disease. Anemia of CKD likely component. He received IV Venofer x 2, started on oral vitamin B12 supplementation. He received IV Venofer x3 with third infusion on 6/28/23. S/p EGD and colonoscopy on 6/7/23 showing active colitis, gastritis, duodenitis. He was recommended Humira and is on chronic steroids. Today on 7/31/23: Hgb 11.8, Hct 36.9, MCV 94. Iron stores improved: ferritin 288, iron 65, TIBC 228. Folate and vitamin B12 within normal limits.   -No need for further IV iron at this time. Will trial oral ferrous sulfate 325 mg once daily. Instructed patient to monitor and discontinue if GI side effects occur.               -Patient instructed to monitor for signs/symptoms of worsening anemia or blood loss              -Instructed to follow up with GI for Crohn's disease. -RTC in 10 weeks. 2. Inflammatory bowel disease - Crohn's  3. Iron deficiency anemia due to chronic blood loss  4. Vitamin B12 Deficiency   Improved. Vitamin B12 369 on 7/31/23 compared to 197 on 4/10/23. Continue oral vitamin B12 supplementation. RTC in 10 weeks     All patient questions answered. Pt voiced understanding. Patient agreed with treatment plan. Follow up as directed. Patient instructed to call for questions or concerns.         Electronically signed by   Nikki Che PA-C

## 2023-08-07 RX ORDER — POTASSIUM CHLORIDE 20 MEQ/1
40 TABLET, EXTENDED RELEASE ORAL 3 TIMES DAILY
Qty: 180 TABLET | Refills: 1 | Status: SHIPPED | OUTPATIENT
Start: 2023-08-07

## 2023-08-10 NOTE — TELEPHONE ENCOUNTER
Repeat labs reviewed - K+ little better at 3.6.   Continue routine Kcl 40 TID - encourage add banana/day

## 2023-08-15 LAB
ANION GAP SERPL CALCULATED.3IONS-SCNC: 8 MMOL/L (ref 4–12)
BUN BLDV-MCNC: 40 MG/DL (ref 7–20)
CALCIUM SERPL-MCNC: 9 MG/DL (ref 8.8–10.5)
CHLORIDE BLD-SCNC: 96 MEQ/L (ref 101–111)
CO2: 35 MEQ/L (ref 21–32)
CREAT SERPL-MCNC: 1.87 MG/DL (ref 0.6–1.3)
CREATININE CLEARANCE: 36
CREATININE, RANDOM URINE: 20.1 MG/DL
GLUCOSE: 107 MG/DL (ref 70–110)
POTASSIUM SERPL-SCNC: 4 MEQ/L (ref 3.6–5)
PROTEIN, URINE, RANDOM: 5.9 MG/DL
PROTEIN/CREAT RATIO: 0.29 G/1.73M2
SODIUM BLD-SCNC: 139 MEQ/L (ref 135–145)
VITAMIN D 25-HYDROXY: 55.4 NG/ML (ref 30–100)

## 2023-08-21 ENCOUNTER — OFFICE VISIT (OUTPATIENT)
Dept: NEPHROLOGY | Age: 68
End: 2023-08-21
Payer: MEDICARE

## 2023-08-21 VITALS
DIASTOLIC BLOOD PRESSURE: 85 MMHG | OXYGEN SATURATION: 98 % | SYSTOLIC BLOOD PRESSURE: 144 MMHG | HEART RATE: 76 BPM | HEIGHT: 63 IN | WEIGHT: 216 LBS | BODY MASS INDEX: 38.27 KG/M2

## 2023-08-21 DIAGNOSIS — I10 PRIMARY HYPERTENSION: ICD-10-CM

## 2023-08-21 DIAGNOSIS — E11.21 DIABETIC NEPHROPATHY ASSOCIATED WITH TYPE 2 DIABETES MELLITUS (HCC): ICD-10-CM

## 2023-08-21 DIAGNOSIS — K50.00 CROHN'S DISEASE OF SMALL INTESTINE WITHOUT COMPLICATION (HCC): ICD-10-CM

## 2023-08-21 DIAGNOSIS — D63.8 ANEMIA OF CHRONIC DISEASE: ICD-10-CM

## 2023-08-21 DIAGNOSIS — N18.32 STAGE 3B CHRONIC KIDNEY DISEASE (HCC): Primary | ICD-10-CM

## 2023-08-21 PROCEDURE — 1123F ACP DISCUSS/DSCN MKR DOCD: CPT | Performed by: INTERNAL MEDICINE

## 2023-08-21 PROCEDURE — 99213 OFFICE O/P EST LOW 20 MIN: CPT | Performed by: INTERNAL MEDICINE

## 2023-08-21 PROCEDURE — 3077F SYST BP >= 140 MM HG: CPT | Performed by: INTERNAL MEDICINE

## 2023-08-21 PROCEDURE — 3079F DIAST BP 80-89 MM HG: CPT | Performed by: INTERNAL MEDICINE

## 2023-08-21 RX ORDER — PREDNISOLONE ACETATE 10 MG/ML
SUSPENSION/ DROPS OPHTHALMIC
COMMUNITY
Start: 2023-08-17

## 2023-09-12 ENCOUNTER — TELEPHONE (OUTPATIENT)
Dept: CARDIOLOGY CLINIC | Age: 68
End: 2023-09-12

## 2023-09-12 RX ORDER — METOLAZONE 2.5 MG/1
2.5 TABLET ORAL DAILY
Qty: 2 TABLET | Refills: 0 | Status: SHIPPED | OUTPATIENT
Start: 2023-09-12 | End: 2023-09-20 | Stop reason: SDUPTHER

## 2023-09-12 NOTE — TELEPHONE ENCOUNTER
Wife Katharine Torres called office   Patient legs very swollen, seeping, and has sores  Wt up from 214 to 219  Denies SOB and bloating  Has GI appointment today at 12:45  Started on Humara  BS last night 500  Instructed wife to call pcp regarding glucose

## 2023-09-14 NOTE — TELEPHONE ENCOUNTER
Spoke with wife. Patient had first dose yesterday and second dose will be today. Will f/u with weights and sx tomorrow.

## 2023-09-15 NOTE — TELEPHONE ENCOUNTER
Spoke to wife. Patient still sleeping, will try to call back before office closes today with weight and sx.   Also has f/u next week

## 2023-09-18 NOTE — TELEPHONE ENCOUNTER
Ok take an extra 40mg tab of lasix with morning dose today (can take with evening dose if he already took morning pills) and tomorrow. Take an extra 20meq of Kcl as well today and tomorrow.

## 2023-09-18 NOTE — TELEPHONE ENCOUNTER
Legs look awful   Swelling down some but where blisters were look bad  Wt 214-215  Appointment in clinic on 9/20

## 2023-09-20 ENCOUNTER — TELEPHONE (OUTPATIENT)
Dept: CARDIOLOGY CLINIC | Age: 68
End: 2023-09-20

## 2023-09-20 ENCOUNTER — OFFICE VISIT (OUTPATIENT)
Dept: CARDIOLOGY CLINIC | Age: 68
End: 2023-09-20
Payer: MEDICARE

## 2023-09-20 VITALS
SYSTOLIC BLOOD PRESSURE: 120 MMHG | BODY MASS INDEX: 38.44 KG/M2 | WEIGHT: 217 LBS | DIASTOLIC BLOOD PRESSURE: 68 MMHG | HEART RATE: 76 BPM | OXYGEN SATURATION: 96 %

## 2023-09-20 DIAGNOSIS — D50.0 IRON DEFICIENCY ANEMIA DUE TO CHRONIC BLOOD LOSS: ICD-10-CM

## 2023-09-20 DIAGNOSIS — R60.0 EDEMA OF BOTH LOWER LEGS: ICD-10-CM

## 2023-09-20 DIAGNOSIS — S81.801A OPEN WOUND OF BOTH LOWER EXTREMITIES WITH COMPLICATION, INITIAL ENCOUNTER: ICD-10-CM

## 2023-09-20 DIAGNOSIS — Z99.89 OSA ON CPAP: ICD-10-CM

## 2023-09-20 DIAGNOSIS — I51.89 GRADE I DIASTOLIC DYSFUNCTION: ICD-10-CM

## 2023-09-20 DIAGNOSIS — I50.32 CHRONIC DIASTOLIC CONGESTIVE HEART FAILURE, NYHA CLASS 2 (HCC): Primary | ICD-10-CM

## 2023-09-20 DIAGNOSIS — S81.802A OPEN WOUND OF BOTH LOWER EXTREMITIES WITH COMPLICATION, INITIAL ENCOUNTER: ICD-10-CM

## 2023-09-20 DIAGNOSIS — G47.33 OSA ON CPAP: ICD-10-CM

## 2023-09-20 PROCEDURE — 99215 OFFICE O/P EST HI 40 MIN: CPT | Performed by: NURSE PRACTITIONER

## 2023-09-20 PROCEDURE — 3078F DIAST BP <80 MM HG: CPT | Performed by: NURSE PRACTITIONER

## 2023-09-20 PROCEDURE — 96374 THER/PROPH/DIAG INJ IV PUSH: CPT | Performed by: NURSE PRACTITIONER

## 2023-09-20 PROCEDURE — 3074F SYST BP LT 130 MM HG: CPT | Performed by: NURSE PRACTITIONER

## 2023-09-20 PROCEDURE — S0171 BUMETANIDE 0.5 MG: HCPCS | Performed by: NURSE PRACTITIONER

## 2023-09-20 PROCEDURE — 1123F ACP DISCUSS/DSCN MKR DOCD: CPT | Performed by: NURSE PRACTITIONER

## 2023-09-20 RX ORDER — METOLAZONE 2.5 MG/1
2.5 TABLET ORAL DAILY
Qty: 2 TABLET | Refills: 0 | Status: SHIPPED | OUTPATIENT
Start: 2023-09-20 | End: 2023-09-22

## 2023-09-20 RX ORDER — BUMETANIDE 0.25 MG/ML
2 INJECTION INTRAMUSCULAR; INTRAVENOUS ONCE
Status: COMPLETED | OUTPATIENT
Start: 2023-09-20 | End: 2023-09-20

## 2023-09-20 RX ORDER — POTASSIUM CHLORIDE 20 MEQ/1
20 TABLET, EXTENDED RELEASE ORAL ONCE
Status: COMPLETED | OUTPATIENT
Start: 2023-09-20 | End: 2023-09-20

## 2023-09-20 RX ADMIN — POTASSIUM CHLORIDE 20 MEQ: 20 TABLET, EXTENDED RELEASE ORAL at 12:11

## 2023-09-20 RX ADMIN — BUMETANIDE 2 MG: 0.25 INJECTION INTRAMUSCULAR; INTRAVENOUS at 12:10

## 2023-09-20 ASSESSMENT — ENCOUNTER SYMPTOMS
NAUSEA: 0
SHORTNESS OF BREATH: 1
ABDOMINAL DISTENTION: 0
VOMITING: 0
COUGH: 0

## 2023-09-20 NOTE — PROGRESS NOTES
Venipuncture obtained from [address] [place] [address]. IV Bumex and po potassium given per order. Patient tolerated procedure without complications or complaints.
second pill    Take an extra potassium tomorrow as well. Continue diet/fluid adherence  Start daily weights  F/U w/ Cardiology  F/U in clinic in 4 weeks      Tolerating above noted HF meds, no ill side effects noted. Will continue to monitor kidney function and electrolytes. Will optimize as tolerated. Pt is compliant w/ medications. Total visit time of 25 minutes has been spent with patient on education of symptoms, management, medication, and plan of care; as well as review of chart: labs, ECHO, radiology reports, etc.   I personally spent more then 50% of the appt time face to face with the patient. Daily weights  Fluid restriction of 2 Liters per day  Limit sodium in diet to around 1423-3991 mg/day  Monitor BP  Activity as tolerated       Patient was instructed to call the [address] for any changes in symptoms as noted in AVS.      Return in about 4 weeks (around 10/18/2023). or sooner if needed     Patient given educational materials - see patient instructions. We discussed the importance of weighing oneself and recording daily. We also discussed the importance of a low sodium diet, higher sodium foods to avoid and better low sodium food options. Patient verbalizes understanding of plan of care using teach back method, and is agreeable to the treatment plan.        Electronically signed by FELI Ozuna CNP on 9/20/2023 at 11:45 AM

## 2023-09-20 NOTE — PATIENT INSTRUCTIONS
You may receive a survey regarding the care you received during your visit. Your input is valuable to us. We encourage you to complete and return your survey. We hope you will choose us in the future for your healthcare needs. Your nurses today were [place] and [employer].   Office hours:   Mon-Thurs 8-4:30  Friday 8-12  Phone: [phone]    Continue:  Continue current medications  Daily weights and record  Fluid restriction of 2 Liters per day  Limit sodium in diet to around 7651-5725 mg/day  Monitor BP  Activity as tolerated     Call the [address] for any of the following symptoms:   Weight gain of 2-3 pounds in 1 day or 5 pounds in 1 week  Increased shortness of breath  Shortness of breath while laying down  Cough  Chest pain  Swelling in feet, ankles or legs  Bloating in abdomen  Fatigue

## 2023-09-21 ENCOUNTER — HOSPITAL ENCOUNTER (OUTPATIENT)
Dept: WOUND CARE | Age: 68
Discharge: HOME | End: 2023-09-21
Payer: MEDICARE

## 2023-09-21 VITALS
RESPIRATION RATE: 18 BRPM | OXYGEN SATURATION: 96 % | DIASTOLIC BLOOD PRESSURE: 71 MMHG | SYSTOLIC BLOOD PRESSURE: 115 MMHG | HEART RATE: 79 BPM | TEMPERATURE: 97.2 F

## 2023-09-21 DIAGNOSIS — R60.0 VENOUS STASIS ULCER OF RIGHT LOWER LEG WITH EDEMA OF RIGHT LOWER LEG (HCC): ICD-10-CM

## 2023-09-21 DIAGNOSIS — R60.0 VENOUS STASIS ULCER OF LEFT LOWER LEG WITH EDEMA OF LEFT LOWER LEG (HCC): Primary | ICD-10-CM

## 2023-09-21 DIAGNOSIS — I83.891 VENOUS STASIS ULCER OF RIGHT LOWER LEG WITH EDEMA OF RIGHT LOWER LEG (HCC): ICD-10-CM

## 2023-09-21 DIAGNOSIS — I83.019 VENOUS STASIS ULCER OF RIGHT LOWER LEG WITH EDEMA OF RIGHT LOWER LEG (HCC): ICD-10-CM

## 2023-09-21 DIAGNOSIS — L97.919 VENOUS STASIS ULCER OF RIGHT LOWER LEG WITH EDEMA OF RIGHT LOWER LEG (HCC): ICD-10-CM

## 2023-09-21 DIAGNOSIS — I83.892 VENOUS STASIS ULCER OF LEFT LOWER LEG WITH EDEMA OF LEFT LOWER LEG (HCC): Primary | ICD-10-CM

## 2023-09-21 DIAGNOSIS — I83.029 VENOUS STASIS ULCER OF LEFT LOWER LEG WITH EDEMA OF LEFT LOWER LEG (HCC): Primary | ICD-10-CM

## 2023-09-21 DIAGNOSIS — L97.929 VENOUS STASIS ULCER OF LEFT LOWER LEG WITH EDEMA OF LEFT LOWER LEG (HCC): Primary | ICD-10-CM

## 2023-09-21 PROBLEM — L03.114 CELLULITIS OF LEFT UPPER EXTREMITY: Status: RESOLVED | Noted: 2019-01-30 | Resolved: 2023-09-21

## 2023-09-21 PROBLEM — S80.01XA CONTUSION OF RIGHT KNEE: Status: RESOLVED | Noted: 2022-10-11 | Resolved: 2023-09-21

## 2023-09-21 PROCEDURE — 99204 OFFICE O/P NEW MOD 45 MIN: CPT | Performed by: NURSE PRACTITIONER

## 2023-09-21 PROCEDURE — 99214 OFFICE O/P EST MOD 30 MIN: CPT

## 2023-09-21 RX ORDER — LIDOCAINE HYDROCHLORIDE 20 MG/ML
JELLY TOPICAL ONCE
Status: CANCELLED | OUTPATIENT
Start: 2023-09-21 | End: 2023-09-21

## 2023-09-21 RX ORDER — LIDOCAINE 50 MG/G
OINTMENT TOPICAL ONCE
Status: CANCELLED | OUTPATIENT
Start: 2023-09-21 | End: 2023-09-21

## 2023-09-21 RX ORDER — LIDOCAINE HYDROCHLORIDE 40 MG/ML
SOLUTION TOPICAL ONCE
Status: CANCELLED | OUTPATIENT
Start: 2023-09-21 | End: 2023-09-21

## 2023-09-21 RX ORDER — IBUPROFEN 200 MG
TABLET ORAL ONCE
Status: CANCELLED | OUTPATIENT
Start: 2023-09-21 | End: 2023-09-21

## 2023-09-21 RX ORDER — BETAMETHASONE DIPROPIONATE 0.05 %
OINTMENT (GRAM) TOPICAL ONCE
Status: CANCELLED | OUTPATIENT
Start: 2023-09-21 | End: 2023-09-21

## 2023-09-21 RX ORDER — CLOBETASOL PROPIONATE 0.5 MG/G
OINTMENT TOPICAL ONCE
Status: CANCELLED | OUTPATIENT
Start: 2023-09-21 | End: 2023-09-21

## 2023-09-21 RX ORDER — LIDOCAINE 40 MG/G
CREAM TOPICAL ONCE
OUTPATIENT
Start: 2023-09-21 | End: 2023-09-21

## 2023-09-21 RX ORDER — BACITRACIN ZINC AND POLYMYXIN B SULFATE 500; 1000 [USP'U]/G; [USP'U]/G
OINTMENT TOPICAL ONCE
Status: CANCELLED | OUTPATIENT
Start: 2023-09-21 | End: 2023-09-21

## 2023-09-21 RX ORDER — BETAMETHASONE DIPROPIONATE 0.05 %
OINTMENT (GRAM) TOPICAL ONCE
OUTPATIENT
Start: 2023-09-21 | End: 2023-09-21

## 2023-09-21 RX ORDER — GINSENG 100 MG
CAPSULE ORAL ONCE
Status: CANCELLED | OUTPATIENT
Start: 2023-09-21 | End: 2023-09-21

## 2023-09-21 RX ORDER — LIDOCAINE HYDROCHLORIDE 20 MG/ML
JELLY TOPICAL ONCE
OUTPATIENT
Start: 2023-09-21 | End: 2023-09-21

## 2023-09-21 RX ORDER — GENTAMICIN SULFATE 1 MG/G
OINTMENT TOPICAL ONCE
OUTPATIENT
Start: 2023-09-21 | End: 2023-09-21

## 2023-09-21 RX ORDER — TRIAMCINOLONE ACETONIDE 1 MG/G
OINTMENT TOPICAL ONCE
OUTPATIENT
Start: 2023-09-21 | End: 2023-09-21

## 2023-09-21 RX ORDER — LIDOCAINE 50 MG/G
OINTMENT TOPICAL ONCE
OUTPATIENT
Start: 2023-09-21 | End: 2023-09-21

## 2023-09-21 RX ORDER — IBUPROFEN 200 MG
TABLET ORAL ONCE
OUTPATIENT
Start: 2023-09-21 | End: 2023-09-21

## 2023-09-21 RX ORDER — CLOBETASOL PROPIONATE 0.5 MG/G
OINTMENT TOPICAL ONCE
OUTPATIENT
Start: 2023-09-21 | End: 2023-09-21

## 2023-09-21 RX ORDER — BACITRACIN ZINC AND POLYMYXIN B SULFATE 500; 1000 [USP'U]/G; [USP'U]/G
OINTMENT TOPICAL ONCE
OUTPATIENT
Start: 2023-09-21 | End: 2023-09-21

## 2023-09-21 RX ORDER — LIDOCAINE 40 MG/G
CREAM TOPICAL ONCE
Status: CANCELLED | OUTPATIENT
Start: 2023-09-21 | End: 2023-09-21

## 2023-09-21 RX ORDER — SODIUM CHLOR/HYPOCHLOROUS ACID 0.033 %
SOLUTION, IRRIGATION IRRIGATION ONCE
Status: CANCELLED | OUTPATIENT
Start: 2023-09-21 | End: 2023-09-21

## 2023-09-21 RX ORDER — TRIAMCINOLONE ACETONIDE 1 MG/G
OINTMENT TOPICAL ONCE
Status: CANCELLED | OUTPATIENT
Start: 2023-09-21 | End: 2023-09-21

## 2023-09-21 RX ORDER — GINSENG 100 MG
CAPSULE ORAL ONCE
OUTPATIENT
Start: 2023-09-21 | End: 2023-09-21

## 2023-09-21 RX ORDER — GENTAMICIN SULFATE 1 MG/G
OINTMENT TOPICAL ONCE
Status: CANCELLED | OUTPATIENT
Start: 2023-09-21 | End: 2023-09-21

## 2023-09-21 RX ORDER — LIDOCAINE HYDROCHLORIDE 40 MG/ML
SOLUTION TOPICAL ONCE
OUTPATIENT
Start: 2023-09-21 | End: 2023-09-21

## 2023-09-21 RX ORDER — SODIUM CHLOR/HYPOCHLOROUS ACID 0.033 %
SOLUTION, IRRIGATION IRRIGATION ONCE
OUTPATIENT
Start: 2023-09-21 | End: 2023-09-21

## 2023-09-21 NOTE — DISCHARGE INSTRUCTIONS
Visit Discharge/Physician Orders:  - Mechanical Debridement was completed today by using a saline and gauze. - long term compression is key to preventing wounds  - ok to shower on dressing change days    Home Care: will send referral today    Wound Location: bilateral legs    Dressing orders:     1) Gather wound care supplies and arrange on clean table. 2) Wash your hands with soap and water or use alcohol based hand  for 20 seconds (sing \"Happy Birthday\" twice). 3) Cleanse wounds with normal saline or wound cleanser and gauze. Pat dry with clean gauze. 4) Bilateral legs - Apply alginate to wound. Cover with ABD. Secure with roll gauze then ACE wrap from base of toes to 1-2 inches below the knee. Change three times weekly . Keep all dressings clean & dry. Follow up visit:   2-3 weeks With Addi Jerry CNP at [facility]. [facility] should reach out to schedule you an appointment. P: [phone]   Address: [address] [place] [address]. [facility], [address] [place] [address]    Supplies: home health to order    Duration of dressings: n/a    We have sent your supply order to the following company:  n/a  If you don't receive the items you were expecting or don't know what the items are that you received, call the company where the order was sent. If you are unable to obtain wound supplies, continue to use the supplies you have available until you are able to reach us. It is most important to keep the wound covered at all times. It is YOUR responsibility to make sure that supplies are re-ordered before you run out. Re-order telephone numbers are included in each package. Keep next scheduled appointment. Please give 24 hour notice if unable to keep appointment. [phone]    If you experience any of the following, please call the Wound Care Service during business hours: Monday through Friday 8:00 am - 4:30 pm  ([phone]).    *Increase in pain   *Temperature over 101   *Increase in drainage from your wound or

## 2023-09-21 NOTE — PLAN OF CARE
Problem: Wound:  Goal: Will show signs of wound healing; wound closure and no evidence of infection  Description: Will show signs of wound healing; wound closure and no evidence of infection  Outcome: Progressing   Patient seen in clinic today for bilateral leg wounds. No s/s of infection. See AVS. Follow up in 2 weeks with Yemi Trujillo CNP at [facility] will call to schedule. Care plan reviewed with patient and wife. Patient and wife verbalize understanding of the plan of care and contribute to goal setting.

## 2023-09-21 NOTE — PATIENT INSTRUCTIONS
Visit Discharge/Physician Orders:  - Mechanical Debridement was completed today by using a saline and gauze. - long term compression is key to preventing wounds  - ok to shower on dressing change days    Home Care: will send referral today    Wound Location: bilateral legs    Dressing orders:     1) Gather wound care supplies and arrange on clean table. 2) Wash your hands with soap and water or use alcohol based hand  for 20 seconds (sing \"Happy Birthday\" twice). 3) Cleanse wounds with normal saline or wound cleanser and gauze. Pat dry with clean gauze. 4) Bilateral legs - Apply alginate to wound. Cover with ABD. Secure with roll gauze then ACE wrap from base of toes to 1-2 inches below the knee. Change three times weekly . Keep all dressings clean & dry. Follow up visit:   2-3 weeks With Charity Bonilla CNP at [facility]. [facility] should reach out to schedule you an appointment. P: [phone]   Address: [address] [place] [address]. [facility], [address] [place] [address]    Supplies: home health to order    Duration of dressings: n/a    We have sent your supply order to the following company:  n/a  If you don't receive the items you were expecting or don't know what the items are that you received, call the company where the order was sent. If you are unable to obtain wound supplies, continue to use the supplies you have available until you are able to reach us. It is most important to keep the wound covered at all times. It is YOUR responsibility to make sure that supplies are re-ordered before you run out. Re-order telephone numbers are included in each package. Keep next scheduled appointment. Please give 24 hour notice if unable to keep appointment. [phone]    If you experience any of the following, please call the Wound Care Service during business hours: Monday through Friday 8:00 am - 4:30 pm  ([phone]).    *Increase in pain   *Temperature over 101   *Increase in drainage from your wound or

## 2023-09-21 NOTE — PROGRESS NOTES
Wound 09/21/23 Leg Right; Anterior (Active)   Wound Image   09/21/23 1011   Wound Etiology Venous 09/21/23 1011   Dressing Status Other (Comment); New dressing applied 09/21/23 1011   Wound Cleansed Cleansed with saline 09/21/23 1011   Dressing/Treatment ABD; Ace wrap;Alginate;Roll gauze 09/21/23 1011   Offloading for Diabetic Foot Ulcers Offloading not required 09/21/23 1011   Wound Length (cm) 9 cm 09/21/23 1011   Wound Width (cm) 9 cm 09/21/23 1011   Wound Depth (cm) 0.1 cm 09/21/23 1011   Wound Surface Area (cm^2) 81 cm^2 09/21/23 1011   Wound Volume (cm^3) 8.1 cm^3 09/21/23 1011   Wound Assessment Granulation tissue;Slough;Ruptured blister 09/21/23 1011   Drainage Amount Large (50-75% saturated) 09/21/23 1011   Drainage Description Serosanguinous; Yellow 09/21/23 1011   Odor None 09/21/23 1011   Loren-wound Assessment Edematous;Dry/flaky; Blanchable erythema; Intact 09/21/23 1011   Margins Attached edges 09/21/23 1011   Wound Thickness Description not for Pressure Injury Full thickness 09/21/23 1011   Number of days: 0       Wound 09/21/23 Tibial Posterior;Right (Active)   Wound Image   09/21/23 1011   Wound Etiology Venous 09/21/23 1011   Dressing Status Other (Comment); New dressing applied 09/21/23 1011   Wound Cleansed Cleansed with saline 09/21/23 1011   Dressing/Treatment ABD; Ace wrap;Alginate;Roll gauze 09/21/23 1011   Offloading for Diabetic Foot Ulcers Offloading not required 09/21/23 1011   Wound Length (cm) 4 cm 09/21/23 1011   Wound Width (cm) 6 cm 09/21/23 1011   Wound Depth (cm) 0.05 cm 09/21/23 1011   Wound Surface Area (cm^2) 24 cm^2 09/21/23 1011   Wound Volume (cm^3) 1.2 cm^3 09/21/23 1011   Wound Assessment Ruptured blister;Granulation tissue 09/21/23 1011   Drainage Amount Large (50-75% saturated) 09/21/23 1011   Drainage Description Serosanguinous; Yellow 09/21/23 1011   Odor None 09/21/23 1011   Loren-wound Assessment Blanchable erythema; Intact;Edematous 09/21/23 1011   Margins Attached edges
(not reported separately)       Procedures done this visit:   Procedure Note  Indications:  Based on my examination of this patient's wound(s)/ulcer(s) today, debridement is not required to promote healing and evaluate the wound base. Written patient dismissal instructions given to patient and signed by patient or POA. Patient voiced understanding that the importance of adherence to instructions is paramount to wound healing improvement or success. Patient Instructions   Visit Discharge/Physician Orders:  - Mechanical Debridement was completed today by using a saline and gauze. - long term compression is key to preventing wounds  - ok to shower on dressing change days    Home Care: will send referral today    Wound Location: bilateral legs    Dressing orders:     1) Gather wound care supplies and arrange on clean table. 2) Wash your hands with soap and water or use alcohol based hand  for 20 seconds (sing \"Happy Birthday\" twice). 3) Cleanse wounds with normal saline or wound cleanser and gauze. Pat dry with clean gauze. 4) Bilateral legs - Apply alginate to wound. Cover with ABD. Secure with roll gauze then ACE wrap from base of toes to 1-2 inches below the knee. Change three times weekly . Keep all dressings clean & dry. Follow up visit:   2-3 weeks With Barrie Renee CNP at [facility]. [facility]IO should reach out to schedule you an appointment. P: [phone]   Address: [address] [place] [address]. [facility], [facility]    Supplies: home health to order    Duration of dressings: n/a    We have sent your supply order to the following company:  n/a  If you don't receive the items you were expecting or don't know what the items are that you received, call the company where the order was sent. If you are unable to obtain wound supplies, continue to use the supplies you have available until you are able to reach us. It is most important to keep the wound covered at all times.   It is YOUR

## 2023-09-25 ENCOUNTER — HOSPITAL ENCOUNTER (OUTPATIENT)
Dept: CT IMAGING | Age: 68
Discharge: HOME | End: 2023-09-25
Payer: MEDICARE

## 2023-09-25 DIAGNOSIS — Z87.891 PERSONAL HISTORY OF TOBACCO USE: ICD-10-CM

## 2023-09-25 PROCEDURE — 71271 CT THORAX LUNG CANCER SCR C-: CPT

## 2023-09-26 NOTE — TELEPHONE ENCOUNTER
Spoke with wife   Breathing not any better  Wt yesterday 215  No wt yet today(not up)  Swelling not much better   Wrapping legs with ACE wraps   [address] coming out today

## 2023-09-27 NOTE — TELEPHONE ENCOUNTER
Wife called office back   Wt today 214  Still having SOB   They are going to be keeping their insurance

## 2023-10-03 ENCOUNTER — OFFICE VISIT (OUTPATIENT)
Dept: PULMONOLOGY | Age: 68
End: 2023-10-03
Payer: MEDICARE

## 2023-10-03 VITALS
DIASTOLIC BLOOD PRESSURE: 80 MMHG | HEIGHT: 63 IN | SYSTOLIC BLOOD PRESSURE: 122 MMHG | OXYGEN SATURATION: 97 % | HEART RATE: 99 BPM | TEMPERATURE: 98.6 F | BODY MASS INDEX: 37.39 KG/M2 | WEIGHT: 211 LBS

## 2023-10-03 DIAGNOSIS — J44.9 CHRONIC OBSTRUCTIVE PULMONARY DISEASE, UNSPECIFIED COPD TYPE (HCC): Primary | ICD-10-CM

## 2023-10-03 DIAGNOSIS — J44.9 MODERATE COPD (CHRONIC OBSTRUCTIVE PULMONARY DISEASE) (HCC): ICD-10-CM

## 2023-10-03 DIAGNOSIS — Z87.891 PERSONAL HISTORY OF TOBACCO USE: ICD-10-CM

## 2023-10-03 PROCEDURE — 99214 OFFICE O/P EST MOD 30 MIN: CPT | Performed by: INTERNAL MEDICINE

## 2023-10-03 PROCEDURE — 3079F DIAST BP 80-89 MM HG: CPT | Performed by: INTERNAL MEDICINE

## 2023-10-03 PROCEDURE — 1124F ACP DISCUSS-NO DSCNMKR DOCD: CPT | Performed by: INTERNAL MEDICINE

## 2023-10-03 PROCEDURE — G0296 VISIT TO DETERM LDCT ELIG: HCPCS | Performed by: INTERNAL MEDICINE

## 2023-10-03 PROCEDURE — 3074F SYST BP LT 130 MM HG: CPT | Performed by: INTERNAL MEDICINE

## 2023-10-03 NOTE — PROGRESS NOTES
Neck Circumference -   19  Mallampati - 3    Lung Nodule Screening     [] Qualifies    [] Does not qualify   [] Declined    [] Completed

## 2023-10-07 ENCOUNTER — APPOINTMENT (OUTPATIENT)
Dept: CT IMAGING | Age: 68
DRG: 291 | End: 2023-10-07
Payer: MEDICARE

## 2023-10-07 ENCOUNTER — APPOINTMENT (OUTPATIENT)
Dept: GENERAL RADIOLOGY | Age: 68
DRG: 291 | End: 2023-10-07
Payer: MEDICARE

## 2023-10-07 ENCOUNTER — HOSPITAL ENCOUNTER (INPATIENT)
Age: 68
LOS: 2 days | Discharge: HOME HEALTH | DRG: 291 | End: 2023-10-11
Attending: EMERGENCY MEDICINE | Admitting: INTERNAL MEDICINE
Payer: MEDICARE

## 2023-10-07 DIAGNOSIS — I50.9 CONGESTIVE HEART FAILURE, UNSPECIFIED HF CHRONICITY, UNSPECIFIED HEART FAILURE TYPE (HCC): Primary | ICD-10-CM
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
## 2023-10-07 DIAGNOSIS — E87.6 HYPOKALEMIA: ICD-10-CM

## 2023-10-07 PROBLEM — R06.02 SHORTNESS OF BREATH: Status: ACTIVE | Noted: 2023-10-07

## 2023-10-07 LAB
ALBUMIN SERPL BCG-MCNC: 3.1 G/DL (ref 3.5–5.1)
ALP SERPL-CCNC: 155 U/L (ref 38–126)
ALT SERPL W/O P-5'-P-CCNC: 41 U/L (ref 11–66)
AMMONIA PLAS-MCNC: 38 UMOL/L (ref 11–60)
ANION GAP SERPL CALC-SCNC: 14 MEQ/L (ref 8–16)
AST SERPL-CCNC: 43 U/L (ref 5–40)
BACTERIA URNS QL MICRO: ABNORMAL /HPF
BASOPHILS ABSOLUTE: 0.1 THOU/MM3 (ref 0–0.1)
BASOPHILS NFR BLD AUTO: 0.7 %
BILIRUB CONJ SERPL-MCNC: < 0.2 MG/DL (ref 0–0.3)
BILIRUB SERPL-MCNC: 0.4 MG/DL (ref 0.3–1.2)
BILIRUB UR QL STRIP.AUTO: NEGATIVE
BUN SERPL-MCNC: 43 MG/DL (ref 7–22)
CALCIUM SERPL-MCNC: 8.7 MG/DL (ref 8.5–10.5)
CASTS #/AREA URNS LPF: ABNORMAL /LPF
CASTS 2: ABNORMAL /LPF
CHARACTER UR: CLEAR
CHLORIDE SERPL-SCNC: 100 MEQ/L (ref 98–111)
CO2 SERPL-SCNC: 24 MEQ/L (ref 23–33)
COLOR: YELLOW
CREAT SERPL-MCNC: 2.3 MG/DL (ref 0.4–1.2)
CRYSTALS URNS MICRO: ABNORMAL
DEPRECATED RDW RBC AUTO: 53.6 FL (ref 35–45)
EKG ATRIAL RATE: 98 BPM
EKG P AXIS: 39 DEGREES
EKG P-R INTERVAL: 146 MS
EKG Q-T INTERVAL: 328 MS
EKG QRS DURATION: 74 MS
EKG QTC CALCULATION (BAZETT): 418 MS
EKG R AXIS: -17 DEGREES
EKG T AXIS: 37 DEGREES
EKG VENTRICULAR RATE: 98 BPM
EOSINOPHIL NFR BLD AUTO: 2.1 %
EOSINOPHILS ABSOLUTE: 0.2 THOU/MM3 (ref 0–0.4)
EPITHELIAL CELLS, UA: ABNORMAL /HPF
ERYTHROCYTE [DISTWIDTH] IN BLOOD BY AUTOMATED COUNT: 14.6 % (ref 11.5–14.5)
ETHANOL SERPL-MCNC: < 0.01 %
FLUAV RNA RESP QL NAA+PROBE: NOT DETECTED
FLUBV RNA RESP QL NAA+PROBE: NOT DETECTED
GFR SERPL CREATININE-BSD FRML MDRD: 30 ML/MIN/1.73M2
GLUCOSE BLD STRIP.AUTO-MCNC: 128 MG/DL (ref 70–108)
GLUCOSE BLD STRIP.AUTO-MCNC: 149 MG/DL (ref 70–108)
GLUCOSE SERPL-MCNC: 149 MG/DL (ref 70–108)
GLUCOSE UR QL STRIP.AUTO: NEGATIVE MG/DL
HCT VFR BLD AUTO: 28 % (ref 42–52)
HGB BLD-MCNC: 8.9 GM/DL (ref 14–18)
HGB UR QL STRIP.AUTO: ABNORMAL
IMM GRANULOCYTES # BLD AUTO: 0.12 THOU/MM3 (ref 0–0.07)
IMM GRANULOCYTES NFR BLD AUTO: 1.7 %
KETONES UR QL STRIP.AUTO: NEGATIVE
LYMPHOCYTES ABSOLUTE: 1.1 THOU/MM3 (ref 1–4.8)
LYMPHOCYTES NFR BLD AUTO: 14.9 %
MAGNESIUM SERPL-MCNC: 1.3 MG/DL (ref 1.6–2.4)
MAGNESIUM SERPL-MCNC: 1.3 MG/DL (ref 1.6–2.4)
MCH RBC QN AUTO: 31.7 PG (ref 26–33)
MCHC RBC AUTO-ENTMCNC: 31.8 GM/DL (ref 32.2–35.5)
MCV RBC AUTO: 99.6 FL (ref 80–94)
MISCELLANEOUS 2: ABNORMAL
MONOCYTES ABSOLUTE: 0.8 THOU/MM3 (ref 0.4–1.3)
MONOCYTES NFR BLD AUTO: 11 %
NEUTROPHILS NFR BLD AUTO: 69.6 %
NITRITE UR QL STRIP: NEGATIVE
NRBC BLD AUTO-RTO: 1 /100 WBC
NT-PROBNP SERPL IA-MCNC: 164.5 PG/ML (ref 0–124)
OSMOLALITY SERPL CALC.SUM OF ELEC: 289.3 MOSMOL/KG (ref 275–300)
PH UR STRIP.AUTO: 5.5 [PH] (ref 5–9)
PLATELET # BLD AUTO: 289 THOU/MM3 (ref 130–400)
PMV BLD AUTO: 9.8 FL (ref 9.4–12.4)
POTASSIUM SERPL-SCNC: 3.5 MEQ/L (ref 3.5–5.2)
PROT SERPL-MCNC: 6 G/DL (ref 6.1–8)
PROT UR STRIP.AUTO-MCNC: ABNORMAL MG/DL
RBC # BLD AUTO: 2.81 MILL/MM3 (ref 4.7–6.1)
RBC URINE: ABNORMAL /HPF
RENAL EPI CELLS #/AREA URNS HPF: ABNORMAL /[HPF]
SARS-COV-2 RNA RESP QL NAA+PROBE: NOT DETECTED
SEGMENTED NEUTROPHILS ABSOLUTE COUNT: 5.1 THOU/MM3 (ref 1.8–7.7)
SODIUM SERPL-SCNC: 138 MEQ/L (ref 135–145)
SP GR UR REFRACT.AUTO: 1.01 (ref 1–1.03)
TROPONIN, HIGH SENSITIVITY: 156 NG/L (ref 0–12)
TROPONIN, HIGH SENSITIVITY: 158 NG/L (ref 0–12)
TROPONIN, HIGH SENSITIVITY: 176 NG/L (ref 0–12)
TROPONIN, HIGH SENSITIVITY: 184 NG/L (ref 0–12)
UROBILINOGEN, URINE: 0.2 EU/DL (ref 0–1)
WBC # BLD AUTO: 7.3 THOU/MM3 (ref 4.8–10.8)
WBC #/AREA URNS HPF: ABNORMAL /HPF
WBC #/AREA URNS HPF: NEGATIVE /[HPF]
YEAST LIKE FUNGI URNS QL MICRO: ABNORMAL

## 2023-10-07 PROCEDURE — 2580000003 HC RX 258

## 2023-10-07 PROCEDURE — 80048 BASIC METABOLIC PNL TOTAL CA: CPT

## 2023-10-07 PROCEDURE — 82077 ASSAY SPEC XCP UR&BREATH IA: CPT

## 2023-10-07 PROCEDURE — 99285 EMERGENCY DEPT VISIT HI MDM: CPT

## 2023-10-07 PROCEDURE — 96372 THER/PROPH/DIAG INJ SC/IM: CPT

## 2023-10-07 PROCEDURE — 83735 ASSAY OF MAGNESIUM: CPT

## 2023-10-07 PROCEDURE — 82140 ASSAY OF AMMONIA: CPT

## 2023-10-07 PROCEDURE — G0378 HOSPITAL OBSERVATION PER HR: HCPCS

## 2023-10-07 PROCEDURE — 84484 ASSAY OF TROPONIN QUANT: CPT

## 2023-10-07 PROCEDURE — 81001 URINALYSIS AUTO W/SCOPE: CPT

## 2023-10-07 PROCEDURE — 93005 ELECTROCARDIOGRAM TRACING: CPT

## 2023-10-07 PROCEDURE — 71045 X-RAY EXAM CHEST 1 VIEW: CPT

## 2023-10-07 PROCEDURE — 36415 COLL VENOUS BLD VENIPUNCTURE: CPT

## 2023-10-07 PROCEDURE — 82948 REAGENT STRIP/BLOOD GLUCOSE: CPT

## 2023-10-07 PROCEDURE — 99223 1ST HOSP IP/OBS HIGH 75: CPT

## 2023-10-07 PROCEDURE — 80076 HEPATIC FUNCTION PANEL: CPT

## 2023-10-07 PROCEDURE — 85025 COMPLETE CBC W/AUTO DIFF WBC: CPT

## 2023-10-07 PROCEDURE — 6360000002 HC RX W HCPCS

## 2023-10-07 PROCEDURE — 83880 ASSAY OF NATRIURETIC PEPTIDE: CPT

## 2023-10-07 PROCEDURE — 96375 TX/PRO/DX INJ NEW DRUG ADDON: CPT

## 2023-10-07 PROCEDURE — 93010 ELECTROCARDIOGRAM REPORT: CPT | Performed by: INTERNAL MEDICINE

## 2023-10-07 PROCEDURE — 6370000000 HC RX 637 (ALT 250 FOR IP)

## 2023-10-07 PROCEDURE — 87636 SARSCOV2 & INF A&B AMP PRB: CPT

## 2023-10-07 PROCEDURE — 72125 CT NECK SPINE W/O DYE: CPT

## 2023-10-07 PROCEDURE — 96365 THER/PROPH/DIAG IV INF INIT: CPT

## 2023-10-07 PROCEDURE — 2500000003 HC RX 250 WO HCPCS

## 2023-10-07 PROCEDURE — 70450 CT HEAD/BRAIN W/O DYE: CPT

## 2023-10-07 RX ORDER — ALLOPURINOL 100 MG/1
100 TABLET ORAL DAILY
Status: DISCONTINUED | OUTPATIENT
Start: 2023-10-07 | End: 2023-10-11 | Stop reason: HOSPADM

## 2023-10-07 RX ORDER — ASPIRIN 81 MG/1
81 TABLET ORAL DAILY
Status: DISCONTINUED | OUTPATIENT
Start: 2023-10-07 | End: 2023-10-11 | Stop reason: HOSPADM

## 2023-10-07 RX ORDER — ACETAMINOPHEN 325 MG/1
650 TABLET ORAL EVERY 6 HOURS PRN
Status: DISCONTINUED | OUTPATIENT
Start: 2023-10-07 | End: 2023-10-11 | Stop reason: HOSPADM

## 2023-10-07 RX ORDER — DEXTROSE MONOHYDRATE 100 MG/ML
INJECTION, SOLUTION INTRAVENOUS CONTINUOUS PRN
Status: DISCONTINUED | OUTPATIENT
Start: 2023-10-07 | End: 2023-10-11 | Stop reason: HOSPADM

## 2023-10-07 RX ORDER — IBUPROFEN 600 MG/1
1 TABLET ORAL PRN
Status: DISCONTINUED | OUTPATIENT
Start: 2023-10-07 | End: 2023-10-11 | Stop reason: HOSPADM

## 2023-10-07 RX ORDER — BUMETANIDE 0.25 MG/ML
2 INJECTION INTRAMUSCULAR; INTRAVENOUS ONCE
Status: COMPLETED | OUTPATIENT
Start: 2023-10-07 | End: 2023-10-07

## 2023-10-07 RX ORDER — BUMETANIDE 0.25 MG/ML
2 INJECTION INTRAMUSCULAR; INTRAVENOUS ONCE
Status: COMPLETED | OUTPATIENT
Start: 2023-10-08 | End: 2023-10-08

## 2023-10-07 RX ORDER — FUROSEMIDE 40 MG/1
40 TABLET ORAL 2 TIMES DAILY
Status: DISCONTINUED | OUTPATIENT
Start: 2023-10-07 | End: 2023-10-09

## 2023-10-07 RX ORDER — INSULIN GLARGINE 100 [IU]/ML
20 INJECTION, SOLUTION SUBCUTANEOUS NIGHTLY
Status: DISCONTINUED | OUTPATIENT
Start: 2023-10-07 | End: 2023-10-10

## 2023-10-07 RX ORDER — ACETAMINOPHEN 650 MG/1
650 SUPPOSITORY RECTAL EVERY 6 HOURS PRN
Status: DISCONTINUED | OUTPATIENT
Start: 2023-10-07 | End: 2023-10-11 | Stop reason: HOSPADM

## 2023-10-07 RX ORDER — POLYETHYLENE GLYCOL 3350 17 G/17G
17 POWDER, FOR SOLUTION ORAL DAILY PRN
Status: DISCONTINUED | OUTPATIENT
Start: 2023-10-07 | End: 2023-10-11 | Stop reason: HOSPADM

## 2023-10-07 RX ORDER — SODIUM CHLORIDE 0.9 % (FLUSH) 0.9 %
10 SYRINGE (ML) INJECTION EVERY 12 HOURS SCHEDULED
Status: DISCONTINUED | OUTPATIENT
Start: 2023-10-07 | End: 2023-10-11 | Stop reason: HOSPADM

## 2023-10-07 RX ORDER — PREDNISONE 20 MG/1
20 TABLET ORAL 2 TIMES DAILY
Status: DISCONTINUED | OUTPATIENT
Start: 2023-10-07 | End: 2023-10-11 | Stop reason: HOSPADM

## 2023-10-07 RX ORDER — ATORVASTATIN CALCIUM 10 MG/1
10 TABLET, FILM COATED ORAL DAILY
Status: DISCONTINUED | OUTPATIENT
Start: 2023-10-07 | End: 2023-10-11 | Stop reason: HOSPADM

## 2023-10-07 RX ORDER — INSULIN LISPRO 100 [IU]/ML
0-4 INJECTION, SOLUTION INTRAVENOUS; SUBCUTANEOUS NIGHTLY
Status: DISCONTINUED | OUTPATIENT
Start: 2023-10-07 | End: 2023-10-10

## 2023-10-07 RX ORDER — ONDANSETRON 2 MG/ML
4 INJECTION INTRAMUSCULAR; INTRAVENOUS EVERY 6 HOURS PRN
Status: DISCONTINUED | OUTPATIENT
Start: 2023-10-07 | End: 2023-10-11 | Stop reason: HOSPADM

## 2023-10-07 RX ORDER — CARVEDILOL 25 MG/1
25 TABLET ORAL 2 TIMES DAILY
Status: DISCONTINUED | OUTPATIENT
Start: 2023-10-07 | End: 2023-10-11 | Stop reason: HOSPADM

## 2023-10-07 RX ORDER — DOXAZOSIN MESYLATE 4 MG/1
4 TABLET ORAL DAILY
Status: DISCONTINUED | OUTPATIENT
Start: 2023-10-07 | End: 2023-10-11 | Stop reason: HOSPADM

## 2023-10-07 RX ORDER — AMLODIPINE BESYLATE 10 MG/1
10 TABLET ORAL DAILY
Status: DISCONTINUED | OUTPATIENT
Start: 2023-10-07 | End: 2023-10-11 | Stop reason: HOSPADM

## 2023-10-07 RX ORDER — ENOXAPARIN SODIUM 100 MG/ML
40 INJECTION SUBCUTANEOUS DAILY
Status: DISCONTINUED | OUTPATIENT
Start: 2023-10-07 | End: 2023-10-11 | Stop reason: HOSPADM

## 2023-10-07 RX ORDER — SODIUM CHLORIDE 9 MG/ML
INJECTION, SOLUTION INTRAVENOUS PRN
Status: DISCONTINUED | OUTPATIENT
Start: 2023-10-07 | End: 2023-10-11 | Stop reason: HOSPADM

## 2023-10-07 RX ORDER — SODIUM CHLORIDE 0.9 % (FLUSH) 0.9 %
10 SYRINGE (ML) INJECTION PRN
Status: DISCONTINUED | OUTPATIENT
Start: 2023-10-07 | End: 2023-10-11 | Stop reason: HOSPADM

## 2023-10-07 RX ORDER — INSULIN LISPRO 100 [IU]/ML
0-8 INJECTION, SOLUTION INTRAVENOUS; SUBCUTANEOUS
Status: DISCONTINUED | OUTPATIENT
Start: 2023-10-07 | End: 2023-10-10

## 2023-10-07 RX ORDER — MAGNESIUM SULFATE 1 G/100ML
1000 INJECTION INTRAVENOUS ONCE
Status: COMPLETED | OUTPATIENT
Start: 2023-10-07 | End: 2023-10-07

## 2023-10-07 RX ORDER — GABAPENTIN 300 MG/1
300 CAPSULE ORAL 2 TIMES DAILY
Status: DISCONTINUED | OUTPATIENT
Start: 2023-10-07 | End: 2023-10-11 | Stop reason: HOSPADM

## 2023-10-07 RX ORDER — ONDANSETRON 4 MG/1
4 TABLET, ORALLY DISINTEGRATING ORAL EVERY 8 HOURS PRN
Status: DISCONTINUED | OUTPATIENT
Start: 2023-10-07 | End: 2023-10-11 | Stop reason: HOSPADM

## 2023-10-07 RX ORDER — LANOLIN ALCOHOL/MO/W.PET/CERES
1000 CREAM (GRAM) TOPICAL DAILY
Status: DISCONTINUED | OUTPATIENT
Start: 2023-10-07 | End: 2023-10-11 | Stop reason: HOSPADM

## 2023-10-07 RX ADMIN — CARVEDILOL 25 MG: 25 TABLET, FILM COATED ORAL at 17:24

## 2023-10-07 RX ADMIN — ATORVASTATIN CALCIUM 10 MG: 10 TABLET, FILM COATED ORAL at 21:01

## 2023-10-07 RX ADMIN — BUMETANIDE 2 MG: 0.25 INJECTION INTRAMUSCULAR; INTRAVENOUS at 14:36

## 2023-10-07 RX ADMIN — ENOXAPARIN SODIUM 40 MG: 100 INJECTION SUBCUTANEOUS at 17:10

## 2023-10-07 RX ADMIN — MAGNESIUM SULFATE HEPTAHYDRATE 1000 MG: 1 INJECTION, SOLUTION INTRAVENOUS at 22:48

## 2023-10-07 RX ADMIN — SODIUM CHLORIDE 25 ML: 9 INJECTION, SOLUTION INTRAVENOUS at 22:45

## 2023-10-07 RX ADMIN — ACETAMINOPHEN 650 MG: 325 TABLET ORAL at 22:51

## 2023-10-07 RX ADMIN — INSULIN GLARGINE 20 UNITS: 100 INJECTION, SOLUTION SUBCUTANEOUS at 21:01

## 2023-10-07 RX ADMIN — PREDNISONE 20 MG: 20 TABLET ORAL at 21:01

## 2023-10-07 RX ADMIN — SODIUM CHLORIDE, PRESERVATIVE FREE 10 ML: 5 INJECTION INTRAVENOUS at 21:01

## 2023-10-07 RX ADMIN — Medication 1000 MCG: at 17:10

## 2023-10-07 RX ADMIN — GABAPENTIN 300 MG: 300 CAPSULE ORAL at 21:01

## 2023-10-07 RX ADMIN — ALLOPURINOL 100 MG: 100 TABLET ORAL at 17:10

## 2023-10-07 RX ADMIN — ASPIRIN 81 MG: 81 TABLET, COATED ORAL at 17:10

## 2023-10-07 ASSESSMENT — PAIN SCALES - GENERAL
PAINLEVEL_OUTOF10: 0
PAINLEVEL_OUTOF10: 0
PAINLEVEL_OUTOF10: 3
PAINLEVEL_OUTOF10: 0

## 2023-10-07 ASSESSMENT — ENCOUNTER SYMPTOMS
ABDOMINAL PAIN: 0
SHORTNESS OF BREATH: 0
SORE THROAT: 0
RHINORRHEA: 0
CHEST TIGHTNESS: 0
COLOR CHANGE: 0

## 2023-10-07 ASSESSMENT — PAIN DESCRIPTION - ONSET: ONSET: GRADUAL

## 2023-10-07 ASSESSMENT — PAIN DESCRIPTION - ORIENTATION: ORIENTATION: RIGHT;LEFT

## 2023-10-07 ASSESSMENT — PAIN - FUNCTIONAL ASSESSMENT
PAIN_FUNCTIONAL_ASSESSMENT: 0-10
PAIN_FUNCTIONAL_ASSESSMENT: 0-10
PAIN_FUNCTIONAL_ASSESSMENT: PREVENTS OR INTERFERES SOME ACTIVE ACTIVITIES AND ADLS
PAIN_FUNCTIONAL_ASSESSMENT: NONE - DENIES PAIN
PAIN_FUNCTIONAL_ASSESSMENT: NONE - DENIES PAIN

## 2023-10-07 ASSESSMENT — PAIN DESCRIPTION - PAIN TYPE: TYPE: CHRONIC PAIN

## 2023-10-07 ASSESSMENT — PAIN DESCRIPTION - DESCRIPTORS: DESCRIPTORS: TENDER;SORE

## 2023-10-07 ASSESSMENT — LIFESTYLE VARIABLES
HOW MANY STANDARD DRINKS CONTAINING ALCOHOL DO YOU HAVE ON A TYPICAL DAY: PATIENT DOES NOT DRINK
HOW OFTEN DO YOU HAVE A DRINK CONTAINING ALCOHOL: NEVER

## 2023-10-07 ASSESSMENT — PAIN DESCRIPTION - FREQUENCY: FREQUENCY: INTERMITTENT

## 2023-10-07 ASSESSMENT — PAIN DESCRIPTION - LOCATION: LOCATION: LEG

## 2023-10-07 NOTE — ED NOTES
Pt transported to [address] [place] [address] in stable condition. Floor contacted before transport,spoke to Wendie Her.       Janet Cates  10/07/23 5799

## 2023-10-07 NOTE — ED NOTES
ED to inpatient nurses report    Chief Complaint   Patient presents with    Shortness of Breath    Extremity Weakness     Bilateral Legs        Present to ED from home  LOC: alert and orientated to name, place, date  Vital signs   Vitals:    10/07/23 1203 10/07/23 1246 10/07/23 1315 10/07/23 1443   BP: (!) 141/61 (!) 134/59 115/66 (!) 146/64   Pulse: 97 86 91 90   Resp: 26 24 27 30   Temp:       TempSrc:       SpO2: 92% 93% 94% 92%   Weight:       Height:          Oxygen Baseline ra    Current needs required ra Bipap/Cpap No  LDAs:   Peripheral IV 10/07/23 Right; Anterior Wrist (Active)     Mobility: Requires assistance * 1  Pending ED orders: none  Present condition: stable    Electronically signed by Moses Lafleur RN on 10/7/2023 at 2:50 PM        Moses Lafleur RN  10/07/23 1450

## 2023-10-07 NOTE — ED TRIAGE NOTES
Patient to ER due to sliding off of the chair today. Patient states this is his first fall in the last month. Patient has had difficulty walking for about a month now. He says his legs get weak when he is walking. Patient also mentions some shortness of breath during the last week. EKG completed.

## 2023-10-07 NOTE — ED NOTES
Patient stood on side of bed, unable to urinate at this time.        Annie Sampson RN  10/07/23 1036

## 2023-10-07 NOTE — ED PROVIDER NOTES
[facility]      EMERGENCY MEDICINE     Pt Name: Matthew Atkins  MRN: [id number]  [address] [facility] [place] 1955  Date of evaluation: 10/7/2023  Resident Physician: Alissa Ty MD  Attending Physician: Dr. Nikki Bello       Chief Complaint   Patient presents with    Shortness of Breath    Extremity Weakness     Bilateral Legs       HISTORY OF PRESENT ILLNESS   Matthew Atkins is a 77 y.o. male who presents to the emergency department from home, by private vehicle for evaluation of shortness of breath and bilateral leg weakness    Patient states over the last several weeks he has had worsening of his shortness of breath on exertion is not able to walk daily as far as he used to. Patient says he is also having worsening leg swelling despite his legs being wrapped. Patient does have home health coming out to the house to change his bandages on his leg because he does have chronic venous stasis ulcers that are being treated. Patient is not currently on antibiotics. Patient denies fevers chills nausea vomiting lightheadedness or dizziness. States his extremities feel so heavy that he is not able to actually tolerate walking very long when he does he does get extremely short of breath. Patient states that today he was at home and trying to get up placed out of his chair and fell down hit his head which is reason for his visit today. Patient denies being on any blood thinners. Patient denies loss of consciousness. Patient without any nausea or vomiting. The patient has no other acute complaints at this time. ROS Negative Except as Documented Above.     PASTMEDICAL HISTORY     Past Medical History:   Diagnosis Date    Arthritis     CAD (coronary artery disease) 04/05/2012    CHF (congestive heart failure) (HCC)     Diabetes mellitus ([address] )     Diverticulosis of colon     History of chest pain     History of tobacco abuse     Hyperlipidemia     Hypertension     Pneumonia        Patient

## 2023-10-07 NOTE — H&P
Hospitalist - History & Physical      Patient: Jace Callahan    Unit/Bed:10/010A  YOB: 1955  MRN: [id number]   Acct: [de-identified]   PCP: FELI Martinez - CNP    Date of Service: Pt seen/examined on 10/07/23  and Admitted to Observation with expected LOS less than two midnights due to medical therapy. Chief Complaint:  fall    Assessment and Plan:  Unwitnessed fall:    Pt sustained an unwitnessed fall at home- no LOC. Pt hemodynamically stable. CT head and neck today without any acute injury. PT/OT. HFpEF, with suspected mild acute exacerbation:   Pt noted to have SpO2 low 90's-88% in ED. Pt endorses 4 lb weight gain unexpected in last 4-5 days. Last ECHO 3/2023 refveals EF 55-60% with grade 1 diastolic dysfunction. CXR today shows mild increased interstitial markings. Given Bumex 2mg in ED. Will plan to continue x 1 dose tomorrow AM and reassess. Daily weights, I&Os. Telemetry. Can consider limited ECHO. HALEIGH on CKD:    Cr 2.3, up from recent baseline. Suspect cardiorenal syndrome. Monitor closely- repeat BMP in the AM.      Elevated troponin:    Noted upon chart review. Pt denies chest pain. EKG without ischemic changes. Will trend, repeat EKG and continue to monitor symtpoms. Otherwise suspect secondary to demand ischemia. SRAVAN:    Continue home CPAP. History Of Present Illness:    Carmen Neely is a 64-year-old  male with a past medical history of CAD, CHF, T2DM, SRAVAN who presents to [facility] ED for evaluation after he sustained a fall at home which was unwitnessed. Patient states he was getting back into his chair and did not hit the seat of the chair, instead he hit the arm of the chair for which caused him to become off balance and fall forward and hit his head. Patient denies loss of consciousness and remembers the whole event. Patient was unable to get up by himself, he required the assistance of his wife who then called the squad.   Patient reports she has anterolisthesis of C7 relative to T1 and C4 relative to C5. At C1-C2, there is normal alignment of the dens relative to anterior arch of C1. At C2-3, there is mild canal and mild-to-moderate bilateral foraminal stenosis. At C3-4, there is mild canal and mild-to-moderate bilateral foraminal stenosis. At C4-5, there is mild canal and moderate bilateral foraminal stenosis. At C5-6, there is mild canal stenosis, possible indentation upon underlying spinal cord, moderate to severe left and moderate right-sided foraminal stenosis. At C6-7, there is mild canal stenosis, possible indentation upon underlying spinal cord and moderate to severe bilateral foraminal stenosis. At C7-T1 there is mild canal and moderate to severe bilateral foraminal stenosis. . There is bilateral carotid artery calcification. .  There are no suspicious findings in the lung apices. 1. Stable CT scan of the cervical spine, no interval change since previous study dated 10/6/2022. 2. Straightening of the normal cervical lordosis and cervical spondylosis especially at C5-6, C6-C7, C7-T1 and C4-5. 3. No acute fracture noted. . **This report has been created using voice recognition software. It may contain minor errors which are inherent in voice recognition technology. ** Final report electronically signed by DR Alda Thibodeaux on 10/7/2023 10:24 AM    CT HEAD WO CONTRAST    Result Date: 10/7/2023  PROCEDURE: CT HEAD WO CONTRAST CLINICAL INFORMATION: fall. COMPARISON: CT scan of the brain dated 10/6/2022. . TECHNIQUE: Noncontrast 5 mm axial images were obtained through the brain. Sagittal and coronal reconstructions were obtained. All CT scans at this facility use dose modulation, iterative reconstruction, and/or weight-based dosing when appropriate to reduce radiation dose to as low as reasonably achievable. FINDINGS: There is mild atrophy. There is diminished attenuation in the white matter most likely representing ischemic changes. There is no hemorrhage.

## 2023-10-08 LAB
ANION GAP SERPL CALC-SCNC: 15 MEQ/L (ref 8–16)
BASOPHILS ABSOLUTE: 0 THOU/MM3 (ref 0–0.1)
BASOPHILS NFR BLD AUTO: 0.3 %
BUN SERPL-MCNC: 38 MG/DL (ref 7–22)
CALCIUM SERPL-MCNC: 8.5 MG/DL (ref 8.5–10.5)
CHLORIDE SERPL-SCNC: 103 MEQ/L (ref 98–111)
CO2 SERPL-SCNC: 26 MEQ/L (ref 23–33)
CREAT SERPL-MCNC: 2.2 MG/DL (ref 0.4–1.2)
DEPRECATED RDW RBC AUTO: 51.3 FL (ref 35–45)
EKG ATRIAL RATE: 89 BPM
EKG P AXIS: 54 DEGREES
EKG P-R INTERVAL: 166 MS
EKG Q-T INTERVAL: 362 MS
EKG QRS DURATION: 88 MS
EKG QTC CALCULATION (BAZETT): 440 MS
EKG R AXIS: -11 DEGREES
EKG T AXIS: 54 DEGREES
EKG VENTRICULAR RATE: 89 BPM
EOSINOPHIL NFR BLD AUTO: 0.2 %
EOSINOPHILS ABSOLUTE: 0 THOU/MM3 (ref 0–0.4)
ERYTHROCYTE [DISTWIDTH] IN BLOOD BY AUTOMATED COUNT: 14.2 % (ref 11.5–14.5)
GFR SERPL CREATININE-BSD FRML MDRD: 31 ML/MIN/1.73M2
GLUCOSE BLD STRIP.AUTO-MCNC: 252 MG/DL (ref 70–108)
GLUCOSE BLD STRIP.AUTO-MCNC: 254 MG/DL (ref 70–108)
GLUCOSE BLD STRIP.AUTO-MCNC: 282 MG/DL (ref 70–108)
GLUCOSE BLD STRIP.AUTO-MCNC: 373 MG/DL (ref 70–108)
GLUCOSE SERPL-MCNC: 230 MG/DL (ref 70–108)
HCT VFR BLD AUTO: 29.2 % (ref 42–52)
HGB BLD-MCNC: 9.3 GM/DL (ref 14–18)
IMM GRANULOCYTES # BLD AUTO: 0.12 THOU/MM3 (ref 0–0.07)
IMM GRANULOCYTES NFR BLD AUTO: 1.9 %
LYMPHOCYTES ABSOLUTE: 0.6 THOU/MM3 (ref 1–4.8)
LYMPHOCYTES NFR BLD AUTO: 9.9 %
MAGNESIUM SERPL-MCNC: 1.7 MG/DL (ref 1.6–2.4)
MCH RBC QN AUTO: 32.1 PG (ref 26–33)
MCHC RBC AUTO-ENTMCNC: 31.8 GM/DL (ref 32.2–35.5)
MCV RBC AUTO: 100.7 FL (ref 80–94)
MONOCYTES ABSOLUTE: 0.3 THOU/MM3 (ref 0.4–1.3)
MONOCYTES NFR BLD AUTO: 5.2 %
NEUTROPHILS NFR BLD AUTO: 82.5 %
NRBC BLD AUTO-RTO: 0 /100 WBC
PLATELET # BLD AUTO: 317 THOU/MM3 (ref 130–400)
PMV BLD AUTO: 10.1 FL (ref 9.4–12.4)
POTASSIUM SERPL-SCNC: 3.6 MEQ/L (ref 3.5–5.2)
RBC # BLD AUTO: 2.9 MILL/MM3 (ref 4.7–6.1)
SEGMENTED NEUTROPHILS ABSOLUTE COUNT: 5.1 THOU/MM3 (ref 1.8–7.7)
SODIUM SERPL-SCNC: 144 MEQ/L (ref 135–145)
WBC # BLD AUTO: 6.2 THOU/MM3 (ref 4.8–10.8)

## 2023-10-08 PROCEDURE — 96376 TX/PRO/DX INJ SAME DRUG ADON: CPT

## 2023-10-08 PROCEDURE — G0378 HOSPITAL OBSERVATION PER HR: HCPCS

## 2023-10-08 PROCEDURE — 6360000002 HC RX W HCPCS

## 2023-10-08 PROCEDURE — 2500000003 HC RX 250 WO HCPCS

## 2023-10-08 PROCEDURE — 99233 SBSQ HOSP IP/OBS HIGH 50: CPT

## 2023-10-08 PROCEDURE — 6370000000 HC RX 637 (ALT 250 FOR IP)

## 2023-10-08 PROCEDURE — 97116 GAIT TRAINING THERAPY: CPT

## 2023-10-08 PROCEDURE — 83735 ASSAY OF MAGNESIUM: CPT

## 2023-10-08 PROCEDURE — 85025 COMPLETE CBC W/AUTO DIFF WBC: CPT

## 2023-10-08 PROCEDURE — 97110 THERAPEUTIC EXERCISES: CPT

## 2023-10-08 PROCEDURE — 97162 PT EVAL MOD COMPLEX 30 MIN: CPT

## 2023-10-08 PROCEDURE — 96366 THER/PROPH/DIAG IV INF ADDON: CPT

## 2023-10-08 PROCEDURE — 2580000003 HC RX 258

## 2023-10-08 PROCEDURE — 36415 COLL VENOUS BLD VENIPUNCTURE: CPT

## 2023-10-08 PROCEDURE — 82948 REAGENT STRIP/BLOOD GLUCOSE: CPT

## 2023-10-08 PROCEDURE — 93010 ELECTROCARDIOGRAM REPORT: CPT | Performed by: INTERNAL MEDICINE

## 2023-10-08 PROCEDURE — 80048 BASIC METABOLIC PNL TOTAL CA: CPT

## 2023-10-08 PROCEDURE — 96372 THER/PROPH/DIAG INJ SC/IM: CPT

## 2023-10-08 RX ORDER — PANTOPRAZOLE SODIUM 40 MG/1
40 TABLET, DELAYED RELEASE ORAL
Status: DISCONTINUED | OUTPATIENT
Start: 2023-10-08 | End: 2023-10-11 | Stop reason: HOSPADM

## 2023-10-08 RX ORDER — POTASSIUM CHLORIDE 20 MEQ/1
40 TABLET, EXTENDED RELEASE ORAL PRN
Status: DISCONTINUED | OUTPATIENT
Start: 2023-10-08 | End: 2023-10-11 | Stop reason: HOSPADM

## 2023-10-08 RX ORDER — MAGNESIUM SULFATE IN WATER 40 MG/ML
2000 INJECTION, SOLUTION INTRAVENOUS PRN
Status: DISCONTINUED | OUTPATIENT
Start: 2023-10-08 | End: 2023-10-11 | Stop reason: HOSPADM

## 2023-10-08 RX ORDER — MAGNESIUM SULFATE 1 G/100ML
1000 INJECTION INTRAVENOUS ONCE
Status: COMPLETED | OUTPATIENT
Start: 2023-10-08 | End: 2023-10-08

## 2023-10-08 RX ORDER — POTASSIUM CHLORIDE 7.45 MG/ML
10 INJECTION INTRAVENOUS PRN
Status: DISCONTINUED | OUTPATIENT
Start: 2023-10-08 | End: 2023-10-11 | Stop reason: HOSPADM

## 2023-10-08 RX ADMIN — PREDNISONE 20 MG: 20 TABLET ORAL at 08:55

## 2023-10-08 RX ADMIN — MAGNESIUM SULFATE HEPTAHYDRATE 1000 MG: 1 INJECTION, SOLUTION INTRAVENOUS at 11:33

## 2023-10-08 RX ADMIN — INSULIN LISPRO 4 UNITS: 100 INJECTION, SOLUTION INTRAVENOUS; SUBCUTANEOUS at 11:32

## 2023-10-08 RX ADMIN — INSULIN GLARGINE 20 UNITS: 100 INJECTION, SOLUTION SUBCUTANEOUS at 20:06

## 2023-10-08 RX ADMIN — GABAPENTIN 300 MG: 300 CAPSULE ORAL at 08:55

## 2023-10-08 RX ADMIN — SODIUM CHLORIDE, PRESERVATIVE FREE 10 ML: 5 INJECTION INTRAVENOUS at 08:55

## 2023-10-08 RX ADMIN — ATORVASTATIN CALCIUM 10 MG: 10 TABLET, FILM COATED ORAL at 20:05

## 2023-10-08 RX ADMIN — PANTOPRAZOLE SODIUM 40 MG: 40 TABLET, DELAYED RELEASE ORAL at 16:31

## 2023-10-08 RX ADMIN — CARVEDILOL 25 MG: 25 TABLET, FILM COATED ORAL at 16:33

## 2023-10-08 RX ADMIN — BUMETANIDE 2 MG: 0.25 INJECTION INTRAMUSCULAR; INTRAVENOUS at 06:44

## 2023-10-08 RX ADMIN — INSULIN LISPRO 4 UNITS: 100 INJECTION, SOLUTION INTRAVENOUS; SUBCUTANEOUS at 08:55

## 2023-10-08 RX ADMIN — GABAPENTIN 300 MG: 300 CAPSULE ORAL at 20:05

## 2023-10-08 RX ADMIN — AMLODIPINE BESYLATE 10 MG: 10 TABLET ORAL at 08:55

## 2023-10-08 RX ADMIN — INSULIN LISPRO 4 UNITS: 100 INJECTION, SOLUTION INTRAVENOUS; SUBCUTANEOUS at 16:31

## 2023-10-08 RX ADMIN — PREDNISONE 20 MG: 20 TABLET ORAL at 20:05

## 2023-10-08 RX ADMIN — ASPIRIN 81 MG: 81 TABLET, COATED ORAL at 08:55

## 2023-10-08 RX ADMIN — SODIUM CHLORIDE, PRESERVATIVE FREE 10 ML: 5 INJECTION INTRAVENOUS at 20:06

## 2023-10-08 RX ADMIN — CARVEDILOL 25 MG: 25 TABLET, FILM COATED ORAL at 08:55

## 2023-10-08 RX ADMIN — ENOXAPARIN SODIUM 40 MG: 100 INJECTION SUBCUTANEOUS at 17:00

## 2023-10-08 RX ADMIN — INSULIN LISPRO 4 UNITS: 100 INJECTION, SOLUTION INTRAVENOUS; SUBCUTANEOUS at 20:06

## 2023-10-08 RX ADMIN — DOXAZOSIN 4 MG: 4 TABLET ORAL at 08:55

## 2023-10-08 RX ADMIN — ALLOPURINOL 100 MG: 100 TABLET ORAL at 08:55

## 2023-10-08 RX ADMIN — Medication 1000 MCG: at 08:55

## 2023-10-08 ASSESSMENT — PAIN SCALES - GENERAL
PAINLEVEL_OUTOF10: 0

## 2023-10-08 NOTE — PROGRESS NOTES
Hospitalist Progress Note      Patient:  Sally Cunha    Unit/Bed:7K-17/017-A  YOB: 1955  MRN: [id number]   Acct: [de-identified]   PCP: FELI Luz CNP  Date of Admission: 10/7/2023    Assessment/Plan:    Fall, decreased mobility:  Pt sustained an unwitnessed fall at home- no LOC. Pt hemodynamically stable. CT head and neck today without any acute injury. PT/OT. Nursing reports concerns with patient mobility. Will await PT/OT input. Diastolic heart failure, recovered EF, suspected mild acute exacerbation: Pt noted to have SpO2 low 90's-88% in ED. Pt endorses 4 lb weight gain unexpected in last 4-5 days. Last ECHO 3/2023 refveals EF 55-60% with grade 1 diastolic dysfunction. CXR on admission shows mild increased interstitial markings. Given Bumex 2mg in ED. additional dose of Bumex 2 mg given this morning. Plan to continue home dose of Lasix tomorrow. Daily weights, I&Os. Telemetry. Limited echo ordered. HALEIGH on CKD: Creatinine 2.3 on admission, baseline appears to be 1.7-1.9. Suspect cardiorenal syndrome. Patient was given 2 doses of IV Bumex. Creatinine this morning 2.2. We will avoid nephrotoxic agents. Repeat BMP in the morning. Elevated troponin: High-sensitivity troponin elevated at 158, increased to 184. EKG and repeat EKG without acute ischemic changes compared to April. Patient denies any chest pain. Does endorse shortness of breath which he states is at baseline from COPD. Patient has history of nonobstructive CAD, last [facility] in 2016. Stress test in July 2022 showed a fixed inferior wall perfusion defect, no reversible ischemia, per Dr. Tasneem Tirado last progress note. Will check limited echo, consider inpatient cardiology consult dependent on results. Hypomagnesemia: Magnesium 1.3 on arrival.  Received 1 g replacement. Up to 1.7 today. Will give an additional 1 g of magnesium at this time. signed by DR Huber Waddell on 10/7/2023 10:24 AM    CT HEAD WO CONTRAST    Result Date: 10/7/2023  PROCEDURE: CT HEAD WO CONTRAST CLINICAL INFORMATION: fall. COMPARISON: CT scan of the brain dated 10/6/2022. . TECHNIQUE: Noncontrast 5 mm axial images were obtained through the brain. Sagittal and coronal reconstructions were obtained. All CT scans at this facility use dose modulation, iterative reconstruction, and/or weight-based dosing when appropriate to reduce radiation dose to as low as reasonably achievable. FINDINGS: There is mild atrophy. There is diminished attenuation in the white matter most likely representing ischemic changes. There is no hemorrhage. There are no intra-or extra-axial collections. There is no hydrocephalus, midline shift or mass effect. . The paranasal sinuses and mastoid air cells are normally aerated. There is no suspicious calvarial abnormality. 1. Stable CT scan of the brain, no interval change since previous study dated 10/6/2022. **This report has been created using voice recognition software. It may contain minor errors which are inherent in voice recognition technology. ** Final report electronically signed by DR Huber Waddell on 10/7/2023 10:17 AM    XR CHEST PORTABLE    Result Date: 10/7/2023  PROCEDURE: XR CHEST PORTABLE CLINICAL INFORMATION: Fall. COMPARISON: Chest x-ray dated 4/3/2023. TECHNIQUE: AP upright view of the chest. FINDINGS: There is cardiomegaly. .The mediastinum is not widened. There is diffuse COPD with no new pulmonary infiltrates or effusions. The pulmonary vascularity is normal. There is no displaced rib fracture. There is no pneumothorax. 1. No interval change since previous study dated 4/3/2023. Kenia Layton **This report has been created using voice recognition software. It may contain minor errors which are inherent in voice recognition technology. ** Final report electronically signed by DR Huber Waddell on 10/7/2023 10:08 AM      Electronically signed by Colt Garner

## 2023-10-08 NOTE — PROGRESS NOTES
[facility]  INPATIENT PHYSICAL THERAPY  EVALUATION  [facility] ORTHOPEDICS 7K - 7K-17/017-A    Time In: 5951  Time Out: 1503  Timed Code Treatment Minutes: 23 Minutes  Minutes: 31          Date: 10/8/2023  Patient Name: Payton Mcnamara,  Gender:  male        MRN: [id number]  : 1955  (77 y.o.)      Referring Practitioner: Rebecca Abdullahi PA-C  Diagnosis: Shortness of breath  Additional Pertinent Hx: 66-year-old  male with a past medical history of CAD, CHF, T2DM, SRAVAN who presents to [facility] ED for evaluation after he sustained a fall at home which was unwitnessed. Patient states he was getting back into his chair and did not hit the seat of the chair, instead he hit the arm of the chair for which caused him to become off balance and fall forward and hit his head. Patient denies loss of consciousness and remembers the whole event. Patient was unable to get up by himself, he required the assistance of his wife who then called the squad. Patient reports she has otherwise been feeling well, recently had a 2-day course of metolazone on top of his normal diuretics due to some swelling in his legs. Restrictions/Precautions:  Restrictions/Precautions: General Precautions, Fall Risk    Subjective:  Chart Reviewed: Yes  Patient assessed for rehabilitation services?: Yes  Subjective: RN approved session.  Pt pleasant and agreeable to therapy    General:  Overall Orientation Status: Within Functional Limits  Vision: Within Functional Limits  Hearing: Within functional limits       Pain: 0/10: denies pain     Vitals: Vitals not assessed per clinical judgement, see nursing flowsheet    Social/Functional History:    Lives With: Spouse, Son  Type of Home: House  Home Layout: One level  Home Access: Stairs to enter with rails  Entrance Stairs - Number of Steps: 2  Entrance Stairs - Rails: Left  Home Equipment: Walker, rolling     Bathroom Shower/Tub: Walk-in shower  Bathroom Toilet: Standard  Bathroom Term Goals  Time Frame for Short Term Goals: by discharge  Short Term Goal 1: bed mobility with HOB flat, no rails ,mod I for increased functional ind  Short Term Goal 2: sit<>stand from various surface with LRD mod I for safe transfers  Short Term Goal 3: ambulate 200' with LRD mod I for safe household distances  Short Term Goal 4: navigate 2 platform steps with LRD mod I for safe enter exit of home  Long Term Goals  Time Frame for Long Term Goals : NA d/t short ELOS    Following session, patient left in safe position with all fall risk precautions in place.     Tereza Soto ([place]) PT, DPT

## 2023-10-09 LAB
ANION GAP SERPL CALC-SCNC: 14 MEQ/L (ref 8–16)
BASOPHILS ABSOLUTE: 0 THOU/MM3 (ref 0–0.1)
BASOPHILS NFR BLD AUTO: 0.4 %
BUN SERPL-MCNC: 41 MG/DL (ref 7–22)
CALCIUM SERPL-MCNC: 8.5 MG/DL (ref 8.5–10.5)
CHLORIDE SERPL-SCNC: 98 MEQ/L (ref 98–111)
CO2 SERPL-SCNC: 28 MEQ/L (ref 23–33)
CREAT SERPL-MCNC: 2.1 MG/DL (ref 0.4–1.2)
DEPRECATED RDW RBC AUTO: 48.8 FL (ref 35–45)
EOSINOPHIL NFR BLD AUTO: 0.1 %
EOSINOPHILS ABSOLUTE: 0 THOU/MM3 (ref 0–0.4)
ERYTHROCYTE [DISTWIDTH] IN BLOOD BY AUTOMATED COUNT: 14.1 % (ref 11.5–14.5)
GFR SERPL CREATININE-BSD FRML MDRD: 33 ML/MIN/1.73M2
GLUCOSE BLD STRIP.AUTO-MCNC: 245 MG/DL (ref 70–108)
GLUCOSE BLD STRIP.AUTO-MCNC: 279 MG/DL (ref 70–108)
GLUCOSE BLD STRIP.AUTO-MCNC: 365 MG/DL (ref 70–108)
GLUCOSE BLD STRIP.AUTO-MCNC: 371 MG/DL (ref 70–108)
GLUCOSE SERPL-MCNC: 258 MG/DL (ref 70–108)
HCT VFR BLD AUTO: 29.1 % (ref 42–52)
HGB BLD-MCNC: 9.6 GM/DL (ref 14–18)
IMM GRANULOCYTES # BLD AUTO: 0.21 THOU/MM3 (ref 0–0.07)
IMM GRANULOCYTES NFR BLD AUTO: 3.1 %
LYMPHOCYTES ABSOLUTE: 0.9 THOU/MM3 (ref 1–4.8)
LYMPHOCYTES NFR BLD AUTO: 12.7 %
MAGNESIUM SERPL-MCNC: 1.9 MG/DL (ref 1.6–2.4)
MCH RBC QN AUTO: 32.1 PG (ref 26–33)
MCHC RBC AUTO-ENTMCNC: 33 GM/DL (ref 32.2–35.5)
MCV RBC AUTO: 97.3 FL (ref 80–94)
MONOCYTES ABSOLUTE: 0.5 THOU/MM3 (ref 0.4–1.3)
MONOCYTES NFR BLD AUTO: 6.8 %
NEUTROPHILS NFR BLD AUTO: 76.9 %
NRBC BLD AUTO-RTO: 0 /100 WBC
PLATELET # BLD AUTO: 374 THOU/MM3 (ref 130–400)
PMV BLD AUTO: 10.3 FL (ref 9.4–12.4)
POTASSIUM SERPL-SCNC: 3.5 MEQ/L (ref 3.5–5.2)
RBC # BLD AUTO: 2.99 MILL/MM3 (ref 4.7–6.1)
SEGMENTED NEUTROPHILS ABSOLUTE COUNT: 5.3 THOU/MM3 (ref 1.8–7.7)
SODIUM SERPL-SCNC: 140 MEQ/L (ref 135–145)
WBC # BLD AUTO: 6.9 THOU/MM3 (ref 4.8–10.8)

## 2023-10-09 PROCEDURE — 6370000000 HC RX 637 (ALT 250 FOR IP)

## 2023-10-09 PROCEDURE — 83735 ASSAY OF MAGNESIUM: CPT

## 2023-10-09 PROCEDURE — 93307 TTE W/O DOPPLER COMPLETE: CPT

## 2023-10-09 PROCEDURE — 94761 N-INVAS EAR/PLS OXIMETRY MLT: CPT

## 2023-10-09 PROCEDURE — 97165 OT EVAL LOW COMPLEX 30 MIN: CPT

## 2023-10-09 PROCEDURE — 94640 AIRWAY INHALATION TREATMENT: CPT

## 2023-10-09 PROCEDURE — 80048 BASIC METABOLIC PNL TOTAL CA: CPT

## 2023-10-09 PROCEDURE — 36415 COLL VENOUS BLD VENIPUNCTURE: CPT

## 2023-10-09 PROCEDURE — 2580000003 HC RX 258

## 2023-10-09 PROCEDURE — 99232 SBSQ HOSP IP/OBS MODERATE 35: CPT

## 2023-10-09 PROCEDURE — 82948 REAGENT STRIP/BLOOD GLUCOSE: CPT

## 2023-10-09 PROCEDURE — 1200000000 HC SEMI PRIVATE

## 2023-10-09 PROCEDURE — 85025 COMPLETE CBC W/AUTO DIFF WBC: CPT

## 2023-10-09 PROCEDURE — 6360000002 HC RX W HCPCS

## 2023-10-09 RX ORDER — FUROSEMIDE 40 MG/1
40 TABLET ORAL 2 TIMES DAILY
Status: DISCONTINUED | OUTPATIENT
Start: 2023-10-09 | End: 2023-10-11 | Stop reason: HOSPADM

## 2023-10-09 RX ADMIN — CARVEDILOL 25 MG: 25 TABLET, FILM COATED ORAL at 17:41

## 2023-10-09 RX ADMIN — TIOTROPIUM BROMIDE AND OLODATEROL 2 PUFF: 3.124; 2.736 SPRAY, METERED RESPIRATORY (INHALATION) at 09:00

## 2023-10-09 RX ADMIN — FUROSEMIDE 40 MG: 40 TABLET ORAL at 17:07

## 2023-10-09 RX ADMIN — ASPIRIN 81 MG: 81 TABLET, COATED ORAL at 08:23

## 2023-10-09 RX ADMIN — FUROSEMIDE 40 MG: 40 TABLET ORAL at 11:37

## 2023-10-09 RX ADMIN — INSULIN LISPRO 4 UNITS: 100 INJECTION, SOLUTION INTRAVENOUS; SUBCUTANEOUS at 20:19

## 2023-10-09 RX ADMIN — INSULIN LISPRO 4 UNITS: 100 INJECTION, SOLUTION INTRAVENOUS; SUBCUTANEOUS at 08:23

## 2023-10-09 RX ADMIN — PANTOPRAZOLE SODIUM 40 MG: 40 TABLET, DELAYED RELEASE ORAL at 08:23

## 2023-10-09 RX ADMIN — INSULIN LISPRO 2 UNITS: 100 INJECTION, SOLUTION INTRAVENOUS; SUBCUTANEOUS at 11:37

## 2023-10-09 RX ADMIN — GABAPENTIN 300 MG: 300 CAPSULE ORAL at 20:19

## 2023-10-09 RX ADMIN — SODIUM CHLORIDE, PRESERVATIVE FREE 10 ML: 5 INJECTION INTRAVENOUS at 20:19

## 2023-10-09 RX ADMIN — GABAPENTIN 300 MG: 300 CAPSULE ORAL at 08:23

## 2023-10-09 RX ADMIN — ATORVASTATIN CALCIUM 10 MG: 10 TABLET, FILM COATED ORAL at 20:19

## 2023-10-09 RX ADMIN — PREDNISONE 20 MG: 20 TABLET ORAL at 20:19

## 2023-10-09 RX ADMIN — SODIUM CHLORIDE, PRESERVATIVE FREE 10 ML: 5 INJECTION INTRAVENOUS at 08:23

## 2023-10-09 RX ADMIN — DOXAZOSIN 4 MG: 4 TABLET ORAL at 08:24

## 2023-10-09 RX ADMIN — ENOXAPARIN SODIUM 40 MG: 100 INJECTION SUBCUTANEOUS at 17:07

## 2023-10-09 RX ADMIN — INSULIN LISPRO 8 UNITS: 100 INJECTION, SOLUTION INTRAVENOUS; SUBCUTANEOUS at 17:07

## 2023-10-09 RX ADMIN — CARVEDILOL 25 MG: 25 TABLET, FILM COATED ORAL at 08:23

## 2023-10-09 RX ADMIN — Medication 1000 MCG: at 08:23

## 2023-10-09 RX ADMIN — ALLOPURINOL 100 MG: 100 TABLET ORAL at 08:23

## 2023-10-09 RX ADMIN — INSULIN GLARGINE 20 UNITS: 100 INJECTION, SOLUTION SUBCUTANEOUS at 20:19

## 2023-10-09 RX ADMIN — PREDNISONE 20 MG: 20 TABLET ORAL at 08:24

## 2023-10-09 ASSESSMENT — PAIN SCALES - GENERAL
PAINLEVEL_OUTOF10: 0

## 2023-10-09 NOTE — PROGRESS NOTES
70-year-old male patient who presents to [facility] with a diagnosis of shortness of breath. Pt demonstrates decreased ROM, decreased strength, decreased endurance, increased SOB, and decreased balance. Pt requires skilled OT intervention to address all self-cares and functional mobility to increase indep with ADL routines and return to PLOF. Without skilled OT intervention, pt is at risk for falls, increased caregiver burden, and readmission to the hospital after discharge. Pt would benefit from being discharged to his home with assistance PRN from his family members. Performance deficits / Impairments: Decreased functional mobility , Decreased ADL status, Decreased ROM, Decreased strength, Decreased endurance, Decreased high-level IADLs, Decreased balance  Prognosis: Good  REQUIRES OT FOLLOW-UP: Yes  Decision Making: Low Complexity    Treatment Initiated: Treatment and education initiated within context of evaluation. Evaluation time included review of current medical information, gathering information related to past medical, social and functional history, completion of standardized testing, formal and informal observation of tasks, assessment of data and development of plan of care and goals. Treatment time included skilled education and facilitation of tasks to increase safety and independence with ADL's for improved functional independence and quality of life. Discharge Recommendations:  Home with assist PRN    Patient Education:     Patient Education  Education Given To: Patient  Education Provided: Role of Therapy, Plan of Care, ADL Adaptive Strategies, Energy Conservation, Fall Prevention Strategies  Education Method: Demonstration, Verbal  Barriers to Learning: None  Education Outcome: Verbalized understanding, Demonstrated understanding    Equipment Recommendations:  Equipment Needed: No    Plan:  Times Per Week: 3-5x  Times Per Day:  Once a day  Current Treatment Recommendations: Strengthening, Balance training, ROM, Functional mobility training, Endurance training, Patient/Caregiver education & training, Home management training, Self-Care / ADL. See long-term goal time frame for expected duration of plan of care. If no long-term goals established, a short length of stay is anticipated. Goals:  Patient goals : Return to PLOF at home  Short Term Goals  Time Frame for Short Term Goals: Until discharge  Short Term Goal 1: Pt will functionally ambulate HH distances with no LOB and Supervision to increase indep with ADLs. Short Term Goal 2: Pt will tolerate dynamic standing x 5 minutes with Supervision to increase endurance for sinkside grooming. Short Term Goal 3: Pt will complete BADL routine with Supervision to increase quality of life at home. Short Term Goal 4: Pt will complete UE moderate resistive exercises x 10 reps with min cues for proper technique to increase strength for the completion of all self-cares. Additional Goals?: No         Following session, patient left in safe position with all fall risk precautions in place.

## 2023-10-09 NOTE — PROGRESS NOTES
Hospitalist Progress Note      Patient:  Melanie Marquez    Unit/Bed:7K-17/017-A  YOB: 1955  MRN: [id number]   Acct: [de-identified]   PCP: FELI Coronado CNP  Date of Admission: 10/7/2023    Assessment/Plan:  Fall, decreased mobility:  Pt sustained an unwitnessed fall at home- no LOC. Pt hemodynamically stable. CT head and neck today without any acute injury. PT/OT. Nursing reports concerns with patient mobility. PT/OT recommending home with assistance as needed. Current with [facility]. Diastolic heart failure, recovered EF, suspected mild acute exacerbation: Pt noted to have SpO2 low 90's-88% in ED. Pt endorses 4 lb weight gain unexpected in last 4-5 days. Last ECHO 3/2023 refveals EF 55-60% with grade 1 diastolic dysfunction. CXR on admission shows mild increased interstitial markings. Given Bumex 2mg in ED. additional dose of Bumex 2 mg given 10/8. Daily weights, I&Os. Telemetry. Limited echo completed but not yet read. Will resume home Lasix dose today. No  signs of volume overload on exam.  HALEIGH on CKD: Creatinine 2.3 on admission, baseline appears to be 1.7-1.9. Suspect cardiorenal syndrome. Patient was given 2 doses of IV Bumex. Creatinine this morning 2.1. Resuming home Lasix today. Repeat BMP in the morning. Elevated troponin: High-sensitivity troponin elevated at 158, increased to 184. EKG and repeat EKG without acute ischemic changes compared to April. Patient denies any chest pain. Does endorse shortness of breath which he states is at baseline from COPD. Patient has history of nonobstructive CAD, last [facility] in 2016. Stress test in July 2022 showed a fixed inferior wall perfusion defect, no reversible ischemia, per Dr. Pamela Vegas last progress note. Discussed with cardiology. Will check limited echocardiogram.  If significant changes we will plan to consult cardiology inpatient.   Otherwise

## 2023-10-09 NOTE — PROGRESS NOTES
Comprehensive Nutrition Assessment    Type and Reason for Visit:  Initial, Positive Nutrition Screen, Wound    Nutrition Recommendations/Plan:   Recommend MVI  Continue 3 CHO diet per provider  Start ONS: Baltazar - BID fruit punch flavor to promote wound healing efforts  Will monitor need for additional nutrition interventions. Malnutrition Assessment:  Malnutrition Status: At risk for malnutrition (Comment) (10/09/23 4815)    Context:  Acute Illness     Findings of the 6 clinical characteristics of malnutrition:  Energy Intake:  No significant decrease in energy intake  Weight Loss:  No significant weight loss     Body Fat Loss:  No significant body fat loss     Muscle Mass Loss:  No significant muscle mass loss    Fluid Accumulation:  Moderate to Severe Extremities   Strength:  Not Performed    Nutrition Assessment:     Pt. nutritionally compromised AEB wounds. At risk for further nutrition compromise r/t increased nutrient needs for wound healing, underlying medical condition (PMHx: CAD, CHF, DM, diverticulosis - s/p colectomy 2001, hx/o tobacco abuse, HLD, HTN). Nutrition Related Findings:    Pt. Report/Treatments/Miscellaneous: Pt seen, reports no concerns with appetite PTA or now. Pt agreed to trying Baltazar to promote wound healing efforts - discussed increased needs. Pt reports no nutrition related questions or concerns at this time. GI Status: No BM documented  Pertinent Labs: BUN 41, Creatinine 2.1, eGFR 33, Glucose 258  Pertinent Meds: lipitor, bumex, coreg, lasix, lantus, humalog, protonix, prednisone, Vitamin B12     Wound Type:  (R leg antiror, R posterior tibial, redness/excoriation - groin/abdomen, abrasion - forehead & arms, stage II pressure ulcer - buttocks)       Current Nutrition Intake & Therapies:    Average Meal Intake: %  Average Supplements Intake: None Ordered  ADULT DIET; Regular; 3 carb choices (45 gm/meal);  Low Sodium (2 gm); 2000 ml  ADULT ORAL NUTRITION

## 2023-10-10 LAB
ANION GAP SERPL CALC-SCNC: 14 MEQ/L (ref 8–16)
BASOPHILS ABSOLUTE: 0 THOU/MM3 (ref 0–0.1)
BASOPHILS NFR BLD AUTO: 0.3 %
BUN SERPL-MCNC: 46 MG/DL (ref 7–22)
CALCIUM SERPL-MCNC: 8.4 MG/DL (ref 8.5–10.5)
CHLORIDE SERPL-SCNC: 97 MEQ/L (ref 98–111)
CO2 SERPL-SCNC: 28 MEQ/L (ref 23–33)
CREAT SERPL-MCNC: 2.2 MG/DL (ref 0.4–1.2)
DEPRECATED RDW RBC AUTO: 47.5 FL (ref 35–45)
EOSINOPHIL NFR BLD AUTO: 0 %
EOSINOPHILS ABSOLUTE: 0 THOU/MM3 (ref 0–0.4)
ERYTHROCYTE [DISTWIDTH] IN BLOOD BY AUTOMATED COUNT: 14 % (ref 11.5–14.5)
GFR SERPL CREATININE-BSD FRML MDRD: 31 ML/MIN/1.73M2
GLUCOSE BLD STRIP.AUTO-MCNC: 220 MG/DL (ref 70–108)
GLUCOSE BLD STRIP.AUTO-MCNC: 222 MG/DL (ref 70–108)
GLUCOSE BLD STRIP.AUTO-MCNC: 270 MG/DL (ref 70–108)
GLUCOSE BLD STRIP.AUTO-MCNC: 317 MG/DL (ref 70–108)
GLUCOSE SERPL-MCNC: 249 MG/DL (ref 70–108)
HCT VFR BLD AUTO: 27.2 % (ref 42–52)
HGB BLD-MCNC: 8.9 GM/DL (ref 14–18)
IMM GRANULOCYTES # BLD AUTO: 0.35 THOU/MM3 (ref 0–0.07)
IMM GRANULOCYTES NFR BLD AUTO: 4.7 %
LYMPHOCYTES ABSOLUTE: 0.9 THOU/MM3 (ref 1–4.8)
LYMPHOCYTES NFR BLD AUTO: 12.3 %
MAGNESIUM SERPL-MCNC: 1.8 MG/DL (ref 1.6–2.4)
MCH RBC QN AUTO: 31.4 PG (ref 26–33)
MCHC RBC AUTO-ENTMCNC: 32.7 GM/DL (ref 32.2–35.5)
MCV RBC AUTO: 96.1 FL (ref 80–94)
MONOCYTES ABSOLUTE: 0.7 THOU/MM3 (ref 0.4–1.3)
MONOCYTES NFR BLD AUTO: 9.2 %
NEUTROPHILS NFR BLD AUTO: 73.5 %
NRBC BLD AUTO-RTO: 2 /100 WBC
PLATELET # BLD AUTO: 371 THOU/MM3 (ref 130–400)
PMV BLD AUTO: 10 FL (ref 9.4–12.4)
POTASSIUM SERPL-SCNC: 3.4 MEQ/L (ref 3.5–5.2)
RBC # BLD AUTO: 2.83 MILL/MM3 (ref 4.7–6.1)
REASON FOR REJECTION: NORMAL
REJECTED TEST: NORMAL
SEGMENTED NEUTROPHILS ABSOLUTE COUNT: 5.5 THOU/MM3 (ref 1.8–7.7)
SODIUM SERPL-SCNC: 139 MEQ/L (ref 135–145)
WBC # BLD AUTO: 7.5 THOU/MM3 (ref 4.8–10.8)

## 2023-10-10 PROCEDURE — 6370000000 HC RX 637 (ALT 250 FOR IP): Performed by: INTERNAL MEDICINE

## 2023-10-10 PROCEDURE — 83735 ASSAY OF MAGNESIUM: CPT

## 2023-10-10 PROCEDURE — 36415 COLL VENOUS BLD VENIPUNCTURE: CPT

## 2023-10-10 PROCEDURE — 97110 THERAPEUTIC EXERCISES: CPT

## 2023-10-10 PROCEDURE — 85025 COMPLETE CBC W/AUTO DIFF WBC: CPT

## 2023-10-10 PROCEDURE — 6370000000 HC RX 637 (ALT 250 FOR IP)

## 2023-10-10 PROCEDURE — 94640 AIRWAY INHALATION TREATMENT: CPT

## 2023-10-10 PROCEDURE — 2580000003 HC RX 258

## 2023-10-10 PROCEDURE — 6360000002 HC RX W HCPCS

## 2023-10-10 PROCEDURE — 97116 GAIT TRAINING THERAPY: CPT

## 2023-10-10 PROCEDURE — 1200000000 HC SEMI PRIVATE

## 2023-10-10 PROCEDURE — 82948 REAGENT STRIP/BLOOD GLUCOSE: CPT

## 2023-10-10 PROCEDURE — 99232 SBSQ HOSP IP/OBS MODERATE 35: CPT | Performed by: INTERNAL MEDICINE

## 2023-10-10 PROCEDURE — 80048 BASIC METABOLIC PNL TOTAL CA: CPT

## 2023-10-10 RX ORDER — INSULIN GLARGINE 100 [IU]/ML
25 INJECTION, SOLUTION SUBCUTANEOUS NIGHTLY
Status: DISCONTINUED | OUTPATIENT
Start: 2023-10-10 | End: 2023-10-11 | Stop reason: HOSPADM

## 2023-10-10 RX ORDER — INSULIN LISPRO 100 [IU]/ML
0-4 INJECTION, SOLUTION INTRAVENOUS; SUBCUTANEOUS NIGHTLY
Status: DISCONTINUED | OUTPATIENT
Start: 2023-10-10 | End: 2023-10-11 | Stop reason: HOSPADM

## 2023-10-10 RX ORDER — INSULIN LISPRO 100 [IU]/ML
0-16 INJECTION, SOLUTION INTRAVENOUS; SUBCUTANEOUS
Status: DISCONTINUED | OUTPATIENT
Start: 2023-10-10 | End: 2023-10-11 | Stop reason: HOSPADM

## 2023-10-10 RX ADMIN — GABAPENTIN 300 MG: 300 CAPSULE ORAL at 08:07

## 2023-10-10 RX ADMIN — PANTOPRAZOLE SODIUM 40 MG: 40 TABLET, DELAYED RELEASE ORAL at 05:39

## 2023-10-10 RX ADMIN — GABAPENTIN 300 MG: 300 CAPSULE ORAL at 20:32

## 2023-10-10 RX ADMIN — ENOXAPARIN SODIUM 40 MG: 100 INJECTION SUBCUTANEOUS at 17:04

## 2023-10-10 RX ADMIN — DOXAZOSIN 4 MG: 4 TABLET ORAL at 08:06

## 2023-10-10 RX ADMIN — Medication 1000 MCG: at 08:07

## 2023-10-10 RX ADMIN — INSULIN LISPRO 2 UNITS: 100 INJECTION, SOLUTION INTRAVENOUS; SUBCUTANEOUS at 12:03

## 2023-10-10 RX ADMIN — PREDNISONE 20 MG: 20 TABLET ORAL at 20:32

## 2023-10-10 RX ADMIN — PREDNISONE 20 MG: 20 TABLET ORAL at 08:06

## 2023-10-10 RX ADMIN — INSULIN LISPRO 4 UNITS: 100 INJECTION, SOLUTION INTRAVENOUS; SUBCUTANEOUS at 17:00

## 2023-10-10 RX ADMIN — CARVEDILOL 25 MG: 25 TABLET, FILM COATED ORAL at 17:01

## 2023-10-10 RX ADMIN — INSULIN LISPRO 4 UNITS: 100 INJECTION, SOLUTION INTRAVENOUS; SUBCUTANEOUS at 14:29

## 2023-10-10 RX ADMIN — FUROSEMIDE 40 MG: 40 TABLET ORAL at 08:06

## 2023-10-10 RX ADMIN — ASPIRIN 81 MG: 81 TABLET, COATED ORAL at 08:09

## 2023-10-10 RX ADMIN — FUROSEMIDE 40 MG: 40 TABLET ORAL at 17:00

## 2023-10-10 RX ADMIN — CARVEDILOL 25 MG: 25 TABLET, FILM COATED ORAL at 08:07

## 2023-10-10 RX ADMIN — INSULIN LISPRO 4 UNITS: 100 INJECTION, SOLUTION INTRAVENOUS; SUBCUTANEOUS at 20:32

## 2023-10-10 RX ADMIN — ATORVASTATIN CALCIUM 10 MG: 10 TABLET, FILM COATED ORAL at 20:32

## 2023-10-10 RX ADMIN — INSULIN GLARGINE 25 UNITS: 100 INJECTION, SOLUTION SUBCUTANEOUS at 20:32

## 2023-10-10 RX ADMIN — SODIUM CHLORIDE, PRESERVATIVE FREE 10 ML: 5 INJECTION INTRAVENOUS at 08:07

## 2023-10-10 RX ADMIN — INSULIN LISPRO 4 UNITS: 100 INJECTION, SOLUTION INTRAVENOUS; SUBCUTANEOUS at 08:07

## 2023-10-10 RX ADMIN — TIOTROPIUM BROMIDE AND OLODATEROL 2 PUFF: 3.124; 2.736 SPRAY, METERED RESPIRATORY (INHALATION) at 10:42

## 2023-10-10 RX ADMIN — SODIUM CHLORIDE, PRESERVATIVE FREE 10 ML: 5 INJECTION INTRAVENOUS at 20:32

## 2023-10-10 RX ADMIN — ALLOPURINOL 100 MG: 100 TABLET ORAL at 08:09

## 2023-10-10 NOTE — PROGRESS NOTES
[facility] Wound Ostomy Continence Nurse  Progress Note       Josey Felder  AGE: 77 y.o. GENDER: male  : 1955  UNIT: 7K-17/017-A  TODAY'S DATE:  10/10/2023  ADMISSION DATE: 10/7/2023  8:58 AM  Subjective:     Reason for [facility] Evaluation and Assessment: bilateral legs and right buttock      Josey Felder is a 77 y.o. male referred by:   [] Physician/PA/APRN  [] Nursing  [] Other:     Wound Identification:  Wound Type: venous and pressure  Contributing Factors: diabetes, poor glucose control, and decreased mobility    Objective:     Abel Risk Score: Abel Scale Score: 19    Assessment:     Encounter: Present to pt room for buttocks and legs wounds. Pt states he has dressings on legs changed at home per home health. States his dressings have not been changed since at home Friday prior to admission. Removed old dressings. Cleansed wound with normal saline and gauze. Pat dry with clean gauze. Soaked old opticell to remove. Stuck to patient very well. Applied mepitel ag to wounds. Covered with ABD pads and wrapped with kerlix and ACE. Pt previously had legs wrapped with kerlix and ACE, will need ACE order per provider if not already in. Pt pedal pulses present. Pitting edema to BLE. Cap refill less than 3 seconds. Pt stands up for assessment of buttocks wound. Wound photo and assessment below. Cleansed wound with normal saline and gauze. Pat dry with clean gauze. Applied triad to area. Pt will need waffle cushion in chair and hercules bed if he will stay.        Wound type: right lower leg venous wound  Wound size: 9cm x 5cm x 0.05cm  Undermining or Tunneling: none  Wound assessment/color: red  Drainage amount: scant  Drainage description: serous  Odor: none  Margins: attached  Loren wound: intact  Exposed structure: none      Wound type: left lower leg venous wound  Wound size: 8cm x 3.5cm x 0.05cm  Undermining or Tunneling: none  Wound assessment/color: yellow  Drainage amount: moderate  Drainage description: serous  Odor: none  Margins: attached  Loren wound: intact  Exposed structure: none      Wound type: right buttock stage 3 PI POA  Wound size: 0.5cm x 1cm x 0.05cm  Undermining or Tunneling: none  Wound assessment/color: pink, yellow  Drainage amount: scant  Drainage description: serosanguinous  Odor: none  Margins: attached  Loren wound: erythema  Exposed structure: none            Plan:     Treatment Recommendations:   Right buttock: Cleanse wound with normal saline or wound cleanser and gauze. Pat dry with clean gauze. Apply triad each shift and as needed. Encourage pt to offload. Bilateral lower legs: Cleanse wound with normal saline or wound cleanser and gauze. Pat dry with clean gauze. Apply mepitel ag to wound beds, cover with ABD pads, wrap with kerlix, followed by ACE wraps (order needed per provider) from base of toes to below bend of knee. Change dressings every other day.     Specialty Bed Required : NEEDS hercules bed  [] Low Air Loss   [] Pressure Redistribution  [] Fluid Immersion- Dolphin  [] Bariatric  [] RotoProne   [] Other:     Discharge Plan:  Placement for patient upon discharge: unknown  Patient appropriate for Outpatient [address]: yes

## 2023-10-10 NOTE — PROGRESS NOTES
Hospitalist Progress Note      Patient:  Meldon Dancer    Unit/Bed:7K-17/017-A  YOB: 1955  MRN: [id number]   Acct: [de-identified]   PCP: FELI Bellamy CNP  Date of Admission: 10/7/2023    Assessment/Plan:  Fall, decreased mobility:  Pt sustained an unwitnessed fall at home- no LOC. Pt hemodynamically stable. CT head and neck today without any acute injury. PT/OT. Nursing reports concerns with patient mobility. PT/OT recommending home with assistance as needed. Current with [facility]. Diastolic heart failure, recovered EF, suspected mild acute exacerbation: Pt noted to have SpO2 low 90's-88% in ED. Pt endorses 4 lb weight gain unexpected in last 4-5 days. Last ECHO 3/2023 refveals EF 55-60% with grade 1 diastolic dysfunction. CXR on admission shows mild increased interstitial markings. Given Bumex 2mg in ED. additional dose of Bumex 2 mg given 10/8. Daily weights, I&Os. Telemetry. Limited echo completed but not yet read. Will resume home Lasix dose today. No  signs of volume overload on exam.  HALEIGH on CKD: Creatinine 2.3 on admission, baseline appears to be 1.7-1.9. Suspect cardiorenal syndrome. Patient was given 2 doses of IV Bumex. Creatinine noted today , consider nephrology        Consult in am if creatinine remains on the higher side. Elevated troponin: High-sensitivity troponin elevated at 158, increased to 184. EKG and repeat EKG without acute ischemic changes compared to April. Patient denies any chest pain. Does endorse shortness of breath which he states is at baseline from COPD. Patient has history of nonobstructive CAD, last [facility] in 2016. Stress test in July 2022 showed a fixed inferior wall perfusion defect, no reversible ischemia, per Dr. Melo Lopez last progress note. Discussed with cardiology.   Will check limited echocardiogram.  If significant changes we will plan to consult cardiology INFORMATION: Fall. COMPARISON: Chest x-ray dated 4/3/2023. TECHNIQUE: AP upright view of the chest. FINDINGS: There is cardiomegaly. .The mediastinum is not widened. There is diffuse COPD with no new pulmonary infiltrates or effusions. The pulmonary vascularity is normal. There is no displaced rib fracture. There is no pneumothorax. 1. No interval change since previous study dated 4/3/2023. Iris Alcantar **This report has been created using voice recognition software. It may contain minor errors which are inherent in voice recognition technology. ** Final report electronically signed by DR Adia Carlos on 10/7/2023 10:08 AM      Electronically signed by Rosario Maynard MD on 10/10/2023 at 12:26 PM

## 2023-10-10 NOTE — PROGRESS NOTES
[facility]  Occupational Therapy  Daily Note  Time:   Time In: 9414  Time Out: 4520  Timed Code Treatment Minutes: 13 Minutes  Minutes: 13          Date: 10/10/2023  Patient Name: Matthew Atkins,   Gender: male      Room: -17/017-A  MRN: [id number]  : 1955  (77 y.o.)  Referring Practitioner: Pool Cabrera PA-C  Diagnosis: Shortness of breath  Additional Pertinent Hx: Per H&P, Laron Vu is a 68-year-old  male with a past medical history of CAD, CHF, T2DM, SRAVAN who presents to [facility] ED for evaluation after he sustained a fall at home which was unwitnessed. Patient states he was getting back into his chair and did not hit the seat of the chair, instead he hit the arm of the chair for which caused him to become off balance and fall forward and hit his head. Patient denies loss of consciousness and remembers the whole event. Patient was unable to get up by himself, he required the assistance of his wife who then called the squad. Patient reports she has otherwise been feeling well, recently had a 2-day course of metolazone on top of his normal diuretics due to some swelling in his legs. Patient at this time denies chest pain, shortness of breath, lightheadedness or dizziness, headache, vision changes or any other pain at this time. He denies any recent illness and reports he has been taking medications as prescribed. \"    Restrictions/Precautions:  Restrictions/Precautions: General Precautions, Fall Risk     SUBJECTIVE: RN approved session. Pt sitting in bedside chair upon arrival. Pt pleasant and agreeable to OT session. PAIN: No c/o    Vitals: Vitals not assessed per clinical judgement, see nursing flowsheet    COGNITION: WFL    ADL:   No ADL's completed this session. [facility] BALANCE:  Sitting Balance:  Modified Independent. Seated in bedside chair  Standing Balance: Stand By Assistance.  With BUE supported on RW    BED MOBILITY:  Not

## 2023-10-10 NOTE — PROGRESS NOTES
[facility]  INPATIENT PHYSICAL THERAPY  DAILY NOTE  [facility] ORTHOPEDICS 7K - 7K-17/017-A    Time In: 8052  Time Out: 5120  Timed Code Treatment Minutes: 25 Minutes  Minutes: 25          Date: 10/10/2023  Patient Name: Gabriela Moritz,  Gender:  male        MRN: [id number]  : 1955  (77 y.o.)     Referring Practitioner: Cynthia Cochran PA-C  Diagnosis: Shortness of breath  Additional Pertinent Hx: 77-year-old  male with a past medical history of CAD, CHF, T2DM, SRAVAN who presents to [facility] ED for evaluation after he sustained a fall at home which was unwitnessed. Patient states he was getting back into his chair and did not hit the seat of the chair, instead he hit the arm of the chair for which caused him to become off balance and fall forward and hit his head. Patient denies loss of consciousness and remembers the whole event. Patient was unable to get up by himself, he required the assistance of his wife who then called the squad. Patient reports she has otherwise been feeling well, recently had a 2-day course of metolazone on top of his normal diuretics due to some swelling in his legs. Prior Level of Function:  Lives With: Spouse, Son  Type of Home: House  Home Layout: One level  Home Access: Stairs to enter with rails  Entrance Stairs - Number of Steps: 2  Entrance Stairs - Rails: Left  Home Equipment: Walker, rolling   Bathroom Shower/Tub: Walk-in shower  Bathroom Toilet: Standard  Bathroom Equipment: Shower chair, Grab bars in shower  Bathroom Accessibility: Walker accessible    ADL Assistance: Independent  Homemaking Assistance: Independent  Ambulation Assistance: Independent  Transfer Assistance: Independent  Active :  Yes  Additional Comments: ind with occasional use of RW    Restrictions/Precautions:  Restrictions/Precautions: General Precautions, Fall Risk     SUBJECTIVE: Pt in recliner upon arrival, and agrees to therapy, RN approved session, Pt A&O X , Pt

## 2023-10-11 VITALS
BODY MASS INDEX: 37.85 KG/M2 | TEMPERATURE: 98 F | HEART RATE: 66 BPM | SYSTOLIC BLOOD PRESSURE: 132 MMHG | HEIGHT: 63 IN | RESPIRATION RATE: 18 BRPM | OXYGEN SATURATION: 96 % | DIASTOLIC BLOOD PRESSURE: 68 MMHG | WEIGHT: 213.63 LBS

## 2023-10-11 LAB
ANION GAP SERPL CALC-SCNC: 14 MEQ/L (ref 8–16)
BUN SERPL-MCNC: 46 MG/DL (ref 7–22)
CALCIUM SERPL-MCNC: 8.1 MG/DL (ref 8.5–10.5)
CHLORIDE SERPL-SCNC: 98 MEQ/L (ref 98–111)
CO2 SERPL-SCNC: 30 MEQ/L (ref 23–33)
CREAT SERPL-MCNC: 2 MG/DL (ref 0.4–1.2)
DEPRECATED MEAN GLUCOSE BLD GHB EST-ACNC: 201 MG/DL (ref 70–126)
GFR SERPL CREATININE-BSD FRML MDRD: 35 ML/MIN/1.73M2
GLUCOSE BLD STRIP.AUTO-MCNC: 241 MG/DL (ref 70–108)
GLUCOSE BLD STRIP.AUTO-MCNC: 241 MG/DL (ref 70–108)
GLUCOSE BLD STRIP.AUTO-MCNC: 328 MG/DL (ref 70–108)
GLUCOSE SERPL-MCNC: 234 MG/DL (ref 70–108)
HBA1C MFR BLD HPLC: 8.7 % (ref 4.4–6.4)
MAGNESIUM SERPL-MCNC: 1.7 MG/DL (ref 1.6–2.4)
POTASSIUM SERPL-SCNC: 2.9 MEQ/L (ref 3.5–5.2)
POTASSIUM SERPL-SCNC: 3.6 MEQ/L (ref 3.5–5.2)
SODIUM SERPL-SCNC: 142 MEQ/L (ref 135–145)

## 2023-10-11 PROCEDURE — 94640 AIRWAY INHALATION TREATMENT: CPT

## 2023-10-11 PROCEDURE — 6370000000 HC RX 637 (ALT 250 FOR IP)

## 2023-10-11 PROCEDURE — 84132 ASSAY OF SERUM POTASSIUM: CPT

## 2023-10-11 PROCEDURE — 6360000002 HC RX W HCPCS

## 2023-10-11 PROCEDURE — 80048 BASIC METABOLIC PNL TOTAL CA: CPT

## 2023-10-11 PROCEDURE — 83735 ASSAY OF MAGNESIUM: CPT

## 2023-10-11 PROCEDURE — 83036 HEMOGLOBIN GLYCOSYLATED A1C: CPT

## 2023-10-11 PROCEDURE — 6370000000 HC RX 637 (ALT 250 FOR IP): Performed by: INTERNAL MEDICINE

## 2023-10-11 PROCEDURE — 36415 COLL VENOUS BLD VENIPUNCTURE: CPT

## 2023-10-11 PROCEDURE — 82948 REAGENT STRIP/BLOOD GLUCOSE: CPT

## 2023-10-11 PROCEDURE — 2580000003 HC RX 258

## 2023-10-11 RX ADMIN — ASPIRIN 81 MG: 81 TABLET, COATED ORAL at 07:52

## 2023-10-11 RX ADMIN — POTASSIUM CHLORIDE 10 MEQ: 7.46 INJECTION, SOLUTION INTRAVENOUS at 13:41

## 2023-10-11 RX ADMIN — POTASSIUM CHLORIDE 10 MEQ: 7.46 INJECTION, SOLUTION INTRAVENOUS at 11:25

## 2023-10-11 RX ADMIN — POTASSIUM CHLORIDE 10 MEQ: 7.46 INJECTION, SOLUTION INTRAVENOUS at 07:50

## 2023-10-11 RX ADMIN — POTASSIUM CHLORIDE 10 MEQ: 7.46 INJECTION, SOLUTION INTRAVENOUS at 10:20

## 2023-10-11 RX ADMIN — Medication 1000 MCG: at 07:56

## 2023-10-11 RX ADMIN — POTASSIUM CHLORIDE 10 MEQ: 7.46 INJECTION, SOLUTION INTRAVENOUS at 09:19

## 2023-10-11 RX ADMIN — AMLODIPINE BESYLATE 10 MG: 10 TABLET ORAL at 07:57

## 2023-10-11 RX ADMIN — ALLOPURINOL 100 MG: 100 TABLET ORAL at 07:57

## 2023-10-11 RX ADMIN — FUROSEMIDE 40 MG: 40 TABLET ORAL at 16:53

## 2023-10-11 RX ADMIN — INSULIN LISPRO 4 UNITS: 100 INJECTION, SOLUTION INTRAVENOUS; SUBCUTANEOUS at 11:23

## 2023-10-11 RX ADMIN — INSULIN LISPRO 4 UNITS: 100 INJECTION, SOLUTION INTRAVENOUS; SUBCUTANEOUS at 07:52

## 2023-10-11 RX ADMIN — SODIUM CHLORIDE, PRESERVATIVE FREE 10 ML: 5 INJECTION INTRAVENOUS at 07:59

## 2023-10-11 RX ADMIN — GABAPENTIN 300 MG: 300 CAPSULE ORAL at 07:59

## 2023-10-11 RX ADMIN — TIOTROPIUM BROMIDE AND OLODATEROL 2 PUFF: 3.124; 2.736 SPRAY, METERED RESPIRATORY (INHALATION) at 08:58

## 2023-10-11 RX ADMIN — PANTOPRAZOLE SODIUM 40 MG: 40 TABLET, DELAYED RELEASE ORAL at 05:27

## 2023-10-11 RX ADMIN — FUROSEMIDE 40 MG: 40 TABLET ORAL at 07:57

## 2023-10-11 RX ADMIN — INSULIN LISPRO 12 UNITS: 100 INJECTION, SOLUTION INTRAVENOUS; SUBCUTANEOUS at 16:58

## 2023-10-11 RX ADMIN — CARVEDILOL 25 MG: 25 TABLET, FILM COATED ORAL at 17:00

## 2023-10-11 RX ADMIN — POTASSIUM CHLORIDE 10 MEQ: 7.46 INJECTION, SOLUTION INTRAVENOUS at 12:37

## 2023-10-11 RX ADMIN — CARVEDILOL 25 MG: 25 TABLET, FILM COATED ORAL at 07:56

## 2023-10-11 RX ADMIN — DOXAZOSIN 4 MG: 4 TABLET ORAL at 07:58

## 2023-10-11 RX ADMIN — PREDNISONE 20 MG: 20 TABLET ORAL at 07:57

## 2023-10-11 NOTE — CARE COORDINATION
10/10/23, 3:02 PM EDT    DISCHARGE ON GOING EVALUATION    Kell Lawson day: 1  Location: -17/017-A Reason for admit: Shortness of breath [R06.02]  HALEIGH (acute kidney injury) ([address]) [N17.9]   Procedure:   10/7 CXR: No interval change since previous study dated 4/3/2023  10/7 CT Head WO: Stable CT scan of the brain, no interval change since previous study dated 10/6/2022  10/7 CT Cervical Spine WO: Stable CT scan of the cervical spine, no interval change since previous study dated 10/6/2022. 2. Straightening of the normal cervical lordosis and cervical spondylosis especially at C5-6, C6-C7, C7-T1 and C4-5. 3. No acute fracture noted  10/9 ECHO: ef 60-65%  Barriers to Discharge: Hospitalist following. PT/OT. Wound nurse. K+ 3.4. Creatinine 2.2. Norvasc. ASA. Lipitor. Coreg. Cardura. Lasix. Lovenox. Prednisone. Lantus. SSI. PCP: FELI Soria CNP  Readmission Risk Score: 20%  Patient Goals/Plan/Treatment Preferences: Plans to return home with wife and son. Current with [facility] for nursing. Has dme and cpap from  [facility].
10/11/23, 11:22 AM EDT    Patient goals/plan/ treatment preferences discussed by  and . Patient goals/plan/ treatment preferences reviewed with patient/ family. Patient/ family verbalize understanding of discharge plan and are in agreement with goal/plan/treatment preferences. Understanding was demonstrated using the teach back method. AVS provided by RN at time of discharge, which includes all necessary medical information pertaining to the patients current course of illness, treatment, post-discharge goals of care, and treatment preferences. Services At/After Discharge: [address] home with wife and son. Current with [facility] for nursing. This CM called and spoke with Kristen to notify of plan for discharge today.         IMM Letter  IMM Letter given to Patient/Family/Significant other/Guardian/POA/by[de-identified] Ami VELA CM  IMM Letter date given[de-identified] 10/09/23  IMM Letter time given[de-identified] 1107  Observation Status Letter date given[de-identified] 10/07/23  Observation Status Letter time given[de-identified] 0145  Observation Status Letter given to Patient/Family/Significant other/Guardian/POA/by[de-identified] pt acceess
10/9/23, 1:14 PM EDT    DISCHARGE PLANNING EVALUATION     Patient is current with [facility], plans home with family.
kidney injury) [facility]) [N17.9]    Patient Goals/Plan/Treatment Preferences: Met with Raymond Burrell. Verified PCP and insurance. He is able to afford medications. Raymond Burrell is independent at home and actively drives. Has a rolling walker and shower chair. Cpap through [facility]. Current with [facility] nursing for wound care. This CM called [facility] and verified services with [facility]. Raymond Burrell plans to return home with his wife and son at discharge. Declines needs. Transportation/Food Security/Housekeeping Addressed: No issues identified.      Jose Payan RN  Case Management Department

## 2023-10-11 NOTE — PROGRESS NOTES
Went over discharge instructions, including medication instructions with Alecbrian Gandhiin and his wife. Both verbalized understanding. Also went over follow up appointments. Discharged with belongings, AVS and wife to car.

## 2023-10-11 NOTE — PLAN OF CARE
Consult for home health, please see progress note 10/09/23
Problem: Discharge Planning  Goal: Discharge to home or other facility with appropriate resources  10/11/2023 0130 by Skyler Haider RN  Outcome: Progressing  Flowsheets (Taken 10/11/2023 0130)  Discharge to home or other facility with appropriate resources:   Identify barriers to discharge with patient and caregiver   Arrange for needed discharge resources and transportation as appropriate   Identify discharge learning needs (meds, wound care, etc)  10/10/2023 1452 by Kelli Quintana RN  Outcome: Progressing  Flowsheets (Taken 10/10/2023 1452)  Discharge to home or other facility with appropriate resources:   Identify barriers to discharge with patient and caregiver   Arrange for needed discharge resources and transportation as appropriate   Identify discharge learning needs (meds, wound care, etc)   Refer to discharge planning if patient needs post-hospital services based on physician order or complex needs related to functional status, cognitive ability or social support system  Note: Home with [facility]     Problem: Safety - Adult  Goal: Free from fall injury  10/11/2023 0130 by Skyler Haider RN  Outcome: Progressing  Flowsheets  Taken 10/11/2023 0130  Free From Fall Injury: Instruct family/caregiver on patient safety  Taken 10/11/2023 0129  Free From Fall Injury: Instruct family/caregiver on patient safety  10/10/2023 1452 by Kelli Quintana RN  Outcome: Progressing  Flowsheets (Taken 10/10/2023 1447)  Free From Fall Injury: Instruct family/caregiver on patient safety  Note: Pt using call light appropriately to call for assistance with ambulation to the bathroom and to chair. Pt is also compliant with use of non-skid slippers. Pt reports understanding of fall prevention when discussed. Problem: Skin/Tissue Integrity  Goal: Absence of new skin breakdown  Description: 1. Monitor for areas of redness and/or skin breakdown  2. Assess vascular access sites hourly  3.   Every 4-6 hours minimum:  Change
Problem: Discharge Planning  Goal: Discharge to home or other facility with appropriate resources  10/8/2023 1225 by Baltazar Clifford RN  Outcome: Progressing  Flowsheets (Taken 10/8/2023 0023 by Beto Pemberton RN)  Discharge to home or other facility with appropriate resources:   Identify barriers to discharge with patient and caregiver   Arrange for needed discharge resources and transportation as appropriate   Identify discharge learning needs (meds, wound care, etc)     Problem: Safety - Adult  Goal: Free from fall injury  10/8/2023 1225 by Baltazar Clifford RN  Outcome: Progressing  Flowsheets (Taken 10/8/2023 0023 by Beto Pemberton RN)  Free From Fall Injury: Instruct family/caregiver on patient safety     Problem: Skin/Tissue Integrity  Goal: Absence of new skin breakdown  Description: 1. Monitor for areas of redness and/or skin breakdown  2. Assess vascular access sites hourly  3. Every 4-6 hours minimum:  Change oxygen saturation probe site  4. Every 4-6 hours:  If on nasal continuous positive airway pressure, respiratory therapy assess nares and determine need for appliance change or resting period. 10/8/2023 1225 by Baltazar Clifford RN  Outcome: Progressing  Note: No new skin breakdown noted. Chair cushion/hercules bed in use. Patient turns self. Zinc cream for buttocks.  Ulcers to BLE wrapped/intact     Problem: Respiratory - Adult  Goal: Achieves optimal ventilation and oxygenation  10/8/2023 1225 by Baltazar Clifford RN  Outcome: Progressing  Flowsheets (Taken 10/8/2023 0023 by Beto Pemberton RN)  Achieves optimal ventilation and oxygenation:   Assess for changes in respiratory status   Assess for changes in mentation and behavior   Position to facilitate oxygenation and minimize respiratory effort   Oxygen supplementation based on oxygen saturation or arterial blood gases   Encourage broncho-pulmonary hygiene including cough, deep breathe, incentive spirometry   Assess the
Problem: Discharge Planning  Goal: Discharge to home or other facility with appropriate resources  Outcome: Progressing  Flowsheets (Taken 10/8/2023 0023)  Discharge to home or other facility with appropriate resources:   Identify barriers to discharge with patient and caregiver   Arrange for needed discharge resources and transportation as appropriate   Identify discharge learning needs (meds, wound care, etc)     Problem: Safety - Adult  Goal: Free from fall injury  Outcome: Progressing  Flowsheets  Taken 10/8/2023 0023  Free From Fall Injury: Instruct family/caregiver on patient safety  Taken 10/8/2023 0021  Free From Fall Injury: Instruct family/caregiver on patient safety     Problem: Skin/Tissue Integrity  Goal: Absence of new skin breakdown  Description: 1. Monitor for areas of redness and/or skin breakdown  2. Assess vascular access sites hourly  3. Every 4-6 hours minimum:  Change oxygen saturation probe site  4. Every 4-6 hours:  If on nasal continuous positive airway pressure, respiratory therapy assess nares and determine need for appliance change or resting period. Outcome: Progressing  Note: No new skin breakdown noted. See flow sheets.       Problem: Respiratory - Adult  Goal: Achieves optimal ventilation and oxygenation  Outcome: Progressing  Flowsheets (Taken 10/8/2023 0023)  Achieves optimal ventilation and oxygenation:   Assess for changes in respiratory status   Assess for changes in mentation and behavior   Position to facilitate oxygenation and minimize respiratory effort   Oxygen supplementation based on oxygen saturation or arterial blood gases   Encourage broncho-pulmonary hygiene including cough, deep breathe, incentive spirometry   Assess the need for suctioning and aspirate as needed   Assess and instruct to report shortness of breath or any respiratory difficulty   Respiratory therapy support as indicated     Problem: Skin/Tissue Integrity - Adult  Goal: Incisions, wounds, or drain
Problem: Discharge Planning  Goal: Discharge to home or other facility with appropriate resources  Outcome: Progressing  Flowsheets (Taken 10/9/2023 1800)  Discharge to home or other facility with appropriate resources:   Identify barriers to discharge with patient and caregiver   Identify discharge learning needs (meds, wound care, etc)   Refer to discharge planning if patient needs post-hospital services based on physician order or complex needs related to functional status, cognitive ability or social support system   Arrange for needed discharge resources and transportation as appropriate     Problem: Safety - Adult  Goal: Free from fall injury  Outcome: Progressing  Flowsheets (Taken 10/9/2023 1800)  Free From Fall Injury: Instruct family/caregiver on patient safety     Problem: Skin/Tissue Integrity  Goal: Absence of new skin breakdown  Description: 1. Monitor for areas of redness and/or skin breakdown  2. Assess vascular access sites hourly  3. Every 4-6 hours minimum:  Change oxygen saturation probe site  4. Every 4-6 hours:  If on nasal continuous positive airway pressure, respiratory therapy assess nares and determine need for appliance change or resting period.   Outcome: Progressing     Problem: Respiratory - Adult  Goal: Achieves optimal ventilation and oxygenation  10/9/2023 1800 by Kelle Vo RN  Outcome: Progressing  Flowsheets (Taken 10/9/2023 1800)  Achieves optimal ventilation and oxygenation:   Assess for changes in respiratory status   Assess for changes in mentation and behavior   Oxygen supplementation based on oxygen saturation or arterial blood gases   Position to facilitate oxygenation and minimize respiratory effort     Problem: Skin/Tissue Integrity - Adult  Goal: Incisions, wounds, or drain sites healing without S/S of infection  Outcome: Progressing  Flowsheets (Taken 10/9/2023 1800)  Incisions, Wounds, or Drain Sites Healing Without Sign and Symptoms of Infection:
Problem: Discharge Planning  Goal: Discharge to home or other facility with appropriate resources  Outcome: Progressing  Flowsheets (Taken 10/9/2023 1800)  Discharge to home or other facility with appropriate resources:   Identify barriers to discharge with patient and caregiver   Identify discharge learning needs (meds, wound care, etc)   Refer to discharge planning if patient needs post-hospital services based on physician order or complex needs related to functional status, cognitive ability or social support system   Arrange for needed discharge resources and transportation as appropriate     Problem: Safety - Adult  Goal: Free from fall injury  Outcome: Progressing  Flowsheets (Taken 10/9/2023 1800)  Free From Fall Injury: Instruct family/caregiver on patient safety     Problem: Skin/Tissue Integrity  Goal: Absence of new skin breakdown  Description: 1. Monitor for areas of redness and/or skin breakdown  2. Assess vascular access sites hourly  3. Every 4-6 hours minimum:  Change oxygen saturation probe site  4. Every 4-6 hours:  If on nasal continuous positive airway pressure, respiratory therapy assess nares and determine need for appliance change or resting period.   Outcome: Progressing     Problem: Respiratory - Adult  Goal: Achieves optimal ventilation and oxygenation  10/9/2023 1800 by Larry Gorman RN  Outcome: Progressing  Flowsheets (Taken 10/9/2023 1800)  Achieves optimal ventilation and oxygenation:   Assess for changes in respiratory status   Assess for changes in mentation and behavior   Oxygen supplementation based on oxygen saturation or arterial blood gases   Position to facilitate oxygenation and minimize respiratory effort     Problem: Skin/Tissue Integrity - Adult  Goal: Incisions, wounds, or drain sites healing without S/S of infection  Outcome: Progressing  Flowsheets (Taken 10/9/2023 1800)  Incisions, Wounds, or Drain Sites Healing Without Sign and Symptoms of Infection:
Problem: Respiratory - Adult  Goal: Achieves optimal ventilation and oxygenation  10/11/2023 0901 by Ronald Section, RCP  Outcome: Progressing
Problem: Respiratory - Adult  Goal: Achieves optimal ventilation and oxygenation  Outcome: Progressing   Stiolto to maintain clear breath sounds. Patient mutually agreed on goals.
document dressing/incision, wound bed, drain sites and surrounding tissue     Problem: Gastrointestinal - Adult  Goal: Maintains or returns to baseline bowel function  Outcome: Progressing  Problem: Chronic Conditions and Co-morbidities  Goal: Patient's chronic conditions and co-morbidity symptoms are monitored and maintained or improved  Outcome: Progressing  Flowsheets (Taken 10/10/2023 1720)  Care Plan - Patient's Chronic Conditions and Co-Morbidity Symptoms are Monitored and Maintained or Improved:   Monitor and assess patient's chronic conditions and comorbid symptoms for stability, deterioration, or improvement   Collaborate with multidisciplinary team to address chronic and comorbid conditions and prevent exacerbation or deterioration   Update acute care plan with appropriate goals if chronic or comorbid symptoms are exacerbated and prevent overall improvement and discharge     Problem: Nutrition Deficit:  Goal: Optimize nutritional status  Outcome: Progressing  Problem: Hematologic - Adult  Goal: Maintains hematologic stability  Maintains hematologic stability: Assess for signs and symptoms of bleeding or hemorrhage     Nutrient intake appropriate for improving, restoring, or maintaining nutritional needs:   Assess nutritional status and recommend course of action   Monitor oral intake, labs, and treatment plans     Maintains or returns to baseline bowel function:   Assess bowel function   Encourage oral fluids to ensure adequate hydration   Administer ordered medications as needed     Achieves optimal ventilation and oxygenation:   Assess for changes in respiratory status   Assess for changes in mentation and behavior   Oxygen supplementation based on oxygen saturation or arterial blood gases   Position to facilitate oxygenation and minimize respiratory effort   Care plan reviewed with patient and wife. Patient and wife verbalize understanding of the plan of care and contribute to goal setting.

## 2023-10-13 NOTE — PROGRESS NOTES
Physician Progress Note      [id number]  :                       1955  ADMIT DATE:       10/7/2023 8:58 AM  DISCH DATE:        10/11/2023 6:15 PM  RESPONDING  PROVIDER #:        Zina Ortega PA-C          QUERY TEXT:    Patient admitted with acute on chronic diastolic CHF. Noted documentation of   Acute Kidney Injury in H&P. In order to support the diagnosis of HALEIGH, please   include additional clinical indicators in your documentation. ? Or please   document if the diagnosis of HALEIGH has been ruled out after further study. The medical record reflects the following:  Risk Factors: HTN, CKD  Clinical Indicators: on arrival Cr 2.3 GFR 30 BUN 43; previous draw Cr 1.78   BUN 36, which is not 1.5 times the baseline  Treatment: IV fluids, labs, monitoring      Defined by Kidney Disease Improving Global Outcomes (KDIGO) clinical practice   guideline for acute kidney injury:  -Increase in SCr by greater than or equal to 0.3 mg/dl within 48 hours; or  -Increase or decrease in SCr to greater than or equal to 1.5 times baseline,   which is known or presumed to have occurred within the prior 7 days; or  -Urine volume < 0.5ml/kg/h for 6 hours. Options provided:  -- Acute kidney injury evidenced by, Please document evidence as well as a   numerical baseline creatinine, if known. -- Acute kidney injury ruled out after study  -- Other - I will add my own diagnosis  -- Disagree - Not applicable / Not valid  -- Disagree - Clinically unable to determine / Unknown  -- Refer to Clinical Documentation Reviewer    PROVIDER RESPONSE TEXT:    Provider is clinically unable to determine a response to this query.     Query created by: Xin Dyer on 10/11/2023 9:18 AM      Electronically signed by:  Zina Ortega PA-C 10/13/2023 8:48 AM

## 2023-10-16 ENCOUNTER — HOSPITAL ENCOUNTER (OUTPATIENT)
Dept: WOUND CARE | Age: 68
Discharge: HOME | End: 2023-10-16
Payer: MEDICARE

## 2023-10-16 VITALS — TEMPERATURE: 97.2 F | RESPIRATION RATE: 18 BRPM

## 2023-10-16 DIAGNOSIS — I83.019 VENOUS STASIS ULCER OF RIGHT LOWER LEG WITH EDEMA OF RIGHT LOWER LEG (HCC): ICD-10-CM

## 2023-10-16 DIAGNOSIS — R60.0 VENOUS STASIS ULCER OF LEFT LOWER LEG WITH EDEMA OF LEFT LOWER LEG (HCC): Primary | ICD-10-CM

## 2023-10-16 DIAGNOSIS — L97.919 VENOUS STASIS ULCER OF RIGHT LOWER LEG WITH EDEMA OF RIGHT LOWER LEG (HCC): ICD-10-CM

## 2023-10-16 DIAGNOSIS — I83.891 VENOUS STASIS ULCER OF RIGHT LOWER LEG WITH EDEMA OF RIGHT LOWER LEG (HCC): ICD-10-CM

## 2023-10-16 DIAGNOSIS — I83.892 VENOUS STASIS ULCER OF LEFT LOWER LEG WITH EDEMA OF LEFT LOWER LEG (HCC): Primary | ICD-10-CM

## 2023-10-16 DIAGNOSIS — R60.0 VENOUS STASIS ULCER OF RIGHT LOWER LEG WITH EDEMA OF RIGHT LOWER LEG (HCC): ICD-10-CM

## 2023-10-16 DIAGNOSIS — I83.029 VENOUS STASIS ULCER OF LEFT LOWER LEG WITH EDEMA OF LEFT LOWER LEG (HCC): Primary | ICD-10-CM

## 2023-10-16 DIAGNOSIS — L97.929 VENOUS STASIS ULCER OF LEFT LOWER LEG WITH EDEMA OF LEFT LOWER LEG (HCC): Primary | ICD-10-CM

## 2023-10-16 PROCEDURE — 99213 OFFICE O/P EST LOW 20 MIN: CPT

## 2023-10-16 RX ORDER — LIDOCAINE HYDROCHLORIDE 20 MG/ML
JELLY TOPICAL ONCE
Status: CANCELLED | OUTPATIENT
Start: 2023-10-16 | End: 2023-10-16

## 2023-10-16 RX ORDER — SODIUM CHLOR/HYPOCHLOROUS ACID 0.033 %
SOLUTION, IRRIGATION IRRIGATION ONCE
Status: CANCELLED | OUTPATIENT
Start: 2023-10-16 | End: 2023-10-16

## 2023-10-16 RX ORDER — POTASSIUM CHLORIDE 20 MEQ/1
TABLET, EXTENDED RELEASE ORAL
Qty: 180 TABLET | Refills: 12 | Status: SHIPPED | OUTPATIENT
Start: 2023-10-16

## 2023-10-16 RX ORDER — LIDOCAINE 50 MG/G
OINTMENT TOPICAL ONCE
Status: CANCELLED | OUTPATIENT
Start: 2023-10-16 | End: 2023-10-16

## 2023-10-16 RX ORDER — IBUPROFEN 200 MG
TABLET ORAL ONCE
Status: CANCELLED | OUTPATIENT
Start: 2023-10-16 | End: 2023-10-16

## 2023-10-16 RX ORDER — LIDOCAINE 40 MG/G
CREAM TOPICAL ONCE
Status: CANCELLED | OUTPATIENT
Start: 2023-10-16 | End: 2023-10-16

## 2023-10-16 RX ORDER — BETAMETHASONE DIPROPIONATE 0.05 %
OINTMENT (GRAM) TOPICAL ONCE
Status: CANCELLED | OUTPATIENT
Start: 2023-10-16 | End: 2023-10-16

## 2023-10-16 RX ORDER — LIDOCAINE HYDROCHLORIDE 40 MG/ML
SOLUTION TOPICAL ONCE
Status: CANCELLED | OUTPATIENT
Start: 2023-10-16 | End: 2023-10-16

## 2023-10-16 RX ORDER — BACITRACIN ZINC AND POLYMYXIN B SULFATE 500; 1000 [USP'U]/G; [USP'U]/G
OINTMENT TOPICAL ONCE
Status: CANCELLED | OUTPATIENT
Start: 2023-10-16 | End: 2023-10-16

## 2023-10-16 RX ORDER — CLOBETASOL PROPIONATE 0.5 MG/G
OINTMENT TOPICAL ONCE
Status: CANCELLED | OUTPATIENT
Start: 2023-10-16 | End: 2023-10-16

## 2023-10-16 RX ORDER — GINSENG 100 MG
CAPSULE ORAL ONCE
Status: CANCELLED | OUTPATIENT
Start: 2023-10-16 | End: 2023-10-16

## 2023-10-16 RX ORDER — TRIAMCINOLONE ACETONIDE 1 MG/G
OINTMENT TOPICAL ONCE
Status: CANCELLED | OUTPATIENT
Start: 2023-10-16 | End: 2023-10-16

## 2023-10-16 RX ORDER — GENTAMICIN SULFATE 1 MG/G
OINTMENT TOPICAL ONCE
Status: CANCELLED | OUTPATIENT
Start: 2023-10-16 | End: 2023-10-16

## 2023-10-16 NOTE — CONSULTS
saline and gauze. - elevate legs to reduce swelling  - take water pill as directed  - prescription for 1/4 strength dakins sent to pharmacy    Home Care: [facility]    Wound Location: bilateral legs    Dressing orders:     1) Gather wound care supplies and arrange on clean table. 2) Wash your hands with soap and water or use alcohol based hand  for 20 seconds (sing \"Happy Birthday\" twice). 3) Cleanse wounds with normal saline or wound cleanser and gauze. Pat dry with clean gauze. 4) Left leg- apply dakins moisten gauze to open area avoiding good skin, cover with ABD pad, wrap leg with kerlix and ACE wrap. Start at base of toes to 1-2 inches below bend of knee. Change x3 a week. Right leg- apply kerlix and ACE wraps. . start at base of toes to 1-2 inches below bend of knee. Change x3 a week. Keep all dressings clean & dry. Follow up visit:   2 Weeks, Oct 30 @ 10am    Supplies:     Duration of dressings: 30 days     We have sent your supply order to the following company:  [employer] [facility]  If you don't receive the items you were expecting or don't know what the items are that you received, call the company where the order was sent. If you are unable to obtain wound supplies, continue to use the supplies you have available until you are able to reach us. It is most important to keep the wound covered at all times. It is YOUR responsibility to make sure that supplies are re-ordered before you run out. Re-order telephone numbers are included in each package. Keep next scheduled appointment. Please give 24 hour notice if unable to keep appointment. [phone]    If you experience any of the following, please call the Wound Care Service during business hours: Monday through Friday 8:00 am - 4:30 pm  ([phone]).    *Increase in pain   *Temperature over 101   *Increase in drainage from your wound or a foul odor   *Uncontrolled swelling   *Need for compression bandage changes due to slippage,

## 2023-10-16 NOTE — PLAN OF CARE
Problem: Wound:  Goal: Will show signs of wound healing; wound closure and no evidence of infection  Description: Will show signs of wound healing; wound closure and no evidence of infection  Outcome: Progressing     Patient seen in wound clinic today for bilateral legs. See AVS for discharge orders. Care plan reviewed with patient and wife. Patient and wife verbalize understanding of the plan of care and contribute to goal setting.

## 2023-10-16 NOTE — PROGRESS NOTES
[address] [place] [address] Dr care  [address] [place] [address] [address] [facility], [address] [facility] [place] [facility] [place],[address]  Telephone: [phone]    Home health agencies: [address] [place] [address] Phone # [phone], Fax # [phone]    Patient Instructions   Visit Discharge/Physician Orders:  - Mechanical Debridement was completed today by using a saline and gauze. - elevate legs to reduce swelling  - take water pill as directed  - prescription for 1/4 strength dakins sent to pharmacy    Home Care: [facility]    Wound Location: bilateral legs    Dressing orders:     1) Gather wound care supplies and arrange on clean table. 2) Wash your hands with soap and water or use alcohol based hand  for 20 seconds (sing \"Happy Birthday\" twice). 3) Cleanse wounds with normal saline or wound cleanser and gauze. Pat dry with clean gauze. 4) Left leg- apply dakins moisten gauze to open area avoiding good skin, cover with ABD pad, wrap leg with kerlix and ACE wrap. Start at base of toes to 1-2 inches below bend of knee. Change x3 a week. Right leg- apply kerlix and ACE wraps. . start at base of toes to 1-2 inches below bend of knee. Change x3 a week. Keep all dressings clean & dry. Follow up visit:   2 Weeks, Oct 30 @ 10am    Supplies:     Duration of dressings: 30 days     We have sent your supply order to the following company:  home health  If you don't receive the items you were expecting or don't know what the items are that you received, call the company where the order was sent. If you are unable to obtain wound supplies, continue to use the supplies you have available until you are able to reach us. It is most important to keep the wound covered at all times. It is YOUR responsibility to make sure that supplies are re-ordered before you run out. Re-order telephone numbers are included in each package. Keep next scheduled appointment. Please give 24 hour notice if unable to keep appointment.

## 2023-10-16 NOTE — PATIENT INSTRUCTIONS
Visit Discharge/Physician Orders:  - Mechanical Debridement was completed today by using a saline and gauze. - elevate legs to reduce swelling  - take water pill as directed  - prescription for 1/4 strength dakins sent to pharmacy    Home Care: [facility]    Wound Location: bilateral legs    Dressing orders:     1) Gather wound care supplies and arrange on clean table. 2) Wash your hands with soap and water or use alcohol based hand  for 20 seconds (sing \"Happy Birthday\" twice). 3) Cleanse wounds with normal saline or wound cleanser and gauze. Pat dry with clean gauze. 4) Left leg- apply dakins moisten gauze to open area avoiding good skin, cover with ABD pad, wrap leg with kerlix and ACE wrap. Start at base of toes to 1-2 inches below bend of knee. Change x3 a week. Right leg- apply kerlix and ACE wraps. . start at base of toes to 1-2 inches below bend of knee. Change x3 a week. Keep all dressings clean & dry. Follow up visit:   2 Weeks, Oct 30 @ 10am    Supplies:     Duration of dressings: 30 days     We have sent your supply order to the following company:  [employer] [facility]  If you don't receive the items you were expecting or don't know what the items are that you received, call the company where the order was sent. If you are unable to obtain wound supplies, continue to use the supplies you have available until you are able to reach us. It is most important to keep the wound covered at all times. It is YOUR responsibility to make sure that supplies are re-ordered before you run out. Re-order telephone numbers are included in each package. Keep next scheduled appointment. Please give 24 hour notice if unable to keep appointment. [phone]    If you experience any of the following, please call the Wound Care Service during business hours: Monday through Friday 8:00 am - 4:30 pm  ([phone]).    *Increase in pain   *Temperature over 101   *Increase in drainage from your wound or a

## 2023-10-17 RX ORDER — TRIAMCINOLONE ACETONIDE 1 MG/G
OINTMENT TOPICAL ONCE
OUTPATIENT
Start: 2023-10-17 | End: 2023-10-17

## 2023-10-17 RX ORDER — SODIUM CHLOR/HYPOCHLOROUS ACID 0.033 %
SOLUTION, IRRIGATION IRRIGATION ONCE
OUTPATIENT
Start: 2023-10-17 | End: 2023-10-17

## 2023-10-17 RX ORDER — LIDOCAINE 40 MG/G
CREAM TOPICAL ONCE
OUTPATIENT
Start: 2023-10-17 | End: 2023-10-17

## 2023-10-17 RX ORDER — LIDOCAINE 50 MG/G
OINTMENT TOPICAL ONCE
OUTPATIENT
Start: 2023-10-17 | End: 2023-10-17

## 2023-10-17 RX ORDER — GINSENG 100 MG
CAPSULE ORAL ONCE
OUTPATIENT
Start: 2023-10-17 | End: 2023-10-17

## 2023-10-17 RX ORDER — LIDOCAINE HYDROCHLORIDE 20 MG/ML
JELLY TOPICAL ONCE
OUTPATIENT
Start: 2023-10-17 | End: 2023-10-17

## 2023-10-17 RX ORDER — IBUPROFEN 200 MG
TABLET ORAL ONCE
OUTPATIENT
Start: 2023-10-17 | End: 2023-10-17

## 2023-10-17 RX ORDER — LIDOCAINE HYDROCHLORIDE 40 MG/ML
SOLUTION TOPICAL ONCE
OUTPATIENT
Start: 2023-10-17 | End: 2023-10-17

## 2023-10-17 RX ORDER — CLOBETASOL PROPIONATE 0.5 MG/G
OINTMENT TOPICAL ONCE
OUTPATIENT
Start: 2023-10-17 | End: 2023-10-17

## 2023-10-17 RX ORDER — BETAMETHASONE DIPROPIONATE 0.05 %
OINTMENT (GRAM) TOPICAL ONCE
OUTPATIENT
Start: 2023-10-17 | End: 2023-10-17

## 2023-10-17 RX ORDER — BACITRACIN ZINC AND POLYMYXIN B SULFATE 500; 1000 [USP'U]/G; [USP'U]/G
OINTMENT TOPICAL ONCE
OUTPATIENT
Start: 2023-10-17 | End: 2023-10-17

## 2023-10-17 RX ORDER — GENTAMICIN SULFATE 1 MG/G
OINTMENT TOPICAL ONCE
OUTPATIENT
Start: 2023-10-17 | End: 2023-10-17

## 2023-10-23 ENCOUNTER — OFFICE VISIT (OUTPATIENT)
Dept: CARDIOLOGY CLINIC | Age: 68
End: 2023-10-23
Payer: MEDICARE

## 2023-10-23 VITALS
SYSTOLIC BLOOD PRESSURE: 130 MMHG | HEIGHT: 63 IN | HEART RATE: 84 BPM | OXYGEN SATURATION: 98 % | DIASTOLIC BLOOD PRESSURE: 70 MMHG | BODY MASS INDEX: 37.21 KG/M2 | WEIGHT: 210 LBS

## 2023-10-23 DIAGNOSIS — I50.32 CHRONIC DIASTOLIC CONGESTIVE HEART FAILURE, NYHA CLASS 2 (HCC): Primary | ICD-10-CM

## 2023-10-23 DIAGNOSIS — R60.0 EDEMA OF BOTH LOWER LEGS: ICD-10-CM

## 2023-10-23 DIAGNOSIS — I51.89 GRADE I DIASTOLIC DYSFUNCTION: ICD-10-CM

## 2023-10-23 DIAGNOSIS — D50.0 IRON DEFICIENCY ANEMIA DUE TO CHRONIC BLOOD LOSS: ICD-10-CM

## 2023-10-23 DIAGNOSIS — G47.33 OSA ON CPAP: ICD-10-CM

## 2023-10-23 PROCEDURE — 3075F SYST BP GE 130 - 139MM HG: CPT | Performed by: NURSE PRACTITIONER

## 2023-10-23 PROCEDURE — 1123F ACP DISCUSS/DSCN MKR DOCD: CPT | Performed by: NURSE PRACTITIONER

## 2023-10-23 PROCEDURE — 3078F DIAST BP <80 MM HG: CPT | Performed by: NURSE PRACTITIONER

## 2023-10-23 PROCEDURE — 99214 OFFICE O/P EST MOD 30 MIN: CPT | Performed by: NURSE PRACTITIONER

## 2023-10-23 RX ORDER — PREDNISONE 20 MG/1
20 TABLET ORAL 2 TIMES DAILY
COMMUNITY
Start: 2023-10-12

## 2023-10-23 ASSESSMENT — ENCOUNTER SYMPTOMS
ABDOMINAL DISTENTION: 0
COUGH: 0
NAUSEA: 0
SHORTNESS OF BREATH: 1
VOMITING: 0

## 2023-10-23 NOTE — PATIENT INSTRUCTIONS
You may receive a survey regarding the care you received during your visit. Your input is valuable to us. We encourage you to complete and return your survey. We hope you will choose us in the future for your healthcare needs. Your nurses today were [place] and [employer].   Office hours:   Mon-Thurs 8-4:30  Friday 8-12  Phone: [phone]    Continue:  Continue current medications  Daily weights and record  Fluid restriction of 2 Liters per day  Limit sodium in diet to around 6690-5732 mg/day  Monitor BP  Activity as tolerated     Call the [address] for any of the following symptoms:   Weight gain of 2-3 pounds in 1 day or 5 pounds in 1 week  Increased shortness of breath  Shortness of breath while laying down  Cough  Chest pain  Swelling in feet, ankles or legs  Bloating in abdomen  Fatigue

## 2023-10-23 NOTE — PROGRESS NOTES
both lower legs        4. SRAVAN on CPAP        5. Iron deficiency anemia due to chronic blood loss          Continue:  GDMT:   ACE/ARB/ARNI -    BB - Coreg 25 BID   Diuretic - Lasix 40 BID  AA - Aldactone 25 BID - stopped d/t CKD  SGLT2 -  none  Vasodilator -   Other - Amlodipine 10 daily, ASA, KCl 40 TID, iron, mag 169    Diastolic HF EF of 76-73%  Grade 1 DD  Mild AS - will monitor   Hypokalemia -secondary to Chron's and diuretic tx    Stable, improved lower leg swelling today. Continues to see wound care - leg wraps are helping. Still not always following his low Na diet. BMP is up from his previous baseline - will continue to monitor and does follow nephrology. 8 week f/u. Anemia - hematology managing - will follow labs as well  New pt with pulmonary for abnormal PFT    SRAVAN - on CPAP - Stable, continue     ECHO 2023: Grade 1 DD, mild AS, mild LA dilation     Stress test 2022: negative  Cath 2016: nonobstructive. Lab reviewed - K 3.9 Cr 2.0 mag 1.7 Hgb 8.9- iron 38    No medication changes today    Continue diet/fluid adherence  Start daily weights  F/U w/ Cardiology  F/U in clinic in 8 weeks      Tolerating above noted HF meds, no ill side effects noted. Will continue to monitor kidney function and electrolytes. Will optimize as tolerated. Pt is compliant w/ medications. Total visit time of 25 minutes has been spent with patient on education of symptoms, management, medication, and plan of care; as well as review of chart: labs, ECHO, radiology reports, etc.   I personally spent more then 50% of the appt time face to face with the patient. Daily weights  Fluid restriction of 2 Liters per day  Limit sodium in diet to around 7588-8863 mg/day  Monitor BP  Activity as tolerated       Patient was instructed to call the [address] for any changes in symptoms as noted in AVS.      Return in about 8 weeks (around 12/18/2023).  or sooner if needed     Patient given educational materials - see patient

## 2023-10-30 ENCOUNTER — HOSPITAL ENCOUNTER (OUTPATIENT)
Dept: WOUND CARE | Age: 68
Discharge: HOME | End: 2023-10-30
Payer: MEDICARE

## 2023-10-30 VITALS
OXYGEN SATURATION: 98 % | TEMPERATURE: 97.3 F | DIASTOLIC BLOOD PRESSURE: 81 MMHG | SYSTOLIC BLOOD PRESSURE: 171 MMHG | RESPIRATION RATE: 18 BRPM | HEART RATE: 86 BPM

## 2023-10-30 DIAGNOSIS — I83.891 VENOUS STASIS ULCER OF RIGHT LOWER LEG WITH EDEMA OF RIGHT LOWER LEG (HCC): ICD-10-CM

## 2023-10-30 DIAGNOSIS — L97.919 VENOUS STASIS ULCER OF RIGHT LOWER LEG WITH EDEMA OF RIGHT LOWER LEG (HCC): ICD-10-CM

## 2023-10-30 DIAGNOSIS — I83.019 VENOUS STASIS ULCER OF RIGHT LOWER LEG WITH EDEMA OF RIGHT LOWER LEG (HCC): ICD-10-CM

## 2023-10-30 DIAGNOSIS — L97.929 VENOUS STASIS ULCER OF LEFT LOWER LEG WITH EDEMA OF LEFT LOWER LEG (HCC): Primary | ICD-10-CM

## 2023-10-30 DIAGNOSIS — R60.0 VENOUS STASIS ULCER OF RIGHT LOWER LEG WITH EDEMA OF RIGHT LOWER LEG (HCC): ICD-10-CM

## 2023-10-30 DIAGNOSIS — R60.0 VENOUS STASIS ULCER OF LEFT LOWER LEG WITH EDEMA OF LEFT LOWER LEG (HCC): Primary | ICD-10-CM

## 2023-10-30 DIAGNOSIS — I83.029 VENOUS STASIS ULCER OF LEFT LOWER LEG WITH EDEMA OF LEFT LOWER LEG (HCC): Primary | ICD-10-CM

## 2023-10-30 DIAGNOSIS — I83.892 VENOUS STASIS ULCER OF LEFT LOWER LEG WITH EDEMA OF LEFT LOWER LEG (HCC): Primary | ICD-10-CM

## 2023-10-30 PROCEDURE — 99213 OFFICE O/P EST LOW 20 MIN: CPT

## 2023-10-30 RX ORDER — TRIAMCINOLONE ACETONIDE 1 MG/G
OINTMENT TOPICAL ONCE
OUTPATIENT
Start: 2023-10-30 | End: 2023-10-30

## 2023-10-30 RX ORDER — BACITRACIN ZINC AND POLYMYXIN B SULFATE 500; 1000 [USP'U]/G; [USP'U]/G
OINTMENT TOPICAL ONCE
OUTPATIENT
Start: 2023-10-30 | End: 2023-10-30

## 2023-10-30 RX ORDER — LIDOCAINE HYDROCHLORIDE 40 MG/ML
SOLUTION TOPICAL ONCE
OUTPATIENT
Start: 2023-10-30 | End: 2023-10-30

## 2023-10-30 RX ORDER — BETAMETHASONE DIPROPIONATE 0.05 %
OINTMENT (GRAM) TOPICAL ONCE
OUTPATIENT
Start: 2023-10-30 | End: 2023-10-30

## 2023-10-30 RX ORDER — GENTAMICIN SULFATE 1 MG/G
OINTMENT TOPICAL ONCE
OUTPATIENT
Start: 2023-10-30 | End: 2023-10-30

## 2023-10-30 RX ORDER — LIDOCAINE 40 MG/G
CREAM TOPICAL ONCE
OUTPATIENT
Start: 2023-10-30 | End: 2023-10-30

## 2023-10-30 RX ORDER — IBUPROFEN 200 MG
TABLET ORAL ONCE
OUTPATIENT
Start: 2023-10-30 | End: 2023-10-30

## 2023-10-30 RX ORDER — CLOBETASOL PROPIONATE 0.5 MG/G
OINTMENT TOPICAL ONCE
OUTPATIENT
Start: 2023-10-30 | End: 2023-10-30

## 2023-10-30 RX ORDER — SODIUM CHLOR/HYPOCHLOROUS ACID 0.033 %
SOLUTION, IRRIGATION IRRIGATION ONCE
OUTPATIENT
Start: 2023-10-30 | End: 2023-10-30

## 2023-10-30 RX ORDER — LIDOCAINE HYDROCHLORIDE 20 MG/ML
JELLY TOPICAL ONCE
OUTPATIENT
Start: 2023-10-30 | End: 2023-10-30

## 2023-10-30 RX ORDER — LIDOCAINE 50 MG/G
OINTMENT TOPICAL ONCE
OUTPATIENT
Start: 2023-10-30 | End: 2023-10-30

## 2023-10-30 RX ORDER — GINSENG 100 MG
CAPSULE ORAL ONCE
OUTPATIENT
Start: 2023-10-30 | End: 2023-10-30

## 2023-10-30 NOTE — PATIENT INSTRUCTIONS
Visit Discharge/Physician Orders:  - Mechanical Debridement was completed today by using a saline and gauze. - elevate legs to reduce swelling  - take water pill as directed  - prescription for 1/4 strength dakins sent to pharmacy    Home Care: [facility]    Wound Location: bilateral legs    Dressing orders:     1) Gather wound care supplies and arrange on clean table. 2) Wash your hands with soap and water or use alcohol based hand  for 20 seconds (sing \"Happy Birthday\" twice). 3) Cleanse wounds with normal saline or wound cleanser and gauze. Pat dry with clean gauze. 4) Left leg- apply dakins moisten gauze to open area avoiding good skin, cover with ABD pad, wrap leg with kerlix and ACE wrap. Start at base of toes to 1-2 inches below bend of knee. Change x3 a week. Right leg- tubi  or compression stockings      Keep all dressings clean & dry. Follow up visit:   3 Weeks, NOV 20 @ 10:30am    Supplies:     Duration of dressings: 30 days     We have sent your supply order to the following company:  [employer]  If you don't receive the items you were expecting or don't know what the items are that you received, call the company where the order was sent. If you are unable to obtain wound supplies, continue to use the supplies you have available until you are able to reach us. It is most important to keep the wound covered at all times. It is YOUR responsibility to make sure that supplies are re-ordered before you run out. Re-order telephone numbers are included in each package. Keep next scheduled appointment. Please give 24 hour notice if unable to keep appointment. [phone]    If you experience any of the following, please call the Wound Care Service during business hours: Monday through Friday 8:00 am - 4:30 pm  ([phone]).    *Increase in pain   *Temperature over 101   *Increase in drainage from your wound or a foul odor   *Uncontrolled swelling   *Need for compression bandage

## 2023-10-30 NOTE — PROGRESS NOTES
[address] [place] [address] Dr care  [address] [place] [address] [address] [facility], [address] [facility],[address]  Telephone: [phone]    Home health agencies: [address] [place] [address] Phone # [phone], Fax # [phone]      Patient Instructions   Visit Discharge/Physician Orders:  - Mechanical Debridement was completed today by using a saline and gauze. - elevate legs to reduce swelling  - take water pill as directed  - prescription for 1/4 strength dakins sent to pharmacy    Home Care: [facility]    Wound Location: bilateral legs    Dressing orders:     1) Gather wound care supplies and arrange on clean table. 2) Wash your hands with soap and water or use alcohol based hand  for 20 seconds (sing \"Happy Birthday\" twice). 3) Cleanse wounds with normal saline or wound cleanser and gauze. Pat dry with clean gauze. 4) Left leg- apply dakins moisten gauze to open area avoiding good skin, cover with ABD pad, wrap leg with kerlix and ACE wrap. Start at base of toes to 1-2 inches below bend of knee. Change x3 a week. Right leg- tubi  or compression stockings      Keep all dressings clean & dry. Follow up visit:   3 Weeks, NOV 20 @ 10:30am    Supplies:     Duration of dressings: 30 days     We have sent your supply order to the following company:  [employer]  If you don't receive the items you were expecting or don't know what the items are that you received, call the company where the order was sent. If you are unable to obtain wound supplies, continue to use the supplies you have available until you are able to reach us. It is most important to keep the wound covered at all times. It is YOUR responsibility to make sure that supplies are re-ordered before you run out. Re-order telephone numbers are included in each package. Keep next scheduled appointment. Please give 24 hour notice if unable to keep appointment.  [phone]    If you experience any of the following, please call
polymer, collagen, or transparent film, and cover secondary dry dressing/foam    For wounds that need additional secondary dressing to help pad or control additional drainage/exudates, add foam, absorbent pad or hydrocolloid    For wounds with suspected or known infection, apply antimicrobial mesh and/or antimicrobial alginate/hydrofiber, or antimicrobial solution moistened gauze/kerlex, or equivalent and cover with secondary dressing/foam    Compression Management needed for edema control, apply multilayer compression or tubular garment or equivalent    Offloading Management needed for pressure relief, apply offloading shoe/boot or equivalent     Standing Status:   Standing     Number of Occurrences:   1       Patient Instructions   Visit Discharge/Physician Orders:  - Mechanical Debridement was completed today by using a saline and gauze. - elevate legs to reduce swelling  - take water pill as directed  - prescription for 1/4 strength dakins sent to pharmacy    Home Care: [facility]    Wound Location: bilateral legs    Dressing orders:     1) Gather wound care supplies and arrange on clean table. 2) Wash your hands with soap and water or use alcohol based hand  for 20 seconds (sing \"Happy Birthday\" twice). 3) Cleanse wounds with normal saline or wound cleanser and gauze. Pat dry with clean gauze. 4) Left leg- apply dakins moisten gauze to open area avoiding good skin, cover with ABD pad, wrap leg with kerlix and ACE wrap. Start at base of toes to 1-2 inches below bend of knee. Change x3 a week. Right leg- tubi  or compression stockings      Keep all dressings clean & dry. Follow up visit:   3 Weeks, NOV 20 @ 10:30am    Supplies:     Duration of dressings: 30 days     We have sent your supply order to the following company:  [employer]  If you don't receive the items you were expecting or don't know what the items are that you received, call the company where the order was sent.    If you

## 2023-10-30 NOTE — PLAN OF CARE
Problem: Wound:  Goal: Will show signs of wound healing; wound closure and no evidence of infection  Description: Will show signs of wound healing; wound closure and no evidence of infection  Outcome: Progressing   Patient seen in clinic today for left leg wound. No s/s of infection. See AVS. Follow up in 3 week/s. Care plan reviewed with patient and spouse. Patient and spouse verbalize understanding of the plan of care and contribute to goal setting.
Private Residence

## 2023-11-03 DIAGNOSIS — M10.9 GOUT, UNSPECIFIED CAUSE, UNSPECIFIED CHRONICITY, UNSPECIFIED SITE: ICD-10-CM

## 2023-11-03 DIAGNOSIS — N18.32 STAGE 3B CHRONIC KIDNEY DISEASE (HCC): Primary | ICD-10-CM

## 2023-11-03 NOTE — TELEPHONE ENCOUNTER
Phoned script for Allopurinol to [facility] per Dr. Heidy Jade.  Called pt to inform him

## 2023-11-06 RX ORDER — ALLOPURINOL 100 MG/1
100 TABLET ORAL DAILY
Qty: 90 TABLET | Refills: 1 | OUTPATIENT
Start: 2023-11-06

## 2023-11-20 ENCOUNTER — HOSPITAL ENCOUNTER (OUTPATIENT)
Dept: WOUND CARE | Age: 68
Discharge: HOME | End: 2023-11-20
Payer: MEDICARE

## 2023-11-20 VITALS
HEART RATE: 92 BPM | RESPIRATION RATE: 20 BRPM | DIASTOLIC BLOOD PRESSURE: 73 MMHG | SYSTOLIC BLOOD PRESSURE: 145 MMHG | TEMPERATURE: 97.3 F | OXYGEN SATURATION: 95 %

## 2023-11-20 DIAGNOSIS — I83.019 VENOUS STASIS ULCER OF RIGHT LOWER LEG WITH EDEMA OF RIGHT LOWER LEG (HCC): ICD-10-CM

## 2023-11-20 DIAGNOSIS — I83.892 VENOUS STASIS ULCER OF LEFT LOWER LEG WITH EDEMA OF LEFT LOWER LEG (HCC): Primary | ICD-10-CM

## 2023-11-20 DIAGNOSIS — I83.891 VENOUS STASIS ULCER OF RIGHT LOWER LEG WITH EDEMA OF RIGHT LOWER LEG (HCC): ICD-10-CM

## 2023-11-20 DIAGNOSIS — L97.929 VENOUS STASIS ULCER OF LEFT LOWER LEG WITH EDEMA OF LEFT LOWER LEG (HCC): Primary | ICD-10-CM

## 2023-11-20 DIAGNOSIS — R60.0 VENOUS STASIS ULCER OF LEFT LOWER LEG WITH EDEMA OF LEFT LOWER LEG (HCC): Primary | ICD-10-CM

## 2023-11-20 DIAGNOSIS — R60.0 VENOUS STASIS ULCER OF RIGHT LOWER LEG WITH EDEMA OF RIGHT LOWER LEG (HCC): ICD-10-CM

## 2023-11-20 DIAGNOSIS — L97.919 VENOUS STASIS ULCER OF RIGHT LOWER LEG WITH EDEMA OF RIGHT LOWER LEG (HCC): ICD-10-CM

## 2023-11-20 DIAGNOSIS — I83.029 VENOUS STASIS ULCER OF LEFT LOWER LEG WITH EDEMA OF LEFT LOWER LEG (HCC): Primary | ICD-10-CM

## 2023-11-20 PROCEDURE — 99213 OFFICE O/P EST LOW 20 MIN: CPT

## 2023-11-20 RX ORDER — LIDOCAINE 40 MG/G
CREAM TOPICAL ONCE
OUTPATIENT
Start: 2023-11-20 | End: 2023-11-20

## 2023-11-20 RX ORDER — SODIUM CHLOR/HYPOCHLOROUS ACID 0.033 %
SOLUTION, IRRIGATION IRRIGATION ONCE
OUTPATIENT
Start: 2023-11-20 | End: 2023-11-20

## 2023-11-20 RX ORDER — IBUPROFEN 200 MG
TABLET ORAL ONCE
OUTPATIENT
Start: 2023-11-20 | End: 2023-11-20

## 2023-11-20 RX ORDER — GINSENG 100 MG
CAPSULE ORAL ONCE
OUTPATIENT
Start: 2023-11-20 | End: 2023-11-20

## 2023-11-20 RX ORDER — LIDOCAINE HYDROCHLORIDE 20 MG/ML
JELLY TOPICAL ONCE
OUTPATIENT
Start: 2023-11-20 | End: 2023-11-20

## 2023-11-20 RX ORDER — LIDOCAINE 50 MG/G
OINTMENT TOPICAL ONCE
OUTPATIENT
Start: 2023-11-20 | End: 2023-11-20

## 2023-11-20 RX ORDER — BACITRACIN ZINC AND POLYMYXIN B SULFATE 500; 1000 [USP'U]/G; [USP'U]/G
OINTMENT TOPICAL ONCE
OUTPATIENT
Start: 2023-11-20 | End: 2023-11-20

## 2023-11-20 RX ORDER — BETAMETHASONE DIPROPIONATE 0.05 %
OINTMENT (GRAM) TOPICAL ONCE
OUTPATIENT
Start: 2023-11-20 | End: 2023-11-20

## 2023-11-20 RX ORDER — LIDOCAINE HYDROCHLORIDE 40 MG/ML
SOLUTION TOPICAL ONCE
OUTPATIENT
Start: 2023-11-20 | End: 2023-11-20

## 2023-11-20 RX ORDER — TRIAMCINOLONE ACETONIDE 1 MG/G
OINTMENT TOPICAL ONCE
OUTPATIENT
Start: 2023-11-20 | End: 2023-11-20

## 2023-11-20 RX ORDER — GENTAMICIN SULFATE 1 MG/G
OINTMENT TOPICAL ONCE
OUTPATIENT
Start: 2023-11-20 | End: 2023-11-20

## 2023-11-20 RX ORDER — CLOBETASOL PROPIONATE 0.5 MG/G
OINTMENT TOPICAL ONCE
OUTPATIENT
Start: 2023-11-20 | End: 2023-11-20

## 2023-11-20 NOTE — PATIENT INSTRUCTIONS
Visit Discharge/Physician Orders:  - Mechanical Debridement was completed today by using a saline and gauze. - elevate legs to reduce swelling  - take water pill as directed     Home Care: [facility]     Wound Location: left leg      Dressing orders:      1) Gather wound care supplies and arrange on clean table. 2) Wash your hands with soap and water or use alcohol based hand  for 20 seconds (sing \"Happy Birthday\" twice). 3) Cleanse wounds with normal saline or wound cleanser and gauze. Pat dry with clean gauze. 4) Left leg- apply dakins moisten gauze to open area avoiding good skin, cover with ABD pad, wrap leg with kerlix and coban. Start at base of toes to 1-2 inches below bend of knee. Change x3 a week. Right leg- tubi  or compression stockings        Keep all dressings clean & dry. Follow up visit:   2 Weeks, Monday December 4th at 10:45 am      Supplies:      Duration of dressings: 30 days      We have sent your supply order to the following company:  [employer]  If you don't receive the items you were expecting or don't know what the items are that you received, call the company where the order was sent. If you are unable to obtain wound supplies, continue to use the supplies you have available until you are able to reach us. It is most important to keep the wound covered at all times. It is YOUR responsibility to make sure that supplies are re-ordered before you run out. Re-order telephone numbers are included in each package. Keep next scheduled appointment. Please give 24 hour notice if unable to keep appointment. [phone]     If you experience any of the following, please call the Wound Care Service during business hours: Monday through Friday 8:00 am - 4:30 pm  ([phone]).               *Increase in pain              *Temperature over 101              *Increase in drainage from your wound or a foul odor              *Uncontrolled swelling              *Need

## 2023-11-20 NOTE — PLAN OF CARE
Problem: Wound:  Goal: Will show signs of wound healing; wound closure and no evidence of infection  Description: Will show signs of wound healing; wound closure and no evidence of infection  Outcome: Progressing   Seen today for left leg. See avs for discharge  Care plan reviewed with patient and wife. Patient and wife verbalize understanding of the plan of care and contribute to goal setting.

## 2023-11-20 NOTE — PROGRESS NOTES
[address] [place] [address] Dr care  [address] [place] [address], [address] [place] [address],[address]  Telephone: [phone]    Home health agencies: [address] [place] [address] Phone # [phone], Fax # [phone]        Patient Instructions   Visit Discharge/Physician Orders:  - Mechanical Debridement was completed today by using a saline and gauze. - elevate legs to reduce swelling  - take water pill as directed     Home Care: [facility]     Wound Location: left leg      Dressing orders:      1) Gather wound care supplies and arrange on clean table. 2) Wash your hands with soap and water or use alcohol based hand  for 20 seconds (sing \"Happy Birthday\" twice). 3) Cleanse wounds with normal saline or wound cleanser and gauze. Pat dry with clean gauze. 4) Left leg- apply dakins moisten gauze to open area avoiding good skin, cover with ABD pad, wrap leg with kerlix and coban. Start at base of toes to 1-2 inches below bend of knee. Change x3 a week. Right leg- tubi  or compression stockings        Keep all dressings clean & dry. Follow up visit:   2 Weeks, Monday December 4th at 10:45 am      Supplies:      Duration of dressings: 30 days      We have sent your supply order to the following company:  [employer]  If you don't receive the items you were expecting or don't know what the items are that you received, call the company where the order was sent. If you are unable to obtain wound supplies, continue to use the supplies you have available until you are able to reach us. It is most important to keep the wound covered at all times. It is YOUR responsibility to make sure that supplies are re-ordered before you run out. Re-order telephone numbers are included in each package. Keep next scheduled appointment. Please give 24 hour notice if unable to keep appointment.  [phone]     If you experience any of the following, please call the Wound Care Service during
AM

## 2023-11-22 LAB
ANION GAP SERPL CALCULATED.3IONS-SCNC: 10 MMOL/L (ref 4–12)
BUN BLDV-MCNC: 54 MG/DL (ref 7–20)
CALCIUM SERPL-MCNC: 8.3 MG/DL (ref 8.8–10.5)
CHLORIDE BLD-SCNC: 99 MEQ/L (ref 101–111)
CO2: 31 MEQ/L (ref 21–32)
CREAT SERPL-MCNC: 1.93 MG/DL (ref 0.6–1.3)
CREATININE CLEARANCE: 35
GLUCOSE: 138 MG/DL (ref 70–110)
PARATHYROID HORMONE INTACT: 63.3 U/ML (ref 12–88)
POTASSIUM SERPL-SCNC: 4.6 MEQ/L (ref 3.6–5)
SODIUM BLD-SCNC: 140 MEQ/L (ref 135–145)
VITAMIN D 25-HYDROXY: 45.8 NG/ML (ref 30–100)

## 2023-11-27 ENCOUNTER — OFFICE VISIT (OUTPATIENT)
Dept: NEPHROLOGY | Age: 68
End: 2023-11-27
Payer: MEDICARE

## 2023-11-27 VITALS
OXYGEN SATURATION: 95 % | HEIGHT: 63 IN | DIASTOLIC BLOOD PRESSURE: 67 MMHG | BODY MASS INDEX: 39.3 KG/M2 | SYSTOLIC BLOOD PRESSURE: 111 MMHG | WEIGHT: 221.8 LBS | HEART RATE: 104 BPM

## 2023-11-27 DIAGNOSIS — E55.9 VITAMIN D DEFICIENCY: ICD-10-CM

## 2023-11-27 DIAGNOSIS — E11.21 DIABETIC NEPHROPATHY ASSOCIATED WITH TYPE 2 DIABETES MELLITUS (HCC): ICD-10-CM

## 2023-11-27 DIAGNOSIS — N25.81 HYPERPARATHYROIDISM, SECONDARY RENAL (HCC): ICD-10-CM

## 2023-11-27 DIAGNOSIS — I10 PRIMARY HYPERTENSION: ICD-10-CM

## 2023-11-27 DIAGNOSIS — N18.32 STAGE 3B CHRONIC KIDNEY DISEASE (HCC): Primary | ICD-10-CM

## 2023-11-27 PROCEDURE — 99213 OFFICE O/P EST LOW 20 MIN: CPT | Performed by: INTERNAL MEDICINE

## 2023-11-27 PROCEDURE — 3074F SYST BP LT 130 MM HG: CPT | Performed by: INTERNAL MEDICINE

## 2023-11-27 PROCEDURE — 3078F DIAST BP <80 MM HG: CPT | Performed by: INTERNAL MEDICINE

## 2023-11-27 PROCEDURE — 3052F HG A1C>EQUAL 8.0%<EQUAL 9.0%: CPT | Performed by: INTERNAL MEDICINE

## 2023-11-27 PROCEDURE — 1123F ACP DISCUSS/DSCN MKR DOCD: CPT | Performed by: INTERNAL MEDICINE

## 2023-12-04 ENCOUNTER — HOSPITAL ENCOUNTER (OUTPATIENT)
Dept: WOUND CARE | Age: 68
Discharge: HOME | End: 2023-12-04
Payer: MEDICARE

## 2023-12-04 DIAGNOSIS — L97.919 VENOUS STASIS ULCER OF RIGHT LOWER LEG WITH EDEMA OF RIGHT LOWER LEG (HCC): ICD-10-CM

## 2023-12-04 DIAGNOSIS — I83.029 VENOUS STASIS ULCER OF LEFT LOWER LEG WITH EDEMA OF LEFT LOWER LEG (HCC): Primary | ICD-10-CM

## 2023-12-04 DIAGNOSIS — R60.0 VENOUS STASIS ULCER OF LEFT LOWER LEG WITH EDEMA OF LEFT LOWER LEG (HCC): Primary | ICD-10-CM

## 2023-12-04 DIAGNOSIS — I83.019 VENOUS STASIS ULCER OF RIGHT LOWER LEG WITH EDEMA OF RIGHT LOWER LEG (HCC): ICD-10-CM

## 2023-12-04 DIAGNOSIS — R60.0 VENOUS STASIS ULCER OF RIGHT LOWER LEG WITH EDEMA OF RIGHT LOWER LEG (HCC): ICD-10-CM

## 2023-12-04 DIAGNOSIS — I83.891 VENOUS STASIS ULCER OF RIGHT LOWER LEG WITH EDEMA OF RIGHT LOWER LEG (HCC): ICD-10-CM

## 2023-12-04 DIAGNOSIS — L97.929 VENOUS STASIS ULCER OF LEFT LOWER LEG WITH EDEMA OF LEFT LOWER LEG (HCC): Primary | ICD-10-CM

## 2023-12-04 DIAGNOSIS — I83.892 VENOUS STASIS ULCER OF LEFT LOWER LEG WITH EDEMA OF LEFT LOWER LEG (HCC): Primary | ICD-10-CM

## 2023-12-04 PROCEDURE — 99213 OFFICE O/P EST LOW 20 MIN: CPT

## 2023-12-04 RX ORDER — GINSENG 100 MG
CAPSULE ORAL ONCE
OUTPATIENT
Start: 2023-12-04 | End: 2023-12-04

## 2023-12-04 RX ORDER — LIDOCAINE HYDROCHLORIDE 40 MG/ML
SOLUTION TOPICAL ONCE
OUTPATIENT
Start: 2023-12-04 | End: 2023-12-04

## 2023-12-04 RX ORDER — LIDOCAINE HYDROCHLORIDE 20 MG/ML
JELLY TOPICAL ONCE
OUTPATIENT
Start: 2023-12-04 | End: 2023-12-04

## 2023-12-04 RX ORDER — IBUPROFEN 200 MG
TABLET ORAL ONCE
OUTPATIENT
Start: 2023-12-04 | End: 2023-12-04

## 2023-12-04 RX ORDER — GENTAMICIN SULFATE 1 MG/G
OINTMENT TOPICAL ONCE
OUTPATIENT
Start: 2023-12-04 | End: 2023-12-04

## 2023-12-04 RX ORDER — BACITRACIN ZINC AND POLYMYXIN B SULFATE 500; 1000 [USP'U]/G; [USP'U]/G
OINTMENT TOPICAL ONCE
OUTPATIENT
Start: 2023-12-04 | End: 2023-12-04

## 2023-12-04 RX ORDER — SODIUM CHLOR/HYPOCHLOROUS ACID 0.033 %
SOLUTION, IRRIGATION IRRIGATION ONCE
OUTPATIENT
Start: 2023-12-04 | End: 2023-12-04

## 2023-12-04 RX ORDER — TRIAMCINOLONE ACETONIDE 1 MG/G
OINTMENT TOPICAL ONCE
OUTPATIENT
Start: 2023-12-04 | End: 2023-12-04

## 2023-12-04 RX ORDER — LIDOCAINE 50 MG/G
OINTMENT TOPICAL ONCE
OUTPATIENT
Start: 2023-12-04 | End: 2023-12-04

## 2023-12-04 RX ORDER — CLOBETASOL PROPIONATE 0.5 MG/G
OINTMENT TOPICAL ONCE
OUTPATIENT
Start: 2023-12-04 | End: 2023-12-04

## 2023-12-04 RX ORDER — BETAMETHASONE DIPROPIONATE 0.05 %
OINTMENT (GRAM) TOPICAL ONCE
OUTPATIENT
Start: 2023-12-04 | End: 2023-12-04

## 2023-12-04 RX ORDER — LIDOCAINE 40 MG/G
CREAM TOPICAL ONCE
OUTPATIENT
Start: 2023-12-04 | End: 2023-12-04

## 2023-12-04 NOTE — PATIENT INSTRUCTIONS
*Need for compression bandage changes due to slippage, breakthrough drainage     If you need medical attention outside of business hours, please contact your Primary Care Doctor or go to the nearest emergency room.

## 2023-12-04 NOTE — PLAN OF CARE
Problem: Wound:  Goal: Will show signs of wound healing; wound closure and no evidence of infection  Description: Will show signs of wound healing; wound closure and no evidence of infection  Outcome: Progressing   Seen today for left leg wound. See avs for discharge   Care plan reviewed with patient and wife. Patient and wife verbalize understanding of the plan of care and contribute to goal setting.

## 2023-12-04 NOTE — PROGRESS NOTES
[address] [place] [address] Dr care  [address] [place] [address] 700 [facility], [address] [facility],[address]  Telephone: [phone]    Home health agencies: [address] [place] [address] Phone # [phone], Fax # [phone]        Patient Instructions   Visit Discharge/Physician Orders:  - Mechanical Debridement was completed today by using a saline and gauze. - elevate legs to reduce swelling  - take water pill as directed     [facility] Care: [facility]     Wound Location: left leg      Dressing orders:      1) Gather wound care supplies and arrange on clean table. 2) Wash your hands with soap and water or use alcohol based hand  for 20 seconds (sing \"Happy Birthday\" twice). 3) Cleanse wounds with normal saline or wound cleanser and gauze. Pat dry with clean gauze. 4) Left leg- apply dakins moisten gauze to open area avoiding good skin, cover with ABD pad, wrap leg with kerlix and coban. Start at base of toes to 1-2 inches below bend of knee. Change x3 a week. Right leg- dual layer tubi - on in the morning and off at night         Keep all dressings clean & dry. Follow up visit:   5 Weeks, Monday January 8 at 11:00 am   Supplies:      Duration of dressings: 30 days      We have sent your supply order to the following company:  [facility] health  If you don't receive the items you were expecting or don't know what the items are that you received, call the company where the order was sent. If you are unable to obtain wound supplies, continue to use the supplies you have available until you are able to reach us. It is most important to keep the wound covered at all times. It is YOUR responsibility to make sure that supplies are re-ordered before you run out. Re-order telephone numbers are included in each package. Keep next scheduled appointment. Please give 24 hour notice if unable to keep appointment.  [phone]     If you experience any of the following, please call the Wound Care

## 2023-12-04 NOTE — PROGRESS NOTES
[address] [facility] [address] and Procedure Note      [address] [facility] RECORD NUMBER:  [id number]  AGE: 77 y.o. GENDER: male  : 1955  EPISODE DATE:  2023    Subjective:     Chief Complaint   Patient presents with    Wound Check     Left leg          HISTORY of PRESENT ILLNESS HPI     Tyrone La is a 77 y.o. male who was referred by Jeanne Samano CNP, for bilateral lower leg wounds and swelling. Patient is a longstanding type II diabetic, former smoker who quit about continued ago, significant history of CHF, CAD, and hypertension. Patient currently is being seen through home health every Monday, Wednesday, and Friday. Upon assessment today patient's previous right lower leg wound is clinically healed. Stable scabbing noted. Scabbing was kept in place. No active redness or drainage of note. Patient still has a superficial fibrous wound to the left lateral leg. There is no undermining of note. Wound is approximate 80% fibrous. Patient does have quite a bit of sensitivity to the wound. Because of this, no debridement is indicated today. Going forward we can have this ongoing wound treated with quarter strength Dakin's wet-to-dry with ABD pad, Kerlix and Ace bandage. This will be changed every Monday, Wednesday, and Friday by home health. We will continue to wrap the right lower leg to prevent reoccurring swelling with Kerlix, Ace bandage. No new medications are prescribed today. Patient was encouraged to elevate his lower extremities while at rest.  Patient will follow back up with us in 2 weeks for reevaluation. 10/30/23  Patient is a pleasant 59-year-old gentleman following up for ongoing superficial fibrogranular wound to the left lateral lower leg. Patient had a previous wound to the right lower leg that was healed at previous visit. Stable scabbing still noted today was removed in office without issue.   No new wounds noted to the right

## 2023-12-08 RX ORDER — METOLAZONE 2.5 MG/1
2.5 TABLET ORAL ONCE
Qty: 1 TABLET | Refills: 0 | OUTPATIENT
Start: 2023-12-08 | End: 2023-12-08

## 2023-12-08 NOTE — TELEPHONE ENCOUNTER
Okay to take 80 mg of Lasix twice a day with an additional 40 of potassium for 3 days and follow-up on Monday

## 2023-12-08 NOTE — TELEPHONE ENCOUNTER
Patient already taking 80mg Lasix BID    VO from Omayra Ureña CNP:  Metolazone 2.5mg PO tomorrow 1 hr prior to lasix with additional 20meq klor con    Wife notified.   RX phoned in

## 2023-12-08 NOTE — TELEPHONE ENCOUNTER
Patient [address]. Weight mid Nov was 212 lb, today 223 lb. Lungs clear/dim. Wounds on right leg healed, left leg healing. Still seeing wound clinic and wearing ISA hose. Denies any SOB, just leg edema.   Does not have any metolazone

## 2023-12-26 NOTE — CARE COORDINATION
Date: 12/26/2023  Patient: Miguel Echols  Admitted: 12/26/2023  3:43 AM  Attending Provider: Marilee Tienrey MD    Miguel Echols or her authorized caregiver has made the decision for the patient to leave the emergency department against  the advice of the emergency department staff. She or her authorized caregiver has been informed and understands the inherent risks, including but not limited to worsening of condition, sepsis, death, .  She or her authorized caregiver has decided to  accept the responsibility for this decision. Miguel Echols and all necessary parties have been advised that she may return for further evaluation or treatment. Her condition at time of discharge was stable.  Miguel Echols had current vital  signs as follows:  /70   Pulse 68   Temp 98.7 °F (37.1 °C) (Oral)   Resp 15   Wt 106.6 kg (235 lb)    Discharge planning update:    Remains on high flow oxygen, 85% FiO2, 60 liters oxygen with saturations at 92%. Afebrile. Creatinine 1.3, telemetry, diabetes management, Vitamin C,D,zinc, Albuterol inhalers, IV Zithromax, Asa, baricitinib, IV Rocephin, IV Decadron. PT/OT. Plan is to return home at discharge. Will continue to follow for needs.   Electronically signed by Charlsie Prader, RN on 10/8/2021 at 1:36 PM complains of pain/discomfort

## 2024-01-05 ENCOUNTER — OFFICE VISIT (OUTPATIENT)
Dept: CARDIOLOGY CLINIC | Age: 69
End: 2024-01-05
Payer: MEDICARE

## 2024-01-05 VITALS
BODY MASS INDEX: 39.6 KG/M2 | HEIGHT: 63 IN | OXYGEN SATURATION: 97 % | HEART RATE: 83 BPM | DIASTOLIC BLOOD PRESSURE: 68 MMHG | WEIGHT: 223.5 LBS | SYSTOLIC BLOOD PRESSURE: 136 MMHG

## 2024-01-05 DIAGNOSIS — G47.33 OSA ON CPAP: ICD-10-CM

## 2024-01-05 DIAGNOSIS — I51.89 GRADE I DIASTOLIC DYSFUNCTION: ICD-10-CM

## 2024-01-05 DIAGNOSIS — D50.0 IRON DEFICIENCY ANEMIA DUE TO CHRONIC BLOOD LOSS: ICD-10-CM

## 2024-01-05 DIAGNOSIS — I50.32 CHRONIC DIASTOLIC CONGESTIVE HEART FAILURE, NYHA CLASS 2 (HCC): Primary | ICD-10-CM

## 2024-01-05 PROCEDURE — 1123F ACP DISCUSS/DSCN MKR DOCD: CPT | Performed by: NURSE PRACTITIONER

## 2024-01-05 PROCEDURE — 3075F SYST BP GE 130 - 139MM HG: CPT | Performed by: NURSE PRACTITIONER

## 2024-01-05 PROCEDURE — 3078F DIAST BP <80 MM HG: CPT | Performed by: NURSE PRACTITIONER

## 2024-01-05 PROCEDURE — 99214 OFFICE O/P EST MOD 30 MIN: CPT | Performed by: NURSE PRACTITIONER

## 2024-01-05 ASSESSMENT — ENCOUNTER SYMPTOMS
SHORTNESS OF BREATH: 1
COUGH: 0
VOMITING: 0
ABDOMINAL DISTENTION: 0
NAUSEA: 0

## 2024-01-05 NOTE — PROGRESS NOTES
Heart Failure Clinic       Visit Date: 1/5/2024  Cardiologist:  Dr. Gomez  Primary Care Physician: Dr. Brennan, Rajeev TO, APRN - CNP    Brandon Jacobs is a 68 y.o. male who presents today for:  Chief Complaint   Patient presents with    Congestive Heart Failure       HPI:     TYPE HF: HFpEF (55-60% 2023) (40-45% 2019) DD grade 1  Cause: nonischemic  Device: none  HX: CAD (nonobstructive), DM, HLD, HTN  Dry Wt:  (226 on 10/3/22) (230 on 1/11/23) (228 on 3/8/23) ) (224 on 3/31/23) (223 on 5/2/23) (219 on 6/15/23) (217 on 9/20/23) (210 on 10/23/23) (223 on 12/20/23) (223 on 1/5/24)    Brandon Jacobs is a 68 y.o. male who presents to the office for a follow up patient visit in the heart failure clinic.      Concerns today: 4 week f/u. Weight and swelling are unchanged. Still not restricting his Na. Continues at wound care but may be discharged Monday. Compression socks are on but need to be tighter per wound care. Denies worsening SOB. Denies bloating or nocturnal dyspnea.     Visit on 12/20/23:  8 week f/u. Weight is up 13 since last visit. He has been over eating over his Na level- half a bag of chips a day and over his 2L/day. He does urinate well on his diuretics. Does note worsening lower leg swelling. Denies worsening SOB, bloating or orthopnea. Wearing his CPAP nightly.     Visit on 10/23/23: here today for his 4 week f/u. Weight is down 7lbs. He is seeing wound care for his bilateral lower leg wounds from blisters. His right is better but still open area of his left. SOB is at baseline, seeing pulmonary, and wearing his CPAP nightly. He does note improved lower leg swelling since wound care has been wrapping his legs. He continues to urinate well on his lasix.     Visit on 9/20/23: here today as an add on pt for worsening lower leg swelling. He also notes worsening SOB with minimal exertion. He continues to consume a high Na diet but following his low fluid diet. Denies bloating or nocturnal

## 2024-01-05 NOTE — PATIENT INSTRUCTIONS
You may receive a survey regarding the care you received during your visit.  Your input is valuable to us.  We encourage you to complete and return your survey.  We hope you will choose us in the future for your healthcare needs.    Your nurses today were Shashank.  Office hours:   Mon-Thurs 8-4:30  Friday 8-12  Phone: [phone]    Continue:  Continue current medications  Daily weights and record  Fluid restriction of 2 Liters per day  Limit sodium in diet to around 5949-7142 mg/day  Monitor BP  Activity as tolerated     Call the Heart Failure Clinic for any of the following symptoms:   Weight gain of 2-3 pounds in 1 day or 5 pounds in 1 week  Increased shortness of breath  Shortness of breath while laying down  Cough  Chest pain  Swelling in feet, ankles or legs  Bloating in abdomen  Fatigue      No medication changes today    Blood work in 4 weeks    Continue diet/fluid adherence  Start daily weights  F/U w/ Cardiology  F/U in clinic in 6 weeks

## 2024-01-08 ENCOUNTER — HOSPITAL ENCOUNTER (OUTPATIENT)
Dept: WOUND CARE | Age: 69
Discharge: HOME | End: 2024-01-08
Payer: MEDICARE

## 2024-01-08 ENCOUNTER — TELEPHONE (OUTPATIENT)
Dept: CARDIOLOGY CLINIC | Age: 69
End: 2024-01-08

## 2024-01-08 VITALS
RESPIRATION RATE: 20 BRPM | SYSTOLIC BLOOD PRESSURE: 129 MMHG | HEART RATE: 87 BPM | TEMPERATURE: 97.1 F | DIASTOLIC BLOOD PRESSURE: 66 MMHG | OXYGEN SATURATION: 93 %

## 2024-01-08 DIAGNOSIS — R60.0 EDEMA OF BOTH LOWER LEGS: Primary | ICD-10-CM

## 2024-01-08 DIAGNOSIS — I83.892 VENOUS STASIS ULCER OF LEFT LOWER LEG WITH EDEMA OF LEFT LOWER LEG (HCC): Primary | ICD-10-CM

## 2024-01-08 DIAGNOSIS — L97.929 VENOUS STASIS ULCER OF LEFT LOWER LEG WITH EDEMA OF LEFT LOWER LEG (HCC): Primary | ICD-10-CM

## 2024-01-08 DIAGNOSIS — R60.0 VENOUS STASIS ULCER OF RIGHT LOWER LEG WITH EDEMA OF RIGHT LOWER LEG (HCC): ICD-10-CM

## 2024-01-08 DIAGNOSIS — I83.019 VENOUS STASIS ULCER OF RIGHT LOWER LEG WITH EDEMA OF RIGHT LOWER LEG (HCC): ICD-10-CM

## 2024-01-08 DIAGNOSIS — R60.0 VENOUS STASIS ULCER OF LEFT LOWER LEG WITH EDEMA OF LEFT LOWER LEG (HCC): Primary | ICD-10-CM

## 2024-01-08 DIAGNOSIS — I83.891 VENOUS STASIS ULCER OF RIGHT LOWER LEG WITH EDEMA OF RIGHT LOWER LEG (HCC): ICD-10-CM

## 2024-01-08 DIAGNOSIS — L97.919 VENOUS STASIS ULCER OF RIGHT LOWER LEG WITH EDEMA OF RIGHT LOWER LEG (HCC): ICD-10-CM

## 2024-01-08 DIAGNOSIS — I83.029 VENOUS STASIS ULCER OF LEFT LOWER LEG WITH EDEMA OF LEFT LOWER LEG (HCC): Primary | ICD-10-CM

## 2024-01-08 PROCEDURE — 99213 OFFICE O/P EST LOW 20 MIN: CPT

## 2024-01-08 RX ORDER — LIDOCAINE HYDROCHLORIDE 40 MG/ML
SOLUTION TOPICAL ONCE
OUTPATIENT
Start: 2024-01-08 | End: 2024-01-08

## 2024-01-08 RX ORDER — LIDOCAINE HYDROCHLORIDE 20 MG/ML
JELLY TOPICAL ONCE
OUTPATIENT
Start: 2024-01-08 | End: 2024-01-08

## 2024-01-08 RX ORDER — LIDOCAINE 40 MG/G
CREAM TOPICAL ONCE
OUTPATIENT
Start: 2024-01-08 | End: 2024-01-08

## 2024-01-08 RX ORDER — BETAMETHASONE DIPROPIONATE 0.05 %
OINTMENT (GRAM) TOPICAL ONCE
OUTPATIENT
Start: 2024-01-08 | End: 2024-01-08

## 2024-01-08 RX ORDER — LIDOCAINE 50 MG/G
OINTMENT TOPICAL ONCE
OUTPATIENT
Start: 2024-01-08 | End: 2024-01-08

## 2024-01-08 RX ORDER — TRIAMCINOLONE ACETONIDE 1 MG/G
OINTMENT TOPICAL ONCE
OUTPATIENT
Start: 2024-01-08 | End: 2024-01-08

## 2024-01-08 RX ORDER — CLOBETASOL PROPIONATE 0.5 MG/G
OINTMENT TOPICAL ONCE
OUTPATIENT
Start: 2024-01-08 | End: 2024-01-08

## 2024-01-08 RX ORDER — SODIUM CHLOR/HYPOCHLOROUS ACID 0.033 %
SOLUTION, IRRIGATION IRRIGATION ONCE
OUTPATIENT
Start: 2024-01-08 | End: 2024-01-08

## 2024-01-08 RX ORDER — IBUPROFEN 200 MG
TABLET ORAL ONCE
OUTPATIENT
Start: 2024-01-08 | End: 2024-01-08

## 2024-01-08 RX ORDER — GENTAMICIN SULFATE 1 MG/G
OINTMENT TOPICAL ONCE
OUTPATIENT
Start: 2024-01-08 | End: 2024-01-08

## 2024-01-08 RX ORDER — BACITRACIN ZINC AND POLYMYXIN B SULFATE 500; 1000 [USP'U]/G; [USP'U]/G
OINTMENT TOPICAL ONCE
OUTPATIENT
Start: 2024-01-08 | End: 2024-01-08

## 2024-01-08 RX ORDER — GINSENG 100 MG
CAPSULE ORAL ONCE
OUTPATIENT
Start: 2024-01-08 | End: 2024-01-08

## 2024-01-08 NOTE — TELEPHONE ENCOUNTER
Patient wife called and said he seen wound care today, Floyd. He is okay to discharge from wound care- did make f/u in 3 weeks but to call and cancel if wound healed and no new concerns. Thinking [facility] coming out Wednesday to d/c for wound care also.    Okay for lymphedema clinic    OV note from today:

## 2024-01-08 NOTE — PLAN OF CARE
Problem: Wound:  Goal: Will show signs of wound healing; wound closure and no evidence of infection  Description: Will show signs of wound healing; wound closure and no evidence of infection  Outcome: Progressing     Patient presents to wound clinic for left leg wound. No s/s of infection noted. See AVS for discharge instructions. Follow up visit: 3 weeks on Monday January 29th at 10:15 am,     Care plan reviewed with patient and wife.  Patient and wife verbalize understanding of the plan of care and contribute to goal setting.

## 2024-01-08 NOTE — PATIENT INSTRUCTIONS
Visit Discharge/Physician Orders:  - Okay to start lymphedema clinic if recommended by CHF clinic.   - Mechanical Debridement was completed today by using a saline and gauze.   - elevate legs to reduce swelling  - take water pill as directed  - If you need wound care supplies after home health discharges you let us know.      Home Care: [facility]- okay for discharge from wound care services.      Wound Location: left leg      Dressing orders:      1) Gather wound care supplies and arrange on clean table.      2) Wash your hands with soap and water or use alcohol based hand  for 20 seconds (sing \"Happy Birthday\" twice).     3) Cleanse wounds with normal saline or wound cleanser and gauze. Pat dry with clean gauze.     4) Left leg- Betadine to wound. Cover with silicone bordered foam. Use tubigrip on leg. Apply in the morning and remove at bedtime. Okay to leave on for 3 days. Change dressing if it is still open in 3 days. If wound is closed do not need to cover area anymore.      Right leg- dual layer tubi - on in the morning and off at night      Keep all dressings clean & dry.     Follow up visit: 3 weeks on Monday January 29th at 10:15 am, call if wound is healed and you have no new concerns.   Supplies:      Duration of dressings: 30 days      We have sent your supply order to the following company:  [employer] [facility]  If you don't receive the items you were expecting or don't know what the items are that you received, call the company where the order was sent.   If you are unable to obtain wound supplies, continue to use the supplies you have available until you are able to reach us. It is most important to keep the wound covered at all times.  It is YOUR responsibility to make sure that supplies are re-ordered before you run out. Re-order telephone numbers are included in each package.      Keep next scheduled appointment. Please give 24 hour notice if unable to keep appointment. [phone]     If you

## 2024-01-08 NOTE — PROGRESS NOTES
[facility]         Consult and Procedure Note      Brandon Jacobs  MEDICAL RECORD NUMBER:  [id number]  AGE: 68 y.o.   GENDER: male  : 1955  EPISODE DATE:  2024    Subjective:     Chief Complaint   Patient presents with    Wound Check     Left leg          HISTORY of PRESENT ILLNESS HPI     Brandon Jacobs is a 68 y.o. male who was referred by Bethany Saucedo CNP, for bilateral lower leg wounds and swelling.  Patient is a longstanding type II diabetic, former smoker who quit about continued ago, significant history of CHF, CAD, and hypertension.  Patient currently is being seen through home health every Monday, Wednesday, and Friday.  Upon assessment today patient's previous right lower leg wound is clinically healed.  Stable scabbing noted.  Scabbing was kept in place.  No active redness or drainage of note.  Patient still has a superficial fibrous wound to the left lateral leg.  There is no undermining of note.  Wound is approximate 80% fibrous.  Patient does have quite a bit of sensitivity to the wound.  Because of this, no debridement is indicated today.  Going forward we can have this ongoing wound treated with quarter strength Dakin's wet-to-dry with ABD pad, Kerlix and Ace bandage.  This will be changed every Monday, Wednesday, and Friday by home health.  We will continue to wrap the right lower leg to prevent reoccurring swelling with Kerlix, Ace bandage.  No new medications are prescribed today.  Patient was encouraged to elevate his lower extremities while at rest.  Patient will follow back up with us in 2 weeks for reevaluation.    10/30/23  Patient is a pleasant 68-year-old gentleman following up for ongoing superficial fibrogranular wound to the left lateral lower leg.  Patient had a previous wound to the right lower leg that was healed at previous visit.  Stable scabbing still noted today was removed in office without issue.  No new wounds noted to the right

## 2024-01-08 NOTE — PROGRESS NOTES
[facility]  [address]  [address]   [place], [place] [address]  Telephone: [phone]     FAX [phone]    Home health agencies: [facility] Phone # [phone], Fax # [phone]      Patient Instructions   Visit Discharge/Physician Orders:  - Okay to start lymphedema clinic if recommended by CHF clinic.   - Mechanical Debridement was completed today by using a saline and gauze.   - elevate legs to reduce swelling  - take water pill as directed  - If you need wound care supplies after home health discharges you let us know.      Home Care: [facility]- okay for discharge from wound care services.      Wound Location: left leg      Dressing orders:      1) Gather wound care supplies and arrange on clean table.      2) Wash your hands with soap and water or use alcohol based hand  for 20 seconds (sing \"Happy Birthday\" twice).     3) Cleanse wounds with normal saline or wound cleanser and gauze. Pat dry with clean gauze.     4) Left leg- Betadine to wound. Cover with silicone bordered foam. Use tubigrip on leg. Apply in the morning and remove at bedtime. Okay to leave on for 3 days. Change dressing if it is still open in 3 days. If wound is closed do not need to cover area anymore.      Right leg- dual layer tubi - on in the morning and off at night      Keep all dressings clean & dry.     Follow up visit: 3 weeks on Monday January 29th at 10:15 am, call if wound is healed and you have no new concerns.   Supplies:      Duration of dressings: 30 days      We have sent your supply order to the following company:  [employer]  If you don't receive the items you were expecting or don't know what the items are that you received, call the company where the order was sent.   If you are unable to obtain wound supplies, continue to use the supplies you have available until you are able to reach us. It is most important to keep the wound covered at all times.  It is YOUR

## 2024-01-16 ENCOUNTER — OFFICE VISIT (OUTPATIENT)
Dept: ONCOLOGY | Age: 69
End: 2024-01-16
Payer: MEDICARE

## 2024-01-16 ENCOUNTER — HOSPITAL ENCOUNTER (OUTPATIENT)
Dept: INFUSION THERAPY | Age: 69
Discharge: HOME | End: 2024-01-16
Payer: MEDICARE

## 2024-01-16 VITALS
HEART RATE: 109 BPM | BODY MASS INDEX: 39.69 KG/M2 | RESPIRATION RATE: 16 BRPM | OXYGEN SATURATION: 95 % | SYSTOLIC BLOOD PRESSURE: 156 MMHG | TEMPERATURE: 97.9 F | WEIGHT: 224 LBS | DIASTOLIC BLOOD PRESSURE: 66 MMHG | HEIGHT: 63 IN

## 2024-01-16 VITALS
DIASTOLIC BLOOD PRESSURE: 66 MMHG | HEART RATE: 109 BPM | TEMPERATURE: 97.9 F | RESPIRATION RATE: 16 BRPM | SYSTOLIC BLOOD PRESSURE: 156 MMHG

## 2024-01-16 DIAGNOSIS — K52.9 INFLAMMATORY BOWEL DISEASE: ICD-10-CM

## 2024-01-16 DIAGNOSIS — D64.9 NORMOCYTIC ANEMIA: Primary | ICD-10-CM

## 2024-01-16 DIAGNOSIS — D50.0 IRON DEFICIENCY ANEMIA DUE TO CHRONIC BLOOD LOSS: ICD-10-CM

## 2024-01-16 DIAGNOSIS — E53.8 VITAMIN B12 DEFICIENCY: ICD-10-CM

## 2024-01-16 DIAGNOSIS — D64.9 NORMOCYTIC ANEMIA: ICD-10-CM

## 2024-01-16 LAB
ABSOLUTE IMMATURE GRANULOCYTE: 0.05 THOU/MM3 (ref 0–0.07)
BASOPHILS ABSOLUTE: 0 THOU/MM3 (ref 0–0.1)
BASOPHILS NFR BLD AUTO: 0 % (ref 0–3)
EOSINOPHIL NFR BLD AUTO: 2 % (ref 0–4)
EOSINOPHILS ABSOLUTE: 0.2 THOU/MM3 (ref 0–0.4)
ERYTHROCYTE [DISTWIDTH] IN BLOOD BY AUTOMATED COUNT: 14.4 % (ref 11.5–14.5)
FERRITIN SERPL IA-MCNC: 282 NG/ML (ref 22–322)
FOLATE SERPL-MCNC: 7.8 NG/ML (ref 4.8–24.2)
HCT VFR BLD AUTO: 30.3 % (ref 42–52)
HGB BLD-MCNC: 9.8 GM/DL (ref 14–18)
IMMATURE GRANULOCYTES: 0 %
IRON SERPL-MCNC: 21 UG/DL (ref 65–195)
LYMPHOCYTES ABSOLUTE: 1 THOU/MM3 (ref 1–4.8)
LYMPHOCYTES NFR BLD AUTO: 9 % (ref 15–47)
MCH RBC QN AUTO: 31 PG (ref 26–33)
MCHC RBC AUTO-ENTMCNC: 32.3 GM/DL (ref 32.2–35.5)
MCV RBC AUTO: 96 FL (ref 80–94)
MONOCYTES ABSOLUTE: 1.3 THOU/MM3 (ref 0.4–1.3)
MONOCYTES NFR BLD AUTO: 11 % (ref 0–12)
NEUTROPHILS NFR BLD AUTO: 78 % (ref 43–75)
PLATELET # BLD AUTO: 276 THOU/MM3 (ref 130–400)
PMV BLD AUTO: 9.5 FL (ref 9.4–12.4)
RBC # BLD AUTO: 3.16 MILL/MM3 (ref 4.7–6.1)
SEGMENTED NEUTROPHILS ABSOLUTE COUNT: 9.1 THOU/MM3 (ref 1.8–7.7)
TIBC SERPL-MCNC: 227 UG/DL (ref 171–450)
VIT B12 SERPL-MCNC: 816 PG/ML (ref 211–911)
WBC # BLD AUTO: 11.7 THOU/MM3 (ref 4.8–10.8)

## 2024-01-16 PROCEDURE — 83550 IRON BINDING TEST: CPT

## 2024-01-16 PROCEDURE — 3078F DIAST BP <80 MM HG: CPT | Performed by: PHYSICIAN ASSISTANT

## 2024-01-16 PROCEDURE — 83540 ASSAY OF IRON: CPT

## 2024-01-16 PROCEDURE — 99211 OFF/OP EST MAY X REQ PHY/QHP: CPT

## 2024-01-16 PROCEDURE — 99214 OFFICE O/P EST MOD 30 MIN: CPT | Performed by: PHYSICIAN ASSISTANT

## 2024-01-16 PROCEDURE — 82728 ASSAY OF FERRITIN: CPT

## 2024-01-16 PROCEDURE — 82746 ASSAY OF FOLIC ACID SERUM: CPT

## 2024-01-16 PROCEDURE — 3077F SYST BP >= 140 MM HG: CPT | Performed by: PHYSICIAN ASSISTANT

## 2024-01-16 PROCEDURE — 82607 VITAMIN B-12: CPT

## 2024-01-16 PROCEDURE — 36415 COLL VENOUS BLD VENIPUNCTURE: CPT

## 2024-01-16 PROCEDURE — 1123F ACP DISCUSS/DSCN MKR DOCD: CPT | Performed by: PHYSICIAN ASSISTANT

## 2024-01-16 PROCEDURE — 85025 COMPLETE CBC W/AUTO DIFF WBC: CPT

## 2024-01-16 RX ORDER — FERROUS SULFATE 325(65) MG
325 TABLET ORAL DAILY
Qty: 90 TABLET | Refills: 1 | Status: SHIPPED | OUTPATIENT
Start: 2024-01-16

## 2024-01-16 NOTE — PROGRESS NOTES
course. Anemia likely multifactorial from iron deficiency anemia from chronic blood loss/poor absorption related to Crohn's disease. Anemia of CKD likely component. He received IV Venofer x 2, started on oral vitamin B12 supplementation. He received IV Venofer x3 with third infusion on 6/28/23. S/p EGD and colonoscopy on 6/7/23 showing active colitis, gastritis, duodenitis. He is following with GI, awaiting insurance approval for treatment of Crohn's.  Today on 1/16/24: Hgb 9.8, hct 30.3, MCV 96.    -Will schedule IV Venofer, total of 3 infusions to be given one week apart. Patient has impaired absorption of iron due to underlying active Crohn's disease.    -Will continue oral ferrous sulfate 325 mg once daily. Instructed patient to monitor and discontinue if GI side effects occur.               -Patient instructed to monitor for signs/symptoms of worsening anemia or blood loss              -Instructed to follow up with GI for Crohn's disease.               -RTC in 3 months      2. Inflammatory bowel disease - Crohn's  3. Iron deficiency anemia due to chronic blood loss  4. Vitamin B12 Deficiency   Improved. Vitamin B12 369 on 7/31/23 compared to 197 on 4/10/23. Continue oral vitamin B12 supplementation. Follow pending level.     RTC in 3 months     All patient questions answered. Pt voiced understanding. Patient agreed with treatment plan. Follow up as directed. Patient instructed to call for questions or concerns.        Electronically signed by   Alanna Banda PA-C

## 2024-01-16 NOTE — PATIENT INSTRUCTIONS
Proceed with IV Venofer, total of 3 infusions to be given at least one week apart.  Return to clinic in 12 weeks   Labs on RTC: CBC, ferritin, iron, TIBC, folate and vitamin B12  Please call for questions or concerns.

## 2024-01-23 RX ORDER — SODIUM CHLORIDE 9 MG/ML
5-250 INJECTION, SOLUTION INTRAVENOUS PRN
OUTPATIENT
Start: 2024-01-23

## 2024-01-23 RX ORDER — DIPHENHYDRAMINE HYDROCHLORIDE 50 MG/ML
50 INJECTION INTRAMUSCULAR; INTRAVENOUS
OUTPATIENT
Start: 2024-01-23

## 2024-01-23 RX ORDER — HEPARIN SODIUM (PORCINE) LOCK FLUSH IV SOLN 100 UNIT/ML 100 UNIT/ML
500 SOLUTION INTRAVENOUS PRN
OUTPATIENT
Start: 2024-01-23

## 2024-01-23 RX ORDER — EPINEPHRINE 1 MG/ML
0.3 INJECTION, SOLUTION, CONCENTRATE INTRAVENOUS PRN
OUTPATIENT
Start: 2024-01-23

## 2024-01-23 RX ORDER — SODIUM CHLORIDE 9 MG/ML
INJECTION, SOLUTION INTRAVENOUS CONTINUOUS
OUTPATIENT
Start: 2024-01-23

## 2024-01-23 RX ORDER — ONDANSETRON 2 MG/ML
8 INJECTION INTRAMUSCULAR; INTRAVENOUS
OUTPATIENT
Start: 2024-01-23

## 2024-01-23 RX ORDER — ACETAMINOPHEN 325 MG/1
650 TABLET ORAL
OUTPATIENT
Start: 2024-01-23

## 2024-01-23 RX ORDER — SODIUM CHLORIDE 0.9 % (FLUSH) 0.9 %
5-40 SYRINGE (ML) INJECTION PRN
OUTPATIENT
Start: 2024-01-23

## 2024-01-23 RX ORDER — FAMOTIDINE 10 MG/ML
20 INJECTION, SOLUTION INTRAVENOUS
OUTPATIENT
Start: 2024-01-23

## 2024-01-23 RX ORDER — ALBUTEROL SULFATE 90 UG/1
4 AEROSOL, METERED RESPIRATORY (INHALATION) PRN
OUTPATIENT
Start: 2024-01-23

## 2024-01-24 ENCOUNTER — HOSPITAL ENCOUNTER (OUTPATIENT)
Dept: PHYSICAL THERAPY | Age: 69
Setting detail: THERAPIES SERIES
Discharge: HOME | End: 2024-01-24
Payer: MEDICARE

## 2024-01-24 PROCEDURE — 97162 PT EVAL MOD COMPLEX 30 MIN: CPT

## 2024-01-24 PROCEDURE — 97140 MANUAL THERAPY 1/> REGIONS: CPT

## 2024-01-24 NOTE — PROGRESS NOTES
** PLEASE SIGN, DATE AND TIME CERTIFICATION BELOW AND RETURN TO [facility] OUTPATIENT REHABILITATION (FAX #: [phone]).  ATTEST/CO-SIGN IF ACCESSING VIA INCue.  THANK YOU.**    I certify that I have examined the patient below and determined that Physical Medicine and Rehabilitation service is necessary and that I approve the established plan of care for up to 90 days or as specifically noted.  Attestation, signature or co-signature of physician indicates approval of certification requirements.    ________________________ ____________ __________  Physician Signature   Date   Time  [facility]  PHYSICAL THERAPY  [x] LYMPHEDEMA SERVICES EVALUATION  [] DAILY NOTE [] PROGRESS NOTE [] DISCHARGE NOTE    [] OUTPATIENT REHABILITATION CENTER [facility]   [] [facility]    [] [facility]   [] [facility]    Date: 2024  Patient Name:  Brandon Jacobs  : 1955  MRN: [id number]  CSN: [id number]    Referring Practitioner Domenica Montes APRN-CNP (STRZ/CHF)   Diagnosis Localized edema [R60.0]    Treatment Diagnosis -M25.561  Right Knee Pain  -M25.572 Pain in left ankle  -M62.81 Generalized Muscle Weakness   -I89.0 Lymphedema, not elsewhere classified (BLE/Lower Truncal/Abdominal)   Date of Evaluation 24       Functional Outcome Measure Used Lymphedema Life Impact Scale (Version 2)   Functional Outcome Score   45 (24) - Initial eval score.       Insurance: Primary: Payor: [facility] MEDICARE /  /  / ,   Secondary:    Authorization Information: PRE CERTIFICATION REQUIRED: yes after initial eval through [facility] [phone]  INSURANCE THERAPY BENEFIT:  visits based on Medical Necessity Calendar year  AQUATIC THERAPY COVERED: yes  MODALITIES COVERED:  yes  TELEHEALTH COVERED: na  REFERENCE NUMBER: Availity   Requested Procedure Codes: 52588 - Therapeutic Exercise  , 20450 - Manual Therapy , 52028 - PT ADL Training, and 98941 - Therapeutic Activities   Visit #

## 2024-01-26 ENCOUNTER — HOSPITAL ENCOUNTER (OUTPATIENT)
Dept: INFUSION THERAPY | Age: 69
Discharge: HOME | End: 2024-01-26

## 2024-01-26 ENCOUNTER — TELEPHONE (OUTPATIENT)
Dept: WOUND CARE | Age: 69
End: 2024-01-26

## 2024-01-26 NOTE — TELEPHONE ENCOUNTER
Patients spouse called stating patient is healed and that they no longer need the appointment on Monday 1/29/2024.

## 2024-01-26 NOTE — PROGRESS NOTES
Patient presented to clinic for venofer infusion. Upon assessment patient states severe cough, wheezing, and SOB since 1/24/24. DERICK Green instructed patient to test for COVID, increase fluid intake, and get plenty of rest. Patient re-scheduled for next week. Patient to call if symptoms worsen and reach out to PCP. Electronically signed by Tami Solis RN on 1/26/2024 at 12:51 PM

## 2024-01-29 ENCOUNTER — TELEPHONE (OUTPATIENT)
Dept: WOUND CARE | Age: 69
End: 2024-01-29

## 2024-01-29 ENCOUNTER — HOSPITAL ENCOUNTER (OUTPATIENT)
Dept: WOUND CARE | Age: 69
Discharge: HOME | End: 2024-01-29
Attending: NURSE PRACTITIONER

## 2024-01-31 ENCOUNTER — HOSPITAL ENCOUNTER (OUTPATIENT)
Dept: PHYSICAL THERAPY | Age: 69
Setting detail: THERAPIES SERIES
Discharge: HOME | End: 2024-01-31
Payer: MEDICARE

## 2024-01-31 PROCEDURE — 97140 MANUAL THERAPY 1/> REGIONS: CPT

## 2024-01-31 PROCEDURE — 97535 SELF CARE MNGMENT TRAINING: CPT

## 2024-01-31 NOTE — PROGRESS NOTES
[facility]  PHYSICAL THERAPY  [] LYMPHEDEMA SERVICES EVALUATION  [x] DAILY NOTE [] PROGRESS NOTE [] DISCHARGE NOTE    [] OUT[facility]IENT REHABILITATION CENTER [facility] [place]   [] [place] [facility]    [] [facility]   [] [facility]    Date: 2024  Patient Name:  Brandon Jacobs  : 1955  MRN: [id number]  CSN: [id number]    Referring Practitioner Domenica Montes APRN-CNP (STRZ/CHF)   Diagnosis Localized edema [R60.0]    Treatment Diagnosis -M25.561  Right Knee Pain  -M25.572 Pain in left ankle  -M62.81 Generalized Muscle Weakness   -I89.0 Lymphedema, not elsewhere classified (BLE/Lower Truncal/Abdominal)   Date of Evaluation 24       Functional Outcome Measure Used Lymphedema Life Impact Scale (Version 2)   Functional Outcome Score   45 (24) - Initial eval score.       Insurance: Primary: Payor: [facility] MEDICARE /  /  / ,   Secondary:    Authorization Information:  (24: See \"Visits Allowed\") PRE CERTIFICATION REQUIRED: yes after initial eval through [facility] [phone]  INSURANCE THERAPY BENEFIT:  visits based on Medical Necessity Calendar year  AQUATIC THERAPY COVERED: yes  MODALITIES COVERED:  yes  TELEHEALTH COVERED: na  REFERENCE NUMBER: Availity   Approved Procedure Codes:  (24) 08140 - Therapeutic Exercise  , 06258 - Manual Therapy , 07920 - PT ADL Training, and 11968 - Therapeutic Activities   Visit # 2 TOTAL (Eval + Treatments)    Approved prior to auth request.      Visits Allowed: Received auth for 6 PT visits for CPT codes 83379, 14043, 25463 and 45217 from 24 through 3/30/24.  Auth#         [id number]  Tracking#  [id number]   Recertification Date: 2024 (12 weeks)   Physician Follow-Up: -Follow up appt with referring provider on .   Physician Orders: -AMB. REFERRAL TO LYMPHEDEMA CLINIC (24)      Cancer History No History of Cancer   Prior Lymphedema Treatment -Utilized Compression Garment: Knee high

## 2024-02-02 ENCOUNTER — HOSPITAL ENCOUNTER (OUTPATIENT)
Dept: INFUSION THERAPY | Age: 69
Discharge: HOME | End: 2024-02-02
Payer: MEDICARE

## 2024-02-02 VITALS
RESPIRATION RATE: 18 BRPM | HEART RATE: 82 BPM | OXYGEN SATURATION: 93 % | WEIGHT: 213 LBS | TEMPERATURE: 97.7 F | SYSTOLIC BLOOD PRESSURE: 121 MMHG | BODY MASS INDEX: 37.74 KG/M2 | DIASTOLIC BLOOD PRESSURE: 58 MMHG | HEIGHT: 63 IN

## 2024-02-02 DIAGNOSIS — D50.0 IRON DEFICIENCY ANEMIA DUE TO CHRONIC BLOOD LOSS: ICD-10-CM

## 2024-02-02 DIAGNOSIS — K52.9 INFLAMMATORY BOWEL DISEASE: Primary | ICD-10-CM

## 2024-02-02 DIAGNOSIS — D64.9 NORMOCYTIC ANEMIA: ICD-10-CM

## 2024-02-02 PROCEDURE — 96365 THER/PROPH/DIAG IV INF INIT: CPT

## 2024-02-02 PROCEDURE — 96366 THER/PROPH/DIAG IV INF ADDON: CPT

## 2024-02-02 PROCEDURE — 2580000003 HC RX 258: Performed by: PHYSICIAN ASSISTANT

## 2024-02-02 PROCEDURE — 6360000002 HC RX W HCPCS: Performed by: PHYSICIAN ASSISTANT

## 2024-02-02 RX ORDER — SODIUM CHLORIDE 0.9 % (FLUSH) 0.9 %
5-40 SYRINGE (ML) INJECTION PRN
OUTPATIENT
Start: 2024-02-09

## 2024-02-02 RX ORDER — SODIUM CHLORIDE 9 MG/ML
5-250 INJECTION, SOLUTION INTRAVENOUS PRN
Status: DISCONTINUED | OUTPATIENT
Start: 2024-02-02 | End: 2024-02-03 | Stop reason: HOSPADM

## 2024-02-02 RX ORDER — ACETAMINOPHEN 325 MG/1
650 TABLET ORAL
OUTPATIENT
Start: 2024-02-09

## 2024-02-02 RX ORDER — SODIUM CHLORIDE 9 MG/ML
5-250 INJECTION, SOLUTION INTRAVENOUS PRN
OUTPATIENT
Start: 2024-02-09

## 2024-02-02 RX ORDER — DIPHENHYDRAMINE HYDROCHLORIDE 50 MG/ML
50 INJECTION INTRAMUSCULAR; INTRAVENOUS
OUTPATIENT
Start: 2024-02-09

## 2024-02-02 RX ORDER — EPINEPHRINE 1 MG/ML
0.3 INJECTION, SOLUTION INTRAMUSCULAR; SUBCUTANEOUS PRN
OUTPATIENT
Start: 2024-02-09

## 2024-02-02 RX ORDER — HEPARIN 100 UNIT/ML
500 SYRINGE INTRAVENOUS PRN
OUTPATIENT
Start: 2024-02-09

## 2024-02-02 RX ORDER — ONDANSETRON 2 MG/ML
8 INJECTION INTRAMUSCULAR; INTRAVENOUS
OUTPATIENT
Start: 2024-02-09

## 2024-02-02 RX ORDER — SODIUM CHLORIDE 9 MG/ML
INJECTION, SOLUTION INTRAVENOUS CONTINUOUS
OUTPATIENT
Start: 2024-02-09

## 2024-02-02 RX ORDER — ALBUTEROL SULFATE 90 UG/1
4 AEROSOL, METERED RESPIRATORY (INHALATION) PRN
OUTPATIENT
Start: 2024-02-09

## 2024-02-02 RX ADMIN — SODIUM CHLORIDE 20 ML/HR: 9 INJECTION, SOLUTION INTRAVENOUS at 12:30

## 2024-02-02 RX ADMIN — SODIUM CHLORIDE 300 MG: 9 INJECTION, SOLUTION INTRAVENOUS at 12:58

## 2024-02-02 NOTE — DISCHARGE INSTRUCTIONS
Call if any uncontrolled nausea or vomiting   Call if any fever,chills, problems or concerns  Call if any questions  Drink at least 6 - 8 ounces glasses of fluids a day    Patient to watch for any:    Dizziness     Lightheadedness        Acute nausea/vomiting   Headache     Chest pain/pressure    Rash/itching     Shortness of breath      Patient instructed if experience any of the above symptoms following today's venofer infusion, he is to notify MD immediately or go to the emergency department.

## 2024-02-02 NOTE — PLAN OF CARE
Problem: Chronic Conditions and Co-morbidities  Goal: Patient's chronic conditions and co-morbidity symptoms are monitored and maintained or improved  Outcome: Adequate for Discharge  Flowsheets (Taken 2/2/2024 1227)  Care Plan - Patient's Chronic Conditions and Co-Morbidity Symptoms are Monitored and Maintained or Improved:   Monitor and assess patient's chronic conditions and comorbid symptoms for stability, deterioration, or improvement   Collaborate with multidisciplinary team to address chronic and comorbid conditions and prevent exacerbation or deterioration  Note: Patient verbalizes understanding to verbal information given on venofer, including action and possible side effects. Aware to call MD if develop complications.      Problem: Safety - Adult  Goal: Free from fall injury  Outcome: Adequate for Discharge  Flowsheets (Taken 2/2/2024 1227)  Free From Fall Injury: Instruct family/caregiver on patient safety  Note: Patient free of falls this visit. Fall risks assessed. Precautions discussed.      Problem: Discharge Planning  Goal: Discharge to home or other facility with appropriate resources  Outcome: Adequate for Discharge  Flowsheets (Taken 2/2/2024 1227)  Discharge to home or other facility with appropriate resources:   Identify barriers to discharge with patient and caregiver   Identify discharge learning needs (meds, wound care, etc)  Note: Patient verbalizes understanding of discharge instructions, follow up appointment, and when to call physician if needed     Care plan reviewed with patient.  Patient verbalizes understanding of the plan of care and contributes to goal setting.     
no

## 2024-02-02 NOTE — PROGRESS NOTES
Patient assessed for the following post venofer infusion:    Dizziness   No  Lightheadedness  No      Acute nausea/vomiting No  Headache   No  Chest pain/pressure  No  Rash/itching   No  Shortness of breath  No    Patient kept for 20 minutes observation post infusion.    Patient tolerated venofer infusion without any complications.    Last vital signs:   BP (!) 121/58   Pulse 82   Temp 97.7 °F (36.5 °C) (Oral)   Resp 18   Ht 1.6 m (5' 3\")   Wt 96.6 kg (213 lb)   SpO2 93%   BMI 37.73 kg/m²       Patient instructed if experience any of the above symptoms following today's infusion, he is to notify MD immediately or go to the emergency department.    Discharge instructions given to patient. Verbalizes understanding. Ambulated off unit per self with belongings.

## 2024-02-06 LAB
ANION GAP SERPL CALCULATED.3IONS-SCNC: 11 MMOL/L (ref 4–12)
BUN BLDV-MCNC: 66 MG/DL (ref 7–20)
CALCIUM SERPL-MCNC: 9.1 MG/DL (ref 8.8–10.5)
CHLORIDE BLD-SCNC: 102 MEQ/L (ref 101–111)
CO2: 25 MEQ/L (ref 21–32)
CREAT SERPL-MCNC: 2.84 MG/DL (ref 0.6–1.3)
CREATININE CLEARANCE: 22
GLUCOSE: 93 MG/DL (ref 70–110)
POTASSIUM SERPL-SCNC: 4.6 MEQ/L (ref 3.6–5)
SODIUM BLD-SCNC: 138 MEQ/L (ref 135–145)

## 2024-02-07 ENCOUNTER — TELEPHONE (OUTPATIENT)
Dept: CARDIOLOGY CLINIC | Age: 69
End: 2024-02-07

## 2024-02-07 ENCOUNTER — TELEPHONE (OUTPATIENT)
Dept: WOUND CARE | Age: 69
End: 2024-02-07

## 2024-02-07 DIAGNOSIS — I50.32 CHRONIC DIASTOLIC HEART FAILURE (HCC): Primary | ICD-10-CM

## 2024-02-07 NOTE — TELEPHONE ENCOUNTER
----- Message from FELI Shultz - CNP sent at 2/7/2024  8:26 AM EST -----  Hold lasix today and tomorrow. 1500cc of water at least for the next 3 days. BMP on Saturday

## 2024-02-08 ENCOUNTER — HOSPITAL ENCOUNTER (OUTPATIENT)
Dept: PHYSICAL THERAPY | Age: 69
Setting detail: THERAPIES SERIES
Discharge: HOME | End: 2024-02-08
Payer: MEDICARE

## 2024-02-08 PROCEDURE — 97535 SELF CARE MNGMENT TRAINING: CPT

## 2024-02-08 PROCEDURE — 97140 MANUAL THERAPY 1/> REGIONS: CPT

## 2024-02-08 NOTE — PROGRESS NOTES
[facility]  PHYSICAL THERAPY  [] LYMPHEDEMA SERVICES EVALUATION  [x] DAILY NOTE [] PROGRESS NOTE [] DISCHARGE NOTE    [] OUTPATIENT REHABILITATION CENTER [facility] [place]   [] [place] [facility]    [] [facility]   [] [facility]    Date: 2024  Patient Name:  Brandon Jacobs  : 1955  MRN: [id number]  CSN: [id number]    Referring Practitioner Domenica Montes APRN-CNP (STRZ/CHF)   Diagnosis Localized edema [R60.0]    Treatment Diagnosis -M25.561  Right Knee Pain  -M25.572 Pain in left ankle  -M62.81 Generalized Muscle Weakness   -I89.0 Lymphedema, not elsewhere classified (BLE/Lower Truncal/Abdominal)   Date of Evaluation 24       Functional Outcome Measure Used Lymphedema Life Impact Scale (Version 2)   Functional Outcome Score   45 (24) - Initial eval score.       Insurance: Primary: Payor: [facility] MEDICARE /  /  / ,   Secondary:    Authorization Information:  (24: See \"Visits Allowed\") PRE CERTIFICATION REQUIRED: yes after initial eval through [facility] [phone]  INSURANCE THERAPY BENEFIT:  visits based on Medical Necessity Calendar year  AQUATIC THERAPY COVERED: yes  MODALITIES COVERED:  yes  TELEHEALTH COVERED: na  REFERENCE NUMBER: Availity   Approved Procedure Codes:  (24) 71081 - Therapeutic Exercise  , 18503 - Manual Therapy , 84731 - PT ADL Training, and 29476 - Therapeutic Activities   Visit # 3 TOTAL (Eval + Treatments)    Approved prior to auth request.      Visits Allowed: Received auth for 6 PT visits for CPT codes 76683, 71324, 69150 and 87660 from 24 through 3/30/24.  Auth#         [id number]  Tracking#  [id number]   Recertification Date: 2024 (12 weeks)   Physician Follow-Up: -Follow up appt with referring provider on .   Physician Orders: -AMB. REFERRAL TO LYMPHEDEMA CLINIC (24)      Cancer History No History of Cancer   Prior Lymphedema Treatment -Utilized Compression Garment: Knee high

## 2024-02-09 ENCOUNTER — HOSPITAL ENCOUNTER (OUTPATIENT)
Dept: INFUSION THERAPY | Age: 69
Discharge: HOME | End: 2024-02-09
Payer: MEDICARE

## 2024-02-09 VITALS
BODY MASS INDEX: 38.97 KG/M2 | HEART RATE: 81 BPM | DIASTOLIC BLOOD PRESSURE: 60 MMHG | OXYGEN SATURATION: 96 % | TEMPERATURE: 97.8 F | RESPIRATION RATE: 20 BRPM | WEIGHT: 220 LBS | SYSTOLIC BLOOD PRESSURE: 129 MMHG

## 2024-02-09 DIAGNOSIS — K52.9 INFLAMMATORY BOWEL DISEASE: Primary | ICD-10-CM

## 2024-02-09 DIAGNOSIS — D50.0 IRON DEFICIENCY ANEMIA DUE TO CHRONIC BLOOD LOSS: ICD-10-CM

## 2024-02-09 DIAGNOSIS — D64.9 NORMOCYTIC ANEMIA: ICD-10-CM

## 2024-02-09 PROCEDURE — 6360000002 HC RX W HCPCS: Performed by: PHYSICIAN ASSISTANT

## 2024-02-09 PROCEDURE — 96366 THER/PROPH/DIAG IV INF ADDON: CPT

## 2024-02-09 PROCEDURE — 96365 THER/PROPH/DIAG IV INF INIT: CPT

## 2024-02-09 PROCEDURE — 2580000003 HC RX 258: Performed by: PHYSICIAN ASSISTANT

## 2024-02-09 RX ORDER — SODIUM CHLORIDE 9 MG/ML
INJECTION, SOLUTION INTRAVENOUS CONTINUOUS
Status: CANCELLED | OUTPATIENT
Start: 2024-02-16

## 2024-02-09 RX ORDER — ONDANSETRON 2 MG/ML
8 INJECTION INTRAMUSCULAR; INTRAVENOUS
Status: CANCELLED | OUTPATIENT
Start: 2024-02-16

## 2024-02-09 RX ORDER — HEPARIN 100 UNIT/ML
500 SYRINGE INTRAVENOUS PRN
Status: CANCELLED | OUTPATIENT
Start: 2024-02-16

## 2024-02-09 RX ORDER — EPINEPHRINE 1 MG/ML
0.3 INJECTION, SOLUTION INTRAMUSCULAR; SUBCUTANEOUS PRN
Status: CANCELLED | OUTPATIENT
Start: 2024-02-16

## 2024-02-09 RX ORDER — SODIUM CHLORIDE 9 MG/ML
5-250 INJECTION, SOLUTION INTRAVENOUS PRN
Status: CANCELLED | OUTPATIENT
Start: 2024-02-16

## 2024-02-09 RX ORDER — ALBUTEROL SULFATE 90 UG/1
4 AEROSOL, METERED RESPIRATORY (INHALATION) PRN
Status: CANCELLED | OUTPATIENT
Start: 2024-02-16

## 2024-02-09 RX ORDER — SODIUM CHLORIDE 0.9 % (FLUSH) 0.9 %
5-40 SYRINGE (ML) INJECTION PRN
Status: CANCELLED | OUTPATIENT
Start: 2024-02-16

## 2024-02-09 RX ORDER — ACETAMINOPHEN 325 MG/1
650 TABLET ORAL
Status: CANCELLED | OUTPATIENT
Start: 2024-02-16

## 2024-02-09 RX ORDER — DIPHENHYDRAMINE HYDROCHLORIDE 50 MG/ML
50 INJECTION INTRAMUSCULAR; INTRAVENOUS
Status: CANCELLED | OUTPATIENT
Start: 2024-02-16

## 2024-02-09 RX ORDER — SODIUM CHLORIDE 9 MG/ML
5-250 INJECTION, SOLUTION INTRAVENOUS PRN
Status: DISCONTINUED | OUTPATIENT
Start: 2024-02-09 | End: 2024-02-10 | Stop reason: HOSPADM

## 2024-02-09 RX ADMIN — SODIUM CHLORIDE 20 ML/HR: 9 INJECTION, SOLUTION INTRAVENOUS at 12:27

## 2024-02-09 RX ADMIN — SODIUM CHLORIDE 300 MG: 9 INJECTION, SOLUTION INTRAVENOUS at 12:29

## 2024-02-09 NOTE — DISCHARGE INSTRUCTIONS
Please contact your Oncologist if you have any questions regarding the venofer that you received today.      Patient instructed if experience any of the symptoms following today's venofer / to notify MD immediately or go to emergency department.    * dizziness/lightheadedness  *acute nausea/vomiting - not relieved with medication  *headache - not relieved from Tylenol/pain medication  *chest pain/pressure  *rash/itching  *shortness of breath        Drink fluids - 48oz fluids daily  Call if develop fever/ chills/ signs or symptoms of infection

## 2024-02-09 NOTE — PROGRESS NOTES
Patient assessed for the following post venofer:    Dizziness   No  Lightheadedness  No      Acute nausea/vomiting No  Headache   No  Chest pain/pressure  No  Rash/itching   No  Shortness of breath  No    Patient kept for 20 minutes observation post infusion.    Patient tolerated venofer without any complications.    Last vital signs:   /60   Pulse 81   Temp 97.8 °F (36.6 °C) (Oral)   Resp 20   Wt 99.8 kg (220 lb)   SpO2 96%   BMI 38.97 kg/m²       Patient instructed if experience any of the above symptoms following today's infusion,he is to notify MD immediately or go to the emergency department.    Discharge instructions given to patient. Verbalizes understanding. Ambulated off unit with family and belongings.

## 2024-02-09 NOTE — PLAN OF CARE
Problem: Safety - Adult  Goal: Free from fall injury  Outcome: Adequate for Discharge  Flowsheets (Taken 2/9/2024 1407)  Free From Fall Injury: Instruct family/caregiver on patient safety  Note: Patient verbalizes understanding of fall precautions. Patient free from falls this visit.     Problem: Discharge Planning  Goal: Discharge to home or other facility with appropriate resources  Outcome: Adequate for Discharge  Flowsheets (Taken 2/9/2024 1407)  Discharge to home or other facility with appropriate resources: Identify barriers to discharge with patient and caregiver  Note: Verbalized understanding of discharge instructions, follow-up appointments, and when to call the physician.     Care plan reviewed with patient and family.  Patient and family verbalize understanding of the plan of care and contribute to goal setting.

## 2024-02-12 LAB
ANION GAP SERPL CALCULATED.3IONS-SCNC: 10 MMOL/L (ref 4–12)
BUN BLDV-MCNC: 47 MG/DL (ref 7–20)
CALCIUM SERPL-MCNC: 8.7 MG/DL (ref 8.8–10.5)
CHLORIDE BLD-SCNC: 102 MEQ/L (ref 101–111)
CO2: 29 MEQ/L (ref 21–32)
CREAT SERPL-MCNC: 2.14 MG/DL (ref 0.6–1.3)
CREATININE CLEARANCE: 31
GLUCOSE: 105 MG/DL (ref 70–110)
POTASSIUM SERPL-SCNC: 4 MEQ/L (ref 3.6–5)
SODIUM BLD-SCNC: 141 MEQ/L (ref 135–145)

## 2024-02-13 ENCOUNTER — TELEPHONE (OUTPATIENT)
Dept: CARDIOLOGY CLINIC | Age: 69
End: 2024-02-13

## 2024-02-13 DIAGNOSIS — I50.32 CHRONIC DIASTOLIC HEART FAILURE (HCC): Primary | ICD-10-CM

## 2024-02-13 NOTE — TELEPHONE ENCOUNTER
----- Message from FELI Shultz - CNP sent at 2/13/2024  7:39 AM EST -----  Renal function is back to baseline - BMP again in 4 weeks

## 2024-02-14 ENCOUNTER — HOSPITAL ENCOUNTER (OUTPATIENT)
Dept: PHYSICAL THERAPY | Age: 69
Setting detail: THERAPIES SERIES
Discharge: HOME | End: 2024-02-14
Payer: MEDICARE

## 2024-02-14 PROCEDURE — 97140 MANUAL THERAPY 1/> REGIONS: CPT

## 2024-02-14 NOTE — PROGRESS NOTES
[facility]  PHYSICAL THERAPY  [] LYMPHEDEMA SERVICES EVALUATION  [x] DAILY NOTE [] PROGRESS NOTE [] DISCHARGE NOTE    [] OUTPATIENT REHABILITATION CENTER [facility] [place]   [] [place] [facility]    [] [facility]   [] [facility]    Date: 2024  Patient Name:  Brandon Jacobs  : 1955  MRN: [id number]  CSN: [id number]    Referring Practitioner Domenica Montes APRN-CNP (STRZ/CHF)   Diagnosis Localized edema [R60.0]    Treatment Diagnosis -M25.561  Right Knee Pain  -M25.572 Pain in left ankle  -M62.81 Generalized Muscle Weakness   -I89.0 Lymphedema, not elsewhere classified (BLE/Lower Truncal/Abdominal)   Date of Evaluation 24       Functional Outcome Measure Used Lymphedema Life Impact Scale (Version 2)   Functional Outcome Score   45 (24) - Initial eval score.       Insurance: Primary: Payor: [facility] MEDICARE /  /  / ,   Secondary:    Authorization Information:  (24: See \"Visits Allowed\") PRE CERTIFICATION REQUIRED: yes after initial eval through [facility] [phone]  INSURANCE THERAPY BENEFIT:  visits based on Medical Necessity Calendar year  AQUATIC THERAPY COVERED: yes  MODALITIES COVERED:  yes  TELEHEALTH COVERED: na  REFERENCE NUMBER: Availity   Approved Procedure Codes:  (24) 81338 - Therapeutic Exercise  , 54432 - Manual Therapy , 87505 - PT ADL Training, and 54436 - Therapeutic Activities   Visit # 4 TOTAL (Eval + Treatments)   3/6 Approved prior to auth request.      Visits Allowed: Received auth for 6 PT visits for CPT codes 82175, 95825, 16044 and 26696 from 24 through 3/30/24.  Auth#         [id number]  Tracking#  [id number]   Recertification Date: 2024 (12 weeks)   Physician Follow-Up: -Follow up appt with referring provider on .   Physician Orders: -AMB. REFERRAL TO LYMPHEDEMA CLINIC (24)      Cancer History No History of Cancer   Prior Lymphedema Treatment -Utilized Compression Garment: Knee high

## 2024-02-16 ENCOUNTER — HOSPITAL ENCOUNTER (OUTPATIENT)
Dept: INFUSION THERAPY | Age: 69
Discharge: HOME | End: 2024-02-16
Payer: MEDICARE

## 2024-02-16 ENCOUNTER — HOSPITAL ENCOUNTER (OUTPATIENT)
Dept: PHYSICAL THERAPY | Age: 69
Setting detail: THERAPIES SERIES
End: 2024-02-16
Payer: MEDICARE

## 2024-02-16 VITALS
RESPIRATION RATE: 20 BRPM | OXYGEN SATURATION: 95 % | HEART RATE: 82 BPM | WEIGHT: 215.6 LBS | BODY MASS INDEX: 38.19 KG/M2 | DIASTOLIC BLOOD PRESSURE: 56 MMHG | TEMPERATURE: 97.7 F | SYSTOLIC BLOOD PRESSURE: 116 MMHG

## 2024-02-16 DIAGNOSIS — D64.9 NORMOCYTIC ANEMIA: ICD-10-CM

## 2024-02-16 DIAGNOSIS — K52.9 INFLAMMATORY BOWEL DISEASE: Primary | ICD-10-CM

## 2024-02-16 DIAGNOSIS — D50.0 IRON DEFICIENCY ANEMIA DUE TO CHRONIC BLOOD LOSS: ICD-10-CM

## 2024-02-16 PROCEDURE — 96366 THER/PROPH/DIAG IV INF ADDON: CPT

## 2024-02-16 PROCEDURE — 6360000002 HC RX W HCPCS: Performed by: PHYSICIAN ASSISTANT

## 2024-02-16 PROCEDURE — 2580000003 HC RX 258: Performed by: PHYSICIAN ASSISTANT

## 2024-02-16 PROCEDURE — 96365 THER/PROPH/DIAG IV INF INIT: CPT

## 2024-02-16 RX ORDER — SODIUM CHLORIDE 9 MG/ML
5-250 INJECTION, SOLUTION INTRAVENOUS PRN
OUTPATIENT
Start: 2024-02-16

## 2024-02-16 RX ORDER — ONDANSETRON 2 MG/ML
8 INJECTION INTRAMUSCULAR; INTRAVENOUS
OUTPATIENT
Start: 2024-02-16

## 2024-02-16 RX ORDER — EPINEPHRINE 1 MG/ML
0.3 INJECTION, SOLUTION INTRAMUSCULAR; SUBCUTANEOUS PRN
OUTPATIENT
Start: 2024-02-16

## 2024-02-16 RX ORDER — ALBUTEROL SULFATE 90 UG/1
4 AEROSOL, METERED RESPIRATORY (INHALATION) PRN
OUTPATIENT
Start: 2024-02-16

## 2024-02-16 RX ORDER — HEPARIN 100 UNIT/ML
500 SYRINGE INTRAVENOUS PRN
OUTPATIENT
Start: 2024-02-16

## 2024-02-16 RX ORDER — SODIUM CHLORIDE 9 MG/ML
5-250 INJECTION, SOLUTION INTRAVENOUS PRN
Status: DISCONTINUED | OUTPATIENT
Start: 2024-02-16 | End: 2024-02-17 | Stop reason: HOSPADM

## 2024-02-16 RX ORDER — ACETAMINOPHEN 325 MG/1
650 TABLET ORAL
OUTPATIENT
Start: 2024-02-16

## 2024-02-16 RX ORDER — SODIUM CHLORIDE 9 MG/ML
INJECTION, SOLUTION INTRAVENOUS CONTINUOUS
OUTPATIENT
Start: 2024-02-16

## 2024-02-16 RX ORDER — SODIUM CHLORIDE 0.9 % (FLUSH) 0.9 %
5-40 SYRINGE (ML) INJECTION PRN
OUTPATIENT
Start: 2024-02-16

## 2024-02-16 RX ORDER — DIPHENHYDRAMINE HYDROCHLORIDE 50 MG/ML
50 INJECTION INTRAMUSCULAR; INTRAVENOUS
OUTPATIENT
Start: 2024-02-16

## 2024-02-16 RX ADMIN — SODIUM CHLORIDE 200 ML/HR: 9 INJECTION, SOLUTION INTRAVENOUS at 13:01

## 2024-02-16 RX ADMIN — SODIUM CHLORIDE 300 MG: 9 INJECTION, SOLUTION INTRAVENOUS at 13:02

## 2024-02-16 NOTE — DISCHARGE INSTRUCTIONS
You received IV Venofer (Iron sucrose) while in outpatient oncology clinic.  Call if any uncontrolled nausea/vomiting  Call if any fevers/chills/ problems or concerns  Call if any bleeding  Call if any chest pain/pressure  Call if any severe shortness of breath  Drink at least (6) 8oz glasses of fluids daily     If develop any of above condition, please call your MD or go to Emergency Department.

## 2024-02-16 NOTE — PLAN OF CARE
Problem: Chronic Conditions and Co-morbidities  Goal: Patient's chronic conditions and co-morbidity symptoms are monitored and maintained or improved  Outcome: Adequate for Discharge  Flowsheets (Taken 2/16/2024 1241)  Care Plan - Patient's Chronic Conditions and Co-Morbidity Symptoms are Monitored and Maintained or Improved:   Monitor and assess patient's chronic conditions and comorbid symptoms for stability, deterioration, or improvement   Collaborate with multidisciplinary team to address chronic and comorbid conditions and prevent exacerbation or deterioration  Note: Patient verbalizes understanding to verbal information given on venofer,action and possible side effects. Aware to call MD if develop complications.        Problem: Safety - Adult  Goal: Free from fall injury  Outcome: Adequate for Discharge  Flowsheets (Taken 2/16/2024 1241)  Free From Fall Injury:   Instruct family/caregiver on patient safety   Based on caregiver fall risk screen, instruct family/caregiver to ask for assistance with transferring infant if caregiver noted to have fall risk factors  Note: Free from falls while in O.P. Oncology.Discussed the need to use the call light for assistance when getting up to ambulate.     Problem: Discharge Planning  Goal: Discharge to home or other facility with appropriate resources  Outcome: Adequate for Discharge  Flowsheets (Taken 2/16/2024 1241)  Discharge to home or other facility with appropriate resources:   Identify barriers to discharge with patient and caregiver   Identify discharge learning needs (meds, wound care, etc)  Note: Verbalize understanding of discharge instructions, follow up appointments, and when to call Physician.Discuss understanding of discharge instructions, follow up appointments and when to call Physician.     Care plan reviewed with patient.  Patient verbalize understanding of the plan of care and contribute to goal setting.

## 2024-02-16 NOTE — PROGRESS NOTES
Patient assessed for the following post iron infusion:    Dizziness   No  Lightheadedness  No      Acute nausea/vomiting No  Headache   No  Chest pain/pressure  No  Rash/itching   No  Shortness of breath  No    Patient kept for 20 minutes observation post infusion.    Patient tolerated treatment Venofer without any complications.    Last vital signs:   BP (!) 116/56   Pulse 82   Temp 97.7 °F (36.5 °C) (Oral)   Resp 20   Wt 97.8 kg (215 lb 9.6 oz)   SpO2 95%   BMI 38.19 kg/m²       Patient instructed if experience any of the above symptoms following today's infusion,he/she is to notify MD immediately or go to the emergency department.    Discharge instructions given to patient. Verbalizes understanding. Ambulated off unit per self with walker and with belongings.

## 2024-02-19 ENCOUNTER — OFFICE VISIT (OUTPATIENT)
Dept: CARDIOLOGY CLINIC | Age: 69
End: 2024-02-19
Payer: MEDICARE

## 2024-02-19 ENCOUNTER — HOSPITAL ENCOUNTER (OUTPATIENT)
Dept: WOUND CARE | Age: 69
Discharge: HOME | End: 2024-02-19
Attending: NURSE PRACTITIONER
Payer: MEDICARE

## 2024-02-19 VITALS
TEMPERATURE: 97.4 F | DIASTOLIC BLOOD PRESSURE: 66 MMHG | RESPIRATION RATE: 18 BRPM | HEART RATE: 81 BPM | OXYGEN SATURATION: 94 % | SYSTOLIC BLOOD PRESSURE: 133 MMHG

## 2024-02-19 VITALS
OXYGEN SATURATION: 93 % | SYSTOLIC BLOOD PRESSURE: 138 MMHG | HEART RATE: 93 BPM | BODY MASS INDEX: 37.92 KG/M2 | WEIGHT: 214 LBS | HEIGHT: 63 IN | DIASTOLIC BLOOD PRESSURE: 76 MMHG

## 2024-02-19 DIAGNOSIS — D50.0 IRON DEFICIENCY ANEMIA DUE TO CHRONIC BLOOD LOSS: ICD-10-CM

## 2024-02-19 DIAGNOSIS — I51.89 GRADE I DIASTOLIC DYSFUNCTION: ICD-10-CM

## 2024-02-19 DIAGNOSIS — G47.33 OSA ON CPAP: ICD-10-CM

## 2024-02-19 DIAGNOSIS — I50.32 CHRONIC DIASTOLIC CONGESTIVE HEART FAILURE, NYHA CLASS 2 (HCC): Primary | ICD-10-CM

## 2024-02-19 DIAGNOSIS — R60.0 EDEMA OF BOTH LOWER LEGS: ICD-10-CM

## 2024-02-19 PROCEDURE — 3078F DIAST BP <80 MM HG: CPT | Performed by: NURSE PRACTITIONER

## 2024-02-19 PROCEDURE — 3075F SYST BP GE 130 - 139MM HG: CPT | Performed by: NURSE PRACTITIONER

## 2024-02-19 PROCEDURE — 99213 OFFICE O/P EST LOW 20 MIN: CPT

## 2024-02-19 PROCEDURE — 99214 OFFICE O/P EST MOD 30 MIN: CPT | Performed by: NURSE PRACTITIONER

## 2024-02-19 PROCEDURE — 1123F ACP DISCUSS/DSCN MKR DOCD: CPT | Performed by: NURSE PRACTITIONER

## 2024-02-19 RX ORDER — SPIRONOLACTONE 25 MG/1
25 TABLET ORAL DAILY
COMMUNITY
Start: 2024-02-13

## 2024-02-19 ASSESSMENT — ENCOUNTER SYMPTOMS
COUGH: 0
NAUSEA: 0
SHORTNESS OF BREATH: 1
ABDOMINAL DISTENTION: 0
VOMITING: 0

## 2024-02-19 NOTE — PATIENT INSTRUCTIONS
You may receive a survey regarding the care you received during your visit.  Your input is valuable to us.  We encourage you to complete and return your survey.  We hope you will choose us in the future for your healthcare needs.    Your nurses today were Shashank.  Office hours:   Mon-Thurs 8-4:30  Friday 8-12  Phone: [phone]    Continue:  Continue current medications  Daily weights and record  Fluid restriction of 2 Liters per day  Limit sodium in diet to around 0147-4465 mg/day  Monitor BP  Activity as tolerated     Call the Heart Failure Clinic for any of the following symptoms:   Weight gain of 2-3 pounds in 1 day or 5 pounds in 1 week  Increased shortness of breath  Shortness of breath while laying down  Cough  Chest pain  Swelling in feet, ankles or legs  Bloating in abdomen  Fatigue        No medication changes today    Continue diet/fluid adherence  Start daily weights  F/U w/ Cardiology  F/U in clinic in 3 months

## 2024-02-19 NOTE — PROGRESS NOTES
Heart Failure Clinic       Visit Date: 2/19/2024  Cardiologist:  Dr. Gomez  Primary Care Physician: Rajeev South, APRN - CNP    Brandon Jacobs is a 68 y.o. male who presents today for:  Chief Complaint   Patient presents with    Congestive Heart Failure       HPI:     TYPE HF: HFpEF (55-60% 2023) (40-45% 2019) DD grade 1  Cause: nonischemic  Device: none  HX: CAD (nonobstructive), DM, HLD, HTN  Dry Wt:  (226 on 10/3/22) (230 on 1/11/23) (228 on 3/8/23) ) (224 on 3/31/23) (223 on 5/2/23) (219 on 6/15/23) (217 on 9/20/23) (210 on 10/23/23) (223 on 12/20/23) (223 on 1/5/24) (214 on 2/19/24)     Brandon Jacobs is a 68 y.o. male who presents to the office for a follow up patient visit in the heart failure clinic.      Concerns today: 6 week f/u. Weight is down 9 lbs. Continues with wound care and lymphedema clinic. He continues to urinate well. Continues with poor diet. SOB is at baseline. Denies bloating or nocturnal dyspnea. Notes improvement of his leg swelling.     Visit on 1/5/24: 4 week f/u. Weight and swelling are unchanged. Still not restricting his Na. Continues at wound care but may be discharged Monday. Compression socks are on but need to be tighter per wound care. Denies worsening SOB. Denies bloating or nocturnal dyspnea.     Visit on 12/20/23:  8 week f/u. Weight is up 13 since last visit. He has been over eating over his Na level- half a bag of chips a day and over his 2L/day. He does urinate well on his diuretics. Does note worsening lower leg swelling. Denies worsening SOB, bloating or orthopnea. Wearing his CPAP nightly.     Visit on 10/23/23: here today for his 4 week f/u. Weight is down 7lbs. He is seeing wound care for his bilateral lower leg wounds from blisters. His right is better but still open area of his left. SOB is at baseline, seeing pulmonary, and wearing his CPAP nightly. He does note improved lower leg swelling since wound care has been wrapping his legs. He

## 2024-02-19 NOTE — PROGRESS NOTES
Wound Care Supplies      Supply Company:     [employer] [address] [place] [address] f: [phone] f: [phone] p: [phone] [email or web address]      Ordering Center:   [facility]  [address] [address]  [phone]  WOUND CARE Dept: [phone]   FAX NUMBER [phone]    Patient Information:      Bisi Jacobs  [address] [place] [id number]   [phone]   : 1955  AGE: 68 y.o.     GENDER: male   EPISODE DATE: 2024    Insurance:      PRIMARY INSURANCE:  Plan: [employer] ESSENTIAL/PLUS  Coverage: BCBS MEDICARE  Effective Date: 2022  Group Number: [unfilled]  Subscriber Number: [id number] - (Medicare Managed)    Payer/Plan Subscr  Sex Relation Sub. Ins. ID Effective Group Num   1. BCBS MEDICARE* BISI JACOBS 1955 Male Self [id number] [facility]                                   [address]       Patient Wound Information:      Problem List Items Addressed This Visit    None      WOUNDS REQUIRING DRESSING SUPPLIES:     Wound 24 Leg Right (Active)   Wound Image   24   Wound Etiology Venous 24   Dressing Status Intact;Old drainage noted 24   Wound Cleansed Cleansed with saline 24   Dressing/Treatment Betadine swabs/povidone iodine;Foam 24   Wound Length (cm) 5.5 cm 24   Wound Width (cm) 5.3 cm 24   Wound Depth (cm) 0.05 cm 24   Wound Surface Area (cm^2) 29.15 cm^2 24   Wound Volume (cm^3) 1.4575 cm^3 24   Wound Assessment Pale granulation tissue 24   Drainage Amount Moderate (25-50%) 24   Drainage Description Serosanguinous;Yellow 24   Odor None 24   Loren-wound Assessment Intact;Dry/flaky;Blanchable erythema 24   Margins Attached edges 24   Wound Thickness Description not for Pressure Injury Full thickness

## 2024-02-19 NOTE — PATIENT INSTRUCTIONS
Visit Discharge/Physician Orders:  - keep following with lymphedema clinic  - Mechanical Debridement was completed today by using a saline and gauze.   - elevate legs to reduce swelling  - take water pill as directed    Wound Location: left leg      Dressing orders:      1) Gather wound care supplies and arrange on clean table.      2) Wash your hands with soap and water or use alcohol based hand  for 20 seconds (sing \"Happy Birthday\" twice).     3) Cleanse wounds with normal saline or wound cleanser and gauze. Pat dry with clean gauze.     4) Bilateral legs- Betadine to wound. Cover with silicone bordered foam. Use tubigrip on legs. Apply in the morning and remove at bedtime. Okay to leave on for 3 days. Change dressing if it is still open in 3 days. If wound is closed do not need to cover area anymore.        Keep all dressings clean & dry.     Follow up visit: 3 weeks on Monday March 11th at 10:30 am    Supplies:      Duration of dressings: 30 days      We have sent your supply order to the following company:  [employer] Phone # [phone]   If you don't receive the items you were expecting or don't know what the items are that you received, call the company where the order was sent.   If you are unable to obtain wound supplies, continue to use the supplies you have available until you are able to reach us. It is most important to keep the wound covered at all times.  It is YOUR responsibility to make sure that supplies are re-ordered before you run out. Re-order telephone numbers are included in each package.      Keep next scheduled appointment. Please give 24 hour notice if unable to keep appointment. [phone]     If you experience any of the following, please call the Wound Care Service during business hours: Monday through Friday 8:00 am - 4:30 pm  ([phone]).              *Increase in pain              *Temperature over 101              *Increase in drainage from your wound or a foul odor

## 2024-02-19 NOTE — PLAN OF CARE
Problem: Wound:  Goal: Will show signs of wound healing; wound closure and no evidence of infection  Description: Will show signs of wound healing; wound closure and no evidence of infection  Outcome: Not Progressing   Pt. Seen today for bilateral leg wounds see AVS for new orders. Follow up in 3 weeks.  Care plan reviewed with patient and wife.  Patient and wife verbalize understanding of the plan of care and contribute to goal setting.      Female

## 2024-02-19 NOTE — PROGRESS NOTES
[facility]         Consult and Procedure Note      Brandon Jacobs  MEDICAL RECORD NUMBER:  [id number]  AGE: 68 y.o.   GENDER: male  : 1955  EPISODE DATE:  2024    Subjective:     Chief Complaint   Patient presents with    Wound Check     Right leg         HISTORY of PRESENT ILLNESS HPI     Brandon Jacobs is a 68 y.o. male who was referred by Bethany Saucedo CNP, for bilateral lower leg wounds and swelling.  Patient is a longstanding type II diabetic, former smoker who quit about continued ago, significant history of CHF, CAD, and hypertension.  Patient currently is being seen through home health every Monday, Wednesday, and Friday.  Upon assessment today patient's previous right lower leg wound is clinically healed.  Stable scabbing noted.  Scabbing was kept in place.  No active redness or drainage of note.  Patient still has a superficial fibrous wound to the left lateral leg.  There is no undermining of note.  Wound is approximate 80% fibrous.  Patient does have quite a bit of sensitivity to the wound.  Because of this, no debridement is indicated today.  Going forward we can have this ongoing wound treated with quarter strength Dakin's wet-to-dry with ABD pad, Kerlix and Ace bandage.  This will be changed every Monday, Wednesday, and Friday by home health.  We will continue to wrap the right lower leg to prevent reoccurring swelling with Kerlix, Ace bandage.  No new medications are prescribed today.  Patient was encouraged to elevate his lower extremities while at rest.  Patient will follow back up with us in 2 weeks for reevaluation.    10/30/23  Patient is a pleasant 68-year-old gentleman following up for ongoing superficial fibrogranular wound to the left lateral lower leg.  Patient had a previous wound to the right lower leg that was healed at previous visit.  Stable scabbing still noted today was removed in office without issue.  No new wounds noted to the right

## 2024-02-20 ENCOUNTER — HOSPITAL ENCOUNTER (OUTPATIENT)
Dept: PHYSICAL THERAPY | Age: 69
Setting detail: THERAPIES SERIES
End: 2024-02-20
Payer: MEDICARE

## 2024-02-22 ENCOUNTER — HOSPITAL ENCOUNTER (OUTPATIENT)
Dept: PHYSICAL THERAPY | Age: 69
Setting detail: THERAPIES SERIES
Discharge: HOME | End: 2024-02-22
Payer: MEDICARE

## 2024-02-22 PROCEDURE — 97535 SELF CARE MNGMENT TRAINING: CPT

## 2024-02-22 PROCEDURE — 97140 MANUAL THERAPY 1/> REGIONS: CPT

## 2024-02-22 NOTE — PROGRESS NOTES
** PLEASE SIGN, DATE AND TIME CERTIFICATION BELOW AND RETURN TO [facility] OUTPATIENT REHABILITATION (FAX #: [phone]).  ATTEST/CO-SIGN IF ACCESSING VIA [employer].  THANK YOU.**    I certify that I have examined the patient below and determined that Physical Medicine and Rehabilitation service is necessary and that I approve the established plan of care for up to 90 days or as specifically noted.  Attestation, signature or co-signature of physician indicates approval of certification requirements.    ________________________ ____________ __________  Physician Signature   Date   Time    [facility]  PHYSICAL THERAPY  [] LYMPHEDEMA SERVICES EVALUATION  [] DAILY NOTE [x] PROGRESS NOTE [] DISCHARGE NOTE    [x] OUTPATIENT REHABILITATION CENTER [facility]   [] [facility]    [] [facility]   [] [facility]    Date: 2024  Patient Name:  Brandon Jacobs  : 1955  MRN: [id number]  CSN: [id number]    Referring Practitioner Domenica Montes APRN-CNP (STRZ/CHF)   Diagnosis Localized edema [R60.0]    Treatment Diagnosis -M25.561  Right Knee Pain  -M25.572 Pain in left ankle  -M62.81 Generalized Muscle Weakness   -I89.0 Lymphedema, not elsewhere classified (BLE/Lower Truncal/Abdominal)   Date of Evaluation 24       Functional Outcome Measure Used Lymphedema Life Impact Scale (Version 2)   Functional Outcome Score   45 (24) - Initial eval score.     41 (24) - Progress note score      Insurance: Primary: Payor: [facility] MEDICARE /  /  / ,   Secondary:    Authorization Information:  (24: See \"Visits Allowed\") PRE CERTIFICATION REQUIRED: yes after initial eval through [facility] [phone]  INSURANCE THERAPY BENEFIT:  visits based on Medical Necessity Calendar year  AQUATIC THERAPY COVERED: yes  MODALITIES COVERED:  yes  TELEHEALTH COVERED: na  REFERENCE NUMBER: Availity   Approved Procedure Codes:  (24) 74136 - Therapeutic Exercise  , 98118 -

## 2024-02-23 ENCOUNTER — HOSPITAL ENCOUNTER (OUTPATIENT)
Dept: PHYSICAL THERAPY | Age: 69
Setting detail: THERAPIES SERIES
End: 2024-02-23
Payer: MEDICARE

## 2024-02-26 ENCOUNTER — HOSPITAL ENCOUNTER (OUTPATIENT)
Dept: PHYSICAL THERAPY | Age: 69
Setting detail: THERAPIES SERIES
Discharge: HOME | End: 2024-02-26
Payer: MEDICARE

## 2024-02-26 PROCEDURE — 97140 MANUAL THERAPY 1/> REGIONS: CPT

## 2024-02-26 PROCEDURE — 97535 SELF CARE MNGMENT TRAINING: CPT

## 2024-02-26 NOTE — PROGRESS NOTES
[facility]  PHYSICAL THERAPY  [] LYMPHEDEMA SERVICES EVALUATION  [x] DAILY NOTE [] PROGRESS NOTE [] DISCHARGE NOTE    [x] OUTPATIENT REHABILITATION CENTER - [place]   [] [facility]    [] [facility]   [] [facility]    Date: 2024  Patient Name:  Brandon Jacobs  : 1955  MRN: [id number]  CSN: [id number]    Referring Practitioner Domenica Montes APRN-CNP (STRZ/CHF)   Diagnosis Localized edema [R60.0]    Treatment Diagnosis -M25.561  Right Knee Pain  -M25.572 Pain in left ankle  -M62.81 Generalized Muscle Weakness   -I89.0 Lymphedema, not elsewhere classified (BLE/Lower Truncal/Abdominal)   Date of Evaluation 24       Functional Outcome Measure Used Lymphedema Life Impact Scale (Version 2)   Functional Outcome Score   45 (24) - Initial eval score.     41 (24) - Progress note score      Insurance: Primary: Payor: [facility] MEDICARE /  /  / ,   Secondary:    Authorization Information:  (24: See \"Visits Allowed\") PRE CERTIFICATION REQUIRED: yes after initial eval through [facility] [phone]  INSURANCE THERAPY BENEFIT:  visits based on Medical Necessity Calendar year  AQUATIC THERAPY COVERED: yes  MODALITIES COVERED:  yes  TELEHEALTH COVERED: na  REFERENCE NUMBER: Availity   Approved Procedure Codes:  (24) 77335 - Therapeutic Exercise  , 50508 - Manual Therapy , 24319 - PT ADL Training, and 33438 - Therapeutic Activities   Visit # 6 TOTAL (Eval + Treatments)    Approved prior to auth request.      Visits Allowed: Received auth for 6 PT visits for CPT codes 51261, 80994, 17418 and 93098 from 24 through 3/30/24.  Auth#         [id number]  Tracking#  [id number]   Recertification Date: 2024 (12 weeks)   Physician Follow-Up: -Follow up appt with referring provider on .   Physician Orders: -AMB. REFERRAL TO LYMPHEDEMA CLINIC (24)      Cancer History No History of Cancer   Prior Lymphedema Treatment -Utilized

## 2024-03-01 ENCOUNTER — HOSPITAL ENCOUNTER (OUTPATIENT)
Dept: PHYSICAL THERAPY | Age: 69
Setting detail: THERAPIES SERIES
Discharge: HOME | End: 2024-03-01
Payer: MEDICARE

## 2024-03-01 PROCEDURE — 97535 SELF CARE MNGMENT TRAINING: CPT

## 2024-03-01 PROCEDURE — 97140 MANUAL THERAPY 1/> REGIONS: CPT

## 2024-03-01 NOTE — PROGRESS NOTES
(12 weeks)   Physician Follow-Up: -Follow up appt with referring provider on 02/19/204.   Physician Orders: -AMB. REFERRAL TO LYMPHEDEMA CLINIC (01/08/24)      Cancer History No History of Cancer   Prior Lymphedema Treatment -Utilized Compression Garment: Knee high compression stockings;15-20 mm Hg; Open Toes (> 1 year), alternates with using Medigrip compression stockinettes BLE (Donns 2 at a time-wife assists with donning garments/stockinettes)   -Exercise:   -Diet: Patient's wife reported that the patient is suppose to be on low sodium and 1513-9519 ronny/day diet.   -Elevation:   Additional Pertinent History    -Hypertension  -Diabetes Mellitus (Type II)  -CHF (Chronic)-Medication controlled. -Colectomy (2001): Diverticulosis  -Esophagus Dilatation  -Appendectomy  -Cardiac Surgery (Heart Cath)  -Hyperlipidemia  -History of Chest Pain  -Arthritis  -CAD   History of Present Illness -The patient reported that he developed sores and swelling on his legs (bilaterally), with an extensive history of swelling in both legs (per patient and patient's wife). Initially underwent consultation with his PCP, referred to CHF clinic, then referred to Wound Clinic (~October 2023). Home Health Care for wound care for leg wrapping-recently discharged on January 1, 2024.     SUBJECTIVE: Patient reports that he has been getting \"burning\" sensation at his LLE. Patient giving no number for pain rating this session. He had proximel silicone foam dressing at bilateral distal Les. Patient reports that wound care wants him to change the proximel dressing every three days. Patient reported that he received a call stating that his compression pump has been approved and delivered to his house.     Objective:   Patient had green/yellow drainge on LLE bandage upon removal but not active seeping noted at open area on LLE. No drainage noted at bandage removed from RLE.    Current Treatment  Conservative Lymphedema Treatment -Patient may benefit

## 2024-03-11 ENCOUNTER — HOSPITAL ENCOUNTER (OUTPATIENT)
Dept: WOUND CARE | Age: 69
Discharge: HOME | End: 2024-03-11
Attending: NURSE PRACTITIONER
Payer: MEDICARE

## 2024-03-11 VITALS
SYSTOLIC BLOOD PRESSURE: 141 MMHG | TEMPERATURE: 97.3 F | DIASTOLIC BLOOD PRESSURE: 68 MMHG | HEART RATE: 82 BPM | RESPIRATION RATE: 20 BRPM | OXYGEN SATURATION: 94 %

## 2024-03-11 PROCEDURE — 99212 OFFICE O/P EST SF 10 MIN: CPT

## 2024-03-11 NOTE — PLAN OF CARE
Problem: Wound:  Goal: Will show signs of wound healing; wound closure and no evidence of infection  Description: Will show signs of wound healing; wound closure and no evidence of infection  Outcome: Progressing   Seen today for bilateral legs. See AVS for discharge   Care plan reviewed with patient and wife.  Patient and wife verbalize understanding of the plan of care and contribute to goal setting.

## 2024-03-11 NOTE — PATIENT INSTRUCTIONS
Visit Discharge/Physician Orders:  - keep following with lymphedema clinic  - Mechanical Debridement was completed today by using a saline and gauze.   - elevate legs to reduce swelling  - take water pill as directed  -call us with any issues or any new open wounds     Wound Location: bilateral legs- healed     Dressing orders:      1) Gather wound care supplies and arrange on clean table.      2) Wash your hands with soap and water or use alcohol based hand  for 20 seconds (sing \"Happy Birthday\" twice).     3) Cleanse wounds with normal saline or wound cleanser and gauze. Pat dry with clean gauze.     4) left leg- apply betadine and cover with band aid. Change every couple days until fully healed.      Keep all dressings clean & dry.     Follow up visit: as needed    Supplies:      Duration of dressings: 30 days      We have sent your supply order to the following company:  [employer] Phone # [phone]   If you don't receive the items you were expecting or don't know what the items are that you received, call the company where the order was sent.   If you are unable to obtain wound supplies, continue to use the supplies you have available until you are able to reach us. It is most important to keep the wound covered at all times.  It is YOUR responsibility to make sure that supplies are re-ordered before you run out. Re-order telephone numbers are included in each package.      Keep next scheduled appointment. Please give 24 hour notice if unable to keep appointment. [phone]     If you experience any of the following, please call the Wound Care Service during business hours: Monday through Friday 8:00 am - 4:30 pm  ([phone]).              *Increase in pain              *Temperature over 101              *Increase in drainage from your wound or a foul odor              *Uncontrolled swelling              *Need for compression bandage changes due to slippage, breakthrough drainage     If you need

## 2024-03-11 NOTE — PROGRESS NOTES
[facility]         Consult and Procedure Note      Brandon Jacobs  MEDICAL RECORD NUMBER:  [id number]  AGE: 68 y.o.   GENDER: male  : 1955  EPISODE DATE:  3/11/2024    Subjective:     Chief Complaint   Patient presents with    Wound Check     Bilateral legs         HISTORY of PRESENT ILLNESS HPI     Brandon Jacobs is a 68 y.o. male who was referred by Bethany Saucedo CNP, for bilateral lower leg wounds and swelling.  Patient is a longstanding type II diabetic, former smoker who quit about continued ago, significant history of CHF, CAD, and hypertension.  Patient currently is being seen through home health every Monday, Wednesday, and Friday.  Upon assessment today patient's previous right lower leg wound is clinically healed.  Stable scabbing noted.  Scabbing was kept in place.  No active redness or drainage of note.  Patient still has a superficial fibrous wound to the left lateral leg.  There is no undermining of note.  Wound is approximate 80% fibrous.  Patient does have quite a bit of sensitivity to the wound.  Because of this, no debridement is indicated today.  Going forward we can have this ongoing wound treated with quarter strength Dakin's wet-to-dry with ABD pad, Kerlix and Ace bandage.  This will be changed every Monday, Wednesday, and Friday by home health.  We will continue to wrap the right lower leg to prevent reoccurring swelling with Kerlix, Ace bandage.  No new medications are prescribed today.  Patient was encouraged to elevate his lower extremities while at rest.  Patient will follow back up with us in 2 weeks for reevaluation.    10/30/23  Patient is a pleasant 68-year-old gentleman following up for ongoing superficial fibrogranular wound to the left lateral lower leg.  Patient had a previous wound to the right lower leg that was healed at previous visit.  Stable scabbing still noted today was removed in office without issue.  No new wounds noted to the

## 2024-03-13 ENCOUNTER — HOSPITAL ENCOUNTER (OUTPATIENT)
Dept: PHYSICAL THERAPY | Age: 69
Setting detail: THERAPIES SERIES
Discharge: HOME | End: 2024-03-13
Payer: MEDICARE

## 2024-03-13 PROCEDURE — 97535 SELF CARE MNGMENT TRAINING: CPT

## 2024-03-13 PROCEDURE — 97140 MANUAL THERAPY 1/> REGIONS: CPT

## 2024-03-13 NOTE — PROGRESS NOTES
[facility]  PHYSICAL THERAPY  [] LYMPHEDEMA SERVICES EVALUATION  [x] DAILY NOTE [] PROGRESS NOTE [] DISCHARGE NOTE    [x] [facility] [place]   [] [facility]    [] [facility]   [] [facility]    Date: 3/13/2024  Patient Name:  Brandon Jacobs  : 1955  MRN: [id number]  CSN: [id number]    Referring Practitioner Domenica Montes APRN-CNP (STRZ/CHF)   Diagnosis Localized edema [R60.0]    Treatment Diagnosis -M25.561  Right Knee Pain  -M25.572 Pain in left ankle  -M62.81 Generalized Muscle Weakness   -I89.0 Lymphedema, not elsewhere classified (BLE/Lower Truncal/Abdominal)   Date of Evaluation 24       Functional Outcome Measure Used Lymphedema Life Impact Scale (Version 2)   Functional Outcome Score   45 (24) - Initial eval score.     41 (24) - Progress note score      Insurance: Primary: Payor: [facility] MEDICARE /  /  / ,   Secondary:    Authorization Information:  (24: See \"Visits Allowed\") PRE CERTIFICATION REQUIRED: yes after initial eval through [facility] [phone]  INSURANCE THERAPY BENEFIT:  visits based on Medical Necessity Calendar year  AQUATIC THERAPY COVERED: yes  MODALITIES COVERED:  yes  TELEHEALTH COVERED: na  REFERENCE NUMBER: Availity    Received auth for 4 PT visits for CPT codes 34342, 10043, 17070 and 40580 from 3/4/24 through 24.    Approved Procedure Codes:  (24) 99176 - Therapeutic Exercise  , 68336 - Manual Therapy , 97709 - PT ADL Training, and 25618 - Therapeutic Activities   Visit # 8 TOTAL (Eval + Treatments)    Approved with auth      Visits Allowed: Received auth for 6 PT visits for CPT codes 67437, 11646, 64033 and 12877 from 24 through 3/30/24.  Auth#         [id number]  Tracking#  [id number]  Received auth for 4 PT visits for CPT codes 48359, 76828, 93189 and 06440 from 3/4/24 through 24.    Recertification Date: 2024 (12 weeks)   Physician Follow-Up:

## 2024-03-19 ENCOUNTER — HOSPITAL ENCOUNTER (OUTPATIENT)
Dept: PHYSICAL THERAPY | Age: 69
Setting detail: THERAPIES SERIES
Discharge: HOME | End: 2024-03-19
Payer: MEDICARE

## 2024-03-19 PROCEDURE — 97140 MANUAL THERAPY 1/> REGIONS: CPT

## 2024-03-19 NOTE — PROGRESS NOTES
[facility]  PHYSICAL THERAPY  [] LYMPHEDEMA SERVICES EVALUATION  [x] DAILY NOTE [] PROGRESS NOTE [] DISCHARGE NOTE    [x] [facility] [place]   [] [place] [facility]    [] [facility]   [] [facility]    Date: 3/19/2024  Patient Name:  Brandon Jacobs  : 1955  MRN: [id number]  CSN: [id number]    Referring Practitioner Domenica Montes APRN-CNP (STRZ/CHF)   Diagnosis Localized edema [R60.0]    Treatment Diagnosis -M25.561  Right Knee Pain  -M25.572 Pain in left ankle  -M62.81 Generalized Muscle Weakness   -I89.0 Lymphedema, not elsewhere classified (BLE/Lower Truncal/Abdominal)   Date of Evaluation 24       Functional Outcome Measure Used Lymphedema Life Impact Scale (Version 2)   Functional Outcome Score   45 (24) - Initial eval score.     41 (24) - Progress note score      Insurance: Primary: Payor: [facility] MEDICARE /  /  / ,   Secondary:    Authorization Information:  (24: See \"Visits Allowed\") PRE CERTIFICATION REQUIRED: yes after initial eval through [facility] [phone]  INSURANCE THERAPY BENEFIT:  visits based on Medical Necessity Calendar year  AQUATIC THERAPY COVERED: yes  MODALITIES COVERED:  yes  TELEHEALTH COVERED: na  REFERENCE NUMBER: Availity    Received auth for 4 PT visits for CPT codes 05166, 69172, 65702 and 38280 from 3/4/24 through 24.    Approved Procedure Codes:  (24) 27073 - Therapeutic Exercise  , 74292 - Manual Therapy , 52936 - PT ADL Training, and 62228 - Therapeutic Activities   Visit # 9 TOTAL (Eval + Treatments)    Approved with auth      Visits Allowed: Received auth for 6 PT visits for CPT codes 23470, 51394, 02628 and 59151 from 24 through 3/30/24.  Auth#         [id number]  Tracking#  [id number]  Received auth for 4 PT visits for CPT codes 64453, 87152, 84335 and 39811 from 3/4/24 through 24.    Recertification Date: 2024 (12 weeks)   Physician Follow-Up:

## 2024-03-21 LAB
ANION GAP SERPL CALCULATED.3IONS-SCNC: 10 MMOL/L (ref 4–12)
BUN BLDV-MCNC: 59 MG/DL (ref 7–20)
CALCIUM SERPL-MCNC: 9.1 MG/DL (ref 8.8–10.5)
CHLORIDE BLD-SCNC: 97 MEQ/L (ref 101–111)
CO2: 29 MEQ/L (ref 21–32)
CREAT SERPL-MCNC: 2.44 MG/DL (ref 0.6–1.3)
CREATININE CLEARANCE: 26
GLUCOSE: 144 MG/DL (ref 70–110)
POTASSIUM SERPL-SCNC: 4.3 MEQ/L (ref 3.6–5)
SODIUM BLD-SCNC: 136 MEQ/L (ref 135–145)

## 2024-03-22 ENCOUNTER — TELEPHONE (OUTPATIENT)
Dept: CARDIOLOGY CLINIC | Age: 69
End: 2024-03-22

## 2024-03-22 NOTE — TELEPHONE ENCOUNTER
----- Message from FELI Shultz - CNP sent at 3/22/2024  8:02 AM EDT -----  Labs reviewed - w/ in baseline - f/u with nephrology in May, no changes today

## 2024-03-25 ENCOUNTER — HOSPITAL ENCOUNTER (OUTPATIENT)
Dept: PHYSICAL THERAPY | Age: 69
Setting detail: THERAPIES SERIES
Discharge: HOME | End: 2024-03-25
Payer: MEDICARE

## 2024-03-25 PROCEDURE — 97140 MANUAL THERAPY 1/> REGIONS: CPT

## 2024-03-25 PROCEDURE — 97535 SELF CARE MNGMENT TRAINING: CPT

## 2024-03-25 NOTE — PROGRESS NOTES
[facility]  PHYSICAL THERAPY  [] LYMPHEDEMA SERVICES EVALUATION  [x] DAILY NOTE [] PROGRESS NOTE [] DISCHARGE NOTE    [x] [facility] [place]   [] [facility]    [] [facility]   [] [facility]    Date: 3/25/2024  Patient Name:  Brandon Jacobs  : 1955  MRN: [id number]  CSN: [id number]    Referring Practitioner Domenica Montes APRN-CNP (STRZ/CHF)   Diagnosis Localized edema [R60.0]    Treatment Diagnosis -M25.561  Right Knee Pain  -M25.572 Pain in left ankle  -M62.81 Generalized Muscle Weakness   -I89.0 Lymphedema, not elsewhere classified (BLE/Lower Truncal/Abdominal)   Date of Evaluation 24       Functional Outcome Measure Used Lymphedema Life Impact Scale (Version 2)   Functional Outcome Score   45 (24) - Initial eval score.     41 (24) - Progress note score      Insurance: Primary: Payor: [facility] MEDICARE /  /  / ,   Secondary:    Authorization Information:  (24: See \"Visits Allowed\") PRE CERTIFICATION REQUIRED: yes after initial eval through [facility] [phone]  INSURANCE THERAPY BENEFIT:  visits based on Medical Necessity Calendar year  AQUATIC THERAPY COVERED: yes  MODALITIES COVERED:  yes  TELEHEALTH COVERED: na  REFERENCE NUMBER: Availity    Received auth for 4 PT visits for CPT codes 21124, 76654, 48117 and 10226 from 3/4/24 through 24.    Approved Procedure Codes:  (24) 89025 - Therapeutic Exercise  , 45008 - Manual Therapy , 80614 - PT ADL Training, and 82696 - Therapeutic Activities   Visit # 9 TOTAL (Eval + Treatments)    Approved with auth      Visits Allowed: Received auth for 6 PT visits for CPT codes 70875, 39364, 05853 and 48605 from 24 through 3/30/24.  Auth#         [id number]  Tracking#  [id number]  Received auth for 4 PT visits for CPT codes 96329, 77150, 61019 and 51876 from 3/4/24 through 24.    Recertification Date: 2024 (12 weeks)   Physician Follow-Up:

## 2024-03-28 ENCOUNTER — HOSPITAL ENCOUNTER (OUTPATIENT)
Dept: PHYSICAL THERAPY | Age: 69
Setting detail: THERAPIES SERIES
Discharge: HOME | End: 2024-03-28
Payer: MEDICARE

## 2024-03-28 PROCEDURE — 97140 MANUAL THERAPY 1/> REGIONS: CPT

## 2024-03-28 PROCEDURE — 97535 SELF CARE MNGMENT TRAINING: CPT

## 2024-03-28 NOTE — PROGRESS NOTES
well  []  Patient limited by fatigue  []  Patient limited by pain   []  Patient limited by medical complications  []  Other:     Patient Education:   [x]  HEP/Education Completed: Refer to \"Current Treatment\" above.  Preclick Access Code:  []  No new Education completed  [x]  Reviewed Prior HEP      [x]  Patient verbalized and/or demonstrated understanding of education provided.  []  Patient unable to verbalize and/or demonstrate understanding of education provided.  Will continue education.  [x]  Barriers to learning: Physical.    Assessment:     GOALS:  Patient Goal: \"I want to get my legs back so I can get back to doing what I need to do for myself\" per patient.   Short Term Goals:  Time Frame: 6 weeks.  *1) REVISED GOAL # 1-02/22/24/MS) Patient will demonstrate a decrease of an additional 10 cm in comparison to measurements taken during progress note working towards being able to obtain new compression garments).  REVISED GOAL # 1 (LEFT LE) NOT MET: ONGOING.  REVISED GOAL # 1 (RIGHT LE) MET: Decreased by 11.0 cm in comparison to previous measurements.    Long Term Goals:  Time Frame: 12 weeks.  *2) Patient/family/caregiver will demonstrate donning/doffing compression garments with modified independence to wear compression garments daily to keep swelling down.  (NOT MET: ONGOING)  *3) Patient/family/caregiver will verbalize a good understanding regarding proper wearing and replacement schedule, precautions and care of garment(s).  (NOT MET: ONGOING)    PLAN:  []  Plan of care continued.  Plan to see patient 2 times per week for 12 weeks to address the treatment planned outlined above.  Treatment may be decreased as appropriate according to patient need.    []  Referral for Sequential Compression Pump  []  Continue with current plan of care  [x]  Modify plan of care as follows:  (1x every other week for 8 weeks-Decreasing treatment frequency as appropriate).  []  Hold pending physician visit  []  Discharge    THE

## 2024-04-12 ENCOUNTER — HOSPITAL ENCOUNTER (OUTPATIENT)
Dept: INFUSION THERAPY | Age: 69
Discharge: HOME | End: 2024-04-12
Payer: MEDICARE

## 2024-04-12 ENCOUNTER — OFFICE VISIT (OUTPATIENT)
Dept: ONCOLOGY | Age: 69
End: 2024-04-12
Payer: MEDICARE

## 2024-04-12 VITALS
HEART RATE: 79 BPM | RESPIRATION RATE: 18 BRPM | TEMPERATURE: 98.3 F | SYSTOLIC BLOOD PRESSURE: 126 MMHG | BODY MASS INDEX: 37.25 KG/M2 | DIASTOLIC BLOOD PRESSURE: 60 MMHG | HEIGHT: 63 IN | OXYGEN SATURATION: 95 % | WEIGHT: 210.2 LBS

## 2024-04-12 VITALS
DIASTOLIC BLOOD PRESSURE: 60 MMHG | HEART RATE: 79 BPM | RESPIRATION RATE: 18 BRPM | OXYGEN SATURATION: 95 % | SYSTOLIC BLOOD PRESSURE: 126 MMHG | TEMPERATURE: 98.3 F

## 2024-04-12 DIAGNOSIS — E53.8 VITAMIN B12 DEFICIENCY: ICD-10-CM

## 2024-04-12 DIAGNOSIS — D64.9 NORMOCYTIC ANEMIA: ICD-10-CM

## 2024-04-12 DIAGNOSIS — D50.0 IRON DEFICIENCY ANEMIA DUE TO CHRONIC BLOOD LOSS: ICD-10-CM

## 2024-04-12 DIAGNOSIS — D64.9 NORMOCYTIC ANEMIA: Primary | ICD-10-CM

## 2024-04-12 DIAGNOSIS — K52.9 INFLAMMATORY BOWEL DISEASE: ICD-10-CM

## 2024-04-12 LAB
BASOPHILS ABSOLUTE: 0.1 THOU/MM3 (ref 0–0.1)
BASOPHILS NFR BLD AUTO: 1 % (ref 0–3)
EOSINOPHIL NFR BLD AUTO: 4 % (ref 0–4)
EOSINOPHILS ABSOLUTE: 0.4 THOU/MM3 (ref 0–0.4)
ERYTHROCYTE [DISTWIDTH] IN BLOOD BY AUTOMATED COUNT: 14.6 % (ref 11.5–14.5)
FERRITIN SERPL IA-MCNC: 487 NG/ML (ref 22–322)
FOLATE SERPL-MCNC: 5.8 NG/ML (ref 4.8–24.2)
HCT VFR BLD AUTO: 32.1 % (ref 42–52)
HGB BLD-MCNC: 10.9 GM/DL (ref 14–18)
IMMATURE GRANULOCYTES %: 1 %
IMMATURE GRANULOCYTES ABSOLUTE: 0.05 THOU/MM3 (ref 0–0.07)
IRON SERPL-MCNC: 41 UG/DL (ref 65–195)
LYMPHOCYTES ABSOLUTE: 1.3 THOU/MM3 (ref 1–4.8)
LYMPHOCYTES NFR BLD AUTO: 11 % (ref 15–47)
MCH RBC QN AUTO: 31.1 PG (ref 26–33)
MCHC RBC AUTO-ENTMCNC: 34 GM/DL (ref 32.2–35.5)
MCV RBC AUTO: 92 FL (ref 80–94)
MONOCYTES ABSOLUTE: 1 THOU/MM3 (ref 0.4–1.3)
MONOCYTES NFR BLD AUTO: 9 % (ref 0–12)
NEUTROPHILS NFR BLD AUTO: 75 % (ref 43–75)
PLATELET # BLD AUTO: 242 THOU/MM3 (ref 130–400)
PMV BLD AUTO: 9.8 FL (ref 9.4–12.4)
RBC # BLD AUTO: 3.51 MILL/MM3 (ref 4.7–6.1)
SEGMENTED NEUTROPHILS ABSOLUTE COUNT: 8.2 THOU/MM3 (ref 1.8–7.7)
TIBC SERPL-MCNC: 206 UG/DL (ref 171–450)
VIT B12 SERPL-MCNC: 910 PG/ML (ref 211–911)
WBC # BLD AUTO: 11 THOU/MM3 (ref 4.8–10.8)

## 2024-04-12 PROCEDURE — 1124F ACP DISCUSS-NO DSCNMKR DOCD: CPT | Performed by: PHYSICIAN ASSISTANT

## 2024-04-12 PROCEDURE — 99213 OFFICE O/P EST LOW 20 MIN: CPT | Performed by: PHYSICIAN ASSISTANT

## 2024-04-12 PROCEDURE — 83540 ASSAY OF IRON: CPT

## 2024-04-12 PROCEDURE — 3078F DIAST BP <80 MM HG: CPT | Performed by: PHYSICIAN ASSISTANT

## 2024-04-12 PROCEDURE — 3074F SYST BP LT 130 MM HG: CPT | Performed by: PHYSICIAN ASSISTANT

## 2024-04-12 PROCEDURE — 82607 VITAMIN B-12: CPT

## 2024-04-12 PROCEDURE — 99211 OFF/OP EST MAY X REQ PHY/QHP: CPT

## 2024-04-12 PROCEDURE — 82746 ASSAY OF FOLIC ACID SERUM: CPT

## 2024-04-12 PROCEDURE — 82728 ASSAY OF FERRITIN: CPT

## 2024-04-12 PROCEDURE — 83550 IRON BINDING TEST: CPT

## 2024-04-12 PROCEDURE — 85025 COMPLETE CBC W/AUTO DIFF WBC: CPT

## 2024-04-12 PROCEDURE — 36415 COLL VENOUS BLD VENIPUNCTURE: CPT

## 2024-04-12 NOTE — PROGRESS NOTES
[facility] [id number], [address]  [facility] [place] [facility] [address]  Dept: [phone]  Dept Fax: [phone] Loc: [phone]      Visit Date:4/12/2024     Brandon Jacobs is a 68 y.o. male who presents today for:   Chief Complaint   Patient presents with    Follow-up     Normocytic anemia          HPI:   Brandon Jacobs is a 68 y.o. male referred to Hematology/Oncology clinic for evaluation of anemia per Hospitalist DERICK Damon. He was seen as a new patient on 4/10/2023. The patient was hospitalized at [facility] 4/3/23 to 4/6/23.  He presented to the ED with worsening shortness of breath.  He was noted to have a hemoglobin 7.4 and on recheck 6.8 on 4/3/2023.  He received 1 unit of packed red blood cells and 2 doses of IV Venofer during hospitalization.  He was also treated for acute on chronic HALEIGH, metabolic acidosis.  On 4/6/2023 hemoglobin 9.5, hematocrit 29.6, MCV 92.8. Per chart review of EMR, the patient has had chronic anemia since at least 2012.  Most recent baseline hemoglobin 9-10's since 2021.  Last CBC prior to hospitalization hemoglobin 9.4 on 10/13/2022.  Iron studies during hospitalization showed ferritin 50, iron 21, TIBC 275.  The patient was evaluated by GI during hospitalization.  He had positive occult blood.  He does have known history of Crohn's disease and is status post partial colectomy.  He was previously treated with Entyvio with last infusion in September 2021.  He was lost to follow-up.  He has follow-up with GI in 3 weeks and was recommended outpatient EGD.  The patient is here with his wife today who helps provide history.  The patient affirms prior history of iron deficiency anemia and previously followed with Dr. Moffett in early 2000's.  He states he received IV iron infusions in vitamin B12 injections at that time.  He denies any abnormal bleeding.  Denies epistaxis, hemoptysis, hematemesis, melena, hematochezia, hematuria.  He reports

## 2024-04-12 NOTE — PATIENT INSTRUCTIONS
Return to clinic in 3 months  Labs on RTC: CBC, ferritin, iron, TIBC, folate, vitamin B12    Please call for questions or concerns.

## 2024-05-01 ENCOUNTER — HOSPITAL ENCOUNTER (OUTPATIENT)
Dept: PHYSICAL THERAPY | Age: 69
Setting detail: THERAPIES SERIES
Discharge: HOME | End: 2024-05-01
Payer: MEDICARE

## 2024-05-01 PROCEDURE — 97140 MANUAL THERAPY 1/> REGIONS: CPT

## 2024-05-01 PROCEDURE — 97535 SELF CARE MNGMENT TRAINING: CPT

## 2024-05-01 NOTE — PROGRESS NOTES
Patient/family/caregiver will verbalize a good understanding regarding proper wearing and replacement schedule, precautions and care of garment(s).  (MET)    PLAN:  []  Plan of care continued.  Plan to see patient 2 times per week for 12 weeks to address the treatment planned outlined above.  Treatment may be decreased as appropriate according to patient need.    []  Referral for Sequential Compression Pump  []  Continue with current plan of care  []  Modify plan of care as follows:  (1x every other week for 8 weeks-Decreasing treatment frequency as appropriate).  []  Hold pending physician visit  [x]  Discharge: THE PATIENT IS BEING DISCHARGED FROM THE LYMPHEDEMA PROGRAM/CLINIC AT THIS TIME.  HE WILL CONTINUE WITH HIS HOME PROGRAM WHICH INCLUDES: MANUAL LYMPH DRAINAGE (MLD)-USING SEQUENTIAL COMPRESSION PUMP, SKIN CARE, COMPRESSION-MEDIGRIP COMPRESSION STOCKINETTES AND EXERCISING/WALKING PROGRAM WITH WALKER FOR SUPPORT.     Time In 0950   Time Out 1045   Timed Code Minutes: 55 min   Total Treatment Time: 55 min     THANK YOU FELI ZARAGOZA-CNP FOR ALLOWING US TO ASSIST IN THE CARE AND TREATMENT OF THIS PATIENT!    Electronically Signed by: Pippa Romano, PT#[id number], TAYLOR-PRIETO ALFARO   on 5/1/2024

## 2024-05-02 NOTE — PROGRESS NOTES
The RCA is a large vessel, dominant vessel.  Gives rise to circumflex and the  LAD.  It does have 20 percent diffuse disease but in the mid portion there is  a lesion of about 50-60 percent compared.     The left main is a large vessel.  Gives rise to circumflex and the LAD without  any critical lesion.     Circumflex is a large vessel, has about 20 percent lesion, proximal lesion,  and has mild luminal irregularity and it gives rise to OM-1 which is a large  caliber vessel and OM-2 which is a medium caliber vessel without any critical  lesion.       OM-1 is a large caliber vessel with mild luminal irregularity.     OM-2 is a moderate caliber vessel with mild luminal irregularity.       The LAD is a calcified vessel which has mild 10-20 percent lesion.     The diagonal is a large vessel which demonstrates, at the most, 10-20 percent  lesion.      CONCLUSION:    1.  The left main is a large vessel, widely patent.  2.  The circumflex has 20 percent lesion, large vessel, noncritical.  3.  OM-1 mild luminal irregularity.  4.  OM-2 mild luminal irregularity.  5.  The LAD is calcified with mild luminal irregularity at the most 10-20  percent lesion.  6.  The diagonal is a large vessel with mild luminal irregularity.  7.  LVEDP was 20 indicative of mild impaired relaxation of the ventricle.  8.  No complication occurred.  9.  Continue aggressive risk factor modification.  10.  The RCA, like I stated, has about mid portion, about 50-60 percent   case reviewed with dr doe.  11.  Again, no complication occurred.  12.  Good hemostasis obtained post procedure.  13.  Continue with aggressive risk factor modification and anti-ischemic  medication with lipid management.  14.  No driving or lifting for the next 3 days.        Stress Test:7/2022  Imaging Results:Calculated gated LVEF 63 %.  The T.I.D. ratio was .97 .  There was a moderate sized, moderate in intensity, fixed myocardial  perfusion defect of the inferior

## 2024-05-03 ENCOUNTER — OFFICE VISIT (OUTPATIENT)
Dept: CARDIOLOGY CLINIC | Age: 69
End: 2024-05-03
Payer: MEDICARE

## 2024-05-03 VITALS
HEART RATE: 75 BPM | SYSTOLIC BLOOD PRESSURE: 137 MMHG | DIASTOLIC BLOOD PRESSURE: 66 MMHG | BODY MASS INDEX: 36.14 KG/M2 | WEIGHT: 204 LBS | HEIGHT: 63 IN

## 2024-05-03 DIAGNOSIS — I51.89 GRADE I DIASTOLIC DYSFUNCTION: Primary | ICD-10-CM

## 2024-05-03 PROCEDURE — 1124F ACP DISCUSS-NO DSCNMKR DOCD: CPT | Performed by: INTERNAL MEDICINE

## 2024-05-03 PROCEDURE — 99214 OFFICE O/P EST MOD 30 MIN: CPT | Performed by: INTERNAL MEDICINE

## 2024-05-03 PROCEDURE — 3078F DIAST BP <80 MM HG: CPT | Performed by: INTERNAL MEDICINE

## 2024-05-03 PROCEDURE — 3075F SYST BP GE 130 - 139MM HG: CPT | Performed by: INTERNAL MEDICINE

## 2024-05-03 RX ORDER — SIMVASTATIN 20 MG
20 TABLET ORAL NIGHTLY
Qty: 90 TABLET | Refills: 5 | Status: SHIPPED | OUTPATIENT
Start: 2024-05-03

## 2024-05-03 RX ORDER — CARVEDILOL 25 MG/1
25 TABLET ORAL 2 TIMES DAILY
Qty: 180 TABLET | Refills: 3 | Status: SHIPPED | OUTPATIENT
Start: 2024-05-03

## 2024-05-03 RX ORDER — AMLODIPINE BESYLATE 10 MG/1
10 TABLET ORAL DAILY
Qty: 90 TABLET | Refills: 3 | Status: SHIPPED | OUTPATIENT
Start: 2024-05-03

## 2024-05-07 DIAGNOSIS — M10.9 GOUT, UNSPECIFIED CAUSE, UNSPECIFIED CHRONICITY, UNSPECIFIED SITE: ICD-10-CM

## 2024-05-07 DIAGNOSIS — N18.32 STAGE 3B CHRONIC KIDNEY DISEASE (HCC): ICD-10-CM

## 2024-05-07 RX ORDER — ALLOPURINOL 100 MG/1
100 TABLET ORAL DAILY
Qty: 90 TABLET | Refills: 1 | Status: SHIPPED | OUTPATIENT
Start: 2024-05-07

## 2024-05-07 NOTE — TELEPHONE ENCOUNTER
Requesting refill for Allopurinol 100 mg 1 tablet daily to be sent to [facility] in [facility] with 90 day supply.

## 2024-05-23 LAB
ANION GAP SERPL CALCULATED.3IONS-SCNC: 10 MMOL/L (ref 4–12)
BUN BLDV-MCNC: 58 MG/DL (ref 7–20)
CALCIUM SERPL-MCNC: 8.5 MG/DL (ref 8.8–10.5)
CHLORIDE BLD-SCNC: 99 MEQ/L (ref 101–111)
CO2: 28 MEQ/L (ref 21–32)
CREAT SERPL-MCNC: 2.53 MG/DL (ref 0.6–1.3)
CREATININE CLEARANCE: 25
CREATININE, RANDOM URINE: 27.5 MG/DL
GLUCOSE: 130 MG/DL (ref 70–110)
PARATHYROID HORMONE INTACT: 50.4 U/ML (ref 12–88)
POTASSIUM SERPL-SCNC: 3.8 MEQ/L (ref 3.6–5)
PROTEIN, URINE, RANDOM: 8.1 MG/DL
PROTEIN/CREAT RATIO: 0.29 G/1.73M2
SODIUM BLD-SCNC: 137 MEQ/L (ref 135–145)
VITAMIN D 25-HYDROXY: 44.7 NG/ML (ref 30–100)

## 2024-05-30 ENCOUNTER — OFFICE VISIT (OUTPATIENT)
Dept: NEPHROLOGY | Age: 69
End: 2024-05-30
Payer: MEDICARE

## 2024-05-30 VITALS
SYSTOLIC BLOOD PRESSURE: 114 MMHG | HEART RATE: 81 BPM | BODY MASS INDEX: 36.32 KG/M2 | OXYGEN SATURATION: 97 % | WEIGHT: 205 LBS | HEIGHT: 63 IN | DIASTOLIC BLOOD PRESSURE: 60 MMHG

## 2024-05-30 DIAGNOSIS — E55.9 VITAMIN D DEFICIENCY: ICD-10-CM

## 2024-05-30 DIAGNOSIS — I10 PRIMARY HYPERTENSION: ICD-10-CM

## 2024-05-30 DIAGNOSIS — E78.5 DYSLIPIDEMIA: ICD-10-CM

## 2024-05-30 DIAGNOSIS — N18.32 STAGE 3B CHRONIC KIDNEY DISEASE (HCC): ICD-10-CM

## 2024-05-30 DIAGNOSIS — K50.00 CROHN'S DISEASE OF SMALL INTESTINE WITHOUT COMPLICATION (HCC): ICD-10-CM

## 2024-05-30 DIAGNOSIS — N18.4 CKD (CHRONIC KIDNEY DISEASE), STAGE IV (HCC): Primary | ICD-10-CM

## 2024-05-30 DIAGNOSIS — E11.21 DIABETIC NEPHROPATHY ASSOCIATED WITH TYPE 2 DIABETES MELLITUS (HCC): ICD-10-CM

## 2024-05-30 DIAGNOSIS — N25.81 HYPERPARATHYROIDISM, SECONDARY RENAL (HCC): ICD-10-CM

## 2024-05-30 DIAGNOSIS — M10.9 GOUT, UNSPECIFIED CAUSE, UNSPECIFIED CHRONICITY, UNSPECIFIED SITE: ICD-10-CM

## 2024-05-30 DIAGNOSIS — R80.9 PROTEINURIA, UNSPECIFIED TYPE: ICD-10-CM

## 2024-05-30 PROCEDURE — 1124F ACP DISCUSS-NO DSCNMKR DOCD: CPT | Performed by: INTERNAL MEDICINE

## 2024-05-30 PROCEDURE — 3078F DIAST BP <80 MM HG: CPT | Performed by: INTERNAL MEDICINE

## 2024-05-30 PROCEDURE — 99214 OFFICE O/P EST MOD 30 MIN: CPT | Performed by: INTERNAL MEDICINE

## 2024-05-30 PROCEDURE — 3074F SYST BP LT 130 MM HG: CPT | Performed by: INTERNAL MEDICINE

## 2024-05-30 RX ORDER — LANCING DEVICE
EACH MISCELLANEOUS
COMMUNITY
Start: 2024-04-15

## 2024-05-30 RX ORDER — BENZONATATE 200 MG/1
CAPSULE ORAL
COMMUNITY
Start: 2024-03-04

## 2024-05-30 RX ORDER — ALLOPURINOL 100 MG/1
100 TABLET ORAL DAILY
Qty: 90 TABLET | Refills: 1 | Status: SHIPPED | OUTPATIENT
Start: 2024-05-30

## 2024-05-30 RX ORDER — BLOOD SUGAR DIAGNOSTIC
STRIP MISCELLANEOUS
COMMUNITY
Start: 2024-04-15

## 2024-05-30 NOTE — PROGRESS NOTES
He has been going to the wound care clinic and edema clinic but now things are WNL. He has a machine with sleeves at home that he uses for edema 5 days a week for one hour at a time.  He will need refills on Allopurinol.  He will start the Humira again tomorrow.

## 2024-05-30 NOTE — PROGRESS NOTES
Renal Progress Note    Assessment and Plan:      Diagnosis Orders   1. CKD (chronic kidney disease), stage IV ([facility])  Basic Metabolic Panel      2. Diabetic nephropathy associated with type 2 diabetes mellitus ([facility])  Protein / creatinine ratio, urine      3. Primary hypertension        4. Dyslipidemia        5. Gout, unspecified cause, unspecified chronicity, unspecified site  allopurinol (ZYLOPRIM) 100 MG tablet      6. Stage 3b chronic kidney disease (HCC)  allopurinol (ZYLOPRIM) 100 MG tablet      7. Vitamin D deficiency  Vitamin D 25 Hydroxy      8. Hyperparathyroidism, secondary renal ([facility])  PTH, Intact      9. Proteinuria, unspecified type        10. Crohn's disease of small intestine without complication ([facility])                  PLAN:  Lab result reviewed with the patient and spouse  They understood very well  I addressed their questions   serum creatinine is slightly increased to 2.53 mg/dL from 2.44 mg/g March 2024 but is improved from 2.69 mg/dL 2 weeks ago  PTH is good at 50.4  Vitamin D level is good at 44.7  Urine protein creatinine ratio is 0.29 g  Medications reviewed  No changes  Refills provided for [id number] mg once a day  I encouraged him to drink plenty of fluid within his diarrhea resulting from Crohns disease.  Return visit in 3 months with labs          Patient Active Problem List   Diagnosis    Hyperlipidemia    Diabetes mellitus ([facility])    History of tobacco abuse    History of chest pain    Diverticulosis of colon    Arthritis    CAD (coronary artery disease)    Essential hypertension    Coronary artery disease involving native coronary artery of native heart without angina pectoris    Acute combined systolic and diastolic CHF, NYHA class 4 ([facility])    Congestive heart failure (HCC)    COVID-19    HALEIGH (acute kidney injury) ([facility])    Chronic diastolic heart failure ([facility])    Normocytic anemia    Inflammatory bowel disease    Iron deficiency anemia due to chronic blood loss    Venous stasis ulcer

## 2024-06-04 ENCOUNTER — OFFICE VISIT (OUTPATIENT)
Dept: CARDIOLOGY CLINIC | Age: 69
End: 2024-06-04
Payer: MEDICARE

## 2024-06-04 VITALS
WEIGHT: 205.6 LBS | BODY MASS INDEX: 36.43 KG/M2 | DIASTOLIC BLOOD PRESSURE: 60 MMHG | HEART RATE: 69 BPM | OXYGEN SATURATION: 95 % | SYSTOLIC BLOOD PRESSURE: 118 MMHG | HEIGHT: 63 IN

## 2024-06-04 DIAGNOSIS — I51.89 GRADE I DIASTOLIC DYSFUNCTION: ICD-10-CM

## 2024-06-04 DIAGNOSIS — R60.0 EDEMA OF BOTH LOWER LEGS: ICD-10-CM

## 2024-06-04 DIAGNOSIS — I50.32 CHRONIC DIASTOLIC CONGESTIVE HEART FAILURE, NYHA CLASS 2 (HCC): Primary | ICD-10-CM

## 2024-06-04 DIAGNOSIS — D50.0 IRON DEFICIENCY ANEMIA DUE TO CHRONIC BLOOD LOSS: ICD-10-CM

## 2024-06-04 DIAGNOSIS — G47.33 OSA ON CPAP: ICD-10-CM

## 2024-06-04 PROCEDURE — 3078F DIAST BP <80 MM HG: CPT | Performed by: NURSE PRACTITIONER

## 2024-06-04 PROCEDURE — 3074F SYST BP LT 130 MM HG: CPT | Performed by: NURSE PRACTITIONER

## 2024-06-04 PROCEDURE — 1124F ACP DISCUSS-NO DSCNMKR DOCD: CPT | Performed by: NURSE PRACTITIONER

## 2024-06-04 PROCEDURE — 99214 OFFICE O/P EST MOD 30 MIN: CPT | Performed by: NURSE PRACTITIONER

## 2024-06-04 RX ORDER — METOLAZONE 2.5 MG/1
2.5 TABLET ORAL
Qty: 24 TABLET | Refills: 1 | Status: SHIPPED | OUTPATIENT
Start: 2024-06-06

## 2024-06-04 RX ORDER — POTASSIUM CHLORIDE 20 MEQ/1
TABLET, EXTENDED RELEASE ORAL
Qty: 24 TABLET | Refills: 1 | Status: SHIPPED | OUTPATIENT
Start: 2024-06-04

## 2024-06-04 RX ORDER — POTASSIUM CHLORIDE 20 MEQ/1
TABLET, EXTENDED RELEASE ORAL
Qty: 180 TABLET | Refills: 12 | Status: SHIPPED | OUTPATIENT
Start: 2024-06-04

## 2024-06-04 ASSESSMENT — ENCOUNTER SYMPTOMS
ABDOMINAL DISTENTION: 0
SHORTNESS OF BREATH: 1
VOMITING: 0
NAUSEA: 0
COUGH: 0

## 2024-06-04 NOTE — PATIENT INSTRUCTIONS
You may receive a survey regarding the care you received during your visit.  Your input is valuable to us.  We encourage you to complete and return your survey.  We hope you will choose us in the future for your healthcare needs.    Your nurses today were Shashank.  Office hours:   Mon-Thurs 8-4:30  Friday 8-12  Phone: [phone]    Continue:  Continue current medications  Daily weights and record  Fluid restriction of 2 Liters per day  Limit sodium in diet to around 2278-9244 mg/day  Monitor BP  Activity as tolerated     Call the Heart Failure Clinic for any of the following symptoms:   Weight gain of -3 pounds in 1 day or 5 pounds in 1 week  Increased shortness of breath  Shortness of breath while laying down  Cough  Chest pain  Swelling in feet, ankles or legs  Bloating in abdomen  Fatigue      No medication changes today    Continue diet/fluid adherence  Start daily weights  F/U w/ Cardiology  F/U in clinic in 6 months

## 2024-06-04 NOTE — PROGRESS NOTES
Heart Failure Clinic       Visit Date: 6/4/2024  Cardiologist:  Dr. Gomez  Primary Care Physician: Rajeev South, APRN - CNP    Brandon Jacobs is a 68 y.o. male who presents today for:  Chief Complaint   Patient presents with    Congestive Heart Failure       HPI:     TYPE HF: HFpEF (55-60% 2023) (40-45% 2019) DD grade 1  Cause: nonischemic  Device: none  HX: CAD (nonobstructive), DM, HLD, HTN  Dry Wt:  (226 on 10/3/22) (230 on 1/11/23) (228 on 3/8/23) ) (224 on 3/31/23) (223 on 5/2/23) (219 on 6/15/23) (217 on 9/20/23) (210 on 10/23/23) (223 on 12/20/23) (223 on 1/5/24) (214 on 2/19/24) (205 on 6/4/24)    Brandon Jacobs is a 68 y.o. male who presents to the office for a follow up patient visit in the heart failure clinic.      Concerns today: 3 month f/u. Weight is down 9lb, total of 18lbs. Denies worsening SOB, bloating, and orthopnea, notes continue improvement of his leg swelling. Doing better with his Na. Urinating well on it.     Visit on 2/19/24: 6 week f/u. Weight is down 9 lbs. Continues with wound care and lymphedema clinic. He continues to urinate well. Continues with poor diet. SOB is at baseline. Denies bloating or nocturnal dyspnea. Notes improvement of his leg swelling.     Visit on 1/5/24: 4 week f/u. Weight and swelling are unchanged. Still not restricting his Na. Continues at wound care but may be discharged Monday. Compression socks are on but need to be tighter per wound care. Denies worsening SOB. Denies bloating or nocturnal dyspnea.     Visit on 12/20/23:  8 week f/u. Weight is up 13 since last visit. He has been over eating over his Na level- half a bag of chips a day and over his 2L/day. He does urinate well on his diuretics. Does note worsening lower leg swelling. Denies worsening SOB, bloating or orthopnea. Wearing his CPAP nightly.         Activity: ADLs performed, FOSTER w/ long distances  Diet: does not follow low sodium diet, does not pay attention to fluid but

## 2024-06-24 RX ORDER — METOLAZONE 2.5 MG/1
TABLET ORAL
Qty: 24 TABLET | Refills: 0 | OUTPATIENT
Start: 2024-06-24

## 2024-07-15 ENCOUNTER — OFFICE VISIT (OUTPATIENT)
Dept: ONCOLOGY | Age: 69
End: 2024-07-15
Payer: MEDICARE

## 2024-07-15 ENCOUNTER — HOSPITAL ENCOUNTER (OUTPATIENT)
Dept: INFUSION THERAPY | Age: 69
Discharge: HOME | End: 2024-07-15
Payer: MEDICARE

## 2024-07-15 VITALS
RESPIRATION RATE: 16 BRPM | DIASTOLIC BLOOD PRESSURE: 56 MMHG | OXYGEN SATURATION: 95 % | TEMPERATURE: 97.6 F | HEART RATE: 80 BPM | BODY MASS INDEX: 36.68 KG/M2 | SYSTOLIC BLOOD PRESSURE: 117 MMHG | HEIGHT: 63 IN | WEIGHT: 207 LBS

## 2024-07-15 VITALS
DIASTOLIC BLOOD PRESSURE: 56 MMHG | SYSTOLIC BLOOD PRESSURE: 117 MMHG | TEMPERATURE: 97.6 F | OXYGEN SATURATION: 95 % | HEART RATE: 80 BPM

## 2024-07-15 DIAGNOSIS — D64.9 NORMOCYTIC ANEMIA: ICD-10-CM

## 2024-07-15 DIAGNOSIS — K52.9 INFLAMMATORY BOWEL DISEASE: ICD-10-CM

## 2024-07-15 DIAGNOSIS — E53.8 VITAMIN B12 DEFICIENCY: ICD-10-CM

## 2024-07-15 DIAGNOSIS — D50.0 IRON DEFICIENCY ANEMIA DUE TO CHRONIC BLOOD LOSS: ICD-10-CM

## 2024-07-15 DIAGNOSIS — D64.9 NORMOCYTIC ANEMIA: Primary | ICD-10-CM

## 2024-07-15 LAB
BASOPHILS ABSOLUTE: 0 THOU/MM3 (ref 0–0.1)
BASOPHILS NFR BLD AUTO: 0 % (ref 0–3)
EOSINOPHIL NFR BLD AUTO: 3 % (ref 0–4)
EOSINOPHILS ABSOLUTE: 0.3 THOU/MM3 (ref 0–0.4)
ERYTHROCYTE [DISTWIDTH] IN BLOOD BY AUTOMATED COUNT: 13.5 % (ref 11.5–14.5)
FERRITIN SERPL IA-MCNC: 326 NG/ML (ref 22–322)
FOLATE SERPL-MCNC: 6.5 NG/ML (ref 4.8–24.2)
HCT VFR BLD AUTO: 32 % (ref 42–52)
HGB BLD-MCNC: 10.5 GM/DL (ref 14–18)
IMMATURE GRANULOCYTES %: 0 %
IMMATURE GRANULOCYTES ABSOLUTE: 0.03 THOU/MM3 (ref 0–0.07)
IRON SERPL-MCNC: 45 UG/DL (ref 65–195)
LYMPHOCYTES ABSOLUTE: 1.2 THOU/MM3 (ref 1–4.8)
LYMPHOCYTES NFR BLD AUTO: 13 % (ref 15–47)
MCH RBC QN AUTO: 30.9 PG (ref 26–33)
MCHC RBC AUTO-ENTMCNC: 32.8 GM/DL (ref 32.2–35.5)
MCV RBC AUTO: 94 FL (ref 80–94)
MONOCYTES ABSOLUTE: 1.1 THOU/MM3 (ref 0.4–1.3)
MONOCYTES NFR BLD AUTO: 11 % (ref 0–12)
NEUTROPHILS ABSOLUTE: 7.1 THOU/MM3 (ref 1.8–7.7)
NEUTROPHILS NFR BLD AUTO: 72 % (ref 43–75)
PLATELET # BLD AUTO: 223 THOU/MM3 (ref 130–400)
PMV BLD AUTO: 9.1 FL (ref 9.4–12.4)
RBC # BLD AUTO: 3.4 MILL/MM3 (ref 4.7–6.1)
TIBC SERPL-MCNC: 221 UG/DL (ref 171–450)
VIT B12 SERPL-MCNC: 716 PG/ML (ref 211–911)
WBC # BLD AUTO: 9.8 THOU/MM3 (ref 4.8–10.8)

## 2024-07-15 PROCEDURE — 83550 IRON BINDING TEST: CPT

## 2024-07-15 PROCEDURE — 82728 ASSAY OF FERRITIN: CPT

## 2024-07-15 PROCEDURE — 1124F ACP DISCUSS-NO DSCNMKR DOCD: CPT | Performed by: PHYSICIAN ASSISTANT

## 2024-07-15 PROCEDURE — 3074F SYST BP LT 130 MM HG: CPT | Performed by: PHYSICIAN ASSISTANT

## 2024-07-15 PROCEDURE — 99213 OFFICE O/P EST LOW 20 MIN: CPT | Performed by: PHYSICIAN ASSISTANT

## 2024-07-15 PROCEDURE — 3078F DIAST BP <80 MM HG: CPT | Performed by: PHYSICIAN ASSISTANT

## 2024-07-15 PROCEDURE — 36415 COLL VENOUS BLD VENIPUNCTURE: CPT

## 2024-07-15 PROCEDURE — 82746 ASSAY OF FOLIC ACID SERUM: CPT

## 2024-07-15 PROCEDURE — 99211 OFF/OP EST MAY X REQ PHY/QHP: CPT

## 2024-07-15 PROCEDURE — 82607 VITAMIN B-12: CPT

## 2024-07-15 PROCEDURE — 85025 COMPLETE CBC W/AUTO DIFF WBC: CPT

## 2024-07-15 PROCEDURE — 83540 ASSAY OF IRON: CPT

## 2024-07-15 RX ORDER — FERROUS SULFATE 325(65) MG
325 TABLET ORAL DAILY
Qty: 90 TABLET | Refills: 3 | Status: SHIPPED | OUTPATIENT
Start: 2024-07-15

## 2024-07-15 NOTE — PROGRESS NOTES
[facility] [place] [facility] [address], [address]  [facility] [address]  Dept: [phone]  Dept Fax: [phone] Loc: [phone]      Visit Date:7/15/2024     Brandon Jacobs is a 68 y.o. male who presents today for:   Chief Complaint   Patient presents with    Follow-up     Normocytic anemia        HPI:   Brandon Jacobs is a 68 y.o. male referred to Hematology/Oncology clinic for evaluation of anemia per Hospitalist DERICK Damon. He was seen as a new patient on 4/10/2023. The patient was hospitalized at [facility] 4/3/23 to 4/6/23.  He presented to the ED with worsening shortness of breath.  He was noted to have a hemoglobin 7.4 and on recheck 6.8 on 4/3/2023.  He received 1 unit of packed red blood cells and 2 doses of IV Venofer during hospitalization.  He was also treated for acute on chronic HALEIGH, metabolic acidosis.  On 4/6/2023 hemoglobin 9.5, hematocrit 29.6, MCV 92.8. Per chart review of EMR, the patient has had chronic anemia since at least 2012.  Most recent baseline hemoglobin 9-10's since 2021.  Last CBC prior to hospitalization hemoglobin 9.4 on 10/13/2022.  Iron studies during hospitalization showed ferritin 50, iron 21, TIBC 275.  The patient was evaluated by GI during hospitalization.  He had positive occult blood.  He does have known history of Crohn's disease and is status post partial colectomy.  He was previously treated with Entyvio with last infusion in September 2021.  He was lost to follow-up.  He has follow-up with GI in 3 weeks and was recommended outpatient EGD.  The patient is here with his wife today who helps provide history.  The patient affirms prior history of iron deficiency anemia and previously followed with Dr. Moffett in early 2000's.  He states he received IV iron infusions in vitamin B12 injections at that time.  He denies any abnormal bleeding.  Denies epistaxis, hemoptysis, hematemesis, melena, hematochezia, hematuria.  He reports

## 2024-07-15 NOTE — PATIENT INSTRUCTIONS
Return to clinic in 5 months  Labs on RTC: CBC, ferritin, iron, TIBC, folate and vitamin B12   Please call for questions or concerns.

## 2024-07-24 DIAGNOSIS — I50.32 CHRONIC DIASTOLIC CONGESTIVE HEART FAILURE, NYHA CLASS 2 (HCC): ICD-10-CM

## 2024-07-24 RX ORDER — FUROSEMIDE 40 MG/1
TABLET ORAL
Qty: 360 TABLET | Refills: 3 | Status: SHIPPED | OUTPATIENT
Start: 2024-07-24

## 2024-08-19 LAB
ANION GAP SERPL CALCULATED.3IONS-SCNC: 12 MMOL/L (ref 4–12)
BUN BLDV-MCNC: 62 MG/DL (ref 7–20)
CALCIUM SERPL-MCNC: 8.8 MG/DL (ref 8.8–10.5)
CHLORIDE BLD-SCNC: 93 MEQ/L (ref 101–111)
CO2: 29 MEQ/L (ref 21–32)
CREAT SERPL-MCNC: 2.67 MG/DL (ref 0.6–1.3)
CREATININE CLEARANCE: 24
CREATININE, RANDOM URINE: 24.5 MG/DL
GLUCOSE: 174 MG/DL (ref 70–110)
POTASSIUM SERPL-SCNC: 4.3 MEQ/L (ref 3.6–5)
PROTEIN, URINE, RANDOM: 7.6 MG/DL
PROTEIN/CREAT RATIO: 0.31 G/1.73M2
PTH INTACT: 53.1 U/ML (ref 12–88)
SODIUM BLD-SCNC: 134 MEQ/L (ref 135–145)
VITAMIN D 25-HYDROXY: 39.8 NG/ML (ref 30–100)

## 2024-08-22 DIAGNOSIS — N25.81 HYPERPARATHYROIDISM, SECONDARY RENAL (HCC): ICD-10-CM

## 2024-08-22 DIAGNOSIS — E55.9 VITAMIN D DEFICIENCY: ICD-10-CM

## 2024-08-22 DIAGNOSIS — R80.9 PROTEINURIA, UNSPECIFIED TYPE: ICD-10-CM

## 2024-08-22 DIAGNOSIS — N18.4 CKD (CHRONIC KIDNEY DISEASE), STAGE IV (HCC): Primary | ICD-10-CM

## 2024-08-29 ENCOUNTER — OFFICE VISIT (OUTPATIENT)
Dept: NEPHROLOGY | Age: 69
End: 2024-08-29
Payer: MEDICARE

## 2024-08-29 VITALS
HEIGHT: 63 IN | OXYGEN SATURATION: 95 % | WEIGHT: 201 LBS | BODY MASS INDEX: 35.61 KG/M2 | DIASTOLIC BLOOD PRESSURE: 58 MMHG | SYSTOLIC BLOOD PRESSURE: 115 MMHG | HEART RATE: 76 BPM

## 2024-08-29 DIAGNOSIS — N18.4 CKD (CHRONIC KIDNEY DISEASE), STAGE IV (HCC): Primary | ICD-10-CM

## 2024-08-29 DIAGNOSIS — E83.51 HYPOCALCEMIA: ICD-10-CM

## 2024-08-29 DIAGNOSIS — D63.8 ANEMIA OF CHRONIC DISEASE: ICD-10-CM

## 2024-08-29 DIAGNOSIS — E55.9 VITAMIN D DEFICIENCY: ICD-10-CM

## 2024-08-29 DIAGNOSIS — K50.00 CROHN'S DISEASE OF SMALL INTESTINE WITHOUT COMPLICATION (HCC): ICD-10-CM

## 2024-08-29 DIAGNOSIS — E11.21 DIABETIC NEPHROPATHY ASSOCIATED WITH TYPE 2 DIABETES MELLITUS (HCC): ICD-10-CM

## 2024-08-29 DIAGNOSIS — I10 PRIMARY HYPERTENSION: ICD-10-CM

## 2024-08-29 PROCEDURE — 1124F ACP DISCUSS-NO DSCNMKR DOCD: CPT | Performed by: INTERNAL MEDICINE

## 2024-08-29 PROCEDURE — 3074F SYST BP LT 130 MM HG: CPT | Performed by: INTERNAL MEDICINE

## 2024-08-29 PROCEDURE — 99214 OFFICE O/P EST MOD 30 MIN: CPT | Performed by: INTERNAL MEDICINE

## 2024-08-29 PROCEDURE — 3078F DIAST BP <80 MM HG: CPT | Performed by: INTERNAL MEDICINE

## 2024-08-29 NOTE — PROGRESS NOTES
08/19/2024    BUN 58 (H) 05/23/2024    CREATININE 2.78 (H) 08/19/2024    CREATININE 2.67 (H) 08/19/2024    CREATININE 2.53 (H) 05/23/2024    GLUCOSE 178 (H) 08/19/2024    GLUCOSE 174 (H) 08/19/2024    GLUCOSE 130 (H) 05/23/2024      Hepatic:   Lab Results   Component Value Date    AST 13 (L) 08/19/2024    AST 19 05/16/2024    AST 16 02/06/2024    ALT 12 08/19/2024    ALT 24 05/16/2024    ALT 11 02/06/2024    BILITOT 0.3 08/19/2024    BILITOT 0.4 05/16/2024    BILITOT 0.4 02/06/2024    ALKPHOS 94 08/19/2024    ALKPHOS 113 05/16/2024    ALKPHOS 87 02/06/2024     BNP: No results found for: \"BNP\"  Lipids:   Lab Results   Component Value Date    CHOL 103 08/03/2019    HDL 41 08/03/2019     INR: No results found for: \"INR\"  URINE:   Lab Results   Component Value Date/Time    NAUR 55 04/03/2023 10:10 PM     Lab Results   Component Value Date/Time    NITRU NEGATIVE 10/07/2023 12:15 PM    COLORU YELLOW 10/07/2023 12:15 PM    PHUR 5.5 10/07/2023 12:15 PM    LABCAST 4-8 HYALINE 04/03/2023 10:10 PM    LABCAST NONE SEEN 04/03/2023 10:10 PM    WBCUA 0-2 10/07/2023 12:15 PM    RBCUA 3-5 10/07/2023 12:15 PM    YEAST NONE SEEN 10/07/2023 12:15 PM    BACTERIA NONE SEEN 10/07/2023 12:15 PM    LEUKOCYTESUR NEGATIVE 10/07/2023 12:15 PM    UROBILINOGEN 0.2 10/07/2023 12:15 PM    BILIRUBINUR NEGATIVE 10/07/2023 12:15 PM    BLOODU MODERATE 10/07/2023 12:15 PM    GLUCOSEU NEGATIVE 10/07/2023 12:15 PM    KETUA NEGATIVE 10/07/2023 12:15 PM      Microalbumen/Creatinine ratio:  No components found for: \"RUCREAT\"    Objective:   Vitals: BP (!) 115/58 (Site: Right Upper Arm, Position: Sitting, Cuff Size: Large Adult)   Pulse 76   Ht 1.6 m (5' 3\")   Wt 91.2 kg (201 lb)   SpO2 95%   BMI 35.61 kg/m²      Constitutional:  Alert, awake, no apparent distress  Skin:normal with no rash or any significant lesions  HEENT:Pupils are reactive .Throat is clear.  Oral mucosa is moist.  Neck:supple with no thyromegaly, JVD, lymphadenopathy or bruit  **  Cardiovascular: Regular sinus rhythm without murmur, rubs or gallops   Respiratory:  Clear to auscultation with no wheezes or rales  Abdomen: Good bowel sound, soft, non tender and no bruit  Ext: No LE edema  Musculoskeletal:Intact  Neuro:Alert, awake and oriented with no obvious focal deficit.  Speech is normal.**    Electronically signed by Jeremy Deluca MD on 8/29/2024 at 3:06 PM   **This report has been created using voice recognition software. It maycontain minor  errors which are inherent in voice recognition technology.**

## 2024-09-03 NOTE — PROGRESS NOTES
[facility]  Pulmonary medicine clinic follow-up note.    Patient: Brandon Jacobs  : 1955      Chief complaint/Pa[place]: Brandon Jacobs is a 69 y.o. old male came for follow-up regarding his COPD and shortness of breath after having low-dose CT scan of chest without IV contrast requested at the last visit    He was initially referred from Ms. Otilia Carson PA-C.     On today's questioning:  He denies cough or expectoration.  He denies hemoptysis.  He denies fever or chills.   He denies recent hospitalizations or emergency room visits.     He is using his prescribed inhalers with good compliance.   He is using rescue inhaler/nebs rarely.     He denies recent loss of weight or appetite changes.   He denies recent decline in functional status.    He is currently not using any home oxygen.    He was ever diagnosed with following pulmonary diseases:  Asthma:NO  Pulmonary fibrosis:NO  Sarcoidosis:NO  Pulmonary embolism: NO   History of DVT in the past: NO    Pulmonary hypertension:NO  Pleural effusions in the past:NO    He ever diagnosed with connective tissue diseases including Systemic lupus Erythematosus, Rheumatoid arthritis etc:NO    He ever diagnosed with COVID-19 infection in the past:Yes.    He denies any history of Glucoma or urinary retention in the past.    Social History:  Occupation:  He is current working: No  Type of profession: retired.      Previous: [employer]               Social History     Tobacco Use    Smoking status: Former     Current packs/day: 0.00     Average packs/day: 1 pack/day for 37.0 years (37.0 ttl pk-yrs)     Types: Cigarettes     Start date: 1976     Quit date: 2013     Years since quittin.7     Passive exposure: Current    Smokeless tobacco: Never    Tobacco comments:     Quit smoking , never chew user   Vaping Use    Vaping status: Never Used   Substance Use Topics    Alcohol use: No    Drug use: No

## 2024-09-23 NOTE — TELEPHONE ENCOUNTER
----- Message from Leopoldo Karvonen, APRN - CNP sent at 9/27/2021  2:52 PM EDT -----  Regarding: BP log  Please call for BP log FAMILY HISTORY:  No pertinent family history in first degree relatives

## 2024-09-27 ENCOUNTER — HOSPITAL ENCOUNTER (OUTPATIENT)
Dept: CT IMAGING | Age: 69
Discharge: HOME | End: 2024-09-27
Attending: INTERNAL MEDICINE
Payer: MEDICARE

## 2024-09-27 ENCOUNTER — HOSPITAL ENCOUNTER (OUTPATIENT)
Dept: PULMONOLOGY | Age: 69
Discharge: HOME | End: 2024-09-27
Attending: INTERNAL MEDICINE
Payer: MEDICARE

## 2024-09-27 DIAGNOSIS — Z87.891 PERSONAL HISTORY OF TOBACCO USE: ICD-10-CM

## 2024-09-27 DIAGNOSIS — J44.9 MODERATE COPD (CHRONIC OBSTRUCTIVE PULMONARY DISEASE) (HCC): ICD-10-CM

## 2024-09-27 PROCEDURE — 71271 CT THORAX LUNG CANCER SCR C-: CPT

## 2024-09-27 PROCEDURE — 94060 EVALUATION OF WHEEZING: CPT

## 2024-09-27 NOTE — PROGRESS NOTES
Neck Circumference -   18.5 inches  Mallampati - 4    Lung Nodule Screening     [x] Qualifies    [] Does not qualify   [] Declined    [] Completed

## 2024-09-30 ENCOUNTER — OFFICE VISIT (OUTPATIENT)
Dept: PULMONOLOGY | Age: 69
End: 2024-09-30
Payer: MEDICARE

## 2024-09-30 VITALS
SYSTOLIC BLOOD PRESSURE: 104 MMHG | OXYGEN SATURATION: 99 % | BODY MASS INDEX: 35.44 KG/M2 | DIASTOLIC BLOOD PRESSURE: 64 MMHG | WEIGHT: 200 LBS | HEART RATE: 68 BPM | TEMPERATURE: 98.1 F | HEIGHT: 63 IN

## 2024-09-30 DIAGNOSIS — Z87.891 PERSONAL HISTORY OF TOBACCO USE: ICD-10-CM

## 2024-09-30 DIAGNOSIS — J44.9 MODERATE COPD (CHRONIC OBSTRUCTIVE PULMONARY DISEASE) (HCC): ICD-10-CM

## 2024-09-30 DIAGNOSIS — J44.9 CHRONIC OBSTRUCTIVE PULMONARY DISEASE, UNSPECIFIED COPD TYPE (HCC): ICD-10-CM

## 2024-09-30 DIAGNOSIS — R91.1 SOLITARY PULMONARY NODULE PRESENT ON COMPUTED TOMOGRAPHY OF LUNG: Primary | ICD-10-CM

## 2024-09-30 PROCEDURE — 1124F ACP DISCUSS-NO DSCNMKR DOCD: CPT | Performed by: INTERNAL MEDICINE

## 2024-09-30 PROCEDURE — 3074F SYST BP LT 130 MM HG: CPT | Performed by: INTERNAL MEDICINE

## 2024-09-30 PROCEDURE — 3078F DIAST BP <80 MM HG: CPT | Performed by: INTERNAL MEDICINE

## 2024-09-30 PROCEDURE — 99214 OFFICE O/P EST MOD 30 MIN: CPT | Performed by: INTERNAL MEDICINE

## 2024-09-30 RX ORDER — ALBUTEROL SULFATE 90 UG/1
2 INHALANT RESPIRATORY (INHALATION) 4 TIMES DAILY PRN
Qty: 1 EACH | Refills: 11 | Status: SHIPPED | OUTPATIENT
Start: 2024-09-30 | End: 2025-09-30

## 2024-10-05 NOTE — PROGRESS NOTES
[facility]  Pulmonary medicine clinic follow-up note.    Patient: Brandon Jacobs  : 1955      Chief complaint/[place]: Brandon Jacobs is a 69 y.o. old male came for follow-up regarding his COPD after having PET scan and full PFTs to follow a 9 mm pulmonary nodule within the left lower lobe-?  Etiology.    He was initially referred from Ms. Otilia Carson PA-C.     On today's questioning:  He denies cough or expectoration.  He denies hemoptysis.  He denies fever or chills.   He denies recent hospitalizations or emergency room visits.     He is using his prescribed inhalers with good compliance.   He is using rescue inhaler/nebs rarely.     He denies recent loss of weight or appetite changes.   He denies recent decline in functional status.    He is currently not using any home oxygen.    He ever diagnosed with connective tissue diseases including Systemic lupus Erythematosus, Rheumatoid arthritis etc:NO    He ever diagnosed with COVID-19 infection in the past:Yes.    He denies any history of Glucoma or urinary retention in the past.    Social History:  Occupation:  He is current working: No  Type of profession: retired.      Previous: [employer] shop               Social History     Tobacco Use    Smoking status: Former     Current packs/day: 0.00     Average packs/day: 1 pack/day for 37.0 years (37.0 ttl pk-yrs)     Types: Cigarettes     Start date: 1976     Quit date: 2013     Years since quittin.8     Passive exposure: Current    Smokeless tobacco: Never    Tobacco comments:     Quit smoking , never chew user   Vaping Use    Vaping status: Never Used   Substance Use Topics    Alcohol use: No    Drug use: No     Stopped smoking 2013 - 37 pack year history  History of recreational or IV drug use in the past:NO  History of Alcohol use: No.       History of exposure to coal mines/coal dust: NO  History of exposure to foundry dust/welding:

## 2024-10-15 ENCOUNTER — HOSPITAL ENCOUNTER (OUTPATIENT)
Dept: PET IMAGING | Age: 69
Discharge: HOME | End: 2024-10-15
Attending: INTERNAL MEDICINE
Payer: MEDICARE

## 2024-10-15 DIAGNOSIS — R91.1 SOLITARY PULMONARY NODULE PRESENT ON COMPUTED TOMOGRAPHY OF LUNG: ICD-10-CM

## 2024-10-15 DIAGNOSIS — Z87.891 PERSONAL HISTORY OF TOBACCO USE: ICD-10-CM

## 2024-10-15 PROCEDURE — 78815 PET IMAGE W/CT SKULL-THIGH: CPT

## 2024-10-15 PROCEDURE — A9609 HC RX DIAGNOSTIC RADIOPHARMACEUTICAL: HCPCS | Performed by: INTERNAL MEDICINE

## 2024-10-15 PROCEDURE — 3430000000 HC RX DIAGNOSTIC RADIOPHARMACEUTICAL: Performed by: INTERNAL MEDICINE

## 2024-10-15 RX ORDER — FLUDEOXYGLUCOSE F 18 200 MCI/ML
10.2 INJECTION, SOLUTION INTRAVENOUS
Status: COMPLETED | OUTPATIENT
Start: 2024-10-15 | End: 2024-10-15

## 2024-10-15 RX ADMIN — FLUDEOXYGLUCOSE F 18 10.2 MILLICURIE: 200 INJECTION, SOLUTION INTRAVENOUS at 12:45

## 2024-10-29 ENCOUNTER — HOSPITAL ENCOUNTER (OUTPATIENT)
Dept: PULMONOLOGY | Age: 69
Discharge: HOME | End: 2024-10-29
Attending: INTERNAL MEDICINE
Payer: MEDICARE

## 2024-10-29 DIAGNOSIS — R91.1 SOLITARY PULMONARY NODULE PRESENT ON COMPUTED TOMOGRAPHY OF LUNG: ICD-10-CM

## 2024-10-29 DIAGNOSIS — Z87.891 PERSONAL HISTORY OF TOBACCO USE: ICD-10-CM

## 2024-10-29 PROCEDURE — 94060 EVALUATION OF WHEEZING: CPT

## 2024-10-29 PROCEDURE — 94729 DIFFUSING CAPACITY: CPT

## 2024-10-29 PROCEDURE — 94726 PLETHYSMOGRAPHY LUNG VOLUMES: CPT

## 2024-11-04 ENCOUNTER — OFFICE VISIT (OUTPATIENT)
Dept: PULMONOLOGY | Age: 69
End: 2024-11-04
Payer: MEDICARE

## 2024-11-04 VITALS
SYSTOLIC BLOOD PRESSURE: 110 MMHG | TEMPERATURE: 98.1 F | HEIGHT: 63 IN | HEART RATE: 84 BPM | DIASTOLIC BLOOD PRESSURE: 68 MMHG | WEIGHT: 199.8 LBS | OXYGEN SATURATION: 93 % | BODY MASS INDEX: 35.4 KG/M2

## 2024-11-04 DIAGNOSIS — Z87.891 PERSONAL HISTORY OF TOBACCO USE, PRESENTING HAZARDS TO HEALTH: Primary | ICD-10-CM

## 2024-11-04 DIAGNOSIS — Z87.891 PERSONAL HISTORY OF TOBACCO USE: ICD-10-CM

## 2024-11-04 DIAGNOSIS — J44.9 MODERATE COPD (CHRONIC OBSTRUCTIVE PULMONARY DISEASE) (HCC): ICD-10-CM

## 2024-11-04 PROCEDURE — 1124F ACP DISCUSS-NO DSCNMKR DOCD: CPT | Performed by: INTERNAL MEDICINE

## 2024-11-04 PROCEDURE — 3078F DIAST BP <80 MM HG: CPT | Performed by: INTERNAL MEDICINE

## 2024-11-04 PROCEDURE — 3074F SYST BP LT 130 MM HG: CPT | Performed by: INTERNAL MEDICINE

## 2024-11-04 PROCEDURE — 1159F MED LIST DOCD IN RCRD: CPT | Performed by: INTERNAL MEDICINE

## 2024-11-04 PROCEDURE — 99214 OFFICE O/P EST MOD 30 MIN: CPT | Performed by: INTERNAL MEDICINE

## 2024-11-04 PROCEDURE — G0296 VISIT TO DETERM LDCT ELIG: HCPCS | Performed by: INTERNAL MEDICINE

## 2024-11-04 NOTE — PATIENT INSTRUCTIONS
biopsy finds cancer, you and your doctor will have to decide how or whether to treat it.  Some lung cancers found on CT scans are harmless and would not have caused a problem if they had not been found through screening. But because doctors can't tell which ones will turn out to be harmless, most will be treated. This means that you may get treatment--including surgery, radiation, or chemotherapy--that you don't need.  There is a risk of damage to cells or tissue from being exposed to radiation, including the small amounts used in CTs, X-rays, and other medical tests. Over time, exposure to radiation may cause cancer and other health problems. But in most cases, the risk of getting cancer from being exposed to small amounts of radiation is low. It's not a reason to avoid these tests for most people.  What are the benefits of screening?  Your scan may be normal (negative).  For some people who are at higher risk, screening lowers the chance of dying of lung cancer. How much and how long you smoked helps to determine your risk level. Screening can find some cancers early, when treatment may be more likely to work.  What happens after screening?  The results of your CT scan will be sent to your doctor. Someone from your care team will explain the results of your scan and answer any questions you may have. If you need any follow-up, he or she will help you understand what to do next.  After a lung cancer screening, you can go back to your usual activities right away.  A lung cancer screening test can't tell if you have lung cancer. If your results are positive, your doctor can't tell whether an abnormal finding is a harmless nodule, cancer, or something else without doing more tests.  What can you do to help prevent lung cancer?  Some lung cancers can't be prevented. But if you smoke, quitting smoking is the best step you can take to prevent lung cancer. If you want to quit, your doctor can recommend medicines or other

## 2024-11-04 NOTE — PROGRESS NOTES
Neck Circumference -   18 inches  Mallampati - 4    Lung Nodule Screening     [x] Qualifies    [] Does not qualify   [] Declined    [] Completed

## 2024-11-05 DIAGNOSIS — M10.9 GOUT, UNSPECIFIED CAUSE, UNSPECIFIED CHRONICITY, UNSPECIFIED SITE: ICD-10-CM

## 2024-11-05 DIAGNOSIS — N18.32 STAGE 3B CHRONIC KIDNEY DISEASE (HCC): ICD-10-CM

## 2024-11-05 RX ORDER — ALLOPURINOL 100 MG/1
100 TABLET ORAL DAILY
Qty: 90 TABLET | Refills: 3 | Status: SHIPPED | OUTPATIENT
Start: 2024-11-05

## 2024-11-20 NOTE — PROGRESS NOTES
"Received notification by  [facility]  on 11/20/24 that pt has triggered for oncology nutrition care (reason for referral: Malnutrition Screening Tool (MST) Triggers: scored a 2 indicating 2-13# (0.9-6 kg) recent wt loss and is eating poorly due to a decreased appetite. (Date of MST: 11/20/24), Ambulatory referral for nutrition services for oncology, and \"Pt states lost 10 lbs in a few weeks, no appetite. He eats maybe twice a day. No energy\" ).    Contacted Alejandro and introduced self and explained the reason for today's call.      Discussed oncology nutrition services available (options for in-person and phone consultation) and the benefits of meeting for a consultation.    Initial RD phone consultation set up for 11/25/24 at 2 pm.      Provided this RD’s contact information asking that Alejandro reach out prn.  All questions/concerns addressed at this time.  " Natural Products (GLUCOSAMINE CHOND COMPLEX/MSM PO), Take by mouth, Disp: , Rfl:     carvedilol (COREG) 25 MG tablet, Take 1 tablet by mouth 2 times daily, Disp: 60 tablet, Rfl: 5    amLODIPine (NORVASC) 10 MG tablet, Take 1 tablet by mouth daily, Disp: 30 tablet, Rfl: 5    simvastatin (ZOCOR) 20 MG tablet, Take 1 tablet by mouth nightly, Disp: 30 tablet, Rfl: 5    spironolactone (ALDACTONE) 25 MG tablet, Take 1 tablet by mouth 2 times daily, Disp: 60 tablet, Rfl: 5    sulfaSALAzine (AZULFIDINE) 500 MG tablet, Take 500 mg by mouth 2 times daily , Disp: , Rfl:     ferrous sulfate 325 (65 Fe) MG tablet, One tab every 48 hr.  Take with vitamn C 500 mg, Disp: 30 tablet, Rfl: 3    vitamin C (VITAMIN C) 500 MG tablet, Take 1 tablet by mouth every 48 hours, Disp: 30 tablet, Rfl: 3    aspirin 81 MG EC tablet, Take 81 mg by mouth daily, Disp: , Rfl:     Insulin Detemir (LEVEMIR SC), Inject 20 Units into the skin nightly , Disp: , Rfl:     metFORMIN (GLUCOPHAGE) 500 MG tablet, Take 1,000 mg by mouth 2 times daily (with meals), Disp: , Rfl:     gabapentin (NEURONTIN) 300 MG capsule, Take 300 mg by mouth 2 times daily. ., Disp: , Rfl:     glimepiride (AMARYL) 2 MG tablet, Take 2 mg by mouth 2 times daily , Disp: , Rfl:     Past Medical History  Ness Le  has a past medical history of Arthritis, CAD (coronary artery disease), Diabetes mellitus ([facility] [address] Diverticulosis of colon, History of chest pain, History of tobacco abuse, Hyperlipidemia, Hypertension, and Pneumonia. Social History  Ness Le  reports that he quit smoking about 5 years ago. He has a 37.00 pack-year smoking history. He has never used smokeless tobacco. He reports that he does not drink alcohol and does not use drugs. Family History  Ness Le family history includes Diabetes in his father; High Cholesterol in his father; Hypertension in his father.     Past Surgical History   Past Surgical History:   Procedure Laterality Date    APPENDECTOMY      CARDIAC SURGERY      heart cath    COLECTOMY  2001    D/T DIVERTICULOSIS    COLONOSCOPY      DIAGNOSTIC CARDIAC CATH LAB PROCEDURE  09/17/2010    EF 60% C MILD DISEASE IN THE PROXIMAL  PORTION BEFORE THE BIFURCATION OF D1,MILD DISEASE IS NOTED IN THE RCA , DIFFUSE AT 10-20% RCA ALSO HAS 10-20% LESIONS    DILATATION, ESOPHAGUS      TRANSTHORACIC ECHOCARDIOGRAM  10/22/2010    EF 55-65% C MILD LFT ATRIAL DILATATION, GRADE 1 DIASOLIC DYSFUNCTION       Review of Systems   Constitutional: Negative for chills and fever  HENT: Negative for congestion, sinus pressure, sneezing and sore throat. Eyes: Negative for pain, discharge, redness and itching. Respiratory: Negative for apnea, cough  Gastrointestinal: Negative for blood in stool, constipation, diarrhea   Endocrine: Negative for cold intolerance, heat intolerance, polydipsia. Genitourinary: Negative for dysuria, enuresis, flank pain and hematuria. Musculoskeletal: Negative for arthralgias, joint swelling and neck pain. Neurological: Negative for numbness and headaches. Psychiatric/Behavioral: Negative for agitation, confusion, decreased concentration and dysphoric mood. Objective: There were no vitals taken for this visit. Wt Readings from Last 3 Encounters:   04/19/21 238 lb (108 kg)   03/02/21 237 lb 12.8 oz (107.9 kg)   01/12/21 240 lb (108.9 kg)     BP Readings from Last 3 Encounters:   04/19/21 138/64   03/02/21 136/70   01/12/21 (!) 142/71       Nursing note and vitals reviewed. Physical Exam   Constitutional: Oriented to person, place, and time. Appears well-developed and well-nourished. ENT: Moist mucous membranes. No bleeding. Tongue is midline. Head: Normocephalic and atraumatic. Eyes: EOM are normal. Pupils are equal, round, and reactive to light. Neck: Normal range of motion. Neck supple. No JVD present. Cardiovascular: Normal rate, regular rhythm, murmur, no rubs, and intact distal pulses.     Pulmonary/Chest: Effort normal and breath sounds normal. No respiratory distress. No wheezes. No rales. Abdominal: Soft. Bowel sounds are normal. No distension. There is no tenderness. Musculoskeletal: Normal range of motion. NO edema. Neurological: Alert and oriented to person, place, and time. No cranial nerve deficit. Coordination normal.   Skin: Skin is warm and dry. Psychiatric: Normal mood and affect.        No results found for: CKTOTAL, CKMB, CKMBINDEX    Lab Results   Component Value Date    WBC 7.3 09/28/2020    RBC 4.05 09/28/2020    RBC 3.92 01/09/2012    HGB 11.3 09/28/2020    HCT 34.7 09/28/2020    MCV 85.6 09/28/2020    MCH 27.9 09/28/2020    MCHC 32.6 09/28/2020    RDW 16.1 09/28/2020     09/28/2020    MPV 11.0 08/03/2019       Lab Results   Component Value Date     04/19/2021    K 5.1 04/19/2021    K 3.1 08/02/2019     04/19/2021    CO2 26 04/19/2021    BUN 37 04/19/2021    LABALBU 3.9 09/28/2020    CREATININE 1.3 04/19/2021    CALCIUM 9.5 04/19/2021    LABGLOM 55 04/19/2021    GLUCOSE 109 04/19/2021    GLUCOSE 188 03/09/2021       Lab Results   Component Value Date    ALKPHOS 54 09/28/2020    ALT 12 09/28/2020    AST 16 09/28/2020    PROT 6.1 09/28/2020    BILITOT 0.2 09/28/2020    BILIDIR <0.2 08/02/2019    LABALBU 3.9 09/28/2020       Lab Results   Component Value Date    MG 1.8 03/02/2021       No results found for: INR, PROTIME      Lab Results   Component Value Date    LABA1C 6.3 01/31/2019       Lab Results   Component Value Date    TRIG 100 08/03/2019    HDL 41 08/03/2019    LDLCALC 42 08/03/2019    LDLDIRECT 57 02/13/2017       No results found for: TSH      Testing Reviewed:      I have individually reviewed the cardiac test below:    ECHO: Results for orders placed during the hospital encounter of 09/03/20    ECHO Complete 2D W Doppler W Color    Narrative  Transthoracic Echocardiography Report (TTE)    Demographics    Patient Name   Bree Black  Gender               Male  E    MR # [id number]        Race                     Ethnicity    Account #      [de-identified]        Room Number    Accession      [id number]        Date of Study        09/03/2020  Number    Date of Birth  1955       Referring Physician  JOSE DANIEL Katz CNP    Age            77 year(s)       Sonographer          Nakul Chao [facility]    Interpreting         Echo reader of the  Physician            mady Sena MD    Procedure    Type of Study    TTE procedure:ECHOCARDIOGRAM COMPLETE 2D W DOPPLER W COLOR. Procedure Date  Date: 09/03/2020 Start: 03:56 PM    Study Location: Echo Lab  Technical Quality: Limited visualization due to poor acoustical window. Indications:Evaluate left ventricular function. Additional Medical History:Coronary artery disease, Former smoker,  Hypertension, hyperlipidemia, Diabetes, CHF    Patient Status: Routine    Height: 67.72 inches Weight: 221.01 pounds BSA: 2.13 m^2 BMI: 33.89 kg/m^2    BP: 142/82 mmHg    Allergies  - Other allergy:(lorazepam). Conclusions    Summary  Normal left ventricle size and systolic function. Ejection fraction was  estimated at 60 to 65 %. There were no regional left ventricular wall  motion abnormalities and wall thickness was within normal limits. Doppler parameters were consistent with abnormal left ventricular  relaxation (grade 1 diastolic dysfunction). The left atrium is Mildly dilated. There is a small anterior and posterior pericardial effusion with no  evidence of hemodynamic compromise. Signature    ----------------------------------------------------------------  Electronically signed by Eden Sena MD (Interpreting  physician) on 09/03/2020 at 06:48 PM  ----------------------------------------------------------------    Findings    Mitral Valve  The mitral valve structure was normal with normal leaflet separation.   DOPPLER: The transmitral velocity was within the normal range with no  evidence for mitral stenosis. There was no evidence of mitral  regurgitation. Aortic Valve  The aortic valve was trileaflet with normal thickness and cuspal  separation. DOPPLER: Transaortic velocity was within the normal range with  no evidence of aortic stenosis. There was no evidence of aortic  regurgitation. Tricuspid Valve  The tricuspid valve structure was normal with normal leaflet separation. DOPPLER: There was no evidence of tricuspid stenosis. Trivial tricuspid regurgitation visualized. Pulmonic Valve  The pulmonic valve leaflets exhibited normal thickness, no calcification,  and normal cuspal separation. DOPPLER: The transpulmonic velocity was  within the normal range with no evidence for regurgitation. Left Atrium  The left atrium is Mildly dilated. Left Ventricle  Normal left ventricle size and systolic function. Ejection fraction was  estimated at 60 to 65 %. There were no regional left ventricular wall  motion abnormalities and wall thickness was within normal limits. Doppler parameters were consistent with abnormal left ventricular  relaxation (grade 1 diastolic dysfunction). Right Atrium  Right atrial size was normal.    Right Ventricle  The right ventricular size was normal with normal systolic function and  wall thickness. Pericardial Effusion  There is a small anterior and posterior pericardial effusion with no  evidence of hemodynamic compromise. Pleural Effusion  No evidence of pleural effusion. Aorta / Great Vessels  -Aortic root dimension within normal limits.  -The Pulmonary artery is within normal limits. -IVC size is within normal limits with normal respiratory phasic changes.     M-Mode/2D Measurements & Calculations    LV Diastolic   LV Systolic Dimension:    AV Cusp Separation: 1.8 cmLA  Dimension: 5.1 3.7 cm                    Dimension: 4.7 cmAO Root  cm             LV Volume Diastolic: 124  Dimension: 2.9 cmLA Area: 23.9  LV FS:27.5 %   ml cm^2  LV PW          LV Volume Systolic: 15.1  Diastolic: 1.3 ml  cm             LV EDV/LV EDV Index: 124  Septum         ml/58 m^2LV ESV/LV ESV    RV Diastolic Dimension: 3 cm  Diastolic: 1.3 Index: 58.7 ml/27 m^2  cm             EF Calculated: 53.2 %     LA/Aorta: 1.62    LA volume/Index: 80.9 ml /38m^2    Doppler Measurements & Calculations    MV Peak E-Wave: 66.4 cm/s   AV Peak Velocity: 151  LVOT Peak Velocity: 84  MV Peak A-Wave: 111 cm/s    cm/s                   cm/s  MV E/A Ratio: 0.6           AV Peak Gradient: 9.12 LVOT Peak Gradient: 3  MV Peak Gradient: 1.76 mmHg mmHg                   mmHg    MV Deceleration Time: 250                          TV Peak E-Wave: 57 cm/s  msec                                               TV Peak A-Wave: 80.1  MV P1/2t: 73 msec                                  cm/s  MVA by PHT:3.01 cm^2        IVRT: 81 msec  TV Peak Gradient: 1.3  MV E' Septal Velocity: 7.1                         mmHg  cm/s                        AV DVI (Vmax):0.56  MV A' Septal Velocity: 10                          PV Peak Velocity: 102  cm/s                                               cm/s  MV E' Lateral Velocity: 5.7                        PV Peak Gradient: 4.16  cm/s                                               mmHg  MV A' Lateral Velocity:  13.6 cm/s  E/E' septal: 9.35  E/E' lateral: 11.65    [email or web address] [id number]     Stress Test:08/2019  No ischemia      Cath:2016  CORONARY ANGIOGRAPHY:       The RCA is a large vessel, dominant vessel.  Gives rise to circumflex and the  LAD.  It does have 20 percent diffuse disease but in the mid portion there is  a lesion of about 50-60 percent compared.     The left main is a large vessel.  Gives rise to circumflex and the LAD without  any critical lesion.     Circumflex is a large vessel, has about 20 percent lesion, proximal lesion,  and has mild luminal irregularity and it gives rise to OM-1 which is a large  caliber vessel and OM-2 which is a medium caliber vessel without any critical  lesion.       OM-1 is a large caliber vessel with mild luminal irregularity.     OM-2 is a moderate caliber vessel with mild luminal irregularity.       The LAD is a calcified vessel which has mild 10-20 percent lesion.     The diagonal is a large vessel which demonstrates, at the most, 10-20 percent  lesion.      CONCLUSION:    1.  The left main is a large vessel, widely patent. 2.  The circumflex has 20 percent lesion, large vessel, noncritical.  3.  OM-1 mild luminal irregularity. 4.  OM-2 mild luminal irregularity. 5.  The LAD is calcified with mild luminal irregularity at the most 10-20  percent lesion. 6.  The diagonal is a large vessel with mild luminal irregularity. 7.  LVEDP was 20 indicative of mild impaired relaxation of the ventricle. 8.  No complication occurred. 9.  Continue aggressive risk factor modification. 10.  The RCA, like I stated, has about mid portion, about 50-60 percent   case reviewed with dr Naseem Longo. 11.  Again, no complication occurred. 12.  Good hemostasis obtained post procedure. 15.  Continue with aggressive risk factor modification and anti-ischemic  medication with lipid management. 14.  No driving or lifting for the next 3 days. AssessmentPlan:   Boris Carvajal is a pleasant 77year old male patient who  has a past medical history of Arthritis, CAD (coronary artery disease), Diabetes mellitus ([address] Diverticulosis of colon, History of chest pain, History of tobacco abuse, Hyperlipidemia, Hypertension, and Pneumonia. He has h/o nonobstructive CAD ([facility] 2016). Stress test in 08/2019 was negative for ischemia. Previous Echo 8/2019 revealed an EF of 40-45%. A repeat Echo on 09/2020 revealed improvement in EF to 78-76% with diastolic dysfunction. The patient is compliant with his medications.  Patient denies chest pain, shortness of breath, dyspnea on exertion, orthopnea, paroxysmal nocturnal dyspnea, palpitations, dizziness, syncope, weight gain or leg swelling. He occasionally check home BP readings, does not recall the numbers. BP today is 181/81, HR 97.   1. Uncontrolled HTN   2. CHF, improved EF (EF 40-45% 08/2019, up to 60-65% 09/2020)  3. Nonobstructive CAD  4. DM  5. H/o tobacco abuse  6. Dyslipidemia        Cath in 2016 was c/w nonobstructive CAD    Stress test 08/2019 was negative for ischemia   Aggressive medical therapy and risk factor modification for CAD is indicated    ASA 81 mg po daily   On zocor    LDL 42 on 4/2019    LFT ok on 09/2020    Check lipid panel    He occasionally check home BP readings, does not recall the numbers. BP today is 181/81, HR 97   Norvasc   Coreg    Increase hydralazine to 100 mg po TID   The patient was instructed to check the blood pressure at home, and record the readings. Patient will call office with blood pressure readings, will adjust patient's antihypertensive medications as needed accordingly    On Aldactone, Lasix, KCL   No signs of volume overload       Above findings and plan of care were discussed with patient in details, patient's questions were answered.      Disposition:  RTC in 12 months             Electronically signed by Tim Juarez MD, [facility]    7/19/2021 at 1:13 PM EDT

## 2024-12-02 DIAGNOSIS — I10 PRIMARY HYPERTENSION: ICD-10-CM

## 2024-12-02 DIAGNOSIS — N18.4 CKD (CHRONIC KIDNEY DISEASE), STAGE IV (HCC): ICD-10-CM

## 2024-12-02 DIAGNOSIS — E87.20 METABOLIC ACIDOSIS: ICD-10-CM

## 2024-12-02 DIAGNOSIS — E55.9 VITAMIN D DEFICIENCY: ICD-10-CM

## 2024-12-02 DIAGNOSIS — D63.8 ANEMIA OF CHRONIC DISEASE: ICD-10-CM

## 2024-12-02 DIAGNOSIS — N25.81 HYPERPARATHYROIDISM, SECONDARY RENAL (HCC): ICD-10-CM

## 2024-12-02 DIAGNOSIS — N18.32 STAGE 3B CHRONIC KIDNEY DISEASE (HCC): Primary | ICD-10-CM

## 2024-12-03 LAB
CREATININE, RANDOM URINE: 26.1 MG/DL
PROTEIN, URINE, RANDOM: 14.7 MG/DL
PROTEIN/CREAT RATIO: 0.56 G/1.73M2

## 2024-12-04 ENCOUNTER — TELEPHONE (OUTPATIENT)
Dept: NEPHROLOGY | Age: 69
End: 2024-12-04

## 2024-12-04 ENCOUNTER — OFFICE VISIT (OUTPATIENT)
Dept: CARDIOLOGY CLINIC | Age: 69
End: 2024-12-04

## 2024-12-04 VITALS
SYSTOLIC BLOOD PRESSURE: 100 MMHG | WEIGHT: 202 LBS | HEART RATE: 83 BPM | RESPIRATION RATE: 18 BRPM | BODY MASS INDEX: 35.79 KG/M2 | OXYGEN SATURATION: 93 % | HEIGHT: 63 IN | DIASTOLIC BLOOD PRESSURE: 60 MMHG

## 2024-12-04 DIAGNOSIS — D50.0 IRON DEFICIENCY ANEMIA DUE TO CHRONIC BLOOD LOSS: ICD-10-CM

## 2024-12-04 DIAGNOSIS — G47.33 OSA ON CPAP: ICD-10-CM

## 2024-12-04 DIAGNOSIS — I51.89 GRADE I DIASTOLIC DYSFUNCTION: ICD-10-CM

## 2024-12-04 DIAGNOSIS — Z91.89 AT RISK FOR FLUID VOLUME OVERLOAD: ICD-10-CM

## 2024-12-04 DIAGNOSIS — I50.32 CHRONIC DIASTOLIC CONGESTIVE HEART FAILURE, NYHA CLASS 2 (HCC): Primary | ICD-10-CM

## 2024-12-04 LAB
ANION GAP SERPL CALCULATED.3IONS-SCNC: 12 MMOL/L (ref 4–12)
BUN BLDV-MCNC: 73 MG/DL (ref 7–20)
CALCIUM SERPL-MCNC: 9 MG/DL (ref 8.8–10.5)
CHLORIDE BLD-SCNC: 93 MEQ/L (ref 101–111)
CO2: 29 MEQ/L (ref 21–32)
CREAT SERPL-MCNC: 2.99 MG/DL (ref 0.6–1.3)
CREATININE CLEARANCE: 21
GLUCOSE: 411 MG/DL (ref 70–110)
POTASSIUM SERPL-SCNC: 5 MEQ/L (ref 3.6–5)
SODIUM BLD-SCNC: 134 MEQ/L (ref 135–145)
VITAMIN D 25-HYDROXY: 38.5 NG/ML (ref 30–100)

## 2024-12-04 RX ORDER — AMLODIPINE BESYLATE 5 MG/1
5 TABLET ORAL DAILY
Qty: 90 TABLET | Refills: 3 | Status: SHIPPED | OUTPATIENT
Start: 2024-12-04

## 2024-12-04 RX ORDER — METOLAZONE 2.5 MG/1
2.5 TABLET ORAL
Qty: 24 TABLET | Refills: 1 | Status: SHIPPED | OUTPATIENT
Start: 2024-12-05

## 2024-12-04 ASSESSMENT — ENCOUNTER SYMPTOMS
COUGH: 0
SHORTNESS OF BREATH: 1
ABDOMINAL DISTENTION: 0
NAUSEA: 0
VOMITING: 0

## 2024-12-04 NOTE — PATIENT INSTRUCTIONS
You may receive a survey regarding the care you received during your visit.  Your input is valuable to us.  We encourage you to complete and return your survey.  We hope you will choose us in the future for your healthcare needs.    Your nurses today were Shashank.  Office hours:   Mon-Thurs 8-4:30  Friday 8-12  Phone: [phone]    Continue:  Continue current medications  Daily weights and record  Fluid restriction of 2 Liters per day  Limit sodium in diet to around 1098-0186 mg/day  Monitor BP  Activity as tolerated     Call the Heart Failure Clinic for any of the following symptoms:   Weight gain of -3 pounds in 1 day or 5 pounds in 1 week  Increased shortness of breath  Shortness of breath while laying down  Cough  Chest pain  Swelling in feet, ankles or legs  Bloating in abdomen  Fatigue      Decrease norvasc to 5mg daily       Continue diet/fluid adherence  Start daily weights  F/U w/ Cardiology  F/U in clinic in 6 months

## 2024-12-04 NOTE — TELEPHONE ENCOUNTER
Spoke with patients wife to see how he was feeling.  He is doing ok.  They are contacting their PCP

## 2024-12-04 NOTE — PROGRESS NOTES
Heart Failure Clinic       Visit Date: 12/4/2024  Cardiologist:  Dr. Gomez  Primary Care Physician: Rajeev South, APRN - CNP    Brandon Jacobs is a 69 y.o. male who presents today for:  Chief Complaint   Patient presents with    Congestive Heart Failure       HPI:     TYPE HF: HFpEF (55-60% 2023) (40-45% 2019) DD grade 1  Cause: nonischemic  Device: none  HX: CAD (nonobstructive), DM, HLD, HTN  Dry Wt:  (226 on 10/3/22) (230 on 1/11/23) (228 on 3/8/23) ) (224 on 3/31/23) (223 on 5/2/23) (219 on 6/15/23) (217 on 9/20/23) (210 on 10/23/23) (223 on 12/20/23) (223 on 1/5/24) (214 on 2/19/24) (205 on 6/4/24) (202 on 12/4/24)    Brandon Jacobs is a 69 y.o. male who presents to the office for a follow up patient visit in the heart failure clinic.      Concerns today: 6 month f/u. Weight is stable. Urinating well.     Patient has:  Chest Pain: no  SOB: yes w/ long distances   Orthopnea/PND: sleeps in chair    SRAVAN: yes wearing CPAP nightly   Edema: resolved  Fatigue: no  Abdominal bloating: no  Cough: no  Appetite: good    Visit on 6/4/24: 3 month f/u. Weight is down 9lb, total of 18lbs. Denies worsening SOB, bloating, and orthopnea, notes continue improvement of his leg swelling. Doing better with his Na. Urinating well on it.     Visit on 2/19/24: 6 week f/u. Weight is down 9 lbs. Continues with wound care and lymphedema clinic. He continues to urinate well. Continues with poor diet. SOB is at baseline. Denies bloating or nocturnal dyspnea. Notes improvement of his leg swelling.     Visit on 1/5/24: 4 week f/u. Weight and swelling are unchanged. Still not restricting his Na. Continues at wound care but may be discharged Monday. Compression socks are on but need to be tighter per wound care. Denies worsening SOB. Denies bloating or nocturnal dyspnea.     Visit on 12/20/23:  8 week f/u. Weight is up 13 since last visit. He has been over eating over his Na level- half a bag of chips a day and over his

## 2024-12-04 NOTE — TELEPHONE ENCOUNTER
[facility] called with a critical glucose level of 411. 12/4 340 pm Perfect serve message sent to Dr. Deluca.

## 2024-12-05 ENCOUNTER — OFFICE VISIT (OUTPATIENT)
Dept: NEPHROLOGY | Age: 69
End: 2024-12-05
Payer: MEDICARE

## 2024-12-05 VITALS
HEART RATE: 89 BPM | WEIGHT: 203 LBS | BODY MASS INDEX: 35.97 KG/M2 | DIASTOLIC BLOOD PRESSURE: 68 MMHG | HEIGHT: 63 IN | OXYGEN SATURATION: 94 % | SYSTOLIC BLOOD PRESSURE: 118 MMHG

## 2024-12-05 DIAGNOSIS — E87.1 HYPONATREMIA: ICD-10-CM

## 2024-12-05 DIAGNOSIS — E11.21 DIABETIC NEPHROPATHY ASSOCIATED WITH TYPE 2 DIABETES MELLITUS (HCC): ICD-10-CM

## 2024-12-05 DIAGNOSIS — I10 PRIMARY HYPERTENSION: ICD-10-CM

## 2024-12-05 DIAGNOSIS — E87.20 METABOLIC ACIDOSIS: ICD-10-CM

## 2024-12-05 DIAGNOSIS — D63.8 ANEMIA OF CHRONIC DISEASE: ICD-10-CM

## 2024-12-05 DIAGNOSIS — N18.4 CKD (CHRONIC KIDNEY DISEASE), STAGE IV (HCC): Primary | ICD-10-CM

## 2024-12-05 DIAGNOSIS — N25.81 HYPERPARATHYROIDISM, SECONDARY RENAL (HCC): ICD-10-CM

## 2024-12-05 PROCEDURE — 3074F SYST BP LT 130 MM HG: CPT | Performed by: INTERNAL MEDICINE

## 2024-12-05 PROCEDURE — 3078F DIAST BP <80 MM HG: CPT | Performed by: INTERNAL MEDICINE

## 2024-12-05 PROCEDURE — 1124F ACP DISCUSS-NO DSCNMKR DOCD: CPT | Performed by: INTERNAL MEDICINE

## 2024-12-05 PROCEDURE — 1159F MED LIST DOCD IN RCRD: CPT | Performed by: INTERNAL MEDICINE

## 2024-12-05 PROCEDURE — 99214 OFFICE O/P EST MOD 30 MIN: CPT | Performed by: INTERNAL MEDICINE

## 2024-12-05 NOTE — PROGRESS NOTES
Renal Progress Note    Assessment and Plan:      Diagnosis Orders   1. CKD (chronic kidney disease), stage IV ([facility])        2. Diabetic nephropathy associated with type 2 diabetes mellitus ([facility])        3. Primary hypertension        4. Metabolic acidosis        5. Hyponatremia        6. Anemia of chronic disease        7. Hyperparathyroidism, secondary renal (HCC)                  PLAN:  Serum creatinine is increased to 2.99 mg/dL from 2.43 mg/dL recently.  This is likely due to combination of both removed by distal diuretic respectively.  I discussed with the patient and spouse.  They understood.  He is currently taking the furosemide 40 mg 2 tablets twice a day.  I will suggest to decrease him down to 1 tablet twice a day.  I will of course leave this at day discretion of the congestive heart failure clinic as patient has a history of congestive heart failure.  I discussed with patient and spouse again.  Diabetes mellitus mL of another provider.  Hypertension is well-controlled.  Hyponatremia is very mild and due to diuretics.  Anemia is managed by another provider.  Parathyroid hormone level was not checked again since he has been normal several times in the past.  Medications reviewed  No changes otherwise  Return return in 4 weeks due to worsening kidney function.          Patient Active Problem List   Diagnosis    Hyperlipidemia    Diabetes mellitus (HCC)    History of tobacco abuse    History of chest pain    Diverticulosis of colon    Arthritis    CAD (coronary artery disease)    Essential hypertension    Coronary artery disease involving native coronary artery of native heart without angina pectoris    Acute combined systolic and diastolic CHF, NYHA class 4 ([facility])    Congestive heart failure ([facility])    COVID-19    HALEIGH (acute kidney injury) ([facility])    Chronic diastolic heart failure ([facility])    Normocytic anemia    Inflammatory bowel disease    Iron deficiency anemia due to chronic blood loss    Venous stasis ulcer

## 2024-12-05 NOTE — PROGRESS NOTES
PCP decreased amlodipine from 10 mg to 5 mg QD due to low BSL.   We called him yesterday due to a critical BSL of 411. Wife says he was out of his insulin for a couple of weeks due to cost.

## 2024-12-16 ENCOUNTER — OFFICE VISIT (OUTPATIENT)
Dept: ONCOLOGY | Age: 69
End: 2024-12-16
Payer: MEDICARE

## 2024-12-16 ENCOUNTER — HOSPITAL ENCOUNTER (OUTPATIENT)
Dept: INFUSION THERAPY | Age: 69
Discharge: HOME | End: 2024-12-16
Payer: MEDICARE

## 2024-12-16 VITALS
RESPIRATION RATE: 18 BRPM | HEIGHT: 63 IN | TEMPERATURE: 97.9 F | BODY MASS INDEX: 35.97 KG/M2 | WEIGHT: 203 LBS | SYSTOLIC BLOOD PRESSURE: 118 MMHG | DIASTOLIC BLOOD PRESSURE: 58 MMHG | HEART RATE: 83 BPM | OXYGEN SATURATION: 95 %

## 2024-12-16 VITALS
HEART RATE: 83 BPM | RESPIRATION RATE: 18 BRPM | SYSTOLIC BLOOD PRESSURE: 118 MMHG | TEMPERATURE: 97.9 F | DIASTOLIC BLOOD PRESSURE: 58 MMHG | OXYGEN SATURATION: 95 %

## 2024-12-16 DIAGNOSIS — E53.8 VITAMIN B12 DEFICIENCY: ICD-10-CM

## 2024-12-16 DIAGNOSIS — K52.9 INFLAMMATORY BOWEL DISEASE: ICD-10-CM

## 2024-12-16 DIAGNOSIS — D50.0 IRON DEFICIENCY ANEMIA DUE TO CHRONIC BLOOD LOSS: ICD-10-CM

## 2024-12-16 DIAGNOSIS — D64.9 NORMOCYTIC ANEMIA: Primary | ICD-10-CM

## 2024-12-16 DIAGNOSIS — D64.9 NORMOCYTIC ANEMIA: ICD-10-CM

## 2024-12-16 LAB
BASOPHILS ABSOLUTE: 0 THOU/MM3 (ref 0–0.1)
BASOPHILS NFR BLD AUTO: 0 % (ref 0–3)
EOSINOPHIL NFR BLD AUTO: 5 % (ref 0–4)
EOSINOPHILS ABSOLUTE: 0.4 THOU/MM3 (ref 0–0.4)
ERYTHROCYTE [DISTWIDTH] IN BLOOD BY AUTOMATED COUNT: 13.1 % (ref 11.5–14.5)
FERRITIN SERPL IA-MCNC: 199 NG/ML (ref 22–322)
HCT VFR BLD AUTO: 31.4 % (ref 42–52)
HGB BLD-MCNC: 10.3 GM/DL (ref 14–18)
IMMATURE GRANULOCYTES %: 0 %
IMMATURE GRANULOCYTES ABSOLUTE: 0.01 THOU/MM3 (ref 0–0.07)
IRON SERPL-MCNC: 35 UG/DL (ref 65–195)
LYMPHOCYTES ABSOLUTE: 0.9 THOU/MM3 (ref 1–4.8)
LYMPHOCYTES NFR BLD AUTO: 10 % (ref 15–47)
MCH RBC QN AUTO: 31 PG (ref 26–33)
MCHC RBC AUTO-ENTMCNC: 32.8 GM/DL (ref 32.2–35.5)
MCV RBC AUTO: 95 FL (ref 80–94)
MONOCYTES ABSOLUTE: 1 THOU/MM3 (ref 0.4–1.3)
MONOCYTES NFR BLD AUTO: 12 % (ref 0–12)
NEUTROPHILS ABSOLUTE: 6.6 THOU/MM3 (ref 1.8–7.7)
NEUTROPHILS NFR BLD AUTO: 73 % (ref 43–75)
PLATELET # BLD AUTO: 259 THOU/MM3 (ref 130–400)
PMV BLD AUTO: 9.3 FL (ref 9.4–12.4)
RBC # BLD AUTO: 3.32 MILL/MM3 (ref 4.7–6.1)
TIBC SERPL-MCNC: 205 UG/DL (ref 171–450)
WBC # BLD AUTO: 9 THOU/MM3 (ref 4.8–10.8)

## 2024-12-16 PROCEDURE — 99211 OFF/OP EST MAY X REQ PHY/QHP: CPT

## 2024-12-16 PROCEDURE — 3074F SYST BP LT 130 MM HG: CPT | Performed by: PHYSICIAN ASSISTANT

## 2024-12-16 PROCEDURE — 36415 COLL VENOUS BLD VENIPUNCTURE: CPT

## 2024-12-16 PROCEDURE — 1124F ACP DISCUSS-NO DSCNMKR DOCD: CPT | Performed by: PHYSICIAN ASSISTANT

## 2024-12-16 PROCEDURE — 3078F DIAST BP <80 MM HG: CPT | Performed by: PHYSICIAN ASSISTANT

## 2024-12-16 PROCEDURE — 85025 COMPLETE CBC W/AUTO DIFF WBC: CPT

## 2024-12-16 PROCEDURE — 1160F RVW MEDS BY RX/DR IN RCRD: CPT | Performed by: PHYSICIAN ASSISTANT

## 2024-12-16 PROCEDURE — 99214 OFFICE O/P EST MOD 30 MIN: CPT | Performed by: PHYSICIAN ASSISTANT

## 2024-12-16 PROCEDURE — 83540 ASSAY OF IRON: CPT

## 2024-12-16 PROCEDURE — 1159F MED LIST DOCD IN RCRD: CPT | Performed by: PHYSICIAN ASSISTANT

## 2024-12-16 PROCEDURE — 83550 IRON BINDING TEST: CPT

## 2024-12-16 PROCEDURE — 82728 ASSAY OF FERRITIN: CPT

## 2024-12-16 PROCEDURE — 1126F AMNT PAIN NOTED NONE PRSNT: CPT | Performed by: PHYSICIAN ASSISTANT

## 2024-12-16 RX ORDER — ACETAMINOPHEN 325 MG/1
650 TABLET ORAL
OUTPATIENT
Start: 2024-12-16

## 2024-12-16 RX ORDER — SODIUM CHLORIDE 9 MG/ML
5-250 INJECTION, SOLUTION INTRAVENOUS PRN
OUTPATIENT
Start: 2024-12-16

## 2024-12-16 RX ORDER — HYDROCORTISONE SODIUM SUCCINATE 100 MG/2ML
100 INJECTION INTRAMUSCULAR; INTRAVENOUS
OUTPATIENT
Start: 2024-12-16

## 2024-12-16 RX ORDER — ONDANSETRON 2 MG/ML
8 INJECTION INTRAMUSCULAR; INTRAVENOUS
OUTPATIENT
Start: 2024-12-16

## 2024-12-16 RX ORDER — DIPHENHYDRAMINE HYDROCHLORIDE 50 MG/ML
50 INJECTION INTRAMUSCULAR; INTRAVENOUS
OUTPATIENT
Start: 2024-12-16

## 2024-12-16 RX ORDER — EPINEPHRINE 1 MG/ML
0.3 INJECTION, SOLUTION, CONCENTRATE INTRAVENOUS PRN
OUTPATIENT
Start: 2024-12-16

## 2024-12-16 RX ORDER — SODIUM CHLORIDE 9 MG/ML
INJECTION, SOLUTION INTRAVENOUS CONTINUOUS
OUTPATIENT
Start: 2024-12-16

## 2024-12-16 RX ORDER — ALBUTEROL SULFATE 90 UG/1
4 INHALANT RESPIRATORY (INHALATION) PRN
OUTPATIENT
Start: 2024-12-16

## 2024-12-16 RX ORDER — HEPARIN SODIUM (PORCINE) LOCK FLUSH IV SOLN 100 UNIT/ML 100 UNIT/ML
500 SOLUTION INTRAVENOUS PRN
OUTPATIENT
Start: 2024-12-16

## 2024-12-16 RX ORDER — SODIUM CHLORIDE 0.9 % (FLUSH) 0.9 %
5-40 SYRINGE (ML) INJECTION PRN
OUTPATIENT
Start: 2024-12-16

## 2024-12-16 RX ORDER — FAMOTIDINE 10 MG/ML
20 INJECTION, SOLUTION INTRAVENOUS
OUTPATIENT
Start: 2024-12-16

## 2024-12-16 NOTE — PATIENT INSTRUCTIONS
Will proceed with IV Venofer, total of 3 infusions to be given one week apart  Return to clinic in 4 months  Labs on RTC  Please call for questions or concerns.

## 2024-12-16 NOTE — PROGRESS NOTES
serum creatinine. He has follow up in 4 weeks.    PMH, SH, and FH:  I reviewed the patients medication list and allergy list as noted on the electronic medical record. The PMH, SH and FH were also reviewed as noted on the EMR.      Review of Systems:   Review of Systems   Pertinent review of systems noted in HPI, all other ROS negative.   Objective:   Physical Exam   BP (!) 118/58 (Site: Left Upper Arm, Position: Sitting, Cuff Size: Medium Adult)   Pulse 83   Temp 97.9 °F (36.6 °C)   Resp 18   Ht 1.6 m (5' 2.99\")   Wt 92.1 kg (203 lb)   SpO2 95%   BMI 35.97 kg/m²    General appearance: No apparent distress, chronically ill appearing, elderly, and cooperative.  HEENT: Pupils equal, round, and reactive to light. Conjunctivae/corneas clear. Oral mucosa moist.    Neck: Supple, with full range of motion. Trachea midline.   Respiratory:  Normal respiratory effort. Clear to auscultation, bilaterally without Rales/Wheezes/Rhonchi. On room air.    Cardiovascular: Regular rate and rhythm with normal S1/S2  Abdomen: Soft, non-distended, active bowel sounds.  Musculoskeletal: ambulates with walker in clinic  Skin: Skin color, texture, turgor normal.  No visible rashes or lesions.  Neurologic:  Neurovascularly intact without any focal sensory/motor deficits.   Psychiatric: Alert and oriented      Imaging Studies and Labs:   CBC:   Lab Results   Component Value Date    WBC 12.2 (H) 11/26/2024    HGB 11.1 (L) 11/26/2024    HCT 34.0 (L) 11/26/2024    MCV 94.4 11/26/2024     11/26/2024     BMP:   Lab Results   Component Value Date/Time     12/03/2024 03:42 PM    K 5.0 12/03/2024 03:42 PM    K 2.9 10/11/2023 06:18 AM    CL 93 12/03/2024 03:42 PM    CO2 29 12/03/2024 03:42 PM    BUN 73 12/03/2024 03:42 PM    CREATININE 2.99 12/03/2024 03:42 PM    GLUCOSE 411 12/03/2024 03:42 PM    CALCIUM 9.00 12/03/2024 03:42 PM      LFT:   Lab Results   Component Value Date    ALT 14 11/26/2024    AST 14 (L) 11/26/2024    ALKPHOS

## 2024-12-30 NOTE — TELEPHONE ENCOUNTER
41 y.o. male presents for evaluation of right elbow pain and swelling that began 1 mo ago. States he noticed radiating pains into wrist that began a few weeks ago. States he has rested his arm the past week and has noticed almost complete resolution of swelling of elbow. He states he is a  and uses his arms a lot. Denies any specific injury. Has had this in the past and was treated with steroids which resolved his symptoms. No numbness/tingling, redness, or any other associated symptom. No other otc meds for symptoms. No other complaints.      Vitals:    12/30/24 1221   BP: 126/83   Pulse: 77   Resp: 16   Temp: 37 °C (98.6 °F)   SpO2: 96%       No Known Allergies    Medication Documentation Review Audit       Reviewed by SILVANO Bhatti (Nurse Practitioner) on 12/30/24 at 1226      Medication Order Taking? Sig Documenting Provider Last Dose Status   escitalopram (Lexapro) 5 mg tablet [id number]  Take 1 tablet (5 mg) by mouth once daily.   Patient not taking: Reported on 12/30/2024    Jarrod Buchanan MD  Active                    Past Medical History:   Diagnosis Date    Celiac disease ([facility])        Past Surgical History:   Procedure Laterality Date    OTHER SURGICAL HISTORY  01/22/2020    Colonoscopy       ROS  See HPI    Physical Exam  Vitals and nursing note reviewed.   Constitutional:       Appearance: Normal appearance.   Musculoskeletal:         General: Tenderness present.      Right elbow: No swelling. Decreased range of motion (unable to fully extend). Tenderness present.      Right wrist: Bony tenderness (ulnar) present. No swelling, deformity, effusion, snuff box tenderness or crepitus. Normal range of motion. Normal pulse (cap refill to distal hand and fingers < 3 seconds, sensation intact).   Skin:     General: Skin is warm and dry.   Neurological:      General: No focal deficit present.      Mental Status: He is alert and oriented to person, place, and time.  Noted thank you   Psychiatric:         Mood and Affect: Mood normal.         Behavior: Behavior normal.           Assessment/Plan/MDM  Hernesto was seen today for arm pain.  Diagnoses and all orders for this visit:  Tendonitis of elbow, right (Primary)  -     predniSONE (Deltasone) 10 mg tablet; 4 tabs po daily x 3 days, then 3 tabs po daily x 3 days, then 2 tab po daily  x3 days, then 1 tab po daily  x 3 days    Pt declines imaging today. He will follow up with PCP if no improvement in symptoms for further work up per his wishes. Encouraged him to continue to rest, use otc tennis elbow brace PRN.  Patient's clinical presentation is otherwise unremarkable at this time. Patient is discharged with instructions to follow-up with primary care or seek emergency medical attention for worsening symptoms or any new concerns.    I did personally review Hernesto's past medical history, surgical history, social history, as well as family history (when relevant).  In this case, I also oversaw the his drug management by reviewing his medication list, allergy list, as well as the medications that I prescribed during the UC course and/or recommended as an out-patient (including possible OTC medications such as acetaminophen, NSAIDs , etc).    After reviewing the items above, I did look at previous medical documentation, such as recent hospitalizations, office visits, and/or recent consultations with PCP/specialist.                          SDOH:   Another factor that I considered in Hernesto's care was his Social Determinants of Health (SDOH). During this UC encounter, he did not have social determinants of health. Those SDOH influencing Hernesto's care are: none      Marin Jules CNP  [facility]  [phone]

## 2025-01-06 DIAGNOSIS — E55.9 VITAMIN D DEFICIENCY: Primary | ICD-10-CM

## 2025-01-06 DIAGNOSIS — E87.20 METABOLIC ACIDOSIS: ICD-10-CM

## 2025-01-06 DIAGNOSIS — I10 PRIMARY HYPERTENSION: ICD-10-CM

## 2025-01-06 DIAGNOSIS — E11.21 DIABETIC NEPHROPATHY ASSOCIATED WITH TYPE 2 DIABETES MELLITUS (HCC): ICD-10-CM

## 2025-01-06 DIAGNOSIS — N25.81 HYPERPARATHYROIDISM, SECONDARY RENAL (HCC): ICD-10-CM

## 2025-01-06 DIAGNOSIS — N18.4 CKD (CHRONIC KIDNEY DISEASE), STAGE IV (HCC): Primary | ICD-10-CM

## 2025-01-07 LAB
ANION GAP SERPL CALCULATED.3IONS-SCNC: 13 MMOL/L (ref 4–12)
BUN BLDV-MCNC: 41 MG/DL (ref 7–20)
CALCIUM SERPL-MCNC: 8.4 MG/DL (ref 8.8–10.5)
CHLORIDE BLD-SCNC: 93 MEQ/L (ref 101–111)
CO2: 34 MEQ/L (ref 21–32)
CREAT SERPL-MCNC: 2.44 MG/DL (ref 0.6–1.3)
CREATININE CLEARANCE: 26
GLUCOSE: 214 MG/DL (ref 70–110)
POTASSIUM SERPL-SCNC: 3.3 MEQ/L (ref 3.6–5)
PTH INTACT: 104.5 PG/ML (ref 12–88)
SODIUM BLD-SCNC: 140 MEQ/L (ref 135–145)

## 2025-01-08 ENCOUNTER — TELEPHONE (OUTPATIENT)
Dept: NEPHROLOGY | Age: 70
End: 2025-01-08

## 2025-01-08 NOTE — TELEPHONE ENCOUNTER
----- Message from Dr. Jeremy Deluca MD sent at 1/8/2025  7:17 AM EST -----  Potassium level remains low.  Is he taking potassium as prescribed?  If not please do so  If already taking as prescribed please take 1 extra tablet a day in addition to what he has been taking  Repeat potassium and magnesium level in about 5 days

## 2025-01-08 NOTE — TELEPHONE ENCOUNTER
Spoke with wife. She confirms he is taking the potassium as directed. He takes potassium 20 meq- 2 tablets TID. He also takes an extra tablet twice a week when he takes metolazone per Domenica Montes. Wife says Dr. Deluca stopped the spironolactone at [facility] last appointment. Do you want him to take a total of 7 tablets of potassium 20 meq 7 days a week and continue taking the extra tablet on Mondays and Thursdays when he takes the metolazone as directed by Domenica Montes? I did instruct him to take an extra one today and I will call her back once I get clear directions from Dr. Deluca. I will need to update the MAR and instruct patient to repeat potassium and magnesium labs in 5 days.

## 2025-01-08 NOTE — TELEPHONE ENCOUNTER
Spoke with wife again. She expresses understanding. She says he is supposed to have his 4 week FU on 1/13/25 to see Dr. Deluca. His appt was on 2/27/24. I moved it back to 1/13/24 as Dr. Deluca wanted to see Brandon for FU 4 weeks. Brandon needs to repeat K and mag on 1/13/24.

## 2025-01-10 ENCOUNTER — HOSPITAL ENCOUNTER (OUTPATIENT)
Dept: INFUSION THERAPY | Age: 70
Discharge: HOME | End: 2025-01-10
Payer: MEDICARE

## 2025-01-10 VITALS
WEIGHT: 209.16 LBS | HEART RATE: 89 BPM | DIASTOLIC BLOOD PRESSURE: 66 MMHG | RESPIRATION RATE: 18 BRPM | SYSTOLIC BLOOD PRESSURE: 142 MMHG | HEIGHT: 63 IN | TEMPERATURE: 97.5 F | OXYGEN SATURATION: 92 % | BODY MASS INDEX: 37.06 KG/M2

## 2025-01-10 DIAGNOSIS — K52.9 INFLAMMATORY BOWEL DISEASE: Primary | ICD-10-CM

## 2025-01-10 DIAGNOSIS — D64.9 NORMOCYTIC ANEMIA: ICD-10-CM

## 2025-01-10 DIAGNOSIS — D50.0 IRON DEFICIENCY ANEMIA DUE TO CHRONIC BLOOD LOSS: ICD-10-CM

## 2025-01-10 PROCEDURE — 96365 THER/PROPH/DIAG IV INF INIT: CPT

## 2025-01-10 PROCEDURE — 6360000002 HC RX W HCPCS: Performed by: PHYSICIAN ASSISTANT

## 2025-01-10 PROCEDURE — 96366 THER/PROPH/DIAG IV INF ADDON: CPT

## 2025-01-10 PROCEDURE — 2500000003 HC RX 250 WO HCPCS: Performed by: PHYSICIAN ASSISTANT

## 2025-01-10 PROCEDURE — 2580000003 HC RX 258: Performed by: PHYSICIAN ASSISTANT

## 2025-01-10 RX ORDER — SODIUM CHLORIDE 9 MG/ML
5-250 INJECTION, SOLUTION INTRAVENOUS PRN
Status: CANCELLED | OUTPATIENT
Start: 2025-01-17

## 2025-01-10 RX ORDER — ACETAMINOPHEN 325 MG/1
650 TABLET ORAL
Status: CANCELLED | OUTPATIENT
Start: 2025-01-17

## 2025-01-10 RX ORDER — DIPHENHYDRAMINE HYDROCHLORIDE 50 MG/ML
50 INJECTION INTRAMUSCULAR; INTRAVENOUS
Status: CANCELLED | OUTPATIENT
Start: 2025-01-17

## 2025-01-10 RX ORDER — SODIUM CHLORIDE 9 MG/ML
INJECTION, SOLUTION INTRAVENOUS CONTINUOUS
Status: CANCELLED | OUTPATIENT
Start: 2025-01-17

## 2025-01-10 RX ORDER — ALBUTEROL SULFATE 90 UG/1
4 INHALANT RESPIRATORY (INHALATION) PRN
Status: CANCELLED | OUTPATIENT
Start: 2025-01-17

## 2025-01-10 RX ORDER — HYDROCORTISONE SODIUM SUCCINATE 100 MG/2ML
100 INJECTION INTRAMUSCULAR; INTRAVENOUS
Status: CANCELLED | OUTPATIENT
Start: 2025-01-17

## 2025-01-10 RX ORDER — EPINEPHRINE 1 MG/ML
0.3 INJECTION, SOLUTION INTRAMUSCULAR; SUBCUTANEOUS PRN
Status: CANCELLED | OUTPATIENT
Start: 2025-01-17

## 2025-01-10 RX ORDER — HEPARIN 100 UNIT/ML
500 SYRINGE INTRAVENOUS PRN
Status: CANCELLED | OUTPATIENT
Start: 2025-01-17

## 2025-01-10 RX ORDER — ONDANSETRON 2 MG/ML
8 INJECTION INTRAMUSCULAR; INTRAVENOUS
Status: CANCELLED | OUTPATIENT
Start: 2025-01-17

## 2025-01-10 RX ORDER — SODIUM CHLORIDE 0.9 % (FLUSH) 0.9 %
5-40 SYRINGE (ML) INJECTION PRN
Status: CANCELLED | OUTPATIENT
Start: 2025-01-17

## 2025-01-10 RX ORDER — SODIUM CHLORIDE 0.9 % (FLUSH) 0.9 %
5-40 SYRINGE (ML) INJECTION PRN
Status: DISCONTINUED | OUTPATIENT
Start: 2025-01-10 | End: 2025-01-11 | Stop reason: HOSPADM

## 2025-01-10 RX ADMIN — SODIUM CHLORIDE 300 MG: 9 INJECTION, SOLUTION INTRAVENOUS at 12:48

## 2025-01-10 RX ADMIN — SODIUM CHLORIDE, PRESERVATIVE FREE 10 ML: 5 INJECTION INTRAVENOUS at 12:45

## 2025-01-10 NOTE — PROGRESS NOTES
Patient assessed for the following post venofer infusion:    Dizziness   No  Lightheadedness  No      Acute nausea/vomiting No  Headache   No  Chest pain/pressure  No  Rash/itching   No  Shortness of breath  No      Patient tolerated venofer infusion without any complications.    Last vital signs:   BP (!) 142/66   Pulse 89   Temp 97.5 °F (36.4 °C) (Oral)   Resp 18   Ht 1.6 m (5' 3\")   Wt 94.9 kg (209 lb 2.6 oz)   SpO2 92%   BMI 37.05 kg/m²         Patient instructed if experience any of the above symptoms following today's infusion, he is to notify MD immediately or go to the emergency department.    Discharge instructions given to patient. Verbalizes understanding. Ambulated off unit per self with belongings.

## 2025-01-10 NOTE — PLAN OF CARE
Problem: Safety - Adult  Goal: Free from fall injury  Outcome: Adequate for Discharge  Flowsheets (Taken 1/10/2025 1243)  Free From Fall Injury:   Instruct family/caregiver on patient safety   Based on caregiver fall risk screen, instruct family/caregiver to ask for assistance with transferring infant if caregiver noted to have fall risk factors     Problem: Discharge Planning  Goal: Discharge to home or other facility with appropriate resources  Outcome: Adequate for Discharge  Flowsheets (Taken 1/10/2025 1243)  Discharge to home or other facility with appropriate resources:   Identify barriers to discharge with patient and caregiver   Identify discharge learning needs (meds, wound care, etc)   Arrange for needed discharge resources and transportation as appropriate     Problem: Chronic Conditions and Co-morbidities  Goal: Patient's chronic conditions and co-morbidity symptoms are monitored and maintained or improved  Outcome: Adequate for Discharge  Note:      Patient verbalizes understanding to verbal information given on venofer. Aware to call MD if develop complications.     Care plan reviewed with patient.  Patient  verbalize understanding of the plan of care and contribute to goal setting.

## 2025-01-13 ENCOUNTER — OFFICE VISIT (OUTPATIENT)
Dept: NEPHROLOGY | Age: 70
End: 2025-01-13
Payer: MEDICARE

## 2025-01-13 VITALS
BODY MASS INDEX: 37.39 KG/M2 | HEIGHT: 63 IN | WEIGHT: 211 LBS | HEART RATE: 85 BPM | SYSTOLIC BLOOD PRESSURE: 105 MMHG | OXYGEN SATURATION: 95 % | DIASTOLIC BLOOD PRESSURE: 56 MMHG

## 2025-01-13 DIAGNOSIS — E83.51 HYPOCALCEMIA: ICD-10-CM

## 2025-01-13 DIAGNOSIS — E87.20 METABOLIC ACIDOSIS: ICD-10-CM

## 2025-01-13 DIAGNOSIS — E87.1 HYPONATREMIA: ICD-10-CM

## 2025-01-13 DIAGNOSIS — N18.4 CKD (CHRONIC KIDNEY DISEASE), STAGE IV (HCC): ICD-10-CM

## 2025-01-13 DIAGNOSIS — N18.4 CKD (CHRONIC KIDNEY DISEASE), STAGE IV (HCC): Primary | ICD-10-CM

## 2025-01-13 DIAGNOSIS — E87.3 METABOLIC ALKALOSIS: ICD-10-CM

## 2025-01-13 DIAGNOSIS — E87.6 HYPOKALEMIA: ICD-10-CM

## 2025-01-13 DIAGNOSIS — E11.21 DIABETIC NEPHROPATHY ASSOCIATED WITH TYPE 2 DIABETES MELLITUS (HCC): ICD-10-CM

## 2025-01-13 DIAGNOSIS — N25.81 HYPERPARATHYROIDISM, SECONDARY RENAL (HCC): ICD-10-CM

## 2025-01-13 DIAGNOSIS — I10 PRIMARY HYPERTENSION: ICD-10-CM

## 2025-01-13 LAB
ANION GAP SERPL CALC-SCNC: 20 MEQ/L (ref 8–16)
BUN SERPL-MCNC: 41 MG/DL (ref 7–22)
CALCIUM SERPL-MCNC: 8.2 MG/DL (ref 8.5–10.5)
CHLORIDE SERPL-SCNC: 97 MEQ/L (ref 98–111)
CO2 SERPL-SCNC: 27 MEQ/L (ref 23–33)
CREAT SERPL-MCNC: 2.6 MG/DL (ref 0.4–1.2)
GFR SERPL CREATININE-BSD FRML MDRD: 26 ML/MIN/1.73M2
GLUCOSE SERPL-MCNC: 133 MG/DL (ref 70–108)
POTASSIUM SERPL-SCNC: 3 MEQ/L (ref 3.5–5.2)
SODIUM SERPL-SCNC: 144 MEQ/L (ref 135–145)

## 2025-01-13 PROCEDURE — 1124F ACP DISCUSS-NO DSCNMKR DOCD: CPT | Performed by: INTERNAL MEDICINE

## 2025-01-13 PROCEDURE — 99214 OFFICE O/P EST MOD 30 MIN: CPT | Performed by: INTERNAL MEDICINE

## 2025-01-13 PROCEDURE — 3078F DIAST BP <80 MM HG: CPT | Performed by: INTERNAL MEDICINE

## 2025-01-13 PROCEDURE — 3074F SYST BP LT 130 MM HG: CPT | Performed by: INTERNAL MEDICINE

## 2025-01-13 PROCEDURE — 1159F MED LIST DOCD IN RCRD: CPT | Performed by: INTERNAL MEDICINE

## 2025-01-13 NOTE — PROGRESS NOTES
FU 4 weeks for CKD IV. Brandon has increased his potassium dosage as Dr. Deluca instructed since 1/8/25. He needs a repeat K drawn today. He is getting iron infusions ordered by Beti Garcia from the cancer center. He has had one so far and has two more to complete (weekly x's 3)  
10/07/2023 12:15 PM    BILIRUBINUR NEGATIVE 10/07/2023 12:15 PM    BLOODU MODERATE 10/07/2023 12:15 PM    GLUCOSEU NEGATIVE 10/07/2023 12:15 PM    KETUA NEGATIVE 10/07/2023 12:15 PM      Microalbumen/Creatinine ratio:  No components found for: \"RUCREAT\"    Objective:   Vitals: BP (!) 105/56 (Site: Right Upper Arm, Position: Sitting, Cuff Size: Large Adult)   Pulse 85   Ht 1.6 m (5' 3\")   Wt 95.7 kg (211 lb)   SpO2 95%   BMI 37.38 kg/m²      Constitutional:  Alert, awake, no apparent distress  Skin:normal with no rash or any significant lesions  HEENT:Pupils are reactive .Throat is clear.  Oral mucosa is moist.  Neck:supple with no thyromegaly, JVD, lymphadenopathy or bruit **  Cardiovascular: Regular sinus rhythm without murmur, rubs or gallops   Respiratory:  Clear to auscultation with no wheezes or rales  Abdomen: Good bowel sound, soft, non tender and no bruit  Ext: No LE edema  Musculoskeletal:Intact  Neuro:Alert, awake and oriented with no obvious focal deficit.  Speech is normal.**    Electronically signed by Jeremy Deluca MD on 1/13/2025 at 9:43 AM   **This report has been created using voice recognition software. It maycontain minor  errors which are inherent in voice recognition technology.**

## 2025-01-14 ENCOUNTER — TELEPHONE (OUTPATIENT)
Dept: NEPHROLOGY | Age: 70
End: 2025-01-14

## 2025-01-14 DIAGNOSIS — E87.6 HYPOKALEMIA: ICD-10-CM

## 2025-01-14 DIAGNOSIS — N18.4 CKD (CHRONIC KIDNEY DISEASE), STAGE IV (HCC): Primary | ICD-10-CM

## 2025-01-14 NOTE — TELEPHONE ENCOUNTER
----- Message from Dr. Jeremy Deluca MD sent at 1/14/2025  7:37 AM EST -----  Potassium level is still low.  He needs to take the potassium supplement as prescribed.  Repeat potassium with magnesium level in 3 to 4 days.

## 2025-01-14 NOTE — TELEPHONE ENCOUNTER
Spoke with wife Zoya. She states she believes Brandon is taking the potassium as directed. She will monitor his pills to be sure. She will increase potassium rich foods and repeat labs either Friday or Monday at [facility].

## 2025-01-16 NOTE — PROGRESS NOTES
Right lower leg: No edema.      Left lower leg: No edema.   Skin:     General: Skin is warm and dry.   Neurological:      General: No focal deficit present.      Mental Status: He is alert.   Psychiatric:         Mood and Affect: Mood normal.         Behavior: Behavior normal.         Thought Content: Thought content normal.            Information added by my medical assistant/LPN was reviewed today.    Electronically signed by FELI Freitas CNP on 1/17/2025 at 11:17 AM

## 2025-01-17 ENCOUNTER — HOSPITAL ENCOUNTER (OUTPATIENT)
Dept: INFUSION THERAPY | Age: 70
Discharge: HOME | End: 2025-01-17
Payer: MEDICARE

## 2025-01-17 ENCOUNTER — OFFICE VISIT (OUTPATIENT)
Dept: PULMONOLOGY | Age: 70
End: 2025-01-17
Payer: MEDICARE

## 2025-01-17 VITALS
WEIGHT: 205.5 LBS | HEIGHT: 63 IN | TEMPERATURE: 97.2 F | BODY MASS INDEX: 36.41 KG/M2 | DIASTOLIC BLOOD PRESSURE: 60 MMHG | RESPIRATION RATE: 18 BRPM | SYSTOLIC BLOOD PRESSURE: 126 MMHG | OXYGEN SATURATION: 95 % | HEART RATE: 83 BPM

## 2025-01-17 VITALS
HEART RATE: 75 BPM | WEIGHT: 207.8 LBS | HEIGHT: 63 IN | TEMPERATURE: 98.8 F | OXYGEN SATURATION: 98 % | SYSTOLIC BLOOD PRESSURE: 122 MMHG | DIASTOLIC BLOOD PRESSURE: 60 MMHG | BODY MASS INDEX: 36.82 KG/M2

## 2025-01-17 DIAGNOSIS — J44.9 MODERATE COPD (CHRONIC OBSTRUCTIVE PULMONARY DISEASE) (HCC): ICD-10-CM

## 2025-01-17 DIAGNOSIS — D64.9 NORMOCYTIC ANEMIA: ICD-10-CM

## 2025-01-17 DIAGNOSIS — G47.33 OSA TREATED WITH BIPAP: Primary | ICD-10-CM

## 2025-01-17 DIAGNOSIS — D50.0 IRON DEFICIENCY ANEMIA DUE TO CHRONIC BLOOD LOSS: ICD-10-CM

## 2025-01-17 DIAGNOSIS — K52.9 INFLAMMATORY BOWEL DISEASE: Primary | ICD-10-CM

## 2025-01-17 DIAGNOSIS — E66.9 OBESITY (BMI 30-39.9): ICD-10-CM

## 2025-01-17 PROCEDURE — 99214 OFFICE O/P EST MOD 30 MIN: CPT

## 2025-01-17 PROCEDURE — 1159F MED LIST DOCD IN RCRD: CPT

## 2025-01-17 PROCEDURE — 96365 THER/PROPH/DIAG IV INF INIT: CPT

## 2025-01-17 PROCEDURE — 3078F DIAST BP <80 MM HG: CPT

## 2025-01-17 PROCEDURE — 2580000003 HC RX 258: Performed by: PHYSICIAN ASSISTANT

## 2025-01-17 PROCEDURE — 2500000003 HC RX 250 WO HCPCS: Performed by: PHYSICIAN ASSISTANT

## 2025-01-17 PROCEDURE — 6360000002 HC RX W HCPCS: Performed by: PHYSICIAN ASSISTANT

## 2025-01-17 PROCEDURE — 96366 THER/PROPH/DIAG IV INF ADDON: CPT

## 2025-01-17 PROCEDURE — 1124F ACP DISCUSS-NO DSCNMKR DOCD: CPT

## 2025-01-17 PROCEDURE — 3074F SYST BP LT 130 MM HG: CPT

## 2025-01-17 RX ORDER — ALBUTEROL SULFATE 90 UG/1
4 INHALANT RESPIRATORY (INHALATION) PRN
Status: CANCELLED | OUTPATIENT
Start: 2025-01-24

## 2025-01-17 RX ORDER — SODIUM CHLORIDE 9 MG/ML
5-250 INJECTION, SOLUTION INTRAVENOUS PRN
Status: DISCONTINUED | OUTPATIENT
Start: 2025-01-17 | End: 2025-01-18 | Stop reason: HOSPADM

## 2025-01-17 RX ORDER — HYDROCORTISONE SODIUM SUCCINATE 100 MG/2ML
100 INJECTION INTRAMUSCULAR; INTRAVENOUS
Status: CANCELLED | OUTPATIENT
Start: 2025-01-24

## 2025-01-17 RX ORDER — ONDANSETRON 2 MG/ML
8 INJECTION INTRAMUSCULAR; INTRAVENOUS
Status: CANCELLED | OUTPATIENT
Start: 2025-01-24

## 2025-01-17 RX ORDER — SODIUM CHLORIDE 0.9 % (FLUSH) 0.9 %
5-40 SYRINGE (ML) INJECTION PRN
Status: DISCONTINUED | OUTPATIENT
Start: 2025-01-17 | End: 2025-01-18 | Stop reason: HOSPADM

## 2025-01-17 RX ORDER — SODIUM CHLORIDE 0.9 % (FLUSH) 0.9 %
5-40 SYRINGE (ML) INJECTION PRN
Status: CANCELLED | OUTPATIENT
Start: 2025-01-24

## 2025-01-17 RX ORDER — HEPARIN 100 UNIT/ML
500 SYRINGE INTRAVENOUS PRN
Status: CANCELLED | OUTPATIENT
Start: 2025-01-24

## 2025-01-17 RX ORDER — EPINEPHRINE 1 MG/ML
0.3 INJECTION, SOLUTION INTRAMUSCULAR; SUBCUTANEOUS PRN
Status: CANCELLED | OUTPATIENT
Start: 2025-01-24

## 2025-01-17 RX ORDER — DIPHENHYDRAMINE HYDROCHLORIDE 50 MG/ML
50 INJECTION INTRAMUSCULAR; INTRAVENOUS
Status: CANCELLED | OUTPATIENT
Start: 2025-01-24

## 2025-01-17 RX ORDER — SODIUM CHLORIDE 9 MG/ML
5-250 INJECTION, SOLUTION INTRAVENOUS PRN
Status: CANCELLED | OUTPATIENT
Start: 2025-01-24

## 2025-01-17 RX ORDER — ACETAMINOPHEN 325 MG/1
650 TABLET ORAL
Status: CANCELLED | OUTPATIENT
Start: 2025-01-24

## 2025-01-17 RX ORDER — SODIUM CHLORIDE 9 MG/ML
INJECTION, SOLUTION INTRAVENOUS CONTINUOUS
Status: CANCELLED | OUTPATIENT
Start: 2025-01-24

## 2025-01-17 RX ADMIN — SODIUM CHLORIDE, PRESERVATIVE FREE 20 ML: 5 INJECTION INTRAVENOUS at 12:20

## 2025-01-17 RX ADMIN — SODIUM CHLORIDE 300 MG: 9 INJECTION, SOLUTION INTRAVENOUS at 12:23

## 2025-01-17 RX ADMIN — SODIUM CHLORIDE, PRESERVATIVE FREE 10 ML: 5 INJECTION INTRAVENOUS at 14:03

## 2025-01-17 ASSESSMENT — ENCOUNTER SYMPTOMS
COUGH: 0
SORE THROAT: 0
WHEEZING: 0
RHINORRHEA: 0
SHORTNESS OF BREATH: 0

## 2025-01-17 NOTE — PLAN OF CARE
Problem: Safety - Adult  Goal: Free from fall injury  Outcome: Progressing     Problem: Discharge Planning  Goal: Discharge to home or other facility with appropriate resources  Outcome: Progressing     Problem: Chronic Conditions and Co-morbidities  Goal: Patient's chronic conditions and co-morbidity symptoms are monitored and maintained or improved  Outcome: Progressing  Flowsheets (Taken 1/17/2025 1153)  Care Plan - Patient's Chronic Conditions and Co-Morbidity Symptoms are Monitored and Maintained or Improved: Monitor and assess patient's chronic conditions and comorbid symptoms for stability, deterioration, or improvement  Note: Discussed indications for iron   Care plan reviewed with patient.  Patient verbalizes understanding of the plan of care and contributes to goal setting.

## 2025-01-17 NOTE — PROGRESS NOTES
Patient tolerated Venofer without any complications.    Denies dizziness, lightheadedness, acute nausea or vomiting, headache, heart palpitations, rash/itching or increased SOB.     Last vital signs:   /60   Pulse 83   Temp 97.2 °F (36.2 °C) (Oral)   Resp 18   Ht 1.6 m (5' 3\")   Wt 93.2 kg (205 lb 8 oz)   SpO2 95%   BMI 36.40 kg/m²     Patient instructed if they experience any of the above symptoms following today's visit, he/she is to notify the physician immediately or go to the Emergency Department.    Discharge instructions given to patient. Verbalizes understanding. Ambulated off unit per self in stable condition with belongings.

## 2025-01-17 NOTE — DISCHARGE INSTRUCTIONS
Please contact your Oncologist if you have any questions regarding the Venofer that you received today.      Please call if you experience any of the the following symptoms after today's therapy / notify MD immediately or go to the Emergency Department.    *dizziness/lightheadedness  *acute nausea/vomiting - not relieved with medication  *headache - not relieved from Tylenol/pain medication  *chest pain/pressure  *rash/itching  *shortness of breath    Drink fluids - 48-64 ounces of fluids daily.    Please call if you develop fever/chills/signs or symptoms of an infection or you are unable to drink fluids.

## 2025-01-24 ENCOUNTER — HOSPITAL ENCOUNTER (OUTPATIENT)
Dept: INFUSION THERAPY | Age: 70
Discharge: HOME | End: 2025-01-24
Payer: MEDICARE

## 2025-01-24 VITALS
HEIGHT: 63 IN | BODY MASS INDEX: 36.32 KG/M2 | WEIGHT: 205 LBS | HEART RATE: 87 BPM | OXYGEN SATURATION: 94 % | TEMPERATURE: 97.4 F | SYSTOLIC BLOOD PRESSURE: 142 MMHG | RESPIRATION RATE: 16 BRPM | DIASTOLIC BLOOD PRESSURE: 63 MMHG

## 2025-01-24 DIAGNOSIS — K52.9 INFLAMMATORY BOWEL DISEASE: Primary | ICD-10-CM

## 2025-01-24 DIAGNOSIS — D50.0 IRON DEFICIENCY ANEMIA DUE TO CHRONIC BLOOD LOSS: ICD-10-CM

## 2025-01-24 DIAGNOSIS — D64.9 NORMOCYTIC ANEMIA: ICD-10-CM

## 2025-01-24 PROCEDURE — 6360000002 HC RX W HCPCS: Performed by: PHYSICIAN ASSISTANT

## 2025-01-24 PROCEDURE — 96365 THER/PROPH/DIAG IV INF INIT: CPT

## 2025-01-24 PROCEDURE — 2580000003 HC RX 258: Performed by: PHYSICIAN ASSISTANT

## 2025-01-24 PROCEDURE — 96366 THER/PROPH/DIAG IV INF ADDON: CPT

## 2025-01-24 RX ORDER — ACETAMINOPHEN 325 MG/1
650 TABLET ORAL
OUTPATIENT
Start: 2025-01-24

## 2025-01-24 RX ORDER — SODIUM CHLORIDE 9 MG/ML
5-250 INJECTION, SOLUTION INTRAVENOUS PRN
Status: DISCONTINUED | OUTPATIENT
Start: 2025-01-24 | End: 2025-01-25 | Stop reason: HOSPADM

## 2025-01-24 RX ORDER — ALBUTEROL SULFATE 90 UG/1
4 INHALANT RESPIRATORY (INHALATION) PRN
OUTPATIENT
Start: 2025-01-24

## 2025-01-24 RX ORDER — DIPHENHYDRAMINE HYDROCHLORIDE 50 MG/ML
50 INJECTION INTRAMUSCULAR; INTRAVENOUS
OUTPATIENT
Start: 2025-01-24

## 2025-01-24 RX ORDER — SODIUM CHLORIDE 0.9 % (FLUSH) 0.9 %
5-40 SYRINGE (ML) INJECTION PRN
OUTPATIENT
Start: 2025-01-24

## 2025-01-24 RX ORDER — SODIUM CHLORIDE 9 MG/ML
5-250 INJECTION, SOLUTION INTRAVENOUS PRN
OUTPATIENT
Start: 2025-01-24

## 2025-01-24 RX ORDER — HEPARIN 100 UNIT/ML
500 SYRINGE INTRAVENOUS PRN
OUTPATIENT
Start: 2025-01-24

## 2025-01-24 RX ORDER — HYDROCORTISONE SODIUM SUCCINATE 100 MG/2ML
100 INJECTION INTRAMUSCULAR; INTRAVENOUS
OUTPATIENT
Start: 2025-01-24

## 2025-01-24 RX ORDER — SODIUM CHLORIDE 9 MG/ML
INJECTION, SOLUTION INTRAVENOUS CONTINUOUS
OUTPATIENT
Start: 2025-01-24

## 2025-01-24 RX ORDER — EPINEPHRINE 1 MG/ML
0.3 INJECTION, SOLUTION INTRAMUSCULAR; SUBCUTANEOUS PRN
OUTPATIENT
Start: 2025-01-24

## 2025-01-24 RX ORDER — ONDANSETRON 2 MG/ML
8 INJECTION INTRAMUSCULAR; INTRAVENOUS
OUTPATIENT
Start: 2025-01-24

## 2025-01-24 RX ADMIN — IRON SUCROSE 300 MG: 20 INJECTION, SOLUTION INTRAVENOUS at 13:05

## 2025-01-24 NOTE — PLAN OF CARE
Problem: Safety - Adult  Goal: Free from fall injury  Outcome: Adequate for Discharge  Flowsheets (Taken 1/24/2025 1607)  Free From Fall Injury:   Instruct family/caregiver on patient safety   Based on caregiver fall risk screen, instruct family/caregiver to ask for assistance with transferring infant if caregiver noted to have fall risk factors  Note: Free from falls while in O.P. Oncology.     Problem: Discharge Planning  Goal: Discharge to home or other facility with appropriate resources  Flowsheets (Taken 1/24/2025 1607)  Discharge to home or other facility with appropriate resources:   Identify barriers to discharge with patient and caregiver   Arrange for needed discharge resources and transportation as appropriate  Note: Verbalize understanding of discharge instructions, follow up appointments, and when to call Physician.     Problem: Chronic Conditions and Co-morbidities  Goal: Patient's chronic conditions and co-morbidity symptoms are monitored and maintained or improved  Flowsheets (Taken 1/24/2025 1607)  Care Plan - Patient's Chronic Conditions and Co-Morbidity Symptoms are Monitored and Maintained or Improved:   Monitor and assess patient's chronic conditions and comorbid symptoms for stability, deterioration, or improvement   Collaborate with multidisciplinary team to address chronic and comorbid conditions and prevent exacerbation or deterioration  Note: Patient verbalizes understanding to verbal information given on venofer,action and possible side effects. Aware to call MD if develop complications.

## 2025-01-24 NOTE — PROGRESS NOTES
Patient assessed for the following post infusion:    Dizziness   No  Lightheadedness  No      Acute nausea/vomiting No  Headache   No  Chest pain/pressure  No  Rash/itching   No  Shortness of breath  No    Patient kept for 20 minutes observation post infusion.    Patient tolerated infusion treatment venofer without any complications.    Last vital signs:   BP (!) 142/63   Pulse 87   Temp 97.4 °F (36.3 °C) (Oral)   Resp 16   Ht 1.6 m (5' 3\")   Wt 93 kg (205 lb)   SpO2 94%   BMI 36.31 kg/m²     Patient instructed if experience any of the above symptoms following today's infusion,he/she is to notify MD immediately or go to the emergency department.    Discharge instructions given to patient. Verbalizes understanding. Ambulated off unit per self with belongings.

## 2025-04-10 ENCOUNTER — HOSPITAL ENCOUNTER (INPATIENT)
Age: 70
LOS: 5 days | Discharge: NURSING, REHAB OR LONG-TERM CARE | DRG: 641 | End: 2025-04-17
Attending: EMERGENCY MEDICINE | Admitting: STUDENT IN AN ORGANIZED HEALTH CARE EDUCATION/TRAINING PROGRAM
Payer: MEDICARE

## 2025-04-10 ENCOUNTER — TELEPHONE (OUTPATIENT)
Dept: NEPHROLOGY | Age: 70
End: 2025-04-10

## 2025-04-10 DIAGNOSIS — N18.4 STAGE 4 CHRONIC KIDNEY DISEASE (HCC): ICD-10-CM

## 2025-04-10 DIAGNOSIS — E87.6 HYPOKALEMIA: Primary | ICD-10-CM

## 2025-04-10 LAB
ALBUMIN SERPL BCG-MCNC: 3.4 G/DL (ref 3.4–4.9)
ALP SERPL-CCNC: 145 U/L (ref 40–129)
ALT SERPL W/O P-5'-P-CCNC: 12 U/L (ref 10–50)
ANION GAP SERPL CALC-SCNC: 15 MEQ/L (ref 8–16)
ANION GAP SERPL CALCULATED.3IONS-SCNC: 13 MMOL/L (ref 4–12)
AST SERPL-CCNC: 21 U/L (ref 10–50)
BASOPHILS ABSOLUTE: 0.1 THOU/MM3 (ref 0–0.1)
BASOPHILS NFR BLD AUTO: 0.5 %
BILIRUB SERPL-MCNC: 0.2 MG/DL (ref 0.3–1.2)
BUN BLDV-MCNC: 60 MG/DL (ref 7–20)
BUN SERPL-MCNC: 57 MG/DL (ref 8–23)
CALCIUM SERPL-MCNC: 7.7 MG/DL (ref 8.8–10.5)
CALCIUM SERPL-MCNC: 8.2 MG/DL (ref 8.8–10.2)
CHLORIDE BLD-SCNC: 92 MEQ/L (ref 101–111)
CHLORIDE SERPL-SCNC: 97 MEQ/L (ref 98–111)
CO2 SERPL-SCNC: 32 MEQ/L (ref 22–29)
CO2: 34 MEQ/L (ref 21–32)
CREAT SERPL-MCNC: 2.68 MG/DL (ref 0.6–1.3)
CREAT SERPL-MCNC: 2.7 MG/DL (ref 0.7–1.2)
CREATININE CLEARANCE: 24
DEPRECATED RDW RBC AUTO: 48 FL (ref 35–45)
EKG ATRIAL RATE: 84 BPM
EKG P AXIS: 51 DEGREES
EKG P-R INTERVAL: 174 MS
EKG Q-T INTERVAL: 376 MS
EKG QRS DURATION: 88 MS
EKG QTC CALCULATION (BAZETT): 444 MS
EKG R AXIS: -3 DEGREES
EKG T AXIS: 49 DEGREES
EKG VENTRICULAR RATE: 84 BPM
EOSINOPHIL NFR BLD AUTO: 2.8 %
EOSINOPHILS ABSOLUTE: 0.4 THOU/MM3 (ref 0–0.4)
ERYTHROCYTE [DISTWIDTH] IN BLOOD BY AUTOMATED COUNT: 14.7 % (ref 11.5–14.5)
GFR SERPL CREATININE-BSD FRML MDRD: 24 ML/MIN/1.73M2
GLUCOSE BLD STRIP.AUTO-MCNC: 174 MG/DL (ref 70–108)
GLUCOSE BLD STRIP.AUTO-MCNC: 268 MG/DL (ref 70–108)
GLUCOSE SERPL-MCNC: 139 MG/DL (ref 74–109)
GLUCOSE: 330 MG/DL (ref 70–110)
HCT VFR BLD AUTO: 30.9 % (ref 42–52)
HGB BLD-MCNC: 10.3 GM/DL (ref 14–18)
IMM GRANULOCYTES # BLD AUTO: 0.05 THOU/MM3 (ref 0–0.07)
IMM GRANULOCYTES NFR BLD AUTO: 0.4 %
LYMPHOCYTES ABSOLUTE: 1.2 THOU/MM3 (ref 1–4.8)
LYMPHOCYTES NFR BLD AUTO: 9.2 %
MAGNESIUM SERPL-MCNC: 1.6 MG/DL (ref 1.6–2.6)
MCH RBC QN AUTO: 30 PG (ref 26–33)
MCHC RBC AUTO-ENTMCNC: 33.3 GM/DL (ref 32.2–35.5)
MCV RBC AUTO: 90.1 FL (ref 80–94)
MONOCYTES ABSOLUTE: 1.6 THOU/MM3 (ref 0.4–1.3)
MONOCYTES NFR BLD AUTO: 12.1 %
NEUTROPHILS ABSOLUTE: 9.8 THOU/MM3 (ref 1.8–7.7)
NEUTROPHILS NFR BLD AUTO: 75 %
NRBC BLD AUTO-RTO: 0 /100 WBC
OSMOLALITY SERPL CALC.SUM OF ELEC: 304.9 MOSMOL/KG (ref 275–300)
PLATELET # BLD AUTO: 243 THOU/MM3 (ref 130–400)
PMV BLD AUTO: 10.2 FL (ref 9.4–12.4)
POTASSIUM SERPL-SCNC: 2.4 MEQ/L (ref 3.5–5.2)
POTASSIUM SERPL-SCNC: 2.5 MEQ/L (ref 3.5–5.2)
POTASSIUM SERPL-SCNC: 2.7 MEQ/L (ref 3.6–5)
PROT SERPL-MCNC: 6.8 G/DL (ref 6.4–8.3)
PTH INTACT: 101.6 PG/ML (ref 12–88)
RBC # BLD AUTO: 3.43 MILL/MM3 (ref 4.7–6.1)
SODIUM BLD-SCNC: 139 MEQ/L (ref 135–145)
SODIUM SERPL-SCNC: 144 MEQ/L (ref 135–145)
VITAMIN D 25-HYDROXY: 34.8 NG/ML (ref 30–100)
WBC # BLD AUTO: 13.1 THOU/MM3 (ref 4.8–10.8)

## 2025-04-10 PROCEDURE — 85025 COMPLETE CBC W/AUTO DIFF WBC: CPT

## 2025-04-10 PROCEDURE — 99222 1ST HOSP IP/OBS MODERATE 55: CPT | Performed by: INTERNAL MEDICINE

## 2025-04-10 PROCEDURE — 80053 COMPREHEN METABOLIC PANEL: CPT

## 2025-04-10 PROCEDURE — G0378 HOSPITAL OBSERVATION PER HR: HCPCS

## 2025-04-10 PROCEDURE — 6360000002 HC RX W HCPCS: Performed by: PHYSICIAN ASSISTANT

## 2025-04-10 PROCEDURE — 6370000000 HC RX 637 (ALT 250 FOR IP): Performed by: PHYSICIAN ASSISTANT

## 2025-04-10 PROCEDURE — 6370000000 HC RX 637 (ALT 250 FOR IP)

## 2025-04-10 PROCEDURE — 96365 THER/PROPH/DIAG IV INF INIT: CPT

## 2025-04-10 PROCEDURE — 84132 ASSAY OF SERUM POTASSIUM: CPT

## 2025-04-10 PROCEDURE — 96366 THER/PROPH/DIAG IV INF ADDON: CPT

## 2025-04-10 PROCEDURE — 83735 ASSAY OF MAGNESIUM: CPT

## 2025-04-10 PROCEDURE — 99223 1ST HOSP IP/OBS HIGH 75: CPT | Performed by: PHYSICIAN ASSISTANT

## 2025-04-10 PROCEDURE — 93005 ELECTROCARDIOGRAM TRACING: CPT

## 2025-04-10 PROCEDURE — 96368 THER/DIAG CONCURRENT INF: CPT

## 2025-04-10 PROCEDURE — 99285 EMERGENCY DEPT VISIT HI MDM: CPT

## 2025-04-10 PROCEDURE — 96374 THER/PROPH/DIAG INJ IV PUSH: CPT

## 2025-04-10 PROCEDURE — 96376 TX/PRO/DX INJ SAME DRUG ADON: CPT

## 2025-04-10 PROCEDURE — 96372 THER/PROPH/DIAG INJ SC/IM: CPT

## 2025-04-10 PROCEDURE — 6360000002 HC RX W HCPCS

## 2025-04-10 PROCEDURE — 93010 ELECTROCARDIOGRAM REPORT: CPT | Performed by: NUCLEAR MEDICINE

## 2025-04-10 PROCEDURE — 36415 COLL VENOUS BLD VENIPUNCTURE: CPT

## 2025-04-10 PROCEDURE — 96375 TX/PRO/DX INJ NEW DRUG ADDON: CPT

## 2025-04-10 PROCEDURE — 82948 REAGENT STRIP/BLOOD GLUCOSE: CPT

## 2025-04-10 RX ORDER — DEXTROSE MONOHYDRATE 100 MG/ML
INJECTION, SOLUTION INTRAVENOUS CONTINUOUS PRN
Status: DISCONTINUED | OUTPATIENT
Start: 2025-04-10 | End: 2025-04-17 | Stop reason: HOSPADM

## 2025-04-10 RX ORDER — DOXAZOSIN 4 MG/1
4 TABLET ORAL DAILY
Status: DISCONTINUED | OUTPATIENT
Start: 2025-04-10 | End: 2025-04-17 | Stop reason: HOSPADM

## 2025-04-10 RX ORDER — FUROSEMIDE 40 MG/1
80 TABLET ORAL 2 TIMES DAILY
Status: DISCONTINUED | OUTPATIENT
Start: 2025-04-10 | End: 2025-04-14

## 2025-04-10 RX ORDER — POTASSIUM CHLORIDE 7.45 MG/ML
10 INJECTION INTRAVENOUS
Status: COMPLETED | OUTPATIENT
Start: 2025-04-10 | End: 2025-04-10

## 2025-04-10 RX ORDER — INSULIN LISPRO 100 [IU]/ML
0-8 INJECTION, SOLUTION INTRAVENOUS; SUBCUTANEOUS
Status: DISCONTINUED | OUTPATIENT
Start: 2025-04-10 | End: 2025-04-12

## 2025-04-10 RX ORDER — ALLOPURINOL 100 MG/1
100 TABLET ORAL DAILY
Status: DISCONTINUED | OUTPATIENT
Start: 2025-04-10 | End: 2025-04-17 | Stop reason: HOSPADM

## 2025-04-10 RX ORDER — SODIUM CHLORIDE 0.9 % (FLUSH) 0.9 %
5-40 SYRINGE (ML) INJECTION EVERY 12 HOURS SCHEDULED
Status: DISCONTINUED | OUTPATIENT
Start: 2025-04-10 | End: 2025-04-17 | Stop reason: HOSPADM

## 2025-04-10 RX ORDER — ONDANSETRON 4 MG/1
4 TABLET, ORALLY DISINTEGRATING ORAL EVERY 8 HOURS PRN
Status: DISCONTINUED | OUTPATIENT
Start: 2025-04-10 | End: 2025-04-17 | Stop reason: HOSPADM

## 2025-04-10 RX ORDER — GABAPENTIN 300 MG/1
300 CAPSULE ORAL 2 TIMES DAILY
Status: DISCONTINUED | OUTPATIENT
Start: 2025-04-10 | End: 2025-04-17 | Stop reason: HOSPADM

## 2025-04-10 RX ORDER — SODIUM CHLORIDE 9 MG/ML
INJECTION, SOLUTION INTRAVENOUS PRN
Status: DISCONTINUED | OUTPATIENT
Start: 2025-04-10 | End: 2025-04-17 | Stop reason: HOSPADM

## 2025-04-10 RX ORDER — CALCIUM CARBONATE 500(1250)
500 TABLET ORAL 2 TIMES DAILY
Status: DISCONTINUED | OUTPATIENT
Start: 2025-04-10 | End: 2025-04-17 | Stop reason: HOSPADM

## 2025-04-10 RX ORDER — SODIUM CHLORIDE 0.9 % (FLUSH) 0.9 %
5-40 SYRINGE (ML) INJECTION PRN
Status: DISCONTINUED | OUTPATIENT
Start: 2025-04-10 | End: 2025-04-17 | Stop reason: HOSPADM

## 2025-04-10 RX ORDER — MAGNESIUM SULFATE IN WATER 40 MG/ML
2000 INJECTION, SOLUTION INTRAVENOUS ONCE
Status: COMPLETED | OUTPATIENT
Start: 2025-04-10 | End: 2025-04-10

## 2025-04-10 RX ORDER — AMLODIPINE BESYLATE 5 MG/1
5 TABLET ORAL DAILY
Status: DISCONTINUED | OUTPATIENT
Start: 2025-04-10 | End: 2025-04-17 | Stop reason: HOSPADM

## 2025-04-10 RX ORDER — POTASSIUM CHLORIDE 1500 MG/1
40 TABLET, EXTENDED RELEASE ORAL ONCE
Status: COMPLETED | OUTPATIENT
Start: 2025-04-10 | End: 2025-04-10

## 2025-04-10 RX ORDER — POTASSIUM CHLORIDE 7.45 MG/ML
10 INJECTION INTRAVENOUS ONCE
Status: COMPLETED | OUTPATIENT
Start: 2025-04-10 | End: 2025-04-10

## 2025-04-10 RX ORDER — ONDANSETRON 2 MG/ML
4 INJECTION INTRAMUSCULAR; INTRAVENOUS EVERY 6 HOURS PRN
Status: DISCONTINUED | OUTPATIENT
Start: 2025-04-10 | End: 2025-04-17 | Stop reason: HOSPADM

## 2025-04-10 RX ORDER — ENOXAPARIN SODIUM 100 MG/ML
30 INJECTION SUBCUTANEOUS DAILY
Status: DISCONTINUED | OUTPATIENT
Start: 2025-04-10 | End: 2025-04-17 | Stop reason: HOSPADM

## 2025-04-10 RX ORDER — INSULIN GLARGINE 100 [IU]/ML
30 INJECTION, SOLUTION SUBCUTANEOUS NIGHTLY
Status: DISCONTINUED | OUTPATIENT
Start: 2025-04-10 | End: 2025-04-17 | Stop reason: HOSPADM

## 2025-04-10 RX ORDER — CARVEDILOL 25 MG/1
25 TABLET ORAL 2 TIMES DAILY
Status: DISCONTINUED | OUTPATIENT
Start: 2025-04-10 | End: 2025-04-17 | Stop reason: HOSPADM

## 2025-04-10 RX ORDER — POLYETHYLENE GLYCOL 3350 17 G/17G
17 POWDER, FOR SOLUTION ORAL DAILY PRN
Status: DISCONTINUED | OUTPATIENT
Start: 2025-04-10 | End: 2025-04-17 | Stop reason: HOSPADM

## 2025-04-10 RX ORDER — PANTOPRAZOLE SODIUM 40 MG/1
40 TABLET, DELAYED RELEASE ORAL
Status: DISCONTINUED | OUTPATIENT
Start: 2025-04-11 | End: 2025-04-17 | Stop reason: HOSPADM

## 2025-04-10 RX ORDER — METOLAZONE 2.5 MG/1
2.5 TABLET ORAL
Status: DISCONTINUED | OUTPATIENT
Start: 2025-04-10 | End: 2025-04-17 | Stop reason: HOSPADM

## 2025-04-10 RX ORDER — LANOLIN ALCOHOL/MO/W.PET/CERES
400 CREAM (GRAM) TOPICAL DAILY
Status: DISCONTINUED | OUTPATIENT
Start: 2025-04-10 | End: 2025-04-17 | Stop reason: HOSPADM

## 2025-04-10 RX ORDER — ACETAMINOPHEN 325 MG/1
650 TABLET ORAL EVERY 6 HOURS PRN
Status: DISCONTINUED | OUTPATIENT
Start: 2025-04-10 | End: 2025-04-17 | Stop reason: HOSPADM

## 2025-04-10 RX ORDER — ACETAMINOPHEN 650 MG/1
650 SUPPOSITORY RECTAL EVERY 6 HOURS PRN
Status: DISCONTINUED | OUTPATIENT
Start: 2025-04-10 | End: 2025-04-17 | Stop reason: HOSPADM

## 2025-04-10 RX ORDER — ASPIRIN 81 MG/1
81 TABLET ORAL DAILY
Status: DISCONTINUED | OUTPATIENT
Start: 2025-04-10 | End: 2025-04-17 | Stop reason: HOSPADM

## 2025-04-10 RX ORDER — FERROUS SULFATE 325(65) MG
325 TABLET ORAL DAILY
Status: DISCONTINUED | OUTPATIENT
Start: 2025-04-10 | End: 2025-04-17 | Stop reason: HOSPADM

## 2025-04-10 RX ORDER — POTASSIUM CHLORIDE 1500 MG/1
20 TABLET, EXTENDED RELEASE ORAL
Status: DISCONTINUED | OUTPATIENT
Start: 2025-04-10 | End: 2025-04-11

## 2025-04-10 RX ORDER — GLUCAGON 1 MG/ML
1 KIT INJECTION PRN
Status: DISCONTINUED | OUTPATIENT
Start: 2025-04-10 | End: 2025-04-17 | Stop reason: HOSPADM

## 2025-04-10 RX ORDER — LANOLIN ALCOHOL/MO/W.PET/CERES
1000 CREAM (GRAM) TOPICAL DAILY
Status: DISCONTINUED | OUTPATIENT
Start: 2025-04-10 | End: 2025-04-17 | Stop reason: HOSPADM

## 2025-04-10 RX ADMIN — POTASSIUM CHLORIDE 10 MEQ: 7.46 INJECTION, SOLUTION INTRAVENOUS at 18:31

## 2025-04-10 RX ADMIN — CARVEDILOL 25 MG: 25 TABLET, FILM COATED ORAL at 20:43

## 2025-04-10 RX ADMIN — AMLODIPINE BESYLATE 5 MG: 5 TABLET ORAL at 14:10

## 2025-04-10 RX ADMIN — ASPIRIN 81 MG: 81 TABLET, COATED ORAL at 14:13

## 2025-04-10 RX ADMIN — GABAPENTIN 300 MG: 300 CAPSULE ORAL at 20:43

## 2025-04-10 RX ADMIN — ENOXAPARIN SODIUM 30 MG: 100 INJECTION SUBCUTANEOUS at 14:13

## 2025-04-10 RX ADMIN — Medication 400 MG: at 14:10

## 2025-04-10 RX ADMIN — DOXAZOSIN 4 MG: 4 TABLET ORAL at 14:10

## 2025-04-10 RX ADMIN — POTASSIUM CHLORIDE 10 MEQ: 7.46 INJECTION, SOLUTION INTRAVENOUS at 21:37

## 2025-04-10 RX ADMIN — INSULIN GLARGINE 30 UNITS: 100 INJECTION, SOLUTION SUBCUTANEOUS at 20:44

## 2025-04-10 RX ADMIN — POTASSIUM CHLORIDE 10 MEQ: 7.46 INJECTION, SOLUTION INTRAVENOUS at 14:08

## 2025-04-10 RX ADMIN — POTASSIUM CHLORIDE 40 MEQ: 1500 TABLET, EXTENDED RELEASE ORAL at 18:30

## 2025-04-10 RX ADMIN — MAGNESIUM SULFATE HEPTAHYDRATE 2000 MG: 40 INJECTION, SOLUTION INTRAVENOUS at 11:51

## 2025-04-10 RX ADMIN — POTASSIUM CHLORIDE 10 MEQ: 7.46 INJECTION, SOLUTION INTRAVENOUS at 19:33

## 2025-04-10 RX ADMIN — POTASSIUM CHLORIDE 10 MEQ: 7.46 INJECTION, SOLUTION INTRAVENOUS at 13:22

## 2025-04-10 RX ADMIN — POTASSIUM CHLORIDE 10 MEQ: 7.46 INJECTION, SOLUTION INTRAVENOUS at 11:52

## 2025-04-10 RX ADMIN — CARVEDILOL 25 MG: 25 TABLET, FILM COATED ORAL at 14:10

## 2025-04-10 RX ADMIN — FERROUS SULFATE TAB 325 MG (65 MG ELEMENTAL FE) 325 MG: 325 (65 FE) TAB at 14:12

## 2025-04-10 RX ADMIN — ALLOPURINOL 100 MG: 100 TABLET ORAL at 14:10

## 2025-04-10 RX ADMIN — CALCIUM 500 MG: 500 TABLET ORAL at 20:43

## 2025-04-10 RX ADMIN — GABAPENTIN 300 MG: 300 CAPSULE ORAL at 14:10

## 2025-04-10 RX ADMIN — Medication 1000 MCG: at 14:10

## 2025-04-10 RX ADMIN — POTASSIUM CHLORIDE 10 MEQ: 7.46 INJECTION, SOLUTION INTRAVENOUS at 15:14

## 2025-04-10 RX ADMIN — INSULIN LISPRO 4 UNITS: 100 INJECTION, SOLUTION INTRAVENOUS; SUBCUTANEOUS at 20:43

## 2025-04-10 RX ADMIN — POTASSIUM CHLORIDE 10 MEQ: 7.46 INJECTION, SOLUTION INTRAVENOUS at 20:45

## 2025-04-10 RX ADMIN — POTASSIUM BICARBONATE 40 MEQ: 782 TABLET, EFFERVESCENT ORAL at 12:17

## 2025-04-10 RX ADMIN — CALCIUM 500 MG: 500 TABLET ORAL at 14:10

## 2025-04-10 ASSESSMENT — PAIN - FUNCTIONAL ASSESSMENT
PAIN_FUNCTIONAL_ASSESSMENT: NONE - DENIES PAIN

## 2025-04-10 NOTE — ED PROVIDER NOTES
[facility] EMERGENCY DEPARTMENT  EMERGENCY DEPARTMENT ENCOUNTER          Pt Name: Brandon Jacobs  MRN: [id number]  Birthdate 1955  Date of evaluation: 4/10/2025  Physician: Kishor Jesus MD  Supervising Attending Physician: Armand Morataya DO       CHIEF COMPLAINT       Chief Complaint   Patient presents with    Abnormal Lab         HISTORY OF PRESENT ILLNESS    HPI  Brandon Jacobs is a 69 y.o. male known to have Crohn's disease, HLD, CKD, CHF, COPD, Hypokalemia, HTN  who presents to the emergency department from home for evaluation of abnormal lab.  Patient sent out by primary care physician due to abnormal labs, patient had a potassium of 2.7.  The patient denies palpitations, chest pain, shortness of breath.  Patient denies having any symptoms.  Patient has Crohn's disease and is having diarrhea.  Patient is on Humira injections every 2 weeks.        PAST MEDICAL AND SURGICAL HISTORY     Past Medical History:   Diagnosis Date    Arthritis     CAD (coronary artery disease) 04/05/2012    CHF (congestive heart failure) (HCC)     Diabetes mellitus (HCC)     Diverticulosis of colon     History of chest pain     History of tobacco abuse     Hyperlipidemia     Hypertension     Pneumonia      Past Surgical History:   Procedure Laterality Date    APPENDECTOMY      CARDIAC SURGERY      heart cath    COLECTOMY  2001    D/T DIVERTICULOSIS    COLONOSCOPY      COLONOSCOPY  6/7/2023    COLONOSCOPY POLYPECTOMY SNARE/COLD BIOPSY performed by Manuela Middleton MD at [facility]    DIAGNOSTIC CARDIAC CATH LAB PROCEDURE  09/17/2010    EF 60% C MILD DISEASE IN THE PROXIMAL  PORTION BEFORE THE BIFURCATION OF D1,MILD DISEASE IS NOTED IN THE RCA , DIFFUSE AT 10-20% RCA ALSO HAS 10-20% LESIONS    DILATATION, ESOPHAGUS      TRANSTHORACIC ECHOCARDIOGRAM  10/22/2010    EF 55-65% C MILD LFT ATRIAL DILATATION, GRADE 1 DIASOLIC DYSFUNCTION    UPPER GASTROINTESTINAL ENDOSCOPY Left 6/7/2023    EGD BIOPSY performed  diuretics as well .  Case discussed with specialties other than Emergency Medicine: Not Applicable      MEDICAL DECISION MAKING   Differential Diagnosis includes but is not limited to:  Medication induced  Malabsorption due to Crohn's  Diarrhea  Idiopathic  RTA  Hypomagnesemia  Arrhythmia     Decision Rules/Clinical Scores utilized:    Not Applicable    Medical Decision Making  Patient presenting with hypokalemia sent by his primary care physician.  EKG does not show any U waves and does not show signs of arrhythmia.  Patient does not complain of muscle weakness.  Patient is having diarrhea.  Patient is taking 80 mg of Lasix twice daily and has history of Crohn's disease.  Given diuretics, malabsorption from diarrhea -all of these might be contributing to the hypokalemia.  Will the patient is taking 20 mg of potassium 3 times daily at home.  Patient requires admission for correction of hypokalemia and for further evaluation.  Vitals stable.  Patient verbalized understanding and agreement to plan. Refer to ED course for additional information.      ED COURSE   ED Medications administered this visit (None if left blank):   Medications   magnesium sulfate 2000 mg in 50 mL IVPB premix (2,000 mg IntraVENous New Bag 4/10/25 1151)   potassium bicarb-citric acid (EFFER-K) effervescent tablet 40 mEq (has no administration in time range)   potassium chloride 10 mEq/100 mL IVPB (Peripheral Line) (10 mEq IntraVENous New Bag 4/10/25 1152)       ED Course as of 04/10/25 1216   Thu Apr 10, 2025   1154 Creatinine(!): 2.7  At baseline  [CA]   1154 Potassium(!!): 2.4 [CA]      ED Course User Index  [CA] Kishor Jesus MD       PROCEDURES: (None if blank)  Procedures:     CRITICAL CARE:  None    MEDICATION CHANGES     New Prescriptions    No medications on file       FINAL DISPOSITION   Shared Decision-Making was performed, disposition discussed with the patient/family and questions answered.    Outpatient follow up (If

## 2025-04-10 NOTE — TELEPHONE ENCOUNTER
[facility] called with critical potassium of 2.7.  Perfect serve sent to Dr. Deluca. They are sending results over

## 2025-04-10 NOTE — CONSULTS
[facility]    [address] [id number]  [phone]     Hospital Consult  4/10/2025 1:08 PM    Pt Name:    Brandon Jacobs  MRN:     [id number]   [id number]  YOB: 1955  Admit Date:    4/10/2025  9:33 AM  Primary Care Physician:  Rajeev Brennan, FELI - CNP        Reason for Consult: Hypokalemia, assistance with diuretics    History:   The patient is a 69 y.o. presented to [facility] ED due to abnormal labs with PCP. PMHx CKD stage IV, hyperparathyroidism secondary to renal, hypocalcemia, CAD, CHF, Type 2 Diabetes, SRAVAN, moderate COPD, Crohn's disease, normocytic anemia. Potassium was 2.7 on 04/09. Repeat 04/10 2.4 and Magnesium 1.6.  Follows with Dr. Deluca for CKD. Last seen 01/2025 and Cr 2.4. Fluctuates between CR 2.2-2.8. Normally on Furosemide 80 mg BID due to volume overload with metolazone 2.5 mg twice. Takes 40meq Klor-Con BID and 60meq nightly and 80 meq on night takes metolazone. Denies any symptoms of hypokalemia. No worsening diarrhea. Does not see any K+ tablets in stool.     Past Medical History:  Past Medical History:   Diagnosis Date    Arthritis     CAD (coronary artery disease) 04/05/2012    CHF (congestive heart failure) (HCC)     Diabetes mellitus (HCC)     Diverticulosis of colon     History of chest pain     History of tobacco abuse     Hyperlipidemia     Hypertension     Pneumonia        Past Surgical History:  Past Surgical History:   Procedure Laterality Date    APPENDECTOMY      CARDIAC SURGERY      heart cath    COLECTOMY  2001    D/T DIVERTICULOSIS    COLONOSCOPY      COLONOSCOPY  6/7/2023    COLONOSCOPY POLYPECTOMY SNARE/COLD BIOPSY performed by Manuela Middleton MD at [facility]    DIAGNOSTIC CARDIAC CATH LAB PROCEDURE  09/17/2010    EF 60% C MILD DISEASE IN THE PROXIMAL  PORTION BEFORE THE BIFURCATION OF D1,MILD DISEASE IS NOTED IN THE RCA , DIFFUSE AT 10-20% RCA ALSO HAS 10-20% LESIONS    DILATATION, ESOPHAGUS      TRANSTHORACIC

## 2025-04-10 NOTE — TELEPHONE ENCOUNTER
Spoke with Dr. Deluca. He advised me to send Brandon to the ER.     Called and spoke with patients wife.  She said she is going to try to get him to go.

## 2025-04-10 NOTE — ED NOTES
ED to inpatient nurses report      Chief Complaint:  Chief Complaint   Patient presents with    Abnormal Lab     Present to ED from: home    MOA:     LOC: alert and orientated to name, place, date  Mobility: Independent  Oxygen Baseline: RA    Current needs required: RA     Code Status:   Prior    What abnormal results were found and what did you give/do to treat them? K  Any procedures or intervention occur? none    Mental Status:  Level of Consciousness: Alert (0)    Psych Assessment:        Vitals:  Patient Vitals for the past 24 hrs:   BP Temp Temp src Pulse Resp SpO2 Weight   04/10/25 1217 116/68 -- -- 81 15 93 % --   04/10/25 1125 138/73 -- -- 81 17 92 % --   04/10/25 1049 122/61 -- -- 82 18 93 % --   04/10/25 0941 130/64 98.5 °F (36.9 °C) Oral 83 17 95 % 90.3 kg (199 lb)        LDAs:   Peripheral IV 04/10/25 Distal;Left Forearm (Active)   Site Assessment Clean, dry & intact 04/10/25 1218   Line Status Infusing 04/10/25 1218       Ambulatory Status:  No data recorded    Diagnosis:  DISPOSITION Admitted 04/10/2025 12:28:11 PM   Final diagnoses:   Hypokalemia   Stage 4 chronic kidney disease (HCC)        Consults:  None     Pain Score:  Pain Assessment  Pain Assessment: None - Denies Pain    C-SSRS:   Risk of Suicide: No Risk    Sepsis Screening:       Rake Fall Risk:       Swallow Screening        Preferred Language:   English      ALLERGIES     Lorazepam    SURGICAL HISTORY       Past Surgical History:   Procedure Laterality Date    APPENDECTOMY      CARDIAC SURGERY      heart cath    COLECTOMY  2001    D/T DIVERTICULOSIS    COLONOSCOPY      COLONOSCOPY  6/7/2023    COLONOSCOPY POLYPECTOMY SNARE/COLD BIOPSY performed by Manuela Middleton MD at [facility]    DIAGNOSTIC CARDIAC CATH LAB PROCEDURE  09/17/2010    EF 60% C MILD DISEASE IN THE PROXIMAL  PORTION BEFORE THE BIFURCATION OF D1,MILD DISEASE IS NOTED IN THE RCA , DIFFUSE AT 10-20% RCA ALSO HAS 10-20% LESIONS    DILATATION, ESOPHAGUS      TRANSTHORACIC

## 2025-04-10 NOTE — ED NOTES
Pt presents to the ED after having outpatient labs completed yesterday and his potassium was 2.7 and was called tday to come to the ED. Pt reports he takes 6-7 potassium pills a day. Pt denies any chest pain or SOB. Pt reports he had the labs completed due to seeing Dr. Deluca on Monday. Pt respirations are even and unlabored.

## 2025-04-10 NOTE — H&P
Hospitalist History & Physical    Patient:  Brandon Jacobs    Unit/Bed:8A-10/010-A  YOB: 1955  MRN: [id number]   PCP: Rajeev Brennan APRN - CNP  Code Status: Full Code    Date of Service: The patient was seen and examined on 04/10/25 and admitted to Observation with an expected length of stay of less than two midnights due to medical therapy.     Chief Complaint: Abnormal labs    Assessment/Plan:    Hypokalemia  Likely multifactorial and secondary to chronic diarrhea in the setting of Crohn's disease and to diuretic use.  Potassium 2.4 on arrival.  Magnesium 1.6.  In the emergency department, the patient received 40 mEq of potassium orally and 10 mEq IV.  Will give an additional 30 mEq IV.  Recheck potassium at 1800.  Nephrology consulted for assistance with further management given chronic kidney disease and significant diuretic use.  Hold diuretics for now.  CKD stage stage IV  Creatinine 2.7, around baseline.  Daily BMP.  Metabolic alkalosis  Likely secondary to diuretic use.  Repeat BMP tomorrow morning.  Insulin-dependent type 2 diabetes mellitus  Current regimen includes Lantus 40 units in the evening.  Will decrease basal insulin to 30 units nightly and add medium sliding scale.  Leukocytosis  WBC mildly elevated at 13.1.  No evidence of infection at this time.  Repeat CBC tomorrow morning.  HFpEF not in acute exacerbation  Echo on 10/9/2023 with EF of 60 to 65%  Outpatient diuretic regimen includes Lasix 80 mg twice daily and metolazone 2.5 mg twice weekly.  Patient denies having had any edema for the last year.  No current dyspnea.  May benefit from reduction in diuretics due to significant hypokalemia.  Nephrology consulted as above for assistance with diuretic therapy.  Strict ins and outs. Daily weights. Low sodium diet. Fluid restriction.  Crohn's disease  Follows with GI outpatient.  On Humira.  Endorses chronic diarrhea.  No change from baseline.    Chronic conditions  which are stable unless otherwise stated:  Hypertension  Hyperlipidemia  Gout  Normocytic anemia        History of Present Illness:  Brandon Jacobs is a 69 y.o. male with a history of CKD stage IV, Crohn's disease, insulin-dependent type 2 diabetes mellitus, hypertension, hyperlipidemia, gout, normocytic anemia, and HFpEF who presented to [facility] with chief complaint of abnormal labs.  The patient was sent in by his nephrologist for hypokalemia.  The patient is on Lasix 80 mg twice daily and metolazone 2.5 mg twice weekly.  Additionally, he is taking approximately 60 mEq of potassium daily in addition to an extra 20 mEq on the days on which he takes his metolazone.  However, his potassium was noted to be 2.7 and he was sent to the hospital.  His potassium the emergency department was 2.4.  The patient endorses chronic diarrhea which has not changed significantly.  He denies any fevers, chills, muscle weakness, chest pain, palpitations, lightheadedness, dizziness, shortness of breath, abdominal pain, nausea, vomiting, or constipation.  He reports he has not had any lower extremity edema in about 1 year.  He does not smoke or drink alcohol.    Review of Systems: Pertinent positives as noted in the HPI. All other systems reviewed and negative.    Medications Prior to Admission:   Prior to Admission Medications   Prescriptions Last Dose Informant Patient Reported? Taking?   ACCU-CHEK GUIDE strip   Yes No   Adalimumab (HUMIRA, 2 SYRINGE,) 40 MG/0.4ML PSKT   No No   Sig: Inject 40 mg into the skin every 14 days   Blood Glucose Calibration (ACCU-CHEK GUIDE CONTROL) LIQD   Yes No   Glucosamine-Chondroitin (GLUCOSAMINE CHONDROITIN COMPLX) 500-250 MG CAPS   Yes No   Si tab(s) Orally bid   LANTUS SOLOSTAR 100 UNIT/ML injection pen   Yes No   Si Units   Lancet Devices (SIMPLE DIAGNOSTICS LANCING DEV) MISC   Yes No   Misc. Devices (CPAP MACHINE) MISC   Yes No   Sig: by Does not apply route

## 2025-04-11 LAB
ANION GAP SERPL CALC-SCNC: 15 MEQ/L (ref 8–16)
BASOPHILS ABSOLUTE: 0 THOU/MM3 (ref 0–0.1)
BASOPHILS NFR BLD AUTO: 0.3 %
BUN SERPL-MCNC: 52 MG/DL (ref 8–23)
CALCIUM SERPL-MCNC: 8.6 MG/DL (ref 8.8–10.2)
CHLORIDE SERPL-SCNC: 96 MEQ/L (ref 98–111)
CO2 SERPL-SCNC: 32 MEQ/L (ref 22–29)
CREAT SERPL-MCNC: 2.5 MG/DL (ref 0.7–1.2)
DEPRECATED RDW RBC AUTO: 49.3 FL (ref 35–45)
EOSINOPHIL NFR BLD AUTO: 1.8 %
EOSINOPHILS ABSOLUTE: 0.3 THOU/MM3 (ref 0–0.4)
ERYTHROCYTE [DISTWIDTH] IN BLOOD BY AUTOMATED COUNT: 14.6 % (ref 11.5–14.5)
GFR SERPL CREATININE-BSD FRML MDRD: 27 ML/MIN/1.73M2
GLUCOSE BLD STRIP.AUTO-MCNC: 215 MG/DL (ref 70–108)
GLUCOSE BLD STRIP.AUTO-MCNC: 237 MG/DL (ref 70–108)
GLUCOSE BLD STRIP.AUTO-MCNC: 287 MG/DL (ref 70–108)
GLUCOSE BLD STRIP.AUTO-MCNC: 322 MG/DL (ref 70–108)
GLUCOSE BLD STRIP.AUTO-MCNC: 348 MG/DL (ref 70–108)
GLUCOSE SERPL-MCNC: 189 MG/DL (ref 74–109)
HCT VFR BLD AUTO: 31 % (ref 42–52)
HGB BLD-MCNC: 10.3 GM/DL (ref 14–18)
IMM GRANULOCYTES # BLD AUTO: 0.05 THOU/MM3 (ref 0–0.07)
IMM GRANULOCYTES NFR BLD AUTO: 0.3 %
LYMPHOCYTES ABSOLUTE: 1.2 THOU/MM3 (ref 1–4.8)
LYMPHOCYTES NFR BLD AUTO: 8.3 %
MAGNESIUM SERPL-MCNC: 2 MG/DL (ref 1.6–2.6)
MCH RBC QN AUTO: 30.7 PG (ref 26–33)
MCHC RBC AUTO-ENTMCNC: 33.2 GM/DL (ref 32.2–35.5)
MCV RBC AUTO: 92.3 FL (ref 80–94)
MONOCYTES ABSOLUTE: 1.6 THOU/MM3 (ref 0.4–1.3)
MONOCYTES NFR BLD AUTO: 10.6 %
NEUTROPHILS ABSOLUTE: 11.6 THOU/MM3 (ref 1.8–7.7)
NEUTROPHILS NFR BLD AUTO: 78.7 %
NRBC BLD AUTO-RTO: 0 /100 WBC
PLATELET # BLD AUTO: 255 THOU/MM3 (ref 130–400)
PMV BLD AUTO: 10.5 FL (ref 9.4–12.4)
POTASSIUM SERPL-SCNC: 2.4 MEQ/L (ref 3.5–5.2)
POTASSIUM SERPL-SCNC: 2.9 MEQ/L (ref 3.5–5.2)
POTASSIUM SERPL-SCNC: 3.1 MEQ/L (ref 3.5–5.2)
POTASSIUM SERPL-SCNC: 4 MEQ/L (ref 3.5–5.2)
RBC # BLD AUTO: 3.36 MILL/MM3 (ref 4.7–6.1)
SODIUM SERPL-SCNC: 143 MEQ/L (ref 135–145)
WBC # BLD AUTO: 14.7 THOU/MM3 (ref 4.8–10.8)

## 2025-04-11 PROCEDURE — 96366 THER/PROPH/DIAG IV INF ADDON: CPT

## 2025-04-11 PROCEDURE — 84132 ASSAY OF SERUM POTASSIUM: CPT

## 2025-04-11 PROCEDURE — 6370000000 HC RX 637 (ALT 250 FOR IP): Performed by: PHYSICIAN ASSISTANT

## 2025-04-11 PROCEDURE — 6360000002 HC RX W HCPCS: Performed by: PHYSICIAN ASSISTANT

## 2025-04-11 PROCEDURE — 83735 ASSAY OF MAGNESIUM: CPT

## 2025-04-11 PROCEDURE — 96376 TX/PRO/DX INJ SAME DRUG ADON: CPT

## 2025-04-11 PROCEDURE — 82948 REAGENT STRIP/BLOOD GLUCOSE: CPT

## 2025-04-11 PROCEDURE — 96361 HYDRATE IV INFUSION ADD-ON: CPT

## 2025-04-11 PROCEDURE — 6370000000 HC RX 637 (ALT 250 FOR IP): Performed by: INTERNAL MEDICINE

## 2025-04-11 PROCEDURE — 99232 SBSQ HOSP IP/OBS MODERATE 35: CPT | Performed by: INTERNAL MEDICINE

## 2025-04-11 PROCEDURE — 99232 SBSQ HOSP IP/OBS MODERATE 35: CPT | Performed by: PHYSICIAN ASSISTANT

## 2025-04-11 PROCEDURE — 85025 COMPLETE CBC W/AUTO DIFF WBC: CPT

## 2025-04-11 PROCEDURE — 96372 THER/PROPH/DIAG INJ SC/IM: CPT

## 2025-04-11 PROCEDURE — 2580000003 HC RX 258: Performed by: PHYSICIAN ASSISTANT

## 2025-04-11 PROCEDURE — 80048 BASIC METABOLIC PNL TOTAL CA: CPT

## 2025-04-11 PROCEDURE — 6360000002 HC RX W HCPCS: Performed by: INTERNAL MEDICINE

## 2025-04-11 PROCEDURE — 36415 COLL VENOUS BLD VENIPUNCTURE: CPT

## 2025-04-11 PROCEDURE — G0378 HOSPITAL OBSERVATION PER HR: HCPCS

## 2025-04-11 PROCEDURE — 2500000003 HC RX 250 WO HCPCS: Performed by: PHYSICIAN ASSISTANT

## 2025-04-11 RX ORDER — POTASSIUM CHLORIDE 1500 MG/1
40 TABLET, EXTENDED RELEASE ORAL
Status: DISCONTINUED | OUTPATIENT
Start: 2025-04-11 | End: 2025-04-16

## 2025-04-11 RX ORDER — POTASSIUM CHLORIDE 7.45 MG/ML
10 INJECTION INTRAVENOUS
Status: COMPLETED | OUTPATIENT
Start: 2025-04-11 | End: 2025-04-11

## 2025-04-11 RX ADMIN — AMLODIPINE BESYLATE 5 MG: 5 TABLET ORAL at 09:02

## 2025-04-11 RX ADMIN — POTASSIUM CHLORIDE 40 MEQ: 20 TABLET, EXTENDED RELEASE ORAL at 17:20

## 2025-04-11 RX ADMIN — INSULIN LISPRO 6 UNITS: 100 INJECTION, SOLUTION INTRAVENOUS; SUBCUTANEOUS at 13:03

## 2025-04-11 RX ADMIN — ENOXAPARIN SODIUM 30 MG: 100 INJECTION SUBCUTANEOUS at 09:15

## 2025-04-11 RX ADMIN — INSULIN LISPRO 4 UNITS: 100 INJECTION, SOLUTION INTRAVENOUS; SUBCUTANEOUS at 21:36

## 2025-04-11 RX ADMIN — ALLOPURINOL 100 MG: 100 TABLET ORAL at 09:03

## 2025-04-11 RX ADMIN — POTASSIUM CHLORIDE 10 MEQ: 7.46 INJECTION, SOLUTION INTRAVENOUS at 11:43

## 2025-04-11 RX ADMIN — CARVEDILOL 25 MG: 25 TABLET, FILM COATED ORAL at 21:36

## 2025-04-11 RX ADMIN — CARVEDILOL 25 MG: 25 TABLET, FILM COATED ORAL at 09:02

## 2025-04-11 RX ADMIN — GABAPENTIN 300 MG: 300 CAPSULE ORAL at 21:36

## 2025-04-11 RX ADMIN — DOXAZOSIN 4 MG: 4 TABLET ORAL at 09:02

## 2025-04-11 RX ADMIN — POTASSIUM CHLORIDE 10 MEQ: 7.46 INJECTION, SOLUTION INTRAVENOUS at 09:08

## 2025-04-11 RX ADMIN — ASPIRIN 81 MG: 81 TABLET, COATED ORAL at 09:00

## 2025-04-11 RX ADMIN — POTASSIUM CHLORIDE 10 MEQ: 7.46 INJECTION, SOLUTION INTRAVENOUS at 10:35

## 2025-04-11 RX ADMIN — SODIUM CHLORIDE 800 ML: 9 INJECTION, SOLUTION INTRAVENOUS at 09:07

## 2025-04-11 RX ADMIN — FERROUS SULFATE TAB 325 MG (65 MG ELEMENTAL FE) 325 MG: 325 (65 FE) TAB at 17:21

## 2025-04-11 RX ADMIN — POTASSIUM CHLORIDE 40 MEQ: 20 TABLET, EXTENDED RELEASE ORAL at 11:43

## 2025-04-11 RX ADMIN — CALCIUM 500 MG: 500 TABLET ORAL at 21:36

## 2025-04-11 RX ADMIN — POTASSIUM CHLORIDE 10 MEQ: 7.46 INJECTION, SOLUTION INTRAVENOUS at 13:05

## 2025-04-11 RX ADMIN — POTASSIUM CHLORIDE 40 MEQ: 20 TABLET, EXTENDED RELEASE ORAL at 09:00

## 2025-04-11 RX ADMIN — CALCIUM 500 MG: 500 TABLET ORAL at 09:02

## 2025-04-11 RX ADMIN — SODIUM CHLORIDE, PRESERVATIVE FREE 10 ML: 5 INJECTION INTRAVENOUS at 21:36

## 2025-04-11 RX ADMIN — Medication 400 MG: at 09:02

## 2025-04-11 RX ADMIN — INSULIN LISPRO 2 UNITS: 100 INJECTION, SOLUTION INTRAVENOUS; SUBCUTANEOUS at 09:11

## 2025-04-11 RX ADMIN — INSULIN LISPRO 6 UNITS: 100 INJECTION, SOLUTION INTRAVENOUS; SUBCUTANEOUS at 17:20

## 2025-04-11 RX ADMIN — Medication 1000 MCG: at 09:02

## 2025-04-11 RX ADMIN — GABAPENTIN 300 MG: 300 CAPSULE ORAL at 09:02

## 2025-04-11 RX ADMIN — INSULIN GLARGINE 30 UNITS: 100 INJECTION, SOLUTION SUBCUTANEOUS at 21:37

## 2025-04-11 RX ADMIN — SODIUM CHLORIDE, PRESERVATIVE FREE 10 ML: 5 INJECTION INTRAVENOUS at 09:01

## 2025-04-11 ASSESSMENT — PAIN DESCRIPTION - LOCATION: LOCATION: KNEE

## 2025-04-11 ASSESSMENT — PAIN DESCRIPTION - DESCRIPTORS: DESCRIPTORS: ACHING;NAGGING

## 2025-04-11 ASSESSMENT — PAIN DESCRIPTION - PAIN TYPE: TYPE: CHRONIC PAIN

## 2025-04-11 ASSESSMENT — PAIN DESCRIPTION - ORIENTATION: ORIENTATION: RIGHT

## 2025-04-11 ASSESSMENT — PAIN SCALES - GENERAL: PAINLEVEL_OUTOF10: 5

## 2025-04-11 ASSESSMENT — PAIN DESCRIPTION - ONSET: ONSET: ON-GOING

## 2025-04-11 ASSESSMENT — PAIN - FUNCTIONAL ASSESSMENT: PAIN_FUNCTIONAL_ASSESSMENT: PREVENTS OR INTERFERES WITH MANY ACTIVE NOT PASSIVE ACTIVITIES

## 2025-04-11 ASSESSMENT — PAIN DESCRIPTION - FREQUENCY: FREQUENCY: INTERMITTENT

## 2025-04-11 NOTE — PROGRESS NOTES
Spiritual Health History and Assessment/Progress Note  [facility]    (P) Advance Care Planning,  ,  ,      Name: Brandon Jacobs MRN: [id number]    Age: 69 y.o.     Sex: male   Language: English   Yazidism: Zoroastrianism   Hypokalemia     Date: 4/11/2025            Total Time Calculated: (P) 20 min              Spiritual Assessment began in [facility]        Referral/Consult From: (P) Nurse   Encounter Overview/Reason: (P) Advance Care Planning  Service Provided For: (P) Patient    Shalini, Belief, Meaning:   Patient identifies as spiritual  Family/Friends No family/friends present      Importance and Influence:  Patient unable to assess at this time  Family/Friends No family/friends present    Community:  Patient is connected with a spiritual community  Family/Friends No family/friends present    Assessment and Plan of Care:     Patient Interventions include: Assisted in Advance Care Planning conversation  Family/Friends Interventions include: No family/friends present    Patient Plan of Care: Spiritual Care available upon further referral  Family/Friends Plan of Care: No family/friends present    Electronically signed by Chaplain Lona on 4/11/2025 at 9:41 AM

## 2025-04-11 NOTE — PROGRESS NOTES
Renal Progress Note  [facility]    Patient :  Brandon Jacobs; 69 y.o. MRN# [id number]  Location:  8A-10/010-A  Attending:  Kishore Roa PA-C  Admit Date:  4/10/2025   Hospital Day: 0    PC: Nephrology consulted for Hypokalemia and diuretic management    Subjective:     Patient seen at bedside this morning.   Denies any SOB, abdominal pain, or worsening edema.   No changes in diarrhea.   K+ 2.4 this morning     Outpatient Medications:     Medications Prior to Admission: metOLazone (ZAROXOLYN) 2.5 MG tablet, Take 1 tablet by mouth Twice a Week On Monday and Thursdays with extra Kcl 20meq  amLODIPine (NORVASC) 5 MG tablet, Take 1 tablet by mouth daily  allopurinol (ZYLOPRIM) 100 MG tablet, Take 1 tablet by mouth daily  Misc. Devices (CPAP MACHINE) MISC, by Does not apply route  furosemide (LASIX) 40 MG tablet, Take 2 tablets by mouth twice daily  cyanocobalamin ([employer] VITAMIN B12) 1000 MCG tablet, Take 1 tablet by mouth daily  ferrous sulfate (IRON 325) 325 (65 Fe) MG tablet, Take 1 tablet by mouth daily  potassium chloride (KLOR-CON M) 20 MEQ extended release tablet, TAKE 2  BY MOUTH THREE TIMES DAILY (Patient taking differently: 1 tablet 3 times daily TAKE 2  BY MOUTH THREE TIMES DAILY with one extra in the evening. On days he takes metolazone he takes an extra one. Total of 8 on metolazone days)  carvedilol (COREG) 25 MG tablet, Take 1 tablet by mouth 2 times daily  simvastatin (ZOCOR) 20 MG tablet, Take 1 tablet by mouth nightly  calcium carbonate (OSCAL) 500 MG TABS tablet, Take 1 tablet by mouth in the morning and at bedtime  LANTUS SOLOSTAR 100 UNIT/ML injection pen, 40 Units  magnesium oxide (MAG-OX) 400 (240 Mg) MG tablet, TAKE 1 TABLET BY MOUTH ONCE DAILY WITH FOOD  Glucosamine-Chondroitin (GLUCOSAMINE CHONDROITIN COMPLX) 500-250 MG CAPS, 1 tab(s) Orally bid  zinc sulfate (ZINCATE) 220 (50 Zn) MG capsule, Take 1 capsule by mouth daily  vitamin D 25 MCG (1000 UT) CAPS, Take 3

## 2025-04-11 NOTE — ACP (ADVANCE CARE PLANNING)
Advance Care Planning     Advance Care Planning Inpatient Note  [facility] Department    Today's Date: 4/11/2025  Unit: [facility]    Received request from HealthCare Provider.  Upon review of chart and communication with care team, patient's decision making abilities are not in question.. Patient was/were present in the room during visit.    Goals of ACP Conversation:  Discuss advance care planning documents  Facilitate a discussion related to patient's goals of care as they align with the patient's values and beliefs.    Health Care Decision Makers:       Primary Decision Maker: Zoya Jacobs - [facility] - [phone]  Summary:  Verified Documents    Advance Care Planning Documents (Patient Wishes):  Healthcare Power of /Advance Directive Appointment of Health Care Agent     Assessment:  The patient explored the ACP documents and did not have necessary information to create a new document.     Interventions:  Provided education on documents for clarity and greater understanding  Discussed and provided education on state decision maker hierarchy  Encouraged ongoing ACP conversation with future decision makers and loved ones  Reviewed but did not complete ACP document    Care Preferences Communicated:   No    Outcomes/Plan:  ACP Discussion: Completed  Existing advance directive reviewed with patient; no changes to patient's previously recorded wishes.    Electronically signed by Chaplain Lona on 4/11/2025 at 9:46 AM

## 2025-04-11 NOTE — PROGRESS NOTES
Hospitalist Progress Note    Patient:  Brandon Jacobs      Unit/Bed:8A-10/010-A    YOB: 1955    MRN: [id number]       Acct: [id number]     PCP: Rajeev Brennan APRN - CNP    Date of Admission: 4/10/2025    Assessment/Plan:    Hypokalemia: 2/2 diuretics and GI losses  Nephrology consulted and following  Receiving ample replacement, low again this AM at 2.4  Continue to trend  Hold diuretics    CKD stage stage IV:  Creatinine 2.7, around baseline- improved to 2.5 4/11/25  Trend BMP.    Metabolic alkalosis:  Likely secondary to diuretic use.  Repeat BMP tomorrow morning.    Insulin-dependent type 2 diabetes mellitus:  Continue basal insulin and sliding scale insulin  Accu-cheks AC and HS  Carb controlled diet   Hypoglycemia protocol    Leukocytosis:  WBC increased to 14.7, continue to monitor  No evidence of infection at this time.    HFpEF not in acute exacerbation:  Echo on 10/9/2023 with EF of 60 to 65%  Outpatient diuretic regimen includes Lasix 80 mg twice daily and metolazone 2.5 mg twice weekly- holding both  Strict ins and outs. Daily weights. Low sodium diet. Fluid restriction.    Crohn's disease:  Follows with GI outpatient.  On Humira.  Endorses chronic diarrhea.  No change from baseline.    DVT Prophylaxis: [x] Lovenox / [] Heparin / [] SCDs / [] Already on Systemic Anticoagulation / [] None     Disposition:    [x] Home       [] TCU       [] Rehab       [] Psych       [] SNF       [] Long Term Care Facility       [] Other-    Chief Complaint: Abnormal labs       Subjective: 69 y.o. male admitted to the hospitalist service for hypokalemia. Patient denies chest pain. He denies nausea or vomiting. He denies SOB. He denies numbness or tingling. He denies BM or diarrhea today.    Medications:    Infusion Medications    sodium chloride      dextrose       Scheduled Medications    potassium chloride  40 mEq Oral TID WC    potassium chloride  10 mEq IntraVENous Q1H    sodium chloride

## 2025-04-12 LAB
ANION GAP SERPL CALC-SCNC: 12 MEQ/L (ref 8–16)
BUN SERPL-MCNC: 43 MG/DL (ref 8–23)
CALCIUM SERPL-MCNC: 8.9 MG/DL (ref 8.8–10.2)
CHLORIDE SERPL-SCNC: 100 MEQ/L (ref 98–111)
CO2 SERPL-SCNC: 32 MEQ/L (ref 22–29)
CREAT SERPL-MCNC: 2.3 MG/DL (ref 0.7–1.2)
GFR SERPL CREATININE-BSD FRML MDRD: 30 ML/MIN/1.73M2
GLUCOSE BLD STRIP.AUTO-MCNC: 223 MG/DL (ref 70–108)
GLUCOSE BLD STRIP.AUTO-MCNC: 240 MG/DL (ref 70–108)
GLUCOSE BLD STRIP.AUTO-MCNC: 257 MG/DL (ref 70–108)
GLUCOSE BLD STRIP.AUTO-MCNC: 323 MG/DL (ref 70–108)
GLUCOSE SERPL-MCNC: 236 MG/DL (ref 74–109)
POTASSIUM SERPL-SCNC: 3.3 MEQ/L (ref 3.5–5.2)
POTASSIUM SERPL-SCNC: 3.7 MEQ/L (ref 3.5–5.2)
POTASSIUM SERPL-SCNC: 3.7 MEQ/L (ref 3.5–5.2)
POTASSIUM SERPL-SCNC: 4.2 MEQ/L (ref 3.5–5.2)
REASON FOR REJECTION: NORMAL
REJECTED TEST: NORMAL
SODIUM SERPL-SCNC: 144 MEQ/L (ref 135–145)

## 2025-04-12 PROCEDURE — 84132 ASSAY OF SERUM POTASSIUM: CPT

## 2025-04-12 PROCEDURE — 6370000000 HC RX 637 (ALT 250 FOR IP): Performed by: PHYSICIAN ASSISTANT

## 2025-04-12 PROCEDURE — 96372 THER/PROPH/DIAG INJ SC/IM: CPT

## 2025-04-12 PROCEDURE — 2500000003 HC RX 250 WO HCPCS: Performed by: PHYSICIAN ASSISTANT

## 2025-04-12 PROCEDURE — 82948 REAGENT STRIP/BLOOD GLUCOSE: CPT

## 2025-04-12 PROCEDURE — 36415 COLL VENOUS BLD VENIPUNCTURE: CPT

## 2025-04-12 PROCEDURE — 1200000003 HC TELEMETRY R&B

## 2025-04-12 PROCEDURE — 6370000000 HC RX 637 (ALT 250 FOR IP): Performed by: INTERNAL MEDICINE

## 2025-04-12 PROCEDURE — 99232 SBSQ HOSP IP/OBS MODERATE 35: CPT | Performed by: PHYSICIAN ASSISTANT

## 2025-04-12 PROCEDURE — 99232 SBSQ HOSP IP/OBS MODERATE 35: CPT | Performed by: INTERNAL MEDICINE

## 2025-04-12 PROCEDURE — 6360000002 HC RX W HCPCS: Performed by: PHYSICIAN ASSISTANT

## 2025-04-12 PROCEDURE — 80048 BASIC METABOLIC PNL TOTAL CA: CPT

## 2025-04-12 RX ORDER — INSULIN LISPRO 100 [IU]/ML
0-16 INJECTION, SOLUTION INTRAVENOUS; SUBCUTANEOUS
Status: DISCONTINUED | OUTPATIENT
Start: 2025-04-12 | End: 2025-04-17 | Stop reason: HOSPADM

## 2025-04-12 RX ORDER — INSULIN LISPRO 100 [IU]/ML
3 INJECTION, SOLUTION INTRAVENOUS; SUBCUTANEOUS
Status: DISCONTINUED | OUTPATIENT
Start: 2025-04-12 | End: 2025-04-17 | Stop reason: HOSPADM

## 2025-04-12 RX ADMIN — PANTOPRAZOLE SODIUM 40 MG: 40 TABLET, DELAYED RELEASE ORAL at 07:04

## 2025-04-12 RX ADMIN — CALCIUM 500 MG: 500 TABLET ORAL at 09:56

## 2025-04-12 RX ADMIN — CARVEDILOL 25 MG: 25 TABLET, FILM COATED ORAL at 20:35

## 2025-04-12 RX ADMIN — AMLODIPINE BESYLATE 5 MG: 5 TABLET ORAL at 09:58

## 2025-04-12 RX ADMIN — INSULIN LISPRO 8 UNITS: 100 INJECTION, SOLUTION INTRAVENOUS; SUBCUTANEOUS at 20:44

## 2025-04-12 RX ADMIN — INSULIN LISPRO 2 UNITS: 100 INJECTION, SOLUTION INTRAVENOUS; SUBCUTANEOUS at 09:58

## 2025-04-12 RX ADMIN — POTASSIUM CHLORIDE 40 MEQ: 20 TABLET, EXTENDED RELEASE ORAL at 09:56

## 2025-04-12 RX ADMIN — POTASSIUM CHLORIDE 40 MEQ: 20 TABLET, EXTENDED RELEASE ORAL at 17:32

## 2025-04-12 RX ADMIN — Medication 1000 MCG: at 09:57

## 2025-04-12 RX ADMIN — FERROUS SULFATE TAB 325 MG (65 MG ELEMENTAL FE) 325 MG: 325 (65 FE) TAB at 13:09

## 2025-04-12 RX ADMIN — CARVEDILOL 25 MG: 25 TABLET, FILM COATED ORAL at 09:57

## 2025-04-12 RX ADMIN — DOXAZOSIN 4 MG: 4 TABLET ORAL at 09:56

## 2025-04-12 RX ADMIN — GABAPENTIN 300 MG: 300 CAPSULE ORAL at 09:57

## 2025-04-12 RX ADMIN — INSULIN LISPRO 6 UNITS: 100 INJECTION, SOLUTION INTRAVENOUS; SUBCUTANEOUS at 13:08

## 2025-04-12 RX ADMIN — ALLOPURINOL 100 MG: 100 TABLET ORAL at 09:56

## 2025-04-12 RX ADMIN — ASPIRIN 81 MG: 81 TABLET, COATED ORAL at 09:56

## 2025-04-12 RX ADMIN — GABAPENTIN 300 MG: 300 CAPSULE ORAL at 20:35

## 2025-04-12 RX ADMIN — INSULIN LISPRO 3 UNITS: 100 INJECTION, SOLUTION INTRAVENOUS; SUBCUTANEOUS at 17:31

## 2025-04-12 RX ADMIN — POTASSIUM CHLORIDE 40 MEQ: 20 TABLET, EXTENDED RELEASE ORAL at 13:08

## 2025-04-12 RX ADMIN — SODIUM CHLORIDE, PRESERVATIVE FREE 10 ML: 5 INJECTION INTRAVENOUS at 20:35

## 2025-04-12 RX ADMIN — INSULIN GLARGINE 30 UNITS: 100 INJECTION, SOLUTION SUBCUTANEOUS at 20:43

## 2025-04-12 RX ADMIN — Medication 400 MG: at 09:57

## 2025-04-12 RX ADMIN — INSULIN LISPRO 4 UNITS: 100 INJECTION, SOLUTION INTRAVENOUS; SUBCUTANEOUS at 17:32

## 2025-04-12 RX ADMIN — CALCIUM 500 MG: 500 TABLET ORAL at 20:35

## 2025-04-12 RX ADMIN — ENOXAPARIN SODIUM 30 MG: 100 INJECTION SUBCUTANEOUS at 09:56

## 2025-04-12 NOTE — PROGRESS NOTES
Hospitalist Progress Note    Patient:  Brandon Jacobs      Unit/Bed:8A-10/010-A    YOB: 1955    MRN: [id number]       Acct: [id number]     PCP: Rajeev Brennan APRN - CNP    Date of Admission: 4/10/2025    Assessment/Plan:    Hypokalemia: 2/2 diuretics and GI losses  Nephrology consulted and following  Receiving ample replacement, low again this AM at 2.4  4/12/25: Patient's potassium improved to 3.3 today.  Creatinine 2.3.  Nephrology following, continue to hold diuretics.  Continue to trend.  Continue replacement per nephrology recommendations.    CKD stage stage IV:  Creatinine 2.7, around baseline- improved to 2.5 4/11/25  Trend BMP.    Metabolic alkalosis:  Likely secondary to diuretic use.  Repeat BMP tomorrow morning.    Insulin-dependent type 2 diabetes mellitus:  Continue basal insulin and sliding scale insulin  Accu-cheks AC and HS  Carb controlled diet   Hypoglycemia protocol    Leukocytosis:  WBC increased to 14.7, continue to monitor  No evidence of infection at this time.    HFpEF not in acute exacerbation:  Echo on 10/9/2023 with EF of 60 to 65%  Outpatient diuretic regimen includes Lasix 80 mg twice daily and metolazone 2.5 mg twice weekly- holding both  Strict ins and outs. Daily weights. Low sodium diet. Fluid restriction.    Crohn's disease:  Follows with GI outpatient.  On Humira.  Endorses chronic diarrhea.  No change from baseline.    DVT Prophylaxis: [x] Lovenox / [] Heparin / [] SCDs / [] Already on Systemic Anticoagulation / [] None     Disposition:    [x] Home       [] TCU       [] Rehab       [] Psych       [] SNF       [] Long Term Care Facility       [] Other-    Chief Complaint: Abnormal labs       Subjective: 69 y.o. male admitted to the hospitalist service for hypokalemia. Patient denies chest pain. He denies nausea or vomiting.  Patient reports right knee pain and difficulty ambulating secondary to pain in his right knee when up.  PT/OT to evaluate.   Nursing communication order placed to wrap the right knee and ACE wrap.    Medications:    Infusion Medications    sodium chloride Stopped (04/11/25 1431)    dextrose       Scheduled Medications    potassium chloride  40 mEq Oral TID WC    sodium chloride flush  5-40 mL IntraVENous 2 times per day    enoxaparin  30 mg SubCUTAneous Daily    allopurinol  100 mg Oral Daily    amLODIPine  5 mg Oral Daily    aspirin  81 mg Oral Daily    calcium elemental  500 mg Oral BID    carvedilol  25 mg Oral BID    cyanocobalamin  1,000 mcg Oral Daily    ferrous sulfate  325 mg Oral Daily    [Held by provider] furosemide  80 mg Oral BID    gabapentin  300 mg Oral BID    insulin glargine  30 Units SubCUTAneous Nightly    insulin lispro  0-8 Units SubCUTAneous 4x Daily AC & HS    [Held by provider] metOLazone  2.5 mg Oral Once per day on Monday Thursday    magnesium oxide  400 mg Oral Daily    pantoprazole  40 mg Oral QAM AC    doxazosin  4 mg Oral Daily     PRN Meds: sodium chloride flush, sodium chloride, ondansetron **OR** ondansetron, polyethylene glycol, acetaminophen **OR** acetaminophen, glucose, dextrose bolus **OR** dextrose bolus, glucagon (rDNA), dextrose      Intake/Output Summary (Last 24 hours) at 4/12/2025 0932  Last data filed at 4/12/2025 0702  Gross per 24 hour   Intake 1595.93 ml   Output 1000 ml   Net 595.93 ml       Diet:  ADULT DIET; Regular; Low Sodium (2 gm); 2000 ml    Exam:  /62   Pulse 91   Temp 98 °F (36.7 °C) (Oral)   Resp 20   Ht 1.6 m (5' 3\")   Wt 90.3 kg (199 lb)   SpO2 91%   BMI 35.25 kg/m²     Physical Exam  Constitutional:       Interventions: He is not intubated.  Cardiovascular:      Rate and Rhythm: Normal rate and regular rhythm.   Pulmonary:      Effort: He is not intubated.   Neurological:      Mental Status: He is alert.   Psychiatric:         Speech: He is communicative.        Labs:   Recent Labs     04/10/25  1045 04/11/25  0531   WBC 13.1* 14.7*   HGB 10.3* 10.3*   HCT 30.9*

## 2025-04-12 NOTE — PLAN OF CARE
Problem: Chronic Conditions and Co-morbidities  Goal: Patient's chronic conditions and co-morbidity symptoms are monitored and maintained or improved  Outcome: Progressing     Problem: Discharge Planning  Goal: Discharge to home or other facility with appropriate resources  Outcome: Progressing     Problem: Skin/Tissue Integrity  Goal: Skin integrity remains intact  Description: 1.  Monitor for areas of redness and/or skin breakdown  2.  Assess vascular access sites hourly  3.  Every 4-6 hours minimum:  Change oxygen saturation probe site  4.  Every 4-6 hours:  If on nasal continuous positive airway pressure, respiratory therapy assess nares and determine need for appliance change or resting period  Outcome: Progressing     Problem: Safety - Adult  Goal: Free from fall injury  Outcome: Progressing     Problem: Cardiovascular - Adult  Goal: Maintains optimal cardiac output and hemodynamic stability  Outcome: Progressing  Goal: Absence of cardiac dysrhythmias or at baseline  Outcome: Progressing     Problem: Musculoskeletal - Adult  Goal: Return mobility to safest level of function  Outcome: Progressing  Goal: Maintain proper alignment of affected body part  Outcome: Progressing     Problem: Metabolic/Fluid and Electrolytes - Adult  Goal: Electrolytes maintained within normal limits  Outcome: Progressing  Goal: Hemodynamic stability and optimal renal function maintained  Outcome: Progressing  Goal: Glucose maintained within prescribed range  Outcome: Progressing     Problem: Pain  Goal: Verbalizes/displays adequate comfort level or baseline comfort level  Outcome: Progressing     Problem: Respiratory - Adult  Goal: Achieves optimal ventilation and oxygenation  Outcome: Progressing

## 2025-04-12 NOTE — PROGRESS NOTES
[facility]   Nephrology progress note  4/12/2025, 11:49 AM      Pt Name:    Brandon Jacobs  MRN:     [id number]     YOB: 1955  Admit Date:    4/10/2025  9:33 AM    Chief Complaint: Nephrology following for hypokalemia and diuretic management .    Subjective:  Patient seen and examined  No chest pain or shortness of breath  Feels okay some leg swelling .    Objective:  24HR INTAKE/OUTPUT:    Intake/Output Summary (Last 24 hours) at 4/12/2025 1149  Last data filed at 4/12/2025 1031  Gross per 24 hour   Intake 1516.39 ml   Output 1300 ml   Net 216.39 ml      Admission weight: 90.3 kg (199 lb)  Wt Readings from Last 3 Encounters:   04/10/25 90.3 kg (199 lb)   01/24/25 93 kg (205 lb)   01/17/25 93.2 kg (205 lb 8 oz)        Vitals :   Vitals:    04/12/25 0000 04/12/25 0358 04/12/25 0945 04/12/25 1056   BP: 129/71 123/62 (!) 110/55 123/60   Pulse: 90 91 87 88   Resp: 18 20 18 18   Temp: 98.2 °F (36.8 °C) 98 °F (36.7 °C) 98 °F (36.7 °C) 98.6 °F (37 °C)   TempSrc: Oral Oral Oral Oral   SpO2: 96% 91% 92% 93%   Weight:       Height:           Physical examination  General Appearance:  Well developed. No distress  Mouth/Throat:  Oral mucosa moist  Neck:  Supple, no JVD  Lungs:  Breath sounds: clear  Heart::  S1,S2 heard  Abdomen:  Soft, non - tender  Musculoskeletal:  Edema - mild rt leg edema noted    Medications:  Infusion:    sodium chloride Stopped (04/11/25 1431)    dextrose       Meds:    potassium chloride  40 mEq Oral TID WC    sodium chloride flush  5-40 mL IntraVENous 2 times per day    enoxaparin  30 mg SubCUTAneous Daily    allopurinol  100 mg Oral Daily    amLODIPine  5 mg Oral Daily    aspirin  81 mg Oral Daily    calcium elemental  500 mg Oral BID    carvedilol  25 mg Oral BID    cyanocobalamin  1,000 mcg Oral Daily    ferrous sulfate  325 mg Oral Daily    [Held by provider] furosemide  80 mg Oral BID    gabapentin  300 mg Oral BID    insulin glargine  30 Units SubCUTAneous  Nightly    insulin lispro  0-8 Units SubCUTAneous 4x Daily AC & HS    [Held by provider] metOLazone  2.5 mg Oral Once per day on Monday Thursday    magnesium oxide  400 mg Oral Daily    pantoprazole  40 mg Oral QAM AC    doxazosin  4 mg Oral Daily       Lab Data :  CBC:   Recent Labs     04/10/25  1045 04/11/25  0531   WBC 13.1* 14.7*   HGB 10.3* 10.3*   HCT 30.9* 31.0*    255     CMP:  Recent Labs     04/10/25  1045 04/10/25  1719 04/11/25  0531 04/11/25  1310 04/11/25  1907 04/12/25  0036 04/12/25  0543     --  143  --   --   --  144   K 2.4*   < > 2.4*   < > 4.0 3.7 3.3*   CL 97*  --  96*  --   --   --  100   CO2 32*  --  32*  --   --   --  32*   BUN 57*  --  52*  --   --   --  43*   CREATININE 2.7*  --  2.5*  --   --   --  2.3*   GLUCOSE 139*  --  189*  --   --   --  236*   CALCIUM 8.2*  --  8.6*  --   --   --  8.9   MG 1.6  --  2.0  --   --   --   --     < > = values in this interval not displayed.     Hepatic:   Recent Labs     04/10/25  1045   AST 21   ALT 12   BILITOT 0.2*   ALKPHOS 145*       Assessment and Plan:  Renal - CKD stage 4 - overall renal fx appears to be stable at baseline  Hypokalemia-mostly due to GI losses and diuretics currently on 40 3 times daily  Potassium is better  We will follow for today    Hypomagnesemia improved as well  Diabetes mellitus  Essential hypertension  Mild lower extremity edema as needed diuretics  Elevated CO2 may be secondary to above follow closely  Meds reviewed and discussed with patient    Osiel Hillman MD  [facility]    This report has been created using voice recognition software. It may contain minor errors which are inherent in voice recognition technology

## 2025-04-13 LAB
ANION GAP SERPL CALC-SCNC: 12 MEQ/L (ref 8–16)
BUN SERPL-MCNC: 42 MG/DL (ref 8–23)
CALCIUM SERPL-MCNC: 8.9 MG/DL (ref 8.8–10.2)
CHLORIDE SERPL-SCNC: 98 MEQ/L (ref 98–111)
CO2 SERPL-SCNC: 30 MEQ/L (ref 22–29)
CREAT SERPL-MCNC: 2.3 MG/DL (ref 0.7–1.2)
GFR SERPL CREATININE-BSD FRML MDRD: 30 ML/MIN/1.73M2
GLUCOSE BLD STRIP.AUTO-MCNC: 189 MG/DL (ref 70–108)
GLUCOSE BLD STRIP.AUTO-MCNC: 269 MG/DL (ref 70–108)
GLUCOSE SERPL-MCNC: 170 MG/DL (ref 74–109)
POTASSIUM SERPL-SCNC: 3.8 MEQ/L (ref 3.5–5.2)
POTASSIUM SERPL-SCNC: 4.1 MEQ/L (ref 3.5–5.2)
POTASSIUM SERPL-SCNC: 4.5 MEQ/L (ref 3.5–5.2)
POTASSIUM SERPL-SCNC: 4.8 MEQ/L (ref 3.5–5.2)
SODIUM SERPL-SCNC: 140 MEQ/L (ref 135–145)

## 2025-04-13 PROCEDURE — 82948 REAGENT STRIP/BLOOD GLUCOSE: CPT

## 2025-04-13 PROCEDURE — 6370000000 HC RX 637 (ALT 250 FOR IP): Performed by: PHYSICIAN ASSISTANT

## 2025-04-13 PROCEDURE — 2500000003 HC RX 250 WO HCPCS: Performed by: PHYSICIAN ASSISTANT

## 2025-04-13 PROCEDURE — 6370000000 HC RX 637 (ALT 250 FOR IP): Performed by: INTERNAL MEDICINE

## 2025-04-13 PROCEDURE — 6360000002 HC RX W HCPCS: Performed by: PHYSICIAN ASSISTANT

## 2025-04-13 PROCEDURE — 80048 BASIC METABOLIC PNL TOTAL CA: CPT

## 2025-04-13 PROCEDURE — 99232 SBSQ HOSP IP/OBS MODERATE 35: CPT | Performed by: INTERNAL MEDICINE

## 2025-04-13 PROCEDURE — 1200000003 HC TELEMETRY R&B

## 2025-04-13 PROCEDURE — 99232 SBSQ HOSP IP/OBS MODERATE 35: CPT | Performed by: PHYSICIAN ASSISTANT

## 2025-04-13 PROCEDURE — 36415 COLL VENOUS BLD VENIPUNCTURE: CPT

## 2025-04-13 PROCEDURE — 84132 ASSAY OF SERUM POTASSIUM: CPT

## 2025-04-13 RX ADMIN — Medication 1000 MCG: at 08:31

## 2025-04-13 RX ADMIN — POTASSIUM CHLORIDE 40 MEQ: 20 TABLET, EXTENDED RELEASE ORAL at 08:30

## 2025-04-13 RX ADMIN — Medication 400 MG: at 08:31

## 2025-04-13 RX ADMIN — DOXAZOSIN 4 MG: 4 TABLET ORAL at 08:31

## 2025-04-13 RX ADMIN — POTASSIUM CHLORIDE 40 MEQ: 20 TABLET, EXTENDED RELEASE ORAL at 14:25

## 2025-04-13 RX ADMIN — CARVEDILOL 25 MG: 25 TABLET, FILM COATED ORAL at 08:31

## 2025-04-13 RX ADMIN — INSULIN GLARGINE 30 UNITS: 100 INJECTION, SOLUTION SUBCUTANEOUS at 22:02

## 2025-04-13 RX ADMIN — INSULIN LISPRO 4 UNITS: 100 INJECTION, SOLUTION INTRAVENOUS; SUBCUTANEOUS at 18:21

## 2025-04-13 RX ADMIN — ASPIRIN 81 MG: 81 TABLET, COATED ORAL at 08:30

## 2025-04-13 RX ADMIN — SODIUM CHLORIDE, PRESERVATIVE FREE 10 ML: 5 INJECTION INTRAVENOUS at 20:12

## 2025-04-13 RX ADMIN — INSULIN LISPRO 3 UNITS: 100 INJECTION, SOLUTION INTRAVENOUS; SUBCUTANEOUS at 18:20

## 2025-04-13 RX ADMIN — INSULIN LISPRO 3 UNITS: 100 INJECTION, SOLUTION INTRAVENOUS; SUBCUTANEOUS at 08:33

## 2025-04-13 RX ADMIN — CARVEDILOL 25 MG: 25 TABLET, FILM COATED ORAL at 20:11

## 2025-04-13 RX ADMIN — FERROUS SULFATE TAB 325 MG (65 MG ELEMENTAL FE) 325 MG: 325 (65 FE) TAB at 14:25

## 2025-04-13 RX ADMIN — GABAPENTIN 300 MG: 300 CAPSULE ORAL at 08:30

## 2025-04-13 RX ADMIN — ENOXAPARIN SODIUM 30 MG: 100 INJECTION SUBCUTANEOUS at 08:32

## 2025-04-13 RX ADMIN — PANTOPRAZOLE SODIUM 40 MG: 40 TABLET, DELAYED RELEASE ORAL at 06:12

## 2025-04-13 RX ADMIN — ALLOPURINOL 100 MG: 100 TABLET ORAL at 08:31

## 2025-04-13 RX ADMIN — CALCIUM 500 MG: 500 TABLET ORAL at 20:11

## 2025-04-13 RX ADMIN — GABAPENTIN 300 MG: 300 CAPSULE ORAL at 20:11

## 2025-04-13 RX ADMIN — POTASSIUM CHLORIDE 40 MEQ: 20 TABLET, EXTENDED RELEASE ORAL at 18:21

## 2025-04-13 RX ADMIN — INSULIN LISPRO 4 UNITS: 100 INJECTION, SOLUTION INTRAVENOUS; SUBCUTANEOUS at 08:32

## 2025-04-13 RX ADMIN — AMLODIPINE BESYLATE 5 MG: 5 TABLET ORAL at 08:31

## 2025-04-13 RX ADMIN — INSULIN LISPRO 8 UNITS: 100 INJECTION, SOLUTION INTRAVENOUS; SUBCUTANEOUS at 22:02

## 2025-04-13 RX ADMIN — CALCIUM 500 MG: 500 TABLET ORAL at 08:30

## 2025-04-13 ASSESSMENT — PAIN SCALES - GENERAL: PAINLEVEL_OUTOF10: 0

## 2025-04-13 ASSESSMENT — PAIN - FUNCTIONAL ASSESSMENT
PAIN_FUNCTIONAL_ASSESSMENT: NONE - DENIES PAIN
PAIN_FUNCTIONAL_ASSESSMENT: NONE - DENIES PAIN

## 2025-04-13 NOTE — PROGRESS NOTES
[facility]   Nephrology progress note  4/13/2025, 1:27 PM      Pt Name:    Brandon Jacobs  MRN:     [id number]     YOB: 1955  Admit Date:    4/10/2025  9:33 AM    Chief Complaint: Nephrology following for hypokalemia and diuretic management .    Subjective:  Patient seen and examined  No chest pain or shortness of breath  Feels okay some leg swelling .  Good UOP     Objective:  24HR INTAKE/OUTPUT:    Intake/Output Summary (Last 24 hours) at 4/13/2025 1327  Last data filed at 4/13/2025 0352  Gross per 24 hour   Intake 250 ml   Output 3600 ml   Net -3350 ml      Admission weight: 90.3 kg (199 lb)  Wt Readings from Last 3 Encounters:   04/10/25 90.3 kg (199 lb)   01/24/25 93 kg (205 lb)   01/17/25 93.2 kg (205 lb 8 oz)        Vitals :   Vitals:    04/12/25 2334 04/13/25 0352 04/13/25 0830 04/13/25 1153   BP: 138/62 117/60 (!) 105/53 (!) 168/73   Pulse: 79 86 78 78   Resp: 16 18 16 16   Temp: 98.1 °F (36.7 °C) 98.2 °F (36.8 °C) 98 °F (36.7 °C) 98.1 °F (36.7 °C)   TempSrc: Oral Oral Oral Oral   SpO2: 95% 95% 93% 93%   Weight:       Height:           Physical examination  General Appearance:  Well developed. No distress  Mouth/Throat:  Oral mucosa moist  Neck:  Supple, no JVD  Lungs:  Breath sounds: clear  Heart::  S1,S2 heard  Abdomen:  Soft, non - tender  Musculoskeletal:  Edema - mild  edema noted    Medications:  Infusion:    sodium chloride Stopped (04/11/25 1431)    dextrose       Meds:    insulin lispro  0-16 Units SubCUTAneous 4x Daily AC & HS    insulin lispro  3 Units SubCUTAneous TID WC    potassium chloride  40 mEq Oral TID WC    sodium chloride flush  5-40 mL IntraVENous 2 times per day    enoxaparin  30 mg SubCUTAneous Daily    allopurinol  100 mg Oral Daily    amLODIPine  5 mg Oral Daily    aspirin  81 mg Oral Daily    calcium elemental  500 mg Oral BID    carvedilol  25 mg Oral BID    cyanocobalamin  1,000 mcg Oral Daily    ferrous sulfate  325 mg Oral Daily    [Held by

## 2025-04-13 NOTE — PROGRESS NOTES
This RN was contacted by [facility] resource RN regarding needed equipment to allow repositioning and simon care for this patient. Kamar advises that a [employer] can be obtained from , and resource nurse then educated this RN on use of the equipment.  After patient was stood and incontinence care was finished, this RN advised patient to return to bed so he could be more easily turned on a regular basis.  Patient  adamantly refused to leave chair.  Air cushion and clean chux pad was placed under patient, and patient was returned to chair.

## 2025-04-13 NOTE — PROGRESS NOTES
Hospitalist Progress Note    Patient:  Brandon Jacobs      Unit/Bed:8A-10/010-A    YOB: 1955    MRN: [id number]       Acct: [id number]     PCP: Rajeev Brennan APRN - CNP    Date of Admission: 4/10/2025    Assessment/Plan:    Hypokalemia: 2/2 diuretics and GI losses  Nephrology consulted and following  Receiving ample replacement, low again this AM at 2.4  4/12/25: Patient's potassium improved to 3.3 today.  Creatinine 2.3.  Nephrology following, continue to hold diuretics.  Continue to trend.  Continue replacement per nephrology recommendations.  4/13/25: Patient's potassium level 3.8 today, continue to monitor.    CKD stage stage IV:  Creatinine 2.7, around baseline- improved to 2.5 4/11/25, 2.3 4/13/25  Trend BMP.    Metabolic alkalosis:  Likely secondary to diuretic use.  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 Therapy evals placed yesterday but they likely will not get to a new consult over the weekend.    Leukocytosis:  WBC increased to 14.7, continue to monitor  No evidence of infection at this time.    HFpEF not in acute exacerbation:  Echo on 10/9/2023 with EF of 60 to 65%  Outpatient diuretic regimen includes Lasix 80 mg twice daily and metolazone 2.5 mg twice weekly- holding both  Strict ins and outs. Daily weights. Low sodium diet. Fluid restriction.    Crohn's disease:  Follows with GI outpatient.  On Humira.  Endorses chronic diarrhea.  No change from

## 2025-04-13 NOTE — PLAN OF CARE
Problem: Chronic Conditions and Co-morbidities  Goal: Patient's chronic conditions and co-morbidity symptoms are monitored and maintained or improved  Outcome: Progressing  Flowsheets (Taken 4/12/2025 2232)  Care Plan - Patient's Chronic Conditions and Co-Morbidity Symptoms are Monitored and Maintained or Improved:   Monitor and assess patient's chronic conditions and comorbid symptoms for stability, deterioration, or improvement   Collaborate with multidisciplinary team to address chronic and comorbid conditions and prevent exacerbation or deterioration   Update acute care plan with appropriate goals if chronic or comorbid symptoms are exacerbated and prevent overall improvement and discharge     Problem: Discharge Planning  Goal: Discharge to home or other facility with appropriate resources  Outcome: Progressing  Flowsheets (Taken 4/12/2025 2232)  Discharge to home or other facility with appropriate resources:   Identify barriers to discharge with patient and caregiver   Arrange for needed discharge resources and transportation as appropriate   Identify discharge learning needs (meds, wound care, etc)   Arrange for interpreters to assist at discharge as needed   Refer to discharge planning if patient needs post-hospital services based on physician order or complex needs related to functional status, cognitive ability or social support system     Problem: Skin/Tissue Integrity  Goal: Skin integrity remains intact  Description: 1.  Monitor for areas of redness and/or skin breakdown  2.  Assess vascular access sites hourly  3.  Every 4-6 hours minimum:  Change oxygen saturation probe site  4.  Every 4-6 hours:  If on nasal continuous positive airway pressure, respiratory therapy assess nares and determine need for appliance change or resting period  Outcome: Progressing  Flowsheets  Taken 4/12/2025 2232  Skin Integrity Remains Intact:   Monitor for areas of redness and/or skin breakdown   Assess vascular access  consults as needed   Apply continuous passive motion per provider or physical therapy orders to increase flexion toward goal   Instruct patient/family in ordered activity level  Goal: Maintain proper alignment of affected body part  Outcome: Progressing  Flowsheets (Taken 4/12/2025 2232)  Maintain proper alignment of affected body part:   Support and protect limb and body alignment per provider's orders   Instruct and reinforce with patient and family use of appropriate assistive device and precautions (e.g. spinal or hip dislocation precautions)     Problem: Metabolic/Fluid and Electrolytes - Adult  Goal: Electrolytes maintained within normal limits  Outcome: Progressing  Flowsheets (Taken 4/12/2025 2232)  Electrolytes maintained within normal limits:   Administer electrolyte replacement as ordered   Monitor labs and assess patient for signs and symptoms of electrolyte imbalances   Monitor response to electrolyte replacements, including repeat lab results as appropriate   Fluid restriction as ordered   Instruct patient on fluid and nutrition restrictions as appropriate  Goal: Hemodynamic stability and optimal renal function maintained  Outcome: Progressing  Flowsheets (Taken 4/12/2025 2232)  Hemodynamic stability and optimal renal function maintained:   Monitor labs and assess for signs and symptoms of volume excess or deficit   Monitor intake, output and patient weight   Monitor urine specific gravity, serum osmolarity and serum sodium as indicated or ordered   Monitor response to interventions for patient's volume status, including labs, urine output, blood pressure (other measures as available)   Encourage oral intake as appropriate   Instruct patient on fluid and nutrition restrictions as appropriate  Goal: Glucose maintained within prescribed range  Outcome: Progressing  Flowsheets (Taken 4/12/2025 2232)  Glucose maintained within prescribed range:   Assess for signs and symptoms of hyperglycemia and

## 2025-04-13 NOTE — PROGRESS NOTES
"PLEASE FOLLOW UP WITH YOUR PRIMARY CARE PHYSICIAN IN REGARDS TO THIS URGENT CARE VISIT    TREATMENT PLAN FOR TODAY'S VISIT:        1. Avoid spicy, oily, hot foods and drinks    2. Acyclovir 400 mg three times daily x 7 days    3. ""GARGLE AND SPIT\"" MAGIC MOUTHWASH SEVERAL TIMES DURING THE DAY FOR THROAT PAIN RELIEF. THE BETTER YOU HAVE THE THROAT PAIN UNDER CONTROL, THE BETTER YOU CAN EAT AND DRINK AND HENCE AVOID DEHYDRATION AND MALNUTRITION    4. Alternate Acetaminophen (for pain) and Ibuprofen (for pain and inflammation). Acetaminophen 500 mg 1-2 tablets every 6 hours as needed (max. 3000 mg/daily)  Ibuprofen 200 mg 2-3 tablets every 6 hours as needed (max. 2400 mg/daily)     Herpetic Gingivostomatitis    What is herpetic gingivostomatitis? Herpetic gingivostomatitis is an infection of the mouth and lips that is caused by a virus. Herpes gingivostomatitis most commonly affects toddlers and young children, although older individuals can also get this infection. What is the cause? Herpetic gingivostomatitis is caused by virus called herpes simplex virus type 1 or HSV1. This is a different kind of herpes virus than the kind that is sexually transmitted. The infection is passed from person to person through contact with saliva that contains the virus (such as sharing utensils, cups, and bottles; thumb sucking; and putting toys in the mouth). Often the contact is with a person who has cold sores. What are the symptoms? The illness usually starts with a fever before the first mouth sores appear. Sores in the mouth may form on the lips, gums, tongue, and cheeks. Often the gums are very red and bleed easily. How is it diagnosed? This condition is common and will be recognized by your healthcare provider without doing any special tests. How long does it last?   The fever usually lasts for a few days. The painful mouth sores last 3 to 5 days before they start to get better.  It will take about 14 " Attempted to ambulate patient to restroom with DANE Nation and HITESH Chan.  Patient unable to  support body weight on legs in order to stand.  Patient also unable to scoot himself back on to chair successfully, so this RN,Chapis and Dustin assisted patient with repositioning in chair. Patient refuses to return to his bed at this time.  Patient has  been educated regarding the importance of turning and repositioning to remain pressure injury free.  Patient verbalizes understanding but refuses to move from chair at this time.   "days before the sores completely heal.  After the mouth sores heal, the virus remains in the body and can become active again. When this happens, usually the sores are not as severe. Sores that return on the lips are called ""cold sores. \""    What can I do to help with the discomfort? Fever and mouth pain can be treated with acetaminophen or ibuprofen. FOR PAIN MANAGEMENT, ALTERNATE ACETAMINOPHEN (FOR PAIN) AND IBUPROFEN (FOR PAIN AND INFLAMMATION). ACETAMINOPHEN 500 MG 1-2 TABLETS EVERY 6 HOURS AS NEEDED (MAX. 3000 MG/DAILY)  IBUPROFEN 200 MG 2-3 TABLETS EVERY 6 HOURS AS NEEDED (MAX. 1500 MG/DAILY)    If you have never had herpetic gingivostomatitis before and has had sores for less than 3 days, an antiviral medicine called acyclovir may help the sores go away more quickly. Antibiotics have no effect on viruses. You can help prevent dehydration by taking plenty of fluids. Chicken broth, sports drinks, or popsicles are good examples of fluids that you may drink easily. If the sores prevent you  from drinking, your urine output will decrease and  mouth will become dry. To keep from spreading the virus, try to keep your hand away from your mouth while you have active sores. Do not rub your eyes to prevent infection. Medicines that make the mouth numb are not a good idea for children since they may cause the child to swallow incorrectly and choke. When should I call my  healthcare provider? Call within 24 hours if:  The skin around the eye has blisters or sores.    If you are not urinating or unable to take any oral solids or liquids      "

## 2025-04-13 NOTE — CARE COORDINATION
04/13/25 0718   Service Assessment   Patient Orientation Alert and Oriented;Person;Place;Situation   Cognition Alert   History Provided By Patient   Primary Caregiver Self   Accompanied By/Relationship NA   Support Systems Spouse/Significant Other;Children;Family Members;Friends/Neighbors   Patient's Healthcare Decision Maker is: Named in Scanned ACP Document   PCP Verified by CM Yes   Last Visit to PCP Within last 6 months   Prior Functional Level Independent in ADLs/IADLs;Bathing;Dressing;Toileting;Feeding;Cooking;Housework;Shopping;Mobility   Current Functional Level Independent in ADLs/IADLs;Feeding   Can patient return to prior living arrangement Yes   Ability to make needs known: Good   Family able to assist with home care needs: Yes   Would you like for me to discuss the discharge plan with any other family members/significant others, and if so, who? Yes  (Spouse)   Financial Resources None   Community Resources None   CM/SW Referral Other (see comment)  (None)   Discharge Planning   Type of Residence House   Living Arrangements Spouse/Significant Other;Children   Current Services Prior To Admission C-pap;Durable Medical Equipment   Current DME Prior to Arrival Walker;Bipap   Potential Assistance Needed Durable Medical Equipment   Potential DME Needed Wheelchair   DME Ordered? No   Potential Assistance Purchasing Medications No   Type of Home Care Services None   Patient expects to be discharged to: House   Follow Up Appointment: Best Day/Time  Wednesday PM   One/Two Story Residence One story   Services At/After Discharge   Transition of Care Consult (CM Consult) Other  (TBD)   Services At/After Discharge None   Mode of Transport at Discharge Self   Confirm Follow Up Transport Family   Condition of Participation: Discharge Planning   The Plan for Transition of Care is related to the following treatment goals: Get me up and walking     Patient Goals/Plan/Treatment Preferences: Live at home with spouse and  kids. Uses walker on occasion. Goal to return home.    If patient is discharged prior to next notation, then this note serves as note for discharge by case management.

## 2025-04-14 ENCOUNTER — APPOINTMENT (OUTPATIENT)
Dept: GENERAL RADIOLOGY | Age: 70
DRG: 641 | End: 2025-04-14
Payer: MEDICARE

## 2025-04-14 LAB
ANION GAP SERPL CALC-SCNC: 11 MEQ/L (ref 8–16)
BASOPHILS ABSOLUTE: 0 THOU/MM3 (ref 0–0.1)
BASOPHILS NFR BLD AUTO: 0.3 %
BUN SERPL-MCNC: 45 MG/DL (ref 8–23)
CALCIUM SERPL-MCNC: 8.6 MG/DL (ref 8.8–10.2)
CHLORIDE SERPL-SCNC: 100 MEQ/L (ref 98–111)
CO2 SERPL-SCNC: 28 MEQ/L (ref 22–29)
CREAT SERPL-MCNC: 2.4 MG/DL (ref 0.7–1.2)
DEPRECATED RDW RBC AUTO: 50.5 FL (ref 35–45)
EOSINOPHIL NFR BLD AUTO: 1.6 %
EOSINOPHILS ABSOLUTE: 0.1 THOU/MM3 (ref 0–0.4)
ERYTHROCYTE [DISTWIDTH] IN BLOOD BY AUTOMATED COUNT: 14.6 % (ref 11.5–14.5)
GFR SERPL CREATININE-BSD FRML MDRD: 28 ML/MIN/1.73M2
GLUCOSE BLD STRIP.AUTO-MCNC: 137 MG/DL (ref 70–108)
GLUCOSE BLD STRIP.AUTO-MCNC: 188 MG/DL (ref 70–108)
GLUCOSE BLD STRIP.AUTO-MCNC: 226 MG/DL (ref 70–108)
GLUCOSE BLD STRIP.AUTO-MCNC: 344 MG/DL (ref 70–108)
GLUCOSE SERPL-MCNC: 129 MG/DL (ref 74–109)
HCT VFR BLD AUTO: 31.4 % (ref 42–52)
HGB BLD-MCNC: 9.8 GM/DL (ref 14–18)
IMM GRANULOCYTES # BLD AUTO: 0.03 THOU/MM3 (ref 0–0.07)
IMM GRANULOCYTES NFR BLD AUTO: 0.3 %
LYMPHOCYTES ABSOLUTE: 0.9 THOU/MM3 (ref 1–4.8)
LYMPHOCYTES NFR BLD AUTO: 10.3 %
MCH RBC QN AUTO: 29.7 PG (ref 26–33)
MCHC RBC AUTO-ENTMCNC: 31.2 GM/DL (ref 32.2–35.5)
MCV RBC AUTO: 95.2 FL (ref 80–94)
MONOCYTES ABSOLUTE: 0.9 THOU/MM3 (ref 0.4–1.3)
MONOCYTES NFR BLD AUTO: 10 %
NEUTROPHILS ABSOLUTE: 6.9 THOU/MM3 (ref 1.8–7.7)
NEUTROPHILS NFR BLD AUTO: 77.5 %
NRBC BLD AUTO-RTO: 0 /100 WBC
PLATELET # BLD AUTO: 271 THOU/MM3 (ref 130–400)
PMV BLD AUTO: 10.5 FL (ref 9.4–12.4)
POTASSIUM SERPL-SCNC: 4.8 MEQ/L (ref 3.5–5.2)
POTASSIUM SERPL-SCNC: 4.8 MEQ/L (ref 3.5–5.2)
POTASSIUM SERPL-SCNC: 5 MEQ/L (ref 3.5–5.2)
RBC # BLD AUTO: 3.3 MILL/MM3 (ref 4.7–6.1)
SODIUM SERPL-SCNC: 139 MEQ/L (ref 135–145)
WBC # BLD AUTO: 8.9 THOU/MM3 (ref 4.8–10.8)

## 2025-04-14 PROCEDURE — 82948 REAGENT STRIP/BLOOD GLUCOSE: CPT

## 2025-04-14 PROCEDURE — 6370000000 HC RX 637 (ALT 250 FOR IP): Performed by: PHYSICIAN ASSISTANT

## 2025-04-14 PROCEDURE — 85025 COMPLETE CBC W/AUTO DIFF WBC: CPT

## 2025-04-14 PROCEDURE — 6370000000 HC RX 637 (ALT 250 FOR IP): Performed by: INTERNAL MEDICINE

## 2025-04-14 PROCEDURE — 80048 BASIC METABOLIC PNL TOTAL CA: CPT

## 2025-04-14 PROCEDURE — 2500000003 HC RX 250 WO HCPCS: Performed by: PHYSICIAN ASSISTANT

## 2025-04-14 PROCEDURE — 97530 THERAPEUTIC ACTIVITIES: CPT

## 2025-04-14 PROCEDURE — 73564 X-RAY EXAM KNEE 4 OR MORE: CPT

## 2025-04-14 PROCEDURE — 97167 OT EVAL HIGH COMPLEX 60 MIN: CPT

## 2025-04-14 PROCEDURE — 99232 SBSQ HOSP IP/OBS MODERATE 35: CPT | Performed by: PHYSICIAN ASSISTANT

## 2025-04-14 PROCEDURE — 84132 ASSAY OF SERUM POTASSIUM: CPT

## 2025-04-14 PROCEDURE — 36415 COLL VENOUS BLD VENIPUNCTURE: CPT

## 2025-04-14 PROCEDURE — 97162 PT EVAL MOD COMPLEX 30 MIN: CPT

## 2025-04-14 PROCEDURE — 1200000003 HC TELEMETRY R&B

## 2025-04-14 PROCEDURE — 97535 SELF CARE MNGMENT TRAINING: CPT

## 2025-04-14 PROCEDURE — 6360000002 HC RX W HCPCS: Performed by: PHYSICIAN ASSISTANT

## 2025-04-14 PROCEDURE — 99232 SBSQ HOSP IP/OBS MODERATE 35: CPT | Performed by: INTERNAL MEDICINE

## 2025-04-14 RX ORDER — FUROSEMIDE 40 MG/1
40 TABLET ORAL 2 TIMES DAILY
Status: DISCONTINUED | OUTPATIENT
Start: 2025-04-14 | End: 2025-04-17 | Stop reason: HOSPADM

## 2025-04-14 RX ADMIN — CARVEDILOL 25 MG: 25 TABLET, FILM COATED ORAL at 10:16

## 2025-04-14 RX ADMIN — ALLOPURINOL 100 MG: 100 TABLET ORAL at 10:16

## 2025-04-14 RX ADMIN — INSULIN LISPRO 3 UNITS: 100 INJECTION, SOLUTION INTRAVENOUS; SUBCUTANEOUS at 13:09

## 2025-04-14 RX ADMIN — SODIUM CHLORIDE, PRESERVATIVE FREE 10 ML: 5 INJECTION INTRAVENOUS at 20:21

## 2025-04-14 RX ADMIN — CALCIUM 500 MG: 500 TABLET ORAL at 10:14

## 2025-04-14 RX ADMIN — Medication 400 MG: at 10:15

## 2025-04-14 RX ADMIN — INSULIN LISPRO 4 UNITS: 100 INJECTION, SOLUTION INTRAVENOUS; SUBCUTANEOUS at 18:21

## 2025-04-14 RX ADMIN — Medication 1000 MCG: at 10:14

## 2025-04-14 RX ADMIN — POTASSIUM CHLORIDE 40 MEQ: 20 TABLET, EXTENDED RELEASE ORAL at 18:22

## 2025-04-14 RX ADMIN — ENOXAPARIN SODIUM 30 MG: 100 INJECTION SUBCUTANEOUS at 10:14

## 2025-04-14 RX ADMIN — CALCIUM 500 MG: 500 TABLET ORAL at 20:20

## 2025-04-14 RX ADMIN — DOXAZOSIN 4 MG: 4 TABLET ORAL at 10:16

## 2025-04-14 RX ADMIN — POTASSIUM CHLORIDE 40 MEQ: 20 TABLET, EXTENDED RELEASE ORAL at 10:14

## 2025-04-14 RX ADMIN — AMLODIPINE BESYLATE 5 MG: 5 TABLET ORAL at 10:15

## 2025-04-14 RX ADMIN — INSULIN LISPRO 12 UNITS: 100 INJECTION, SOLUTION INTRAVENOUS; SUBCUTANEOUS at 13:08

## 2025-04-14 RX ADMIN — METOLAZONE 2.5 MG: 2.5 TABLET ORAL at 10:30

## 2025-04-14 RX ADMIN — INSULIN LISPRO 4 UNITS: 100 INJECTION, SOLUTION INTRAVENOUS; SUBCUTANEOUS at 20:20

## 2025-04-14 RX ADMIN — INSULIN GLARGINE 30 UNITS: 100 INJECTION, SOLUTION SUBCUTANEOUS at 20:20

## 2025-04-14 RX ADMIN — CARVEDILOL 25 MG: 25 TABLET, FILM COATED ORAL at 20:20

## 2025-04-14 RX ADMIN — ASPIRIN 81 MG: 81 TABLET, COATED ORAL at 10:14

## 2025-04-14 RX ADMIN — INSULIN LISPRO 3 UNITS: 100 INJECTION, SOLUTION INTRAVENOUS; SUBCUTANEOUS at 18:21

## 2025-04-14 RX ADMIN — FUROSEMIDE 40 MG: 40 TABLET ORAL at 10:30

## 2025-04-14 RX ADMIN — GABAPENTIN 300 MG: 300 CAPSULE ORAL at 20:20

## 2025-04-14 RX ADMIN — PANTOPRAZOLE SODIUM 40 MG: 40 TABLET, DELAYED RELEASE ORAL at 05:39

## 2025-04-14 RX ADMIN — GABAPENTIN 300 MG: 300 CAPSULE ORAL at 10:15

## 2025-04-14 RX ADMIN — ACETAMINOPHEN 650 MG: 325 TABLET ORAL at 20:20

## 2025-04-14 NOTE — PROGRESS NOTES
[facility]  INPATIENT OCCUPATIONAL THERAPY  STRZ MED SURG 8AB  EVALUATION      Discharge Recommendations: Patient would benefit from continued therapy after discharge, Continue to assess pending progress  Equipment Recommendations: No        Time In: 0845  Time Out: 1015  Timed Code Treatment Minutes: 60 Minutes  Minutes: 90     Variance: 15  Reason:  (Pt was eating breakfast at the edge of bed)    Date: 2025  Patient Name: Brandon Jacobs,   Gender: male      MRN: [id number]  : 1955  (69 y.o.)  Referring Practitioner: Kishore Roa PA-C  Diagnosis: Hypokalemia  Additional Pertinent Hx: Pt with a history of CKD stage IV, Crohn's disease, insulin-dependent type 2 diabetes mellitus, hypertension, hyperlipidemia, gout, normocytic anemia, and HFpEF who presented to [facility] with chief complaint of abnormal labs.  The patient was sent in by his nephrologist for hypokalemia.  The patient is on Lasix 80 mg twice daily and metolazone 2.5 mg twice weekly.  Additionally, he is taking approximately 60 mEq of potassium daily in addition to an extra 20 mEq on the days on which he takes his metolazone.  However, his potassium was noted to be 2.7 and he was sent to the hospital.  His potassium the emergency department was 2.4.  The patient endorses chronic diarrhea which has not changed significantly.  Pt reports not having leg swelling for about 1 year.    Restrictions/Precautions:  Restrictions/Precautions: Fall Risk  Position Activity Restriction  Other Position/Activity Restrictions: Pt reported not wearing any knee brace    Subjective  Chart Reviewed: Yes, Orders, History and Physical    Subjective: Pleasant.  Needed encouragement to work toward getting up out of bed.  Comments: RN approved session.  Pt was incontinent of bowel x 2 while in bed and sitting at the edge of bed.  He also was incontinent of urine once the external catheter had been removed.  Pt C/O R knee pain with

## 2025-04-14 NOTE — CARE COORDINATION
4/14/25, 2:48 PM EDT    DISCHARGE ON GOING EVALUATION    Brandon BOBBY [facility] day: 2  Location: 8A-10/010-A Reason for admit: Hypokalemia [E87.6]  Stage 4 chronic kidney disease (HCC) [N18.4]     Procedures: none    Imaging since last note:   4/14 XR Right Knee: pending     Barriers to Discharge: To ED w/ abnormal labs. Hospitalist and Nephrology following. PT/OT; await PT note. OT recs SNF and patient refusing to get out of bed. Fluid restriction. XR Right knee pending. SQ lovenox. PO lasix resumed. Metolazone unheld. Creat 2.4.     PCP: Rajeev Brennan, APRN - CNP  Readmission Risk Score: 15.3    Patient Goals/Plan/Treatment Preferences: From home w/ wife and kids. Planning SNF. Referral to [facility]; will require precert. SW following.

## 2025-04-14 NOTE — PROGRESS NOTES
level  Home Access: Stairs to enter with rails  Entrance Stairs - Number of Steps: 2 steps with 1 rail  Home Equipment: Walker - Rolling, Lift chair     Bathroom Shower/Tub: Walk-in shower, Shower chair with back  Bathroom Toilet: Handicap height  Bathroom Equipment: Grab bars in shower  Bathroom Accessibility: Accessible    Receives Help From: Family  Prior Level of Assist for ADLs: Needs assistance  Prior Level of Assist for Homemaking: Needs assistance  Homemaking Responsibilities: No  Prior Level of Assist for Transfers: Needs assistance  Prior Level of Assist for Ambulation: Independent household ambulator, with or without device  Has the patient had two or more falls in the past year or any fall with injury in the past year?: Yes    Active : No  Mode of Transportation: Car  Occupation: Retired  Type of Occupation: [employer]  Leisure & Hobbies: Watching TV, shopping.  Additional Comments: Pt was using a rolling walker.  He had a lift chair that he was sleeping in.  He had been taking spongebaths.  Pt is incontinent of stool and wears Depends, per pt.  Pt only left the home for appointments.    OBJECTIVE:  Range of Motion:  Right Lower Extremity: Impaired - Knee in ACE wrap with incomplete extension in supine; sitting edge of bed able to dangle ~80 degrees  Left Lower Extremity: WFL    Strength:  Bilateral Lower Extremity: Not Tested    Balance:  Static Sitting Balance:  Stand By Assistance   *Sitting edge of bed with ADL activity with good balance.  Duration ~5 minutes.    Bed Mobility:  Supine to Sit: Maximum Assistance, X 1, with rail, with increased time for completion  Sit to Supine: Dependent, X 2, with head of bed flat, with increased time for completion     Transfers:  Attempted times 3 with verbal cues for hand placement, modified bed height to increase ease.  2 assist present.  Patient did not assist and even pushed back on writer stating, you are wasting your time--I can't do

## 2025-04-14 NOTE — PROGRESS NOTES
Hospitalist Progress Note    Patient:  Brandon Jacobs      Unit/Bed:8A-10/010-A    YOB: 1955    MRN: [id number]       Acct: [id number]     PCP: Rajeev Brennan APRN - CNP    Date of Admission: 4/10/2025    Assessment/Plan:    Hypokalemia: 2/2 diuretics and GI losses  Nephrology consulted and following  Receiving ample replacement, low again this AM at 2.4  4/12/25: Patient's potassium improved to 3.3 today.  Creatinine 2.3.  Nephrology following, continue to hold diuretics.  Continue to trend.  Continue replacement per nephrology recommendations.  4/13/25: Patient's potassium level 3.8 today, continue to monitor.    CKD stage stage IV:  Creatinine 2.7, around baseline- improved to 2.5 4/11/25, 2.3 4/13/25  Trend BMP.    Metabolic alkalosis:  Likely secondary to diuretic use.  Trend PRN    Insulin-dependent type 2 diabetes mellitus:  Continue basal insulin and sliding scale insulin, insulin adjusted yesterday  Latest POCT glucose 344, unfortunately patient was not on carb controlled diet.  This was changed.  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 Therapy evals placed yesterday but they likely will not get to a new consult over the weekend.  XR right knee ordered  4/14/25: Case discussed with physical therapist, patient would not stand for any significant length of time for her.  Patient plans to go home, however do not see how this will be possible patient will not stand up and take care of herself.  Nursing reports that this is behavioral, she informed that the patient would not be able to go home and he did start doing more things for himself.

## 2025-04-14 NOTE — CARE COORDINATION
DISCHARGE PLANNING EVALUATION  4/14/25, 2:18 PM EDT    Reason for Referral: Placement  Decision Maker: Self, wife also present  Current Services: None  New Services Requested: Rehab stay  Family/ Social/ Home environment: Patient resides at home with his wife and child.  He stated that home is one floor with a 2 steps inside.  He stated that he does not use an assistive device.    Payment Source:[employer]  Transportation at Discharge: Unknown  Post-acute (PAC) provider list was provided to patient. Patient was informed of their freedom to choose PAC provider. Discussed and offered to show the patient the relevant PAC Providers quality and resource use measures on Medicare Compare web site via computer based on patient's goals of care and treatment preferences. Questions regarding selection process were answered.      Teach Back Method used with Brandon regarding care plan and options for discharge.   Patient and spouse verbalized understanding of the plan of care and contribute to goal setting.       Patient preferences and discharge plan: Patient and wife in agreement with a referral to [facility] for a rehab stay.  Referral called to [facility], patient will be a pre-cert.     Electronically signed by GUSTABO Ackerman on 4/14/2025 at 2:18 PM

## 2025-04-14 NOTE — PROGRESS NOTES
[facility]   Nephrology progress note  4/14/2025, 7:56 AM      Pt Name:    Brandon Jacobs  MRN:     [id number]     YOB: 1955  Admit Date:    4/10/2025  9:33 AM    Chief Complaint: Nephrology following for hypokalemia and diuretic management .    Subjective:  Patient seen and examined  No new events. Denies complaints. Legs look more swollen. No SOB.    Objective:  24HR INTAKE/OUTPUT:    Intake/Output Summary (Last 24 hours) at 4/14/2025 0756  Last data filed at 4/14/2025 0600  Gross per 24 hour   Intake --   Output 1000 ml   Net -1000 ml      Admission weight: 90.3 kg (199 lb)  Wt Readings from Last 3 Encounters:   04/14/25 91.3 kg (201 lb 4.8 oz)   01/24/25 93 kg (205 lb)   01/17/25 93.2 kg (205 lb 8 oz)        Vitals :   Vitals:    04/13/25 2009 04/13/25 2349 04/14/25 0538 04/14/25 0600   BP: (!) 120/59 (!) 122/56 114/63    Pulse: 79 77 76    Resp: 18 18 16    Temp: 98.6 °F (37 °C) 98.4 °F (36.9 °C) 98.2 °F (36.8 °C)    TempSrc:   Oral    SpO2: 97% 95% 95%    Weight:    91.3 kg (201 lb 4.8 oz)   Height:           Physical examination  General Appearance:  Well developed. No distress  Mouth/Throat:  Oral mucosa moist  Neck:  Supple, no JVD  Lungs:  Breath sounds: clear  Heart::  S1,S2 heard  Abdomen:  Soft, non - tender  Musculoskeletal:  2+ edema    Medications:  Infusion:    sodium chloride Stopped (04/11/25 1431)    dextrose       Meds:    furosemide  40 mg Oral BID    insulin lispro  0-16 Units SubCUTAneous 4x Daily AC & HS    insulin lispro  3 Units SubCUTAneous TID WC    potassium chloride  40 mEq Oral TID WC    sodium chloride flush  5-40 mL IntraVENous 2 times per day    enoxaparin  30 mg SubCUTAneous Daily    allopurinol  100 mg Oral Daily    amLODIPine  5 mg Oral Daily    aspirin  81 mg Oral Daily    calcium elemental  500 mg Oral BID    carvedilol  25 mg Oral BID    cyanocobalamin  1,000 mcg Oral Daily    ferrous sulfate  325 mg Oral Daily    gabapentin  300 mg Oral

## 2025-04-15 LAB
ANION GAP SERPL CALC-SCNC: 12 MEQ/L (ref 8–16)
BUN SERPL-MCNC: 47 MG/DL (ref 8–23)
CALCIUM SERPL-MCNC: 8.7 MG/DL (ref 8.8–10.2)
CHLORIDE SERPL-SCNC: 99 MEQ/L (ref 98–111)
CO2 SERPL-SCNC: 28 MEQ/L (ref 22–29)
CREAT SERPL-MCNC: 2.3 MG/DL (ref 0.7–1.2)
GFR SERPL CREATININE-BSD FRML MDRD: 30 ML/MIN/1.73M2
GLUCOSE BLD STRIP.AUTO-MCNC: 133 MG/DL (ref 70–108)
GLUCOSE BLD STRIP.AUTO-MCNC: 188 MG/DL (ref 70–108)
GLUCOSE BLD STRIP.AUTO-MCNC: 193 MG/DL (ref 70–108)
GLUCOSE BLD STRIP.AUTO-MCNC: 238 MG/DL (ref 70–108)
GLUCOSE SERPL-MCNC: 138 MG/DL (ref 74–109)
POTASSIUM SERPL-SCNC: 3.7 MEQ/L (ref 3.5–5.2)
SODIUM SERPL-SCNC: 139 MEQ/L (ref 135–145)

## 2025-04-15 PROCEDURE — 6360000002 HC RX W HCPCS: Performed by: PHYSICIAN ASSISTANT

## 2025-04-15 PROCEDURE — 6370000000 HC RX 637 (ALT 250 FOR IP): Performed by: PHYSICIAN ASSISTANT

## 2025-04-15 PROCEDURE — 2500000003 HC RX 250 WO HCPCS: Performed by: PHYSICIAN ASSISTANT

## 2025-04-15 PROCEDURE — 99233 SBSQ HOSP IP/OBS HIGH 50: CPT | Performed by: PHYSICIAN ASSISTANT

## 2025-04-15 PROCEDURE — 97530 THERAPEUTIC ACTIVITIES: CPT

## 2025-04-15 PROCEDURE — 99232 SBSQ HOSP IP/OBS MODERATE 35: CPT | Performed by: INTERNAL MEDICINE

## 2025-04-15 PROCEDURE — 1200000003 HC TELEMETRY R&B

## 2025-04-15 PROCEDURE — 36415 COLL VENOUS BLD VENIPUNCTURE: CPT

## 2025-04-15 PROCEDURE — 97535 SELF CARE MNGMENT TRAINING: CPT

## 2025-04-15 PROCEDURE — 6370000000 HC RX 637 (ALT 250 FOR IP): Performed by: INTERNAL MEDICINE

## 2025-04-15 PROCEDURE — 82948 REAGENT STRIP/BLOOD GLUCOSE: CPT

## 2025-04-15 PROCEDURE — 80048 BASIC METABOLIC PNL TOTAL CA: CPT

## 2025-04-15 RX ADMIN — ENOXAPARIN SODIUM 30 MG: 100 INJECTION SUBCUTANEOUS at 09:40

## 2025-04-15 RX ADMIN — CARVEDILOL 25 MG: 25 TABLET, FILM COATED ORAL at 21:18

## 2025-04-15 RX ADMIN — Medication 400 MG: at 09:39

## 2025-04-15 RX ADMIN — CARVEDILOL 25 MG: 25 TABLET, FILM COATED ORAL at 09:39

## 2025-04-15 RX ADMIN — POTASSIUM CHLORIDE 40 MEQ: 20 TABLET, EXTENDED RELEASE ORAL at 18:13

## 2025-04-15 RX ADMIN — INSULIN LISPRO 3 UNITS: 100 INJECTION, SOLUTION INTRAVENOUS; SUBCUTANEOUS at 12:59

## 2025-04-15 RX ADMIN — INSULIN LISPRO 3 UNITS: 100 INJECTION, SOLUTION INTRAVENOUS; SUBCUTANEOUS at 09:40

## 2025-04-15 RX ADMIN — ALLOPURINOL 100 MG: 100 TABLET ORAL at 09:39

## 2025-04-15 RX ADMIN — FUROSEMIDE 40 MG: 40 TABLET ORAL at 09:39

## 2025-04-15 RX ADMIN — GABAPENTIN 300 MG: 300 CAPSULE ORAL at 21:17

## 2025-04-15 RX ADMIN — FUROSEMIDE 40 MG: 40 TABLET ORAL at 18:13

## 2025-04-15 RX ADMIN — INSULIN LISPRO 3 UNITS: 100 INJECTION, SOLUTION INTRAVENOUS; SUBCUTANEOUS at 18:13

## 2025-04-15 RX ADMIN — PANTOPRAZOLE SODIUM 40 MG: 40 TABLET, DELAYED RELEASE ORAL at 05:22

## 2025-04-15 RX ADMIN — POTASSIUM CHLORIDE 40 MEQ: 20 TABLET, EXTENDED RELEASE ORAL at 12:59

## 2025-04-15 RX ADMIN — INSULIN GLARGINE 30 UNITS: 100 INJECTION, SOLUTION SUBCUTANEOUS at 21:18

## 2025-04-15 RX ADMIN — SODIUM CHLORIDE, PRESERVATIVE FREE 10 ML: 5 INJECTION INTRAVENOUS at 09:41

## 2025-04-15 RX ADMIN — INSULIN LISPRO 4 UNITS: 100 INJECTION, SOLUTION INTRAVENOUS; SUBCUTANEOUS at 18:14

## 2025-04-15 RX ADMIN — FERROUS SULFATE TAB 325 MG (65 MG ELEMENTAL FE) 325 MG: 325 (65 FE) TAB at 12:59

## 2025-04-15 RX ADMIN — INSULIN LISPRO 4 UNITS: 100 INJECTION, SOLUTION INTRAVENOUS; SUBCUTANEOUS at 21:18

## 2025-04-15 RX ADMIN — POTASSIUM CHLORIDE 40 MEQ: 20 TABLET, EXTENDED RELEASE ORAL at 09:39

## 2025-04-15 RX ADMIN — ASPIRIN 81 MG: 81 TABLET, COATED ORAL at 09:40

## 2025-04-15 RX ADMIN — Medication 1000 MCG: at 09:39

## 2025-04-15 RX ADMIN — CALCIUM 500 MG: 500 TABLET ORAL at 09:39

## 2025-04-15 RX ADMIN — DOXAZOSIN 4 MG: 4 TABLET ORAL at 09:39

## 2025-04-15 RX ADMIN — SODIUM CHLORIDE, PRESERVATIVE FREE 10 ML: 5 INJECTION INTRAVENOUS at 21:18

## 2025-04-15 RX ADMIN — GABAPENTIN 300 MG: 300 CAPSULE ORAL at 09:39

## 2025-04-15 RX ADMIN — AMLODIPINE BESYLATE 5 MG: 5 TABLET ORAL at 09:39

## 2025-04-15 RX ADMIN — CALCIUM 500 MG: 500 TABLET ORAL at 21:17

## 2025-04-15 RX ADMIN — INSULIN LISPRO 4 UNITS: 100 INJECTION, SOLUTION INTRAVENOUS; SUBCUTANEOUS at 12:59

## 2025-04-15 NOTE — CARE COORDINATION
4/15/25, 12:05 PM EDT    DISCHARGE PLANNING EVALUATION    Received message from Olinda at [facility], patient is on expensive medication.     Spoke with Olinda at [facility], patient Humira is very expensive, asking if it can be on hold while in Rehab.    Messaged Hospitalist Jorge A Roa PA-C, he said Humira can be on hold whole in Rehab.    Spoke with Olinda at [facility], advised of above, she will start precert today 4/15.

## 2025-04-15 NOTE — DISCHARGE INSTR - COC
Continuity of Care Form    Patient Name: Brandon Jacobs   :  1955  MRN:  [id number]    Admit date:  4/10/2025  Discharge date:  2025    Code Status Order: Full Code   Advance Directives:     Admitting Physician:  Ashita Behl, MD  PCP: Rajeev Brennan, APRN - CNP    Discharging Nurse: [facility] Unit[facility] 8A-10/010-A  Discharging Unit Phone Number: [phone]    Emergency Contact:   Extended Emergency Contact Information  Primary Emergency Contact: ArleneZoya douglas  Address: [address] [place] [id number] [facility]  Home Phone: [phone]  Mobile Phone: [phone]  Relation: Spouse  Secondary Emergency Contact: Carmelina Rosales   [facility]  Home Phone: [phone]  Relation: Child    Past Surgical History:  Past Surgical History:   Procedure Laterality Date    APPENDECTOMY      CARDIAC SURGERY      heart cath    COLECTOMY      D/T DIVERTICULOSIS    COLONOSCOPY      COLONOSCOPY  2023    COLONOSCOPY POLYPECTOMY SNARE/COLD BIOPSY performed by Manuela Middleton MD at [facility]    DIAGNOSTIC CARDIAC CATH LAB PROCEDURE  2010    EF 60% C MILD DISEASE IN THE PROXIMAL  PORTION BEFORE THE BIFURCATION OF D1,MILD DISEASE IS NOTED IN THE RCA , DIFFUSE AT 10-20% RCA ALSO HAS 10-20% LESIONS    DILATATION, ESOPHAGUS      TRANSTHORACIC ECHOCARDIOGRAM  10/22/2010    EF 55-65% C MILD LFT ATRIAL DILATATION, GRADE 1 DIASOLIC DYSFUNCTION    UPPER GASTROINTESTINAL ENDOSCOPY Left 2023    EGD BIOPSY performed by Manuela Middleton MD at [facility]       Immunization History:   Immunization History   Administered Date(s) Administered    Influenza Virus Vaccine 2016, 10/23/2017, 10/30/2018    Influenza, AFLURIA (age 3 y+), FLUZONE, (age 6 mo+), Quadv MDV, 0.5mL 10/22/2018    Influenza, FLUAD, (age 65 y+), IM, Trivalent PF, 0.5mL 2020, 09/10/2020, 2021, 2022    Influenza, FLUARIX, FLULAVAL, FLUZONE (age 
16-Oct-2018

## 2025-04-15 NOTE — PROGRESS NOTES
Hospitalist Progress Note    Patient:  Brandon Jacobs      Unit/Bed:8A-10/010-A    YOB: 1955    MRN: [id number]       Acct: [id number]     PCP: Rajeev Brennan APRN - CNP    Date of Admission: 4/10/2025    Assessment/Plan:    Hypokalemia: 2/2 diuretics and GI losses  Nephrology consulted and following  Receiving ample replacement, low again this AM at 2.4  4/12/25: Patient's potassium improved to 3.3 today.  Creatinine 2.3.  Nephrology following, continue to hold diuretics.  Continue to trend.  Continue replacement per nephrology recommendations.  4/13/25: Patient's potassium level 3.8 today, continue to monitor.  4/15/25: Potassium WNL at 3.7 today, patient is medically stable for d/c    CKD stage stage IV:  Creatinine 2.7, around baseline- improved to 2.5 4/11/25, 2.3 4/15/25  Trend BMP PRN    Metabolic alkalosis:  Likely secondary to diuretic use.  Trend PRN    Insulin-dependent type 2 diabetes mellitus:  Continue basal insulin and sliding scale insulin, insulin adjusted yesterday  Latest POCT glucose 344, unfortunately patient was not on carb controlled diet.  This was changed.  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 Therapy evals placed yesterday but they likely will not get to a new consult over the weekend.  XR right knee ordered  4/14/25: Case discussed with physical therapist, patient would not stand for any significant length of time for her.  Patient plans to go home, however do not see how this will be possible patient will not stand up and take care of herself.  Nursing reports that this is behavioral, she informed that the patient

## 2025-04-15 NOTE — PROGRESS NOTES
[facility]  STRZ MED SURG 8AB  Occupational Therapy  Daily Note    Discharge Recommendations: Subacute/skilled nursing facility  Equipment Recommendations: No         Time In: 0750  Time Out: 0813  Timed Code Treatment Minutes: 23 Minutes  Minutes: 23          Date: 4/15/2025  Patient Name: Brandon Jacobs,   Gender: male      Room: 8A-10/010-A  MRN: [id number]  : 1955  (69 y.o.)  Referring Practitioner: Kishore Roa PA-C  Diagnosis: Hypokalemia  Additional Pertinent Hx: Pt with a history of CKD stage IV, Crohn's disease, insulin-dependent type 2 diabetes mellitus, hypertension, hyperlipidemia, gout, normocytic anemia, and HFpEF who presented to [facility] with chief complaint of abnormal labs.  The patient was sent in by his nephrologist for hypokalemia.  The patient is on Lasix 80 mg twice daily and metolazone 2.5 mg twice weekly.  Additionally, he is taking approximately 60 mEq of potassium daily in addition to an extra 20 mEq on the days on which he takes his metolazone.  However, his potassium was noted to be 2.7 and he was sent to the hospital.  His potassium the emergency department was 2.4.  The patient endorses chronic diarrhea which has not changed significantly.  Pt reports not having leg swelling for about 1 year.    Restrictions/Precautions:  Restrictions/Precautions: Fall Risk  Position Activity Restriction  Other Position/Activity Restrictions: Pt reported not wearing any knee brace      Social/Functional History:  Lives With: Spouse, Other (Comment) (and pt's son)  Type of Home: House  Home Layout: One level  Home Access: Stairs to enter with rails  Entrance Stairs - Number of Steps: 2 steps with 1 rail  Home Equipment: Walker - Rolling, Lift chair   Bathroom Shower/Tub: Walk-in shower, Shower chair with back  Bathroom Toilet: Handicap height  Bathroom Equipment: Grab bars in shower  Bathroom Accessibility: Accessible    Receives Help From: Family  Prior Level

## 2025-04-15 NOTE — PROGRESS NOTES
[facility]   Nephrology progress note  4/15/2025, 8:39 AM      Pt Name:    Brandon Jacobs  MRN:     [id number]     YOB: 1955  Admit Date:    4/10/2025  9:33 AM    Chief Complaint: Nephrology following for hypokalemia and diuretic management .    Subjective:  Patient seen and examined this morning.   No overnight events. Awaiting precert.   Reports good urine output.    Objective:  24HR INTAKE/OUTPUT:    Intake/Output Summary (Last 24 hours) at 4/15/2025 0839  Last data filed at 4/15/2025 0521  Gross per 24 hour   Intake 720 ml   Output 1200 ml   Net -480 ml      Admission weight: 90.3 kg (199 lb)  Wt Readings from Last 3 Encounters:   04/15/25 91.2 kg (201 lb 1.6 oz)   01/24/25 93 kg (205 lb)   01/17/25 93.2 kg (205 lb 8 oz)        Vitals :   Vitals:    04/14/25 1532 04/14/25 2015 04/15/25 0521 04/15/25 0523   BP: 133/65 103/60 117/62    Pulse: 71 78 69    Resp: 18 20 18    Temp: 98 °F (36.7 °C) 98.1 °F (36.7 °C) 97.5 °F (36.4 °C)    TempSrc: Oral Oral Oral    SpO2: 95% 95% 91%    Weight:    91.2 kg (201 lb 1.6 oz)   Height:           Physical examination  General Appearance:  Well developed. No distress  Mouth/Throat:  Oral mucosa moist  Neck:  Supple, no JVD  Lungs: diminished, scattered rales  Heart::  S1,S2 heard  Abdomen:  Soft, non - tender  Musculoskeletal:  1-2+ edema    Medications:  Infusion:    sodium chloride Stopped (04/11/25 1431)    dextrose       Meds:    furosemide  40 mg Oral BID    insulin lispro  0-16 Units SubCUTAneous 4x Daily AC & HS    insulin lispro  3 Units SubCUTAneous TID WC    potassium chloride  40 mEq Oral TID WC    sodium chloride flush  5-40 mL IntraVENous 2 times per day    enoxaparin  30 mg SubCUTAneous Daily    allopurinol  100 mg Oral Daily    amLODIPine  5 mg Oral Daily    aspirin  81 mg Oral Daily    calcium elemental  500 mg Oral BID    carvedilol  25 mg Oral BID    cyanocobalamin  1,000 mcg Oral Daily    ferrous sulfate  325 mg Oral Daily

## 2025-04-15 NOTE — PLAN OF CARE
Problem: Chronic Conditions and Co-morbidities  Goal: Patient's chronic conditions and co-morbidity symptoms are monitored and maintained or improved  Outcome: Progressing  Flowsheets (Taken 4/14/2025 2015)  Care Plan - Patient's Chronic Conditions and Co-Morbidity Symptoms are Monitored and Maintained or Improved: Monitor and assess patient's chronic conditions and comorbid symptoms for stability, deterioration, or improvement     Problem: Discharge Planning  Goal: Discharge to home or other facility with appropriate resources  4/14/2025 2116 by Cassie Lott RN  Outcome: Progressing  Flowsheets (Taken 4/14/2025 2015)  Discharge to home or other facility with appropriate resources: Identify barriers to discharge with patient and caregiver     Problem: Skin/Tissue Integrity  Goal: Skin integrity remains intact  Description: 1.  Monitor for areas of redness and/or skin breakdown  2.  Assess vascular access sites hourly  3.  Every 4-6 hours minimum:  Change oxygen saturation probe site  4.  Every 4-6 hours:  If on nasal continuous positive airway pressure, respiratory therapy assess nares and determine need for appliance change or resting period  Outcome: Progressing  Flowsheets (Taken 4/14/2025 2015)  Skin Integrity Remains Intact: Monitor for areas of redness and/or skin breakdown     Problem: Safety - Adult  Goal: Free from fall injury  Outcome: Progressing     Problem: Cardiovascular - Adult  Goal: Maintains optimal cardiac output and hemodynamic stability  Outcome: Progressing  Flowsheets (Taken 4/14/2025 2015)  Maintains optimal cardiac output and hemodynamic stability: Monitor blood pressure and heart rate     Problem: Cardiovascular - Adult  Goal: Absence of cardiac dysrhythmias or at baseline  Outcome: Progressing  Flowsheets (Taken 4/14/2025 2015)  Absence of cardiac dysrhythmias or at baseline: Monitor cardiac rate and rhythm     Problem: Musculoskeletal - Adult  Goal: Return mobility to safest level of

## 2025-04-16 PROBLEM — Z86.39 HX OF INSULIN DEPENDENT DIABETES MELLITUS: Status: ACTIVE | Noted: 2025-04-16

## 2025-04-16 PROBLEM — M25.561 RIGHT KNEE PAIN: Status: ACTIVE | Noted: 2025-04-16

## 2025-04-16 LAB
ANION GAP SERPL CALC-SCNC: 16 MEQ/L (ref 8–16)
BUN SERPL-MCNC: 47 MG/DL (ref 8–23)
CALCIUM SERPL-MCNC: 9 MG/DL (ref 8.8–10.2)
CHARACTER SNV: ABNORMAL
CHLORIDE SERPL-SCNC: 97 MEQ/L (ref 98–111)
CO2 SERPL-SCNC: 29 MEQ/L (ref 22–29)
COLOR SNV: YELLOW
CREAT SERPL-MCNC: 2.2 MG/DL (ref 0.7–1.2)
CRYSTALS FLD MICRO: ABNORMAL
GFR SERPL CREATININE-BSD FRML MDRD: 31 ML/MIN/1.73M2
GLUCOSE BLD STRIP.AUTO-MCNC: 136 MG/DL (ref 70–108)
GLUCOSE BLD STRIP.AUTO-MCNC: 170 MG/DL (ref 70–108)
GLUCOSE BLD STRIP.AUTO-MCNC: 173 MG/DL (ref 70–108)
GLUCOSE BLD STRIP.AUTO-MCNC: 258 MG/DL (ref 70–108)
GLUCOSE SERPL-MCNC: 115 MG/DL (ref 74–109)
GRANULOCYTES NFR FLD AUTO: 65.8 % (ref 0–24)
MAGNESIUM SERPL-MCNC: 2.3 MG/DL (ref 1.6–2.6)
MONONUC CELLS NFR FLD AUTO: 34.2 % (ref 0–74)
NUC CELL # FLD AUTO: 1271 /CUMM (ref 0–150)
PATHOLOGIST REVIEW: ABNORMAL
POTASSIUM SERPL-SCNC: 3.3 MEQ/L (ref 3.5–5.2)
POTASSIUM SERPL-SCNC: 3.9 MEQ/L (ref 3.5–5.2)
SODIUM SERPL-SCNC: 142 MEQ/L (ref 135–145)
TOTAL VOLUME RECEIVED SYNOVIAL: 10 ML

## 2025-04-16 PROCEDURE — 99232 SBSQ HOSP IP/OBS MODERATE 35: CPT | Performed by: INTERNAL MEDICINE

## 2025-04-16 PROCEDURE — 82948 REAGENT STRIP/BLOOD GLUCOSE: CPT

## 2025-04-16 PROCEDURE — 97110 THERAPEUTIC EXERCISES: CPT

## 2025-04-16 PROCEDURE — 87205 SMEAR GRAM STAIN: CPT

## 2025-04-16 PROCEDURE — 83735 ASSAY OF MAGNESIUM: CPT

## 2025-04-16 PROCEDURE — 6370000000 HC RX 637 (ALT 250 FOR IP): Performed by: PHYSICIAN ASSISTANT

## 2025-04-16 PROCEDURE — 99232 SBSQ HOSP IP/OBS MODERATE 35: CPT | Performed by: NURSE PRACTITIONER

## 2025-04-16 PROCEDURE — 97530 THERAPEUTIC ACTIVITIES: CPT

## 2025-04-16 PROCEDURE — 84132 ASSAY OF SERUM POTASSIUM: CPT

## 2025-04-16 PROCEDURE — 89050 BODY FLUID CELL COUNT: CPT

## 2025-04-16 PROCEDURE — 1200000003 HC TELEMETRY R&B

## 2025-04-16 PROCEDURE — 80048 BASIC METABOLIC PNL TOTAL CA: CPT

## 2025-04-16 PROCEDURE — 87070 CULTURE OTHR SPECIMN AEROBIC: CPT

## 2025-04-16 PROCEDURE — 89060 EXAM SYNOVIAL FLUID CRYSTALS: CPT

## 2025-04-16 PROCEDURE — 36415 COLL VENOUS BLD VENIPUNCTURE: CPT

## 2025-04-16 PROCEDURE — 6360000002 HC RX W HCPCS: Performed by: PHYSICIAN ASSISTANT

## 2025-04-16 PROCEDURE — 87075 CULTR BACTERIA EXCEPT BLOOD: CPT

## 2025-04-16 PROCEDURE — 6370000000 HC RX 637 (ALT 250 FOR IP): Performed by: INTERNAL MEDICINE

## 2025-04-16 PROCEDURE — 2500000003 HC RX 250 WO HCPCS: Performed by: PHYSICIAN ASSISTANT

## 2025-04-16 RX ORDER — POTASSIUM CHLORIDE 1500 MG/1
60 TABLET, EXTENDED RELEASE ORAL
Status: DISCONTINUED | OUTPATIENT
Start: 2025-04-16 | End: 2025-04-17 | Stop reason: HOSPADM

## 2025-04-16 RX ADMIN — INSULIN LISPRO 3 UNITS: 100 INJECTION, SOLUTION INTRAVENOUS; SUBCUTANEOUS at 17:24

## 2025-04-16 RX ADMIN — ASPIRIN 81 MG: 81 TABLET, COATED ORAL at 08:46

## 2025-04-16 RX ADMIN — CALCIUM 500 MG: 500 TABLET ORAL at 21:16

## 2025-04-16 RX ADMIN — AMLODIPINE BESYLATE 5 MG: 5 TABLET ORAL at 08:46

## 2025-04-16 RX ADMIN — SODIUM CHLORIDE, PRESERVATIVE FREE 10 ML: 5 INJECTION INTRAVENOUS at 21:18

## 2025-04-16 RX ADMIN — POTASSIUM CHLORIDE 60 MEQ: 1500 TABLET, EXTENDED RELEASE ORAL at 17:24

## 2025-04-16 RX ADMIN — PANTOPRAZOLE SODIUM 40 MG: 40 TABLET, DELAYED RELEASE ORAL at 05:30

## 2025-04-16 RX ADMIN — SODIUM CHLORIDE, PRESERVATIVE FREE 10 ML: 5 INJECTION INTRAVENOUS at 09:25

## 2025-04-16 RX ADMIN — DOXAZOSIN 4 MG: 4 TABLET ORAL at 08:45

## 2025-04-16 RX ADMIN — ALLOPURINOL 100 MG: 100 TABLET ORAL at 08:45

## 2025-04-16 RX ADMIN — INSULIN GLARGINE 30 UNITS: 100 INJECTION, SOLUTION SUBCUTANEOUS at 21:17

## 2025-04-16 RX ADMIN — GABAPENTIN 300 MG: 300 CAPSULE ORAL at 08:46

## 2025-04-16 RX ADMIN — Medication 1000 MCG: at 08:45

## 2025-04-16 RX ADMIN — POTASSIUM CHLORIDE 60 MEQ: 1500 TABLET, EXTENDED RELEASE ORAL at 12:50

## 2025-04-16 RX ADMIN — FUROSEMIDE 40 MG: 40 TABLET ORAL at 17:25

## 2025-04-16 RX ADMIN — INSULIN LISPRO 3 UNITS: 100 INJECTION, SOLUTION INTRAVENOUS; SUBCUTANEOUS at 12:50

## 2025-04-16 RX ADMIN — ENOXAPARIN SODIUM 30 MG: 100 INJECTION SUBCUTANEOUS at 08:46

## 2025-04-16 RX ADMIN — GABAPENTIN 300 MG: 300 CAPSULE ORAL at 21:16

## 2025-04-16 RX ADMIN — CALCIUM 500 MG: 500 TABLET ORAL at 08:45

## 2025-04-16 RX ADMIN — FUROSEMIDE 40 MG: 40 TABLET ORAL at 08:46

## 2025-04-16 RX ADMIN — POTASSIUM CHLORIDE 60 MEQ: 1500 TABLET, EXTENDED RELEASE ORAL at 08:45

## 2025-04-16 RX ADMIN — INSULIN LISPRO 8 UNITS: 100 INJECTION, SOLUTION INTRAVENOUS; SUBCUTANEOUS at 21:16

## 2025-04-16 RX ADMIN — Medication 400 MG: at 08:45

## 2025-04-16 RX ADMIN — INSULIN LISPRO 3 UNITS: 100 INJECTION, SOLUTION INTRAVENOUS; SUBCUTANEOUS at 09:00

## 2025-04-16 RX ADMIN — FERROUS SULFATE TAB 325 MG (65 MG ELEMENTAL FE) 325 MG: 325 (65 FE) TAB at 12:50

## 2025-04-16 RX ADMIN — CARVEDILOL 25 MG: 25 TABLET, FILM COATED ORAL at 08:45

## 2025-04-16 NOTE — CARE COORDINATION
4/16/25, 1:18 PM EDT    DISCHARGE PLANNING EVALUATION    Olinda from [facility] left message, precert approved.    1:46 PM  Left message for Olinda from [facility], patient not ready today potassium is down.  Hopeful for discharge tomorrow 4/17.

## 2025-04-16 NOTE — PLAN OF CARE
Problem: Chronic Conditions and Co-morbidities  Goal: Patient's chronic conditions and co-morbidity symptoms are monitored and maintained or improved  Outcome: Progressing     Problem: Discharge Planning  Goal: Discharge to home or other facility with appropriate resources  Outcome: Progressing     Problem: Skin/Tissue Integrity  Goal: Skin integrity remains intact  Description: 1.  Monitor for areas of redness and/or skin breakdown  2.  Assess vascular access sites hourly  3.  Every 4-6 hours minimum:  Change oxygen saturation probe site  4.  Every 4-6 hours:  If on nasal continuous positive airway pressure, respiratory therapy assess nares and determine need for appliance change or resting period  Outcome: Progressing     Problem: Safety - Adult  Goal: Free from fall injury  Outcome: Progressing     Problem: Cardiovascular - Adult  Goal: Maintains optimal cardiac output and hemodynamic stability  Outcome: Progressing  Goal: Absence of cardiac dysrhythmias or at baseline  Outcome: Progressing     Problem: Cardiovascular - Adult  Goal: Absence of cardiac dysrhythmias or at baseline  Outcome: Progressing     Problem: Musculoskeletal - Adult  Goal: Return mobility to safest level of function  Outcome: Progressing  Goal: Maintain proper alignment of affected body part  Outcome: Progressing     Problem: Musculoskeletal - Adult  Goal: Maintain proper alignment of affected body part  Outcome: Progressing     Problem: Pain  Goal: Verbalizes/displays adequate comfort level or baseline comfort level  Outcome: Progressing     Problem: Respiratory - Adult  Goal: Achieves optimal ventilation and oxygenation  Outcome: Progressing     Problem: Metabolic/Fluid and Electrolytes - Adult  Goal: Glucose maintained within prescribed range  Outcome: Progressing     Problem: Metabolic/Fluid and Electrolytes - Adult  Goal: Hemodynamic stability and optimal renal function maintained  Outcome: Progressing     Problem: Pain  Goal:

## 2025-04-16 NOTE — PROGRESS NOTES
Hospitalist Progress Note    Patient:  Brandon Jacobs      Unit/Bed:8A-10/010-A    YOB: 1955    MRN: [id number]       Acct: [id number]     PCP: Rajeev Brennan APRN - CNP    Date of Admission: 4/10/2025    Assessment/Plan:      1.  Hypokalemia secondary to diuretics and GI losses.   Potassium level today 3.3  Nephrology managing med appreciate input replete     2.  Chronic kidney disease stage IV  Creatinine 2.2  Avoid nephrotoxins  Renally dose medications  Monitor BMP    3.  Insulin dependent diabetes mellitus  Insulin sliding scale  Monitor glucose ACHS  Carb diet controlled  Hypoglycemia protocol    4.  Right knee pain: Patient with Ace wrap in place  Right  Knee Xray with marked degenerative changes with bone-on-bone contact secondary to chronic arthritis.  Patient also with large right knee joint effusion  Patient was evaluated by Ortho had knee aspiration done  Fluid culture showed few segmented neutrophils no bacteria seen  Ortho recommended a compressive dressing, pain control  PT/OT    5.  Chron's disease  Patient on Humira  Follows with GI outpatient    6.HFpEF not in exacerbation  Echo on 10/9/2023 with EF of 60 to 65%  On Lasix 80 mg twice daily and metolazone 2.5 mg twice weekly  Continue strict I's and O, daily weight, fluid restriction       Discharge in : Possible tomorrow    Active Hospital Problems    Diagnosis Date Noted    Hypokalemia [E87.6] 04/10/2025    Stage 4 chronic kidney disease (HCC) [N18.4] 04/10/2025             Chief Complaint: Abnormal labs     Hospital Course: Initial history of Present Illness:  Brandon Jacobs is a 69 y.o. male with a history of CKD stage IV, Crohn's disease, insulin-dependent type 2 diabetes mellitus, hypertension, hyperlipidemia, gout, normocytic anemia, and HFpEF who presented to [facility] with chief complaint of abnormal labs.  The patient was sent in by his nephrologist for hypokalemia.  The patient is on Lasix 80

## 2025-04-16 NOTE — PROGRESS NOTES
Occupational Therapy  [facility]  STRZ MED SURG 8AB  Occupational Therapy  Daily Note    Discharge Recommendations: Subacute/skilled nursing facility  Equipment Recommendations: No      Time In: 1337  Time Out: 1348  Timed Code Treatment Minutes: 11 Minutes  Minutes: 11        Date: 2025  Patient Name: Brandon Jacobs,   Gender: male      Room: 8A-10/010-A  MRN: [id number]  : 1955  (69 y.o.)  Referring Practitioner: Kishore Roa PA-C  Diagnosis: Hypokalemia  Additional Pertinent Hx: Pt with a history of CKD stage IV, Crohn's disease, insulin-dependent type 2 diabetes mellitus, hypertension, hyperlipidemia, gout, normocytic anemia, and HFpEF who presented to [facility] with chief complaint of abnormal labs.  The patient was sent in by his nephrologist for hypokalemia.  The patient is on Lasix 80 mg twice daily and metolazone 2.5 mg twice weekly.  Additionally, he is taking approximately 60 mEq of potassium daily in addition to an extra 20 mEq on the days on which he takes his metolazone.  However, his potassium was noted to be 2.7 and he was sent to the hospital.  His potassium the emergency department was 2.4.  The patient endorses chronic diarrhea which has not changed significantly.  Pt reports not having leg swelling for about 1 year.    Restrictions/Precautions:  Restrictions/Precautions: Fall Risk  Position Activity Restriction  Other Position/Activity Restrictions: Pt reported not wearing any knee brace     Social/Functional History:  Lives With: Spouse, Other (Comment) (and pt's son)  Type of Home: House  Home Layout: One level  Home Access: Stairs to enter with rails  Entrance Stairs - Number of Steps: 2 steps with 1 rail  Home Equipment: Walker - Rolling, Lift chair   Bathroom Shower/Tub: Walk-in shower, Shower chair with back  Bathroom Toilet: Handicap height  Bathroom Equipment: Grab bars in shower  Bathroom Accessibility: Accessible    Receives Help From:

## 2025-04-16 NOTE — PROCEDURES
PROCEDURE NOTE  Date: 4/16/2025   Name: Brandon Jacobs  YOB: 1955    Arthrocentesis Procedure Note     Indication: Joint pain, joint swelling, and to obtain joint fluid for diagnostic testing     Consent: The patient was counseled regarding the procedure, it's indications, risks, potential complications and alternatives and any questions were answered. Verbal consent was obtained.     Procedure: The right knee was positioned appropriately and the landmarks were identified.  Local anesthesia was not utilized. The area was then prepped and draped in the usual sterile fashion.  An 18 gauge needle was then introduced into the superolateral joint space at which point 20 cc of clear and straw colored fluid was aspirated.  A joint injection was not performed.  The aspirated fluid was sent to the lab for cell count, differential, gram stain, culture and sensitivity, and crystals.  A sterile dressing was then applied to the site.     The patient tolerated the procedure well.     Complications: None       Sent for cell count differential crystal culture  WBAT RLE  Compressive dressing    Beata Meyer PA-C

## 2025-04-16 NOTE — CARE COORDINATION
4/16/25, 1:58 PM EDT    DISCHARGE ON GOING EVALUATION    Brandon ROSALES [facility] day: 4  Location: 8A-10/010-A Reason for admit: Hypokalemia [E87.6]  Stage 4 chronic kidney disease (HCC) [N18.4]     Procedures: none    Imaging since last note:  no new    Barriers to Discharge: Hospitalist and Nephrology following. Continue with therapy. Electrolyte replacement. Repeat BMP ordered for tomorrow.    04/15/25 05:47 04/16/25 06:01   Potassium 3.7 3.3 (L)       PCP: Rajeev Brennan, FELI - CNP  Readmission Risk Score: 19.1    Patient Goals/Plan/Treatment Preferences: From home with his wife and children. Planning rehab stay at [facility].

## 2025-04-16 NOTE — PROGRESS NOTES
[facility]   Nephrology progress note  4/16/2025, 12:34 PM      Pt Name:    Brandon Jacobs  MRN:     [id number]     YOB: 1955  Admit Date:    4/10/2025  9:33 AM    Chief Complaint: Nephrology following for hypokalemia and diuretic management .    Subjective:  Patient seen and examined this morning.   He had right knee aspiration this morning.    Objective:  24HR INTAKE/OUTPUT:    Intake/Output Summary (Last 24 hours) at 4/16/2025 1234  Last data filed at 4/16/2025 0849  Gross per 24 hour   Intake 600 ml   Output 3050 ml   Net -2450 ml      Admission weight: 90.3 kg (199 lb)  Wt Readings from Last 3 Encounters:   04/15/25 91.2 kg (201 lb 1.6 oz)   01/24/25 93 kg (205 lb)   01/17/25 93.2 kg (205 lb 8 oz)        Vitals :   Vitals:    04/15/25 2100 04/16/25 0400 04/16/25 0800 04/16/25 1145   BP: 131/70 115/65 (!) 110/59 (!) 144/79   Pulse: 68 61 65 69   Resp: 18 18 18 18   Temp: 98.1 °F (36.7 °C) 97.5 °F (36.4 °C) 97.5 °F (36.4 °C) 98.6 °F (37 °C)   TempSrc: Oral Oral Oral Oral   SpO2: 94% 98% 99%    Weight:       Height:           Physical examination  General Appearance:  Well developed. No distress  Mouth/Throat:  Oral mucosa moist  Neck:  Supple, no JVD  Lungs: diminished, no rales  Heart::  S1,S2 heard  Abdomen:  Soft, non - tender  Musculoskeletal:  1-2+ edema    Medications:  Infusion:    sodium chloride Stopped (04/11/25 1431)    dextrose       Meds:    potassium chloride  60 mEq Oral TID WC    furosemide  40 mg Oral BID    insulin lispro  0-16 Units SubCUTAneous 4x Daily AC & HS    insulin lispro  3 Units SubCUTAneous TID WC    sodium chloride flush  5-40 mL IntraVENous 2 times per day    enoxaparin  30 mg SubCUTAneous Daily    allopurinol  100 mg Oral Daily    amLODIPine  5 mg Oral Daily    aspirin  81 mg Oral Daily    calcium elemental  500 mg Oral BID    carvedilol  25 mg Oral BID    cyanocobalamin  1,000 mcg Oral Daily    ferrous sulfate  325 mg Oral Daily    gabapentin

## 2025-04-16 NOTE — PROGRESS NOTES
[facility]  INPATIENT PHYSICAL THERAPY  DAILY NOTE  STRZ MED SURG 8AB - 8A-10/010-A      Discharge Recommendations: Subacute/Skilled Nursing Facility and Patient would benefit from continued PT at discharge  Equipment Recommendations: No  defer to SNF            Time In: 1148  Time Out: 1216  Timed Code Treatment Minutes: 28 Minutes  Minutes: 28          Date: 2025  Patient Name: Brandon Jacobs,  Gender:  male        MRN: [id number]  : 1955  (69 y.o.)        Diagnosis: Hypokalemia  Additional Pertinent Hx: 69 y.o. male admitted to the hospitalist service for hypokalemia. Patient denies chest pain. He denies nausea or vomiting.  Patient reports right knee pain and difficulty ambulating secondary to pain in his right knee when up.  PT/OT to evaluate.  Nursing communication order placed to wrap the right knee and ACE wrap.     Prior Level of Function:  Lives With: Spouse, Other (Comment) (and pt's son)  Type of Home: House  Home Layout: One level  Home Access: Stairs to enter with rails  Entrance Stairs - Number of Steps: 2 steps with 1 rail  Home Equipment: Walker - Rolling, Lift chair   Bathroom Shower/Tub: Walk-in shower, Shower chair with back  Bathroom Toilet: Handicap height  Bathroom Equipment: Grab bars in shower  Bathroom Accessibility: Accessible    Receives Help From: Family  Prior Level of Assist for ADLs: Needs assistance  Prior Level of Assist for Homemaking: Needs assistance  Homemaking Responsibilities: No  Prior Level of Assist for Transfers: Needs assistance  Active : No  Additional Comments: Pt was using a rolling walker.  He had a lift chair that he was sleeping in.  He had been taking spongebaths.  Pt is incontinent of stool and wears Depends, per pt.  Pt only left the home for appointments.  Prior Level of Assist for Ambulation: Independent household ambulator, with or without device  Has the patient had two or more falls in the past year or any fall with injury

## 2025-04-16 NOTE — PLAN OF CARE
Problem: Chronic Conditions and Co-morbidities  Goal: Patient's chronic conditions and co-morbidity symptoms are monitored and maintained or improved  Outcome: Progressing     Problem: Discharge Planning  Goal: Discharge to home or other facility with appropriate resources  Outcome: Progressing     Problem: Skin/Tissue Integrity  Goal: Skin integrity remains intact  Description: 1.  Monitor for areas of redness and/or skin breakdown  2.  Assess vascular access sites hourly  3.  Every 4-6 hours minimum:  Change oxygen saturation probe site  4.  Every 4-6 hours:  If on nasal continuous positive airway pressure, respiratory therapy assess nares and determine need for appliance change or resting period  Outcome: Progressing     Problem: Safety - Adult  Goal: Free from fall injury  Outcome: Progressing     Problem: Cardiovascular - Adult  Goal: Maintains optimal cardiac output and hemodynamic stability  Outcome: Progressing  Goal: Absence of cardiac dysrhythmias or at baseline  Outcome: Progressing      Other Specify

## 2025-04-17 VITALS
WEIGHT: 201.1 LBS | TEMPERATURE: 97.9 F | HEIGHT: 63 IN | HEART RATE: 72 BPM | SYSTOLIC BLOOD PRESSURE: 112 MMHG | RESPIRATION RATE: 17 BRPM | DIASTOLIC BLOOD PRESSURE: 69 MMHG | BODY MASS INDEX: 35.63 KG/M2 | OXYGEN SATURATION: 93 %

## 2025-04-17 LAB
ANION GAP SERPL CALC-SCNC: 15 MEQ/L (ref 8–16)
BUN SERPL-MCNC: 50 MG/DL (ref 8–23)
CALCIUM SERPL-MCNC: 8.8 MG/DL (ref 8.8–10.2)
CHLORIDE SERPL-SCNC: 98 MEQ/L (ref 98–111)
CO2 SERPL-SCNC: 28 MEQ/L (ref 22–29)
CREAT SERPL-MCNC: 2.3 MG/DL (ref 0.7–1.2)
GFR SERPL CREATININE-BSD FRML MDRD: 30 ML/MIN/1.73M2
GLUCOSE BLD STRIP.AUTO-MCNC: 113 MG/DL (ref 70–108)
GLUCOSE SERPL-MCNC: 104 MG/DL (ref 74–109)
POTASSIUM SERPL-SCNC: 3.7 MEQ/L (ref 3.5–5.2)
SODIUM SERPL-SCNC: 141 MEQ/L (ref 135–145)

## 2025-04-17 PROCEDURE — 36415 COLL VENOUS BLD VENIPUNCTURE: CPT

## 2025-04-17 PROCEDURE — 80048 BASIC METABOLIC PNL TOTAL CA: CPT

## 2025-04-17 PROCEDURE — 82948 REAGENT STRIP/BLOOD GLUCOSE: CPT

## 2025-04-17 PROCEDURE — 97530 THERAPEUTIC ACTIVITIES: CPT

## 2025-04-17 PROCEDURE — 97535 SELF CARE MNGMENT TRAINING: CPT

## 2025-04-17 PROCEDURE — 99239 HOSP IP/OBS DSCHRG MGMT >30: CPT | Performed by: NURSE PRACTITIONER

## 2025-04-17 PROCEDURE — 6370000000 HC RX 637 (ALT 250 FOR IP): Performed by: INTERNAL MEDICINE

## 2025-04-17 PROCEDURE — 99232 SBSQ HOSP IP/OBS MODERATE 35: CPT | Performed by: INTERNAL MEDICINE

## 2025-04-17 PROCEDURE — 6370000000 HC RX 637 (ALT 250 FOR IP): Performed by: PHYSICIAN ASSISTANT

## 2025-04-17 RX ORDER — POTASSIUM CHLORIDE 1500 MG/1
60 TABLET, EXTENDED RELEASE ORAL
Qty: 60 TABLET | Refills: 3 | Status: SHIPPED | OUTPATIENT
Start: 2025-04-17

## 2025-04-17 RX ORDER — METOLAZONE 2.5 MG/1
2.5 TABLET ORAL
Qty: 30 TABLET | Refills: 3 | Status: SHIPPED | OUTPATIENT
Start: 2025-04-17

## 2025-04-17 RX ORDER — FUROSEMIDE 40 MG/1
40 TABLET ORAL 2 TIMES DAILY
Qty: 60 TABLET | Refills: 3 | Status: SHIPPED | OUTPATIENT
Start: 2025-04-17

## 2025-04-17 RX ADMIN — ASPIRIN 81 MG: 81 TABLET, COATED ORAL at 09:45

## 2025-04-17 RX ADMIN — CALCIUM 500 MG: 500 TABLET ORAL at 09:46

## 2025-04-17 RX ADMIN — GABAPENTIN 300 MG: 300 CAPSULE ORAL at 09:46

## 2025-04-17 RX ADMIN — Medication 1000 MCG: at 09:47

## 2025-04-17 RX ADMIN — Medication 400 MG: at 09:46

## 2025-04-17 RX ADMIN — FUROSEMIDE 40 MG: 40 TABLET ORAL at 09:46

## 2025-04-17 RX ADMIN — AMLODIPINE BESYLATE 5 MG: 5 TABLET ORAL at 09:47

## 2025-04-17 RX ADMIN — PANTOPRAZOLE SODIUM 40 MG: 40 TABLET, DELAYED RELEASE ORAL at 05:54

## 2025-04-17 RX ADMIN — POTASSIUM CHLORIDE 60 MEQ: 1500 TABLET, EXTENDED RELEASE ORAL at 09:45

## 2025-04-17 RX ADMIN — DOXAZOSIN 4 MG: 4 TABLET ORAL at 09:47

## 2025-04-17 RX ADMIN — ALLOPURINOL 100 MG: 100 TABLET ORAL at 09:46

## 2025-04-17 RX ADMIN — CARVEDILOL 25 MG: 25 TABLET, FILM COATED ORAL at 09:46

## 2025-04-17 RX ADMIN — METOLAZONE 2.5 MG: 2.5 TABLET ORAL at 09:47

## 2025-04-17 NOTE — CARE COORDINATION
4/17/25, 9:58 AM EDT    Patient goals/plan/ treatment preferences discussed by  and .  Patient goals/plan/ treatment preferences reviewed with patient/ family.  Patient/ family verbalize understanding of discharge plan and are in agreement with goal/plan/treatment preferences.  Understanding was demonstrated using the teach back method.  AVS provided by RN at time of discharge, which includes all necessary medical information pertaining to the patients current course of illness, treatment, post-discharge goals of care, and treatment preferences.     Services At/After Discharge: Skilled Nursing Facility (SNF), Aide services, In ambulance, Nursing service, OT, and PT       Patient discharged to [facility] skilled medicare bed.  Spoke with Olinda at [facility], informed of discharge and 10:00 transport. Faxed AVS and MAR, RN aware to call report.  Spoke with patient and phoned wife, informed of discharge and 10:00 transport.

## 2025-04-17 NOTE — DISCHARGE SUMMARY
Hospital Medicine Discharge Summary      Patient Identification:   Brandon Jacobs   : 1955  MRN: [id number]   Account: [id number]      Patient's PCP: Rajeev Brennan APRN - CNP    Admit Date: 4/10/2025     Discharge Date: 2025      Admitting Physician: Ashita Behl, MD     Discharge Physician: FELI Esquivel CNP     Discharge Diagnoses:      1.  Hypokalemia resolved, was secondary to diuretics and GI losses.   Potassium level today 3.7  Nephrology followed while inpatient.  Follow-up with nephrology outpatient    2.  Chronic kidney disease stage IV  Creatinine 2.3     3.  Insulin dependent diabetes mellitus  Continue home meds    4.  Right knee pain: Patient with Ace wrap in place  Right  Knee Xray with marked degenerative changes with bone-on-bone contact secondary to chronic arthritis.  Patient also with large right knee joint effusion  Patient was evaluated by Ortho had knee aspiration done  Fluid culture showed few segmented neutrophils no bacteria seen  Ortho recommended a compressive dressing, pain control  PT/OT     5.  Chron's disease  Patient on Humira  Follows with GI outpatient     6.HFpEF not in exacerbation  Echo on 10/9/2023 with EF of 60 to 65%  On Lasix 80 mg twice daily and metolazone 2.5 mg twice weekly  Continue strict I's and O, daily weight, fluid restriction     Hospital Course: Initial history of Present Illness:  Brandon Jacobs is a 69 y.o. male with a history of CKD stage IV, Crohn's disease, insulin-dependent type 2 diabetes mellitus, hypertension, hyperlipidemia, gout, normocytic anemia, and HFpEF who presented to [facility] with chief complaint of abnormal labs.  The patient was sent in by his nephrologist for hypokalemia.  The patient is on Lasix 80 mg twice daily and metolazone 2.5 mg twice weekly.  Additionally, he is taking approximately 60 mEq of potassium daily in addition to an extra 20 mEq on the days on which he takes his metolazone.

## 2025-04-17 NOTE — PROGRESS NOTES
[facility]  [facility] MED SURG 8AB  Occupational Therapy  Daily Note    Discharge Recommendations: Subacute/skilled nursing facility  Equipment Recommendations: No         Time In: 0855  Time Out: 0944  Timed Code Treatment Minutes: 49 Minutes  Minutes: 49          Date: 2025  Patient Name: Brandon Jacobs,   Gender: male      Room: 8A-10/010-A  MRN: [id number]  : 1955  (69 y.o.)  Referring Practitioner: Kishore Roa PA-C  Diagnosis: Hypokalemia  Additional Pertinent Hx: Pt with a history of CKD stage IV, Crohn's disease, insulin-dependent type 2 diabetes mellitus, hypertension, hyperlipidemia, gout, normocytic anemia, and HFpEF who presented to [facility] with chief complaint of abnormal labs.  The patient was sent in by his nephrologist for hypokalemia.  The patient is on Lasix 80 mg twice daily and metolazone 2.5 mg twice weekly.  Additionally, he is taking approximately 60 mEq of potassium daily in addition to an extra 20 mEq on the days on which he takes his metolazone.  However, his potassium was noted to be 2.7 and he was sent to the hospital.  His potassium the emergency department was 2.4.  The patient endorses chronic diarrhea which has not changed significantly.  Pt reports not having leg swelling for about 1 year.    Restrictions/Precautions:  Restrictions/Precautions: Fall Risk  Position Activity Restriction  Other Position/Activity Restrictions: Pt reported not wearing any knee brace      Social/Functional History:  Lives With: Spouse, Other (Comment) (and pt's son)  Type of Home: House  Home Layout: One level  Home Access: Stairs to enter with rails  Entrance Stairs - Number of Steps: 2 steps with 1 rail  Home Equipment: Walker - Rolling, Lift chair   Bathroom Shower/Tub: Walk-in shower, Shower chair with back  Bathroom Toilet: Handicap height  Bathroom Equipment: Grab bars in shower  Bathroom Accessibility: Accessible    Receives Help From: Family  Prior Level

## 2025-04-17 NOTE — PLAN OF CARE
Problem: Chronic Conditions and Co-morbidities  Goal: Patient's chronic conditions and co-morbidity symptoms are monitored and maintained or improved  4/17/2025 0323 by Danilo Smith RN  Outcome: Progressing  4/16/2025 1613 by Karthik Hendricks RN  Outcome: Progressing     Problem: Discharge Planning  Goal: Discharge to home or other facility with appropriate resources  4/17/2025 0323 by Danilo Smith RN  Outcome: Progressing  4/16/2025 1613 by Karthik Hendricks RN  Outcome: Progressing     Problem: Skin/Tissue Integrity  Goal: Skin integrity remains intact  Description: 1.  Monitor for areas of redness and/or skin breakdown  2.  Assess vascular access sites hourly  3.  Every 4-6 hours minimum:  Change oxygen saturation probe site  4.  Every 4-6 hours:  If on nasal continuous positive airway pressure, respiratory therapy assess nares and determine need for appliance change or resting period  4/17/2025 0323 by Danilo Smith RN  Outcome: Progressing  4/16/2025 1613 by Karthik Hendricks RN  Outcome: Progressing     Problem: Safety - Adult  Goal: Free from fall injury  4/17/2025 0323 by Danilo Smith RN  Outcome: Progressing  4/16/2025 1613 by Karthik Hendricks RN  Outcome: Progressing     Problem: Cardiovascular - Adult  Goal: Maintains optimal cardiac output and hemodynamic stability  4/17/2025 0323 by Danilo Smith RN  Outcome: Progressing  4/16/2025 1613 by Karthik Hendricks RN  Outcome: Progressing  Goal: Absence of cardiac dysrhythmias or at baseline  4/17/2025 0323 by Danilo Smith RN  Outcome: Progressing  4/16/2025 1613 by Karthik Hendricks RN  Outcome: Progressing     Problem: Cardiovascular - Adult  Goal: Absence of cardiac dysrhythmias or at baseline  4/17/2025 0323 by Danilo Smith RN  Outcome: Progressing  4/16/2025 1613 by Krathik Hendricks RN  Outcome: Progressing     Problem: Musculoskeletal - Adult  Goal: Return mobility to safest level of function  4/17/2025 0323 by Danilo Smith RN  Outcome:

## 2025-04-17 NOTE — PROGRESS NOTES
[facility]   Nephrology progress note  4/17/2025, 9:40 AM      Pt Name:    Brandon Jacobs  MRN:     [id number]     YOB: 1955  Admit Date:    4/10/2025  9:33 AM    Chief Complaint: Nephrology following for hypokalemia and diuretic management .    Subjective:  Patient seen and examined this morning.   He is getting discharged this morning.    Objective:  24HR INTAKE/OUTPUT:    Intake/Output Summary (Last 24 hours) at 4/17/2025 0940  Last data filed at 4/17/2025 0552  Gross per 24 hour   Intake 600 ml   Output 2500 ml   Net -1900 ml      Admission weight: 90.3 kg (199 lb)  Wt Readings from Last 3 Encounters:   04/15/25 91.2 kg (201 lb 1.6 oz)   01/24/25 93 kg (205 lb)   01/17/25 93.2 kg (205 lb 8 oz)        Vitals :   Vitals:    04/16/25 1638 04/16/25 1945 04/17/25 0326 04/17/25 0803   BP: 128/63 101/64 103/60 112/69   Pulse: 84 82 72 72   Resp: 17 16 16 17   Temp: 98.6 °F (37 °C) 97.9 °F (36.6 °C) 98 °F (36.7 °C) 97.9 °F (36.6 °C)   TempSrc: Oral Oral Oral Oral   SpO2:  100% 97% 93%   Weight:       Height:           Physical examination  General Appearance:  Well developed. No distress  Mouth/Throat:  Oral mucosa moist  Neck:  Supple, no JVD  Lungs: diminished, no rales  Heart::  S1,S2 heard  Abdomen:  Soft, non - tender  Musculoskeletal:  left leg swelling better, right leg edema noted with knee brace right leg    Medications:  Infusion:    sodium chloride Stopped (04/11/25 1431)    dextrose       Meds:    potassium chloride  60 mEq Oral TID WC    furosemide  40 mg Oral BID    insulin lispro  0-16 Units SubCUTAneous 4x Daily AC & HS    insulin lispro  3 Units SubCUTAneous TID WC    sodium chloride flush  5-40 mL IntraVENous 2 times per day    enoxaparin  30 mg SubCUTAneous Daily    allopurinol  100 mg Oral Daily    amLODIPine  5 mg Oral Daily    aspirin  81 mg Oral Daily    calcium elemental  500 mg Oral BID    carvedilol  25 mg Oral BID    cyanocobalamin  1,000 mcg Oral Daily     ferrous sulfate  325 mg Oral Daily    gabapentin  300 mg Oral BID    insulin glargine  30 Units SubCUTAneous Nightly    metOLazone  2.5 mg Oral Once per day on Monday Thursday    magnesium oxide  400 mg Oral Daily    pantoprazole  40 mg Oral QAM AC    doxazosin  4 mg Oral Daily       Lab Data :  CBC:   Recent Labs     04/14/25  1421   WBC 8.9   HGB 9.8*   HCT 31.4*          CMP:  Recent Labs     04/15/25  0547 04/16/25  0601 04/16/25  1501 04/17/25  0534    142  --  141   K 3.7 3.3* 3.9 3.7   CL 99 97*  --  98   CO2 28 29  --  28   BUN 47* 47*  --  50*   CREATININE 2.3* 2.2*  --  2.3*   GLUCOSE 138* 115*  --  104   CALCIUM 8.7* 9.0  --  8.8   MG  --  2.3  --   --      Assessment and Plan:  Renal - CKD stage 4 - overall renal fx appears to be stable at baseline  Hypokalemia-mostly due to GI losses and diuretics currently on kcl 40 meq 3 times daily  Better kcl increased to 60 meq tid  Lasix resumed at 40 mg bid  Ok for DC from renal standpoint. Repeat bmp 1 week -see Dr. Deluca in 2 weeks    Hypomagnesemia improved as well  Diabetes mellitus  Essential hypertension  HFpEF :diuretics resumed  Anemia  Right knee effusion s/p aspiration consistent with CPPD    Mone Davis,   [facility]    This report has been created using voice recognition software. It may contain minor errors which are inherent in voice recognition technology

## 2025-04-17 NOTE — PLAN OF CARE
R knee aspiration consistent with CPPD  -no surgical intervention  -unable for NSAID considering renal function, steroid appropriate from ortho standpoint  -WBAT RLE  -compressive dressing    Will follow peripherally to ensure no change in culture     Beata Meyer PA-C

## 2025-04-18 LAB
BACTERIA SPEC ANAEROBE CULT: NORMAL
BACTERIA SPEC BFLD CULT: NORMAL
GRAM STN SPEC: NORMAL

## 2025-04-21 LAB
BACTERIA SPEC ANAEROBE CULT: NORMAL
BACTERIA SPEC BFLD CULT: NORMAL
GRAM STN SPEC: NORMAL

## 2025-04-25 ENCOUNTER — TELEPHONE (OUTPATIENT)
Dept: CARDIOLOGY CLINIC | Age: 70
End: 2025-04-25

## 2025-04-25 DIAGNOSIS — I50.32 CHRONIC DIASTOLIC CONGESTIVE HEART FAILURE, NYHA CLASS 2 (HCC): Primary | ICD-10-CM

## 2025-04-25 NOTE — TELEPHONE ENCOUNTER
Received hosp f/u labs on 4/19. Scanned in chart.  K+ 5.0, Creatinine 3.0    Called to nephrology office as he was sent into ER 4/10 for critical low k+ and to f/u with neph at d/c. Spoke to Gigi, Dr Deluca is out of office until Monday but will fwd on labs.    Called back to F. Labs were signed off on by Dr Rasheed with no new orders.    Order by Tiffany Almanza CNP:  Hold metolazone, kcl, and lasix until repeat BMP on Monday resulted.    Nurse Melissa notified and verbalized understanding with readback. Orders faxed.

## 2025-04-28 LAB
BUN BLDV-MCNC: 74 MG/DL
CALCIUM SERPL-MCNC: 9.3 MG/DL
CHLORIDE BLD-SCNC: 101 MMOL/L
CO2: 23 MMOL/L
CREAT SERPL-MCNC: 2.9 MG/DL
EGFR: NORMAL
GLUCOSE BLD-MCNC: 74 MG/DL
POTASSIUM SERPL-SCNC: 4.4 MMOL/L
SODIUM BLD-SCNC: 141 MMOL/L

## 2025-04-28 NOTE — TELEPHONE ENCOUNTER
Nurse Watkins called back  BMP drawn but can not guarantee when the results will come back  Meds still on hold

## 2025-04-28 NOTE — TELEPHONE ENCOUNTER
Nurse not available. Message to  to have nurse call office. Did they draw BMP today? Meds still on hold?

## 2025-04-29 RX ORDER — POTASSIUM CHLORIDE 1500 MG/1
20 TABLET, EXTENDED RELEASE ORAL DAILY
Qty: 60 TABLET | Refills: 3
Start: 2025-04-29

## 2025-04-29 NOTE — TELEPHONE ENCOUNTER
Can go back on lasix 40mg BID. Change KCL to 20 meq daily. BMP again in a week. If weight trending up/worsening symptoms to call the office and will consider metolazone.

## 2025-04-29 NOTE — TELEPHONE ENCOUNTER
Nurse June called to see if we received the labs.    This MA gave Domenica's orders to June.  Go back on lasix 40 mg bid, change K+ to 20 meq daily and BMP again in 1 week.  June voiced understanding.

## 2025-05-02 ENCOUNTER — OFFICE VISIT (OUTPATIENT)
Dept: CARDIOLOGY CLINIC | Age: 70
End: 2025-05-02
Payer: MEDICARE

## 2025-05-02 VITALS
HEIGHT: 63 IN | SYSTOLIC BLOOD PRESSURE: 93 MMHG | DIASTOLIC BLOOD PRESSURE: 55 MMHG | BODY MASS INDEX: 32.43 KG/M2 | WEIGHT: 183 LBS | HEART RATE: 89 BPM

## 2025-05-02 DIAGNOSIS — I50.32 CHRONIC DIASTOLIC CONGESTIVE HEART FAILURE, NYHA CLASS 2 (HCC): ICD-10-CM

## 2025-05-02 PROCEDURE — 3078F DIAST BP <80 MM HG: CPT | Performed by: INTERNAL MEDICINE

## 2025-05-02 PROCEDURE — 3074F SYST BP LT 130 MM HG: CPT | Performed by: INTERNAL MEDICINE

## 2025-05-02 PROCEDURE — 1123F ACP DISCUSS/DSCN MKR DOCD: CPT | Performed by: INTERNAL MEDICINE

## 2025-05-02 PROCEDURE — 99214 OFFICE O/P EST MOD 30 MIN: CPT | Performed by: INTERNAL MEDICINE

## 2025-05-02 PROCEDURE — 1159F MED LIST DOCD IN RCRD: CPT | Performed by: INTERNAL MEDICINE

## 2025-05-02 RX ORDER — LOPERAMIDE HYDROCHLORIDE 2 MG/1
2 CAPSULE ORAL 4 TIMES DAILY PRN
COMMUNITY

## 2025-05-02 RX ORDER — AMLODIPINE BESYLATE 2.5 MG/1
2.5 TABLET ORAL DAILY
Qty: 30 TABLET | Refills: 2 | Status: SHIPPED | OUTPATIENT
Start: 2025-05-02

## 2025-05-02 NOTE — PROGRESS NOTES
Pt C/O swelling in legs    Non smoker        Pt denies CP, SOB, Headache, dizziness, heart palpitations, fatigue  
weight, keep a log  Nonobstructive CAD ([facility] 2016)  Stress test on 07/2022 revealed a fixed inferior wall perfusion defect, no reversible ischemia  He denies chest pain   Continue risk factor modification and medical management  Statin, ASA  Norvasc 5 mg po daily. BP on low side , will decrease to 2.5 mg po daily  Corege  The patient was instructed to check the blood pressure at home, and record the readings. Patient will call office with blood pressure readings, will adjust patient's antihypertensive medications as needed accordingly     Disposition:   F/u with physician as scheduled, or sooner if needed.    Electronically signed by Augusta Gomez MD [facility], [facility]

## 2025-05-05 ENCOUNTER — OFFICE VISIT (OUTPATIENT)
Dept: NEPHROLOGY | Age: 70
End: 2025-05-05
Payer: MEDICARE

## 2025-05-05 VITALS
SYSTOLIC BLOOD PRESSURE: 82 MMHG | HEIGHT: 63 IN | HEART RATE: 86 BPM | DIASTOLIC BLOOD PRESSURE: 54 MMHG | BODY MASS INDEX: 33.31 KG/M2 | WEIGHT: 188 LBS | OXYGEN SATURATION: 96 %

## 2025-05-05 DIAGNOSIS — I10 PRIMARY HYPERTENSION: ICD-10-CM

## 2025-05-05 DIAGNOSIS — N25.81 HYPERPARATHYROIDISM, SECONDARY RENAL: ICD-10-CM

## 2025-05-05 DIAGNOSIS — E11.21 DIABETIC NEPHROPATHY ASSOCIATED WITH TYPE 2 DIABETES MELLITUS (HCC): ICD-10-CM

## 2025-05-05 DIAGNOSIS — D63.8 ANEMIA OF CHRONIC DISEASE: ICD-10-CM

## 2025-05-05 DIAGNOSIS — E55.9 VITAMIN D DEFICIENCY: ICD-10-CM

## 2025-05-05 DIAGNOSIS — N18.4 CKD (CHRONIC KIDNEY DISEASE), STAGE IV (HCC): Primary | ICD-10-CM

## 2025-05-05 PROCEDURE — 3074F SYST BP LT 130 MM HG: CPT | Performed by: INTERNAL MEDICINE

## 2025-05-05 PROCEDURE — 1159F MED LIST DOCD IN RCRD: CPT | Performed by: INTERNAL MEDICINE

## 2025-05-05 PROCEDURE — 3078F DIAST BP <80 MM HG: CPT | Performed by: INTERNAL MEDICINE

## 2025-05-05 PROCEDURE — 1123F ACP DISCUSS/DSCN MKR DOCD: CPT | Performed by: INTERNAL MEDICINE

## 2025-05-05 PROCEDURE — 99214 OFFICE O/P EST MOD 30 MIN: CPT | Performed by: INTERNAL MEDICINE

## 2025-05-05 RX ORDER — DIPHENHYDRAMINE HCL 25 MG
25 CAPSULE ORAL EVERY 6 HOURS PRN
COMMUNITY

## 2025-05-05 NOTE — PROGRESS NOTES
Renal Progress Note    Assessment and Plan:      Diagnosis Orders   1. CKD (chronic kidney disease), stage IV ([facility])        2. Diabetic nephropathy associated with type 2 diabetes mellitus ([facility])        3. Primary hypertension        4. Vitamin D deficiency        5. Hyperparathyroidism, secondary renal        6. Anemia of chronic disease                  PLAN:  Serum creatinine remains mostly at baseline.  This was discussed with the patient.  We reviewed the lab report together in epic.  I addressed his questions.  Diabetes mellitus is managed by another provider.  Blood pressure is actually low in the office today.  He denies any symptoms of hypotension.  Will discontinue amlodipine.  Will like to decrease furosemide from 40 mg twice a day to once a day.  However this was started by the cardiologist.  Patient is going to reach out to his cardiologist and see if they can do that.  Vitamin D level remains good at 34.8.  Parathyroid hormone level is stable at 101.6 and  requires no specific therapy.  Hemoglobin is slightly decreased to 9.8 g from 10.3 g in April 2025.  Will monitor that for now.  If he remains persistently below 10 g he will need erythropoietin replacement.  This was discussed with him.  Medications reviewed  No other changes otherwise  Return visit in 4 weeks with labs due to multiple ongoing events.          Patient Active Problem List   Diagnosis    Hyperlipidemia    Diabetes mellitus (HCC)    History of tobacco abuse    History of chest pain    Diverticulosis of colon    Arthritis    CAD (coronary artery disease)    Essential hypertension    Coronary artery disease involving native coronary artery of native heart without angina pectoris    Acute combined systolic and diastolic CHF, NYHA class 4 (HCC)    Congestive heart failure (HCC)    COVID-19    HLAEIGH (acute kidney injury)    Chronic heart failure with preserved ejection fraction (HCC)    Normocytic anemia    Inflammatory bowel disease    Iron

## 2025-05-05 NOTE — PROGRESS NOTES
Brandon was hospitalized here at [facility]. He has been at [facility] since then. He had hypokalemia that resolved.  He was seen by cardiology Friday (per patient and wife) they state Dr. Gomez decreased his amlodipine daily dosage. He is now taking 2.5 mg daily

## 2025-05-06 LAB
BUN BLDV-MCNC: 42 MG/DL
CALCIUM SERPL-MCNC: 8.5 MG/DL
CHLORIDE BLD-SCNC: 99 MMOL/L
CO2: 31 MMOL/L
CREAT SERPL-MCNC: 2.1 MG/DL
EGFR: 33
GLUCOSE BLD-MCNC: 102 MG/DL
POTASSIUM SERPL-SCNC: 2.5 MMOL/L
SODIUM BLD-SCNC: 142 MMOL/L

## 2025-05-07 ENCOUNTER — RESULTS FOLLOW-UP (OUTPATIENT)
Dept: CARDIOLOGY | Age: 70
End: 2025-05-07

## 2025-05-08 ENCOUNTER — RESULTS FOLLOW-UP (OUTPATIENT)
Dept: NEPHROLOGY | Age: 70
End: 2025-05-08

## 2025-05-08 DIAGNOSIS — E87.6 HYPOKALEMIA: Primary | ICD-10-CM

## 2025-05-08 DIAGNOSIS — N18.4 CKD (CHRONIC KIDNEY DISEASE), STAGE IV (HCC): ICD-10-CM

## 2025-05-08 LAB — POTASSIUM SERPL-SCNC: 3.4 MEQ/L (ref 3.4–5.3)

## 2025-05-14 LAB — POTASSIUM SERPL-SCNC: 3.2 MEQ/L (ref 3.4–5.3)

## 2025-05-15 NOTE — TELEPHONE ENCOUNTER
Spoke with Nalini (MARIA TERESA from [facility])  Brandon has been only taking Potassium 20 meq QD. She will be sure that he increases the potassium to 20 meq TID starting today. She will have repeat K lab done Monday. Note and lab faxed.

## 2025-05-19 LAB
BUN BLDV-MCNC: 19 MG/DL
CALCIUM SERPL-MCNC: 9.3 MG/DL
CHLORIDE BLD-SCNC: 102 MMOL/L
CO2: 27 MMOL/L
CREAT SERPL-MCNC: 1.7 MG/DL
EGFR: NORMAL
GLUCOSE BLD-MCNC: 133 MG/DL
POTASSIUM SERPL-SCNC: 4.3 MMOL/L
SODIUM BLD-SCNC: 137 MMOL/L

## 2025-06-05 ENCOUNTER — TELEPHONE (OUTPATIENT)
Dept: NEPHROLOGY | Age: 70
End: 2025-06-05

## 2025-06-05 ENCOUNTER — OFFICE VISIT (OUTPATIENT)
Dept: NEPHROLOGY | Age: 70
End: 2025-06-05
Payer: MEDICARE

## 2025-06-05 VITALS
HEART RATE: 85 BPM | HEIGHT: 63 IN | DIASTOLIC BLOOD PRESSURE: 44 MMHG | SYSTOLIC BLOOD PRESSURE: 89 MMHG | WEIGHT: 189.6 LBS | OXYGEN SATURATION: 97 % | BODY MASS INDEX: 33.59 KG/M2

## 2025-06-05 DIAGNOSIS — N25.81 HYPERPARATHYROIDISM, SECONDARY RENAL: ICD-10-CM

## 2025-06-05 DIAGNOSIS — E87.6 HYPOKALEMIA: ICD-10-CM

## 2025-06-05 DIAGNOSIS — N18.32 STAGE 3B CHRONIC KIDNEY DISEASE (HCC): Primary | ICD-10-CM

## 2025-06-05 DIAGNOSIS — E11.21 DIABETIC NEPHROPATHY ASSOCIATED WITH TYPE 2 DIABETES MELLITUS (HCC): ICD-10-CM

## 2025-06-05 DIAGNOSIS — I10 PRIMARY HYPERTENSION: ICD-10-CM

## 2025-06-05 PROCEDURE — 3078F DIAST BP <80 MM HG: CPT | Performed by: INTERNAL MEDICINE

## 2025-06-05 PROCEDURE — 1123F ACP DISCUSS/DSCN MKR DOCD: CPT | Performed by: INTERNAL MEDICINE

## 2025-06-05 PROCEDURE — 1159F MED LIST DOCD IN RCRD: CPT | Performed by: INTERNAL MEDICINE

## 2025-06-05 PROCEDURE — 3074F SYST BP LT 130 MM HG: CPT | Performed by: INTERNAL MEDICINE

## 2025-06-05 PROCEDURE — 99214 OFFICE O/P EST MOD 30 MIN: CPT | Performed by: INTERNAL MEDICINE

## 2025-06-05 RX ORDER — CHOLESTYRAMINE 4 G/9G
1 POWDER, FOR SUSPENSION ORAL 2 TIMES DAILY
COMMUNITY

## 2025-06-05 NOTE — PROGRESS NOTES
Patient and wife report Brandon has been home from [facility] since May 19,2025. They both deny that he needs a cardiac cath. They say he had an EKG at the nursing home that was WNL.   Wife has a record of recent blood sugar levels. She is concerned that the nursing home Doctor decreased his potassium from 3 tabs TID and now only 1 tab TID.  They report metolazone was stopped at the nursing home. Now he has slight bilateral feet swelling.  He has also start cholestyramine packets BID.  BP low today in office. Patient says PT took it earlier today and it was 110/82

## 2025-06-05 NOTE — PROGRESS NOTES
Renal Progress Note    Assessment and Plan:      Diagnosis Orders   1. Stage 3b chronic kidney disease (HCC)        2. Hypokalemia        3. Primary hypertension        4. Diabetic nephropathy associated with type 2 diabetes mellitus (HCC)        5. Hyperparathyroidism, secondary renal                  PLAN:  Serum creatinine is improved to 1.7 mg/dL from 2.1 mg/dL.  Serum potassium level is normal at 4.3 mill equivalent /liter a a significant improvement from 2.5 mEq/L last time I saw him.  Hypertension is managed by other provider.  Diabetes mellitus is managed by the provider.  Last .6 which requires no specific therapy.  Medications reviewed  Continue potassium supplement as long as he remains on furosemide.  This was discussed with him.  Discontinue terazosin due to low blood pressure.  I saw discussed with the patient and spouse.  Return visit in 3 months with labs          Patient Active Problem List   Diagnosis    Hyperlipidemia    Diabetes mellitus (HCC)    History of tobacco abuse    History of chest pain    Diverticulosis of colon    Arthritis    CAD (coronary artery disease)    Essential hypertension    Coronary artery disease involving native coronary artery of native heart without angina pectoris    Acute combined systolic and diastolic CHF, NYHA class 4 (HCC)    Congestive heart failure (HCC)    COVID-19    HALEIGH (acute kidney injury)    Chronic heart failure with preserved ejection fraction (HCC)    Normocytic anemia    Inflammatory bowel disease    Iron deficiency anemia due to chronic blood loss    Venous stasis ulcer of left lower leg with edema of left lower leg (HCC)    Venous stasis ulcer of right lower leg with edema of right lower leg (HCC)    Shortness of breath    Hypokalemia    Stage 4 chronic kidney disease (HCC)    Right knee pain    Hx of insulin dependent diabetes mellitus           Subjective:   Chief complaint:  Chief Complaint   Patient presents with    Follow-up     1 month FU

## 2025-06-05 NOTE — TELEPHONE ENCOUNTER
Brandon is scheduled for a follow up to see Dr. Deluca today. He informed our office via phone that he will need cardiac clearance. Please advise what surgery Brandon needs clearance for.

## 2025-06-06 NOTE — TELEPHONE ENCOUNTER
Spoke with patient wife, she said that she is not aware of any surgery, she did say something about a heart cath, but not sure, and he wanted to have knee surgery last year, but he is to high risk.

## 2025-06-06 NOTE — TELEPHONE ENCOUNTER
Office note reviewed  No plans for cardiac cath from my standpoint   If patient is having any new cardiac complaints, he should notify office

## 2025-06-09 NOTE — TELEPHONE ENCOUNTER
Patient's wife, Zoya-on HIPAA verbalized understanding.   She states he isn't having any chest pain, pressure or shortness of breath.   I advised her if he begins to have those symptoms to contact our office.

## 2025-06-26 ENCOUNTER — OFFICE VISIT (OUTPATIENT)
Dept: CARDIOLOGY CLINIC | Age: 70
End: 2025-06-26
Payer: MEDICARE

## 2025-06-26 VITALS
BODY MASS INDEX: 33.13 KG/M2 | WEIGHT: 187 LBS | OXYGEN SATURATION: 96 % | SYSTOLIC BLOOD PRESSURE: 140 MMHG | DIASTOLIC BLOOD PRESSURE: 70 MMHG | HEART RATE: 82 BPM

## 2025-06-26 DIAGNOSIS — G47.33 OSA ON CPAP: ICD-10-CM

## 2025-06-26 DIAGNOSIS — I51.89 GRADE I DIASTOLIC DYSFUNCTION: ICD-10-CM

## 2025-06-26 DIAGNOSIS — I50.32 CHRONIC DIASTOLIC CONGESTIVE HEART FAILURE, NYHA CLASS 2 (HCC): Primary | ICD-10-CM

## 2025-06-26 DIAGNOSIS — R60.0 EDEMA OF BOTH LOWER LEGS: ICD-10-CM

## 2025-06-26 DIAGNOSIS — D50.0 IRON DEFICIENCY ANEMIA DUE TO CHRONIC BLOOD LOSS: ICD-10-CM

## 2025-06-26 PROCEDURE — 99214 OFFICE O/P EST MOD 30 MIN: CPT | Performed by: NURSE PRACTITIONER

## 2025-06-26 PROCEDURE — 1160F RVW MEDS BY RX/DR IN RCRD: CPT | Performed by: NURSE PRACTITIONER

## 2025-06-26 PROCEDURE — 3078F DIAST BP <80 MM HG: CPT | Performed by: NURSE PRACTITIONER

## 2025-06-26 PROCEDURE — 3077F SYST BP >= 140 MM HG: CPT | Performed by: NURSE PRACTITIONER

## 2025-06-26 PROCEDURE — 1159F MED LIST DOCD IN RCRD: CPT | Performed by: NURSE PRACTITIONER

## 2025-06-26 PROCEDURE — 1123F ACP DISCUSS/DSCN MKR DOCD: CPT | Performed by: NURSE PRACTITIONER

## 2025-06-26 ASSESSMENT — ENCOUNTER SYMPTOMS
NAUSEA: 0
ABDOMINAL DISTENTION: 0
SHORTNESS OF BREATH: 1
COUGH: 0
VOMITING: 0

## 2025-06-26 NOTE — PATIENT INSTRUCTIONS
You may receive a survey regarding the care you received during your visit.  Your input is valuable to us.  We encourage you to complete and return your survey.  We hope you will choose us in the future for your healthcare needs.    Your nurses today were Shashank.  Office hours:   Mon-Thurs 8-4:30  Friday 8-12  Phone: [phone]    Continue:  Continue current medications  Daily weights and record  Fluid restriction of 2 Liters per day  Limit sodium in diet to around 9791-8447 mg/day  Monitor BP  Activity as tolerated     Call the Heart Failure Clinic for any of the following symptoms:   Weight gain of -3 pounds in 1 day or 5 pounds in 1 week  Increased shortness of breath  Shortness of breath while laying down  Cough  Chest pain  Swelling in feet, ankles or legs  Bloating in abdomen  Fatigue        For 3 days take extra lasix 40mg 6hrs after morning dose   Take potassium 40meq TID for 3 days.         Continue diet/fluid adherence  Start daily weights  F/U w/ Cardiology  F/U in clinic in 3 months

## 2025-06-26 NOTE — PROGRESS NOTES
Heart Failure Clinic       Visit Date: 6/26/2025  Cardiologist:  Dr. Gomez  Primary Care Physician: Rajeev South, APRN - CNP    Brandon Jacobs is a 69 y.o. male who presents today for:  Chief Complaint   Patient presents with    Congestive Heart Failure    Follow-up       HPI:     TYPE HF: HFpEF (55-60% 2023) (40-45% 2019) DD grade 1  Cause: nonischemic  Device: none  HX: CAD (nonobstructive), DM, HLD, HTN  Dry Wt:  (226 on 10/3/22) (230 on 1/11/23) (228 on 3/8/23) ) (224 on 3/31/23) (223 on 5/2/23) (219 on 6/15/23) (217 on 9/20/23) (210 on 10/23/23) (223 on 12/20/23) (223 on 1/5/24) (214 on 2/19/24) (205 on 6/4/24) (202 on 12/4/24) (187 on 6/26/25)    Brandon Jacobs is a 69 y.o. male who presents to the office for a follow up patient visit in the heart failure clinic.      Concerns today: here today for his 6 month f/u. Weight is down another 15lbs. Less caloric intake. No hospitalization for CHF. Was in for hypokalemia and lasix was decreased from 80mg BID to 40mg daily. K level stable.     Patient has:  Chest Pain: no  SOB: yes w/ long distances- not walking much, back to wheel chair d/t decline post hospitalization.   Orthopnea/PND: sleeps in chair    SRAVAN: yes wearing CPAP nightly   Edema: resolved  Fatigue: no  Abdominal bloating: no  Cough: no  Appetite: good    Visit on 12/4/24: 6 month f/u. Weight is stable. Urinating well.     Patient has:  Chest Pain: no  SOB: yes w/ long distances   Orthopnea/PND: sleeps in chair    SRAVAN: yes wearing CPAP nightly   Edema: resolved  Fatigue: no  Abdominal bloating: no  Cough: no  Appetite: good    Visit on 6/4/24: 3 month f/u. Weight is down 9lb, total of 18lbs. Denies worsening SOB, bloating, and orthopnea, notes continue improvement of his leg swelling. Doing better with his Na. Urinating well on it.     Visit on 2/19/24: 6 week f/u. Weight is down 9 lbs. Continues with wound care and lymphedema clinic. He continues to urinate well. Continues with

## 2025-07-07 RX ORDER — FUROSEMIDE 40 MG/1
40 TABLET ORAL DAILY
Qty: 90 TABLET | Refills: 3 | Status: SHIPPED | OUTPATIENT
Start: 2025-07-07

## 2025-08-28 LAB
ANION GAP SERPL CALCULATED.3IONS-SCNC: 6 MMOL/L (ref 4–12)
BUN BLDV-MCNC: 20 MG/DL (ref 7–20)
CALCIUM SERPL-MCNC: 7.8 MG/DL (ref 8.8–10.5)
CHLORIDE BLD-SCNC: 105 MEQ/L (ref 101–111)
CO2: 28 MEQ/L (ref 21–32)
CREAT SERPL-MCNC: 1.47 MG/DL (ref 0.6–1.3)
CREATININE CLEARANCE: 47
CREATININE, RANDOM URINE: 60.4 MG/DL
GLUCOSE: 200 MG/DL (ref 70–110)
MAGNESIUM: 1.6 MG/DL (ref 1.8–2.5)
MICROALBUMIN/CREAT 24H UR: 69.2 MG/L
MICROALBUMIN/CREAT UR-RTO: 114.5 MG/GM (ref 0–30)
POTASSIUM SERPL-SCNC: 3.9 MEQ/L (ref 3.6–5)
PTH INTACT: 73.4 PG/ML (ref 12–88)
SODIUM BLD-SCNC: 139 MEQ/L (ref 135–145)
VITAMIN D 25-HYDROXY: 31.2 NG/ML (ref 30–100)

## 2025-09-03 LAB — MAGNESIUM: 1.6 MG/DL (ref 1.8–2.5)

## 2025-09-04 ENCOUNTER — OFFICE VISIT (OUTPATIENT)
Dept: NEPHROLOGY | Age: 70
End: 2025-09-04

## 2025-09-04 VITALS
WEIGHT: 187 LBS | BODY MASS INDEX: 33.13 KG/M2 | HEIGHT: 63 IN | DIASTOLIC BLOOD PRESSURE: 82 MMHG | SYSTOLIC BLOOD PRESSURE: 164 MMHG | HEART RATE: 85 BPM | OXYGEN SATURATION: 97 %

## 2025-09-04 DIAGNOSIS — E55.9 VITAMIN D DEFICIENCY: ICD-10-CM

## 2025-09-04 DIAGNOSIS — N18.32 STAGE 3B CHRONIC KIDNEY DISEASE (HCC): Primary | ICD-10-CM

## 2025-09-04 DIAGNOSIS — E83.51 HYPOCALCEMIA: ICD-10-CM

## 2025-09-04 DIAGNOSIS — E11.21 DIABETIC NEPHROPATHY ASSOCIATED WITH TYPE 2 DIABETES MELLITUS (HCC): ICD-10-CM

## 2025-09-04 DIAGNOSIS — E83.42 HYPOMAGNESEMIA: ICD-10-CM

## 2025-09-04 DIAGNOSIS — I10 PRIMARY HYPERTENSION: ICD-10-CM

## 2025-09-04 RX ORDER — MAGNESIUM OXIDE 400 MG/1
400 TABLET ORAL 2 TIMES DAILY
Qty: 10 TABLET | Refills: 1 | Status: SHIPPED | OUTPATIENT
Start: 2025-09-04 | End: 2025-09-04 | Stop reason: ALTCHOICE